# Patient Record
Sex: MALE | Race: WHITE | Employment: OTHER | URBAN - METROPOLITAN AREA
[De-identification: names, ages, dates, MRNs, and addresses within clinical notes are randomized per-mention and may not be internally consistent; named-entity substitution may affect disease eponyms.]

---

## 2018-02-20 ENCOUNTER — HOSPITAL ENCOUNTER (OUTPATIENT)
Age: 58
Setting detail: SPECIMEN
Discharge: HOME | End: 2018-02-20
Payer: COMMERCIAL

## 2018-02-20 LAB
ABSOLUTE EOS #: 0.08 K/UL (ref 0–0.44)
ABSOLUTE IMMATURE GRANULOCYTE: 0.03 K/UL (ref 0–0.3)
ABSOLUTE LYMPH #: 1.23 K/UL (ref 1.1–3.7)
ABSOLUTE MONO #: 0.53 K/UL (ref 0.1–1.2)
ALBUMIN SERPL-MCNC: 4.3 G/DL (ref 3.5–5.2)
ALBUMIN/GLOBULIN RATIO: 1.8 (ref 1–2.5)
ALP BLD-CCNC: 233 U/L (ref 40–129)
ALT SERPL-CCNC: 19 U/L (ref 5–41)
ANION GAP SERPL CALCULATED.3IONS-SCNC: 15 MMOL/L (ref 9–17)
AST SERPL-CCNC: 18 U/L
BASOPHILS # BLD: 1 % (ref 0–2)
BASOPHILS ABSOLUTE: 0.07 K/UL (ref 0–0.2)
BILIRUB SERPL-MCNC: 0.94 MG/DL (ref 0.3–1.2)
BILIRUBIN URINE: NEGATIVE
BUN BLDV-MCNC: 10 MG/DL (ref 6–20)
BUN/CREAT BLD: ABNORMAL (ref 9–20)
CALCIUM SERPL-MCNC: 9.2 MG/DL (ref 8.6–10.4)
CHLORIDE BLD-SCNC: 105 MMOL/L (ref 98–107)
CHOLESTEROL/HDL RATIO: 5.7
CHOLESTEROL: 170 MG/DL
CO2: 25 MMOL/L (ref 20–31)
COLOR: YELLOW
COMMENT UA: NORMAL
CREAT SERPL-MCNC: 1.27 MG/DL (ref 0.7–1.2)
DIFFERENTIAL TYPE: ABNORMAL
EOSINOPHILS RELATIVE PERCENT: 1 % (ref 1–4)
ESTIMATED AVERAGE GLUCOSE: 111 MG/DL
GFR AFRICAN AMERICAN: >60 ML/MIN
GFR NON-AFRICAN AMERICAN: 58 ML/MIN
GFR SERPL CREATININE-BSD FRML MDRD: ABNORMAL ML/MIN/{1.73_M2}
GFR SERPL CREATININE-BSD FRML MDRD: ABNORMAL ML/MIN/{1.73_M2}
GLUCOSE BLD-MCNC: 87 MG/DL (ref 70–99)
GLUCOSE URINE: NEGATIVE
HBA1C MFR BLD: 5.5 % (ref 4–6)
HCT VFR BLD CALC: 50.1 % (ref 40.7–50.3)
HDLC SERPL-MCNC: 30 MG/DL
HEMOGLOBIN: 15.8 G/DL (ref 13–17)
IMMATURE GRANULOCYTES: 1 %
KETONES, URINE: NEGATIVE
LDL CHOLESTEROL: 108 MG/DL (ref 0–130)
LEUKOCYTE ESTERASE, URINE: NEGATIVE
LYMPHOCYTES # BLD: 19 % (ref 24–43)
MCH RBC QN AUTO: 28.7 PG (ref 25.2–33.5)
MCHC RBC AUTO-ENTMCNC: 31.5 G/DL (ref 28.4–34.8)
MCV RBC AUTO: 91.1 FL (ref 82.6–102.9)
MONOCYTES # BLD: 8 % (ref 3–12)
NITRITE, URINE: NEGATIVE
NRBC AUTOMATED: 0 PER 100 WBC
PDW BLD-RTO: 15.6 % (ref 11.8–14.4)
PH UA: 5 (ref 5–8)
PLATELET # BLD: 167 K/UL (ref 138–453)
PLATELET ESTIMATE: ABNORMAL
PMV BLD AUTO: 10.5 FL (ref 8.1–13.5)
POTASSIUM SERPL-SCNC: 5.5 MMOL/L (ref 3.7–5.3)
PROTEIN UA: NEGATIVE
RBC # BLD: 5.5 M/UL (ref 4.21–5.77)
RBC # BLD: ABNORMAL 10*6/UL
SEG NEUTROPHILS: 70 % (ref 36–65)
SEGMENTED NEUTROPHILS ABSOLUTE COUNT: 4.59 K/UL (ref 1.5–8.1)
SODIUM BLD-SCNC: 145 MMOL/L (ref 135–144)
SPECIFIC GRAVITY UA: 1.01 (ref 1–1.03)
THYROXINE, FREE: 0.97 NG/DL (ref 0.93–1.7)
TOTAL PROTEIN: 6.7 G/DL (ref 6.4–8.3)
TRIGL SERPL-MCNC: 158 MG/DL
TSH SERPL DL<=0.05 MIU/L-ACNC: 1.93 MIU/L (ref 0.3–5)
TURBIDITY: CLEAR
URINE HGB: NEGATIVE
UROBILINOGEN, URINE: NORMAL
VITAMIN D 25-HYDROXY: 36.2 NG/ML (ref 30–100)
VLDLC SERPL CALC-MCNC: ABNORMAL MG/DL (ref 1–30)
WBC # BLD: 6.5 K/UL (ref 3.5–11.3)
WBC # BLD: ABNORMAL 10*3/UL

## 2018-02-22 ENCOUNTER — HOSPITAL ENCOUNTER (OUTPATIENT)
Age: 58
Setting detail: SPECIMEN
Discharge: HOME | End: 2018-02-22
Payer: COMMERCIAL

## 2018-02-22 LAB
ALBUMIN SERPL-MCNC: 4.5 G/DL (ref 3.5–5.2)
ALBUMIN/GLOBULIN RATIO: 2 (ref 1–2.5)
ALP BLD-CCNC: 230 U/L (ref 40–129)
ALT SERPL-CCNC: 19 U/L (ref 5–41)
ANION GAP SERPL CALCULATED.3IONS-SCNC: 17 MMOL/L (ref 9–17)
AST SERPL-CCNC: 17 U/L
BILIRUB SERPL-MCNC: 0.66 MG/DL (ref 0.3–1.2)
BUN BLDV-MCNC: 12 MG/DL (ref 6–20)
BUN/CREAT BLD: ABNORMAL (ref 9–20)
CALCIUM SERPL-MCNC: 9.1 MG/DL (ref 8.6–10.4)
CHLORIDE BLD-SCNC: 103 MMOL/L (ref 98–107)
CO2: 24 MMOL/L (ref 20–31)
CREAT SERPL-MCNC: 1.21 MG/DL (ref 0.7–1.2)
GFR AFRICAN AMERICAN: >60 ML/MIN
GFR NON-AFRICAN AMERICAN: >60 ML/MIN
GFR SERPL CREATININE-BSD FRML MDRD: ABNORMAL ML/MIN/{1.73_M2}
GFR SERPL CREATININE-BSD FRML MDRD: ABNORMAL ML/MIN/{1.73_M2}
GGT: 23 U/L (ref 8–61)
GLUCOSE BLD-MCNC: 54 MG/DL (ref 70–99)
HAV IGM SER IA-ACNC: NONREACTIVE
HEPATITIS B CORE IGM ANTIBODY: NONREACTIVE
HEPATITIS B SURFACE ANTIGEN: NONREACTIVE
HEPATITIS C ANTIBODY: NONREACTIVE
POTASSIUM SERPL-SCNC: 5.6 MMOL/L (ref 3.7–5.3)
SODIUM BLD-SCNC: 144 MMOL/L (ref 135–144)
TOTAL PROTEIN: 6.8 G/DL (ref 6.4–8.3)

## 2018-02-26 ENCOUNTER — HOSPITAL ENCOUNTER (OUTPATIENT)
Age: 58
Setting detail: SPECIMEN
Discharge: HOME | End: 2018-02-26
Payer: COMMERCIAL

## 2018-02-26 LAB
ALBUMIN SERPL-MCNC: 4.3 G/DL (ref 3.5–5.2)
ALBUMIN/GLOBULIN RATIO: 1.7 (ref 1–2.5)
ALP BLD-CCNC: 235 U/L (ref 40–129)
ALT SERPL-CCNC: 18 U/L (ref 5–41)
ANION GAP SERPL CALCULATED.3IONS-SCNC: 14 MMOL/L (ref 9–17)
AST SERPL-CCNC: 19 U/L
BILIRUB SERPL-MCNC: 0.62 MG/DL (ref 0.3–1.2)
BUN BLDV-MCNC: 10 MG/DL (ref 6–20)
BUN/CREAT BLD: ABNORMAL (ref 9–20)
CALCIUM SERPL-MCNC: 9 MG/DL (ref 8.6–10.4)
CHLORIDE BLD-SCNC: 102 MMOL/L (ref 98–107)
CO2: 26 MMOL/L (ref 20–31)
CREAT SERPL-MCNC: 1.23 MG/DL (ref 0.7–1.2)
GFR AFRICAN AMERICAN: >60 ML/MIN
GFR NON-AFRICAN AMERICAN: >60 ML/MIN
GFR SERPL CREATININE-BSD FRML MDRD: ABNORMAL ML/MIN/{1.73_M2}
GFR SERPL CREATININE-BSD FRML MDRD: ABNORMAL ML/MIN/{1.73_M2}
GLUCOSE BLD-MCNC: 66 MG/DL (ref 70–99)
POTASSIUM SERPL-SCNC: 5.9 MMOL/L (ref 3.7–5.3)
SODIUM BLD-SCNC: 142 MMOL/L (ref 135–144)
TOTAL PROTEIN: 6.8 G/DL (ref 6.4–8.3)

## 2018-03-06 ENCOUNTER — HOSPITAL ENCOUNTER (OUTPATIENT)
Age: 58
Setting detail: SPECIMEN
Discharge: HOME | End: 2018-03-06
Payer: COMMERCIAL

## 2018-03-06 LAB
ALBUMIN SERPL-MCNC: 4.5 G/DL (ref 3.5–5.2)
ALBUMIN/GLOBULIN RATIO: 2 (ref 1–2.5)
ALP BLD-CCNC: 257 U/L (ref 40–129)
ALT SERPL-CCNC: 30 U/L (ref 5–41)
ANION GAP SERPL CALCULATED.3IONS-SCNC: 14 MMOL/L (ref 9–17)
AST SERPL-CCNC: 21 U/L
BILIRUB SERPL-MCNC: 0.66 MG/DL (ref 0.3–1.2)
BUN BLDV-MCNC: 11 MG/DL (ref 6–20)
BUN/CREAT BLD: ABNORMAL (ref 9–20)
CALCIUM SERPL-MCNC: 9.6 MG/DL (ref 8.6–10.4)
CHLORIDE BLD-SCNC: 105 MMOL/L (ref 98–107)
CO2: 27 MMOL/L (ref 20–31)
CREAT SERPL-MCNC: 1.09 MG/DL (ref 0.7–1.2)
GFR AFRICAN AMERICAN: >60 ML/MIN
GFR NON-AFRICAN AMERICAN: >60 ML/MIN
GFR SERPL CREATININE-BSD FRML MDRD: ABNORMAL ML/MIN/{1.73_M2}
GFR SERPL CREATININE-BSD FRML MDRD: ABNORMAL ML/MIN/{1.73_M2}
GLUCOSE BLD-MCNC: 89 MG/DL (ref 70–99)
POTASSIUM SERPL-SCNC: 5 MMOL/L (ref 3.7–5.3)
SODIUM BLD-SCNC: 146 MMOL/L (ref 135–144)
TOTAL PROTEIN: 6.8 G/DL (ref 6.4–8.3)

## 2018-03-09 ENCOUNTER — HOSPITAL ENCOUNTER (OUTPATIENT)
Age: 58
Setting detail: SPECIMEN
Discharge: HOME | End: 2018-03-09
Payer: COMMERCIAL

## 2018-03-09 LAB
ALP BLD-CCNC: 269 U/L (ref 40–129)
PTH INTACT: 108.8 PG/ML (ref 15–65)

## 2018-03-12 LAB
ALBUMIN (CALCULATED): 4.5 G/DL (ref 3.2–5.2)
ALBUMIN PERCENT: 67 % (ref 45–65)
ALPHA 1 PERCENT: 3 % (ref 3–6)
ALPHA 2 PERCENT: 11 % (ref 6–13)
ALPHA-1-GLOBULIN: 0.2 G/DL (ref 0.1–0.4)
ALPHA-2-GLOBULIN: 0.7 G/DL (ref 0.5–0.9)
BETA GLOBULIN: 0.7 G/DL (ref 0.5–1.1)
BETA PERCENT: 11 % (ref 11–19)
FREE KAPPA/LAMBDA RATIO: 2.99 (ref 0.26–1.65)
GAMMA GLOBULIN %: 9 % (ref 9–20)
GAMMA GLOBULIN: 0.6 G/DL (ref 0.5–1.5)
KAPPA FREE LIGHT CHAINS QNT: 5.39 MG/DL (ref 0.37–1.94)
LAMBDA FREE LIGHT CHAINS QNT: 1.8 MG/DL (ref 0.57–2.63)
MITOCHONDRIAL ANTIBODY: 4.9 UNITS (ref 0–20)
PATHOLOGIST: ABNORMAL
PATHOLOGIST: ABNORMAL
PROTEIN ELECTROPHORESIS, SERUM: ABNORMAL
SERUM IFX INTERP: ABNORMAL
TOTAL PROT. SUM,%: 101 % (ref 98–102)
TOTAL PROT. SUM: 6.7 G/DL (ref 6.3–8.2)
TOTAL PROTEIN: 6.8 G/DL (ref 6.4–8.3)

## 2018-04-05 ENCOUNTER — INITIAL CONSULT (OUTPATIENT)
Dept: ONCOLOGY | Age: 58
End: 2018-04-05
Payer: COMMERCIAL

## 2018-04-05 ENCOUNTER — TELEPHONE (OUTPATIENT)
Dept: ONCOLOGY | Age: 58
End: 2018-04-05

## 2018-04-05 ENCOUNTER — HOSPITAL ENCOUNTER (OUTPATIENT)
Facility: MEDICAL CENTER | Age: 58
Discharge: HOME | End: 2018-04-05
Payer: COMMERCIAL

## 2018-04-05 VITALS
BODY MASS INDEX: 30.81 KG/M2 | RESPIRATION RATE: 20 BRPM | WEIGHT: 191.7 LBS | SYSTOLIC BLOOD PRESSURE: 98 MMHG | HEART RATE: 61 BPM | DIASTOLIC BLOOD PRESSURE: 67 MMHG | TEMPERATURE: 97.6 F | HEIGHT: 66 IN

## 2018-04-05 DIAGNOSIS — N18.30 CHRONIC RENAL IMPAIRMENT, STAGE 3 (MODERATE) (HCC): ICD-10-CM

## 2018-04-05 DIAGNOSIS — D89.2 PARAPROTEINEMIA: Primary | ICD-10-CM

## 2018-04-05 DIAGNOSIS — D47.2 MONOCLONAL GAMMOPATHY: ICD-10-CM

## 2018-04-05 DIAGNOSIS — D89.2 PARAPROTEINEMIA: ICD-10-CM

## 2018-04-05 LAB
ABSOLUTE EOS #: 0.1 K/UL (ref 0–0.4)
ABSOLUTE IMMATURE GRANULOCYTE: ABNORMAL K/UL (ref 0–0.3)
ABSOLUTE LYMPH #: 1.2 K/UL (ref 1–4.8)
ABSOLUTE MONO #: 0.6 K/UL (ref 0.2–0.8)
ALBUMIN SERPL-MCNC: 4.2 G/DL (ref 3.5–5.2)
ALBUMIN/GLOBULIN RATIO: ABNORMAL (ref 1–2.5)
ALP BLD-CCNC: 211 U/L (ref 40–129)
ALT SERPL-CCNC: 16 U/L (ref 5–41)
ANION GAP SERPL CALCULATED.3IONS-SCNC: 10 MMOL/L (ref 9–17)
AST SERPL-CCNC: 12 U/L
BASOPHILS # BLD: 1 % (ref 0–2)
BASOPHILS ABSOLUTE: 0 K/UL (ref 0–0.2)
BILIRUB SERPL-MCNC: 0.66 MG/DL (ref 0.3–1.2)
BUN BLDV-MCNC: 12 MG/DL (ref 6–20)
BUN/CREAT BLD: 9 (ref 9–20)
CALCIUM SERPL-MCNC: 9.1 MG/DL (ref 8.6–10.4)
CHLORIDE BLD-SCNC: 103 MMOL/L (ref 98–107)
CO2: 27 MMOL/L (ref 20–31)
CREAT SERPL-MCNC: 1.29 MG/DL (ref 0.7–1.2)
DIFFERENTIAL TYPE: ABNORMAL
EOSINOPHILS RELATIVE PERCENT: 1 % (ref 1–4)
FREE KAPPA/LAMBDA RATIO: 3.75 (ref 0.26–1.65)
GFR AFRICAN AMERICAN: >60 ML/MIN
GFR NON-AFRICAN AMERICAN: 57 ML/MIN
GFR SERPL CREATININE-BSD FRML MDRD: ABNORMAL ML/MIN/{1.73_M2}
GFR SERPL CREATININE-BSD FRML MDRD: ABNORMAL ML/MIN/{1.73_M2}
GLUCOSE BLD-MCNC: 91 MG/DL (ref 70–99)
HCT VFR BLD CALC: 48.5 % (ref 41–53)
HEMOGLOBIN: 15.9 G/DL (ref 13.5–17.5)
IGA: 161 MG/DL (ref 70–400)
IGG: 849 MG/DL (ref 700–1600)
IGM: 41 MG/DL (ref 40–230)
IMMATURE GRANULOCYTES: ABNORMAL %
KAPPA FREE LIGHT CHAINS QNT: 8.07 MG/DL (ref 0.37–1.94)
LACTATE DEHYDROGENASE: 156 U/L (ref 135–225)
LAMBDA FREE LIGHT CHAINS QNT: 2.15 MG/DL (ref 0.57–2.63)
LYMPHOCYTES # BLD: 14 % (ref 24–44)
MCH RBC QN AUTO: 29.5 PG (ref 26–34)
MCHC RBC AUTO-ENTMCNC: 32.9 G/DL (ref 31–37)
MCV RBC AUTO: 89.9 FL (ref 80–100)
MONOCYTES # BLD: 7 % (ref 1–7)
NRBC AUTOMATED: ABNORMAL PER 100 WBC
PDW BLD-RTO: 15.9 % (ref 11.5–14.5)
PLATELET # BLD: 185 K/UL (ref 130–400)
PLATELET ESTIMATE: ABNORMAL
PMV BLD AUTO: 8.7 FL (ref 6–12)
POTASSIUM SERPL-SCNC: 4.6 MMOL/L (ref 3.7–5.3)
RBC # BLD: 5.39 M/UL (ref 4.5–5.9)
RBC # BLD: ABNORMAL 10*6/UL
SEG NEUTROPHILS: 77 % (ref 36–66)
SEGMENTED NEUTROPHILS ABSOLUTE COUNT: 6.5 K/UL (ref 1.8–7.7)
SODIUM BLD-SCNC: 140 MMOL/L (ref 135–144)
TOTAL PROTEIN: 6.8 G/DL (ref 6.4–8.3)
WBC # BLD: 8.4 K/UL (ref 3.5–11)
WBC # BLD: ABNORMAL 10*3/UL

## 2018-04-05 PROCEDURE — 83883 ASSAY NEPHELOMETRY NOT SPEC: CPT

## 2018-04-05 PROCEDURE — 84165 PROTEIN E-PHORESIS SERUM: CPT

## 2018-04-05 PROCEDURE — 83615 LACTATE (LD) (LDH) ENZYME: CPT

## 2018-04-05 PROCEDURE — G8417 CALC BMI ABV UP PARAM F/U: HCPCS | Performed by: INTERNAL MEDICINE

## 2018-04-05 PROCEDURE — 82784 ASSAY IGA/IGD/IGG/IGM EACH: CPT

## 2018-04-05 PROCEDURE — G8427 DOCREV CUR MEDS BY ELIG CLIN: HCPCS | Performed by: INTERNAL MEDICINE

## 2018-04-05 PROCEDURE — 82232 ASSAY OF BETA-2 PROTEIN: CPT

## 2018-04-05 PROCEDURE — 3017F COLORECTAL CA SCREEN DOC REV: CPT | Performed by: INTERNAL MEDICINE

## 2018-04-05 PROCEDURE — 84155 ASSAY OF PROTEIN SERUM: CPT

## 2018-04-05 PROCEDURE — 80053 COMPREHEN METABOLIC PANEL: CPT

## 2018-04-05 PROCEDURE — 36415 COLL VENOUS BLD VENIPUNCTURE: CPT

## 2018-04-05 PROCEDURE — 86334 IMMUNOFIX E-PHORESIS SERUM: CPT

## 2018-04-05 PROCEDURE — 85025 COMPLETE CBC W/AUTO DIFF WBC: CPT

## 2018-04-05 PROCEDURE — 99201 HC NEW PT, E/M LEVEL 1: CPT

## 2018-04-05 PROCEDURE — 99244 OFF/OP CNSLTJ NEW/EST MOD 40: CPT | Performed by: INTERNAL MEDICINE

## 2018-04-05 RX ORDER — RANITIDINE 150 MG/1
150 TABLET ORAL 2 TIMES DAILY
Status: ON HOLD | COMMUNITY
End: 2018-05-29 | Stop reason: HOSPADM

## 2018-04-05 RX ORDER — FOLIC ACID 1 MG/1
1 TABLET ORAL DAILY
Status: ON HOLD | COMMUNITY
End: 2019-09-10

## 2018-04-05 RX ORDER — FEXOFENADINE HYDROCHLORIDE 60 MG/1
60 TABLET, FILM COATED ORAL DAILY
Status: ON HOLD | COMMUNITY
End: 2018-05-22 | Stop reason: ALTCHOICE

## 2018-04-05 RX ORDER — POLYETHYLENE GLYCOL 3350 17 G/17G
17 POWDER, FOR SOLUTION ORAL DAILY PRN
Status: ON HOLD | COMMUNITY
End: 2018-05-29 | Stop reason: HOSPADM

## 2018-04-05 RX ORDER — HYDROXYZINE 50 MG/1
50 TABLET, FILM COATED ORAL 3 TIMES DAILY PRN
Status: ON HOLD | COMMUNITY
End: 2018-05-29 | Stop reason: HOSPADM

## 2018-04-05 RX ORDER — BENZONATATE 100 MG/1
100 CAPSULE ORAL 3 TIMES DAILY PRN
Status: ON HOLD | COMMUNITY
End: 2018-05-22 | Stop reason: ALTCHOICE

## 2018-04-05 RX ORDER — DIAPER,BRIEF,INFANT-TODD,DISP
EACH MISCELLANEOUS 2 TIMES DAILY
Status: ON HOLD | COMMUNITY
End: 2019-09-10

## 2018-04-05 RX ORDER — ALUMINUM ZIRCONIUM OCTACHLOROHYDREX GLY 16 G/100G
1 GEL TOPICAL DAILY
Status: ON HOLD | COMMUNITY
End: 2018-05-22 | Stop reason: ALTCHOICE

## 2018-04-05 RX ORDER — SENNA PLUS 8.6 MG/1
1 TABLET ORAL 2 TIMES DAILY
Status: ON HOLD | COMMUNITY
End: 2018-05-22 | Stop reason: ALTCHOICE

## 2018-04-06 LAB — BETA-2 MICROGLOBULIN: 3 MG/L (ref 0.6–2.4)

## 2018-04-08 LAB
ALBUMIN (CALCULATED): 4.6 G/DL (ref 3.2–5.2)
ALBUMIN PERCENT: 68 % (ref 45–65)
ALPHA 1 PERCENT: 2 % (ref 3–6)
ALPHA 2 PERCENT: 9 % (ref 6–13)
ALPHA-1-GLOBULIN: 0.2 G/DL (ref 0.1–0.4)
ALPHA-2-GLOBULIN: 0.6 G/DL (ref 0.5–0.9)
BETA GLOBULIN: 0.7 G/DL (ref 0.5–1.1)
BETA PERCENT: 11 % (ref 11–19)
GAMMA GLOBULIN %: 10 % (ref 9–20)
GAMMA GLOBULIN: 0.7 G/DL (ref 0.5–1.5)
PATHOLOGIST: ABNORMAL
PATHOLOGIST: NORMAL
PROTEIN ELECTROPHORESIS, SERUM: ABNORMAL
SERUM IFX INTERP: NORMAL
TOTAL PROT. SUM,%: 100 % (ref 98–102)
TOTAL PROT. SUM: 6.8 G/DL (ref 6.3–8.2)
TOTAL PROTEIN: 6.8 G/DL (ref 6.4–8.3)

## 2018-04-11 ENCOUNTER — HOSPITAL ENCOUNTER (OUTPATIENT)
Facility: MEDICAL CENTER | Age: 58
End: 2018-04-11
Payer: COMMERCIAL

## 2018-04-12 ENCOUNTER — OFFICE VISIT (OUTPATIENT)
Dept: ONCOLOGY | Age: 58
End: 2018-04-12
Payer: COMMERCIAL

## 2018-04-12 ENCOUNTER — TELEPHONE (OUTPATIENT)
Dept: ONCOLOGY | Age: 58
End: 2018-04-12

## 2018-04-12 VITALS
SYSTOLIC BLOOD PRESSURE: 95 MMHG | RESPIRATION RATE: 20 BRPM | HEART RATE: 80 BPM | WEIGHT: 191.8 LBS | DIASTOLIC BLOOD PRESSURE: 65 MMHG | TEMPERATURE: 96 F | BODY MASS INDEX: 30.96 KG/M2

## 2018-04-12 DIAGNOSIS — D47.2 MGUS (MONOCLONAL GAMMOPATHY OF UNKNOWN SIGNIFICANCE): Primary | ICD-10-CM

## 2018-04-12 PROCEDURE — 3017F COLORECTAL CA SCREEN DOC REV: CPT | Performed by: INTERNAL MEDICINE

## 2018-04-12 PROCEDURE — 1036F TOBACCO NON-USER: CPT | Performed by: INTERNAL MEDICINE

## 2018-04-12 PROCEDURE — G8417 CALC BMI ABV UP PARAM F/U: HCPCS | Performed by: INTERNAL MEDICINE

## 2018-04-12 PROCEDURE — G8427 DOCREV CUR MEDS BY ELIG CLIN: HCPCS | Performed by: INTERNAL MEDICINE

## 2018-04-12 PROCEDURE — 99214 OFFICE O/P EST MOD 30 MIN: CPT | Performed by: INTERNAL MEDICINE

## 2018-04-12 PROCEDURE — 99211 OFF/OP EST MAY X REQ PHY/QHP: CPT

## 2018-05-21 ENCOUNTER — HOSPITAL ENCOUNTER (INPATIENT)
Age: 58
LOS: 8 days | Discharge: HOME | DRG: 751 | End: 2018-05-29
Attending: EMERGENCY MEDICINE | Admitting: PSYCHIATRY & NEUROLOGY
Payer: COMMERCIAL

## 2018-05-21 DIAGNOSIS — R45.851 SUICIDAL IDEATION: ICD-10-CM

## 2018-05-21 DIAGNOSIS — F32.A DEPRESSION, UNSPECIFIED DEPRESSION TYPE: Primary | ICD-10-CM

## 2018-05-21 LAB
ABSOLUTE EOS #: 0.1 K/UL (ref 0–0.4)
ABSOLUTE IMMATURE GRANULOCYTE: ABNORMAL K/UL (ref 0–0.3)
ABSOLUTE LYMPH #: 1.2 K/UL (ref 1–4.8)
ABSOLUTE MONO #: 0.6 K/UL (ref 0.1–1.3)
ANION GAP SERPL CALCULATED.3IONS-SCNC: 12 MMOL/L (ref 9–17)
BASOPHILS # BLD: 1 % (ref 0–2)
BASOPHILS ABSOLUTE: 0.1 K/UL (ref 0–0.2)
BUN BLDV-MCNC: 13 MG/DL (ref 6–20)
BUN/CREAT BLD: ABNORMAL (ref 9–20)
CALCIUM SERPL-MCNC: 9.4 MG/DL (ref 8.6–10.4)
CHLORIDE BLD-SCNC: 103 MMOL/L (ref 98–107)
CO2: 26 MMOL/L (ref 20–31)
CREAT SERPL-MCNC: 1.13 MG/DL (ref 0.7–1.2)
DIFFERENTIAL TYPE: ABNORMAL
EOSINOPHILS RELATIVE PERCENT: 1 % (ref 0–4)
GFR AFRICAN AMERICAN: >60 ML/MIN
GFR NON-AFRICAN AMERICAN: >60 ML/MIN
GFR SERPL CREATININE-BSD FRML MDRD: ABNORMAL ML/MIN/{1.73_M2}
GFR SERPL CREATININE-BSD FRML MDRD: ABNORMAL ML/MIN/{1.73_M2}
GLUCOSE BLD-MCNC: 150 MG/DL (ref 70–99)
HCT VFR BLD CALC: 48.5 % (ref 41–53)
HEMOGLOBIN: 16 G/DL (ref 13.5–17.5)
IMMATURE GRANULOCYTES: ABNORMAL %
LYMPHOCYTES # BLD: 16 % (ref 24–44)
MCH RBC QN AUTO: 29.1 PG (ref 26–34)
MCHC RBC AUTO-ENTMCNC: 33 G/DL (ref 31–37)
MCV RBC AUTO: 88.1 FL (ref 80–100)
MONOCYTES # BLD: 8 % (ref 1–7)
NRBC AUTOMATED: ABNORMAL PER 100 WBC
PDW BLD-RTO: 15.4 % (ref 11.5–14.9)
PLATELET # BLD: 182 K/UL (ref 150–450)
PLATELET ESTIMATE: ABNORMAL
PMV BLD AUTO: 8.3 FL (ref 6–12)
POTASSIUM SERPL-SCNC: 4.2 MMOL/L (ref 3.7–5.3)
RBC # BLD: 5.5 M/UL (ref 4.5–5.9)
RBC # BLD: ABNORMAL 10*6/UL
SEG NEUTROPHILS: 74 % (ref 36–66)
SEGMENTED NEUTROPHILS ABSOLUTE COUNT: 5.5 K/UL (ref 1.3–9.1)
SODIUM BLD-SCNC: 141 MMOL/L (ref 135–144)
WBC # BLD: 7.5 K/UL (ref 3.5–11)
WBC # BLD: ABNORMAL 10*3/UL

## 2018-05-21 PROCEDURE — 6370000000 HC RX 637 (ALT 250 FOR IP): Performed by: PSYCHIATRY & NEUROLOGY

## 2018-05-21 PROCEDURE — 1240000000 HC EMOTIONAL WELLNESS R&B

## 2018-05-21 PROCEDURE — 85025 COMPLETE CBC W/AUTO DIFF WBC: CPT

## 2018-05-21 PROCEDURE — 99285 EMERGENCY DEPT VISIT HI MDM: CPT

## 2018-05-21 PROCEDURE — 36415 COLL VENOUS BLD VENIPUNCTURE: CPT

## 2018-05-21 PROCEDURE — 80048 BASIC METABOLIC PNL TOTAL CA: CPT

## 2018-05-21 RX ORDER — FAMOTIDINE 20 MG/1
40 TABLET, FILM COATED ORAL 2 TIMES DAILY
Status: DISCONTINUED | OUTPATIENT
Start: 2018-05-21 | End: 2018-05-29 | Stop reason: HOSPADM

## 2018-05-21 RX ORDER — MAGNESIUM HYDROXIDE/ALUMINUM HYDROXICE/SIMETHICONE 120; 1200; 1200 MG/30ML; MG/30ML; MG/30ML
30 SUSPENSION ORAL EVERY 6 HOURS PRN
Status: DISCONTINUED | OUTPATIENT
Start: 2018-05-21 | End: 2018-05-29 | Stop reason: HOSPADM

## 2018-05-21 RX ORDER — CETIRIZINE HYDROCHLORIDE 10 MG/1
10 TABLET ORAL DAILY
Status: DISCONTINUED | OUTPATIENT
Start: 2018-05-22 | End: 2018-05-29 | Stop reason: HOSPADM

## 2018-05-21 RX ORDER — PANTOPRAZOLE SODIUM 40 MG/1
40 TABLET, DELAYED RELEASE ORAL DAILY
Status: DISCONTINUED | OUTPATIENT
Start: 2018-05-22 | End: 2018-05-29 | Stop reason: HOSPADM

## 2018-05-21 RX ORDER — FOLIC ACID 1 MG/1
1 TABLET ORAL DAILY
Status: DISCONTINUED | OUTPATIENT
Start: 2018-05-22 | End: 2018-05-29 | Stop reason: HOSPADM

## 2018-05-21 RX ORDER — WARFARIN SODIUM 2 MG/1
2 TABLET ORAL
Status: DISCONTINUED | OUTPATIENT
Start: 2018-05-23 | End: 2018-05-22

## 2018-05-21 RX ORDER — SENNA PLUS 8.6 MG/1
1 TABLET ORAL 2 TIMES DAILY
Status: DISCONTINUED | OUTPATIENT
Start: 2018-05-21 | End: 2018-05-22

## 2018-05-21 RX ORDER — TAMSULOSIN HYDROCHLORIDE 0.4 MG/1
0.4 CAPSULE ORAL DAILY
Status: DISCONTINUED | OUTPATIENT
Start: 2018-05-22 | End: 2018-05-29 | Stop reason: HOSPADM

## 2018-05-21 RX ORDER — LUBIPROSTONE 24 UG/1
24 CAPSULE, GELATIN COATED ORAL 2 TIMES DAILY WITH MEALS
Status: DISCONTINUED | OUTPATIENT
Start: 2018-05-22 | End: 2018-05-29 | Stop reason: HOSPADM

## 2018-05-21 RX ORDER — BENZTROPINE MESYLATE 1 MG/ML
2 INJECTION INTRAMUSCULAR; INTRAVENOUS 2 TIMES DAILY PRN
Status: DISCONTINUED | OUTPATIENT
Start: 2018-05-21 | End: 2018-05-28

## 2018-05-21 RX ORDER — POLYETHYLENE GLYCOL 3350 17 G/17G
17 POWDER, FOR SOLUTION ORAL DAILY PRN
Status: DISCONTINUED | OUTPATIENT
Start: 2018-05-21 | End: 2018-05-29 | Stop reason: HOSPADM

## 2018-05-21 RX ORDER — DOCUSATE SODIUM 100 MG/1
100 CAPSULE, LIQUID FILLED ORAL 2 TIMES DAILY
Status: DISCONTINUED | OUTPATIENT
Start: 2018-05-21 | End: 2018-05-24

## 2018-05-21 RX ORDER — TRAZODONE HYDROCHLORIDE 50 MG/1
50 TABLET ORAL NIGHTLY PRN
Status: DISCONTINUED | OUTPATIENT
Start: 2018-05-22 | End: 2018-05-23

## 2018-05-21 RX ORDER — BENZONATATE 100 MG/1
100 CAPSULE ORAL 3 TIMES DAILY PRN
Status: DISCONTINUED | OUTPATIENT
Start: 2018-05-21 | End: 2018-05-29 | Stop reason: HOSPADM

## 2018-05-21 RX ORDER — NICOTINE 21 MG/24HR
1 PATCH, TRANSDERMAL 24 HOURS TRANSDERMAL DAILY
Status: DISCONTINUED | OUTPATIENT
Start: 2018-05-22 | End: 2018-05-29 | Stop reason: HOSPADM

## 2018-05-21 RX ORDER — ASPIRIN 81 MG/1
81 TABLET, CHEWABLE ORAL DAILY
Status: DISCONTINUED | OUTPATIENT
Start: 2018-05-22 | End: 2018-05-29 | Stop reason: HOSPADM

## 2018-05-21 RX ORDER — LACTOBACILLUS RHAMNOSUS GG 10B CELL
1 CAPSULE ORAL DAILY
Status: DISCONTINUED | OUTPATIENT
Start: 2018-05-22 | End: 2018-05-22

## 2018-05-21 RX ORDER — LANOLIN ALCOHOL/MO/W.PET/CERES
1000 CREAM (GRAM) TOPICAL DAILY
Status: DISCONTINUED | OUTPATIENT
Start: 2018-05-22 | End: 2018-05-29 | Stop reason: HOSPADM

## 2018-05-21 RX ORDER — DIAPER,BRIEF,INFANT-TODD,DISP
EACH MISCELLANEOUS 2 TIMES DAILY
Status: DISCONTINUED | OUTPATIENT
Start: 2018-05-21 | End: 2018-05-29 | Stop reason: HOSPADM

## 2018-05-21 RX ORDER — ACETAMINOPHEN 325 MG/1
650 TABLET ORAL EVERY 4 HOURS PRN
Status: DISCONTINUED | OUTPATIENT
Start: 2018-05-21 | End: 2018-05-22

## 2018-05-21 RX ORDER — HYDROXYZINE HYDROCHLORIDE 25 MG/1
50 TABLET, FILM COATED ORAL 3 TIMES DAILY PRN
Status: DISCONTINUED | OUTPATIENT
Start: 2018-05-21 | End: 2018-05-29 | Stop reason: HOSPADM

## 2018-05-21 RX ORDER — ATORVASTATIN CALCIUM 20 MG/1
20 TABLET, FILM COATED ORAL DAILY
Status: DISCONTINUED | OUTPATIENT
Start: 2018-05-22 | End: 2018-05-22

## 2018-05-21 RX ORDER — WARFARIN SODIUM 1 MG/1
1 TABLET ORAL
Status: DISCONTINUED | OUTPATIENT
Start: 2018-05-22 | End: 2018-05-22

## 2018-05-21 RX ADMIN — HYDROXYZINE HYDROCHLORIDE 50 MG: 25 TABLET, FILM COATED ORAL at 23:42

## 2018-05-21 RX ADMIN — SENNOSIDES 8.6 MG: 8.6 TABLET, FILM COATED ORAL at 23:42

## 2018-05-21 RX ADMIN — FAMOTIDINE 40 MG: 20 TABLET ORAL at 23:42

## 2018-05-21 ASSESSMENT — ENCOUNTER SYMPTOMS
RHINORRHEA: 0
COUGH: 0
DIARRHEA: 0
SHORTNESS OF BREATH: 0
ABDOMINAL PAIN: 0
VOMITING: 0
EYE DISCHARGE: 0
EYE PAIN: 0
BACK PAIN: 0
EYE REDNESS: 0

## 2018-05-21 ASSESSMENT — SLEEP AND FATIGUE QUESTIONNAIRES
DIFFICULTY STAYING ASLEEP: YES
DIFFICULTY FALLING ASLEEP: YES
RESTFUL SLEEP: NO
DIFFICULTY ARISING: NO
SLEEP PATTERN: DIFFICULTY FALLING ASLEEP;DISTURBED/INTERRUPTED SLEEP;EARLY AWAKENING;RESTLESSNESS;INSOMNIA
AVERAGE NUMBER OF SLEEP HOURS: 2
DO YOU HAVE DIFFICULTY SLEEPING: YES
DO YOU USE A SLEEP AID: YES

## 2018-05-21 ASSESSMENT — PATIENT HEALTH QUESTIONNAIRE - PHQ9: SUM OF ALL RESPONSES TO PHQ QUESTIONS 1-9: 19

## 2018-05-21 ASSESSMENT — PAIN - FUNCTIONAL ASSESSMENT: PAIN_FUNCTIONAL_ASSESSMENT: 0-10

## 2018-05-21 ASSESSMENT — LIFESTYLE VARIABLES: HISTORY_ALCOHOL_USE: YES

## 2018-05-22 LAB
CHOLESTEROL/HDL RATIO: 7.4
CHOLESTEROL: 215 MG/DL
ESTIMATED AVERAGE GLUCOSE: 103 MG/DL
HBA1C MFR BLD: 5.2 % (ref 4–6)
HDLC SERPL-MCNC: 29 MG/DL
INR BLD: 2.2
LDL CHOLESTEROL: 154 MG/DL (ref 0–130)
PROTHROMBIN TIME: 22.5 SEC (ref 9.7–12)
THYROXINE, FREE: 1.1 NG/DL (ref 0.93–1.7)
TRIGL SERPL-MCNC: 162 MG/DL
TSH SERPL DL<=0.05 MIU/L-ACNC: 2.21 MIU/L (ref 0.3–5)
VLDLC SERPL CALC-MCNC: ABNORMAL MG/DL (ref 1–30)

## 2018-05-22 PROCEDURE — 83036 HEMOGLOBIN GLYCOSYLATED A1C: CPT

## 2018-05-22 PROCEDURE — 6370000000 HC RX 637 (ALT 250 FOR IP): Performed by: PSYCHIATRY & NEUROLOGY

## 2018-05-22 PROCEDURE — 84439 ASSAY OF FREE THYROXINE: CPT

## 2018-05-22 PROCEDURE — 84443 ASSAY THYROID STIM HORMONE: CPT

## 2018-05-22 PROCEDURE — 6370000000 HC RX 637 (ALT 250 FOR IP): Performed by: REGISTERED NURSE

## 2018-05-22 PROCEDURE — 85610 PROTHROMBIN TIME: CPT

## 2018-05-22 PROCEDURE — 90792 PSYCH DIAG EVAL W/MED SRVCS: CPT | Performed by: REGISTERED NURSE

## 2018-05-22 PROCEDURE — 1240000000 HC EMOTIONAL WELLNESS R&B

## 2018-05-22 PROCEDURE — 80061 LIPID PANEL: CPT

## 2018-05-22 PROCEDURE — 36415 COLL VENOUS BLD VENIPUNCTURE: CPT

## 2018-05-22 RX ORDER — WARFARIN SODIUM 2 MG/1
2 TABLET ORAL
Status: DISCONTINUED | OUTPATIENT
Start: 2018-05-23 | End: 2018-05-29 | Stop reason: HOSPADM

## 2018-05-22 RX ORDER — ACETAMINOPHEN 325 MG/1
650 TABLET ORAL EVERY 6 HOURS PRN
Status: DISCONTINUED | OUTPATIENT
Start: 2018-05-22 | End: 2018-05-29 | Stop reason: HOSPADM

## 2018-05-22 RX ORDER — GUAIFENESIN 100 MG/5ML
100 SYRUP ORAL 3 TIMES DAILY PRN
Status: ON HOLD | COMMUNITY
End: 2018-05-29 | Stop reason: HOSPADM

## 2018-05-22 RX ORDER — ARIPIPRAZOLE 30 MG/1
30 TABLET ORAL DAILY
Status: DISCONTINUED | OUTPATIENT
Start: 2018-05-22 | End: 2018-05-29 | Stop reason: HOSPADM

## 2018-05-22 RX ORDER — FEXOFENADINE HYDROCHLORIDE 60 MG/1
60 TABLET, FILM COATED ORAL DAILY
COMMUNITY
End: 2019-07-11

## 2018-05-22 RX ORDER — MIRTAZAPINE 30 MG/1
30 TABLET, FILM COATED ORAL NIGHTLY
Status: DISCONTINUED | OUTPATIENT
Start: 2018-05-22 | End: 2018-05-26

## 2018-05-22 RX ORDER — MIDODRINE HYDROCHLORIDE 5 MG/1
5 TABLET ORAL 3 TIMES DAILY
Status: DISCONTINUED | OUTPATIENT
Start: 2018-05-22 | End: 2018-05-29 | Stop reason: HOSPADM

## 2018-05-22 RX ORDER — PSEUDOEPHEDRINE HYDROCHLORIDE 30 MG/1
30 TABLET ORAL 2 TIMES DAILY PRN
Status: ON HOLD | COMMUNITY
End: 2018-05-29 | Stop reason: HOSPADM

## 2018-05-22 RX ORDER — BENZTROPINE MESYLATE 0.5 MG/1
0.5 TABLET ORAL 2 TIMES DAILY
Status: DISCONTINUED | OUTPATIENT
Start: 2018-05-22 | End: 2018-05-29 | Stop reason: HOSPADM

## 2018-05-22 RX ORDER — GUAIFENESIN 100 MG/5ML
100 SOLUTION ORAL 3 TIMES DAILY PRN
Status: DISCONTINUED | OUTPATIENT
Start: 2018-05-22 | End: 2018-05-29 | Stop reason: HOSPADM

## 2018-05-22 RX ORDER — MIDODRINE HYDROCHLORIDE 5 MG/1
10 TABLET ORAL 3 TIMES DAILY
Status: ON HOLD | COMMUNITY
End: 2019-09-10

## 2018-05-22 RX ORDER — DIPHENHYDRAMINE HCL 25 MG
25 CAPSULE ORAL 2 TIMES DAILY PRN
Status: ON HOLD | COMMUNITY
End: 2018-05-29 | Stop reason: HOSPADM

## 2018-05-22 RX ORDER — GABAPENTIN 600 MG/1
600 TABLET ORAL 3 TIMES DAILY
Status: DISCONTINUED | OUTPATIENT
Start: 2018-05-22 | End: 2018-05-29 | Stop reason: HOSPADM

## 2018-05-22 RX ORDER — WARFARIN SODIUM 1 MG/1
1 TABLET ORAL
Status: DISCONTINUED | OUTPATIENT
Start: 2018-05-22 | End: 2018-05-29 | Stop reason: HOSPADM

## 2018-05-22 RX ORDER — DIPHENHYDRAMINE HCL 25 MG
25 TABLET ORAL 2 TIMES DAILY PRN
Status: DISCONTINUED | OUTPATIENT
Start: 2018-05-22 | End: 2018-05-29 | Stop reason: HOSPADM

## 2018-05-22 RX ORDER — ACETAMINOPHEN 325 MG/1
650 TABLET ORAL EVERY 6 HOURS PRN
Status: ON HOLD | COMMUNITY
End: 2018-05-29 | Stop reason: HOSPADM

## 2018-05-22 RX ORDER — PSEUDOEPHEDRINE HYDROCHLORIDE 30 MG/1
30 TABLET ORAL 2 TIMES DAILY PRN
Status: DISCONTINUED | OUTPATIENT
Start: 2018-05-22 | End: 2018-05-29 | Stop reason: HOSPADM

## 2018-05-22 RX ADMIN — GABAPENTIN 600 MG: 600 TABLET, FILM COATED ORAL at 20:48

## 2018-05-22 RX ADMIN — NYSTATIN 500000 UNITS: 100000 SUSPENSION ORAL at 08:44

## 2018-05-22 RX ADMIN — Medication 1 CAPSULE: at 08:39

## 2018-05-22 RX ADMIN — BENZTROPINE MESYLATE 0.5 MG: 0.5 TABLET ORAL at 20:47

## 2018-05-22 RX ADMIN — LUBIPROSTONE 24 MCG: 24 CAPSULE, GELATIN COATED ORAL at 08:40

## 2018-05-22 RX ADMIN — GABAPENTIN 600 MG: 600 TABLET, FILM COATED ORAL at 15:21

## 2018-05-22 RX ADMIN — PANTOPRAZOLE SODIUM 40 MG: 40 TABLET, DELAYED RELEASE ORAL at 08:39

## 2018-05-22 RX ADMIN — TAMSULOSIN HYDROCHLORIDE 0.4 MG: 0.4 CAPSULE ORAL at 08:39

## 2018-05-22 RX ADMIN — WARFARIN SODIUM 1 MG: 1 TABLET ORAL at 18:12

## 2018-05-22 RX ADMIN — FAMOTIDINE 40 MG: 20 TABLET ORAL at 20:47

## 2018-05-22 RX ADMIN — ARIPIPRAZOLE 30 MG: 30 TABLET ORAL at 15:21

## 2018-05-22 RX ADMIN — SENNOSIDES 8.6 MG: 8.6 TABLET, FILM COATED ORAL at 08:39

## 2018-05-22 RX ADMIN — FAMOTIDINE 40 MG: 20 TABLET ORAL at 08:39

## 2018-05-22 RX ADMIN — ASPIRIN 81 MG 81 MG: 81 TABLET ORAL at 08:39

## 2018-05-22 RX ADMIN — METOPROLOL TARTRATE 25 MG: 25 TABLET ORAL at 08:39

## 2018-05-22 RX ADMIN — BENZTROPINE MESYLATE 0.5 MG: 0.5 TABLET ORAL at 15:21

## 2018-05-22 RX ADMIN — HYDROXYZINE HYDROCHLORIDE 50 MG: 25 TABLET, FILM COATED ORAL at 20:48

## 2018-05-22 RX ADMIN — VITAMIN D, TAB 1000IU (100/BT) 4000 UNITS: 25 TAB at 09:01

## 2018-05-22 RX ADMIN — MIDODRINE HYDROCHLORIDE 5 MG: 5 TABLET ORAL at 15:22

## 2018-05-22 RX ADMIN — MIDODRINE HYDROCHLORIDE 5 MG: 5 TABLET ORAL at 20:48

## 2018-05-22 RX ADMIN — FOLIC ACID 1 MG: 1 TABLET ORAL at 08:39

## 2018-05-22 RX ADMIN — CYANOCOBALAMIN TAB 1000 MCG 1000 MCG: 1000 TAB at 08:40

## 2018-05-22 RX ADMIN — ATORVASTATIN CALCIUM 20 MG: 20 TABLET, FILM COATED ORAL at 08:39

## 2018-05-22 RX ADMIN — DOCUSATE SODIUM 100 MG: 100 CAPSULE, LIQUID FILLED ORAL at 08:39

## 2018-05-22 RX ADMIN — LUBIPROSTONE 24 MCG: 24 CAPSULE, GELATIN COATED ORAL at 15:21

## 2018-05-22 RX ADMIN — MIRTAZAPINE 30 MG: 30 TABLET, FILM COATED ORAL at 20:47

## 2018-05-22 RX ADMIN — PSEUDOEPHEDRINE HCL 30 MG: 30 TABLET, FILM COATED ORAL at 20:47

## 2018-05-22 RX ADMIN — CETIRIZINE HYDROCHLORIDE 10 MG: 10 TABLET, FILM COATED ORAL at 08:39

## 2018-05-22 ASSESSMENT — LIFESTYLE VARIABLES: HISTORY_ALCOHOL_USE: YES

## 2018-05-23 LAB
INR BLD: 1.8
PROTHROMBIN TIME: 18 SEC (ref 9.7–12)

## 2018-05-23 PROCEDURE — 6370000000 HC RX 637 (ALT 250 FOR IP): Performed by: PSYCHIATRY & NEUROLOGY

## 2018-05-23 PROCEDURE — 85610 PROTHROMBIN TIME: CPT

## 2018-05-23 PROCEDURE — 1240000000 HC EMOTIONAL WELLNESS R&B

## 2018-05-23 PROCEDURE — 99232 SBSQ HOSP IP/OBS MODERATE 35: CPT | Performed by: REGISTERED NURSE

## 2018-05-23 PROCEDURE — 36415 COLL VENOUS BLD VENIPUNCTURE: CPT

## 2018-05-23 PROCEDURE — 6370000000 HC RX 637 (ALT 250 FOR IP): Performed by: REGISTERED NURSE

## 2018-05-23 RX ORDER — TRAZODONE HYDROCHLORIDE 100 MG/1
100 TABLET ORAL NIGHTLY PRN
Status: DISCONTINUED | OUTPATIENT
Start: 2018-05-23 | End: 2018-05-24

## 2018-05-23 RX ADMIN — VITAMIN D, TAB 1000IU (100/BT) 4000 UNITS: 25 TAB at 08:36

## 2018-05-23 RX ADMIN — WARFARIN SODIUM 2 MG: 2 TABLET ORAL at 18:27

## 2018-05-23 RX ADMIN — ARIPIPRAZOLE 30 MG: 30 TABLET ORAL at 08:35

## 2018-05-23 RX ADMIN — FOLIC ACID 1 MG: 1 TABLET ORAL at 08:35

## 2018-05-23 RX ADMIN — FAMOTIDINE 40 MG: 20 TABLET ORAL at 08:35

## 2018-05-23 RX ADMIN — HYDROXYZINE HYDROCHLORIDE 50 MG: 25 TABLET, FILM COATED ORAL at 21:15

## 2018-05-23 RX ADMIN — CYANOCOBALAMIN TAB 1000 MCG 1000 MCG: 1000 TAB at 08:36

## 2018-05-23 RX ADMIN — DIPHENHYDRAMINE HCL 25 MG: 25 TABLET ORAL at 02:24

## 2018-05-23 RX ADMIN — TRAZODONE HYDROCHLORIDE 100 MG: 100 TABLET ORAL at 21:15

## 2018-05-23 RX ADMIN — MIRTAZAPINE 30 MG: 30 TABLET, FILM COATED ORAL at 21:15

## 2018-05-23 RX ADMIN — HYDROCORTISONE: 1 CREAM TOPICAL at 08:36

## 2018-05-23 RX ADMIN — HYDROXYZINE HYDROCHLORIDE 50 MG: 25 TABLET, FILM COATED ORAL at 14:29

## 2018-05-23 RX ADMIN — TRAZODONE HYDROCHLORIDE 50 MG: 50 TABLET ORAL at 02:24

## 2018-05-23 RX ADMIN — GABAPENTIN 600 MG: 600 TABLET, FILM COATED ORAL at 08:35

## 2018-05-23 RX ADMIN — GABAPENTIN 600 MG: 600 TABLET, FILM COATED ORAL at 13:27

## 2018-05-23 RX ADMIN — MIDODRINE HYDROCHLORIDE 5 MG: 5 TABLET ORAL at 13:28

## 2018-05-23 RX ADMIN — ASPIRIN 81 MG 81 MG: 81 TABLET ORAL at 08:35

## 2018-05-23 RX ADMIN — CETIRIZINE HYDROCHLORIDE 10 MG: 10 TABLET, FILM COATED ORAL at 08:36

## 2018-05-23 RX ADMIN — FAMOTIDINE 40 MG: 20 TABLET ORAL at 21:15

## 2018-05-23 RX ADMIN — MIDODRINE HYDROCHLORIDE 5 MG: 5 TABLET ORAL at 08:36

## 2018-05-23 RX ADMIN — BENZTROPINE MESYLATE 0.5 MG: 0.5 TABLET ORAL at 08:36

## 2018-05-23 RX ADMIN — PANTOPRAZOLE SODIUM 40 MG: 40 TABLET, DELAYED RELEASE ORAL at 08:36

## 2018-05-23 RX ADMIN — GABAPENTIN 600 MG: 600 TABLET, FILM COATED ORAL at 21:16

## 2018-05-23 RX ADMIN — BENZTROPINE MESYLATE 0.5 MG: 0.5 TABLET ORAL at 21:16

## 2018-05-23 RX ADMIN — TAMSULOSIN HYDROCHLORIDE 0.4 MG: 0.4 CAPSULE ORAL at 08:36

## 2018-05-24 LAB
INR BLD: 1.8
PROTHROMBIN TIME: 18.7 SEC (ref 9.7–12)

## 2018-05-24 PROCEDURE — 6370000000 HC RX 637 (ALT 250 FOR IP): Performed by: PSYCHIATRY & NEUROLOGY

## 2018-05-24 PROCEDURE — 99232 SBSQ HOSP IP/OBS MODERATE 35: CPT | Performed by: REGISTERED NURSE

## 2018-05-24 PROCEDURE — 85610 PROTHROMBIN TIME: CPT

## 2018-05-24 PROCEDURE — 1240000000 HC EMOTIONAL WELLNESS R&B

## 2018-05-24 PROCEDURE — 6370000000 HC RX 637 (ALT 250 FOR IP): Performed by: REGISTERED NURSE

## 2018-05-24 PROCEDURE — 36415 COLL VENOUS BLD VENIPUNCTURE: CPT

## 2018-05-24 RX ORDER — TRAZODONE HYDROCHLORIDE 150 MG/1
150 TABLET ORAL NIGHTLY PRN
Status: DISCONTINUED | OUTPATIENT
Start: 2018-05-24 | End: 2018-05-29 | Stop reason: HOSPADM

## 2018-05-24 RX ADMIN — FOLIC ACID 1 MG: 1 TABLET ORAL at 08:04

## 2018-05-24 RX ADMIN — MIRTAZAPINE 30 MG: 30 TABLET, FILM COATED ORAL at 20:48

## 2018-05-24 RX ADMIN — WARFARIN SODIUM 2 MG: 2 TABLET ORAL at 17:48

## 2018-05-24 RX ADMIN — HYDROCORTISONE: 1 CREAM TOPICAL at 08:04

## 2018-05-24 RX ADMIN — MIDODRINE HYDROCHLORIDE 5 MG: 5 TABLET ORAL at 08:04

## 2018-05-24 RX ADMIN — TAMSULOSIN HYDROCHLORIDE 0.4 MG: 0.4 CAPSULE ORAL at 08:03

## 2018-05-24 RX ADMIN — FAMOTIDINE 40 MG: 20 TABLET ORAL at 20:49

## 2018-05-24 RX ADMIN — PANTOPRAZOLE SODIUM 40 MG: 40 TABLET, DELAYED RELEASE ORAL at 08:04

## 2018-05-24 RX ADMIN — GABAPENTIN 600 MG: 600 TABLET, FILM COATED ORAL at 08:03

## 2018-05-24 RX ADMIN — BENZTROPINE MESYLATE 0.5 MG: 0.5 TABLET ORAL at 08:03

## 2018-05-24 RX ADMIN — GABAPENTIN 600 MG: 600 TABLET, FILM COATED ORAL at 20:48

## 2018-05-24 RX ADMIN — BENZTROPINE MESYLATE 0.5 MG: 0.5 TABLET ORAL at 20:49

## 2018-05-24 RX ADMIN — GABAPENTIN 600 MG: 600 TABLET, FILM COATED ORAL at 14:13

## 2018-05-24 RX ADMIN — CYANOCOBALAMIN TAB 1000 MCG 1000 MCG: 1000 TAB at 08:04

## 2018-05-24 RX ADMIN — HYDROXYZINE HYDROCHLORIDE 50 MG: 25 TABLET, FILM COATED ORAL at 09:28

## 2018-05-24 RX ADMIN — ARIPIPRAZOLE 30 MG: 30 TABLET ORAL at 08:03

## 2018-05-24 RX ADMIN — CETIRIZINE HYDROCHLORIDE 10 MG: 10 TABLET, FILM COATED ORAL at 08:03

## 2018-05-24 RX ADMIN — ASPIRIN 81 MG 81 MG: 81 TABLET ORAL at 08:03

## 2018-05-24 RX ADMIN — TRAZODONE HYDROCHLORIDE 150 MG: 150 TABLET ORAL at 20:49

## 2018-05-24 RX ADMIN — FAMOTIDINE 40 MG: 20 TABLET ORAL at 08:04

## 2018-05-24 RX ADMIN — HYDROXYZINE HYDROCHLORIDE 50 MG: 25 TABLET, FILM COATED ORAL at 20:49

## 2018-05-24 RX ADMIN — MIDODRINE HYDROCHLORIDE 5 MG: 5 TABLET ORAL at 14:13

## 2018-05-24 RX ADMIN — VITAMIN D, TAB 1000IU (100/BT) 4000 UNITS: 25 TAB at 08:03

## 2018-05-25 LAB
INR BLD: 1.9
PROTHROMBIN TIME: 19.4 SEC (ref 9.7–12)

## 2018-05-25 PROCEDURE — 6370000000 HC RX 637 (ALT 250 FOR IP): Performed by: REGISTERED NURSE

## 2018-05-25 PROCEDURE — 85610 PROTHROMBIN TIME: CPT

## 2018-05-25 PROCEDURE — 1240000000 HC EMOTIONAL WELLNESS R&B

## 2018-05-25 PROCEDURE — 36415 COLL VENOUS BLD VENIPUNCTURE: CPT

## 2018-05-25 PROCEDURE — 6370000000 HC RX 637 (ALT 250 FOR IP): Performed by: PSYCHIATRY & NEUROLOGY

## 2018-05-25 PROCEDURE — 99232 SBSQ HOSP IP/OBS MODERATE 35: CPT | Performed by: REGISTERED NURSE

## 2018-05-25 RX ADMIN — FAMOTIDINE 40 MG: 20 TABLET ORAL at 08:24

## 2018-05-25 RX ADMIN — PSEUDOEPHEDRINE HCL 30 MG: 30 TABLET, FILM COATED ORAL at 08:26

## 2018-05-25 RX ADMIN — TRAZODONE HYDROCHLORIDE 150 MG: 150 TABLET ORAL at 20:34

## 2018-05-25 RX ADMIN — LUBIPROSTONE 24 MCG: 24 CAPSULE, GELATIN COATED ORAL at 08:27

## 2018-05-25 RX ADMIN — ASPIRIN 81 MG 81 MG: 81 TABLET ORAL at 08:24

## 2018-05-25 RX ADMIN — TAMSULOSIN HYDROCHLORIDE 0.4 MG: 0.4 CAPSULE ORAL at 08:24

## 2018-05-25 RX ADMIN — FOLIC ACID 1 MG: 1 TABLET ORAL at 08:24

## 2018-05-25 RX ADMIN — MIDODRINE HYDROCHLORIDE 5 MG: 5 TABLET ORAL at 14:28

## 2018-05-25 RX ADMIN — HYDROXYZINE HYDROCHLORIDE 50 MG: 25 TABLET, FILM COATED ORAL at 20:34

## 2018-05-25 RX ADMIN — PANTOPRAZOLE SODIUM 40 MG: 40 TABLET, DELAYED RELEASE ORAL at 08:24

## 2018-05-25 RX ADMIN — BENZTROPINE MESYLATE 0.5 MG: 0.5 TABLET ORAL at 08:24

## 2018-05-25 RX ADMIN — GABAPENTIN 600 MG: 600 TABLET, FILM COATED ORAL at 20:34

## 2018-05-25 RX ADMIN — WARFARIN SODIUM 2 MG: 2 TABLET ORAL at 17:30

## 2018-05-25 RX ADMIN — BENZTROPINE MESYLATE 0.5 MG: 0.5 TABLET ORAL at 20:34

## 2018-05-25 RX ADMIN — MIDODRINE HYDROCHLORIDE 5 MG: 5 TABLET ORAL at 08:28

## 2018-05-25 RX ADMIN — MIDODRINE HYDROCHLORIDE 5 MG: 5 TABLET ORAL at 21:02

## 2018-05-25 RX ADMIN — MIRTAZAPINE 30 MG: 30 TABLET, FILM COATED ORAL at 20:34

## 2018-05-25 RX ADMIN — GABAPENTIN 600 MG: 600 TABLET, FILM COATED ORAL at 08:24

## 2018-05-25 RX ADMIN — FAMOTIDINE 40 MG: 20 TABLET ORAL at 20:34

## 2018-05-25 RX ADMIN — LUBIPROSTONE 24 MCG: 24 CAPSULE, GELATIN COATED ORAL at 17:29

## 2018-05-25 RX ADMIN — ARIPIPRAZOLE 30 MG: 30 TABLET ORAL at 08:24

## 2018-05-25 RX ADMIN — CETIRIZINE HYDROCHLORIDE 10 MG: 10 TABLET, FILM COATED ORAL at 08:24

## 2018-05-25 RX ADMIN — GABAPENTIN 600 MG: 600 TABLET, FILM COATED ORAL at 14:28

## 2018-05-26 LAB
INR BLD: 2
PROTHROMBIN TIME: 20.2 SEC (ref 9.7–12)

## 2018-05-26 PROCEDURE — 6370000000 HC RX 637 (ALT 250 FOR IP): Performed by: PSYCHIATRY & NEUROLOGY

## 2018-05-26 PROCEDURE — 99232 SBSQ HOSP IP/OBS MODERATE 35: CPT | Performed by: PSYCHIATRY & NEUROLOGY

## 2018-05-26 PROCEDURE — 85610 PROTHROMBIN TIME: CPT

## 2018-05-26 PROCEDURE — 36415 COLL VENOUS BLD VENIPUNCTURE: CPT

## 2018-05-26 PROCEDURE — 6370000000 HC RX 637 (ALT 250 FOR IP): Performed by: REGISTERED NURSE

## 2018-05-26 PROCEDURE — 1240000000 HC EMOTIONAL WELLNESS R&B

## 2018-05-26 RX ADMIN — FOLIC ACID 1 MG: 1 TABLET ORAL at 08:40

## 2018-05-26 RX ADMIN — ASPIRIN 81 MG 81 MG: 81 TABLET ORAL at 08:38

## 2018-05-26 RX ADMIN — GABAPENTIN 600 MG: 600 TABLET, FILM COATED ORAL at 08:38

## 2018-05-26 RX ADMIN — CYANOCOBALAMIN TAB 1000 MCG 1000 MCG: 1000 TAB at 08:38

## 2018-05-26 RX ADMIN — FAMOTIDINE 40 MG: 20 TABLET ORAL at 08:38

## 2018-05-26 RX ADMIN — HYDROXYZINE HYDROCHLORIDE 50 MG: 25 TABLET, FILM COATED ORAL at 06:29

## 2018-05-26 RX ADMIN — MIRTAZAPINE 45 MG: 30 TABLET, FILM COATED ORAL at 21:03

## 2018-05-26 RX ADMIN — WARFARIN SODIUM 1 MG: 1 TABLET ORAL at 17:12

## 2018-05-26 RX ADMIN — BENZTROPINE MESYLATE 0.5 MG: 0.5 TABLET ORAL at 21:04

## 2018-05-26 RX ADMIN — CETIRIZINE HYDROCHLORIDE 10 MG: 10 TABLET, FILM COATED ORAL at 08:38

## 2018-05-26 RX ADMIN — GABAPENTIN 600 MG: 600 TABLET, FILM COATED ORAL at 14:20

## 2018-05-26 RX ADMIN — VITAMIN D, TAB 1000IU (100/BT) 4000 UNITS: 25 TAB at 14:20

## 2018-05-26 RX ADMIN — ARIPIPRAZOLE 30 MG: 30 TABLET ORAL at 08:39

## 2018-05-26 RX ADMIN — BENZTROPINE MESYLATE 0.5 MG: 0.5 TABLET ORAL at 08:38

## 2018-05-26 RX ADMIN — MIDODRINE HYDROCHLORIDE 5 MG: 5 TABLET ORAL at 21:04

## 2018-05-26 RX ADMIN — PANTOPRAZOLE SODIUM 40 MG: 40 TABLET, DELAYED RELEASE ORAL at 08:39

## 2018-05-26 RX ADMIN — MIDODRINE HYDROCHLORIDE 5 MG: 5 TABLET ORAL at 08:38

## 2018-05-26 RX ADMIN — TAMSULOSIN HYDROCHLORIDE 0.4 MG: 0.4 CAPSULE ORAL at 08:38

## 2018-05-26 RX ADMIN — FAMOTIDINE 40 MG: 20 TABLET ORAL at 21:03

## 2018-05-26 RX ADMIN — MIDODRINE HYDROCHLORIDE 5 MG: 5 TABLET ORAL at 14:21

## 2018-05-26 RX ADMIN — HYDROXYZINE HYDROCHLORIDE 50 MG: 25 TABLET, FILM COATED ORAL at 21:04

## 2018-05-26 RX ADMIN — TRAZODONE HYDROCHLORIDE 150 MG: 150 TABLET ORAL at 21:03

## 2018-05-26 RX ADMIN — GABAPENTIN 600 MG: 600 TABLET, FILM COATED ORAL at 21:03

## 2018-05-26 RX ADMIN — LUBIPROSTONE 24 MCG: 24 CAPSULE, GELATIN COATED ORAL at 17:12

## 2018-05-27 LAB
INR BLD: 1.9
PROTHROMBIN TIME: 19.3 SEC (ref 9.7–12)

## 2018-05-27 PROCEDURE — 85610 PROTHROMBIN TIME: CPT

## 2018-05-27 PROCEDURE — 6370000000 HC RX 637 (ALT 250 FOR IP): Performed by: REGISTERED NURSE

## 2018-05-27 PROCEDURE — 6370000000 HC RX 637 (ALT 250 FOR IP): Performed by: NURSE PRACTITIONER

## 2018-05-27 PROCEDURE — 6370000000 HC RX 637 (ALT 250 FOR IP): Performed by: PSYCHIATRY & NEUROLOGY

## 2018-05-27 PROCEDURE — 36415 COLL VENOUS BLD VENIPUNCTURE: CPT

## 2018-05-27 PROCEDURE — 1240000000 HC EMOTIONAL WELLNESS R&B

## 2018-05-27 RX ORDER — DIPHENOXYLATE HYDROCHLORIDE AND ATROPINE SULFATE 2.5; .025 MG/1; MG/1
1 TABLET ORAL 4 TIMES DAILY PRN
Status: DISCONTINUED | OUTPATIENT
Start: 2018-05-27 | End: 2018-05-29 | Stop reason: HOSPADM

## 2018-05-27 RX ADMIN — ARIPIPRAZOLE 30 MG: 30 TABLET ORAL at 08:24

## 2018-05-27 RX ADMIN — FAMOTIDINE 40 MG: 20 TABLET ORAL at 21:27

## 2018-05-27 RX ADMIN — FOLIC ACID 1 MG: 1 TABLET ORAL at 08:25

## 2018-05-27 RX ADMIN — GABAPENTIN 600 MG: 600 TABLET, FILM COATED ORAL at 21:27

## 2018-05-27 RX ADMIN — BENZTROPINE MESYLATE 0.5 MG: 0.5 TABLET ORAL at 21:27

## 2018-05-27 RX ADMIN — TAMSULOSIN HYDROCHLORIDE 0.4 MG: 0.4 CAPSULE ORAL at 08:25

## 2018-05-27 RX ADMIN — CYANOCOBALAMIN TAB 1000 MCG 1000 MCG: 1000 TAB at 08:25

## 2018-05-27 RX ADMIN — GABAPENTIN 600 MG: 600 TABLET, FILM COATED ORAL at 08:24

## 2018-05-27 RX ADMIN — MIDODRINE HYDROCHLORIDE 5 MG: 5 TABLET ORAL at 21:27

## 2018-05-27 RX ADMIN — GABAPENTIN 600 MG: 600 TABLET, FILM COATED ORAL at 14:16

## 2018-05-27 RX ADMIN — FAMOTIDINE 40 MG: 20 TABLET ORAL at 08:24

## 2018-05-27 RX ADMIN — MIRTAZAPINE 45 MG: 30 TABLET, FILM COATED ORAL at 21:27

## 2018-05-27 RX ADMIN — DIPHENOXYLATE HYDROCHLORIDE AND ATROPINE SULFATE 1 TABLET: 2.5; .025 TABLET ORAL at 21:26

## 2018-05-27 RX ADMIN — ASPIRIN 81 MG 81 MG: 81 TABLET ORAL at 08:25

## 2018-05-27 RX ADMIN — HYDROXYZINE HYDROCHLORIDE 50 MG: 25 TABLET, FILM COATED ORAL at 15:34

## 2018-05-27 RX ADMIN — PANTOPRAZOLE SODIUM 40 MG: 40 TABLET, DELAYED RELEASE ORAL at 08:24

## 2018-05-27 RX ADMIN — HYDROXYZINE HYDROCHLORIDE 50 MG: 25 TABLET, FILM COATED ORAL at 08:24

## 2018-05-27 RX ADMIN — BENZTROPINE MESYLATE 0.5 MG: 0.5 TABLET ORAL at 08:25

## 2018-05-27 RX ADMIN — VITAMIN D, TAB 1000IU (100/BT) 4000 UNITS: 25 TAB at 08:24

## 2018-05-27 RX ADMIN — MIDODRINE HYDROCHLORIDE 5 MG: 5 TABLET ORAL at 14:16

## 2018-05-27 RX ADMIN — MIDODRINE HYDROCHLORIDE 5 MG: 5 TABLET ORAL at 08:25

## 2018-05-27 RX ADMIN — CETIRIZINE HYDROCHLORIDE 10 MG: 10 TABLET, FILM COATED ORAL at 08:25

## 2018-05-27 RX ADMIN — TRAZODONE HYDROCHLORIDE 150 MG: 150 TABLET ORAL at 21:27

## 2018-05-27 RX ADMIN — WARFARIN SODIUM 2 MG: 2 TABLET ORAL at 17:21

## 2018-05-27 RX ADMIN — HYDROXYZINE HYDROCHLORIDE 50 MG: 25 TABLET, FILM COATED ORAL at 21:26

## 2018-05-28 PROBLEM — F32.A DEPRESSION: Status: RESOLVED | Noted: 2018-05-21 | Resolved: 2018-05-28

## 2018-05-28 PROBLEM — F14.90 CRACK COCAINE USE: Status: ACTIVE | Noted: 2018-05-28

## 2018-05-28 LAB
INR BLD: 1.8
PROTHROMBIN TIME: 18.4 SEC (ref 9.7–12)

## 2018-05-28 PROCEDURE — 6370000000 HC RX 637 (ALT 250 FOR IP): Performed by: REGISTERED NURSE

## 2018-05-28 PROCEDURE — 6370000000 HC RX 637 (ALT 250 FOR IP): Performed by: PSYCHIATRY & NEUROLOGY

## 2018-05-28 PROCEDURE — 85610 PROTHROMBIN TIME: CPT

## 2018-05-28 PROCEDURE — 99232 SBSQ HOSP IP/OBS MODERATE 35: CPT | Performed by: PSYCHIATRY & NEUROLOGY

## 2018-05-28 PROCEDURE — 36415 COLL VENOUS BLD VENIPUNCTURE: CPT

## 2018-05-28 PROCEDURE — 1240000000 HC EMOTIONAL WELLNESS R&B

## 2018-05-28 RX ADMIN — FAMOTIDINE 40 MG: 20 TABLET ORAL at 20:49

## 2018-05-28 RX ADMIN — ARIPIPRAZOLE 30 MG: 30 TABLET ORAL at 08:48

## 2018-05-28 RX ADMIN — FOLIC ACID 1 MG: 1 TABLET ORAL at 08:54

## 2018-05-28 RX ADMIN — CETIRIZINE HYDROCHLORIDE 10 MG: 10 TABLET, FILM COATED ORAL at 08:48

## 2018-05-28 RX ADMIN — HYDROXYZINE HYDROCHLORIDE 50 MG: 25 TABLET, FILM COATED ORAL at 20:50

## 2018-05-28 RX ADMIN — VITAMIN D, TAB 1000IU (100/BT) 4000 UNITS: 25 TAB at 10:43

## 2018-05-28 RX ADMIN — MIRTAZAPINE 45 MG: 30 TABLET, FILM COATED ORAL at 20:49

## 2018-05-28 RX ADMIN — FAMOTIDINE 40 MG: 20 TABLET ORAL at 08:49

## 2018-05-28 RX ADMIN — MIDODRINE HYDROCHLORIDE 5 MG: 5 TABLET ORAL at 08:48

## 2018-05-28 RX ADMIN — TAMSULOSIN HYDROCHLORIDE 0.4 MG: 0.4 CAPSULE ORAL at 08:48

## 2018-05-28 RX ADMIN — WARFARIN SODIUM 2 MG: 2 TABLET ORAL at 18:30

## 2018-05-28 RX ADMIN — GABAPENTIN 600 MG: 600 TABLET, FILM COATED ORAL at 08:48

## 2018-05-28 RX ADMIN — MIDODRINE HYDROCHLORIDE 5 MG: 5 TABLET ORAL at 15:44

## 2018-05-28 RX ADMIN — BENZTROPINE MESYLATE 0.5 MG: 0.5 TABLET ORAL at 08:48

## 2018-05-28 RX ADMIN — TRAZODONE HYDROCHLORIDE 150 MG: 150 TABLET ORAL at 20:49

## 2018-05-28 RX ADMIN — ASPIRIN 81 MG 81 MG: 81 TABLET ORAL at 08:48

## 2018-05-28 RX ADMIN — GABAPENTIN 600 MG: 600 TABLET, FILM COATED ORAL at 20:49

## 2018-05-28 RX ADMIN — BENZTROPINE MESYLATE 0.5 MG: 0.5 TABLET ORAL at 20:49

## 2018-05-28 RX ADMIN — HYDROXYZINE HYDROCHLORIDE 50 MG: 25 TABLET, FILM COATED ORAL at 14:26

## 2018-05-28 RX ADMIN — PANTOPRAZOLE SODIUM 40 MG: 40 TABLET, DELAYED RELEASE ORAL at 08:48

## 2018-05-28 RX ADMIN — GABAPENTIN 600 MG: 600 TABLET, FILM COATED ORAL at 14:26

## 2018-05-28 RX ADMIN — CYANOCOBALAMIN TAB 1000 MCG 1000 MCG: 1000 TAB at 08:49

## 2018-05-28 RX ADMIN — MIDODRINE HYDROCHLORIDE 5 MG: 5 TABLET ORAL at 20:49

## 2018-05-29 VITALS
DIASTOLIC BLOOD PRESSURE: 72 MMHG | OXYGEN SATURATION: 96 % | SYSTOLIC BLOOD PRESSURE: 105 MMHG | RESPIRATION RATE: 14 BRPM | HEART RATE: 76 BPM | BODY MASS INDEX: 30.22 KG/M2 | HEIGHT: 66 IN | TEMPERATURE: 98.2 F | WEIGHT: 188 LBS

## 2018-05-29 LAB
INR BLD: 1.8
PROTHROMBIN TIME: 18.4 SEC (ref 9.7–12)

## 2018-05-29 PROCEDURE — 6370000000 HC RX 637 (ALT 250 FOR IP): Performed by: PSYCHIATRY & NEUROLOGY

## 2018-05-29 PROCEDURE — 5130000000 HC BRIDGE APPOINTMENT

## 2018-05-29 PROCEDURE — 36415 COLL VENOUS BLD VENIPUNCTURE: CPT

## 2018-05-29 PROCEDURE — 6370000000 HC RX 637 (ALT 250 FOR IP): Performed by: REGISTERED NURSE

## 2018-05-29 PROCEDURE — 85610 PROTHROMBIN TIME: CPT

## 2018-05-29 PROCEDURE — 6370000000 HC RX 637 (ALT 250 FOR IP): Performed by: NURSE PRACTITIONER

## 2018-05-29 PROCEDURE — 99239 HOSP IP/OBS DSCHRG MGMT >30: CPT | Performed by: PSYCHIATRY & NEUROLOGY

## 2018-05-29 RX ORDER — GABAPENTIN 600 MG/1
600 TABLET ORAL 3 TIMES DAILY
Qty: 30 TABLET | Refills: 0 | Status: ON HOLD | OUTPATIENT
Start: 2018-05-29 | End: 2019-09-10

## 2018-05-29 RX ORDER — BENZTROPINE MESYLATE 0.5 MG/1
0.5 TABLET ORAL 2 TIMES DAILY
Qty: 60 TABLET | Refills: 0 | Status: SHIPPED | OUTPATIENT
Start: 2018-05-29 | End: 2019-07-11

## 2018-05-29 RX ORDER — MIRTAZAPINE 45 MG/1
45 TABLET, FILM COATED ORAL NIGHTLY
Qty: 30 TABLET | Refills: 0 | Status: ON HOLD | OUTPATIENT
Start: 2018-05-29 | End: 2019-09-10

## 2018-05-29 RX ORDER — ARIPIPRAZOLE 30 MG/1
30 TABLET ORAL DAILY
Qty: 30 TABLET | Refills: 0 | Status: SHIPPED | OUTPATIENT
Start: 2018-05-30 | End: 2019-07-11

## 2018-05-29 RX ADMIN — CYANOCOBALAMIN TAB 1000 MCG 1000 MCG: 1000 TAB at 08:34

## 2018-05-29 RX ADMIN — GABAPENTIN 600 MG: 600 TABLET, FILM COATED ORAL at 08:33

## 2018-05-29 RX ADMIN — CETIRIZINE HYDROCHLORIDE 10 MG: 10 TABLET, FILM COATED ORAL at 08:34

## 2018-05-29 RX ADMIN — ASPIRIN 81 MG 81 MG: 81 TABLET ORAL at 08:34

## 2018-05-29 RX ADMIN — MIDODRINE HYDROCHLORIDE 5 MG: 5 TABLET ORAL at 08:34

## 2018-05-29 RX ADMIN — FOLIC ACID 1 MG: 1 TABLET ORAL at 08:33

## 2018-05-29 RX ADMIN — ARIPIPRAZOLE 30 MG: 30 TABLET ORAL at 08:33

## 2018-05-29 RX ADMIN — FAMOTIDINE 40 MG: 20 TABLET ORAL at 08:33

## 2018-05-29 RX ADMIN — VITAMIN D, TAB 1000IU (100/BT) 4000 UNITS: 25 TAB at 08:34

## 2018-05-29 RX ADMIN — BENZTROPINE MESYLATE 0.5 MG: 0.5 TABLET ORAL at 08:34

## 2018-05-29 RX ADMIN — DIPHENOXYLATE HYDROCHLORIDE AND ATROPINE SULFATE 1 TABLET: 2.5; .025 TABLET ORAL at 02:55

## 2018-05-29 RX ADMIN — HYDROXYZINE HYDROCHLORIDE 50 MG: 25 TABLET, FILM COATED ORAL at 02:52

## 2018-05-29 RX ADMIN — PANTOPRAZOLE SODIUM 40 MG: 40 TABLET, DELAYED RELEASE ORAL at 08:34

## 2018-05-29 RX ADMIN — TAMSULOSIN HYDROCHLORIDE 0.4 MG: 0.4 CAPSULE ORAL at 08:34

## 2018-07-03 ENCOUNTER — HOSPITAL ENCOUNTER (EMERGENCY)
Age: 58
Discharge: HOME | End: 2018-07-03
Attending: EMERGENCY MEDICINE
Payer: COMMERCIAL

## 2018-07-03 VITALS
SYSTOLIC BLOOD PRESSURE: 154 MMHG | DIASTOLIC BLOOD PRESSURE: 89 MMHG | RESPIRATION RATE: 18 BRPM | OXYGEN SATURATION: 94 % | TEMPERATURE: 98.8 F | HEART RATE: 85 BPM

## 2018-07-03 DIAGNOSIS — F41.9 ANXIETY: Primary | ICD-10-CM

## 2018-07-03 DIAGNOSIS — G47.9 DIFFICULTY SLEEPING: ICD-10-CM

## 2018-07-03 LAB
ABSOLUTE EOS #: 0 K/UL (ref 0–0.4)
ABSOLUTE IMMATURE GRANULOCYTE: 0 K/UL (ref 0–0.3)
ABSOLUTE LYMPH #: 0.83 K/UL (ref 1–4.8)
ABSOLUTE MONO #: 0.18 K/UL (ref 0.1–0.8)
ANION GAP SERPL CALCULATED.3IONS-SCNC: 10 MMOL/L (ref 9–17)
BASOPHILS # BLD: 1 % (ref 0–2)
BASOPHILS ABSOLUTE: 0.09 K/UL (ref 0–0.2)
BUN BLDV-MCNC: 9 MG/DL (ref 6–20)
BUN/CREAT BLD: NORMAL (ref 9–20)
CALCIUM SERPL-MCNC: 9.2 MG/DL (ref 8.6–10.4)
CHLORIDE BLD-SCNC: 107 MMOL/L (ref 98–107)
CO2: 24 MMOL/L (ref 20–31)
CREAT SERPL-MCNC: 0.96 MG/DL (ref 0.7–1.2)
DIFFERENTIAL TYPE: ABNORMAL
EOSINOPHILS RELATIVE PERCENT: 0 % (ref 1–4)
GFR AFRICAN AMERICAN: >60 ML/MIN
GFR NON-AFRICAN AMERICAN: >60 ML/MIN
GFR SERPL CREATININE-BSD FRML MDRD: NORMAL ML/MIN/{1.73_M2}
GFR SERPL CREATININE-BSD FRML MDRD: NORMAL ML/MIN/{1.73_M2}
GLUCOSE BLD-MCNC: 89 MG/DL (ref 70–99)
HCT VFR BLD CALC: 50 % (ref 40.7–50.3)
HEMOGLOBIN: 16.1 G/DL (ref 13–17)
IMMATURE GRANULOCYTES: 0 %
LYMPHOCYTES # BLD: 9 % (ref 24–44)
MCH RBC QN AUTO: 28.3 PG (ref 25.2–33.5)
MCHC RBC AUTO-ENTMCNC: 32.2 G/DL (ref 28.4–34.8)
MCV RBC AUTO: 88 FL (ref 82.6–102.9)
MONOCYTES # BLD: 2 % (ref 1–7)
MORPHOLOGY: ABNORMAL
NRBC AUTOMATED: 0 PER 100 WBC
PDW BLD-RTO: 14.9 % (ref 11.8–14.4)
PLATELET # BLD: 156 K/UL (ref 138–453)
PLATELET ESTIMATE: ABNORMAL
PMV BLD AUTO: 10.2 FL (ref 8.1–13.5)
POTASSIUM SERPL-SCNC: 4.4 MMOL/L (ref 3.7–5.3)
RBC # BLD: 5.68 M/UL (ref 4.21–5.77)
RBC # BLD: ABNORMAL 10*6/UL
SEG NEUTROPHILS: 88 % (ref 36–66)
SEGMENTED NEUTROPHILS ABSOLUTE COUNT: 8.1 K/UL (ref 1.8–7.7)
SODIUM BLD-SCNC: 141 MMOL/L (ref 135–144)
THYROXINE, FREE: 1.22 NG/DL (ref 0.93–1.7)
TSH SERPL DL<=0.05 MIU/L-ACNC: 1.75 MIU/L (ref 0.3–5)
WBC # BLD: 9.2 K/UL (ref 3.5–11.3)
WBC # BLD: ABNORMAL 10*3/UL

## 2018-07-03 PROCEDURE — 80048 BASIC METABOLIC PNL TOTAL CA: CPT

## 2018-07-03 PROCEDURE — 6360000002 HC RX W HCPCS: Performed by: EMERGENCY MEDICINE

## 2018-07-03 PROCEDURE — 84439 ASSAY OF FREE THYROXINE: CPT

## 2018-07-03 PROCEDURE — 99283 EMERGENCY DEPT VISIT LOW MDM: CPT

## 2018-07-03 PROCEDURE — 85025 COMPLETE CBC W/AUTO DIFF WBC: CPT

## 2018-07-03 PROCEDURE — 96374 THER/PROPH/DIAG INJ IV PUSH: CPT

## 2018-07-03 PROCEDURE — 84443 ASSAY THYROID STIM HORMONE: CPT

## 2018-07-03 RX ORDER — LORAZEPAM 2 MG/ML
1 INJECTION INTRAMUSCULAR ONCE
Status: COMPLETED | OUTPATIENT
Start: 2018-07-03 | End: 2018-07-03

## 2018-07-03 RX ADMIN — LORAZEPAM 1 MG: 2 INJECTION INTRAMUSCULAR; INTRAVENOUS at 10:17

## 2018-07-03 ASSESSMENT — ENCOUNTER SYMPTOMS
DIARRHEA: 0
WHEEZING: 0
EYE REDNESS: 0
CHEST TIGHTNESS: 0
ABDOMINAL PAIN: 0
PHOTOPHOBIA: 0
NAUSEA: 0
BACK PAIN: 0
SINUS PAIN: 0
CONSTIPATION: 0
VOMITING: 0
COLOR CHANGE: 0
SHORTNESS OF BREATH: 0
TROUBLE SWALLOWING: 0
RHINORRHEA: 0

## 2018-07-03 NOTE — ED NOTES
Pt to Ed with onset of insomnia, anxiety and psych. no SI or HI. Pt states PCP changed meds, but unsure what medications were changed. Pt show sno distress pt resting in bed with no distress no CP or SOB. Pt resting in bed.  Pt staying at [facility]  07/03/18 7974

## 2018-07-03 NOTE — ED PROVIDER NOTES
[address]  ED  Emergency Department Encounter  Emergency Medicine Resident     Pt Name: Hayley Kamara  MRN: [id number]  [place] 1960  Date of evaluation: 7/3/18  PCP:  Crystal Hager MD    [address] [place] [address]       Chief Complaint   Patient presents with    Insomnia     pt state sno sleep for 3-4 days    Anxiety     Hx of bad anxiety and psych. pt feels slightly panic. pt states he cant sit still    Withdrawal     pt states switching meds taken on and off meds causing withdrawal symptoms. pt is poor historian       HISTORY OF PRESENT ILLNESS  (Location/Symptom, Timing/Onset, Context/Setting, Quality, Duration, Modifying Factors, Severity.)      Hayley Kamara is a 61 y.o. male who presents with 2 days of anxiety and difficulty sleeping. Patient denies increased stressors. He states recent medication changes, unable to provide specifics. Denies illicit drug or alcohol use recently. Reports history of thyroid disorder, does not know if he's been on medication the past.  Patient denies chest pain, palpitations, shortness of breath, diaphoresis. PAST MEDICAL / SURGICAL / SOCIAL / FAMILY HISTORY      has a past medical history of Bipolar 1 disorder ([facility] [address] Depression; Hypertension; Neck pain; and Patient in clinical research study. has a past surgical history that includes hernia repair; Neck surgery (2013); Abdomen surgery; Tonsillectomy; Appendectomy; Dilatation, esophagus; and Cardiac surgery (7/14/15). Social History     Social History    Marital status: Single     Spouse name: N/A    Number of children: N/A    Years of education: N/A     Occupational History    Not on file.      Social History Main Topics    Smoking status: Never Smoker    Smokeless tobacco: Never Used    Alcohol use Yes      Comment: heavy    Drug use: Yes     Types: Cocaine    Sexual activity: Not on file     Other Topics Concern    Not on file     Social History Narrative    No narrative on file no gallop and no friction rub. No murmur heard. Pulmonary/Chest: Effort normal and breath sounds normal. No stridor. No respiratory distress. He has no wheezes. He exhibits no tenderness. Abdominal: Soft. He exhibits no distension. There is no tenderness. Musculoskeletal: Normal range of motion. He exhibits no edema, tenderness or deformity. Neurological: He is alert and oriented to person, place, and time. He has normal reflexes. Awake, alert, answering questions appropriately. Sensation of face, upper, lower extremities intact equal bilaterally. Strength 5/5 upper and lower extremities symmetrical bilaterally. No dysdiadochokinesis. Skin: Skin is warm and dry. He is not diaphoretic.        DIFFERENTIAL  DIAGNOSIS     PLAN (LABS / IMAGING / EKG):  Orders Placed This Encounter   Procedures    TSH without Reflex    T4, Free    CBC Auto Differential    Basic Metabolic Panel       MEDICATIONS ORDERED:  Orders Placed This Encounter   Medications    LORazepam (ATIVAN) injection 1 mg       DDX: Anxiety, panic attack, medication side effect, cocaine, methamphetamine, unknown stimulant use, hyperthyroid, thyroid storm, dehydration    DIAGNOSTIC RESULTS / EMERGENCY DEPARTMENT COURSE / MDM     LABS:  Results for orders placed or performed during the hospital encounter of 07/03/18   T4, Free   Result Value Ref Range    Thyroxine, Free 1.22 0.93 - 1.70 ng/dL   CBC Auto Differential   Result Value Ref Range    WBC 9.2 3.5 - 11.3 k/uL    RBC 5.68 4.21 - 5.77 m/uL    Hemoglobin 16.1 13.0 - 17.0 g/dL    Hematocrit 50.0 40.7 - 50.3 %    MCV 88.0 82.6 - 102.9 fL    MCH 28.3 25.2 - 33.5 pg    MCHC 32.2 28.4 - 34.8 g/dL    RDW 14.9 (H) 11.8 - 14.4 %    Platelets 274 019 - 854 k/uL    MPV 10.2 8.1 - 13.5 fL    NRBC Automated 0.0 0.0 per 100 WBC    Differential Type NOT REPORTED     WBC Morphology NOT REPORTED     RBC Morphology NOT REPORTED     Platelet Estimate NOT REPORTED     Immature Granulocytes 0 0 %    Seg Neutrophils 88 (H) 36 - 66 %    Lymphocytes 9 (L) 24 - 44 %    Monocytes 2 1 - 7 %    Eosinophils % 0 (L) 1 - 4 %    Basophils 1 0 - 2 %    Absolute Immature Granulocyte 0.00 0.00 - 0.30 k/uL    Segs Absolute 8.10 (H) 1.8 - 7.7 k/uL    Absolute Lymph # 0.83 (L) 1.0 - 4.8 k/uL    Absolute Mono # 0.18 0.1 - 0.8 k/uL    Absolute Eos # 0.00 0.0 - 0.4 k/uL    Basophils # 0.09 0.0 - 0.2 k/uL    Morphology ANISOCYTOSIS PRESENT    Basic Metabolic Panel   Result Value Ref Range    Glucose 89 70 - 99 mg/dL    BUN 9 6 - 20 mg/dL    CREATININE 0.96 0.70 - 1.20 mg/dL    Bun/Cre Ratio NOT REPORTED 9 - 20    Calcium 9.2 8.6 - 10.4 mg/dL    Sodium 141 135 - 144 mmol/L    Potassium 4.4 3.7 - 5.3 mmol/L    Chloride 107 98 - 107 mmol/L    CO2 24 20 - 31 mmol/L    Anion Gap 10 9 - 17 mmol/L    GFR Non-African American >60 >60 mL/min    GFR African American >60 >60 mL/min    GFR Comment          GFR Staging NOT REPORTED    TSH without Reflex   Result Value Ref Range    TSH 1.75 0.30 - 5.00 mIU/L       IMPRESSION: 79-year-old male with complex medical history including bipolar, depression, polysubstance abuse reporting difficulty with sleep and increased anxiety. Compliant with outpatient medications prescribed by [facility]. Review of medication prescriptions shows current prescription of temazepam.  Single dose of Ativan given in ED with improvement. No evidence of thyroid or electrolyte abnormality. Patient discharged with instructions to follow up with [facility] and return to the ED as necessary. RADIOLOGY:  None    EKG  None    All EKG's are interpreted by the Emergency Department Physician who either signs or Co-signs this chart in the absence of a cardiologist.    EMERGENCY DEPARTMENT COURSE:    79-year-old male with past medical history bipolar, depression, polysubstance abuse presents with 2-3 days of increased anxiety and difficulty with sleep.   Patient notes recent changes in outpatient medication plan, unable to provide

## 2018-07-03 NOTE — ED NOTES
Bed: 08  Expected date:   Expected time:   Means of arrival:   Comments:  Rogelio Hammond, RN  07/03/18 [id number]

## 2018-08-02 ENCOUNTER — HOSPITAL ENCOUNTER (OUTPATIENT)
Age: 58
Setting detail: SPECIMEN
Discharge: HOME | End: 2018-08-02
Payer: COMMERCIAL

## 2018-08-02 LAB
ABSOLUTE EOS #: 0.04 K/UL (ref 0–0.44)
ABSOLUTE IMMATURE GRANULOCYTE: 0.04 K/UL (ref 0–0.3)
ABSOLUTE LYMPH #: 0.86 K/UL (ref 1.1–3.7)
ABSOLUTE MONO #: 0.4 K/UL (ref 0.1–1.2)
ALBUMIN SERPL-MCNC: 4.3 G/DL (ref 3.5–5.2)
ALBUMIN/GLOBULIN RATIO: 1.7 (ref 1–2.5)
ALP BLD-CCNC: 178 U/L (ref 40–129)
ALT SERPL-CCNC: 19 U/L (ref 5–41)
ANION GAP SERPL CALCULATED.3IONS-SCNC: 15 MMOL/L (ref 9–17)
AST SERPL-CCNC: 13 U/L
BASOPHILS # BLD: 1 % (ref 0–2)
BASOPHILS ABSOLUTE: 0.04 K/UL (ref 0–0.2)
BILIRUB SERPL-MCNC: 0.68 MG/DL (ref 0.3–1.2)
BUN BLDV-MCNC: 7 MG/DL (ref 6–20)
BUN/CREAT BLD: ABNORMAL (ref 9–20)
CALCIUM SERPL-MCNC: 9.2 MG/DL (ref 8.6–10.4)
CHLORIDE BLD-SCNC: 104 MMOL/L (ref 98–107)
CHOLESTEROL/HDL RATIO: 6.9
CHOLESTEROL: 193 MG/DL
CO2: 23 MMOL/L (ref 20–31)
CREAT SERPL-MCNC: 1.05 MG/DL (ref 0.7–1.2)
DIFFERENTIAL TYPE: ABNORMAL
EOSINOPHILS RELATIVE PERCENT: 1 % (ref 1–4)
GFR AFRICAN AMERICAN: >60 ML/MIN
GFR NON-AFRICAN AMERICAN: >60 ML/MIN
GFR SERPL CREATININE-BSD FRML MDRD: ABNORMAL ML/MIN/{1.73_M2}
GFR SERPL CREATININE-BSD FRML MDRD: ABNORMAL ML/MIN/{1.73_M2}
GLUCOSE BLD-MCNC: 86 MG/DL (ref 70–99)
HCT VFR BLD CALC: 51.2 % (ref 40.7–50.3)
HDLC SERPL-MCNC: 28 MG/DL
HEMOGLOBIN: 15.8 G/DL (ref 13–17)
IMMATURE GRANULOCYTES: 1 %
LDL CHOLESTEROL: 139 MG/DL (ref 0–130)
LYMPHOCYTES # BLD: 13 % (ref 24–43)
MCH RBC QN AUTO: 27.7 PG (ref 25.2–33.5)
MCHC RBC AUTO-ENTMCNC: 30.9 G/DL (ref 28.4–34.8)
MCV RBC AUTO: 89.8 FL (ref 82.6–102.9)
MONOCYTES # BLD: 6 % (ref 3–12)
NRBC AUTOMATED: 0 PER 100 WBC
PDW BLD-RTO: 15.1 % (ref 11.8–14.4)
PLATELET # BLD: 177 K/UL (ref 138–453)
PLATELET ESTIMATE: ABNORMAL
PMV BLD AUTO: 11 FL (ref 8.1–13.5)
POTASSIUM SERPL-SCNC: 5.4 MMOL/L (ref 3.7–5.3)
RBC # BLD: 5.7 M/UL (ref 4.21–5.77)
RBC # BLD: ABNORMAL 10*6/UL
SEG NEUTROPHILS: 78 % (ref 36–65)
SEGMENTED NEUTROPHILS ABSOLUTE COUNT: 5.15 K/UL (ref 1.5–8.1)
SODIUM BLD-SCNC: 142 MMOL/L (ref 135–144)
THYROXINE, FREE: 1.39 NG/DL (ref 0.93–1.7)
TOTAL PROTEIN: 6.8 G/DL (ref 6.4–8.3)
TRIGL SERPL-MCNC: 132 MG/DL
TSH SERPL DL<=0.05 MIU/L-ACNC: 1.79 MIU/L (ref 0.3–5)
VITAMIN D 25-HYDROXY: 28.4 NG/ML (ref 30–100)
VLDLC SERPL CALC-MCNC: ABNORMAL MG/DL (ref 1–30)
WBC # BLD: 6.5 K/UL (ref 3.5–11.3)
WBC # BLD: ABNORMAL 10*3/UL

## 2018-08-03 LAB
ESTIMATED AVERAGE GLUCOSE: 105 MG/DL
HBA1C MFR BLD: 5.3 % (ref 4–6)

## 2018-10-03 ENCOUNTER — HOSPITAL ENCOUNTER (OUTPATIENT)
Facility: MEDICAL CENTER | Age: 58
End: 2018-10-03
Payer: COMMERCIAL

## 2018-10-04 ENCOUNTER — HOSPITAL ENCOUNTER (OUTPATIENT)
Facility: MEDICAL CENTER | Age: 58
Discharge: HOME | End: 2018-10-04
Payer: COMMERCIAL

## 2018-10-04 DIAGNOSIS — D47.2 MGUS (MONOCLONAL GAMMOPATHY OF UNKNOWN SIGNIFICANCE): ICD-10-CM

## 2018-10-04 LAB
ABSOLUTE EOS #: 0.1 K/UL (ref 0–0.4)
ABSOLUTE IMMATURE GRANULOCYTE: ABNORMAL K/UL (ref 0–0.3)
ABSOLUTE LYMPH #: 0.8 K/UL (ref 1–4.8)
ABSOLUTE MONO #: 0.6 K/UL (ref 0.2–0.8)
ANION GAP SERPL CALCULATED.3IONS-SCNC: 10 MMOL/L (ref 9–17)
BASOPHILS # BLD: 2 % (ref 0–2)
BASOPHILS ABSOLUTE: 0.1 K/UL (ref 0–0.2)
BUN BLDV-MCNC: 6 MG/DL (ref 6–20)
BUN/CREAT BLD: 6 (ref 9–20)
CALCIUM SERPL-MCNC: 9.3 MG/DL (ref 8.6–10.4)
CHLORIDE BLD-SCNC: 102 MMOL/L (ref 98–107)
CO2: 26 MMOL/L (ref 20–31)
CREAT SERPL-MCNC: 0.95 MG/DL (ref 0.7–1.2)
DIFFERENTIAL TYPE: ABNORMAL
EOSINOPHILS RELATIVE PERCENT: 1 % (ref 1–4)
FREE KAPPA/LAMBDA RATIO: 3.2 (ref 0.26–1.65)
GFR AFRICAN AMERICAN: >60 ML/MIN
GFR NON-AFRICAN AMERICAN: >60 ML/MIN
GFR SERPL CREATININE-BSD FRML MDRD: ABNORMAL ML/MIN/{1.73_M2}
GFR SERPL CREATININE-BSD FRML MDRD: ABNORMAL ML/MIN/{1.73_M2}
GLUCOSE BLD-MCNC: 71 MG/DL (ref 70–99)
HCT VFR BLD CALC: 45.5 % (ref 41–53)
HEMOGLOBIN: 15 G/DL (ref 13.5–17.5)
IGA: 140 MG/DL (ref 70–400)
IGG: 792 MG/DL (ref 700–1600)
IGM: 57 MG/DL (ref 40–230)
IMMATURE GRANULOCYTES: ABNORMAL %
KAPPA FREE LIGHT CHAINS QNT: 4.42 MG/DL (ref 0.37–1.94)
LAMBDA FREE LIGHT CHAINS QNT: 1.38 MG/DL (ref 0.57–2.63)
LYMPHOCYTES # BLD: 10 % (ref 24–44)
MCH RBC QN AUTO: 29.5 PG (ref 26–34)
MCHC RBC AUTO-ENTMCNC: 33.1 G/DL (ref 31–37)
MCV RBC AUTO: 89.3 FL (ref 80–100)
MONOCYTES # BLD: 7 % (ref 1–7)
NRBC AUTOMATED: ABNORMAL PER 100 WBC
PDW BLD-RTO: 16.7 % (ref 11.5–14.5)
PLATELET # BLD: 230 K/UL (ref 130–400)
PLATELET ESTIMATE: ABNORMAL
PMV BLD AUTO: 8.4 FL (ref 6–12)
POTASSIUM SERPL-SCNC: 3.9 MMOL/L (ref 3.7–5.3)
RBC # BLD: 5.09 M/UL (ref 4.5–5.9)
RBC # BLD: ABNORMAL 10*6/UL
SEG NEUTROPHILS: 80 % (ref 36–66)
SEGMENTED NEUTROPHILS ABSOLUTE COUNT: 6.4 K/UL (ref 1.8–7.7)
SODIUM BLD-SCNC: 138 MMOL/L (ref 135–144)
WBC # BLD: 8 K/UL (ref 3.5–11)
WBC # BLD: ABNORMAL 10*3/UL

## 2018-10-04 PROCEDURE — 82784 ASSAY IGA/IGD/IGG/IGM EACH: CPT

## 2018-10-04 PROCEDURE — 36415 COLL VENOUS BLD VENIPUNCTURE: CPT

## 2018-10-04 PROCEDURE — 84165 PROTEIN E-PHORESIS SERUM: CPT

## 2018-10-04 PROCEDURE — 83883 ASSAY NEPHELOMETRY NOT SPEC: CPT

## 2018-10-04 PROCEDURE — 80048 BASIC METABOLIC PNL TOTAL CA: CPT

## 2018-10-04 PROCEDURE — 85025 COMPLETE CBC W/AUTO DIFF WBC: CPT

## 2018-10-04 PROCEDURE — 86334 IMMUNOFIX E-PHORESIS SERUM: CPT

## 2018-10-04 PROCEDURE — 84155 ASSAY OF PROTEIN SERUM: CPT

## 2018-10-05 LAB
ALBUMIN (CALCULATED): 4.3 G/DL (ref 3.2–5.2)
ALBUMIN PERCENT: 67 % (ref 45–65)
ALPHA 1 PERCENT: 2 % (ref 3–6)
ALPHA 2 PERCENT: 11 % (ref 6–13)
ALPHA-1-GLOBULIN: 0.2 G/DL (ref 0.1–0.4)
ALPHA-2-GLOBULIN: 0.7 G/DL (ref 0.5–0.9)
BETA GLOBULIN: 0.6 G/DL (ref 0.5–1.1)
BETA PERCENT: 10 % (ref 11–19)
GAMMA GLOBULIN %: 11 % (ref 9–20)
GAMMA GLOBULIN: 0.7 G/DL (ref 0.5–1.5)
PATHOLOGIST: ABNORMAL
PATHOLOGIST: NORMAL
PROTEIN ELECTROPHORESIS, SERUM: ABNORMAL
SERUM IFX INTERP: NORMAL
TOTAL PROT. SUM,%: 101 % (ref 98–102)
TOTAL PROT. SUM: 6.5 G/DL (ref 6.3–8.2)
TOTAL PROTEIN: 6.5 G/DL (ref 6.4–8.3)

## 2018-10-11 ENCOUNTER — OFFICE VISIT (OUTPATIENT)
Dept: ONCOLOGY | Age: 58
End: 2018-10-11
Payer: COMMERCIAL

## 2018-10-11 ENCOUNTER — TELEPHONE (OUTPATIENT)
Dept: ONCOLOGY | Age: 58
End: 2018-10-11

## 2018-10-11 VITALS
DIASTOLIC BLOOD PRESSURE: 74 MMHG | BODY MASS INDEX: 26.15 KG/M2 | WEIGHT: 162 LBS | SYSTOLIC BLOOD PRESSURE: 104 MMHG | RESPIRATION RATE: 18 BRPM | HEART RATE: 89 BPM | TEMPERATURE: 97.3 F

## 2018-10-11 DIAGNOSIS — D47.2 MGUS (MONOCLONAL GAMMOPATHY OF UNKNOWN SIGNIFICANCE): Primary | ICD-10-CM

## 2018-10-11 PROCEDURE — G8417 CALC BMI ABV UP PARAM F/U: HCPCS | Performed by: INTERNAL MEDICINE

## 2018-10-11 PROCEDURE — 3017F COLORECTAL CA SCREEN DOC REV: CPT | Performed by: INTERNAL MEDICINE

## 2018-10-11 PROCEDURE — G8427 DOCREV CUR MEDS BY ELIG CLIN: HCPCS | Performed by: INTERNAL MEDICINE

## 2018-10-11 PROCEDURE — 99212 OFFICE O/P EST SF 10 MIN: CPT | Performed by: INTERNAL MEDICINE

## 2018-10-11 PROCEDURE — 1036F TOBACCO NON-USER: CPT | Performed by: INTERNAL MEDICINE

## 2018-10-11 PROCEDURE — 99214 OFFICE O/P EST MOD 30 MIN: CPT | Performed by: INTERNAL MEDICINE

## 2018-10-11 PROCEDURE — G8484 FLU IMMUNIZE NO ADMIN: HCPCS | Performed by: INTERNAL MEDICINE

## 2018-10-11 RX ORDER — POLYETHYLENE GLYCOL 3350 17 G/17G
17 POWDER, FOR SOLUTION ORAL DAILY PRN
Status: ON HOLD | COMMUNITY
End: 2019-09-10

## 2018-10-11 RX ORDER — ALENDRONATE SODIUM 10 MG/1
10 TABLET ORAL
Status: ON HOLD | COMMUNITY
End: 2019-09-10

## 2018-10-11 RX ORDER — DIPHENHYDRAMINE HCL 25 MG
25 CAPSULE ORAL EVERY 6 HOURS PRN
COMMUNITY
End: 2019-07-11

## 2018-10-15 NOTE — PROGRESS NOTES
signs: Blood pressure 104/74, pulse 89, temperature 97.3 °F (36.3 °C), temperature source Oral, resp. rate 18, weight 162 lb (73.5 kg). HEENT:  Eyes are normal. Ears, nose and throat are normal.  Neck: Supple. No lymph node enlargement. No thyroid enlargement. Trachea is centrally located. Chest:  Clear to auscultation. No wheezes or crepitations. Heart: Regular sinus rhythm. Abdomen: Soft, nontender. No hepatosplenomegaly. No masses. Extremities:  With no edema. Lymph Nodes:  No cervical, axillary or inguinal lymph node enlargement. Neurologic:  Conscious and oriented. No focal neurological deficits. Psychosocial: No depression, anxiety or stress. Skin: No rashes, bruises or ecchymoses.       Review of Diagnostic data:   Lab Results   Component Value Date    WBC 8.0 10/04/2018    HGB 15.0 10/04/2018    HCT 45.5 10/04/2018    MCV 89.3 10/04/2018     10/04/2018       Chemistry        Component Value Date/Time     10/04/2018 0914    K 3.9 10/04/2018 0914     10/04/2018 0914    CO2 26 10/04/2018 0914    BUN 6 10/04/2018 0914    CREATININE 0.95 10/04/2018 0914        Component Value Date/Time    CALCIUM 9.3 10/04/2018 0914    ALKPHOS 178 (H) 08/02/2018 1756    AST 13 08/02/2018 1756    ALT 19 08/02/2018 1756    BILITOT 0.68 08/02/2018 1756        4/5/2018 12:45 PM - Jeremy, [facility] Incoming Lab Results From [facility]     Component Results     Component Value Ref Range & Units Status Collected Lab   Kappa Free Light Chains QNT 8.07   0.37 - 1.94 mg/dL Final 04/05/2018  9:10  [address]   Lambda Free Light Chains QNT 2.15  0.57 - 2.63 mg/dL Final 04/05/2018  9:10  [address]   Free Comobabi/Lambda Ratio 3.75   0.26 - 1.65 Final 04/05/2018  9:10 AM [address], [address] [place] [address] [phone]     4/5/2018 12:06 PM - Jeremy, [facility] Incoming Lab Results From [facility]     Component Results     Component Value Ref Range &

## 2018-10-26 ENCOUNTER — TELEPHONE (OUTPATIENT)
Dept: ONCOLOGY | Age: 58
End: 2018-10-26

## 2018-10-26 NOTE — TELEPHONE ENCOUNTER
Received call from [facility] patient care giver in triage reporting that after Engel last appointment, they reviewed records and found that Leonidas Rubalcava has lost 30 pounds over the last year. When they saw Dr Alejandrina Birmingham, they were unsure of this. Would like to know if Dr Cherelle Dawson would see patient sooner? Writer speaks with Dr Cherelle Dawson and he will see patient in January with labs drawn.   Writer attempts to call [facility] and set up appointment, however she is not in until Monday  Had to leave voice mail message for moving appt up to January  Pink slip generated with above information and given to  with information

## 2018-12-28 ENCOUNTER — HOSPITAL ENCOUNTER (OUTPATIENT)
Facility: MEDICAL CENTER | Age: 58
End: 2018-12-28
Payer: COMMERCIAL

## 2019-01-03 ENCOUNTER — HOSPITAL ENCOUNTER (OUTPATIENT)
Facility: MEDICAL CENTER | Age: 59
Discharge: HOME | End: 2019-01-03
Payer: COMMERCIAL

## 2019-01-03 ENCOUNTER — HOSPITAL ENCOUNTER (OUTPATIENT)
Age: 59
Discharge: HOME | End: 2019-01-03
Payer: COMMERCIAL

## 2019-01-03 DIAGNOSIS — D47.2 MGUS (MONOCLONAL GAMMOPATHY OF UNKNOWN SIGNIFICANCE): ICD-10-CM

## 2019-01-03 LAB
ABSOLUTE EOS #: 0.2 K/UL (ref 0–0.4)
ABSOLUTE IMMATURE GRANULOCYTE: ABNORMAL K/UL (ref 0–0.3)
ABSOLUTE LYMPH #: 1.5 K/UL (ref 1–4.8)
ABSOLUTE MONO #: 0.7 K/UL (ref 0.2–0.8)
ALBUMIN SERPL-MCNC: 3.8 G/DL (ref 3.5–5.2)
ALBUMIN/GLOBULIN RATIO: 1.5 (ref 1–2.5)
ALP BLD-CCNC: 132 U/L (ref 40–129)
ALT SERPL-CCNC: 11 U/L (ref 5–41)
ANION GAP SERPL CALCULATED.3IONS-SCNC: 11 MMOL/L (ref 9–17)
ANION GAP SERPL CALCULATED.3IONS-SCNC: 14 MMOL/L (ref 9–17)
AST SERPL-CCNC: 13 U/L
BASOPHILS # BLD: 0 % (ref 0–2)
BASOPHILS ABSOLUTE: 0 K/UL (ref 0–0.2)
BILIRUB SERPL-MCNC: 0.59 MG/DL (ref 0.3–1.2)
BUN BLDV-MCNC: 10 MG/DL (ref 6–20)
BUN BLDV-MCNC: 10 MG/DL (ref 6–20)
BUN/CREAT BLD: 13 (ref 9–20)
BUN/CREAT BLD: ABNORMAL (ref 9–20)
CALCIUM SERPL-MCNC: 8.5 MG/DL (ref 8.6–10.4)
CALCIUM SERPL-MCNC: 8.7 MG/DL (ref 8.6–10.4)
CALCIUM SERPL-MCNC: 8.7 MG/DL (ref 8.6–10.4)
CHLORIDE BLD-SCNC: 100 MMOL/L (ref 98–107)
CHLORIDE BLD-SCNC: 100 MMOL/L (ref 98–107)
CO2: 24 MMOL/L (ref 20–31)
CO2: 26 MMOL/L (ref 20–31)
CREAT SERPL-MCNC: 0.79 MG/DL (ref 0.7–1.2)
CREAT SERPL-MCNC: 0.84 MG/DL (ref 0.7–1.2)
DIFFERENTIAL TYPE: ABNORMAL
EOSINOPHILS RELATIVE PERCENT: 2 % (ref 1–4)
FREE KAPPA/LAMBDA RATIO: 2.64 (ref 0.26–1.65)
GFR AFRICAN AMERICAN: >60 ML/MIN
GFR AFRICAN AMERICAN: >60 ML/MIN
GFR NON-AFRICAN AMERICAN: >60 ML/MIN
GFR NON-AFRICAN AMERICAN: >60 ML/MIN
GFR SERPL CREATININE-BSD FRML MDRD: ABNORMAL ML/MIN/{1.73_M2}
GLUCOSE BLD-MCNC: 79 MG/DL (ref 70–99)
GLUCOSE BLD-MCNC: 82 MG/DL (ref 70–99)
HCT VFR BLD CALC: 39.4 % (ref 41–53)
HCT VFR BLD CALC: 39.4 % (ref 41–53)
HEMOGLOBIN: 13.3 G/DL (ref 13.5–17.5)
HEMOGLOBIN: 13.3 G/DL (ref 13.5–17.5)
IGA: 113 MG/DL (ref 70–400)
IGG: 788 MG/DL (ref 700–1600)
IGM: 35 MG/DL (ref 40–230)
IMMATURE GRANULOCYTES: ABNORMAL %
KAPPA FREE LIGHT CHAINS QNT: 4.33 MG/DL (ref 0.37–1.94)
LAMBDA FREE LIGHT CHAINS QNT: 1.64 MG/DL (ref 0.57–2.63)
LYMPHOCYTES # BLD: 16 % (ref 24–44)
MCH RBC QN AUTO: 30.8 PG (ref 26–34)
MCH RBC QN AUTO: 30.8 PG (ref 26–34)
MCHC RBC AUTO-ENTMCNC: 33.8 G/DL (ref 31–37)
MCHC RBC AUTO-ENTMCNC: 33.8 G/DL (ref 31–37)
MCV RBC AUTO: 90.9 FL (ref 80–100)
MCV RBC AUTO: 90.9 FL (ref 80–100)
MONOCYTES # BLD: 8 % (ref 1–7)
NRBC AUTOMATED: ABNORMAL PER 100 WBC
NRBC AUTOMATED: ABNORMAL PER 100 WBC
PDW BLD-RTO: 15.1 % (ref 11.5–14.5)
PDW BLD-RTO: 15.1 % (ref 11.5–14.5)
PLATELET # BLD: 187 K/UL (ref 130–400)
PLATELET # BLD: 187 K/UL (ref 130–400)
PLATELET ESTIMATE: ABNORMAL
PMV BLD AUTO: ABNORMAL FL (ref 6–12)
PMV BLD AUTO: ABNORMAL FL (ref 6–12)
POTASSIUM SERPL-SCNC: 3.6 MMOL/L (ref 3.7–5.3)
POTASSIUM SERPL-SCNC: 3.7 MMOL/L (ref 3.7–5.3)
PTH INTACT: 101.3 PG/ML (ref 15–65)
RBC # BLD: 4.34 M/UL (ref 4.5–5.9)
RBC # BLD: 4.34 M/UL (ref 4.5–5.9)
RBC # BLD: ABNORMAL 10*6/UL
SEG NEUTROPHILS: 74 % (ref 36–66)
SEGMENTED NEUTROPHILS ABSOLUTE COUNT: 6.8 K/UL (ref 1.8–7.7)
SODIUM BLD-SCNC: 137 MMOL/L (ref 135–144)
SODIUM BLD-SCNC: 138 MMOL/L (ref 135–144)
TOTAL PROTEIN: 6.4 G/DL (ref 6.4–8.3)
WBC # BLD: 9.2 K/UL (ref 3.5–11)
WBC # BLD: 9.2 K/UL (ref 3.5–11)
WBC # BLD: ABNORMAL 10*3/UL

## 2019-01-03 PROCEDURE — 85025 COMPLETE CBC W/AUTO DIFF WBC: CPT

## 2019-01-03 PROCEDURE — 83970 ASSAY OF PARATHORMONE: CPT

## 2019-01-03 PROCEDURE — 82523 COLLAGEN CROSSLINKS: CPT

## 2019-01-03 PROCEDURE — 85027 COMPLETE CBC AUTOMATED: CPT

## 2019-01-03 PROCEDURE — 83883 ASSAY NEPHELOMETRY NOT SPEC: CPT

## 2019-01-03 PROCEDURE — 86334 IMMUNOFIX E-PHORESIS SERUM: CPT

## 2019-01-03 PROCEDURE — 80053 COMPREHEN METABOLIC PANEL: CPT

## 2019-01-03 PROCEDURE — 82310 ASSAY OF CALCIUM: CPT

## 2019-01-03 PROCEDURE — 80048 BASIC METABOLIC PNL TOTAL CA: CPT

## 2019-01-03 PROCEDURE — 36415 COLL VENOUS BLD VENIPUNCTURE: CPT

## 2019-01-03 PROCEDURE — 82784 ASSAY IGA/IGD/IGG/IGM EACH: CPT

## 2019-01-04 LAB
PATHOLOGIST: NORMAL
SERUM IFX INTERP: NORMAL

## 2019-01-05 LAB
CREATININE URINE /VOLUME: 117 MG/DL
N-TELO/CREAT. RATIO: 22 (ref 21–83)

## 2019-01-10 ENCOUNTER — TELEPHONE (OUTPATIENT)
Dept: ONCOLOGY | Age: 59
End: 2019-01-10

## 2019-01-10 ENCOUNTER — OFFICE VISIT (OUTPATIENT)
Dept: ONCOLOGY | Age: 59
End: 2019-01-10
Payer: COMMERCIAL

## 2019-01-10 VITALS
RESPIRATION RATE: 18 BRPM | HEART RATE: 93 BPM | SYSTOLIC BLOOD PRESSURE: 106 MMHG | DIASTOLIC BLOOD PRESSURE: 75 MMHG | TEMPERATURE: 97.3 F | WEIGHT: 163.5 LBS | BODY MASS INDEX: 26.39 KG/M2

## 2019-01-10 DIAGNOSIS — N18.30 CHRONIC RENAL IMPAIRMENT, STAGE 3 (MODERATE) (HCC): ICD-10-CM

## 2019-01-10 DIAGNOSIS — D47.2 MGUS (MONOCLONAL GAMMOPATHY OF UNKNOWN SIGNIFICANCE): Primary | ICD-10-CM

## 2019-01-10 PROCEDURE — G8417 CALC BMI ABV UP PARAM F/U: HCPCS | Performed by: INTERNAL MEDICINE

## 2019-01-10 PROCEDURE — 99211 OFF/OP EST MAY X REQ PHY/QHP: CPT

## 2019-01-10 PROCEDURE — G8428 CUR MEDS NOT DOCUMENT: HCPCS | Performed by: INTERNAL MEDICINE

## 2019-01-10 PROCEDURE — 1036F TOBACCO NON-USER: CPT | Performed by: INTERNAL MEDICINE

## 2019-01-10 PROCEDURE — 99214 OFFICE O/P EST MOD 30 MIN: CPT | Performed by: INTERNAL MEDICINE

## 2019-01-10 PROCEDURE — 3017F COLORECTAL CA SCREEN DOC REV: CPT | Performed by: INTERNAL MEDICINE

## 2019-01-10 PROCEDURE — G8484 FLU IMMUNIZE NO ADMIN: HCPCS | Performed by: INTERNAL MEDICINE

## 2019-02-18 ENCOUNTER — TELEPHONE (OUTPATIENT)
Dept: GASTROENTEROLOGY | Age: 59
End: 2019-02-18

## 2019-03-04 ENCOUNTER — HOSPITAL ENCOUNTER (OUTPATIENT)
Dept: SLEEP CENTER | Age: 59
Discharge: HOME | End: 2019-03-06
Payer: COMMERCIAL

## 2019-03-04 DIAGNOSIS — G47.33 OSA (OBSTRUCTIVE SLEEP APNEA): Primary | ICD-10-CM

## 2019-03-04 PROCEDURE — 95811 POLYSOM 6/>YRS CPAP 4/> PARM: CPT

## 2019-03-05 VITALS
OXYGEN SATURATION: 96 % | HEART RATE: 78 BPM | HEIGHT: 69 IN | RESPIRATION RATE: 16 BRPM | DIASTOLIC BLOOD PRESSURE: 82 MMHG | BODY MASS INDEX: 23.55 KG/M2 | SYSTOLIC BLOOD PRESSURE: 120 MMHG | WEIGHT: 159 LBS

## 2019-03-05 ASSESSMENT — SLEEP AND FATIGUE QUESTIONNAIRES
HOW LIKELY ARE YOU TO NOD OFF OR FALL ASLEEP WHILE LYING DOWN TO REST IN THE AFTERNOON WHEN CIRCUMSTANCES PERMIT: 0
ESS TOTAL SCORE: 0
HOW LIKELY ARE YOU TO NOD OFF OR FALL ASLEEP IN A CAR, WHILE STOPPED FOR A FEW MINUTES IN TRAFFIC: 0
HOW LIKELY ARE YOU TO NOD OFF OR FALL ASLEEP WHILE SITTING QUIETLY AFTER LUNCH WITHOUT ALCOHOL: 0
HOW LIKELY ARE YOU TO NOD OFF OR FALL ASLEEP WHEN YOU ARE A PASSENGER IN A CAR FOR AN HOUR WITHOUT A BREAK: 0
HOW LIKELY ARE YOU TO NOD OFF OR FALL ASLEEP WHILE SITTING INACTIVE IN A PUBLIC PLACE: 0
HOW LIKELY ARE YOU TO NOD OFF OR FALL ASLEEP WHILE SITTING AND TALKING TO SOMEONE: 0
HOW LIKELY ARE YOU TO NOD OFF OR FALL ASLEEP WHILE SITTING AND READING: 0
HOW LIKELY ARE YOU TO NOD OFF OR FALL ASLEEP WHILE WATCHING TV: 0

## 2019-03-19 LAB — STATUS: NORMAL

## 2019-07-03 ENCOUNTER — HOSPITAL ENCOUNTER (OUTPATIENT)
Facility: MEDICAL CENTER | Age: 59
Discharge: HOME | End: 2019-07-03
Payer: COMMERCIAL

## 2019-07-03 DIAGNOSIS — D47.2 MGUS (MONOCLONAL GAMMOPATHY OF UNKNOWN SIGNIFICANCE): ICD-10-CM

## 2019-07-03 LAB
ABSOLUTE EOS #: 0.1 K/UL (ref 0–0.44)
ABSOLUTE IMMATURE GRANULOCYTE: 0.04 K/UL (ref 0–0.3)
ABSOLUTE LYMPH #: 0.79 K/UL (ref 1.1–3.7)
ABSOLUTE MONO #: 0.48 K/UL (ref 0.1–1.2)
ANION GAP SERPL CALCULATED.3IONS-SCNC: 12 MMOL/L (ref 9–17)
BASOPHILS # BLD: 1 % (ref 0–2)
BASOPHILS ABSOLUTE: 0.06 K/UL (ref 0–0.2)
BUN BLDV-MCNC: 10 MG/DL (ref 6–20)
BUN/CREAT BLD: 10 (ref 9–20)
CALCIUM SERPL-MCNC: 8.9 MG/DL (ref 8.6–10.4)
CHLORIDE BLD-SCNC: 101 MMOL/L (ref 98–107)
CO2: 25 MMOL/L (ref 20–31)
CREAT SERPL-MCNC: 0.96 MG/DL (ref 0.7–1.2)
DIFFERENTIAL TYPE: ABNORMAL
EOSINOPHILS RELATIVE PERCENT: 2 % (ref 1–4)
FREE KAPPA/LAMBDA RATIO: 5.99 (ref 0.26–1.65)
GFR AFRICAN AMERICAN: >60 ML/MIN
GFR NON-AFRICAN AMERICAN: >60 ML/MIN
GFR SERPL CREATININE-BSD FRML MDRD: ABNORMAL ML/MIN/{1.73_M2}
GFR SERPL CREATININE-BSD FRML MDRD: ABNORMAL ML/MIN/{1.73_M2}
GLUCOSE BLD-MCNC: 115 MG/DL (ref 70–99)
HCT VFR BLD CALC: 44.1 % (ref 40.7–50.3)
HEMOGLOBIN: 14.3 G/DL (ref 13–17)
IGA: 115 MG/DL (ref 70–400)
IGG: 974 MG/DL (ref 700–1600)
IGM: 31 MG/DL (ref 40–230)
IMMATURE GRANULOCYTES: 1 %
KAPPA FREE LIGHT CHAINS QNT: 12.81 MG/DL (ref 0.37–1.94)
LAMBDA FREE LIGHT CHAINS QNT: 2.14 MG/DL (ref 0.57–2.63)
LYMPHOCYTES # BLD: 15 % (ref 24–43)
MCH RBC QN AUTO: 29.4 PG (ref 25.2–33.5)
MCHC RBC AUTO-ENTMCNC: 32.4 G/DL (ref 28.4–34.8)
MCV RBC AUTO: 90.6 FL (ref 82.6–102.9)
MONOCYTES # BLD: 9 % (ref 3–12)
NRBC AUTOMATED: 0 PER 100 WBC
PDW BLD-RTO: 15.1 % (ref 11.8–14.4)
PLATELET # BLD: 151 K/UL (ref 138–453)
PLATELET ESTIMATE: ABNORMAL
PMV BLD AUTO: 10.2 FL (ref 8.1–13.5)
POTASSIUM SERPL-SCNC: 4 MMOL/L (ref 3.7–5.3)
RBC # BLD: 4.87 M/UL (ref 4.21–5.77)
RBC # BLD: ABNORMAL 10*6/UL
SEG NEUTROPHILS: 72 % (ref 36–65)
SEGMENTED NEUTROPHILS ABSOLUTE COUNT: 3.82 K/UL (ref 1.5–8.1)
SODIUM BLD-SCNC: 138 MMOL/L (ref 135–144)
WBC # BLD: 5.3 K/UL (ref 3.5–11.3)
WBC # BLD: ABNORMAL 10*3/UL

## 2019-07-03 PROCEDURE — 82784 ASSAY IGA/IGD/IGG/IGM EACH: CPT

## 2019-07-03 PROCEDURE — 36415 COLL VENOUS BLD VENIPUNCTURE: CPT

## 2019-07-03 PROCEDURE — 80048 BASIC METABOLIC PNL TOTAL CA: CPT

## 2019-07-03 PROCEDURE — 85025 COMPLETE CBC W/AUTO DIFF WBC: CPT

## 2019-07-03 PROCEDURE — 84155 ASSAY OF PROTEIN SERUM: CPT

## 2019-07-03 PROCEDURE — 84165 PROTEIN E-PHORESIS SERUM: CPT

## 2019-07-03 PROCEDURE — 86334 IMMUNOFIX E-PHORESIS SERUM: CPT

## 2019-07-03 PROCEDURE — 83883 ASSAY NEPHELOMETRY NOT SPEC: CPT

## 2019-07-08 ENCOUNTER — HOSPITAL ENCOUNTER (OUTPATIENT)
Facility: MEDICAL CENTER | Age: 59
End: 2019-07-08
Payer: COMMERCIAL

## 2019-07-08 LAB
ALBUMIN (CALCULATED): 4.3 G/DL (ref 3.2–5.2)
ALBUMIN PERCENT: 66 % (ref 45–65)
ALPHA 1 PERCENT: 3 % (ref 3–6)
ALPHA 2 PERCENT: 10 % (ref 6–13)
ALPHA-1-GLOBULIN: 0.2 G/DL (ref 0.1–0.4)
ALPHA-2-GLOBULIN: 0.7 G/DL (ref 0.5–0.9)
BETA GLOBULIN: 0.6 G/DL (ref 0.5–1.1)
BETA PERCENT: 10 % (ref 11–19)
GAMMA GLOBULIN %: 12 % (ref 9–20)
GAMMA GLOBULIN: 0.8 G/DL (ref 0.5–1.5)
PATHOLOGIST: ABNORMAL
PATHOLOGIST: NORMAL
PROTEIN ELECTROPHORESIS, SERUM: ABNORMAL
SERUM IFX INTERP: NORMAL
TOTAL PROT. SUM,%: 101 % (ref 98–102)
TOTAL PROT. SUM: 6.6 G/DL (ref 6.3–8.2)
TOTAL PROTEIN: 6.5 G/DL (ref 6.4–8.3)

## 2019-07-11 ENCOUNTER — OFFICE VISIT (OUTPATIENT)
Dept: ONCOLOGY | Age: 59
End: 2019-07-11
Payer: COMMERCIAL

## 2019-07-11 ENCOUNTER — TELEPHONE (OUTPATIENT)
Dept: ONCOLOGY | Age: 59
End: 2019-07-11

## 2019-07-11 VITALS
SYSTOLIC BLOOD PRESSURE: 125 MMHG | DIASTOLIC BLOOD PRESSURE: 76 MMHG | RESPIRATION RATE: 17 BRPM | HEART RATE: 72 BPM | TEMPERATURE: 97.5 F | BODY MASS INDEX: 25.99 KG/M2 | WEIGHT: 176 LBS

## 2019-07-11 DIAGNOSIS — D47.2 MGUS (MONOCLONAL GAMMOPATHY OF UNKNOWN SIGNIFICANCE): Primary | ICD-10-CM

## 2019-07-11 PROCEDURE — G8427 DOCREV CUR MEDS BY ELIG CLIN: HCPCS | Performed by: INTERNAL MEDICINE

## 2019-07-11 PROCEDURE — G8417 CALC BMI ABV UP PARAM F/U: HCPCS | Performed by: INTERNAL MEDICINE

## 2019-07-11 PROCEDURE — 99211 OFF/OP EST MAY X REQ PHY/QHP: CPT | Performed by: INTERNAL MEDICINE

## 2019-07-11 PROCEDURE — 1036F TOBACCO NON-USER: CPT | Performed by: INTERNAL MEDICINE

## 2019-07-11 PROCEDURE — 99214 OFFICE O/P EST MOD 30 MIN: CPT | Performed by: INTERNAL MEDICINE

## 2019-07-11 PROCEDURE — 3017F COLORECTAL CA SCREEN DOC REV: CPT | Performed by: INTERNAL MEDICINE

## 2019-07-11 NOTE — PROGRESS NOTES
[quetiapine fumarate]. FAMILY HISTORY: Negative for any hematological or oncological conditions. SOCIAL HISTORY:  reports that he has never smoked. He has never used smokeless tobacco. He reports that he drinks alcohol. He reports that he has current or past drug history. Drug: Cocaine. REVIEW OF SYSTEMS:     · General: No weakness or fatigue. No unanticipated weight loss or decreased appetite. No fever or chills. · Eyes: No blurred vision, eye pain or double vision. · Ears: No hearing problems or drainage. No tinnitus. · Throat: No sore throat, problems with swallowing or dysphagia. · Respiratory: No cough, sputum or hemoptysis. No shortness of breath. No pleuritic chest pain. · Cardiovascular: No chest pain, orthopnea or PND. No lower extremity edema. No palpitation. · Gastrointestinal: No problems with swallowing. No abdominal pain or bloating. No nausea or vomiting. No diarrhea or constipation. No GI bleeding. · Genitourinary: No dysuria, hematuria, frequency or urgency. · Musculoskeletal: No muscle aches or pains. No limitation of movement. No back pain. No gait disturbance, No joint complaints. · Dermatologic: No skin rashes or pruritus. No skin lesions or discolorations. · Psychiatric: No depression, anxiety, or stress or signs of schizophrenia. No change in mood or affect. · Hematologic: No history of bleeding tendency. No bruises or ecchymosis. No history of clotting problems. · Infectious disease: No fever, chills or frequent infections. · Endocrine: No problems with opacity. No polydipsia or polyuria. No temperature intolerance. · Neurologic: No headaches or dizziness. No weakness or numbness of the extremities. No changes in balance, coordination,  memory, mentation, behavior. · Allergic/Immunologic: No nasal congestion or hives. No repeated infections. PHYSICAL EXAM:  The patient is not in acute distress.   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 (MGUS). Chronic renal insufficiency  Bipolar disorder  Hypertension  ECOG performance score (0). PLAN: labs were reviewed and discussed with the patient. I explained the significance of these abnormalities related to immunoglobulins. There were some fluctuation in the level of the immunoglobulins. Likely dealing with MGUS. Recommend monitoring. No need for BM test yet. We will repeat labs in 6 months. Patient's questions were answered to the best of his satisfaction and he verbalized full understanding and agreement.

## 2019-09-10 ENCOUNTER — HOSPITAL ENCOUNTER (INPATIENT)
Age: 59
LOS: 2 days | Discharge: HOME | DRG: 753 | End: 2019-09-12
Attending: PSYCHIATRY & NEUROLOGY | Admitting: PSYCHIATRY & NEUROLOGY
Payer: COMMERCIAL

## 2019-09-10 DIAGNOSIS — F31.9 BIPOLAR 1 DISORDER (HCC): Primary | ICD-10-CM

## 2019-09-10 PROCEDURE — 6370000000 HC RX 637 (ALT 250 FOR IP): Performed by: NURSE PRACTITIONER

## 2019-09-10 PROCEDURE — 1240000000 HC EMOTIONAL WELLNESS R&B

## 2019-09-10 PROCEDURE — 90792 PSYCH DIAG EVAL W/MED SRVCS: CPT | Performed by: NURSE PRACTITIONER

## 2019-09-10 RX ORDER — TAMSULOSIN HYDROCHLORIDE 0.4 MG/1
0.8 CAPSULE ORAL DAILY
COMMUNITY

## 2019-09-10 RX ORDER — ATORVASTATIN CALCIUM 20 MG/1
20 TABLET, FILM COATED ORAL EVERY EVENING
Status: DISCONTINUED | OUTPATIENT
Start: 2019-09-10 | End: 2019-09-12 | Stop reason: HOSPADM

## 2019-09-10 RX ORDER — RANITIDINE 150 MG/1
150 TABLET ORAL 2 TIMES DAILY
Status: ON HOLD | COMMUNITY
End: 2020-07-01 | Stop reason: ALTCHOICE

## 2019-09-10 RX ORDER — CETIRIZINE HYDROCHLORIDE 10 MG/1
10 TABLET ORAL DAILY
COMMUNITY

## 2019-09-10 RX ORDER — CYANOCOBALAMIN (VITAMIN B-12) 500 MCG
1000 TABLET ORAL DAILY
COMMUNITY

## 2019-09-10 RX ORDER — TAMSULOSIN HYDROCHLORIDE 0.4 MG/1
0.4 CAPSULE ORAL NIGHTLY
Status: DISCONTINUED | OUTPATIENT
Start: 2019-09-10 | End: 2019-09-12 | Stop reason: HOSPADM

## 2019-09-10 RX ORDER — TEMAZEPAM 15 MG/1
30 CAPSULE ORAL NIGHTLY PRN
Status: ON HOLD | COMMUNITY
End: 2019-09-10

## 2019-09-10 RX ORDER — ALENDRONATE SODIUM 70 MG/1
70 TABLET ORAL
Status: DISCONTINUED | OUTPATIENT
Start: 2019-09-13 | End: 2019-09-10 | Stop reason: CLARIF

## 2019-09-10 RX ORDER — ATORVASTATIN CALCIUM 20 MG/1
20 TABLET, FILM COATED ORAL DAILY
Status: ON HOLD | COMMUNITY
End: 2020-07-07 | Stop reason: HOSPADM

## 2019-09-10 RX ORDER — FAMOTIDINE 20 MG/1
20 TABLET, FILM COATED ORAL 2 TIMES DAILY
Status: DISCONTINUED | OUTPATIENT
Start: 2019-09-10 | End: 2019-09-12 | Stop reason: HOSPADM

## 2019-09-10 RX ORDER — LANOLIN ALCOHOL/MO/W.PET/CERES
400 CREAM (GRAM) TOPICAL NIGHTLY
Status: DISCONTINUED | OUTPATIENT
Start: 2019-09-10 | End: 2019-09-12 | Stop reason: HOSPADM

## 2019-09-10 RX ORDER — ATORVASTATIN CALCIUM 20 MG/1
20 TABLET, FILM COATED ORAL NIGHTLY
Status: ON HOLD | COMMUNITY
End: 2019-09-10

## 2019-09-10 RX ORDER — CETIRIZINE HYDROCHLORIDE 10 MG/1
10 TABLET ORAL DAILY
Status: ON HOLD | COMMUNITY
End: 2019-09-10

## 2019-09-10 RX ORDER — LANOLIN ALCOHOL/MO/W.PET/CERES
1000 CREAM (GRAM) TOPICAL DAILY
Status: DISCONTINUED | OUTPATIENT
Start: 2019-09-10 | End: 2019-09-12 | Stop reason: HOSPADM

## 2019-09-10 RX ORDER — DIVALPROEX SODIUM 500 MG/1
500 TABLET, EXTENDED RELEASE ORAL NIGHTLY
Status: DISCONTINUED | OUTPATIENT
Start: 2019-09-10 | End: 2019-09-12 | Stop reason: HOSPADM

## 2019-09-10 RX ORDER — MAGNESIUM OXIDE 400 MG/1
400 TABLET ORAL DAILY
Status: ON HOLD | COMMUNITY
End: 2019-09-10

## 2019-09-10 RX ORDER — TRAZODONE HYDROCHLORIDE 50 MG/1
50 TABLET ORAL NIGHTLY PRN
Status: DISCONTINUED | OUTPATIENT
Start: 2019-09-10 | End: 2019-09-12 | Stop reason: HOSPADM

## 2019-09-10 RX ORDER — ASPIRIN 81 MG/1
81 TABLET ORAL DAILY
Status: DISCONTINUED | OUTPATIENT
Start: 2019-09-10 | End: 2019-09-12 | Stop reason: HOSPADM

## 2019-09-10 RX ORDER — CETIRIZINE HYDROCHLORIDE 10 MG/1
10 TABLET ORAL DAILY
Status: DISCONTINUED | OUTPATIENT
Start: 2019-09-10 | End: 2019-09-12 | Stop reason: HOSPADM

## 2019-09-10 RX ORDER — TRAZODONE HYDROCHLORIDE 50 MG/1
50 TABLET ORAL NIGHTLY
Status: DISCONTINUED | OUTPATIENT
Start: 2019-09-10 | End: 2019-09-10

## 2019-09-10 RX ORDER — ALENDRONATE SODIUM 70 MG/1
70 TABLET ORAL
COMMUNITY

## 2019-09-10 RX ORDER — GABAPENTIN 300 MG/1
300 CAPSULE ORAL 3 TIMES DAILY
Status: DISCONTINUED | OUTPATIENT
Start: 2019-09-10 | End: 2019-09-12 | Stop reason: HOSPADM

## 2019-09-10 RX ADMIN — FAMOTIDINE 20 MG: 20 TABLET ORAL at 20:46

## 2019-09-10 RX ADMIN — DIVALPROEX SODIUM 500 MG: 500 TABLET, FILM COATED, EXTENDED RELEASE ORAL at 20:46

## 2019-09-10 RX ADMIN — Medication 400 MG: at 20:46

## 2019-09-10 RX ADMIN — ASPIRIN 81 MG: 81 TABLET ORAL at 16:12

## 2019-09-10 RX ADMIN — ATORVASTATIN CALCIUM 20 MG: 20 TABLET, FILM COATED ORAL at 16:12

## 2019-09-10 RX ADMIN — CETIRIZINE HYDROCHLORIDE 10 MG: 10 TABLET, FILM COATED ORAL at 16:12

## 2019-09-10 RX ADMIN — GABAPENTIN 300 MG: 300 CAPSULE ORAL at 20:46

## 2019-09-10 RX ADMIN — GABAPENTIN 300 MG: 300 CAPSULE ORAL at 16:12

## 2019-09-10 RX ADMIN — TAMSULOSIN HYDROCHLORIDE 0.4 MG: 0.4 CAPSULE ORAL at 20:46

## 2019-09-10 ASSESSMENT — PATIENT HEALTH QUESTIONNAIRE - PHQ9: SUM OF ALL RESPONSES TO PHQ QUESTIONS 1-9: 14

## 2019-09-10 ASSESSMENT — SLEEP AND FATIGUE QUESTIONNAIRES
AVERAGE NUMBER OF SLEEP HOURS: 4
RESTFUL SLEEP: NO
DO YOU HAVE DIFFICULTY SLEEPING: YES
DIFFICULTY ARISING: NO
SLEEP PATTERN: DISTURBED/INTERRUPTED SLEEP;RESTLESSNESS;INSOMNIA
DO YOU USE A SLEEP AID: YES
DIFFICULTY FALLING ASLEEP: YES
DIFFICULTY STAYING ASLEEP: YES

## 2019-09-10 ASSESSMENT — LIFESTYLE VARIABLES
HISTORY_ALCOHOL_USE: NO
HISTORY_ALCOHOL_USE: NO

## 2019-09-10 ASSESSMENT — PAIN SCALES - GENERAL
PAINLEVEL_OUTOF10: 0
PAINLEVEL_OUTOF10: 0

## 2019-09-10 NOTE — GROUP NOTE
Group Therapy Note    Date: September 10    Group Start Time: 1330  Group End Time: 2790  Group Topic: Recovery    [facility]    PIETER Hernandez, LUIS    patient refused to attend Recovery group at 1:30pm after encouragement from staff.   1:1 talk time provided as alternative to group session      Signature:  PIETER Hernandez LSW

## 2019-09-10 NOTE — GROUP NOTE
Group Therapy Note    Date: September 10    Group Start Time: 1000  Group End Time: 1030  Group Topic: Psychotherapy    [facility]    Cynthia Roth        Group Therapy Note    Attendees: 6/16         Patient's Goal:  PT will demonstrate increased interpersonal interaction and a clear understanding on multiple types of coping skills relating to the here-and-now therapeutic practice. Notes: PT was an active listener during group discussion on this date. Status After Intervention:  Improved    Participation Level: Active Listener    Participation Quality: Appropriate and Attentive      Speech:  normal      Thought Process/Content: Logical      Affective Functioning: Congruent      Mood: stable      Level of consciousness:  Alert, Oriented x4 and Attentive      Response to Learning: Able to verbalize current knowledge/experience and Progressing to goal      Endings: None Reported    Modes of Intervention: Support, Socialization, Exploration, Clarifying and Problem-solving      Discipline Responsible: /Counselor      Signature:   Cynthia Roth

## 2019-09-10 NOTE — PLAN OF CARE
Problem: Altered Mood, Depressive Behavior:  Goal: Able to verbalize and/or display a decrease in depressive symptoms  Description  Able to verbalize and/or display a decrease in depressive symptoms  9/10/2019 1303 by Virginie Sainz LPN  Outcome: Ongoing  1:1 with pt x ten minutes. Pt encouraged to attend unit programming and interact with peers and staff. Pt also encouraged to tend to hygiene and ADLs. Pt encouraged to discuss feelings with staff and feedback and reassurance provided. Problem: Altered Mood, Depressive Behavior:  Goal: Ability to disclose and discuss suicidal ideas will improve  Description  Ability to disclose and discuss suicidal ideas will improve  9/10/2019 1303 by Virginie Saizn LPN  Outcome: Ongoing  Pt admits to having thoughts of self harm. Agreeable to seeking out staff should feelings increase. Able to identify positive coping skills and plan for safety. Will cont to monitor q15 minutes for safety and provide support and reassurance as needed. Problem: Altered Mood, Depressive Behavior:  Goal: Absence of self-harm  Description  Absence of self-harm  9/10/2019 1303 by Virginie Sainz LPN  Outcome: Ongoing   No self harm noted this shift. Patient agrees to seek staff out if negative thoughts arise. Will continue to monitor Q15 minute and intermittently.

## 2019-09-10 NOTE — BH NOTE
Psychiatric Admission Note         Medina Jaimes is a 60 y.o. male who was admitted from [facility]. The patient was admitted on a pink slip though he has signed in on a voluntary basis. The patient had suicidal ideation to shoot himself. He was still reporting suicidal thoughts upon admission, but was able to contract for safety. Also reported heightened anxiety to nursing. Documentation and charting has been reviewed. The patient has not received any as needed medications in the past 24 hours. Patient is seen up in his room today. The patient reports that he has been hospitalized because \"I am having racing thoughts. I am having suicidal thoughts. \"  The patient does report that his suicidal thoughts have been \"coming and going\" throughout the day. When asked if he could explain the thoughts he stated \"it would just be easier if I were gone. \"  The patient adamantly denies any specific plan to hurt himself. He does endorse feeling hopeless helpless and worthless, he indicates that he feels that there is no other way but to die at this point in effort to deal with his symptoms. The patient does report that he is experiencing brielle. He states he feels like he is \"crawling out of my skin. \"  He states he is been sleeping poorly even with his CPAP. He is unable to stay and fall asleep. He has had heightened energy, even with less and sleep. He remains hyperactive. Distractible. He reports he has been impulsive and made risky decisions in the past, though none recently. The patient states that his brielle started when his gabapentin was discontinued last week. The patient had been taking 600 mg 3 times daily. He denies any auditory or visual hallucinations. There are no notes of any delusions or hallucinations. The patient reports that he has attempted suicide in the past.  Reports intermittent drug use.     In addition to presenting with symptoms of brielle, the patient also reports that he has been feeling sad and depressed. He reports a loss of interest in usual activities and loss of self confidence. Given the patient's suicidal thoughts, hopelessness helplessness and worthlessness, coupled with his presentation of manic to hypomanic symptoms, he has an elevated risk of harm to self. He will remain hospitalized at this time his level of care required for stabilization is greater than that which may be provided on the outpatient basis. Past Psychiatric History   Patient reports current outpatient psychiatric linkage. .  Patient is currently linked with [facility]. The  Reported history of psychiatric inpatient hospitalization he reports that he was just hospitalized at [address] [place] [address] in 2019 for similar presentation. He states that he stabilized with the gabapentin, but his outpatient provider discontinued this approximately 1 week ago. Reported history of suicide attempts. History of Substance Abuse     Patient reports a history of alcohol abuse and dependence. He states he has not drank alcohol in over 5 years. He does report that he has been using more drugs in the past 1-2 months. Most recently he reported that he had been using powder cocaine. Denies any tobacco use. Family History of psychiatric disorders    Family history: denied . The patient reports that his parents are . Past psychiatric medications  The patient is  unable to provide a thorough list of medications he has taken in the past to manage his bipolar disorder. He is able to report that he has taken BuSpar, clonazepam, and gabapentin. No additional medications are able to be recalled by the patient. Medical History   Allergies:  Oxycodone; Fish-derived products; Ibuprofen; Latuda [lurasidone hcl];  Lithium; Lurasidone; Quetiapine; and Seroquel [quetiapine fumarate]   Past Medical History:   Diagnosis Date    Bipolar 1

## 2019-09-10 NOTE — BH NOTE
`[facility]  Admission Note     Admission Type:   Admission Type:  Involuntary    Reason for admission:  Reason for Admission: suicidal ideation    PATIENT STRENGTHS:  Strengths: Connection to output provider    Patient Strengths and Limitations:  Limitations: Difficult relationships / poor social skills, Hopeless about future    Addictive Behavior:   Addictive Behavior  In the past 3 months, have you felt or has someone told you that you have a problem with:  : None  Do you have a history of Chemical Use?: No  Do you have a history of Alcohol Use?: No  Do you have a history of Street Drug Abuse?: No  Histroy of Prescripton Drug Abuse?: No    Medical Problems:   Past Medical History:   Diagnosis Date    Bipolar 1 disorder ([facility] [address]     Depression     Hypertension     Neck pain     Patient in clinical research study 04/12/2018    [id number]       Status EXAM:  Status and Exam  Normal: No  Facial Expression: Worried  Affect: Appropriate  Level of Consciousness: Alert  Mood:Normal: No  Mood: Depressed  Motor Activity:Normal: Yes  Interview Behavior: Cooperative  Preception: [place] to Person, [address] to Time, [place] to Place, [place] to Situation  Attention:Normal: No  Attention: Unable to Concentrate  Thought Processes: Blocking  Thought Content:Normal: No  Thought Content: Preoccupations  Hallucinations: None  Delusions: No  Memory:Normal: Yes  Insight and Judgment: No  Insight and Judgment: Poor Judgment, Unmotivated  Present Suicidal Ideation: Yes(fleeting no plan contracts for safety)  Present Homicidal Ideation: No    Tobacco Screening:  Practical Counseling, on admission, georgette X, if applicable and completed (first 3 are required if patient doesn't refuse):            ( )  Recognizing danger situations (included triggers and roadblocks)                    ( )  Coping skills (new ways to manage stress, exercise, relaxation techniques, changing routine, distraction)

## 2019-09-10 NOTE — H&P
HISTORY and Audrey Kwong [address]       NAME:  Ita Pastro  MRN: [id number]   YOB: 1960   Date: 9/10/2019   Age: 60 y.o. Gender: male     COMPLAINT AND PRESENT HISTORY:      Ita Pastor is 60 y.o.,  male, admitted because of Bipolar Disorder. Patient states he came in due to suicidal ideations. Patient had plans to hang himself. Pt denies any homicidal ideation. Pt has a history of previous suicide attempts. Pt feels hopeless, helpless, worthless, lack of interest, loss of energy. Poor insight. Patient admits to his depression, he states that he feels like his he is crawling out of his skin. Patient states that he was taken off of his Klonopin a week ago and has been unable to contain himself. Patient admits to poor sleep and concentration. Patient denies any visual or auditory hallucinations. Patient states that he has been on his other prescribed psychiatric medications. Patient denies any current alcohol or substance abuse. Patient states his last use of cocaine was done couple months ago. Patient states he has his own place. Any somatic complaints. Chest pain or shortness of breath.       DIAGNOSTIC RESULTS       PAST MEDICAL HISTORY     Past Medical History:   Diagnosis Date    Bipolar 1 disorder ([facility] [address]     Depression     Hypertension     Neck pain     Patient in clinical research study 04/12/2018    [id number]       Pt denies any history of Diabetes mellitus type 2,  stroke, heart disease, COPD, Asthma, GERD, HLD, Cancer, Seizures,Thyroid disease, Kidney Disease, Hepatitis, TB.    SURGICAL HISTORY       Past Surgical History:   Procedure Laterality Date    ABDOMEN SURGERY      APPENDECTOMY      CARDIAC SURGERY  7/14/15    Median Sternotomy, Repair of ascending aorti aneurysm, stentless valve placement    DILATATION, ESOPHAGUS      HERNIA REPAIR      NECK SURGERY  2013    TONSILLECTOMY         FAMILY HISTORY       Family History   Problem Relation Age of Onset    Coronary Art Dis Mother     Hypertension Mother     Hypertension Father     Cancer Father         blood       SOCIAL HISTORY       Social History     Socioeconomic History    Marital status: Single     Spouse name: Not on file    Number of children: Not on file    Years of education: Not on file    Highest education level: Not on file   Occupational History    Not on file   Social Needs    Financial resource strain: Not on file    Food insecurity:     Worry: Not on file     Inability: Not on file    Transportation needs:     Medical: Not on file     Non-medical: Not on file   Tobacco Use    Smoking status: Never Smoker    Smokeless tobacco: Never Used   Substance and Sexual Activity    Alcohol use: Yes     Comment: heavy    Drug use: Yes     Types: Cocaine    Sexual activity: Not on file   Lifestyle    Physical activity:     Days per week: Not on file     Minutes per session: Not on file    Stress: Not on file   Relationships    Social connections:     Talks on phone: Not on file     Gets together: Not on file     Attends Yarsanism service: Not on file     Active member of club or organization: Not on file     Attends meetings of clubs or organizations: Not on file     Relationship status: Not on file    Intimate partner violence:     Fear of current or ex partner: Not on file     Emotionally abused: Not on file     Physically abused: Not on file     Forced sexual activity: Not on file   Other Topics Concern    Not on file   Social History Narrative    Not on file           REVIEW OF SYSTEMS      Allergies   Allergen Reactions    Oxycodone Itching    Fish-Derived Products     Ibuprofen     Latuda [Lurasidone Hcl] Other (See Comments)     \"Feels like pt is going to crawl out of own skin\"    Lithium     Lurasidone     Quetiapine     Seroquel [Quetiapine Fumarate]        No current facility-administered medications on file prior to encounter.       Current problems.  *      JACKLYN MARI, LOGAN - CNP on 9/10/2019 at 1:10 PM

## 2019-09-10 NOTE — PLAN OF CARE
[facility]  Initial Interdisciplinary Treatment Plan NO      Original treatment plan Date & Time: 9/10/2019  0726    Admission Type:  Admission Type:  Involuntary    Reason for admission:   Reason for Admission: suicidal ideation    Estimated Length of Stay:  5-7days  Estimated Discharge Date: to be determined by physician    PATIENT STRENGTHS:  Patient Strengths:Strengths: Connection to output provider  Patient Strengths and Limitations:Limitations: Difficult relationships / poor social skills, Hopeless about future  Addictive Behavior: Addictive Behavior  In the past 3 months, have you felt or has someone told you that you have a problem with:  : None  Do you have a history of Chemical Use?: No  Do you have a history of Alcohol Use?: No  Do you have a history of Street Drug Abuse?: No  Histroy of Prescripton Drug Abuse?: No  Medical Problems:  Past Medical History:   Diagnosis Date    Bipolar 1 disorder ([facility] [address]     Depression     Hypertension     Neck pain     Patient in clinical research study 04/12/2018    [id number]     Status EXAM:Status and Exam  Normal: No  Facial Expression: Worried  Affect: Appropriate  Level of Consciousness: Alert  Mood:Normal: No  Mood: Depressed  Motor Activity:Normal: Yes  Interview Behavior: Cooperative  Preception: Dell to Person, Jay Han to Time, Dell to Place, Dell to Situation  Attention:Normal: No  Attention: Unable to Concentrate  Thought Processes: Blocking  Thought Content:Normal: No  Thought Content: Preoccupations  Hallucinations: None  Delusions: No  Memory:Normal: Yes  Insight and Judgment: No  Insight and Judgment: Poor Judgment, Unmotivated  Present Suicidal Ideation: Yes(fleeting no plan contracts for safety)  Present Homicidal Ideation: No    EDUCATION:   Learner Progress Toward Treatment Goals: reviewed group plans and strategies for care    Method:group therapy, medication compliance, individualized assessments and care planning    Outcome: needs

## 2019-09-11 LAB
INR BLD: 1.8
PROTHROMBIN TIME: 20.5 SEC (ref 11.8–14.6)

## 2019-09-11 PROCEDURE — 85610 PROTHROMBIN TIME: CPT

## 2019-09-11 PROCEDURE — 36415 COLL VENOUS BLD VENIPUNCTURE: CPT

## 2019-09-11 PROCEDURE — 99221 1ST HOSP IP/OBS SF/LOW 40: CPT | Performed by: INTERNAL MEDICINE

## 2019-09-11 PROCEDURE — 99232 SBSQ HOSP IP/OBS MODERATE 35: CPT | Performed by: NURSE PRACTITIONER

## 2019-09-11 PROCEDURE — 6370000000 HC RX 637 (ALT 250 FOR IP): Performed by: NURSE PRACTITIONER

## 2019-09-11 PROCEDURE — 1240000000 HC EMOTIONAL WELLNESS R&B

## 2019-09-11 RX ORDER — TEMAZEPAM 15 MG/1
15 CAPSULE ORAL ONCE
Status: COMPLETED | OUTPATIENT
Start: 2019-09-11 | End: 2019-09-11

## 2019-09-11 RX ORDER — WARFARIN SODIUM 2 MG/1
2 TABLET ORAL
Status: ON HOLD | COMMUNITY
End: 2019-09-12 | Stop reason: HOSPADM

## 2019-09-11 RX ORDER — WARFARIN SODIUM 3 MG/1
3 TABLET ORAL
Status: COMPLETED | OUTPATIENT
Start: 2019-09-11 | End: 2019-09-12

## 2019-09-11 RX ORDER — BENZTROPINE MESYLATE 0.5 MG/1
0.5 TABLET ORAL 2 TIMES DAILY
Status: DISCONTINUED | OUTPATIENT
Start: 2019-09-11 | End: 2019-09-12 | Stop reason: HOSPADM

## 2019-09-11 RX ORDER — WARFARIN SODIUM 1 MG/1
1 TABLET ORAL EVERY EVENING
Status: ON HOLD | COMMUNITY
Start: 2019-09-11 | End: 2019-09-11

## 2019-09-11 RX ADMIN — GABAPENTIN 300 MG: 300 CAPSULE ORAL at 08:23

## 2019-09-11 RX ADMIN — GABAPENTIN 300 MG: 300 CAPSULE ORAL at 15:35

## 2019-09-11 RX ADMIN — TAMSULOSIN HYDROCHLORIDE 0.4 MG: 0.4 CAPSULE ORAL at 20:45

## 2019-09-11 RX ADMIN — BENZTROPINE MESYLATE 0.5 MG: 0.5 TABLET ORAL at 20:45

## 2019-09-11 RX ADMIN — ASPIRIN 81 MG: 81 TABLET ORAL at 08:24

## 2019-09-11 RX ADMIN — VITAMIN D, TAB 1000IU (100/BT) 1000 UNITS: 25 TAB at 08:24

## 2019-09-11 RX ADMIN — Medication 400 MG: at 20:45

## 2019-09-11 RX ADMIN — TEMAZEPAM 15 MG: 15 CAPSULE ORAL at 20:45

## 2019-09-11 RX ADMIN — ATORVASTATIN CALCIUM 20 MG: 20 TABLET, FILM COATED ORAL at 18:55

## 2019-09-11 RX ADMIN — GABAPENTIN 300 MG: 300 CAPSULE ORAL at 20:45

## 2019-09-11 RX ADMIN — FAMOTIDINE 20 MG: 20 TABLET ORAL at 20:45

## 2019-09-11 RX ADMIN — CETIRIZINE HYDROCHLORIDE 10 MG: 10 TABLET, FILM COATED ORAL at 08:24

## 2019-09-11 RX ADMIN — CYANOCOBALAMIN TAB 1000 MCG 1000 MCG: 1000 TAB at 08:24

## 2019-09-11 RX ADMIN — FAMOTIDINE 20 MG: 20 TABLET ORAL at 08:24

## 2019-09-11 RX ADMIN — DIVALPROEX SODIUM 500 MG: 500 TABLET, FILM COATED, EXTENDED RELEASE ORAL at 20:45

## 2019-09-11 NOTE — GROUP NOTE
Group Therapy Note    Date: September 11    Group Start Time: 1000  Group End Time: 8200  Group Topic: Psychotherapy    [facility]    Eneida Rubalcava        Group Therapy Note    Attendees: *6/17         Patient's Goal:Patient is making progress, AEB participating in group discussion, actively listening, and supporting other group members. PT participates in group and encourages others to participate         Notes: Patient is making progress, AEB participating in group discussion, actively listening, and supporting other group members. PT participates in group and encourages others to participate      Status After Intervention:  Improved    Participation Level: Active Listener and Interactive    Participation Quality: Appropriate, Attentive, Sharing and Supportive      Speech:  normal      Thought Process/Content: Logical      Affective Functioning: Congruent      Mood: stable      Level of consciousness:  Alert, Oriented x4 and Attentive      Response to Learning: Able to verbalize/acknowledge new learning and Progressing to goal      Endings: None Reported    Modes of Intervention: Support, Socialization, Exploration, Clarifying and Problem-solving      Discipline Responsible: /Counselor      Signature:   Eneida Rubalcava

## 2019-09-11 NOTE — PROGRESS NOTES
Temp 97.7 °F (36.5 °C) (Oral)   Resp 16   Ht 5' 6\" (1.676 m)   Wt 175 lb (79.4 kg)   SpO2 100%   BMI 28.25 kg/m²      Mental Status Evaluation:  Orientation: alertness: alert   Mood:. depressed      Affect:  normal and mood-congruent      Appearance:  age appropriate and casually dressed   Activity:  Within Normal Limits   Gait/Posture: Normal   Speech:   Normal in rate rhythm tone and volume. Thought Process:  goal directed   Thought Content:   Patient denies active suicidal thoughts. At times he reports that he has thoughts that he is better off if he were not here because he feels like a burden. No auditory visual hallucinations. No notes of delusions or paranoia. Sensorium:  person, place, time/date and situation   Cognition:  slowed   Memory: impaired recent memory   Insight:  fair   Judgment: fair   Suicidal Ideations: passive   Homicidal Ideations: Negative for homicidal ideation      Medication Side Effects: absent       Attention Span attention span appeared shorter than expected for age       Labs  No results found for this or any previous visit (from the past 67 hour(s)).     Medications  Current Facility-Administered Medications   Medication Dose Route Frequency Provider Last Rate Last Dose    traZODone (DESYREL) tablet 50 mg  50 mg Oral Nightly PRN Justine Odell MD        aspirin EC tablet 81 mg  81 mg Oral Daily Rudene Roland, APRN - CNP   81 mg at 09/11/19 0824    atorvastatin (LIPITOR) tablet 20 mg  20 mg Oral QPM Rudene Roland, APRN - CNP   20 mg at 09/10/19 1612    cetirizine (ZYRTEC) tablet 10 mg  10 mg Oral Daily Rudene Roland, APRN - CNP   10 mg at 09/11/19 0824    vitamin B-12 (CYANOCOBALAMIN) tablet 1,000 mcg  1,000 mcg Oral Daily Rudene Roland, APRN - CNP   1,000 mcg at 09/11/19 0824    magnesium oxide (MAG-OX) tablet 400 mg  400 mg Oral Nightly Rudene Roland, APRN - CNP   400 mg at 09/10/19 2046    famotidine (PEPCID) tablet 20 mg  20 mg Oral BID Rudene Roland,

## 2019-09-12 VITALS
OXYGEN SATURATION: 97 % | RESPIRATION RATE: 14 BRPM | HEART RATE: 60 BPM | HEIGHT: 66 IN | SYSTOLIC BLOOD PRESSURE: 117 MMHG | BODY MASS INDEX: 28.12 KG/M2 | DIASTOLIC BLOOD PRESSURE: 64 MMHG | WEIGHT: 175 LBS | TEMPERATURE: 97.3 F

## 2019-09-12 LAB
INR BLD: 1.6
PROTHROMBIN TIME: 19 SEC (ref 11.8–14.6)

## 2019-09-12 PROCEDURE — 36415 COLL VENOUS BLD VENIPUNCTURE: CPT

## 2019-09-12 PROCEDURE — 5130000000 HC BRIDGE APPOINTMENT

## 2019-09-12 PROCEDURE — 6370000000 HC RX 637 (ALT 250 FOR IP): Performed by: NURSE PRACTITIONER

## 2019-09-12 PROCEDURE — 85610 PROTHROMBIN TIME: CPT

## 2019-09-12 PROCEDURE — 6370000000 HC RX 637 (ALT 250 FOR IP): Performed by: PSYCHIATRY & NEUROLOGY

## 2019-09-12 PROCEDURE — 99239 HOSP IP/OBS DSCHRG MGMT >30: CPT | Performed by: NURSE PRACTITIONER

## 2019-09-12 RX ORDER — WARFARIN SODIUM 2 MG/1
1 TABLET ORAL
Qty: 30 TABLET | Refills: 3 | Status: ON HOLD | OUTPATIENT
Start: 2019-09-12 | End: 2022-06-13 | Stop reason: DRUGHIGH

## 2019-09-12 RX ORDER — WARFARIN SODIUM 1 MG/1
1 TABLET ORAL EVERY EVENING
Qty: 30 TABLET | Refills: 3 | Status: ON HOLD | OUTPATIENT
Start: 2019-09-12 | End: 2020-02-19

## 2019-09-12 RX ORDER — BENZTROPINE MESYLATE 0.5 MG/1
0.5 TABLET ORAL 2 TIMES DAILY
Qty: 60 TABLET | Refills: 0 | Status: ON HOLD | OUTPATIENT
Start: 2019-09-12 | End: 2020-07-01

## 2019-09-12 RX ORDER — DIVALPROEX SODIUM 500 MG/1
500 TABLET, EXTENDED RELEASE ORAL NIGHTLY
Qty: 30 TABLET | Refills: 0 | Status: ON HOLD | OUTPATIENT
Start: 2019-09-12 | End: 2020-02-18 | Stop reason: HOSPADM

## 2019-09-12 RX ORDER — WARFARIN SODIUM 3 MG/1
3 TABLET ORAL
Status: DISCONTINUED | OUTPATIENT
Start: 2019-09-12 | End: 2019-09-12 | Stop reason: HOSPADM

## 2019-09-12 RX ORDER — GABAPENTIN 300 MG/1
300 CAPSULE ORAL 3 TIMES DAILY
Qty: 90 CAPSULE | Refills: 0 | Status: ON HOLD | OUTPATIENT
Start: 2019-09-12 | End: 2020-07-01

## 2019-09-12 RX ORDER — TEMAZEPAM 15 MG/1
30 CAPSULE ORAL NIGHTLY PRN
Qty: 30 CAPSULE | Refills: 0 | Status: SHIPPED | OUTPATIENT
Start: 2019-09-12 | End: 2019-10-12

## 2019-09-12 RX ADMIN — FAMOTIDINE 20 MG: 20 TABLET ORAL at 08:25

## 2019-09-12 RX ADMIN — BENZTROPINE MESYLATE 0.5 MG: 0.5 TABLET ORAL at 08:25

## 2019-09-12 RX ADMIN — ASPIRIN 81 MG: 81 TABLET ORAL at 08:25

## 2019-09-12 RX ADMIN — CETIRIZINE HYDROCHLORIDE 10 MG: 10 TABLET, FILM COATED ORAL at 08:25

## 2019-09-12 RX ADMIN — VITAMIN D, TAB 1000IU (100/BT) 1000 UNITS: 25 TAB at 08:25

## 2019-09-12 RX ADMIN — CYANOCOBALAMIN TAB 1000 MCG 1000 MCG: 1000 TAB at 08:25

## 2019-09-12 RX ADMIN — GABAPENTIN 300 MG: 300 CAPSULE ORAL at 08:25

## 2019-09-12 RX ADMIN — WARFARIN SODIUM 3 MG: 3 TABLET ORAL at 04:34

## 2019-09-12 NOTE — GROUP NOTE
Group Therapy Note    Date: September 12    Group Start Time: 1330  Group End Time: 1910  Group Topic: Recovery    [facility]    PIETER Desai, LUIS    patient refused to attend Recovery group at 1:30pm after encouragement from staff.   1:1 talk time not provided as alternative to group session          Signature:  PIETER Desai LSW

## 2019-09-12 NOTE — BH NOTE
Pharmacy contacted about consult to dose coumadin Patient stated he has not had it in two days, Pharmacy to put in order.

## 2019-09-12 NOTE — DISCHARGE SUMMARY
the time of admission, therefore the level of care required for stabilization was greater than that which provided on the outpatient basis. Hospital Course:   Upon admission, Oumar Prado was provided a safe secure environment, introduced to unit milieu. Patient participated in groups and individual therapies. Meds were adjusted. After few days of hospital care, patient began to feel improvement. Depression lifted, thoughts to harm self ceased. Sleep improved, appetite was good. On morning rounds 9/12/2019, patient endorsed feeling ready for discharge. Patient denies suicidal or homicidal ideations, denies hallucinations or delusions. Denies SE's from meds. It was decided that pt had achieved maximum benefit from hospital care and can be discharged     Consults: Internal medicine for history and physical as well as management of Coumadin. Significant Diagnostic Studies: Routine labs and diagnostics    Treatments: Psychotropic medications, therapy with group, milieu, and 1:1 with nurses, social workers, PAAlleyC/CNP, and Attending physician. Discharge Medications:  Discharge Medication List as of 9/12/2019  1:37 PM      START taking these medications    Details   gabapentin (NEURONTIN) 300 MG capsule Take 1 capsule by mouth 3 times daily for 30 days. , Disp-90 capsule, R-0Normal      divalproex (DEPAKOTE ER) 500 MG extended release tablet Take 1 tablet by mouth nightly, Disp-30 tablet, R-0Normal      benztropine (COGENTIN) 0.5 MG tablet Take 1 tablet by mouth 2 times daily, Disp-60 tablet, R-0Normal         CONTINUE these medications which have CHANGED    Details   !! warfarin (COUMADIN) 2 MG tablet Take 0.5 tablets by mouth four times a week Indications: takes 1 mg tabs tuesday and saturday, takes 2mg tabs mon, wed thurs fri sun, Disp-30 tablet, R-3Normal      !! warfarin (COUMADIN) 1 MG tablet Take 1 tablet by mouth every evening Take 1mg by mouth on Mondays.  Take 2mg on all other days Tuesday, Wednesday,Thursday,Friday,Saturday, Sunday. , Disp-30 tablet, R-3Normal      temazepam (RESTORIL) 15 MG capsule Take 2 capsules by mouth nightly as needed for Sleep for up to 30 days. , Disp-30 capsule, R-0Normal       !! - Potential duplicate medications found. Please discuss with provider.       CONTINUE these medications which have NOT CHANGED    Details   tamsulosin (FLOMAX) 0.4 MG capsule Take 0.4 mg by mouth dailyHistorical Med      Cyanocobalamin (VITAMIN B 12 PO) Take 1,000 mcg by mouth dailyHistorical Med      MAGNESIUM OXIDE 400 PO Take 1 tablet by mouth nightlyHistorical Med      alendronate (FOSAMAX) 70 MG tablet Take 70 mg by mouth every 7 daysHistorical Med      cetirizine (ZYRTEC) 10 MG tablet Take 10 mg by mouth dailyHistorical Med      atorvastatin (LIPITOR) 20 MG tablet Take 20 mg by mouth dailyHistorical Med      aspirin 81 MG tablet Take 81 mg by mouth dailyHistorical Med      ranitidine (ZANTAC) 150 MG tablet Take 150 mg by mouth 2 times dailyHistorical Med      vitamin D (CHOLECALCIFEROL) 1000 UNIT TABS tablet Take 1,000 Units by mouth dailyHistorical Med         STOP taking these medications       Alendronate Sodium 70 MG TBEF Comments:   Reason for Stopping:         magnesium oxide (MAG-OX) 400 MG tablet Comments:   Reason for Stopping:         polyethylene glycol (GLYCOLAX) packet Comments:   Reason for Stopping:         gabapentin (NEURONTIN) 600 MG tablet Comments:   Reason for Stopping:         mirtazapine (REMERON) 45 MG tablet Comments:   Reason for Stopping:         midodrine (PROAMATINE) 5 MG tablet Comments:   Reason for Stopping:         hydrocortisone 1 % cream Comments:   Reason for Stopping:         folic acid (FOLVITE) 1 MG tablet Comments:   Reason for Stopping:         cyanocobalamin 1000 MCG tablet Comments:   Reason for Stopping:         vitamin D (ERGOCALCIFEROL) 20638 UNITS CAPS capsule Comments:   Reason for Stopping:                Core Measures statement:   Not

## 2019-09-12 NOTE — GROUP NOTE
Group Therapy Note     Date: September 12  Group Start Time: 1000am  Group End Time: 1045am  Group Topic: Cognitive Skills     NORAH Foss, CTRS           Group Therapy Note     Attendees:    4/11        Patient's Goal:  To increase social interaction and to practice problem solving, and improve thought process     Notes: Pt participated fully in group and was able to solve some problems independently, and others with support of peers and brief clues. Pt was pleasant and supportive of peers. Status After Intervention:  Improved     Participation Level:  Active Listener and Interactive     Participation Quality: Appropriate, Attentive, Sharing         Speech:   Normal     Thought Process/Content: Logical,linear     Affective Functioning: Congruent, brightened     Mood: euthymic        Level of consciousness:  Alert, and Attentive        Response to Learning: Able to verbalize current knowledge/experience, Able to verbalize/acknowledge new learning, and Progressing to goal        Endings: None Reported     Modes of Intervention: Education, Support, Socialization, Exploration, Clarifying and Problem-solving        Discipline Responsible: Psychoeducational Specialist        Signature:  DIMITRI Mcbride

## 2019-09-16 NOTE — CARE COORDINATION
Name: Sonya Heller    : 1960    Discharge Date: 2019    Primary Auth/Cert #: [id number]    Discharge Medications:      Medication List      START taking these medications    benztropine 0.5 MG tablet  Commonly known as:  COGENTIN  Take 1 tablet by mouth 2 times daily  Notes to patient:  For prevention of psychiatric medication side effects     divalproex 500 MG extended release tablet  Commonly known as:  DEPAKOTE ER  Take 1 tablet by mouth nightly  Notes to patient: For mood stabilization     gabapentin 300 MG capsule  Commonly known as:  NEURONTIN  Take 1 capsule by mouth 3 times daily for 30 days. Replaces:  gabapentin 600 MG tablet  Notes to patient:  For nerve pain and mood stabilization        CHANGE how you take these medications    aspirin 81 MG tablet  What changed:  Another medication with the same name was removed. Continue taking this medication, and follow the directions you see here. Notes to patient:  For health maintence     temazepam 15 MG capsule  Commonly known as:  RESTORIL  Take 2 capsules by mouth nightly as needed for Sleep for up to 30 days. What changed:  Another medication with the same name was removed. Continue taking this medication, and follow the directions you see here. Notes to patient:  For sleep     * warfarin 2 MG tablet  Commonly known as:  COUMADIN  Take 0.5 tablets by mouth four times a week Indications: takes 1 mg tabs tuesday and saturday, takes 2mg tabs mon, wed thurs fri sun  What changed:  Another medication with the same name was removed. Continue taking this medication, and follow the directions you see here. Notes to patient:  For clot prevention     * warfarin 1 MG tablet  Commonly known as:  COUMADIN  Take 1 tablet by mouth every evening Take 1mg by mouth on . Take 2mg on all other days Tuesday, Wednesday,Thursday,Friday,Saturday, . What changed:  Another medication with the same name was removed.  Continue taking this medication, and 8:30am with Yandy    Please send PT to;  [address] [facility]  [address]  [facility]  Go on 9/12/2019  Please send by [facility] cab

## 2020-01-07 ENCOUNTER — HOSPITAL ENCOUNTER (OUTPATIENT)
Facility: MEDICAL CENTER | Age: 60
Discharge: HOME | End: 2020-01-07
Payer: COMMERCIAL

## 2020-01-07 DIAGNOSIS — D47.2 MGUS (MONOCLONAL GAMMOPATHY OF UNKNOWN SIGNIFICANCE): ICD-10-CM

## 2020-01-07 LAB
ABSOLUTE EOS #: 0.13 K/UL (ref 0–0.44)
ABSOLUTE IMMATURE GRANULOCYTE: 0.04 K/UL (ref 0–0.3)
ABSOLUTE LYMPH #: 1.09 K/UL (ref 1.1–3.7)
ABSOLUTE MONO #: 0.64 K/UL (ref 0.1–1.2)
ANION GAP SERPL CALCULATED.3IONS-SCNC: 11 MMOL/L (ref 9–17)
BASOPHILS # BLD: 1 % (ref 0–2)
BASOPHILS ABSOLUTE: 0.05 K/UL (ref 0–0.2)
BUN BLDV-MCNC: 7 MG/DL (ref 8–23)
BUN/CREAT BLD: 6 (ref 9–20)
CALCIUM SERPL-MCNC: 9 MG/DL (ref 8.6–10.4)
CHLORIDE BLD-SCNC: 107 MMOL/L (ref 98–107)
CO2: 24 MMOL/L (ref 20–31)
CREAT SERPL-MCNC: 1.16 MG/DL (ref 0.7–1.2)
DIFFERENTIAL TYPE: ABNORMAL
EOSINOPHILS RELATIVE PERCENT: 2 % (ref 1–4)
FREE KAPPA/LAMBDA RATIO: 6.17 (ref 0.26–1.65)
GFR AFRICAN AMERICAN: >60 ML/MIN
GFR NON-AFRICAN AMERICAN: >60 ML/MIN
GFR SERPL CREATININE-BSD FRML MDRD: ABNORMAL ML/MIN/{1.73_M2}
GFR SERPL CREATININE-BSD FRML MDRD: ABNORMAL ML/MIN/{1.73_M2}
GLUCOSE BLD-MCNC: 129 MG/DL (ref 70–99)
HCT VFR BLD CALC: 42.1 % (ref 40.7–50.3)
HEMOGLOBIN: 13.7 G/DL (ref 13–17)
IGA: 120 MG/DL (ref 70–400)
IGG: 986 MG/DL (ref 700–1600)
IGM: 43 MG/DL (ref 40–230)
IMMATURE GRANULOCYTES: 1 %
KAPPA FREE LIGHT CHAINS QNT: 11.84 MG/DL (ref 0.37–1.94)
LAMBDA FREE LIGHT CHAINS QNT: 1.92 MG/DL (ref 0.57–2.63)
LYMPHOCYTES # BLD: 18 % (ref 24–43)
MCH RBC QN AUTO: 29.3 PG (ref 25.2–33.5)
MCHC RBC AUTO-ENTMCNC: 32.5 G/DL (ref 28.4–34.8)
MCV RBC AUTO: 90 FL (ref 82.6–102.9)
MONOCYTES # BLD: 11 % (ref 3–12)
NRBC AUTOMATED: 0 PER 100 WBC
PDW BLD-RTO: 15.6 % (ref 11.8–14.4)
PLATELET # BLD: 164 K/UL (ref 138–453)
PLATELET ESTIMATE: ABNORMAL
PMV BLD AUTO: 10.6 FL (ref 8.1–13.5)
POTASSIUM SERPL-SCNC: 3.8 MMOL/L (ref 3.7–5.3)
RBC # BLD: 4.68 M/UL (ref 4.21–5.77)
RBC # BLD: ABNORMAL 10*6/UL
SEG NEUTROPHILS: 67 % (ref 36–65)
SEGMENTED NEUTROPHILS ABSOLUTE COUNT: 4.09 K/UL (ref 1.5–8.1)
SODIUM BLD-SCNC: 142 MMOL/L (ref 135–144)
WBC # BLD: 6 K/UL (ref 3.5–11.3)
WBC # BLD: ABNORMAL 10*3/UL

## 2020-01-07 PROCEDURE — 80048 BASIC METABOLIC PNL TOTAL CA: CPT

## 2020-01-07 PROCEDURE — 36415 COLL VENOUS BLD VENIPUNCTURE: CPT

## 2020-01-07 PROCEDURE — 84155 ASSAY OF PROTEIN SERUM: CPT

## 2020-01-07 PROCEDURE — 82784 ASSAY IGA/IGD/IGG/IGM EACH: CPT

## 2020-01-07 PROCEDURE — 83883 ASSAY NEPHELOMETRY NOT SPEC: CPT

## 2020-01-07 PROCEDURE — 86334 IMMUNOFIX E-PHORESIS SERUM: CPT

## 2020-01-07 PROCEDURE — 84165 PROTEIN E-PHORESIS SERUM: CPT

## 2020-01-07 PROCEDURE — 85025 COMPLETE CBC W/AUTO DIFF WBC: CPT

## 2020-01-09 LAB
ALBUMIN (CALCULATED): 4.2 G/DL (ref 3.2–5.2)
ALBUMIN PERCENT: 64 % (ref 45–65)
ALPHA 1 PERCENT: 3 % (ref 3–6)
ALPHA 2 PERCENT: 9 % (ref 6–13)
ALPHA-1-GLOBULIN: 0.2 G/DL (ref 0.1–0.4)
ALPHA-2-GLOBULIN: 0.6 G/DL (ref 0.5–0.9)
BETA GLOBULIN: 0.8 G/DL (ref 0.5–1.1)
BETA PERCENT: 12 % (ref 11–19)
GAMMA GLOBULIN %: 12 % (ref 9–20)
GAMMA GLOBULIN: 0.8 G/DL (ref 0.5–1.5)
PATHOLOGIST: NORMAL
PATHOLOGIST: NORMAL
PROTEIN ELECTROPHORESIS, SERUM: NORMAL
SERUM IFX INTERP: NORMAL
TOTAL PROT. SUM,%: 100 % (ref 98–102)
TOTAL PROT. SUM: 6.6 G/DL (ref 6.3–8.2)
TOTAL PROTEIN: 6.5 G/DL (ref 6.4–8.3)

## 2020-01-20 ENCOUNTER — HOSPITAL ENCOUNTER (OUTPATIENT)
Facility: MEDICAL CENTER | Age: 60
End: 2020-01-20
Payer: COMMERCIAL

## 2020-01-21 ENCOUNTER — TELEPHONE (OUTPATIENT)
Dept: ONCOLOGY | Age: 60
End: 2020-01-21

## 2020-01-21 ENCOUNTER — OFFICE VISIT (OUTPATIENT)
Dept: ONCOLOGY | Age: 60
End: 2020-01-21
Payer: COMMERCIAL

## 2020-01-21 VITALS
HEART RATE: 61 BPM | WEIGHT: 197.9 LBS | SYSTOLIC BLOOD PRESSURE: 118 MMHG | TEMPERATURE: 97.5 F | DIASTOLIC BLOOD PRESSURE: 80 MMHG | BODY MASS INDEX: 31.94 KG/M2

## 2020-01-21 PROCEDURE — G8417 CALC BMI ABV UP PARAM F/U: HCPCS | Performed by: INTERNAL MEDICINE

## 2020-01-21 PROCEDURE — 99211 OFF/OP EST MAY X REQ PHY/QHP: CPT

## 2020-01-21 PROCEDURE — G8484 FLU IMMUNIZE NO ADMIN: HCPCS | Performed by: INTERNAL MEDICINE

## 2020-01-21 PROCEDURE — 3017F COLORECTAL CA SCREEN DOC REV: CPT | Performed by: INTERNAL MEDICINE

## 2020-01-21 PROCEDURE — G8427 DOCREV CUR MEDS BY ELIG CLIN: HCPCS | Performed by: INTERNAL MEDICINE

## 2020-01-21 PROCEDURE — 1036F TOBACCO NON-USER: CPT | Performed by: INTERNAL MEDICINE

## 2020-01-21 PROCEDURE — 99214 OFFICE O/P EST MOD 30 MIN: CPT | Performed by: INTERNAL MEDICINE

## 2020-01-21 NOTE — PROGRESS NOTES
_        Chief Complaint   Patient presents with    Follow-up     Review status of disease     Discuss Labs     DIAGNOSIS:       Paraproteinemia/ monoclonal gammopathy of undetermined significance. (MGUS). Chronic renal insufficiency  Bipolar disorder  Hypertension      CURRENT THERAPY:         Seen to discuss labs results and further management plans. BRIEF CASE HISTORY:      Mr. Cornelio Garcia is a very pleasant 60 y.o. male with history of hypertension, bipolar disorder, and chronic renal insufficiency. The patient had labs done which revealed monoclonal gammopathy. He is referred for further management. At the present time he denies any fever or night sweats. No weight loss or decreased appetite. No chest pain or shortness of breath. No palpable lymph nodes. Patient has generalized body aches. No bone fractures. No other complaints. .     INTERIM HISTORY:   Seen for follow up abnormal immunoglobulins levels. Patient is doing well clinically. No significant complaints. No aches or pains. No fever or night sweats. No weight loss or decreased appetite. No other complaints. PAST MEDICAL HISTORY: has a past medical history of Bipolar 1 disorder ([facility] [address] Depression, Hypertension, Neck pain, and Patient in clinical research study. PAST SURGICAL HISTORY: has a past surgical history that includes hernia repair; Neck surgery (2013); Abdomen surgery; Tonsillectomy; Appendectomy; Dilatation, esophagus; and Cardiac surgery (7/14/15). CURRENT MEDICATIONS:  has a current medication list which includes the following prescription(s): warfarin, warfarin, gabapentin, divalproex, benztropine, tamsulosin, cyanocobalamin, magnesium oxide, alendronate, cetirizine, atorvastatin, aspirin, ranitidine, and vitamin d.     ALLERGIES:  is allergic to oxycodone; fish-derived products; ibuprofen; latuda [lurasidone hcl]; lithium; lurasidone; quetiapine; and seroquel [quetiapine fumarate]. FAMILY HISTORY: Negative for any hematological or oncological conditions. SOCIAL HISTORY:  reports that he has never smoked. He has never used smokeless tobacco. He reports current alcohol use. He reports current drug use. Drug: Cocaine. REVIEW OF SYSTEMS:     · General: No weakness or fatigue. No unanticipated weight loss or decreased appetite. No fever or chills. · Eyes: No blurred vision, eye pain or double vision. · Ears: No hearing problems or drainage. No tinnitus. · Throat: No sore throat, problems with swallowing or dysphagia. · Respiratory: No cough, sputum or hemoptysis. No shortness of breath. No pleuritic chest pain. · Cardiovascular: No chest pain, orthopnea or PND. No lower extremity edema. No palpitation. · Gastrointestinal: No problems with swallowing. No abdominal pain or bloating. No nausea or vomiting. No diarrhea or constipation. No GI bleeding. · Genitourinary: No dysuria, hematuria, frequency or urgency. · Musculoskeletal: No muscle aches or pains. No limitation of movement. No back pain. No gait disturbance, No joint complaints. · Dermatologic: No skin rashes or pruritus. No skin lesions or discolorations. · Psychiatric: No depression, anxiety, or stress or signs of schizophrenia. No change in mood or affect. · Hematologic: No history of bleeding tendency. No bruises or ecchymosis. No history of clotting problems. · Infectious disease: No fever, chills or frequent infections. · Endocrine: No problems with opacity. No polydipsia or polyuria. No temperature intolerance. · Neurologic: No headaches or dizziness. No weakness or numbness of the extremities. No changes in balance, coordination,  memory, mentation, behavior. · Allergic/Immunologic: No nasal congestion or hives. No repeated infections. PHYSICAL EXAM:  The patient is not in acute distress.   Vital signs: Blood pressure 118/80, pulse 61, temperature 97.5 °F (36.4 °C), temperature source Oral, weight 197 lb 14.4 oz (89.8 kg). HEENT:  Eyes are normal. Ears, nose and throat are normal.  Neck: Supple. No lymph node enlargement. No thyroid enlargement. Trachea is centrally located. Chest:  Clear to auscultation. No wheezes or crepitations. Heart: Regular sinus rhythm. Abdomen: Soft, nontender. No hepatosplenomegaly. No masses. Extremities:  With no edema. Lymph Nodes:  No cervical, axillary or inguinal lymph node enlargement. Neurologic:  Conscious and oriented. No focal neurological deficits. Psychosocial: No depression, anxiety or stress. Skin: No rashes, bruises or ecchymoses.       Review of Diagnostic data:   Lab Results   Component Value Date    WBC 6.0 01/07/2020    HGB 13.7 01/07/2020    HCT 42.1 01/07/2020    MCV 90.0 01/07/2020     01/07/2020       Chemistry        Component Value Date/Time     01/07/2020 1132    K 3.8 01/07/2020 1132     01/07/2020 1132    CO2 24 01/07/2020 1132    BUN 7 (L) 01/07/2020 1132    CREATININE 1.16 01/07/2020 1132        Component Value Date/Time    CALCIUM 9.0 01/07/2020 1132    ALKPHOS 132 (H) 01/03/2019 1005    AST 13 01/03/2019 1005    ALT 11 01/03/2019 1005    BILITOT 0.59 01/03/2019 1005        4/5/2018 12:45 PM - Atilio Luna Incoming Lab Results From [facility]     Component Results     Component Value Ref Range & Units Status Collected Lab   Kappa Free Light Chains QNT 8.07   0.37 - 1.94 mg/dL Final 04/05/2018  9:10 AM [facility] [place]   Lambda Free Light Chains QNT 2.15  0.57 - 2.63 mg/dL Final 04/05/2018  9:10  [address]   Free [place]/Lambda Ratio 3.75   0.26 - 1.65 Final 04/05/2018  9:10 AM [address] [phone]     4/5/2018 12:06 PM - Atilio Luna Incoming Lab Results From [facility]     Component Results     Component Value Ref Range & Units Status Collected Lab   Igg 849  700 - 1600 mg/dL Final 04/05/2018  9:10  [address]     70 - 400 mg/dL Final 04/05/2018  9:10  [address]   Igm 41  40 - 230 mg/dL Final 04/05/2018  9:10 AM [address] [place] [address], [address] [place] [address] [phone]             IMPRESSION:   Paraproteinemia/ monoclonal gammopathy of undetermined significance. (MGUS). Chronic renal insufficiency  Bipolar disorder  Hypertension  ECOG performance score (0). PLAN: labs were reviewed and discussed with the patient. I explained the significance of these abnormalities related to immunoglobulins. There were some fluctuation in the level of the immunoglobulins. Likely dealing with MGUS. Recommend monitoring. No need for BM test yet. We will repeat labs in 6 months. Patient's questions were answered to the best of his satisfaction and he verbalized full understanding and agreement.

## 2020-02-14 ENCOUNTER — APPOINTMENT (OUTPATIENT)
Dept: GENERAL RADIOLOGY | Age: 60
DRG: 753 | End: 2020-02-14
Payer: COMMERCIAL

## 2020-02-14 ENCOUNTER — HOSPITAL ENCOUNTER (INPATIENT)
Age: 60
LOS: 2 days | Discharge: PSYCHIATRIC OR SUBSTANCE USE FACILITY | DRG: 753 | End: 2020-02-16
Attending: EMERGENCY MEDICINE | Admitting: FAMILY MEDICINE
Payer: COMMERCIAL

## 2020-02-14 ENCOUNTER — APPOINTMENT (OUTPATIENT)
Dept: CT IMAGING | Age: 60
DRG: 753 | End: 2020-02-14
Payer: COMMERCIAL

## 2020-02-14 PROBLEM — D64.9 ANEMIA: Status: ACTIVE | Noted: 2020-02-14

## 2020-02-14 PROBLEM — N17.9 AKI (ACUTE KIDNEY INJURY) (HCC): Status: ACTIVE | Noted: 2020-02-14

## 2020-02-14 PROBLEM — E87.0 HYPERNATREMIA: Status: ACTIVE | Noted: 2020-02-14

## 2020-02-14 PROBLEM — R41.82 ALTERED MENTAL STATUS: Status: ACTIVE | Noted: 2020-02-14

## 2020-02-14 PROBLEM — R41.82 ALTERED MENTAL STATUS, UNSPECIFIED: Status: ACTIVE | Noted: 2020-02-14

## 2020-02-14 PROBLEM — R79.1 SUPRATHERAPEUTIC INR: Status: ACTIVE | Noted: 2020-02-14

## 2020-02-14 PROBLEM — E86.0 DEHYDRATION: Status: ACTIVE | Noted: 2020-02-14

## 2020-02-14 PROBLEM — I48.91 ATRIAL FIBRILLATION (HCC): Status: ACTIVE | Noted: 2020-02-14

## 2020-02-14 LAB
ABSOLUTE EOS #: 0.07 K/UL (ref 0–0.4)
ABSOLUTE EOS #: <0.03 K/UL (ref 0–0.44)
ABSOLUTE IMMATURE GRANULOCYTE: 0.07 K/UL (ref 0–0.3)
ABSOLUTE IMMATURE GRANULOCYTE: 0.07 K/UL (ref 0–0.3)
ABSOLUTE LYMPH #: 0.74 K/UL (ref 1–4.8)
ABSOLUTE LYMPH #: 0.79 K/UL (ref 1.1–3.7)
ABSOLUTE MONO #: 0.15 K/UL (ref 0.1–0.8)
ABSOLUTE MONO #: 0.71 K/UL (ref 0.1–1.2)
ACETAMINOPHEN LEVEL: <5 UG/ML (ref 10–30)
ALBUMIN SERPL-MCNC: 3.7 G/DL (ref 3.5–5.2)
ALBUMIN/GLOBULIN RATIO: 1.4 (ref 1–2.5)
ALP BLD-CCNC: 137 U/L (ref 40–129)
ALT SERPL-CCNC: 25 U/L (ref 5–41)
AMMONIA: 25 UMOL/L (ref 16–60)
AMMONIA: 33 UMOL/L (ref 16–60)
AMPHETAMINE SCREEN URINE: NEGATIVE
ANION GAP SERPL CALCULATED.3IONS-SCNC: 12 MMOL/L (ref 9–17)
ANION GAP SERPL CALCULATED.3IONS-SCNC: 13 MMOL/L (ref 9–17)
AST SERPL-CCNC: 27 U/L
BARBITURATE SCREEN URINE: NEGATIVE
BASOPHILS # BLD: 1 % (ref 0–2)
BASOPHILS # BLD: 1 % (ref 0–2)
BASOPHILS ABSOLUTE: 0.04 K/UL (ref 0–0.2)
BASOPHILS ABSOLUTE: 0.07 K/UL (ref 0–0.2)
BENZODIAZEPINE SCREEN, URINE: NEGATIVE
BILIRUB SERPL-MCNC: 0.81 MG/DL (ref 0.3–1.2)
BNP INTERPRETATION: ABNORMAL
BUN BLDV-MCNC: 13 MG/DL (ref 8–23)
BUN BLDV-MCNC: 13 MG/DL (ref 8–23)
BUN/CREAT BLD: ABNORMAL (ref 9–20)
BUN/CREAT BLD: ABNORMAL (ref 9–20)
BUPRENORPHINE URINE: ABNORMAL
CALCIUM SERPL-MCNC: 7.8 MG/DL (ref 8.6–10.4)
CALCIUM SERPL-MCNC: 8.2 MG/DL (ref 8.6–10.4)
CANNABINOID SCREEN URINE: NEGATIVE
CHLORIDE BLD-SCNC: 101 MMOL/L (ref 98–107)
CHLORIDE BLD-SCNC: 107 MMOL/L (ref 98–107)
CO2: 33 MMOL/L (ref 20–31)
CO2: 34 MMOL/L (ref 20–31)
COCAINE METABOLITE, URINE: POSITIVE
CREAT SERPL-MCNC: 1.15 MG/DL (ref 0.7–1.2)
CREAT SERPL-MCNC: 1.35 MG/DL (ref 0.7–1.2)
DIFFERENTIAL TYPE: ABNORMAL
DIFFERENTIAL TYPE: ABNORMAL
EOSINOPHILS RELATIVE PERCENT: 0 % (ref 1–4)
EOSINOPHILS RELATIVE PERCENT: 1 % (ref 1–4)
ETHANOL PERCENT: <0.01 %
ETHANOL: <10 MG/DL
GFR AFRICAN AMERICAN: >60 ML/MIN
GFR AFRICAN AMERICAN: >60 ML/MIN
GFR NON-AFRICAN AMERICAN: 54 ML/MIN
GFR NON-AFRICAN AMERICAN: >60 ML/MIN
GFR SERPL CREATININE-BSD FRML MDRD: ABNORMAL ML/MIN/{1.73_M2}
GLUCOSE BLD-MCNC: 114 MG/DL (ref 70–99)
GLUCOSE BLD-MCNC: 144 MG/DL (ref 70–99)
HCT VFR BLD CALC: 33.1 % (ref 40.7–50.3)
HCT VFR BLD CALC: 33.3 % (ref 40.7–50.3)
HEMOGLOBIN: 10.8 G/DL (ref 13–17)
HEMOGLOBIN: 11 G/DL (ref 13–17)
IMMATURE GRANULOCYTES: 1 %
IMMATURE GRANULOCYTES: 1 %
INR BLD: 3.9
LYMPHOCYTES # BLD: 10 % (ref 24–43)
LYMPHOCYTES # BLD: 10 % (ref 24–44)
MAGNESIUM: 2.3 MG/DL (ref 1.6–2.6)
MCH RBC QN AUTO: 30.6 PG (ref 25.2–33.5)
MCH RBC QN AUTO: 30.7 PG (ref 25.2–33.5)
MCHC RBC AUTO-ENTMCNC: 32.4 G/DL (ref 28.4–34.8)
MCHC RBC AUTO-ENTMCNC: 33.2 G/DL (ref 28.4–34.8)
MCV RBC AUTO: 92.5 FL (ref 82.6–102.9)
MCV RBC AUTO: 94.3 FL (ref 82.6–102.9)
MDMA URINE: ABNORMAL
METHADONE SCREEN, URINE: NEGATIVE
METHAMPHETAMINE, URINE: ABNORMAL
MONOCYTES # BLD: 2 % (ref 1–7)
MONOCYTES # BLD: 9 % (ref 3–12)
MORPHOLOGY: ABNORMAL
NRBC AUTOMATED: 0 PER 100 WBC
NRBC AUTOMATED: 0 PER 100 WBC
OPIATES, URINE: NEGATIVE
OXYCODONE SCREEN URINE: NEGATIVE
PDW BLD-RTO: 15.5 % (ref 11.8–14.4)
PDW BLD-RTO: 15.7 % (ref 11.8–14.4)
PHENCYCLIDINE, URINE: NEGATIVE
PLATELET # BLD: 167 K/UL (ref 138–453)
PLATELET # BLD: 174 K/UL (ref 138–453)
PLATELET ESTIMATE: ABNORMAL
PLATELET ESTIMATE: ABNORMAL
PMV BLD AUTO: 10.4 FL (ref 8.1–13.5)
PMV BLD AUTO: 10.6 FL (ref 8.1–13.5)
POTASSIUM SERPL-SCNC: 3.3 MMOL/L (ref 3.7–5.3)
POTASSIUM SERPL-SCNC: 3.9 MMOL/L (ref 3.7–5.3)
PRO-BNP: 306 PG/ML
PROPOXYPHENE, URINE: ABNORMAL
PROTHROMBIN TIME: 37.3 SEC (ref 9–12)
RBC # BLD: 3.53 M/UL (ref 4.21–5.77)
RBC # BLD: 3.58 M/UL (ref 4.21–5.77)
RBC # BLD: ABNORMAL 10*6/UL
RBC # BLD: ABNORMAL 10*6/UL
SALICYLATE LEVEL: <1 MG/DL (ref 3–10)
SEG NEUTROPHILS: 79 % (ref 36–65)
SEG NEUTROPHILS: 85 % (ref 36–66)
SEGMENTED NEUTROPHILS ABSOLUTE COUNT: 5.98 K/UL (ref 1.5–8.1)
SEGMENTED NEUTROPHILS ABSOLUTE COUNT: 6.3 K/UL (ref 1.8–7.7)
SODIUM BLD-SCNC: 147 MMOL/L (ref 135–144)
SODIUM BLD-SCNC: 153 MMOL/L (ref 135–144)
TEST INFORMATION: ABNORMAL
TOTAL PROTEIN: 6.3 G/DL (ref 6.4–8.3)
TOXIC TRICYCLIC SC,BLOOD: NEGATIVE
TRICYCLIC ANTIDEPRESSANTS, UR: ABNORMAL
TROPONIN INTERP: NORMAL
TROPONIN T: NORMAL NG/ML
TROPONIN, HIGH SENSITIVITY: 15 NG/L (ref 0–22)
WBC # BLD: 7.4 K/UL (ref 3.5–11.3)
WBC # BLD: 7.6 K/UL (ref 3.5–11.3)
WBC # BLD: ABNORMAL 10*3/UL
WBC # BLD: ABNORMAL 10*3/UL

## 2020-02-14 PROCEDURE — 93005 ELECTROCARDIOGRAM TRACING: CPT | Performed by: STUDENT IN AN ORGANIZED HEALTH CARE EDUCATION/TRAINING PROGRAM

## 2020-02-14 PROCEDURE — G0378 HOSPITAL OBSERVATION PER HR: HCPCS

## 2020-02-14 PROCEDURE — 99285 EMERGENCY DEPT VISIT HI MDM: CPT

## 2020-02-14 PROCEDURE — 85610 PROTHROMBIN TIME: CPT

## 2020-02-14 PROCEDURE — 73562 X-RAY EXAM OF KNEE 3: CPT

## 2020-02-14 PROCEDURE — 99221 1ST HOSP IP/OBS SF/LOW 40: CPT | Performed by: PHYSICIAN ASSISTANT

## 2020-02-14 PROCEDURE — 84484 ASSAY OF TROPONIN QUANT: CPT

## 2020-02-14 PROCEDURE — 83735 ASSAY OF MAGNESIUM: CPT

## 2020-02-14 PROCEDURE — 2580000003 HC RX 258: Performed by: PHYSICIAN ASSISTANT

## 2020-02-14 PROCEDURE — 70450 CT HEAD/BRAIN W/O DYE: CPT

## 2020-02-14 PROCEDURE — 1200000000 HC SEMI PRIVATE

## 2020-02-14 PROCEDURE — G0480 DRUG TEST DEF 1-7 CLASSES: HCPCS

## 2020-02-14 PROCEDURE — 73060 X-RAY EXAM OF HUMERUS: CPT

## 2020-02-14 PROCEDURE — 90715 TDAP VACCINE 7 YRS/> IM: CPT | Performed by: STUDENT IN AN ORGANIZED HEALTH CARE EDUCATION/TRAINING PROGRAM

## 2020-02-14 PROCEDURE — 6360000002 HC RX W HCPCS: Performed by: STUDENT IN AN ORGANIZED HEALTH CARE EDUCATION/TRAINING PROGRAM

## 2020-02-14 PROCEDURE — 96361 HYDRATE IV INFUSION ADD-ON: CPT

## 2020-02-14 PROCEDURE — 90471 IMMUNIZATION ADMIN: CPT | Performed by: STUDENT IN AN ORGANIZED HEALTH CARE EDUCATION/TRAINING PROGRAM

## 2020-02-14 PROCEDURE — 6370000000 HC RX 637 (ALT 250 FOR IP): Performed by: PHYSICIAN ASSISTANT

## 2020-02-14 PROCEDURE — 80307 DRUG TEST PRSMV CHEM ANLYZR: CPT

## 2020-02-14 PROCEDURE — 85025 COMPLETE CBC W/AUTO DIFF WBC: CPT

## 2020-02-14 PROCEDURE — 83880 ASSAY OF NATRIURETIC PEPTIDE: CPT

## 2020-02-14 PROCEDURE — 2580000003 HC RX 258: Performed by: STUDENT IN AN ORGANIZED HEALTH CARE EDUCATION/TRAINING PROGRAM

## 2020-02-14 PROCEDURE — 80048 BASIC METABOLIC PNL TOTAL CA: CPT

## 2020-02-14 PROCEDURE — 73590 X-RAY EXAM OF LOWER LEG: CPT

## 2020-02-14 PROCEDURE — 82140 ASSAY OF AMMONIA: CPT

## 2020-02-14 PROCEDURE — 71046 X-RAY EXAM CHEST 2 VIEWS: CPT

## 2020-02-14 PROCEDURE — 80053 COMPREHEN METABOLIC PANEL: CPT

## 2020-02-14 RX ORDER — TAMSULOSIN HYDROCHLORIDE 0.4 MG/1
0.4 CAPSULE ORAL DAILY
Status: DISCONTINUED | OUTPATIENT
Start: 2020-02-14 | End: 2020-02-16 | Stop reason: HOSPADM

## 2020-02-14 RX ORDER — FAMOTIDINE 20 MG/1
20 TABLET, FILM COATED ORAL 2 TIMES DAILY
Status: CANCELLED | OUTPATIENT
Start: 2020-02-14

## 2020-02-14 RX ORDER — CETIRIZINE HYDROCHLORIDE 10 MG/1
10 TABLET ORAL DAILY
Status: DISCONTINUED | OUTPATIENT
Start: 2020-02-14 | End: 2020-02-16 | Stop reason: HOSPADM

## 2020-02-14 RX ORDER — SODIUM CHLORIDE 9 MG/ML
INJECTION, SOLUTION INTRAVENOUS CONTINUOUS
Status: DISCONTINUED | OUTPATIENT
Start: 2020-02-14 | End: 2020-02-15

## 2020-02-14 RX ORDER — 0.9 % SODIUM CHLORIDE 0.9 %
250 INTRAVENOUS SOLUTION INTRAVENOUS ONCE
Status: COMPLETED | OUTPATIENT
Start: 2020-02-14 | End: 2020-02-14

## 2020-02-14 RX ORDER — BENZTROPINE MESYLATE 0.5 MG/1
0.5 TABLET ORAL 2 TIMES DAILY
Status: DISCONTINUED | OUTPATIENT
Start: 2020-02-14 | End: 2020-02-16 | Stop reason: HOSPADM

## 2020-02-14 RX ORDER — GABAPENTIN 300 MG/1
300 CAPSULE ORAL 3 TIMES DAILY
Status: DISCONTINUED | OUTPATIENT
Start: 2020-02-14 | End: 2020-02-16 | Stop reason: HOSPADM

## 2020-02-14 RX ORDER — ATORVASTATIN CALCIUM 20 MG/1
20 TABLET, FILM COATED ORAL DAILY
Status: DISCONTINUED | OUTPATIENT
Start: 2020-02-14 | End: 2020-02-16 | Stop reason: HOSPADM

## 2020-02-14 RX ORDER — ACETAMINOPHEN 500 MG
1000 TABLET ORAL EVERY 8 HOURS PRN
Status: DISCONTINUED | OUTPATIENT
Start: 2020-02-14 | End: 2020-02-16 | Stop reason: HOSPADM

## 2020-02-14 RX ADMIN — SODIUM CHLORIDE 250 ML: 9 INJECTION, SOLUTION INTRAVENOUS at 14:52

## 2020-02-14 RX ADMIN — SODIUM CHLORIDE: 9 INJECTION, SOLUTION INTRAVENOUS at 21:53

## 2020-02-14 RX ADMIN — GABAPENTIN 300 MG: 300 CAPSULE ORAL at 21:19

## 2020-02-14 RX ADMIN — TAMSULOSIN HYDROCHLORIDE 0.4 MG: 0.4 CAPSULE ORAL at 18:39

## 2020-02-14 RX ADMIN — TETANUS TOXOID, REDUCED DIPHTHERIA TOXOID AND ACELLULAR PERTUSSIS VACCINE, ADSORBED 0.5 ML: 5; 2.5; 8; 8; 2.5 SUSPENSION INTRAMUSCULAR at 13:26

## 2020-02-14 RX ADMIN — ATORVASTATIN CALCIUM 20 MG: 20 TABLET, FILM COATED ORAL at 18:39

## 2020-02-14 RX ADMIN — BENZTROPINE MESYLATE 0.5 MG: 0.5 TABLET ORAL at 21:19

## 2020-02-14 RX ADMIN — CETIRIZINE HYDROCHLORIDE 10 MG: 10 TABLET ORAL at 18:39

## 2020-02-14 RX ADMIN — ACETAMINOPHEN 1000 MG: 500 TABLET ORAL at 18:38

## 2020-02-14 ASSESSMENT — ENCOUNTER SYMPTOMS
ABDOMINAL PAIN: 0
SHORTNESS OF BREATH: 0

## 2020-02-14 ASSESSMENT — PAIN SCALES - GENERAL
PAINLEVEL_OUTOF10: 0
PAINLEVEL_OUTOF10: 4
PAINLEVEL_OUTOF10: 0

## 2020-02-14 NOTE — ED TRIAGE NOTES
Patient states that he took double doses of his sleeping pill. Patient stays at [facility]. Patient states no other complaints.  Patient states that he isnt suicidal he just wanted to sleep

## 2020-02-14 NOTE — ED PROVIDER NOTES
[facility]     Emergency Department     Faculty Attestation    I performed a history and physical examination of the patient and discussed management with the resident. I reviewed the residents note and agree with the documented findings and plan of care. Any areas of disagreement are noted on the chart. I was personally present for the key portions of any procedures. I have documented in the chart those procedures where I was not present during the key portions. I have reviewed the emergency nurses triage note. I agree with the chief complaint, past medical history, past surgical history, allergies, medications, social and family history as documented unless otherwise noted below. Documentation of the HPI, Physical Exam and Medical Decision Making performed by medical students or scribes is based on my personal performance of the HPI, PE and MDM. For Physician Assistant/ Nurse Practitioner cases/documentation I have personally evaluated this patient and have completed at least one if not all key elements of the E/M (history, physical exam, and MDM). Additional findings are as noted. Vital signs:   Vitals:    02/14/20 1130   BP: 112/72   Pulse: 69   Resp: 16   Temp: 96.1 °F (35.6 °C)   SpO2: 80      70-year-old male here with altered mental status and fatigue. Patient reportedly took a double dose of his sleep medication yesterday. He denies pain apart from pain in his left upper extremity. He has a bruise there. He is on Coumadin because he has a mechanical valve. Initially denied falls, but then states that he is not sure if he has been falling, and then states that he thinks he did fall. Breath sounds are clear and equal.  Cardiac exam with normal rate, regular rhythm. Abdomen is soft and nontender. Extremities with multiple areas of bruising. We will obtain x-rays. CT head.   CBC, BMP, EKG.,  Urinalysis            Jennifer Serrano M.D,  Attending Emergency  Physician Elvin Gonzalez MD  02/14/20 2970

## 2020-02-14 NOTE — H&P
[facility]    HISTORY AND PHYSICAL EXAMINATION            Date:   2/14/2020  Patient name:  Erica Harding  Date of admission:  2/14/2020 11:26 AM  MRN:   [id number]  Account:  [de-identified]  YOB: 1960  PCP:    Lis Talamantes MD  Room:   19/19  Code Status:    Prior    Chief Complaint:     Chief Complaint   Patient presents with    Fatigue       History Obtained From:     patient, electronic medical record    History of Present Illness:     Erica Harding is a 60 y.o. male with past medical history significant for bipolar 1 disorder, major depressive disorder, hypertension, atrial fibrillation, crack cocaine abuse, homelessness. Patient is a poor historian. Reportedly presented today due to suicidal ideation. Patient reports that he is having suicidal thoughts. States that he has suicidal thoughts \"all the time\". States that he took 2-3 Ambien last night, unclear of which dose. He has no recollection of the events preceding his ED evaluation today. He comes to us with diffuse bruising on his right lower extremity and right knee. States that he may have fell, however is unsure. Notably does take Coumadin for his atrial fibrillation. ED evaluation was significant for INR of 3.9. CT head was unrevealing. Right lower extremity x-ray was negative for any acute fracture; however, right knee x-ray revealed subcutaneous edema with pre-patella hematoma. Creatinine elevated to 1.37, unclear what the patient's baseline is. Sodium elevated to 147. Hemoglobin 11. Patient admitted to medicine for further evaluation. Will likely be discharged to psychiatric hospital upon medical clearance.     Past Medical History:     Past Medical History:   Diagnosis Date    Bipolar 1 disorder ([facility] [address]     Depression     Hypertension     Neck pain     Patient in clinical research study 04/12/2018    [id number]        Past Surgical History: <0.03 ng/mL    Troponin Interp NOT REPORTED    Protime-INR    Collection Time: 02/14/20 12:03 PM   Result Value Ref Range    Protime 37.3 (H) 9.0 - 12.0 sec    INR 3.9    AMMONIA    Collection Time: 02/14/20  1:36 PM   Result Value Ref Range    Ammonia 25 16 - 60 umol/L       Imaging/Diagnostics:  Xr Chest Standard (2 Vw)    Result Date: 2/14/2020  Chronic findings in the chest.  Chronic appearing basilar opacities favoring compressive atelectasis or scarring. Xr Humerus Left (min 2 Views)    Result Date: 2/14/2020  No acute fracture. Xr Knee Right (3 Views)    Result Date: 2/14/2020  1. No acute osseous abnormality in the right knee or right tibia/fibula. 2.  Diffuse subcutaneous edema with prominent prepatellar soft tissue swelling. There is also an oval soft tissue mass in the prepatellar soft tissues, likely hematoma, given history. Xr Tibia Fibula Right (2 Views)    Result Date: 2/14/2020  1. No acute osseous abnormality in the right knee or right tibia/fibula. 2.  Diffuse subcutaneous edema with prominent prepatellar soft tissue swelling. There is also an oval soft tissue mass in the prepatellar soft tissues, likely hematoma, given history. Ct Head Wo Contrast    Result Date: 2/14/2020  No acute intracranial abnormality. Assessment :      Hospital Problems           Last Modified POA    * (Principal) Altered mental status, unspecified 2/14/2020 Yes    HTN (hypertension) 2/14/2020 Yes    Crack cocaine use 2/14/2020 Yes    Overview Signed 5/28/2018 11:44 AM by Pushpa De Souza MD     $300 a week         Bipolar 1 disorder ([address] 2/14/2020 Yes    Dehydration 2/14/2020 Yes    YONATHAN (acute kidney injury) ([address] 2/14/2020 Yes    Altered mental status 2/14/2020 Yes    Supratherapeutic INR 2/14/2020 Yes    Atrial fibrillation ([address] 2/14/2020 Yes    Hypernatremia 2/14/2020 Yes    Anemia 2/14/2020 Yes          Plan:     Patient status inpatient in the Med/Surge    1. Suicidal ideation: psych consult.

## 2020-02-15 LAB
ANION GAP SERPL CALCULATED.3IONS-SCNC: 12 MMOL/L (ref 9–17)
BUN BLDV-MCNC: 12 MG/DL (ref 8–23)
BUN/CREAT BLD: ABNORMAL (ref 9–20)
CALCIUM SERPL-MCNC: 7.9 MG/DL (ref 8.6–10.4)
CHLORIDE BLD-SCNC: 108 MMOL/L (ref 98–107)
CO2: 24 MMOL/L (ref 20–31)
CREAT SERPL-MCNC: 1.17 MG/DL (ref 0.7–1.2)
EKG ATRIAL RATE: 72 BPM
EKG P AXIS: 50 DEGREES
EKG P-R INTERVAL: 142 MS
EKG Q-T INTERVAL: 396 MS
EKG QRS DURATION: 92 MS
EKG QTC CALCULATION (BAZETT): 433 MS
EKG R AXIS: 12 DEGREES
EKG T AXIS: 44 DEGREES
EKG VENTRICULAR RATE: 72 BPM
GFR AFRICAN AMERICAN: >60 ML/MIN
GFR NON-AFRICAN AMERICAN: >60 ML/MIN
GFR SERPL CREATININE-BSD FRML MDRD: ABNORMAL ML/MIN/{1.73_M2}
GFR SERPL CREATININE-BSD FRML MDRD: ABNORMAL ML/MIN/{1.73_M2}
GLUCOSE BLD-MCNC: 102 MG/DL (ref 70–99)
INR BLD: 4.9
POTASSIUM SERPL-SCNC: 3.6 MMOL/L (ref 3.7–5.3)
PROTHROMBIN TIME: 45.7 SEC (ref 9–12)
SODIUM BLD-SCNC: 144 MMOL/L (ref 135–144)

## 2020-02-15 PROCEDURE — 96361 HYDRATE IV INFUSION ADD-ON: CPT

## 2020-02-15 PROCEDURE — 2580000003 HC RX 258: Performed by: FAMILY MEDICINE

## 2020-02-15 PROCEDURE — 36415 COLL VENOUS BLD VENIPUNCTURE: CPT

## 2020-02-15 PROCEDURE — G0378 HOSPITAL OBSERVATION PER HR: HCPCS

## 2020-02-15 PROCEDURE — 6370000000 HC RX 637 (ALT 250 FOR IP): Performed by: NURSE PRACTITIONER

## 2020-02-15 PROCEDURE — 1200000000 HC SEMI PRIVATE

## 2020-02-15 PROCEDURE — 80048 BASIC METABOLIC PNL TOTAL CA: CPT

## 2020-02-15 PROCEDURE — 85610 PROTHROMBIN TIME: CPT

## 2020-02-15 PROCEDURE — 6370000000 HC RX 637 (ALT 250 FOR IP): Performed by: FAMILY MEDICINE

## 2020-02-15 PROCEDURE — 90792 PSYCH DIAG EVAL W/MED SRVCS: CPT | Performed by: NURSE PRACTITIONER

## 2020-02-15 PROCEDURE — 99232 SBSQ HOSP IP/OBS MODERATE 35: CPT | Performed by: FAMILY MEDICINE

## 2020-02-15 PROCEDURE — 6370000000 HC RX 637 (ALT 250 FOR IP): Performed by: PHYSICIAN ASSISTANT

## 2020-02-15 RX ORDER — DEXTROSE AND SODIUM CHLORIDE 5; .45 G/100ML; G/100ML
INJECTION, SOLUTION INTRAVENOUS CONTINUOUS
Status: DISCONTINUED | OUTPATIENT
Start: 2020-02-15 | End: 2020-02-16 | Stop reason: HOSPADM

## 2020-02-15 RX ORDER — HYDROXYZINE HYDROCHLORIDE 25 MG/1
25 TABLET, FILM COATED ORAL 3 TIMES DAILY PRN
Status: DISCONTINUED | OUTPATIENT
Start: 2020-02-15 | End: 2020-02-16 | Stop reason: HOSPADM

## 2020-02-15 RX ORDER — MIRTAZAPINE 15 MG/1
15 TABLET, FILM COATED ORAL NIGHTLY
Status: DISCONTINUED | OUTPATIENT
Start: 2020-02-15 | End: 2020-02-16 | Stop reason: HOSPADM

## 2020-02-15 RX ORDER — UREA 10 %
3 LOTION (ML) TOPICAL NIGHTLY PRN
Status: DISCONTINUED | OUTPATIENT
Start: 2020-02-15 | End: 2020-02-16 | Stop reason: HOSPADM

## 2020-02-15 RX ADMIN — Medication 3 MG: at 22:09

## 2020-02-15 RX ADMIN — ATORVASTATIN CALCIUM 20 MG: 20 TABLET, FILM COATED ORAL at 08:46

## 2020-02-15 RX ADMIN — GABAPENTIN 300 MG: 300 CAPSULE ORAL at 15:12

## 2020-02-15 RX ADMIN — DEXTROSE AND SODIUM CHLORIDE: 5; 450 INJECTION, SOLUTION INTRAVENOUS at 16:47

## 2020-02-15 RX ADMIN — GABAPENTIN 300 MG: 300 CAPSULE ORAL at 20:09

## 2020-02-15 RX ADMIN — BENZTROPINE MESYLATE 0.5 MG: 0.5 TABLET ORAL at 08:47

## 2020-02-15 RX ADMIN — HYDROXYZINE HYDROCHLORIDE 25 MG: 25 TABLET ORAL at 15:12

## 2020-02-15 RX ADMIN — ACETAMINOPHEN 1000 MG: 500 TABLET ORAL at 15:36

## 2020-02-15 RX ADMIN — GABAPENTIN 300 MG: 300 CAPSULE ORAL at 08:47

## 2020-02-15 RX ADMIN — HYDROXYZINE HYDROCHLORIDE 25 MG: 25 TABLET ORAL at 22:09

## 2020-02-15 RX ADMIN — BENZTROPINE MESYLATE 0.5 MG: 0.5 TABLET ORAL at 20:09

## 2020-02-15 RX ADMIN — CETIRIZINE HYDROCHLORIDE 10 MG: 10 TABLET ORAL at 08:47

## 2020-02-15 RX ADMIN — MIRTAZAPINE 15 MG: 15 TABLET, FILM COATED ORAL at 20:09

## 2020-02-15 RX ADMIN — TAMSULOSIN HYDROCHLORIDE 0.4 MG: 0.4 CAPSULE ORAL at 08:47

## 2020-02-15 ASSESSMENT — ENCOUNTER SYMPTOMS
VOMITING: 0
CONSTIPATION: 0
NAUSEA: 0
DIARRHEA: 0
COUGH: 0
VOICE CHANGE: 0
SHORTNESS OF BREATH: 0
WHEEZING: 0
SINUS PRESSURE: 0
ABDOMINAL PAIN: 0
RHINORRHEA: 0
BACK PAIN: 0
CHOKING: 0
CHEST TIGHTNESS: 0

## 2020-02-15 ASSESSMENT — PAIN SCALES - GENERAL
PAINLEVEL_OUTOF10: 4
PAINLEVEL_OUTOF10: 8

## 2020-02-15 NOTE — PROGRESS NOTES
[facility]      Daily Progress Note     Admit Date: 2/14/2020  Bed/Room No.  1652/4567-57  Admitting Physician : Maicol Dolan MD  Code Status :Prior  Hospital Day:  LOS: 1 day   Chief Complaint:     Chief Complaint   Patient presents with    Fatigue     Principal Problem:    Altered mental status, unspecified  Active Problems:    HTN (hypertension)    Chronic renal impairment, stage 3 (moderate) (HCC)    Crack cocaine use    Bipolar 1 disorder ([facility] [address]    Dehydration    YONATHAN (acute kidney injury) ([facility] [address]    Altered mental status    Supratherapeutic INR    Atrial fibrillation (HCC)    Hypernatremia    Anemia  Resolved Problems:    * No resolved hospital problems. *    Subjective : Interval History/Significant events :  02/15/20    Patient is having anxiety. Patient is shaking his body. He is breathing okay on room air. Wants to eat and is hungry. Denies any breathing difficulty. Patient is complaining of right knee pain. He has bruise all over right leg. He does not recall any fall or trauma. Vitals - Stable afebrile  Labs -hypernatremia    Nursing notes , Consults notes reviewed. Overnight events and updates discussed with Nursing staff . Background History:         Molly Bearden is 60 y.o. male  Who was admitted to the hospital on 2/14/2020 for treatment of Altered mental status, unspecified. Patient presented to emergency room with fatigue, depression with suicidal ideation. Patient took extra Ambien with unclear intent. He has underlying history of bipolar disorder with depression, hypertension, paroxysmal atrial fibrillation on long-term anticoagulation with Coumadin. Initial evaluation in emergency room showed hyponatremia 147, BUN 13, creatinine 1.35, troponin negative, glucose 144. Alcohol level was <0.010%, U tox was positive for cocaine. Drug screen negative for salicylate, acetaminophen. Patient had supratherapeutic INR 3.9 and increased to 4.9.   PMH:  Past SpO2 Height Weight   02/14/20 2209 -- -- -- -- -- -- 5' 6\" (1.676 m) 206 lb (93.4 kg)   02/14/20 2145 117/80 98 °F (36.7 °C) Oral 75 16 94 % -- --   02/14/20 1743 128/89 -- -- 71 16 95 % -- --   02/14/20 1456 -- -- -- -- -- 97 % -- --   02/14/20 1408 -- -- -- -- -- 98 % -- --   02/14/20 1346 138/88 -- -- -- -- -- -- --   02/14/20 1331 124/80 -- -- -- -- 95 % -- --   02/14/20 1316 117/72 -- -- -- -- 99 % -- --   02/14/20 1247 114/66 -- -- -- -- 96 % -- --   02/14/20 1153 118/69 -- -- -- -- 95 % -- --   02/14/20 1130 112/72 96.1 °F (35.6 °C) -- 69 16 92 % -- 200 lb (90.7 kg)     Intake / output   02/14 0701 - 02/15 0700  In: 250   Out: -   Physical Exam:  Physical Exam  Vitals signs and nursing note reviewed. Constitutional:       General: He is not in acute distress. Appearance: He is not diaphoretic. HENT:      Head: Normocephalic and atraumatic. Nose:      Right Sinus: No maxillary sinus tenderness or frontal sinus tenderness. Left Sinus: No maxillary sinus tenderness or frontal sinus tenderness. Mouth/Throat:      Pharynx: No oropharyngeal exudate. Eyes:      General: No scleral icterus. Conjunctiva/sclera: Conjunctivae normal.      Pupils: Pupils are equal, round, and reactive to light. Neck:      Musculoskeletal: Full passive range of motion without pain and neck supple. Thyroid: No thyromegaly. Vascular: No JVD. Cardiovascular:      Rate and Rhythm: Normal rate and regular rhythm. Pulses:           Dorsalis pedis pulses are 2+ on the right side and 2+ on the left side. Heart sounds: Normal heart sounds. No murmur. Pulmonary:      Effort: Pulmonary effort is normal.      Breath sounds: Normal breath sounds. No wheezing or rales. Abdominal:      Palpations: Abdomen is soft. There is no mass. Tenderness: There is no abdominal tenderness. Musculoskeletal:      Comments: Right knee effusion, limited ROM, tender to touch.   Ecchymosis right leg

## 2020-02-15 NOTE — CONSULTS
1,000 mcg by mouth daily   Yes Historical Provider, MD   MAGNESIUM OXIDE 400 PO Take 1 tablet by mouth nightly   Yes Historical Provider, MD   alendronate (FOSAMAX) 70 MG tablet Take 70 mg by mouth every 7 days   Yes Historical Provider, MD   cetirizine (ZYRTEC) 10 MG tablet Take 10 mg by mouth daily   Yes Historical Provider, MD   atorvastatin (LIPITOR) 20 MG tablet Take 20 mg by mouth daily   Yes Historical Provider, MD   aspirin 81 MG tablet Take 81 mg by mouth daily   Yes Historical Provider, MD   ranitidine (ZANTAC) 150 MG tablet Take 150 mg by mouth 2 times daily   Yes Historical Provider, MD   vitamin D (CHOLECALCIFEROL) 1000 UNIT TABS tablet Take 1,000 Units by mouth daily   Yes Historical Provider, MD   gabapentin (NEURONTIN) 300 MG capsule Take 1 capsule by mouth 3 times daily for 30 days. 9/12/19 1/21/20  [facility] Coffee, APRN - RADHA   divalproex (DEPAKOTE ER) 500 MG extended release tablet Take 1 tablet by mouth nightly 9/12/19 1/21/20  [facility] Delvin APRN - CNP   benztropine (COGENTIN) 0.5 MG tablet Take 1 tablet by mouth 2 times daily 9/12/19 1/21/20  [facility] Coffee, APRN - CNP       Allergies:    Oxycodone; Fish-derived products; Ibuprofen; Latuda [lurasidone hcl];  Lithium; Lurasidone; Quetiapine; and Seroquel [quetiapine fumarate]    Social History:   Social History     Socioeconomic History    Marital status: Single     Spouse name: None    Number of children: None    Years of education: None    Highest education level: None   Occupational History    None   Social Needs    Financial resource strain: None    Food insecurity:     Worry: None     Inability: None    Transportation needs:     Medical: None     Non-medical: None   Tobacco Use    Smoking status: Never Smoker    Smokeless tobacco: Never Used   Substance and Sexual Activity    Alcohol use: Yes     Comment: heavy    Drug use: Yes     Types: Cocaine    Sexual activity: None   Lifestyle    Physical activity:     Days per week: None     Minutes per session: None    Stress: None   Relationships    Social connections:     Talks on phone: None     Gets together: None     Attends [employer] service: None     Active member of club or organization: None     Attends meetings of clubs or organizations: None     Relationship status: None    Intimate partner violence:     Fear of current or ex partner: None     Emotionally abused: None     Physically abused: None     Forced sexual activity: None   Other Topics Concern    None   Social History Narrative    None       Family History:  Family History   Problem Relation Age of Onset    Coronary Art Dis Mother     Hypertension Mother     Hypertension Father     Cancer Father         blood       REVIEW OF SYSTEMS:    Constitutional: Negative for fever and chills. HENT: Negative for congestion. Eyes: Negative for blurred vision and double vision. Respiratory: Negative for cough, shortness of breath and wheezing. Cardiovascular: Negative for chest pain and palpitations. Gastrointestinal: Negative for nausea. Negative for vomiting. Musculoskeletal: Positive for right knee pain. See HPI   Skin: Negative for itching and rash. Neurological: Negative for dizziness, sensory change and headaches. Psychiatric/Behavioral: Negative for depression and suicidal ideas. PHYSICAL EXAM:  /66   Pulse 76   Temp 98.4 °F (36.9 °C) (Oral)   Resp 22   Ht 5' 6\" (1.676 m)   Wt 206 lb (93.4 kg)   SpO2 93%   BMI 33.25 kg/m²     Gen: AAOx3, NAD, cooperative     Head: Normocephalic, atraumatic     Neck: Supple     Chest: Non labored breathing    Cardiovascular: Regular rate, no dependent edema, distal pulses 2+     Respiratory: Chest symmetric, no accessory muscle use, normal respirations     RLE: Pre-patella bursa firm and swollen. Mild increased warmth, erythema to medial aspect. Multiple scabs / abrasions over anterior knee. Ecchymosis throughout anterior leg and anterior thigh.  No

## 2020-02-15 NOTE — ED NOTES
Patient's pleasant & cooperative with assessment, has flat affect. Patient states he took double his sleeping pill dose in an attempt to sleep & kill himself. Patient states he's depressed because no one wants to be around him. Patient confirms hx self-harm via OD, most recently several months ago. Per Epic, patient was pink slipped from [facility] to [facility] 9/10/19, discharged 9/12/19 dx bipolar 1. Patient denies HI, a/v hallucinations. Patient states he lives at [facility] sober living facility. Patient confirms medication compliance, last dose yesterday. Patient states the RN has medication list & it's in the computer. Patient confirms crack cocaine use yesterday, states he uses crack cocaine every once in awhile. Patient states he's on the ACT team, requests writer inform therapist [facility] he's in ED & why. Ninoska Sifuentes, patient's ACT therapist, stated patient was at their facility today & they called the EMS due to his behavior. [facility] stated patient denied SI, stated he took extra unknown medication to sleep. [facility] stated patient is in recovery program but does not live in sober living facility, lives in one of their [facility] apartments & has a had time bonding with others. [facility] requested floor social work contact her at [phone] to coordinate care. Writer left voicemail for floor social work.       PIETER Cole, LSW  02/14/20 6646
Pt back to room from xray.      Denise Dallas RN  02/14/20 5946
Pt is now stating he fell 2 days ago, pt is on Coumadin and has bruising in many different areas of his body. Pt has a large bruise covering entire right knee/three abrasions to right knee, one abrasion is actively bleeding. Pt had gauze and tape covering the wounds.       Maxene Fabry, RN  02/14/20 7005
Pt now states SI with a plan to hurt himself by overdosing on sleeping pills. Josiah Zuluaga speaking with pt.      Rika Villatoro RN  02/14/20 3003
Pt resting in bed with safety guard present. Pt denies complaints at this time. Will continue to monitor.       Bryan Villa RN  02/14/20 6239
Report taken from [facility].       Andrew Reed RN  02/14/20 6906
Report to [facility] on 4C w/ solid understanding, no further questions at this time     Christ Woodall RN  02/14/20 2112
Safety guard called to watch pt d/t suicide risk.      Radha Jackman RN  02/14/20 2035
Persons present during search:   Results of search and disposition:       Searchers Name: security    These items or items similar should be removed from the room:   [x] Chairs   [] Telephone   [] Trash cans and liners   [x] Plastic utensils (order Patient Safety tray)   [] Empty or remove Sharps containers   [x] All personal clothing/belongings removed   [x] All unnecessary lead wires, electrical cords, draw cords, etc.   [x] Flowers and plants   [x] Double check for lighters, matches, razors, any glass items etc that can be used as weapons. Person completing Checklist: [facility]       GENERAL INFORMATION     Y  N     [x] [] Has the patient been informed that they are on a watch and what that means? [x] [] Can the patient get out of Bed without nursing assistance? [x] [] Can the patient use the restroom without nursing assistance? [x] [] Can the patient walk the halls to [place] their legs? \"   [] [x] Does the patient have metal utensils? [x] [] Have the patient's belongings been placed out of control of the patient? [x] [] Have the patient and his/her belongings been checked for contraband? [x] [] Is the patient under any visitor restrictions? If Yes, explain: DIRK pt   [] [x] Is the patient under an alias? [facility] Name:   Authorized visitors (no more than two are to be on the list)   Name/Relationship:   Name/Relationship:    Name of Staff member that you  Received this information from?: security and self    General Description:    Dustin Rodríguez 19/19 male 60 y.o.  Admission weight: 200 lb (90.7 kg)    Race: []  [] Black  []   []   [x] [place] [] Other  Facial Hair:  [x] Yes  [] No  If yes, please describe: grey beard  Identifying Marks (i.e. Visible tattoos, scars, etc...): tattoos on arms visible      NURSING CARE PLAN    Nursing Diagnosis: Risk of Self Directed Harm  [x] Actual  [] Potential  Date Started: 2/14/20      Etiological Factors: (related to)  [x]

## 2020-02-15 NOTE — VIRTUAL HEALTH
Continuous  warfarin (COUMADIN) daily dosing (placeholder), , Other, RX Placeholder       PAST PSYCHIATRIC HISTORY:  Patient reports he has a history of bipolar although patient denies history of manic episode. Patient reports two months ago he tried to OD on pills because he was suicidal and depressed. Past psychiatric medications include:   Depakote  Restoril  Remeron  Cogentin    Adverse reactions from psychotropic medications:  denies    Lifetime Psychiatric Review of Systems:     Mirian: denies  Panic: reports having panic attacks last one a couple days ago  Phobia: denies  Hallucinations: denies  Delusions: denies     Past Medical History:        Diagnosis Date    Bipolar 1 disorder ([facility]     Depression     Hypertension     Neck pain     Patient in clinical research study 04/12/2018    [id number]       Past Surgical History:        Procedure Laterality Date    ABDOMEN SURGERY      APPENDECTOMY      CARDIAC SURGERY  7/14/15    [facility] Sternotomy, Repair of ascending aorti aneurysm, stentless valve placement    DILATATION, ESOPHAGUS      HERNIA REPAIR      NECK SURGERY  2013    TONSILLECTOMY         Allergies: Oxycodone; Fish-derived products; Ibuprofen; Latuda [lurasidone hcl];  Lithium; Lurasidone; Quetiapine; and Seroquel [quetiapine fumarate]      Social History:    Patient denies drinking alcohol in a long time; patient reports he used crack cocaine 3 days ago  Social History     Socioeconomic History    Marital status: Single     Spouse name: None    Number of children: None    Years of education: None    Highest education level: None   Occupational History    None   Social Needs    Financial resource strain: None    Food insecurity:     Worry: None     Inability: None    Transportation needs:     Medical: None     Non-medical: None   Tobacco Use    Smoking status: Never Smoker    Smokeless tobacco: Never Used   Substance and Sexual Activity    Alcohol use: Yes     Comment: heavy    Drug use: Yes     Types: Cocaine    Sexual activity: None   Lifestyle    Physical activity:     Days per week: None     Minutes per session: None    Stress: None   Relationships    Social connections:     Talks on phone: None     Gets together: None     Attends Jewish service: None     Active member of club or organization: None     Attends meetings of clubs or organizations: None     Relationship status: None    Intimate partner violence:     Fear of current or ex partner: None     Emotionally abused: None     Physically abused: None     Forced sexual activity: None   Other Topics Concern    None   Social History Narrative    None       Family Psychiatric History:  Patient reports \"dont know\" for family psych history; patient reports his father was an alcoholic; patient reports his paternal and maternal uncle both committed suicide when he was 20's-30's. Family History:       Problem Relation Age of Onset    Coronary Art Dis Mother     Hypertension Mother     Hypertension Father     Cancer Father         blood         Physical  /66   Pulse 76   Temp 98.4 °F (36.9 °C) (Oral)   Resp 22   Ht 5' 6\" (1.676 m)   Wt 206 lb (93.4 kg)   SpO2 93%   BMI 33.25 kg/m²     MENTAL STATUS EXAM:  Level of consciousness: alert  Appearance:  Hospital attire  Behavior/Motor: patient moves head continuously, nurse confirms this has been occurring all morning, cooperative, pleasant  Attitude toward examiner:  cooperative  Speech: slow to respond, volume WNL  Mood:  Depressed and suicidal  Affect:  flat  Thought processes:  Coherent, guarded, limited due to confusion  Thought content:  Homocidal ideation denies  Suicidal Ideation: patient reports he is suicidal and has a plan to shoot himself  Delusions:  None noted at this time  Perceptual Disturbance:  Patient denies, none noted  Cognition:  Alert and oriented to time, person, place.   Memory: limited, patient reports being confused during interview  Insight & Judgement: poor  Medication side effects: denies    DSM-V DIAGNOSIS:  MDD, recurrent, unspecified    Impression    Patient Active Problem List   Diagnosis Code    Light headed R42    Recurrent major depression in partial remission ([facility] F33.41    HTN (hypertension) I10    Neck pain M54.2    GERD (gastroesophageal reflux disease) K21.9    Degenerative disc disease, cervical M50.30    Hyponatremia E87.1    Hypercalcemia E83.52    Aortic aneurysm ([facility]) I71.9    MGUS (monoclonal gammopathy of unknown significance) D47.2    Chronic renal impairment, stage 3 (moderate) ([facility]) N18.3    Crack cocaine use F14.90    Bipolar 1 disorder ([facility]) F31.9    Altered mental status, unspecified R41.82    Dehydration E86.0    YONATHAN (acute kidney injury) ([facility] N17.9    Altered mental status R41.82    Supratherapeutic INR R79.1    Atrial fibrillation ([facility]) I48.91    Hypernatremia E87.0    Anemia D64.9               PLAN:  Recommendation is to start Remeron for depression and suicidal ideation. This could possibly help the patient with sleep as well. Recommend to start Atarax 25mg BID PRN to help with anxiety. Once medically clear please contact the on call provider for consideration for admission to the [facility] related to suicidal ideations and plan. Patient has plan to shoot self but denies having access to a firearm at this time. Thank you very much for allowing us to participate in the care of this patient. Time spent > 60 min. Physicians Signature:  Electronically signed by LOGAN Kaur CNP on 2/15/2020 at 11:08 AM.    Dragon voice recognition software used in portions of this document. Patient Location:  [address] Onc/Med Surg    Provider Location (City/State):   [place]    This virtual visit was conducted via interactive/real-time audio/video.

## 2020-02-15 NOTE — PROGRESS NOTES
Patient expresses concerns of  Having active thoughts of harming self with gun, does not have access to one, his uncle was successful that way and that's what he wishes as well. He lives alone, has thoughts like thins chronically, would like to get help and treatment.

## 2020-02-16 ENCOUNTER — HOSPITAL ENCOUNTER (INPATIENT)
Age: 60
LOS: 3 days | Discharge: HOME | DRG: 751 | End: 2020-02-19
Attending: PSYCHIATRY & NEUROLOGY | Admitting: PSYCHIATRY & NEUROLOGY
Payer: COMMERCIAL

## 2020-02-16 ENCOUNTER — APPOINTMENT (OUTPATIENT)
Dept: GENERAL RADIOLOGY | Age: 60
DRG: 753 | End: 2020-02-16
Payer: COMMERCIAL

## 2020-02-16 VITALS
WEIGHT: 206 LBS | SYSTOLIC BLOOD PRESSURE: 118 MMHG | HEART RATE: 75 BPM | DIASTOLIC BLOOD PRESSURE: 74 MMHG | RESPIRATION RATE: 18 BRPM | TEMPERATURE: 97.8 F | BODY MASS INDEX: 33.11 KG/M2 | OXYGEN SATURATION: 94 % | HEIGHT: 66 IN

## 2020-02-16 PROBLEM — F33.9 MAJOR DEPRESSIVE DISORDER, RECURRENT (HCC): Status: ACTIVE | Noted: 2020-02-16

## 2020-02-16 PROBLEM — F33.9 MAJOR DEPRESSION, RECURRENT (HCC): Status: ACTIVE | Noted: 2020-02-16

## 2020-02-16 LAB
INR BLD: 2.8
PROTHROMBIN TIME: 27.5 SEC (ref 9–12)

## 2020-02-16 PROCEDURE — 96375 TX/PRO/DX INJ NEW DRUG ADDON: CPT

## 2020-02-16 PROCEDURE — 6360000002 HC RX W HCPCS: Performed by: FAMILY MEDICINE

## 2020-02-16 PROCEDURE — 85610 PROTHROMBIN TIME: CPT

## 2020-02-16 PROCEDURE — 2580000003 HC RX 258: Performed by: FAMILY MEDICINE

## 2020-02-16 PROCEDURE — G0378 HOSPITAL OBSERVATION PER HR: HCPCS

## 2020-02-16 PROCEDURE — 96366 THER/PROPH/DIAG IV INF ADDON: CPT

## 2020-02-16 PROCEDURE — 6370000000 HC RX 637 (ALT 250 FOR IP): Performed by: EMERGENCY MEDICINE

## 2020-02-16 PROCEDURE — 96365 THER/PROPH/DIAG IV INF INIT: CPT

## 2020-02-16 PROCEDURE — 1240000000 HC EMOTIONAL WELLNESS R&B

## 2020-02-16 PROCEDURE — 99232 SBSQ HOSP IP/OBS MODERATE 35: CPT | Performed by: FAMILY MEDICINE

## 2020-02-16 PROCEDURE — 6370000000 HC RX 637 (ALT 250 FOR IP): Performed by: NURSE PRACTITIONER

## 2020-02-16 PROCEDURE — 6370000000 HC RX 637 (ALT 250 FOR IP): Performed by: PHYSICIAN ASSISTANT

## 2020-02-16 PROCEDURE — 36415 COLL VENOUS BLD VENIPUNCTURE: CPT

## 2020-02-16 PROCEDURE — 71045 X-RAY EXAM CHEST 1 VIEW: CPT

## 2020-02-16 RX ORDER — BENZTROPINE MESYLATE 0.5 MG/1
0.5 TABLET ORAL 2 TIMES DAILY
Status: DISCONTINUED | OUTPATIENT
Start: 2020-02-16 | End: 2020-02-19 | Stop reason: HOSPADM

## 2020-02-16 RX ORDER — VITAMIN B COMPLEX
1000 TABLET ORAL DAILY
Status: DISCONTINUED | OUTPATIENT
Start: 2020-02-16 | End: 2020-02-19 | Stop reason: HOSPADM

## 2020-02-16 RX ORDER — DIVALPROEX SODIUM 500 MG/1
500 TABLET, EXTENDED RELEASE ORAL NIGHTLY
Status: DISCONTINUED | OUTPATIENT
Start: 2020-02-16 | End: 2020-02-19 | Stop reason: HOSPADM

## 2020-02-16 RX ORDER — TAMSULOSIN HYDROCHLORIDE 0.4 MG/1
0.4 CAPSULE ORAL DAILY
Status: DISCONTINUED | OUTPATIENT
Start: 2020-02-16 | End: 2020-02-19 | Stop reason: HOSPADM

## 2020-02-16 RX ORDER — NICOTINE 21 MG/24HR
1 PATCH, TRANSDERMAL 24 HOURS TRANSDERMAL DAILY
Status: DISCONTINUED | OUTPATIENT
Start: 2020-02-16 | End: 2020-02-19 | Stop reason: HOSPADM

## 2020-02-16 RX ORDER — TRAZODONE HYDROCHLORIDE 50 MG/1
50 TABLET ORAL NIGHTLY PRN
Status: DISCONTINUED | OUTPATIENT
Start: 2020-02-16 | End: 2020-02-19 | Stop reason: HOSPADM

## 2020-02-16 RX ORDER — WARFARIN SODIUM 1 MG/1
1 TABLET ORAL EVERY EVENING
Status: DISCONTINUED | OUTPATIENT
Start: 2020-02-16 | End: 2020-02-16 | Stop reason: DRUGHIGH

## 2020-02-16 RX ORDER — ATORVASTATIN CALCIUM 20 MG/1
20 TABLET, FILM COATED ORAL DAILY
Status: DISCONTINUED | OUTPATIENT
Start: 2020-02-16 | End: 2020-02-19 | Stop reason: HOSPADM

## 2020-02-16 RX ORDER — UREA 10 %
3 LOTION (ML) TOPICAL NIGHTLY PRN
Qty: 30 TABLET | Refills: 0 | Status: ON HOLD | OUTPATIENT
Start: 2020-02-16 | End: 2020-07-07 | Stop reason: HOSPADM

## 2020-02-16 RX ORDER — ASPIRIN 81 MG/1
81 TABLET ORAL DAILY
Status: DISCONTINUED | OUTPATIENT
Start: 2020-02-16 | End: 2020-02-19 | Stop reason: HOSPADM

## 2020-02-16 RX ORDER — BENZTROPINE MESYLATE 1 MG/ML
2 INJECTION INTRAMUSCULAR; INTRAVENOUS 2 TIMES DAILY PRN
Status: DISCONTINUED | OUTPATIENT
Start: 2020-02-16 | End: 2020-02-19 | Stop reason: HOSPADM

## 2020-02-16 RX ORDER — HYDROXYZINE HYDROCHLORIDE 25 MG/1
25 TABLET, FILM COATED ORAL 3 TIMES DAILY PRN
Status: DISCONTINUED | OUTPATIENT
Start: 2020-02-16 | End: 2020-02-19 | Stop reason: HOSPADM

## 2020-02-16 RX ORDER — UREA 10 %
3 LOTION (ML) TOPICAL NIGHTLY PRN
Status: DISCONTINUED | OUTPATIENT
Start: 2020-02-16 | End: 2020-02-16 | Stop reason: CLARIF

## 2020-02-16 RX ORDER — HYDROXYZINE HYDROCHLORIDE 25 MG/1
25 TABLET, FILM COATED ORAL 3 TIMES DAILY PRN
Qty: 30 TABLET | Refills: 0 | Status: SHIPPED | OUTPATIENT
Start: 2020-02-16 | End: 2020-02-26

## 2020-02-16 RX ORDER — MAGNESIUM HYDROXIDE/ALUMINUM HYDROXICE/SIMETHICONE 120; 1200; 1200 MG/30ML; MG/30ML; MG/30ML
30 SUSPENSION ORAL EVERY 6 HOURS PRN
Status: DISCONTINUED | OUTPATIENT
Start: 2020-02-16 | End: 2020-02-19 | Stop reason: HOSPADM

## 2020-02-16 RX ORDER — ACETAMINOPHEN 325 MG/1
650 TABLET ORAL EVERY 4 HOURS PRN
Status: DISCONTINUED | OUTPATIENT
Start: 2020-02-16 | End: 2020-02-19 | Stop reason: HOSPADM

## 2020-02-16 RX ORDER — WARFARIN SODIUM 1 MG/1
1 TABLET ORAL
Status: COMPLETED | OUTPATIENT
Start: 2020-02-16 | End: 2020-02-16

## 2020-02-16 RX ORDER — ONDANSETRON 2 MG/ML
4 INJECTION INTRAMUSCULAR; INTRAVENOUS EVERY 6 HOURS PRN
Status: DISCONTINUED | OUTPATIENT
Start: 2020-02-16 | End: 2020-02-16 | Stop reason: HOSPADM

## 2020-02-16 RX ORDER — CETIRIZINE HYDROCHLORIDE 10 MG/1
10 TABLET ORAL DAILY
Status: DISCONTINUED | OUTPATIENT
Start: 2020-02-16 | End: 2020-02-19 | Stop reason: HOSPADM

## 2020-02-16 RX ORDER — GABAPENTIN 300 MG/1
300 CAPSULE ORAL 3 TIMES DAILY
Status: DISCONTINUED | OUTPATIENT
Start: 2020-02-16 | End: 2020-02-19 | Stop reason: HOSPADM

## 2020-02-16 RX ORDER — FAMOTIDINE 20 MG/1
40 TABLET, FILM COATED ORAL 2 TIMES DAILY
Status: DISCONTINUED | OUTPATIENT
Start: 2020-02-16 | End: 2020-02-19 | Stop reason: HOSPADM

## 2020-02-16 RX ADMIN — DEXTROSE AND SODIUM CHLORIDE: 5; 450 INJECTION, SOLUTION INTRAVENOUS at 04:20

## 2020-02-16 RX ADMIN — VITAMIN D, TAB 1000IU (100/BT) 1000 UNITS: 25 TAB at 21:42

## 2020-02-16 RX ADMIN — GABAPENTIN 300 MG: 300 CAPSULE ORAL at 13:37

## 2020-02-16 RX ADMIN — ATORVASTATIN CALCIUM 20 MG: 20 TABLET, FILM COATED ORAL at 07:41

## 2020-02-16 RX ADMIN — GABAPENTIN 300 MG: 300 CAPSULE ORAL at 07:41

## 2020-02-16 RX ADMIN — Medication 400 MG: at 21:42

## 2020-02-16 RX ADMIN — BENZTROPINE MESYLATE 0.5 MG: 0.5 TABLET ORAL at 07:41

## 2020-02-16 RX ADMIN — DIVALPROEX SODIUM 500 MG: 500 TABLET, EXTENDED RELEASE ORAL at 21:42

## 2020-02-16 RX ADMIN — GABAPENTIN 300 MG: 300 CAPSULE ORAL at 21:42

## 2020-02-16 RX ADMIN — AMPICILLIN AND SULBACTAM 1.5 G: 1; .5 INJECTION, POWDER, FOR SOLUTION INTRAMUSCULAR; INTRAVENOUS at 17:00

## 2020-02-16 RX ADMIN — ACETAMINOPHEN 1000 MG: 500 TABLET ORAL at 07:41

## 2020-02-16 RX ADMIN — ONDANSETRON 4 MG: 2 INJECTION INTRAMUSCULAR; INTRAVENOUS at 09:25

## 2020-02-16 RX ADMIN — BENZTROPINE MESYLATE 0.5 MG: 0.5 TABLET ORAL at 21:42

## 2020-02-16 RX ADMIN — CETIRIZINE HYDROCHLORIDE 10 MG: 10 TABLET ORAL at 07:41

## 2020-02-16 RX ADMIN — TAMSULOSIN HYDROCHLORIDE 0.4 MG: 0.4 CAPSULE ORAL at 07:41

## 2020-02-16 RX ADMIN — ASPIRIN 81 MG: 81 TABLET, COATED ORAL at 21:42

## 2020-02-16 RX ADMIN — Medication 1 MG: at 21:42

## 2020-02-16 RX ADMIN — WARFARIN SODIUM 1 MG: 1 TABLET ORAL at 17:54

## 2020-02-16 ASSESSMENT — SLEEP AND FATIGUE QUESTIONNAIRES
DO YOU HAVE DIFFICULTY SLEEPING: YES
AVERAGE NUMBER OF SLEEP HOURS: 5
DIFFICULTY ARISING: NO
DO YOU USE A SLEEP AID: NO
SLEEP PATTERN: DIFFICULTY FALLING ASLEEP;INSOMNIA;DISTURBED/INTERRUPTED SLEEP
DIFFICULTY STAYING ASLEEP: NO
DIFFICULTY FALLING ASLEEP: YES
DO YOU HAVE DIFFICULTY SLEEPING: YES
DIFFICULTY FALLING ASLEEP: YES
RESTFUL SLEEP: NO
DIFFICULTY ARISING: NO
RESTFUL SLEEP: NO
DIFFICULTY STAYING ASLEEP: NO
SLEEP PATTERN: DIFFICULTY FALLING ASLEEP;INSOMNIA;DISTURBED/INTERRUPTED SLEEP
DO YOU USE A SLEEP AID: NO
AVERAGE NUMBER OF SLEEP HOURS: 5

## 2020-02-16 ASSESSMENT — ENCOUNTER SYMPTOMS
DIARRHEA: 0
CHEST TIGHTNESS: 0
BACK PAIN: 0
WHEEZING: 0
NAUSEA: 0
CONSTIPATION: 0
SHORTNESS OF BREATH: 0
CHOKING: 0
VOMITING: 0
RHINORRHEA: 0
VOICE CHANGE: 0
COUGH: 0
SINUS PRESSURE: 0
ABDOMINAL PAIN: 0

## 2020-02-16 ASSESSMENT — PAIN DESCRIPTION - DESCRIPTORS: DESCRIPTORS: ACHING;CONSTANT;SHARP

## 2020-02-16 ASSESSMENT — PAIN DESCRIPTION - ORIENTATION: ORIENTATION: RIGHT

## 2020-02-16 ASSESSMENT — PATIENT HEALTH QUESTIONNAIRE - PHQ9: SUM OF ALL RESPONSES TO PHQ QUESTIONS 1-9: 9

## 2020-02-16 ASSESSMENT — PAIN SCALES - GENERAL
PAINLEVEL_OUTOF10: 7
PAINLEVEL_OUTOF10: 8

## 2020-02-16 ASSESSMENT — LIFESTYLE VARIABLES: HISTORY_ALCOHOL_USE: NO

## 2020-02-16 ASSESSMENT — PAIN DESCRIPTION - ONSET: ONSET: ON-GOING

## 2020-02-16 ASSESSMENT — PAIN DESCRIPTION - LOCATION: LOCATION: KNEE

## 2020-02-16 ASSESSMENT — PAIN - FUNCTIONAL ASSESSMENT: PAIN_FUNCTIONAL_ASSESSMENT: PREVENTS OR INTERFERES SOME ACTIVE ACTIVITIES AND ADLS

## 2020-02-16 ASSESSMENT — PAIN DESCRIPTION - FREQUENCY: FREQUENCY: CONTINUOUS

## 2020-02-16 ASSESSMENT — PAIN DESCRIPTION - PAIN TYPE: TYPE: CHRONIC PAIN

## 2020-02-16 NOTE — PLAN OF CARE
Problem: Falls - Risk of:  Goal: Will remain free from falls  Description  Will remain free from falls  Outcome: Ongoing  Goal: Absence of physical injury  Description  Absence of physical injury  Outcome: Ongoing     Problem: Suicide risk  Goal: Provide patient with safe environment  Description  Provide patient with safe environment  Outcome: Ongoing     Problem: Health Behavior:  Goal: Ability to identify and utilize available support systems will improve  Description  Ability to identify and utilize available support systems will improve  Outcome: Ongoing     Problem: Role Relationship:  Goal: Ability to verbalize awareness of feelings that lead to decreased social interaction will improve  Description  Ability to verbalize awareness of feelings that lead to decreased social interaction will improve  Outcome: Ongoing  Goal: Ability to demonstrate positive changes in social behaviors and relationships will improve  Description  Ability to demonstrate positive changes in social behaviors and relationships will improve  Outcome: Ongoing

## 2020-02-16 NOTE — PROGRESS NOTES
[facility] [place]      Daily Progress Note     Admit Date: 2/14/2020  Bed/Room No.  2796/6302-68  Admitting Physician : Mary Jane Hartmann MD  Code Status :Prior  Hospital Day:  LOS: 2 days   Chief Complaint:     Chief Complaint   Patient presents with    Fatigue     Principal Problem:    Altered mental status, unspecified  Active Problems:    HTN (hypertension)    Chronic renal impairment, stage 3 (moderate) (HCC)    Depression    Crack cocaine use    Bipolar 1 disorder ([facility]    Dehydration    YONATHAN (acute kidney injury) ([facility]    Altered mental status    Supratherapeutic INR    Atrial fibrillation (HCC)    Hypernatremia    Anemia  Resolved Problems:    * No resolved hospital problems. *    Subjective : Interval History/Significant events :  02/16/20    Patient continues to complain of right knee pain and swelling. No change in leg rash, effusion. Patient was worked up in orthopedic surgery and no intervention needed. He remains afebrile. T-max 99.1. He is breathing okay on room air without any dyspnea, cough, expectoration. Vitals - Stable afebrile  Labs  -hypernatremia resolved. Nursing notes , Consults notes reviewed. Overnight events and updates discussed with Nursing staff . Background History:         Timm Severs is 60 y.o. male  Who was admitted to the hospital on 2/14/2020 for treatment of Altered mental status, unspecified. Patient presented to emergency room with fatigue, depression with suicidal ideation. Patient took extra Ambien with unclear intent. He has underlying history of bipolar disorder with depression, hypertension, paroxysmal atrial fibrillation on long-term anticoagulation with Coumadin. Initial evaluation in emergency room showed hyponatremia 147, BUN 13, creatinine 1.35, troponin negative, glucose 144. Alcohol level was <0.010%, U tox was positive for cocaine. Drug screen negative for salicylate, acetaminophen.   Patient had supratherapeutic INR 3.9 and increased to 4.9. Coumadin was held and orthopedic surgery was consulted for knee effusion and recommended conservative treatment. Patient was evaluated psychiatry recommended inpatient treatment for depression. PMH:  Past Medical History:   Diagnosis Date    Bipolar 1 disorder ([facility]     Depression     Hypertension     Neck pain     Patient in clinical research study 04/12/2018    [id number]      Allergies: Allergies   Allergen Reactions    Oxycodone Itching    Fish-Derived Products     Ibuprofen     Latuda [Lurasidone Hcl] Other (See Comments)     \"Feels like pt is going to crawl out of own skin\"    Lithium     Lurasidone     Quetiapine     Seroquel [Quetiapine Fumarate]       Medications :  mirtazapine, 15 mg, Oral, Nightly  atorvastatin, 20 mg, Oral, Daily  benztropine, 0.5 mg, Oral, BID  cetirizine, 10 mg, Oral, Daily  gabapentin, 300 mg, Oral, TID  tamsulosin, 0.4 mg, Oral, Daily  warfarin (COUMADIN) daily dosing (placeholder), , Other, RX Placeholder        Review of Systems   Review of Systems   Constitutional: Negative for activity change, appetite change, fatigue, fever and unexpected weight change. HENT: Negative for congestion, nosebleeds, rhinorrhea, sinus pressure, sneezing and voice change. Respiratory: Negative for cough, choking, chest tightness, shortness of breath and wheezing. Cardiovascular: Negative for chest pain, palpitations and leg swelling. Gastrointestinal: Negative for abdominal pain, constipation, diarrhea, nausea and vomiting. Genitourinary: Negative for difficulty urinating, discharge, dysuria, frequency and testicular pain. Musculoskeletal: Positive for joint swelling (R Knee ). Negative for back pain. Skin: Negative for rash. Neurological: Negative for dizziness, weakness, light-headedness and headaches. Hematological: Does not bruise/bleed easily.    Psychiatric/Behavioral: Negative for agitation, behavioral problems, confusion, self-injury, sleep disturbance and suicidal ideas. The patient is nervous/anxious. Objective :      Current Vitals : Temp: 99.1 °F (37.3 °C),  Pulse: 76, Resp: 18, BP: 98/65, SpO2: 93 %  Last 24 Hrs Vitals   Patient Vitals for the past 24 hrs:   BP Temp Temp src Resp SpO2   02/15/20 1945 98/65 99.1 °F (37.3 °C) Oral 18 93 %     Intake / output   02/15 0701 - 02/16 0700  In: 2050 [I.V.:2050]  Out: -   Physical Exam:  Physical Exam  Vitals signs and nursing note reviewed. Constitutional:       General: He is not in acute distress. Appearance: He is not diaphoretic. HENT:      Head: Normocephalic and atraumatic. Nose:      Right Sinus: No maxillary sinus tenderness or frontal sinus tenderness. Left Sinus: No maxillary sinus tenderness or frontal sinus tenderness. Mouth/Throat:      Pharynx: No oropharyngeal exudate. Eyes:      General: No scleral icterus. Conjunctiva/sclera: Conjunctivae normal.      Pupils: Pupils are equal, round, and reactive to light. Neck:      Musculoskeletal: Full passive range of motion without pain and neck supple. Thyroid: No thyromegaly. Vascular: No JVD. Cardiovascular:      Rate and Rhythm: Normal rate and regular rhythm. Pulses:           Dorsalis pedis pulses are 2+ on the right side and 2+ on the left side. Heart sounds: Normal heart sounds. No murmur. Pulmonary:      Effort: Pulmonary effort is normal.      Breath sounds: Normal breath sounds. No wheezing or rales. Abdominal:      Palpations: Abdomen is soft. There is no mass. Tenderness: There is no abdominal tenderness. Musculoskeletal:      Comments: Right knee effusion, limited ROM, tender to touch. Ecchymosis right leg   Lymphadenopathy:      Head:      Right side of head: No submandibular adenopathy. Left side of head: No submandibular adenopathy. Cervical: No cervical adenopathy. Skin:     General: Skin is warm.    Neurological:      Mental Status: He is alert and oriented to person, place, and time. Motor: No tremor. Psychiatric:         Behavior: Behavior is cooperative. Laboratory findings:    Recent Labs     02/14/20  1203 02/14/20  1804 02/15/20  0602 02/16/20  0602   WBC 7.4 7.6  --   --    HGB 11.0* 10.8*  --   --    HCT 33.1* 33.3*  --   --     167  --   --    INR 3.9  --  4.9* 2.8     Recent Labs     02/14/20  1203 02/14/20  1804 02/15/20  1656   * 153* 144   K 3.9 3.3* 3.6*    107 108*   CO2 33* 34* 24   GLUCOSE 144* 114* 102*   BUN 13 13 12   CREATININE 1.35* 1.15 1.17   MG  --  2.3  --    CALCIUM 8.2* 7.8* 7.9*     Recent Labs     02/14/20  1336 02/14/20  1804   PROT  --  6.3*   LABALBU  --  3.7   AST  --  27   ALT  --  25   ALKPHOS  --  137*   BILITOT  --  0.81   AMMONIA 25 33          Specific Gravity, UA   Date Value Ref Range Status   02/20/2018 1.014 1.005 - 1.030 Final     Protein, UA   Date Value Ref Range Status   02/20/2018 NEGATIVE NEG Final     RBC, UA   Date Value Ref Range Status   02/21/2013 5 TO 10 /HPF Final     Bacteria, UA   Date Value Ref Range Status   02/21/2013 FEW (A) NONE Final     Nitrite, Urine   Date Value Ref Range Status   02/20/2018 NEGATIVE NEG Final     WBC, UA   Date Value Ref Range Status   02/21/2013 5 TO 10 /HPF Final     Leukocyte Esterase, Urine   Date Value Ref Range Status   02/20/2018 NEGATIVE NEG Final       Imaging / Clinical Data :-   Xr Chest Standard (2 Vw)    Result Date: 2/14/2020  Chronic findings in the chest.  Chronic appearing basilar opacities favoring compressive atelectasis or scarring. Xr Humerus Left (min 2 Views)    Result Date: 2/14/2020  No acute fracture. Xr Knee Right (3 Views)    Result Date: 2/14/2020  1. No acute osseous abnormality in the right knee or right tibia/fibula. 2.  Diffuse subcutaneous edema with prominent prepatellar soft tissue swelling.   There is also an oval soft tissue mass in the prepatellar soft tissues, likely hematoma, given history. Xr Tibia Fibula Right (2 Views)    Result Date: 2/14/2020  1. No acute osseous abnormality in the right knee or right tibia/fibula. 2.  Diffuse subcutaneous edema with prominent prepatellar soft tissue swelling. There is also an oval soft tissue mass in the prepatellar soft tissues, likely hematoma, given history. Ct Head Wo Contrast    Result Date: 2/14/2020  No acute intracranial abnormality. Clinical Course : gradually improving  Assessment and Plan  :        1. Depression with suicidal ideation -psychiatric consultation, recommend inpatient treatment. 2. Cocaine abuse -recommend abstinence. 3. Supratherapeutic INR -Coumadin held in the hospital.  Resume tonight. 4. Paroxysmal atrial fibrillation -in normal sinus rhythm. Continue beta-blocker. 5. Bipolar disorder with depression -continue Remeron  6. Anxiety disorder-Atarax 25 three  times daily. 7. Right knee hematoma-consult orthopedic surgery. Conservative treatment recommended. Discharge to [facility] inpatient psychiatry  Plan and updates discussed with patient ,  answers  explained to satisfaction.    Plan discussed with Staff Pancho KIM     (Please note that portions of this note were completed with a voice recognition program. Efforts were made to edit the dictations but occasionally words are mis-transcribed.)      Harsha Schmid MD  2/16/2020

## 2020-02-16 NOTE — PLAN OF CARE
Problem: Falls - Risk of:  Goal: Will remain free from falls  Description  Will remain free from falls  2/15/2020 2321 by J Carlos Pandya RN  Outcome: Ongoing     Problem: Falls - Risk of:  Goal: Absence of physical injury  Description  Absence of physical injury  2/15/2020 2321 by J Carlos Pandya RN  Outcome: Ongoing     Problem: Suicide risk  Goal: Provide patient with safe environment  Description  Provide patient with safe environment  Outcome: Ongoing     Problem: Health Behavior:  Goal: Ability to identify and utilize available support systems will improve  Description  Ability to identify and utilize available support systems will improve  Outcome: Ongoing     Problem: Role Relationship:  Goal: Ability to verbalize awareness of feelings that lead to decreased social interaction will improve  Description  Ability to verbalize awareness of feelings that lead to decreased social interaction will improve  Outcome: Ongoing     Problem: Role Relationship:  Goal: Ability to demonstrate positive changes in social behaviors and relationships will improve  Description  Ability to demonstrate positive changes in social behaviors and relationships will improve  Outcome: Ongoing

## 2020-02-17 LAB
ABSOLUTE EOS #: 0.1 K/UL (ref 0–0.4)
ABSOLUTE IMMATURE GRANULOCYTE: ABNORMAL K/UL (ref 0–0.3)
ABSOLUTE LYMPH #: 0.9 K/UL (ref 1–4.8)
ABSOLUTE MONO #: 0.7 K/UL (ref 0.1–1.3)
ALBUMIN SERPL-MCNC: 3.2 G/DL (ref 3.5–5.2)
ALBUMIN/GLOBULIN RATIO: ABNORMAL (ref 1–2.5)
ALP BLD-CCNC: 112 U/L (ref 40–129)
ALT SERPL-CCNC: 14 U/L (ref 5–41)
ANION GAP SERPL CALCULATED.3IONS-SCNC: 11 MMOL/L (ref 9–17)
AST SERPL-CCNC: 14 U/L
BASOPHILS # BLD: 1 % (ref 0–2)
BASOPHILS ABSOLUTE: 0.1 K/UL (ref 0–0.2)
BILIRUB SERPL-MCNC: 1.18 MG/DL (ref 0.3–1.2)
BUN BLDV-MCNC: 15 MG/DL (ref 8–23)
BUN/CREAT BLD: ABNORMAL (ref 9–20)
CALCIUM SERPL-MCNC: 8.5 MG/DL (ref 8.6–10.4)
CHLORIDE BLD-SCNC: 109 MMOL/L (ref 98–107)
CHOLESTEROL/HDL RATIO: 5.3
CHOLESTEROL: 126 MG/DL
CO2: 21 MMOL/L (ref 20–31)
CREAT SERPL-MCNC: 1 MG/DL (ref 0.7–1.2)
DIFFERENTIAL TYPE: ABNORMAL
EOSINOPHILS RELATIVE PERCENT: 1 % (ref 0–4)
ESTIMATED AVERAGE GLUCOSE: 108 MG/DL
GFR AFRICAN AMERICAN: >60 ML/MIN
GFR NON-AFRICAN AMERICAN: >60 ML/MIN
GFR SERPL CREATININE-BSD FRML MDRD: ABNORMAL ML/MIN/{1.73_M2}
GFR SERPL CREATININE-BSD FRML MDRD: ABNORMAL ML/MIN/{1.73_M2}
GLUCOSE BLD-MCNC: 97 MG/DL (ref 70–99)
HBA1C MFR BLD: 5.4 % (ref 4–6)
HCT VFR BLD CALC: 30.5 % (ref 41–53)
HDLC SERPL-MCNC: 24 MG/DL
HEMOGLOBIN: 10.3 G/DL (ref 13.5–17.5)
IMMATURE GRANULOCYTES: ABNORMAL %
INR BLD: 1.7
LDL CHOLESTEROL: 79 MG/DL (ref 0–130)
LYMPHOCYTES # BLD: 13 % (ref 24–44)
MCH RBC QN AUTO: 30.6 PG (ref 26–34)
MCHC RBC AUTO-ENTMCNC: 33.6 G/DL (ref 31–37)
MCV RBC AUTO: 91.1 FL (ref 80–100)
MONOCYTES # BLD: 10 % (ref 1–7)
NRBC AUTOMATED: ABNORMAL PER 100 WBC
PDW BLD-RTO: 16.3 % (ref 11.5–14.9)
PLATELET # BLD: 191 K/UL (ref 150–450)
PLATELET ESTIMATE: ABNORMAL
PMV BLD AUTO: 7.4 FL (ref 6–12)
POTASSIUM SERPL-SCNC: 4.3 MMOL/L (ref 3.7–5.3)
PROTHROMBIN TIME: 19.7 SEC (ref 11.8–14.6)
RBC # BLD: 3.35 M/UL (ref 4.5–5.9)
RBC # BLD: ABNORMAL 10*6/UL
SEG NEUTROPHILS: 75 % (ref 36–66)
SEGMENTED NEUTROPHILS ABSOLUTE COUNT: 5.3 K/UL (ref 1.3–9.1)
SODIUM BLD-SCNC: 141 MMOL/L (ref 135–144)
THYROXINE, FREE: 0.81 NG/DL (ref 0.93–1.7)
TOTAL PROTEIN: 6 G/DL (ref 6.4–8.3)
TRIGL SERPL-MCNC: 116 MG/DL
TSH SERPL DL<=0.05 MIU/L-ACNC: 2.41 MIU/L (ref 0.3–5)
VLDLC SERPL CALC-MCNC: ABNORMAL MG/DL (ref 1–30)
WBC # BLD: 7.1 K/UL (ref 3.5–11)
WBC # BLD: ABNORMAL 10*3/UL

## 2020-02-17 PROCEDURE — 84443 ASSAY THYROID STIM HORMONE: CPT

## 2020-02-17 PROCEDURE — 36415 COLL VENOUS BLD VENIPUNCTURE: CPT

## 2020-02-17 PROCEDURE — 1240000000 HC EMOTIONAL WELLNESS R&B

## 2020-02-17 PROCEDURE — 80061 LIPID PANEL: CPT

## 2020-02-17 PROCEDURE — 80053 COMPREHEN METABOLIC PANEL: CPT

## 2020-02-17 PROCEDURE — 6370000000 HC RX 637 (ALT 250 FOR IP): Performed by: REGISTERED NURSE

## 2020-02-17 PROCEDURE — 90792 PSYCH DIAG EVAL W/MED SRVCS: CPT | Performed by: REGISTERED NURSE

## 2020-02-17 PROCEDURE — 97161 PT EVAL LOW COMPLEX 20 MIN: CPT

## 2020-02-17 PROCEDURE — 85025 COMPLETE CBC W/AUTO DIFF WBC: CPT

## 2020-02-17 PROCEDURE — 6370000000 HC RX 637 (ALT 250 FOR IP): Performed by: INTERNAL MEDICINE

## 2020-02-17 PROCEDURE — 85610 PROTHROMBIN TIME: CPT

## 2020-02-17 PROCEDURE — 84439 ASSAY OF FREE THYROXINE: CPT

## 2020-02-17 PROCEDURE — 83036 HEMOGLOBIN GLYCOSYLATED A1C: CPT

## 2020-02-17 PROCEDURE — 6370000000 HC RX 637 (ALT 250 FOR IP): Performed by: NURSE PRACTITIONER

## 2020-02-17 RX ORDER — MIDODRINE HYDROCHLORIDE 5 MG/1
10 TABLET ORAL 3 TIMES DAILY
Status: ON HOLD | COMMUNITY
End: 2020-07-01 | Stop reason: DRUGHIGH

## 2020-02-17 RX ORDER — POLYETHYLENE GLYCOL 3350 17 G/17G
17 POWDER, FOR SOLUTION ORAL DAILY PRN
Status: DISCONTINUED | OUTPATIENT
Start: 2020-02-17 | End: 2020-02-19 | Stop reason: HOSPADM

## 2020-02-17 RX ORDER — WARFARIN SODIUM 3 MG/1
3 TABLET ORAL
Status: COMPLETED | OUTPATIENT
Start: 2020-02-17 | End: 2020-02-17

## 2020-02-17 RX ORDER — POLYETHYLENE GLYCOL 3350 17 G/17G
17 POWDER, FOR SOLUTION ORAL DAILY PRN
Status: ON HOLD | COMMUNITY
End: 2020-07-01

## 2020-02-17 RX ORDER — FUROSEMIDE 20 MG/1
20 TABLET ORAL DAILY
COMMUNITY

## 2020-02-17 RX ORDER — CALCIUM POLYCARBOPHIL 625 MG 625 MG/1
625 TABLET ORAL 3 TIMES DAILY
Status: DISCONTINUED | OUTPATIENT
Start: 2020-02-17 | End: 2020-02-19 | Stop reason: HOSPADM

## 2020-02-17 RX ORDER — LIDOCAINE 50 MG/G
1 PATCH TOPICAL DAILY
Status: ON HOLD | COMMUNITY
End: 2020-07-01

## 2020-02-17 RX ORDER — IBUPROFEN 200 MG
400 TABLET ORAL EVERY 6 HOURS PRN
Status: ON HOLD | COMMUNITY
End: 2020-02-18 | Stop reason: HOSPADM

## 2020-02-17 RX ORDER — FOLIC ACID 1 MG/1
1 TABLET ORAL DAILY
COMMUNITY

## 2020-02-17 RX ORDER — CALCIUM POLYCARBOPHIL 625 MG 625 MG/1
625 TABLET ORAL 3 TIMES DAILY
Status: ON HOLD | COMMUNITY
End: 2022-05-01

## 2020-02-17 RX ORDER — MULTIVITAMIN
1 TABLET ORAL DAILY
Status: ON HOLD | COMMUNITY
End: 2020-07-07 | Stop reason: HOSPADM

## 2020-02-17 RX ORDER — DOXEPIN HYDROCHLORIDE 10 MG/1
6 CAPSULE ORAL NIGHTLY
Status: ON HOLD | COMMUNITY
End: 2020-07-01 | Stop reason: DRUGHIGH

## 2020-02-17 RX ORDER — ERGOCALCIFEROL 1.25 MG/1
50000 CAPSULE ORAL WEEKLY
Status: ON HOLD | COMMUNITY
End: 2020-07-01 | Stop reason: ALTCHOICE

## 2020-02-17 RX ORDER — OLANZAPINE 10 MG/1
10 TABLET ORAL NIGHTLY
Status: ON HOLD | COMMUNITY
End: 2020-02-18 | Stop reason: HOSPADM

## 2020-02-17 RX ORDER — FOLIC ACID 1 MG/1
1 TABLET ORAL DAILY
Status: DISCONTINUED | OUTPATIENT
Start: 2020-02-17 | End: 2020-02-19 | Stop reason: HOSPADM

## 2020-02-17 RX ORDER — FUROSEMIDE 20 MG/1
20 TABLET ORAL DAILY
Status: DISCONTINUED | OUTPATIENT
Start: 2020-02-17 | End: 2020-02-19 | Stop reason: HOSPADM

## 2020-02-17 RX ORDER — M-VIT,TX,IRON,MINS/CALC/FOLIC 27MG-0.4MG
1 TABLET ORAL DAILY
Status: DISCONTINUED | OUTPATIENT
Start: 2020-02-17 | End: 2020-02-19 | Stop reason: HOSPADM

## 2020-02-17 RX ADMIN — FAMOTIDINE 40 MG: 20 TABLET ORAL at 10:54

## 2020-02-17 RX ADMIN — DIVALPROEX SODIUM 500 MG: 500 TABLET, EXTENDED RELEASE ORAL at 20:57

## 2020-02-17 RX ADMIN — FUROSEMIDE 20 MG: 20 TABLET ORAL at 17:13

## 2020-02-17 RX ADMIN — TAMSULOSIN HYDROCHLORIDE 0.4 MG: 0.4 CAPSULE ORAL at 09:30

## 2020-02-17 RX ADMIN — GABAPENTIN 300 MG: 300 CAPSULE ORAL at 09:30

## 2020-02-17 RX ADMIN — CALCIUM POLYCARBOPHIL 625 MG: 625 TABLET, FILM COATED ORAL at 20:57

## 2020-02-17 RX ADMIN — GABAPENTIN 300 MG: 300 CAPSULE ORAL at 20:57

## 2020-02-17 RX ADMIN — MULTIPLE VITAMINS W/ MINERALS TAB 1 TABLET: TAB at 17:13

## 2020-02-17 RX ADMIN — VITAMIN D, TAB 1000IU (100/BT) 1000 UNITS: 25 TAB at 09:30

## 2020-02-17 RX ADMIN — CETIRIZINE HYDROCHLORIDE 10 MG: 10 TABLET, FILM COATED ORAL at 09:30

## 2020-02-17 RX ADMIN — BENZTROPINE MESYLATE 0.5 MG: 0.5 TABLET ORAL at 20:57

## 2020-02-17 RX ADMIN — ASPIRIN 81 MG: 81 TABLET, COATED ORAL at 09:30

## 2020-02-17 RX ADMIN — ATORVASTATIN CALCIUM 20 MG: 20 TABLET, FILM COATED ORAL at 09:30

## 2020-02-17 RX ADMIN — FOLIC ACID 1 MG: 1 TABLET ORAL at 17:13

## 2020-02-17 RX ADMIN — BENZTROPINE MESYLATE 0.5 MG: 0.5 TABLET ORAL at 09:30

## 2020-02-17 RX ADMIN — FAMOTIDINE 40 MG: 20 TABLET ORAL at 20:57

## 2020-02-17 RX ADMIN — CALCIUM POLYCARBOPHIL 625 MG: 625 TABLET, FILM COATED ORAL at 17:56

## 2020-02-17 RX ADMIN — GABAPENTIN 300 MG: 300 CAPSULE ORAL at 17:07

## 2020-02-17 RX ADMIN — Medication 400 MG: at 20:57

## 2020-02-17 RX ADMIN — WARFARIN SODIUM 3 MG: 3 TABLET ORAL at 17:07

## 2020-02-17 ASSESSMENT — PAIN DESCRIPTION - FREQUENCY: FREQUENCY: INTERMITTENT

## 2020-02-17 ASSESSMENT — PAIN SCALES - GENERAL: PAINLEVEL_OUTOF10: 8

## 2020-02-17 ASSESSMENT — PAIN DESCRIPTION - DESCRIPTORS: DESCRIPTORS: ACHING

## 2020-02-17 ASSESSMENT — PAIN DESCRIPTION - PAIN TYPE: TYPE: ACUTE PAIN

## 2020-02-17 ASSESSMENT — PAIN - FUNCTIONAL ASSESSMENT: PAIN_FUNCTIONAL_ASSESSMENT: ACTIVITIES ARE NOT PREVENTED

## 2020-02-17 ASSESSMENT — PAIN DESCRIPTION - LOCATION: LOCATION: KNEE

## 2020-02-17 ASSESSMENT — PAIN DESCRIPTION - ONSET: ONSET: ON-GOING

## 2020-02-17 ASSESSMENT — LIFESTYLE VARIABLES: HISTORY_ALCOHOL_USE: NO

## 2020-02-17 ASSESSMENT — PAIN DESCRIPTION - ORIENTATION: ORIENTATION: RIGHT

## 2020-02-17 ASSESSMENT — PAIN DESCRIPTION - PROGRESSION: CLINICAL_PROGRESSION: NOT CHANGED

## 2020-02-17 NOTE — DISCHARGE SUMMARY
[facility]      Discharge Summary     Patient ID: Robin Lane  :  1960   MRN: [id number]     ACCOUNT:  [de-identified]   Patient Location : [address] [place] [address]  Patient's PCP: Lacey Oliver MD  Admit Date: 2020   Discharge Date: 2020     Length of Stay: 2  Code Status:  Full Code  Admitting Physician: Cleopatra Higgins MD  Discharge Physician: Cleopatra Higgins MD     Active Discharge Diagnosis :     Primary Problem  Altered mental status, unspecified      [place] Problems    Diagnosis Date Noted    Altered mental status, unspecified [R41.82] 2020    Dehydration [E86.0] 2020    YONATHAN (acute kidney injury) ([facility] [N17.9] 2020    Altered mental status [R41.82] 2020    Supratherapeutic INR [R79.1] 2020    Atrial fibrillation ([facility] [I48.91] 2020    Hypernatremia [E87.0] 2020    Anemia [D64.9] 2020    Bipolar 1 disorder ([facility] [facility] [F31.9] 09/10/2019    Crack cocaine use [F14.90] 2018    Depression [F32.9] 2018    Chronic renal impairment, stage 3 (moderate) ([facility] [N18.3] 2018    HTN (hypertension) [I10] 2014       Admission Condition:  fair     Discharged Condition: stable    Hospital Stay:     Hospital Course:  Robin Lane is a 60 y.o. male who was admitted for the management of   Altered mental status, unspecified , presented to ER with Fatigue  Patient presented to emergency room with fatigue, depression with suicidal ideation. Patient took extra Ambien with unclear intent. He has underlying history of bipolar disorder with depression, hypertension, paroxysmal atrial fibrillation on long-term anticoagulation with Coumadin. Initial evaluation in emergency room showed hyponatremia 147, BUN 13, creatinine 1.35, troponin negative, glucose 144. Alcohol level was <0.010%, U tox was positive for cocaine. Drug screen negative for salicylate, acetaminophen.   Patient had supratherapeutic INR 3.9 and increased to 4.9. Coumadin was held and orthopedic surgery was consulted for knee effusion and recommended conservative treatment. Patient was evaluated psychiatry recommended inpatient treatment for depression. Significant therapeutic interventions:   1. Depression with suicidal ideation - psychiatric consultation, recommend inpatient treatment. 2. Cocaine abuse -recommend abstinence. 3. Supratherapeutic INR -Coumadin held in the hospital.  Resume at discharge   4. Paroxysmal atrial fibrillation - in normal sinus rhythm. Continue beta-blocker. 5. Bipolar disorder with depression -continue Remeron  6. Anxiety disorder-Atarax 25 three  times daily. 7. Right knee hematoma-consult orthopedic surgery. Conservative treatment recommended. Significant Diagnostic Studies:   Labs / Micro:/Radiology  Recent Labs     02/17/20  0647 02/14/20  1804 02/14/20  1203   WBC 7.1 7.6 7.4   HGB 10.3* 10.8* 11.0*   HCT 30.5* 33.3* 33.1*   MCV 91.1 94.3 92.5    167 174     Labs Renal Latest Ref Rng & Units 2/17/2020 2/15/2020 2/14/2020 2/14/2020 1/7/2020   BUN 8 - 23 mg/dL 15 12 13 13 7(L)   Cr 0.70 - 1.20 mg/dL 1.00 1.17 1.15 1.35(H) 1.16   K 3.7 - 5.3 mmol/L 4.3 3.6(L) 3. 3(L) 3.9 3.8   Na 135 - 144 mmol/L 141 144 153(H) 147(H) 142     Lab Results   Component Value Date    ALT 14 02/17/2020    AST 14 02/17/2020    ALKPHOS 112 02/17/2020    BILITOT 1.18 02/17/2020     Lab Results   Component Value Date    TSH 2.41 02/17/2020     Lab Results   Component Value Date    HEPAIGM NONREACTIVE 02/22/2018    HEPBIGM NONREACTIVE 02/22/2018    HEPCAB NONREACTIVE 02/22/2018     Lab Results   Component Value Date    COLORU YELLOW 02/20/2018    NITRU NEGATIVE 02/20/2018    GLUCOSEU NEGATIVE 02/20/2018    KETUA NEGATIVE 02/20/2018    UROBILINOGEN Normal 02/20/2018    BILIRUBINUR NEGATIVE 02/20/2018     Lab Results   Component Value Date    LABA1C 5.4 02/17/2020     Lab Results   Component Value Date Up POST HOSPITALIZATION/Incidental Findings: follow up with Psych. Diet: cardiac diet    Activity: As tolerated. Instructions to Patient: follow up with Psychiatry. Discharge Medications:      Medication List      START taking these medications    hydrOXYzine 25 MG tablet  Commonly known as:  ATARAX  Take 1 tablet by mouth 3 times daily as needed for Itching     melatonin 1 MG tablet  Take 3 tablets by mouth nightly as needed for Sleep        CONTINUE taking these medications    alendronate 70 MG tablet  Commonly known as:  FOSAMAX     aspirin 81 MG tablet     atorvastatin 20 MG tablet  Commonly known as:  LIPITOR     benztropine 0.5 MG tablet  Commonly known as:  COGENTIN  Take 1 tablet by mouth 2 times daily     cetirizine 10 MG tablet  Commonly known as:  ZYRTEC     divalproex 500 MG extended release tablet  Commonly known as:  DEPAKOTE ER  Take 1 tablet by mouth nightly     gabapentin 300 MG capsule  Commonly known as:  NEURONTIN  Take 1 capsule by mouth 3 times daily for 30 days. MAGNESIUM OXIDE 400 PO     ranitidine 150 MG tablet  Commonly known as:  ZANTAC     tamsulosin 0.4 MG capsule  Commonly known as:  FLOMAX     VITAMIN B 12 PO     vitamin D 1000 UNIT Tabs tablet  Commonly known as:  CHOLECALCIFEROL     * warfarin 2 MG tablet  Commonly known as:  COUMADIN  Take 0.5 tablets by mouth four times a week Indications: takes 1 mg tabs tuesday and saturday, takes 2mg tabs mon, wed thurs fri sun     * warfarin 1 MG tablet  Commonly known as:  COUMADIN  Take 1 tablet by mouth every evening Take 1mg by mouth on Mondays. Take 2mg on all other days Tuesday, Wednesday,Thursday,Friday,Saturday, Sunday. * This list has 2 medication(s) that are the same as other medications prescribed for you. Read the directions carefully, and ask your doctor or other care provider to review them with you.                Where to Get Your Medications      These medications were sent to [address] [place] [address]

## 2020-02-17 NOTE — BH NOTE
products; Ibuprofen; Latuda [lurasidone hcl]; Lithium; Lurasidone; Quetiapine; and Seroquel [quetiapine fumarate]   Past Medical History:   Diagnosis Date    AMS (altered mental status) 02/15/2020    Atrial fibrillation (HCC)     on coumadin    Bipolar 1 disorder ([facility] [address]     Depression     Hypertension     Neck pain     Patient in clinical research study 04/12/2018    [id number]      Past Surgical History:   Procedure Laterality Date    ABDOMEN SURGERY      APPENDECTOMY      CARDIAC SURGERY  7/14/15    Median Sternotomy, Repair of ascending aorti aneurysm, stentless valve placement    CARDIAC SURGERY  07/14/2015    Median stenotomy, repair of ascending aorti aneurysm, stentless valve placement     DILATATION, ESOPHAGUS      HERNIA REPAIR      NECK SURGERY  2013    TONSILLECTOMY       Neurologic Exam    See H&P for further physical examination. SOCIAL HISTORY     The patient reports he was born and raised in [place]. He has 2 sisters and he is the middle child. He denies any history of physical sexual or emotional abuse as a child or an adult. He reports he did complete high school. The patient currently receives disability secondary to his bipolar disorder. He is single and has no children. Drug and alcohol history as noted previously. Denies any smith of preference. Denies any  experience. Denies any current legal issues.       Social History     Socioeconomic History    Marital status: Single     Spouse name: Not on file    Number of children: Not on file    Years of education: Not on file    Highest education level: Not on file   Occupational History    Not on file   Social Needs    Financial resource strain: Not on file    Food insecurity:     Worry: Not on file     Inability: Not on file    Transportation needs:     Medical: Not on file     Non-medical: Not on file   Tobacco Use    Smoking status: Never Smoker    Smokeless tobacco: Never Used   Substance and Sexual Activity    Alcohol use: Yes     Comment: heavy    Drug use: Yes     Types: Cocaine    Sexual activity: Not on file   Lifestyle    Physical activity:     Days per week: Not on file     Minutes per session: Not on file    Stress: Not on file   Relationships    Social connections:     Talks on phone: Not on file     Gets together: Not on file     Attends Tenriism service: Not on file     Active member of club or organization: Not on file     Attends meetings of clubs or organizations: Not on file     Relationship status: Not on file    Intimate partner violence:     Fear of current or ex partner: Not on file     Emotionally abused: Not on file     Physically abused: Not on file     Forced sexual activity: Not on file   Other Topics Concern    Not on file   Social History Narrative    Not on file         Mental Status    The patient is seen in the day area. He is alert. He is oriented to person place situation and time. The patient is dressed in hospital garb. He makes good eye contact. Significant involuntary movements are noted of both the hands legs mouth and torso. The patient makes good eye contact. Grooming and hygiene are appropriate. He is calm cooperative with this writer. He has been isolative and withdrawn, but has attended select groups. He has come out of his room for meals. The patient's speech is soft in volume. At times garbled and difficult to understand, but  when asked to repeat statements which have been difficult to understand the patient is able to do so clearly. His mood is depressed and anxious. Affect is congruent to mood appropriate to content. Thought processes are organized, though there is some thought blocking. Associations are impaired. Thought content is positive for hopeless helpless and worthless feelings. Positive for passive suicidal ideation. Negative for any homicidal ideation. No auditory or visual hallucinations are reported.   He any delusions or paranoia during assessment. Patient does report that thoughts are racing. His gait is steady. He is noted to be restless and fidgety throughout assessment. Language skills are  within normal limits based on this assessment, but suspect that there may be some reduction in skill. We will further assess throughout the week. Patient's cognitive functions were slowed. Memory for recent events was forgetful and remote appears grossly intact. Insight and judgment are poor.       Labs  Recent Results (from the past 72 hour(s))   Comprehensive Metabolic Panel w/ Reflex to MG    Collection Time: 02/14/20  6:04 PM   Result Value Ref Range    Glucose 114 (H) 70 - 99 mg/dL    BUN 13 8 - 23 mg/dL    CREATININE 1.15 0.70 - 1.20 mg/dL    Bun/Cre Ratio NOT REPORTED 9 - 20    Calcium 7.8 (L) 8.6 - 10.4 mg/dL    Sodium 153 (H) 135 - 144 mmol/L    Potassium 3.3 (L) 3.7 - 5.3 mmol/L    Chloride 107 98 - 107 mmol/L    CO2 34 (H) 20 - 31 mmol/L    Anion Gap 12 9 - 17 mmol/L    Alkaline Phosphatase 137 (H) 40 - 129 U/L    ALT 25 5 - 41 U/L    AST 27 <40 U/L    Total Bilirubin 0.81 0.3 - 1.2 mg/dL    Total Protein 6.3 (L) 6.4 - 8.3 g/dL    Alb 3.7 3.5 - 5.2 g/dL    Albumin/Globulin Ratio 1.4 1.0 - 2.5    GFR Non-African American >60 >60 mL/min    GFR African American >60 >60 mL/min    GFR Comment          GFR Staging NOT REPORTED    Ammonia    Collection Time: 02/14/20  6:04 PM   Result Value Ref Range    Ammonia 33 16 - 60 umol/L   CBC Auto Differential    Collection Time: 02/14/20  6:04 PM   Result Value Ref Range    WBC 7.6 3.5 - 11.3 k/uL    RBC 3.53 (L) 4.21 - 5.77 m/uL    Hemoglobin 10.8 (L) 13.0 - 17.0 g/dL    Hematocrit 33.3 (L) 40.7 - 50.3 %    MCV 94.3 82.6 - 102.9 fL    MCH 30.6 25.2 - 33.5 pg    MCHC 32.4 28.4 - 34.8 g/dL    RDW 15.7 (H) 11.8 - 14.4 %    Platelets 223 320 - 087 k/uL    MPV 10.6 8.1 - 13.5 fL    NRBC Automated 0.0 0.0 per 100 WBC    Differential Type NOT REPORTED     WBC Morphology NOT REPORTED     RBC 135 - 144 mmol/L    Potassium 3.6 (L) 3.7 - 5.3 mmol/L    Chloride 108 (H) 98 - 107 mmol/L    CO2 24 20 - 31 mmol/L    Anion Gap 12 9 - 17 mmol/L    GFR Non-African American >60 >60 mL/min    GFR African American >60 >60 mL/min    GFR Comment          GFR Staging NOT REPORTED    Protime-INR    Collection Time: 02/16/20  6:02 AM   Result Value Ref Range    Protime 27.5 (H) 9.0 - 12.0 sec    INR 2.8    Comprehensive Metabolic Panel w/ Reflex to MG    Collection Time: 02/17/20  6:47 AM   Result Value Ref Range    Glucose 97 70 - 99 mg/dL    BUN 15 8 - 23 mg/dL    CREATININE 1.00 0.70 - 1.20 mg/dL    Bun/Cre Ratio NOT REPORTED 9 - 20    Calcium 8.5 (L) 8.6 - 10.4 mg/dL    Sodium 141 135 - 144 mmol/L    Potassium 4.3 3.7 - 5.3 mmol/L    Chloride 109 (H) 98 - 107 mmol/L    CO2 21 20 - 31 mmol/L    Anion Gap 11 9 - 17 mmol/L    Alkaline Phosphatase 112 40 - 129 U/L    ALT 14 5 - 41 U/L    AST 14 <40 U/L    Total Bilirubin 1.18 0.3 - 1.2 mg/dL    Total Protein 6.0 (L) 6.4 - 8.3 g/dL    Alb 3.2 (L) 3.5 - 5.2 g/dL    Albumin/Globulin Ratio NOT REPORTED 1.0 - 2.5    GFR Non-African American >60 >60 mL/min    GFR African American >60 >60 mL/min    GFR Comment          GFR Staging NOT REPORTED    Hemoglobin A1c    Collection Time: 02/17/20  6:47 AM   Result Value Ref Range    Hemoglobin A1C 5.4 4.0 - 6.0 %    Estimated Avg Glucose 108 mg/dL   TSH without Reflex    Collection Time: 02/17/20  6:47 AM   Result Value Ref Range    TSH 2.41 0.30 - 5.00 mIU/L   T4, free    Collection Time: 02/17/20  6:47 AM   Result Value Ref Range    Thyroxine, Free 0.81 (L) 0.93 - 1.70 ng/dL   Lipid panel - fasting    Collection Time: 02/17/20  6:47 AM   Result Value Ref Range    Cholesterol 126 <200 mg/dL    HDL 24 (L) >40 mg/dL    LDL Cholesterol 79 0 - 130 mg/dL    Chol/HDL Ratio 5.3 (H) <5    Triglycerides 116 <150 mg/dL    VLDL NOT REPORTED 1 - 30 mg/dL   CBC auto differential    Collection Time: 02/17/20  6:47 AM   Result Value Ref Range    WBC treatment   Supportive therapy with medication management. Reviewed risks and benefits as well as potential side effects with patient.  Therapeutic activities and groups   Follow up at [facility] after symptoms stabilized   Restart the patient on gabapentin 300 mg 3 times daily and Depakote delayed release 500 mg at bedtime. Estimated length of stay: 5-7 days    Cherylann Gottron, APRN - CNS  Psychiatric Advanced Practice Nurse  Marc voice recognition software used in portions of this document.  Occasionally words are mis-transcribed

## 2020-02-17 NOTE — GROUP NOTE
Group Therapy Note    Date: 2/17/2020    Group Start Time: 1100  Group End Time: 1130  Group Topic: Psychoeducation    NORAH BHPATRICIA link, [facility]    Patient's Goal:  To increase interpersonal interaction. Notes:  Pt attended group, however, needed assistance to participate. Status After Intervention:  Improved    Participation Level:  Active Listener and Interactive    Participation Quality: Appropriate      Speech:  normal      Thought Process/Content: Logical      Affective Functioning: Constricted/Restricted      Mood: dysphoric      Level of consciousness:  Alert and Attentive      Response to Learning: Progressing to goal      Endings: None Reported    Modes of Intervention: Socialization, Problem-solving, Activity, Media and Reality-testing      Discipline Responsible: Psychoeducational Specialist      Signature:  Damaris Guzman

## 2020-02-17 NOTE — CARE COORDINATION
ENTRY FOR 2/17/20    Called Araseli Pierce pt's [facility]  -  left message that pt was discharged to [facility] on 2/1620

## 2020-02-17 NOTE — PROGRESS NOTES
(degrees)  RLE AROM: WFL  AROM LLE (degrees)  LLE AROM : WFL  AROM RUE (degrees)  RUE AROM : WFL  AROM LUE (degrees)  LUE AROM : WFL  Strength RLE  Strength RLE: WFL  Comment: Grossly 4-/5  Strength LLE  Strength LLE: WFL  Comment: Grossly 4-/5  Strength RUE  Strength RUE: WFL  Strength LUE  Strength LUE: WFL     Sensation  Overall Sensation Status: WFL(Pt denies)  Bed mobility  Rolling to Left: Modified independent  Supine to Sit: Modified independent  Sit to Supine: Modified independent  Scooting: Modified independent  Comment: HOB slightly elevated, pt requires no cues, safe bed mobility. Back to bed at end of session. Declines R LE elevation. Transfers  Sit to Stand: Independent  Stand to sit: Independent  Comment: no device, no cues needed, no LOB or requiring UE support  Ambulation  Ambulation?: Yes  Ambulation 1  Surface: level tile  Device: No Device  Assistance: Independent  Quality of Gait: slight increased lateral sway, no LOB, pt steady  Distance: 48'  Comments: Pt denies feeling off balance or unsteady, \"just have some knee pain\" Declines needs for further PT services. Stairs/Curb  Stairs?: No(not a goal per home set up)     Balance  Posture: Good  Sitting - Static: Good  Sitting - Dynamic: Good  Standing - Static: Good;-  Standing - Dynamic: Good;-  Comments: no device, low fall risk per Tinetti 26/28        Plan   Plan  Times per week: D/C PT - no PT goals identified  Safety Devices  Type of devices:  All fall risk precautions in place, Gait belt, Left in bed, Nurse notified(Gait belt removed from room/unit)    G-Code       OutComes Score  Balance Score: 15 (02/17/20 0840)  Gait Score: 11 (02/17/20 0840)        Tinetti Total Score: 26 (02/17/20 0840)   Low fall risk                                   AM-PAC Score     AM-PAC Inpatient Mobility without Stair Climbing Raw Score : 17 (02/17/20 0840)  AM-PAC Inpatient without Stair Climbing T-Scale Score : 48.47 (02/17/20 0840)  Mobility Inpatient CMS 0-100% Score: 32.72 (02/17/20 0840)  Mobility Inpatient without Stair CMS G-Code Modifier : CJ (02/17/20 0840)       Goals  Short term goals  Time Frame for Short term goals: D/C PT - no PT skilled identified. Pt is IND with functional mobility.        Therapy Time   Individual Concurrent Group Co-treatment   Time In 0840         Time Out 6556         Minutes [address] [place] [address]

## 2020-02-17 NOTE — CARE COORDINATION
[facility] [address] called in order to fax a form to hospital. They reported their contact information was as follows: Phone [phone], Fax [phone].

## 2020-02-17 NOTE — PROGRESS NOTES
Pharmacy Note  Warfarin Consult    Rehana Henry is a 60 y.o. male for whom pharmacy has been consulted to manage warfarin therapy. Consulting Physician: Freddy Ames  Reason for Admission: MDD    Warfarin dose prior to admission: 1 mg on Mondays; 2 mg all other days  Warfarin indication: Atrial Fibrillation  Target INR range: 2 to 3     Past Medical History:   Diagnosis Date    Bipolar 1 disorder ([facility] [address]     Depression     Hypertension     Neck pain     Patient in clinical research study 04/12/2018    [id number]                Recent Labs     02/17/20  0815   INR 1.7     Recent Labs     02/14/20  1203 02/14/20  1804 02/17/20  0647   HGB 11.0* 10.8* 10.3*   HCT 33.1* 33.3* 30.5*    167 191       Current warfarin drug-drug interactions: Aspirin, Acetaminophen      Date             INR        Dose   2/17/2020        1.7       3 mg    INR dropped one point since yesterday (2.8 on 2/16/20). Will give 3 mg today and plan to resume home regimen moving forward. Daily PT/INR while inpatient. Thank you for the consult. Will continue to follow.   Emanuel Riojas, PharmD, BCPS  2/17/2020 9:04 AM

## 2020-02-17 NOTE — PROGRESS NOTES
BHI Biopsychosocial Assessment    Current Level of Psychosocial Functioning     Independent X  Dependent    Minimal Assist     Comments:    Psychosocial High Risk Factors (check all that apply)    Unable to obtain meds   Chronic illness/pain    Substance abuse X  Lack of Family Support X    Financial stress   Isolation X   Inadequate Community Resources  Suicide attempt(s) X   Not taking medications   Victim of crime   Developmental Delay  Unable to manage personal needs    Age 72 or older   Homeless  No transportation   Readmission within 30 days  Unemployment X   Traumatic Event    Comments:   Psychiatric Advanced Directives:  N/A   Family to Involve in Treatment:   No TUNDE for family signed  Sexual Orientation:    Heterosexual  Patient Strengths:  Pt is independent in self-care activities. Patient Barriers:   Pt lacks support system  Opiate Education Provided:  N/A   CMHC/mental health history:  [facility] [phone] (TUNDE signed)  Plan of Care   medication management, group/individual therapies, family meetings, psycho -education, treatment team meetings to assist with stabilization    Initial Discharge Plan:    Pt is planning to return home and continue outpatient care with [facility] [phone] (TUNDE signed). Clinical Summary:    Pt is a 60 y.o.  male who presented to the ED with suicidal ideations and a plan to shoot himself in the head. Prior to this pt had been using crack cocaine. Pt reports a previous suicide attempt approximately 2 months ago, where he tried to OD on his sleeping medication. Pt denied SI, HI, and hallucinations. Pt was oriented to time, place, person, and situation. Pt was friendly and cooperative during assessment. Pt reports he is linked to [facility] [phone] (TUNDE signed) and is on the ACT team. Pt reports he receives SSI. Pt reports that he currently lives in [address] [place] [address] owned by [facility]. Pt reported his parents are  and he speaks to his 2 sisters \"once in awhile\".  Pt

## 2020-02-17 NOTE — PROGRESS NOTES
Nutrition Assessment (Low Risk)    Type and Reason for Visit: Positive Nutrition Screen(Difficulty chewing or swallowing. Dietitian consult needed: oor dentition/ missing teeth)    Nutrition Recommendations: Continue General diet. Add Dental Soft restriction if requested by patient. Nutrition Assessment:  Patient assessed for nutritional risk. Deemed to be at low risk at this time. Will continue to monitor for changes in status. Patient appears to be adequately nourished and has a good appetite. Patient is edentulous but is eating well at meals and is able to find food that are toleated. Patient declined need for altered diet texture. Continue General diet. Monitor p.o intakes and chewing ability.      Malnutrition Assessment:  · Malnutrition Status: No malnutrition    Nutrition Risk Level   Risk Level: Low    Nutrition Diagnosis:   · Problem: Biting/chewing difficulty  · Etiology: Partial or complete edentulism    Signs and symptoms: Ill-fitting, broken, or lack of dentures    Nutrition Intervention:  Food and/or Delivery: Continue current diet  Nutrition Education/Counseling/Coordination of Care:  Continued Inpatient Monitoring      [facility]olic HealthZOHRA, LPAT,  Clinical Dietitian  Cell # [phone]  Office # [phone]

## 2020-02-17 NOTE — PROGRESS NOTES
Pharmacy Med Education Group Note    Date: 2/17/20  Start Time: 1330  End Time: 2708    Number Participants in Group:  4    Goal:  Patient will demonstrate an understanding of the medications intended purpose and possible adverse effects  Topic: [place] for Pharmacy Med Ed Group    Discipline Responsible:     OT  AT  [facility].  RT     X Other       Participation Level:     None  Minimal      X Active Listener    X Interactive    Monopolizing         Participation Quality:    X Appropriate  Inappropriate     X       Attentive        Intrusive          Sharing        Resistant          Supportive        Lethargic       Affective:     X Congruent  Incongruent  Blunted  Flat    Constricted  Anxious  Elated  Angry    Labile  Depressed  Other         Cognitive:    X Alert  Oriented PPTP     Concentration   X G  F  P   Attention Span   X G  F  P   Short-Term Memory   X G  F  P   Long-Term Memory  G  F  P   ProblemSolving/  Decision Making  G  F  P   Ability to Process  Information   X G  F  P      Contributing Factors             Delusional             Hallucinating             Flight of Ideas             Other:       Modes of Intervention:    X Education   X Support  Exploration    Clarifying  Problem Solving  Confrontation    Socialization  Limit Setting  Reality Testing    Activity  Movement  Media    Other:            Response to Learning:    X Able to verbalize current knowledge/experience    Able to verbalize/acknowledge new learning    Able to retain information    Capable of insight    Able to change behavior    Progressing to goal    Other:        Comments:     Trena Koroma PharmD, BCPS  2/17/2020 2:15 PM

## 2020-02-17 NOTE — GROUP NOTE
Group Therapy Note    Date: 2/17/2020    Group Start Time: 1000  Group End Time: 4936  Group Topic: Group Therapy    [facility]    PIETER Huitron, LUIS        Group Therapy Note    Attendees: 9/17         Patient's Goal:  Increase interpersonal relationship skills    Notes:  Patient was an active participant in group discussion     Status After Intervention:  Unchanged    Participation Level:  Active Listener and Interactive    Participation Quality: Appropriate, Attentive, Sharing and Supportive      Speech:  normal      Thought Process/Content: Logical      Affective Functioning: Congruent      Mood: anxious and depressed      Level of consciousness:  Alert, Oriented x4 and Attentive      Response to Learning: Progressing to goal      Endings: None Reported    Modes of Intervention: Support, Socialization, Exploration and Clarifying      Discipline Responsible: /Counselor      Signature:  PIETER Huitron, LUIS

## 2020-02-17 NOTE — H&P
HISTORY and [facility] [address]       NAME:  Jayshree Arango  MRN: [id number]   YOB: 1960   Date: 2/17/2020   Age: 60 y.o. Gender: male     COMPLAINT AND PRESENT HISTORY:    Jayshree Arango is a 60 y.o.  male, admitted because of increasing depression with suicidal ideation. Patient had plan to his self. Patient states he does not have a gun. . Patient has a history of previous suicide attempts. Patient was admitted couple days prior due to fatigue, mental status secondary to taking extra Ambien. Patient was watched for dehydration and hyper coagulation. Patient has a history of atrial fibrillation is a post status open heart surgery. Patient is currently on Coumadin. Patient states to writer he is unsure why he is here. Patient appears to have poor insight. Patient exhibits some  erythemic areas to his right knee with granulated areas also swelling that right side. Patient states he is unsure how he may have injured it. Patient denies any homicidal ideation. Patient admits to feeling hopeless, helpless, worthless, with a general lack of interest in everyday activities. patient states that sleep  and  concentration has been affected. No history of auditory, visual or tactile hallucination. Patient patient did exhibit some motor agitation with his legs and some mild rocking. Patient denies any current alcohol abuse, but he admits that he does use cocaine occasionally. Patient states that he has his own place. Patient has somatic complaints of right knee pain. Knee is swollen with erythema around some granulated sites. .  Patient's labs are showing anemia at 10.3 hemoglobin. Patient denies any chest pain or shortness of breath. No fever or chills.     DIAGNOSTIC RESULTS   Labs:  CBC:   Recent Labs     02/14/20  1203 02/14/20  1804 02/17/20  0647   WBC 7.4 7.6 7.1   HGB 11.0* 10.8* 10.3*    167 191     BMP:    Recent Labs     02/14/20  1804 02/15/20  8482 02/17/20  0647   * 144 141   K 3.3* 3.6* 4.3    108* 109*   CO2 34* 24 21   BUN 13 12 15   CREATININE 1.15 1.17 1.00   GLUCOSE 114* 102* 97     Hepatic:   Recent Labs     02/14/20  1804 02/17/20  0647   AST 27 14   ALT 25 14   BILITOT 0.81 1.18   ALKPHOS 137* 112     Lipids:   Recent Labs     02/17/20  0647   CHOL 126   HDL 24*     U/A:  Lab Results   Component Value Date    COLORU YELLOW 02/20/2018    WBCUA 5 TO 10 02/21/2013    RBCUA 5 TO 10 02/21/2013    MUCUS 3+ 02/21/2013    BACTERIA FEW 02/21/2013    SPECGRAV 1.014 02/20/2018    LEUKOCYTESUR NEGATIVE 02/20/2018    GLUCOSEU NEGATIVE 02/20/2018    AMORPHOUS NOT REPORTED 02/21/2013       PAST MEDICAL HISTORY     Past Medical History:   Diagnosis Date    AMS (altered mental status) 02/15/2020    Atrial fibrillation (HCC)     on coumadin    [facility]     Depression     Hypertension     Neck pain     Patient in clinical research study 04/12/2018    [id number]     Pt denies any history of Diabetes mellitus type 2,stroke, heart disease, COPD, Asthma, GERD, HLD, Cancer, Seizures,Thyroid disease, Kidney Disease, Hepatitis, TB.    SURGICAL HISTORY       Past Surgical History:   Procedure Laterality Date    ABDOMEN SURGERY      APPENDECTOMY      CARDIAC SURGERY  7/14/15    Median Sternotomy, Repair of ascending aorti aneurysm, stentless valve placement    CARDIAC SURGERY  07/14/2015    Median stenotomy, repair of ascending aorti aneurysm, stentless valve placement     DILATATION, ESOPHAGUS      HERNIA REPAIR      NECK SURGERY  2013    TONSILLECTOMY       FAMILY HISTORY       Family History   Problem Relation Age of Onset    Coronary Art Dis Mother     Hypertension Mother     Hypertension Father     Cancer Father         blood       SOCIAL HISTORY       Social History     Socioeconomic History    Marital status: Single     Spouse name: None    Number of children: None    Years of education: None    Highest education level: None   Occupational History    None   Social Needs    Financial resource strain: None    Food insecurity:     Worry: None     Inability: None    Transportation needs:     Medical: None     Non-medical: None   Tobacco Use    Smoking status: Never Smoker    Smokeless tobacco: Never Used   Substance and Sexual Activity    Alcohol use: Yes     Comment: heavy    Drug use: Yes     Types: Cocaine    Sexual activity: None   Lifestyle    Physical activity:     Days per week: None     Minutes per session: None    Stress: None   Relationships    Social connections:     Talks on phone: None     Gets together: None     Attends Tenriism service: None     Active member of club or organization: None     Attends meetings of clubs or organizations: None     Relationship status: None    Intimate partner violence:     Fear of current or ex partner: None     Emotionally abused: None     Physically abused: None     Forced sexual activity: None   Other Topics Concern    None   Social History Narrative    None       REVIEW OF SYSTEMS      Allergies   Allergen Reactions    Oxycodone Itching    Fish-Derived Products     Ibuprofen     Latuda [Lurasidone Hcl] Other (See Comments)     \"Feels like pt is going to crawl out of own skin\"    Lithium     Lurasidone     Quetiapine     Seroquel [Quetiapine Fumarate]        No current facility-administered medications on file prior to encounter. Current Outpatient Medications on File Prior to Encounter   Medication Sig Dispense Refill    hydrOXYzine (ATARAX) 25 MG tablet Take 1 tablet by mouth 3 times daily as needed for Itching 30 tablet 0    melatonin 1 MG tablet Take 3 tablets by mouth nightly as needed for Sleep 30 tablet 0    gabapentin (NEURONTIN) 300 MG capsule Take 1 capsule by mouth 3 times daily for 30 days.  90 capsule 0    benztropine (COGENTIN) 0.5 MG tablet Take 1 tablet by mouth 2 times daily 60 tablet 0    tamsulosin (FLOMAX) 0.4 MG capsule Take 0.4 mg by is 33.25 kg/m². Pt was examined with a nurse present in the room. GENERAL APPEARANCE:  Petra Will is 60 y.o.,  male, moderately obese, nourished, conscious, alert. Does not appear to be distress or pain at this time. SKIN:  Warm, dry, no cyanosis or jaundice. Patient has multiple granulated areas with erythema surrounding. Right lower quadrant is swollen. HEAD:  Normocephalic, atraumatic, no swelling or tenderness. EYES:  Pupils equal, reactive to light, Conjunctiva is clear, EOMs intact uma. eyelids WNL. EARS:  No discharge, no marked hearing loss. NOSE:  No rhinorrhea, epistaxis or septal deformity. THROAT:  Not congested. No ulceration bleeding or discharge. NECK:  No stiffness, trachea central.  No palpable masses or L.N.      CHEST:  Symmetrical and equal on expansion. HEART: irregular rate and rhythm. S1 > S2, No audible murmurs or gallops. LUNGS:  Equal on expansion, normal breath sounds. No adventitious sounds. ABDOMEN:  Obese. Soft on palpation. No localized tenderness. No guarding or rigidity. No palpable organomegaly. LYMPHATICS:  No palpable cervical Lymphadenopathy. LOCOMOTOR, BACK AND SPINE:  No tenderness or deformities. EXTREMITIES:  Symmetrical, no pretibial edema. Kaylas sign negative. No discoloration or ulcerations. NEUROLOGIC:  The patient is conscious, alert, oriented,Cranial nerve II-XII intact, taste and smell were not examined. No apparent focal sensory or motor deficits. Muscle strength equal Uma. No facial droop, tongue protrudes centrally, no slurring of the speech. PROVISIONAL DIAGNOSES:      Active Problems:    Major depression, recurrent (HCC)    Major depressive disorder, recurrent ([facility]  Resolved Problems:    * No resolved hospital problems.  *      JACKLYN MARI, LOGAN - CNP on 2/17/2020 at 11:39 AM

## 2020-02-17 NOTE — PLAN OF CARE
[id number]  Initial Interdisciplinary Treatment Plan NO      Original treatment plan Date & Time: 2/17/20 0930    Admission Type:       Reason for admission:   Reason for Admission: admitted for feelings of self harm to overdose or shoot self,  patient does not have access to guns at home. possible overdose on his coumadin. patient has swelling and bruising to right knee, patient unsure of falls.   current daily cocaine user, states about 20 dollars daily    Estimated Length of Stay:  5-7days  Estimated Discharge Date: to be determined by physician    PATIENT STRENGTHS:  Patient Strengths:Strengths: Connection to output provider  Patient Strengths and Limitations:Limitations: Tendency to isolate self, Inappropriate/potentially harmful leisure interests, Difficulty problem solving/relies on others to help solve problems  Addictive Behavior: Addictive Behavior  In the past 3 months, have you felt or has someone told you that you have a problem with:  : None  Do you have a history of Chemical Use?: Yes  Do you have a history of Alcohol Use?: No  Do you have a history of Street Drug Abuse?: Yes  Histroy of Prescripton Drug Abuse?: No  Medical Problems:  Past Medical History:   Diagnosis Date    Bipolar 1 disorder ([facility]     Depression     Hypertension     Neck pain     Patient in clinical research study 04/12/2018    [id number]     Status EXAM:Status and Exam  Normal: No  Facial Expression: Flat  Affect: Appropriate  Level of Consciousness: Alert  Mood:Normal: No  Mood: Depressed, Anxious, Helpless  Motor Activity:Normal: No  Motor Activity: Decreased, Unusual Posture/Gait  Interview Behavior: Cooperative  Preception: [place] to Person, [place] to Time, [place] to Place, [place] to Situation  Attention:Normal: Yes  Thought Content:Normal: No  Thought Content: Other(See Comment)(denies)  Hallucinations: None  Delusions: No  Memory:Normal: No  Memory: Poor Recent, Poor Remote  Insight and Judgment: No  Insight and Judgment: Poor Judgment, Poor Insight, Unmotivated  Present Suicidal Ideation: Yes  Present Homicidal Ideation: No    EDUCATION:   Learner Progress Toward Treatment Goals: reviewed group plans and strategies for care    Method:group therapy, medication compliance, individualized assessments and care planning    Outcome: needs reinforcement    PATIENT GOALS: to be discussed with patient within 72 hours    PLAN/TREATMENT RECOMMENDATIONS:     continue group therapy , medications compliance, goal setting, individualized assessments and care, continue to monitor pt on unit      SHORT-TERM GOALS:   Time frame for Short-Term Goals: 5-7 days    LONG-TERM GOALS:  Time frame for Long-Term Goals: 6 months  Members Present in Team Meeting: See Signature Sheet    Sandoval, [address]

## 2020-02-17 NOTE — BH NOTE
`[facility]  Admission Note     Admission Type:        Reason for admission:  Reason for Admission: admitted for feelings of self harm to overdose or shoot self,  patient does not have access to guns at home. possible overdose on his coumadin. patient has swelling and bruising to right knee, patient unsure of falls.   current daily cocaine user, states about 20 dollars daily    PATIENT STRENGTHS:  Strengths: Connection to output provider    Patient Strengths and Limitations:  Limitations: Tendency to isolate self, Inappropriate/potentially harmful leisure interests, Difficulty problem solving/relies on others to help solve problems    Addictive Behavior:   Addictive Behavior  In the past 3 months, have you felt or has someone told you that you have a problem with:  : None  Do you have a history of Chemical Use?: Yes  Do you have a history of Alcohol Use?: No  Do you have a history of Street Drug Abuse?: Yes  Histroy of Prescripton Drug Abuse?: No    Medical Problems:   Past Medical History:   Diagnosis Date    Bipolar 1 disorder ([facility]     Depression     Hypertension     Neck pain     Patient in clinical research study 04/12/2018    [id number]       Status EXAM:  Status and Exam  Normal: No  Facial Expression: Flat  Affect: Appropriate  Level of Consciousness: Alert  Mood:Normal: No  Mood: Depressed, Anxious, Helpless  Motor Activity:Normal: No  Motor Activity: Decreased, Unusual Posture/Gait  Interview Behavior: Cooperative  Preception: [place] to Person, [place] to Time, [place] to Place, [place] to Situation  Attention:Normal: Yes  Thought Content:Normal: No  Thought Content: Other(See Comment)(denies)  Hallucinations: None  Delusions: No  Memory:Normal: No  Memory: Poor Recent, Poor Remote  Insight and Judgment: No  Insight and Judgment: Poor Judgment, Poor Insight, Unmotivated  Present Suicidal Ideation: Yes  Present Homicidal Ideation: No    Tobacco Screening:  Practical Counseling, on program expectations and copy of patient rights given. Received admission packet:  yes. Consents reviewed, signed yes. Patient verbalize understanding:  yes. Patient education on precautions: yes    Patient admitted to [facility]  unit room 208 bed 1 at 1800. Patient changed out into hospital attire and scanned with metal detector. food and beverage was provided to patient,  Physician aware of suicidal ideation and patient juana for safety. Patient suicide precautions discontinued due to patient willing to seek out staff if feelings of self harm were to continue. Every 15 minute checks and random spontaneous checks/roundings to continue for patient safety. Patient currently in dayroom,  watching television.     Carlis Sandifer, RN

## 2020-02-18 LAB
INR BLD: 1.6
PROTHROMBIN TIME: 18.8 SEC (ref 11.8–14.6)

## 2020-02-18 PROCEDURE — 85610 PROTHROMBIN TIME: CPT

## 2020-02-18 PROCEDURE — 6370000000 HC RX 637 (ALT 250 FOR IP): Performed by: NURSE PRACTITIONER

## 2020-02-18 PROCEDURE — 99232 SBSQ HOSP IP/OBS MODERATE 35: CPT | Performed by: REGISTERED NURSE

## 2020-02-18 PROCEDURE — 5130000000 HC BRIDGE APPOINTMENT

## 2020-02-18 PROCEDURE — 99222 1ST HOSP IP/OBS MODERATE 55: CPT | Performed by: INTERNAL MEDICINE

## 2020-02-18 PROCEDURE — 1240000000 HC EMOTIONAL WELLNESS R&B

## 2020-02-18 PROCEDURE — 6370000000 HC RX 637 (ALT 250 FOR IP): Performed by: REGISTERED NURSE

## 2020-02-18 PROCEDURE — 6370000000 HC RX 637 (ALT 250 FOR IP): Performed by: INTERNAL MEDICINE

## 2020-02-18 PROCEDURE — 36415 COLL VENOUS BLD VENIPUNCTURE: CPT

## 2020-02-18 RX ORDER — CEPHALEXIN 500 MG/1
500 CAPSULE ORAL EVERY 8 HOURS SCHEDULED
Status: DISCONTINUED | OUTPATIENT
Start: 2020-02-18 | End: 2020-02-19 | Stop reason: HOSPADM

## 2020-02-18 RX ORDER — DIVALPROEX SODIUM 500 MG/1
500 TABLET, EXTENDED RELEASE ORAL NIGHTLY
Qty: 14 TABLET | Refills: 0 | Status: ON HOLD | OUTPATIENT
Start: 2020-02-18 | End: 2020-07-01

## 2020-02-18 RX ORDER — WARFARIN SODIUM 3 MG/1
3 TABLET ORAL
Status: COMPLETED | OUTPATIENT
Start: 2020-02-18 | End: 2020-02-18

## 2020-02-18 RX ORDER — CEPHALEXIN 500 MG/1
500 CAPSULE ORAL 3 TIMES DAILY
Qty: 30 CAPSULE | Refills: 0 | Status: ON HOLD | OUTPATIENT
Start: 2020-02-18 | End: 2020-07-01 | Stop reason: ALTCHOICE

## 2020-02-18 RX ADMIN — CALCIUM POLYCARBOPHIL 625 MG: 625 TABLET, FILM COATED ORAL at 09:35

## 2020-02-18 RX ADMIN — GABAPENTIN 300 MG: 300 CAPSULE ORAL at 14:23

## 2020-02-18 RX ADMIN — DIVALPROEX SODIUM 500 MG: 500 TABLET, EXTENDED RELEASE ORAL at 20:53

## 2020-02-18 RX ADMIN — CETIRIZINE HYDROCHLORIDE 10 MG: 10 TABLET, FILM COATED ORAL at 09:36

## 2020-02-18 RX ADMIN — ATORVASTATIN CALCIUM 20 MG: 20 TABLET, FILM COATED ORAL at 09:36

## 2020-02-18 RX ADMIN — FUROSEMIDE 20 MG: 20 TABLET ORAL at 09:36

## 2020-02-18 RX ADMIN — CEPHALEXIN 500 MG: 500 CAPSULE ORAL at 17:14

## 2020-02-18 RX ADMIN — FAMOTIDINE 40 MG: 20 TABLET ORAL at 09:36

## 2020-02-18 RX ADMIN — BENZTROPINE MESYLATE 0.5 MG: 0.5 TABLET ORAL at 09:36

## 2020-02-18 RX ADMIN — ASPIRIN 81 MG: 81 TABLET, COATED ORAL at 09:35

## 2020-02-18 RX ADMIN — WARFARIN SODIUM 3 MG: 3 TABLET ORAL at 17:14

## 2020-02-18 RX ADMIN — CALCIUM POLYCARBOPHIL 625 MG: 625 TABLET, FILM COATED ORAL at 20:53

## 2020-02-18 RX ADMIN — FOLIC ACID 1 MG: 1 TABLET ORAL at 09:36

## 2020-02-18 RX ADMIN — GABAPENTIN 300 MG: 300 CAPSULE ORAL at 20:53

## 2020-02-18 RX ADMIN — FAMOTIDINE 40 MG: 20 TABLET ORAL at 20:53

## 2020-02-18 RX ADMIN — CALCIUM POLYCARBOPHIL 625 MG: 625 TABLET, FILM COATED ORAL at 14:23

## 2020-02-18 RX ADMIN — GABAPENTIN 300 MG: 300 CAPSULE ORAL at 09:35

## 2020-02-18 RX ADMIN — Medication 400 MG: at 20:53

## 2020-02-18 RX ADMIN — MULTIPLE VITAMINS W/ MINERALS TAB 1 TABLET: TAB at 09:36

## 2020-02-18 RX ADMIN — BENZTROPINE MESYLATE 0.5 MG: 0.5 TABLET ORAL at 20:53

## 2020-02-18 RX ADMIN — VITAMIN D, TAB 1000IU (100/BT) 1000 UNITS: 25 TAB at 09:35

## 2020-02-18 RX ADMIN — TAMSULOSIN HYDROCHLORIDE 0.4 MG: 0.4 CAPSULE ORAL at 09:36

## 2020-02-18 RX ADMIN — CEPHALEXIN 500 MG: 500 CAPSULE ORAL at 20:53

## 2020-02-18 NOTE — GROUP NOTE
Group Therapy Note    Date: 2/18/2020    Group Start Time: 1000  Group End Time: 1746  Group Topic: Psychotherapy    [facility] Geiger        Group Therapy Note    Attendees: 10/17    Patient's Goal:  Increase interpersonal relationships and express emotions adequately     Notes:  Patient shared experiences and offered support and insight     Status After Intervention: Unchanged     Participation Level:  Active Listener and Interactive     Participation Quality: Appropriate, Attentive and Sharing     Speech:  Normal     Thought Process/Content: Logical     Affective Functioning: Congruent     Mood: Euthymic     Level of consciousness:  Alert, Oriented x4 and Attentive     Response to Learning: Able to verbalize current knowledge/experience, Able to verbalize/acknowledge new learning, Able to retain information and Capable of insight     Endings: None Reported     Modes of Intervention: Education, Socialization and Exploration     Discipline Responsible: /Counselor     Signature:  LUIS Ramos

## 2020-02-18 NOTE — PROGRESS NOTES
Pharmacy Note  Warfarin Consult follow-up      Recent Labs     02/16/20  0602 02/17/20  0815 02/18/20  0626   INR 2.8 1.7 1.6     Recent Labs     02/17/20  0647   HGB 10.3*   HCT 30.5*          Significant Drug-Drug Interactions:  New warfarin drug-drug interactions: None  Discontinued drug-drug interactions: None  Current warfarin drug-drug interactions: Aspirin, Acetaminophen, Atorvastatin      Date             INR        Dose given previous day  Dose scheduled for today  2/18/2020        1.6       3 mg        3 mg      Notes: Will give 3 mg again today. Daily PT/INR while inpatient. Thank you for the consult. Will continue to follow.   Juan R Aldana PharmD, BCPS  2/18/2020 8:03 AM

## 2020-02-18 NOTE — CARE COORDINATION
Bridge Appointment completed: Reviewed Discharge Instructions with patient. Patient verbalizes understanding and agreement with the discharge plan using the teachback method. Discharge Arrangements: Pt is scheduled to follow up with Dr. Christelle Ware at [address] [place] [address] on 2/25/2020 for medication management.     Guardian notified: N/A  Discharge destination/address: Home; [address] [place] [address] , [facility], [address]  Transported byTonya December cab

## 2020-02-18 NOTE — DISCHARGE SUMMARY
patient has not received any as needed medications in the past 24 hours.     Patient is seen up in the day room today. He states he continues to feel suicidal and depressed. He reports that he started having SI after he used crack cocaine. He has a history of bipolar disorder. He has had hospital admissions with brielle and with depression. He reports feeling hopeless, helpless and worthless. He reports anxiety and feeling very anxious fidgety and racing heart. He reports his mind races. Hospital Course:   Upon admission, Rehana Henry was provided a safe secure environment, introduced to unit milieu. Patient participated in groups and individual therapies. Meds were adjusted. After few days of hospital care, patient began to feel improvement. Depression lifted, thoughts to harm self ceased. Sleep improved, appetite was good. On morning rounds 2/18/2020, patient endorsed feeling ready for discharge. Patient denies suicidal or homicidal ideations, denies hallucinations or delusions. Denies SE's from meds. It was decided that pt had achieved maximum benefit from hospital care and can be discharged     Consults:   Internal medicine    Significant Diagnostic Studies: Routine labs and diagnostics    Treatments: Psychotropic medications, therapy with group, milieu, and 1:1 with nurses, social workers, PA-C/CNP, and Attending physician.       Discharge Medications:  Current Discharge Medication List      CONTINUE these medications which have CHANGED    Details   divalproex (DEPAKOTE ER) 500 MG extended release tablet Take 1 tablet by mouth nightly  Qty: 14 tablet, Refills: 0         CONTINUE these medications which have NOT CHANGED    Details   polyethylene glycol (GLYCOLAX) packet Take 17 g by mouth daily as needed for Constipation      folic acid (FOLVITE) 1 MG tablet Take 1 mg by mouth daily      Multiple Vitamin TABS Take 1 tablet by mouth daily      midodrine (PROAMATINE) 5 MG tablet Take 10 mg by mouth 3 times daily      polycarbophil (FIBERCON) 625 MG tablet Take 625 mg by mouth 3 times daily      furosemide (LASIX) 20 MG tablet Take 20 mg by mouth daily      vitamin D (ERGOCALCIFEROL) 1.25 MG (60865 UT) CAPS capsule Take 50,000 Units by mouth once a week Monday      doxepin (SINEQUAN) 10 MG capsule Take 6 mg by mouth nightly      lidocaine (LIDODERM) 5 % Place 1 patch onto the skin daily 12 hours on, 12 hours off.      hydrOXYzine (ATARAX) 25 MG tablet Take 1 tablet by mouth 3 times daily as needed for Itching  Qty: 30 tablet, Refills: 0      melatonin 1 MG tablet Take 3 tablets by mouth nightly as needed for Sleep  Qty: 30 tablet, Refills: 0      gabapentin (NEURONTIN) 300 MG capsule Take 1 capsule by mouth 3 times daily for 30 days. Qty: 90 capsule, Refills: 0      benztropine (COGENTIN) 0.5 MG tablet Take 1 tablet by mouth 2 times daily  Qty: 60 tablet, Refills: 0      tamsulosin (FLOMAX) 0.4 MG capsule Take 0.4 mg by mouth daily      cetirizine (ZYRTEC) 10 MG tablet Take 10 mg by mouth daily      atorvastatin (LIPITOR) 20 MG tablet Take 20 mg by mouth daily      !! warfarin (COUMADIN) 2 MG tablet Take 0.5 tablets by mouth four times a week Indications: takes 1 mg tabs tuesday and saturday, takes 2mg tabs mon, wed thurs fri sun  Qty: 30 tablet, Refills: 3      !! warfarin (COUMADIN) 1 MG tablet Take 1 tablet by mouth every evening Take 1mg by mouth on Mondays. Take 2mg on all other days Tuesday, Wednesday,Thursday,Friday,Saturday, Sunday. Qty: 30 tablet, Refills: 3      Cyanocobalamin (VITAMIN B 12 PO) Take 1,000 mcg by mouth daily      MAGNESIUM OXIDE 400 PO Take 1 tablet by mouth nightly      alendronate (FOSAMAX) 70 MG tablet Take 70 mg by mouth every 7 days      aspirin 81 MG tablet Take 81 mg by mouth daily      ranitidine (ZANTAC) 150 MG tablet Take 150 mg by mouth 2 times daily      vitamin D (CHOLECALCIFEROL) 1000 UNIT TABS tablet Take 1,000 Units by mouth daily       ! ! - Potential duplicate medications

## 2020-02-18 NOTE — BH NOTE
Consult:   Dr Christianne Lopez in to see patient. Instructed to give patient an ice pack for knee swelling. Patient refused ice pack. Knee is red and swollen with three scabbed abrasion; area is hot to touch. Also most of right bicept is bruised, possibly from a blood pressure cuff. Will ask Doctor to reevaluate tomorrow.  Tomas Kimbrough RN

## 2020-02-18 NOTE — GROUP NOTE
Group Therapy Note    Date: 2/18/2020    Group Start Time: 1100  Group End Time: 1130  Group Topic: Psychoeducation    [facility]IA [facility]    Patient's Goal:  To increase interpersonal interaction. Notes:  Pt attended and participated in group. Status After Intervention:  Improved    Participation Level:  Active Listener and Interactive    Participation Quality: Appropriate and Attentive      Speech:  normal      Thought Process/Content: Logical      Affective Functioning: Congruent      Mood: euthymic      Level of consciousness:  Alert and Attentive      Response to Learning: Able to verbalize current knowledge/experience and Progressing to goal      Endings: None Reported    Modes of Intervention: Socialization, Problem-solving, Media and Reality-testing      Discipline Responsible: Psychoeducational Specialist      Signature:  David Rinaldi

## 2020-02-18 NOTE — PLAN OF CARE
Problem: Falls - Risk of:  Goal: Will remain free from falls  Description  Will remain free from falls  2/17/2020 2215 by Ward Lesches, RN  Outcome: Ongoing  Note:   Pt remains free of falls and verbalizes understanding of individual fall risks. Pt wearing non skid footwear and encouraged to seek out staff for any assistance needed.         Problem: Altered Mood, Depressive Behavior:  Goal: Able to verbalize and/or display a decrease in depressive symptoms  Description  Able to verbalize and/or display a decrease in depressive symptoms  2/17/2020 2215 by Ward Lesches, RN  Outcome: Ongoing  Note:   Patient is very thought blocking, he denies suicidal/homicidal ideations, denies hallucinations, patient out watching television during early part of the shift, he is evasive of peers but will interact with them when they interact with him, patient has little insight into his care, when discussing his night time medications patient had limited awareness of what he was taking and he said the time is usually \"when someone brings them to me\", cooperative and pleasant when interacted with and compliant with all evening care, currently medication compliant and behaviorally controlled

## 2020-02-18 NOTE — PLAN OF CARE
No  Memory:Normal: No  Memory: Poor Recent, Poor Remote  Insight and Judgment: No  Insight and Judgment: Poor Judgment, Poor Insight  Present Suicidal Ideation: No  Present Homicidal Ideation: No    Daily Assessment Last Entry:   Daily Sleep (WDL): Within Defined Limits         Patient Currently in Pain: Yes  Daily Nutrition (WDL): Within Defined Limits    Patient Monitoring:  Frequency of Checks: 4 times per hour, close    Psychiatric Symptoms:   Depression Symptoms  Depression Symptoms: Impaired concentration  Anxiety Symptoms  Anxiety Symptoms: Generalized  Mirian Symptoms  Mirian Symptoms: No problems reported or observed. Psychosis Symptoms  Delusion Type: No problems reported or observed. Suicide Risk CSSR-S:  1) Within the past month, have you wished you were dead or wished you could go to sleep and not wake up? : Yes  2) Have you actually had any thoughts of killing yourself? : Yes  3) Have you been thinking about how you might kill yourself? : Yes  5) Have you started to work out or worked out the details of how to kill yourself? Do you intend to carry out this plan? : No  6) Have you ever done anything, started to do anything, or prepared to do anything to end your life?: Yes  Change in Result no Change in Plan of care no      EDUCATION:   EDUCATION:   Learner Progress Toward Treatment Goals: Reviewed results and recommendations of this team, Reviewed group plan and strategies, Reviewed signs, symptoms and risk of self harm and violent behavior, Reviewed goals and plan of care    Method:small group, individual verbal education    Outcome:verbalized by patient, but needs reinforcement to obtain goals    PATIENT GOALS:  Short term: To get better and clear his thoughts   Long term: To work on sobriety and to follow up with [facility]    PLAN/TREATMENT RECOMMENDATIONS UPDATE: continue with group therapies, increased socialization, continue planning for after discharge goals, continue with medication

## 2020-02-19 VITALS
BODY MASS INDEX: 33.11 KG/M2 | SYSTOLIC BLOOD PRESSURE: 125 MMHG | WEIGHT: 206 LBS | TEMPERATURE: 98.4 F | RESPIRATION RATE: 14 BRPM | OXYGEN SATURATION: 97 % | HEIGHT: 66 IN | HEART RATE: 80 BPM | DIASTOLIC BLOOD PRESSURE: 77 MMHG

## 2020-02-19 LAB
INR BLD: 1.5
PROTHROMBIN TIME: 18.3 SEC (ref 11.8–14.6)

## 2020-02-19 PROCEDURE — 6370000000 HC RX 637 (ALT 250 FOR IP): Performed by: INTERNAL MEDICINE

## 2020-02-19 PROCEDURE — 36415 COLL VENOUS BLD VENIPUNCTURE: CPT

## 2020-02-19 PROCEDURE — 99239 HOSP IP/OBS DSCHRG MGMT >30: CPT | Performed by: REGISTERED NURSE

## 2020-02-19 PROCEDURE — 85610 PROTHROMBIN TIME: CPT

## 2020-02-19 PROCEDURE — 6370000000 HC RX 637 (ALT 250 FOR IP): Performed by: NURSE PRACTITIONER

## 2020-02-19 PROCEDURE — 6370000000 HC RX 637 (ALT 250 FOR IP): Performed by: REGISTERED NURSE

## 2020-02-19 RX ORDER — WARFARIN SODIUM 2 MG/1
4 TABLET ORAL
Status: DISCONTINUED | OUTPATIENT
Start: 2020-02-19 | End: 2020-02-19 | Stop reason: HOSPADM

## 2020-02-19 RX ORDER — WARFARIN SODIUM 2 MG/1
TABLET ORAL
Qty: 2 TABLET | Refills: 0 | Status: ON HOLD | OUTPATIENT
Start: 2020-02-19 | End: 2020-07-01 | Stop reason: SDUPTHER

## 2020-02-19 RX ADMIN — ATORVASTATIN CALCIUM 20 MG: 20 TABLET, FILM COATED ORAL at 08:47

## 2020-02-19 RX ADMIN — ASPIRIN 81 MG: 81 TABLET, COATED ORAL at 08:47

## 2020-02-19 RX ADMIN — VITAMIN D, TAB 1000IU (100/BT) 1000 UNITS: 25 TAB at 08:47

## 2020-02-19 RX ADMIN — CALCIUM POLYCARBOPHIL 625 MG: 625 TABLET, FILM COATED ORAL at 08:48

## 2020-02-19 RX ADMIN — MULTIPLE VITAMINS W/ MINERALS TAB 1 TABLET: TAB at 08:47

## 2020-02-19 RX ADMIN — FOLIC ACID 1 MG: 1 TABLET ORAL at 08:47

## 2020-02-19 RX ADMIN — FAMOTIDINE 40 MG: 20 TABLET ORAL at 08:48

## 2020-02-19 RX ADMIN — BENZTROPINE MESYLATE 0.5 MG: 0.5 TABLET ORAL at 08:55

## 2020-02-19 RX ADMIN — CETIRIZINE HYDROCHLORIDE 10 MG: 10 TABLET, FILM COATED ORAL at 08:47

## 2020-02-19 RX ADMIN — TAMSULOSIN HYDROCHLORIDE 0.4 MG: 0.4 CAPSULE ORAL at 08:48

## 2020-02-19 RX ADMIN — CEPHALEXIN 500 MG: 500 CAPSULE ORAL at 06:21

## 2020-02-19 RX ADMIN — FUROSEMIDE 20 MG: 20 TABLET ORAL at 08:47

## 2020-02-19 RX ADMIN — GABAPENTIN 300 MG: 300 CAPSULE ORAL at 08:47

## 2020-02-19 ASSESSMENT — PAIN SCALES - GENERAL: PAINLEVEL_OUTOF10: 6

## 2020-02-19 ASSESSMENT — PAIN DESCRIPTION - DESCRIPTORS: DESCRIPTORS: ACHING

## 2020-02-19 ASSESSMENT — PAIN DESCRIPTION - ORIENTATION: ORIENTATION: RIGHT

## 2020-02-19 ASSESSMENT — PAIN DESCRIPTION - LOCATION: LOCATION: KNEE

## 2020-02-19 NOTE — BH NOTE
08/02/2018    HDL 29 (L) 05/22/2018    HDL 30 (L) 02/20/2018    HDL 27 (L) 06/27/2016    HDL 36 (L) 02/21/2013     No components found for: LDLCAL  No results found for: LABVLDL    Patient ambulated to discharge doors and left via [employer] cab for private apartment with all belongings.      Jennifer Choi RN

## 2020-02-19 NOTE — GROUP NOTE
Group Therapy Note    Date: 2/19/2020    Group Start Time: 0845  Group End Time: 0900  Group Topic: [address]        Group Therapy Note    Attendees: 8/16         Patient's Goal:  To increase interpersonal interaction     Notes:      Status After Intervention:  Improved    Participation Level:  Active Listener and Interactive    Participation Quality: Appropriate, Attentive and Sharing      Speech:  normal      Thought Process/Content: Logical  Linear      Affective Functioning: Congruent      Mood: euthymic      Level of consciousness:  Alert, Oriented x4 and Attentive      Response to Learning: Able to verbalize current knowledge/experience, Able to verbalize/acknowledge new learning, Able to retain information and Progressing to goal      Endings: None Reported    Modes of Intervention: Education, Support, Socialization, Exploration, Clarifying and Problem-solving      Discipline Responsible: Psychoeducational Specialist      Signature:  [place]

## 2020-02-19 NOTE — PLAN OF CARE
Problem: Altered Mood, Depressive Behavior:  Goal: Able to verbalize and/or display a decrease in depressive symptoms  Description  Able to verbalize and/or display a decrease in depressive symptoms  2/18/2020 2026 by Ana Maria Huynh RN  Outcome: Ongoing  Note:   Patient denies any thoughts to harm self or others, denies any hallucinations. States he is eating well and sleeping well. States he was suppose to be discharged today but had a hiccup with it. States he is ready to be discharged tomorrow. Social in the dayroom.

## 2020-02-19 NOTE — DISCHARGE INSTR - OTHER ORDERS
Please keep all follow up appointments. Take all medications as prescribed. Avoid alcohol and street drugs.

## 2020-02-19 NOTE — PROGRESS NOTES
Department of Psychiatry  Attending Progress Note  Chief Complaint: Bipolar 1 disorder [facility])     SUBJECTIVE: This is a 50-year-old male being seen for follow-up. He is seen in treatment meeting. He initially was to be discharged today but Coumadin orders were not clear and the pharmacy advised that the patient had not filled coumadin since end of December. [facility] to learn appropriate dosing and the clinic is closed now. Will plan for discharge in the am.   OBJECTIVE    Physical  /77   Pulse 80   Temp 98.4 °F (36.9 °C) (Oral)   Resp 14   Ht 5' 6\" (1.676 m)   Wt 206 lb (93.4 kg)   SpO2 97%   BMI 33.25 kg/m²      Mental Status Evaluation:  Orientation: alertness: alert   Mood:. dysthymic      Affect:  normal and mood-congruent      Appearance:  age appropriate and casually dressed   Activity:  Within Normal Limits   Gait/Posture: Normal   Speech:   Normal in rate rhythm tone and volume. Thought Process:  goal directed   Thought Content:   No auditory visual hallucinations. No notes of delusions or paranoia.    Sensorium:  person, place, time/date and situation   Cognition:  slowed   Memory: impaired recent memory   Insight:  fair   Judgment: fair   Suicidal Ideations: denies   Homicidal Ideations: Negative for homicidal ideation      Medication Side Effects: absent       Attention Span attention span appeared shorter than expected for age       Labs  Recent Results (from the past 67 hour(s))   Comprehensive Metabolic Panel w/ Reflex to MG    Collection Time: 02/17/20  6:47 AM   Result Value Ref Range    Glucose 97 70 - 99 mg/dL    BUN 15 8 - 23 mg/dL    CREATININE 1.00 0.70 - 1.20 mg/dL    Bun/Cre Ratio NOT REPORTED 9 - 20    Calcium 8.5 (L) 8.6 - 10.4 mg/dL    Sodium 141 135 - 144 mmol/L    Potassium 4.3 3.7 - 5.3 mmol/L    Chloride 109 (H) 98 - 107 mmol/L    CO2 21 20 - 31 mmol/L    Anion Gap 11 9 - 17 mmol/L    Alkaline Phosphatase 112 40 - 129 U/L    ALT 14 5 (NICORETTE) gum 2 mg  2 mg Oral Q2H PRN Colene Jan, APRN - CNP        aspirin EC tablet 81 mg  81 mg Oral Daily Colene Jan, APRN - CNP   81 mg at 02/19/20 0847    atorvastatin (LIPITOR) tablet 20 mg  20 mg Oral Daily Colene Jan, APRN - CNP   20 mg at 02/19/20 0847    benztropine (COGENTIN) tablet 0.5 mg  0.5 mg Oral BID Colene Jan, APRN - CNP   0.5 mg at 02/19/20 0855    cetirizine (ZYRTEC) tablet 10 mg  10 mg Oral Daily Colene Jan, APRN - CNP   10 mg at 02/19/20 0847    divalproex (DEPAKOTE ER) extended release tablet 500 mg  500 mg Oral Nightly Frances Jan, APRN - CNP   500 mg at 02/18/20 2053    gabapentin (NEURONTIN) capsule 300 mg  300 mg Oral TID Frances Jan, APRN - CNP   300 mg at 02/19/20 6002    hydrOXYzine (ATARAX) tablet 25 mg  25 mg Oral TID PRN Colene Jan, APRN - CNP        magnesium oxide (MAG-OX) tablet 400 mg  400 mg Oral Nightly Colene Jan, APRN - CNP   400 mg at 02/18/20 2053    famotidine (PEPCID) tablet 40 mg  40 mg Oral BID Colene Jan, APRN - CNP   40 mg at 02/19/20 0848    vitamin D (CHOLECALCIFEROL) tablet 1,000 Units  1,000 Units Oral Daily Colene Jan, APRN - CNP   1,000 Units at 02/19/20 0847    tamsulosin (FLOMAX) capsule 0.4 mg  0.4 mg Oral Daily Colene Jan, APRN - CNP   0.4 mg at 02/19/20 0848    melatonin ER tablet 1 mg  1 mg Oral Nightly PRN Frances Jan, APRN - CNP   1 mg at 02/16/20 2142    warfarin (COUMADIN) daily dosing (placeholder)   Other RX Placeholder Frances Jan, APRN - CNP             warfarin  4 mg Oral Once    cephALEXin  500 mg Oral 3 times per day    folic acid  1 mg Oral Daily    furosemide  20 mg Oral Daily    therapeutic multivitamin-minerals  1 tablet Oral Daily    polycarbophil  625 mg Oral TID    nicotine  1 patch Transdermal Daily    aspirin  81 mg Oral Daily    atorvastatin  20 mg Oral Daily    benztropine  0.5 mg Oral BID    cetirizine  10 mg Oral Daily    divalproex  500 mg Oral Nightly  gabapentin  300 mg Oral TID    magnesium oxide  400 mg Oral Nightly    famotidine  40 mg Oral BID    Vitamin D  1,000 Units Oral Daily    tamsulosin  0.4 mg Oral Daily    warfarin (COUMADIN) daily dosing (placeholder)   Other RX Placeholder       ASSESSMENT  Bipolar 1 disorder (HCC)     Patient's Response to Treatment: positive    PLAN  · Continue inpatient psychiatric treatment  · Supportive therapy with medication management. Reviewed risks and benefits as well as potential side effects with patient. · Therapeutic activities and groups  · Follow up at [facility] after symptoms stabilized  · Continue current medications. Pharmacy to dose coumadin. · Discharge 2/19/2020 after pharmacy discusses proper coumadin dose with [facility] coumadin clinic. Electronically signed by LOGAN Garcia on 2/19/2020 at 1545 [employer] voice recognition software used in portions of this document.

## 2020-02-19 NOTE — PLAN OF CARE
Problem: Falls - Risk of:  Goal: Will remain free from falls  Description  Will remain free from falls  Outcome: Met This Shift  Note:   Pt remains free of falls and verbalizes understanding of individual fall risks. Pt wearing non skid footwear and encouraged to seek out staff for any assistance needed. Problem: Falls - Risk of:  Goal: Absence of physical injury  Description  Absence of physical injury  Outcome: Met This Shift  Note:   No falls or injuries noted. Problem: Altered Mood, Depressive Behavior:  Goal: Able to verbalize and/or display a decrease in depressive symptoms  Description  Able to verbalize and/or display a decrease in depressive symptoms  Outcome: Met This Shift  Note:   Patient denies suicidal or homicidal ideations. Denies hallucinations. Patient voices readiness for discharge. Mood is stable. Patient is compliant with medications and group therapy. Voices he feels better. Continue to provide support and reassurance in a therapeutic milieu. Problem: Altered Mood, Depressive Behavior:  Goal: Ability to disclose and discuss suicidal ideas will improve  Description  Ability to disclose and discuss suicidal ideas will improve  Outcome: Met This Shift  Note:   Patient denies suicidal ideations at this time. Problem: Altered Mood, Depressive Behavior:  Goal: Able to verbalize support systems  Description  Able to verbalize support systems  Outcome: Met This Shift  Note:   Patient voices [facility] as his main support. Problem: Bleeding:  Goal: Will show no signs and symptoms of excessive bleeding  Description  Will show no signs and symptoms of excessive bleeding  Outcome: Met This Shift  Note:   No bleeding noted. Bleeding precautions maintained. Problem: Pain:  Goal: Pain level will decrease  Description  Pain level will decrease  Outcome: Met This Shift  Note:   No voiced complaints of pain.

## 2020-02-19 NOTE — DISCHARGE SUMMARY
DISCHARGE SUMMARY  Patient ID:  Casa Bowman  [id number]  10 y.o.  1960    Admit date: 2/16/2020    Discharge date and time: 2/19/2020  11:57 AM     Admitting Physician: Jose Armando Mcguire MD     Discharge Physician:  Karley Echeverria APRN - CNS    Admission Diagnoses: Major depression [F32.9]  Major depression, recurrent ([address] [F33.9]  Major depressive disorder, recurrent ([address] [F33.9]    Discharge Diagnoses:   Bipolar 1 disorder ([address]     Patient Active Problem List   Diagnosis Code    Light headed R42    Recurrent major depression in partial remission ([facility] F33.41    HTN (hypertension) I10    Neck pain M54.2    GERD (gastroesophageal reflux disease) K21.9    Degenerative disc disease, cervical M50.30    Hyponatremia E87.1    Hypercalcemia E83.52    Aortic aneurysm (HCC) I71.9    MGUS (monoclonal gammopathy of unknown significance) D47.2    Chronic renal impairment, stage 3 (moderate) (HCC) N18.3    Depression F32.9    Crack cocaine use F14.90    Bipolar 1 disorder ([address] F31.9    Altered mental status, unspecified R41.82    Dehydration E86.0    YONATHAN (acute kidney injury) ([address] N17.9    Altered mental status R41.82    Supratherapeutic INR R79.1    Atrial fibrillation (HCC) I48.91    Hypernatremia E87.0    Anemia D64.9    Major depression, recurrent (HCC) F33.9    Major depressive disorder, recurrent ([address] F33.9        Admission Condition: poor    Discharged Condition: stable    Indication for Admission: threat to self    History of Present Illnes (present tense wording indicates findings from admission exam on 2/16/2020 and are not necessarily indicative of current findings):   Casa Bowman is a 60 y.o. male who was admitted from [address]. He was admitted medically for treatment of altered mental status. While inpatient he reported that he felt depression and had been lonely and confused. He reported SI with a plan to shoot himself. Documentation and charting has been reviewed.   The patient has not received any as needed medications in the past 24 hours.     Patient is seen up in the day room today. He states he continues to feel suicidal and depressed. He reports that he started having SI after he used crack cocaine. He has a history of bipolar disorder. He has had hospital admissions with brielle and with depression. He reports feeling hopeless, helpless and worthless. He reports anxiety and feeling very anxious fidgety and racing heart. He reports his mind races  Hospital Course:   Upon admission, Marko Borden was provided a safe secure environment, introduced to unit milieu. Patient participated in groups and individual therapies. Meds were adjusted. After few days of hospital care, patient began to feel improvement. Depression lifted, thoughts to harm self ceased. Sleep improved, appetite was good. On morning rounds 2/19/2020, patient endorsed feeling ready for discharge. Patient denies suicidal or homicidal ideations, denies hallucinations or delusions. Denies SE's from meds. It was decided that pt had achieved maximum benefit from hospital care and can be discharged     Consults:   Internal Medicine    Significant Diagnostic Studies: Routine labs and diagnostics    Treatments: Psychotropic medications, therapy with group, milieu, and 1:1 with nurses, social workers, PA-C/CNP, and Attending physician. Discharge Medications:  Current Discharge Medication List      START taking these medications    Details   cephALEXin (KEFLEX) 500 MG capsule Take 1 capsule by mouth 3 times daily  Qty: 30 capsule, Refills: 0         CONTINUE these medications which have CHANGED    Details   !! warfarin (COUMADIN) 2 MG tablet Take 4 mg tonight only. Qty: 2 tablet, Refills: 0      divalproex (DEPAKOTE ER) 500 MG extended release tablet Take 1 tablet by mouth nightly  Qty: 14 tablet, Refills: 0       !! - Potential duplicate medications found. Please discuss with provider.       CONTINUE these

## 2020-02-19 NOTE — BH NOTE
Patient given tobacco quitline number 42898649172 at this time, refusing to call at this time, states \" I just dont want to quit now\"- patient given information as to the dangers of long term tobacco use. Continue to reinforce the importance of tobacco cessation.

## 2020-02-19 NOTE — CARE COORDINATION
Bridge Appointment completed: Reviewed Discharge Instructions with patient.  Patient verbalizes understanding and agreement with the discharge plan using the teachback method.         Discharge Arrangements: Pt is scheduled to follow up with Dr. Penelope Allen at [facility] on 2/25/2020 for medication management.     Guardian notified: N/A  Discharge destination/address: Home; [address] [place] [address] Dr, [place], [address]  [facility]

## 2020-02-20 NOTE — CARE COORDINATION
Name: Jax Lopez    : 1960    Discharge Date: 20    Primary Auth/Cert #: [id number]    Discharged to home     Discharge Medications:      Medication List      START taking these medications    cephALEXin 500 MG capsule  Commonly known as:  KEFLEX  Take 1 capsule by mouth 3 times daily  Notes to patient:  Antibiotic         CHANGE how you take these medications    * warfarin 2 MG tablet  Commonly known as:  COUMADIN  Take 0.5 tablets by mouth four times a week Indications: takes 1 mg tabs tuesday and saturday, takes 2mg tabs mon,  fri sun  What changed:    · how much to take  · when to take this  · additional instructions  Notes to patient:  Prevent blood clots     * warfarin 2 MG tablet  Commonly known as:  COUMADIN  Take 4 mg tonight only. What changed: You were already taking a medication with the same name, and this prescription was added. Make sure you understand how and when to take each. Notes to patient:  Prevent blood clots         * This list has 2 medication(s) that are the same as other medications prescribed for you. Read the directions carefully, and ask your doctor or other care provider to review them with you. CONTINUE taking these medications    alendronate 70 MG tablet  Commonly known as:  FOSAMAX  Notes to patient: For bone density / osteoporosis     aspirin 81 MG tablet  Notes to patient:  For heart health     atorvastatin 20 MG tablet  Commonly known as:  LIPITOR  Notes to patient:  Lowers bad cholesterol     benztropine 0.5 MG tablet  Commonly known as:  COGENTIN  Take 1 tablet by mouth 2 times daily  Notes to patient:  For side effects caused by other medications. cetirizine 10 MG tablet  Commonly known as:  ZYRTEC  Notes to patient:  For allergy symptoms.       divalproex 500 MG extended release tablet  Commonly known as:  DEPAKOTE ER  Take 1 tablet by mouth nightly  Notes to patient:  Treat seizures / bipolar / migraines     doxepin 10 MG These medications were sent to [facility], [address] [place] [address]  [address] [address]    Phone:  [phone]   · divalproex 500 MG extended release tablet  · warfarin 2 MG tablet     You can get these medications from any pharmacy    Bring a paper prescription for each of these medications  · cephALEXin 500 MG capsule         Follow Up Appointment: Traci Tsang MD  [address]  [phone]      As needed    [facility] [address] [place] [address] E    Go on 2/25/2020  Dr. Kimmy Alexander at 2:40 pm for medication management.

## 2020-03-15 PROBLEM — E86.0 DEHYDRATION: Status: RESOLVED | Noted: 2020-02-14 | Resolved: 2020-03-15

## 2020-06-30 ENCOUNTER — HOSPITAL ENCOUNTER (INPATIENT)
Age: 60
LOS: 7 days | Discharge: HOME | DRG: 750 | End: 2020-07-07
Attending: PSYCHIATRY & NEUROLOGY | Admitting: PSYCHIATRY & NEUROLOGY
Payer: COMMERCIAL

## 2020-06-30 PROCEDURE — 1240000000 HC EMOTIONAL WELLNESS R&B

## 2020-07-01 LAB
-: NORMAL
ABSOLUTE BANDS #: 0.05 K/UL (ref 0–1)
ABSOLUTE EOS #: 0.05 K/UL (ref 0–0.4)
ABSOLUTE IMMATURE GRANULOCYTE: ABNORMAL K/UL (ref 0–0.3)
ABSOLUTE LYMPH #: 0.88 K/UL (ref 1–4.8)
ABSOLUTE MONO #: 0.68 K/UL (ref 0.1–1.3)
ALBUMIN SERPL-MCNC: 3.9 G/DL (ref 3.5–5.2)
ALBUMIN/GLOBULIN RATIO: ABNORMAL (ref 1–2.5)
ALP BLD-CCNC: 75 U/L (ref 40–129)
ALT SERPL-CCNC: 10 U/L (ref 5–41)
AMORPHOUS: NORMAL
AMPHETAMINE SCREEN URINE: NEGATIVE
ANION GAP SERPL CALCULATED.3IONS-SCNC: 11 MMOL/L (ref 9–17)
AST SERPL-CCNC: 13 U/L
BACTERIA: NORMAL
BANDS: 1 % (ref 0–10)
BARBITURATE SCREEN URINE: NEGATIVE
BASOPHILS # BLD: 0 % (ref 0–2)
BASOPHILS ABSOLUTE: 0 K/UL (ref 0–0.2)
BENZODIAZEPINE SCREEN, URINE: NEGATIVE
BILIRUB SERPL-MCNC: 0.45 MG/DL (ref 0.3–1.2)
BILIRUBIN URINE: ABNORMAL
BUN BLDV-MCNC: 20 MG/DL (ref 8–23)
BUN/CREAT BLD: ABNORMAL (ref 9–20)
BUPRENORPHINE URINE: ABNORMAL
CALCIUM SERPL-MCNC: 8.9 MG/DL (ref 8.6–10.4)
CANNABINOID SCREEN URINE: NEGATIVE
CASTS UA: NORMAL /LPF
CHLORIDE BLD-SCNC: 107 MMOL/L (ref 98–107)
CO2: 23 MMOL/L (ref 20–31)
COCAINE METABOLITE, URINE: POSITIVE
COLOR: YELLOW
COMMENT UA: ABNORMAL
CREAT SERPL-MCNC: 1.45 MG/DL (ref 0.7–1.2)
CRYSTALS, UA: NORMAL /HPF
DIFFERENTIAL TYPE: ABNORMAL
EOSINOPHILS RELATIVE PERCENT: 1 % (ref 0–4)
EPITHELIAL CELLS UA: NORMAL /HPF
GFR AFRICAN AMERICAN: >60 ML/MIN
GFR NON-AFRICAN AMERICAN: 50 ML/MIN
GFR SERPL CREATININE-BSD FRML MDRD: ABNORMAL ML/MIN/{1.73_M2}
GFR SERPL CREATININE-BSD FRML MDRD: ABNORMAL ML/MIN/{1.73_M2}
GLUCOSE BLD-MCNC: 83 MG/DL (ref 70–99)
GLUCOSE URINE: NEGATIVE
HCT VFR BLD CALC: 40.5 % (ref 41–53)
HEMOGLOBIN: 13.8 G/DL (ref 13.5–17.5)
IMMATURE GRANULOCYTES: ABNORMAL %
INR BLD: 2.6
KETONES, URINE: ABNORMAL
LEUKOCYTE ESTERASE, URINE: NEGATIVE
LYMPHOCYTES # BLD: 17 % (ref 24–44)
MCH RBC QN AUTO: 29.7 PG (ref 26–34)
MCHC RBC AUTO-ENTMCNC: 34 G/DL (ref 31–37)
MCV RBC AUTO: 87.5 FL (ref 80–100)
MDMA URINE: ABNORMAL
METHADONE SCREEN, URINE: NEGATIVE
METHAMPHETAMINE, URINE: ABNORMAL
MONOCYTES # BLD: 13 % (ref 1–7)
MORPHOLOGY: ABNORMAL
MUCUS: NORMAL
NITRITE, URINE: NEGATIVE
NRBC AUTOMATED: ABNORMAL PER 100 WBC
OPIATES, URINE: NEGATIVE
OTHER OBSERVATIONS UA: NORMAL
OXYCODONE SCREEN URINE: NEGATIVE
PDW BLD-RTO: 16.8 % (ref 11.5–14.9)
PH UA: 5.5 (ref 5–8)
PHENCYCLIDINE, URINE: NEGATIVE
PLATELET # BLD: 138 K/UL (ref 150–450)
PLATELET ESTIMATE: ABNORMAL
PMV BLD AUTO: 7.4 FL (ref 6–12)
POTASSIUM SERPL-SCNC: 4.6 MMOL/L (ref 3.7–5.3)
PROPOXYPHENE, URINE: ABNORMAL
PROTEIN UA: NEGATIVE
PROTHROMBIN TIME: 27.9 SEC (ref 11.8–14.6)
RBC # BLD: 4.63 M/UL (ref 4.5–5.9)
RBC # BLD: ABNORMAL 10*6/UL
RBC UA: NORMAL /HPF
RENAL EPITHELIAL, UA: NORMAL /HPF
SEG NEUTROPHILS: 68 % (ref 36–66)
SEGMENTED NEUTROPHILS ABSOLUTE COUNT: 3.54 K/UL (ref 1.3–9.1)
SODIUM BLD-SCNC: 141 MMOL/L (ref 135–144)
SPECIFIC GRAVITY UA: 1.02 (ref 1–1.03)
TEST INFORMATION: ABNORMAL
TOTAL PROTEIN: 6.3 G/DL (ref 6.4–8.3)
TRICHOMONAS: NORMAL
TRICYCLIC ANTIDEPRESSANTS, UR: ABNORMAL
TSH SERPL DL<=0.05 MIU/L-ACNC: 1.33 MIU/L (ref 0.3–5)
TURBIDITY: CLEAR
URINE HGB: ABNORMAL
UROBILINOGEN, URINE: NORMAL
WBC # BLD: 5.2 K/UL (ref 3.5–11)
WBC # BLD: ABNORMAL 10*3/UL
WBC UA: NORMAL /HPF
YEAST: NORMAL

## 2020-07-01 PROCEDURE — 93005 ELECTROCARDIOGRAM TRACING: CPT | Performed by: PSYCHIATRY & NEUROLOGY

## 2020-07-01 PROCEDURE — 80307 DRUG TEST PRSMV CHEM ANLYZR: CPT

## 2020-07-01 PROCEDURE — 85025 COMPLETE CBC W/AUTO DIFF WBC: CPT

## 2020-07-01 PROCEDURE — 99223 1ST HOSP IP/OBS HIGH 75: CPT | Performed by: PSYCHIATRY & NEUROLOGY

## 2020-07-01 PROCEDURE — 6370000000 HC RX 637 (ALT 250 FOR IP): Performed by: PSYCHIATRY & NEUROLOGY

## 2020-07-01 PROCEDURE — 36415 COLL VENOUS BLD VENIPUNCTURE: CPT

## 2020-07-01 PROCEDURE — 80053 COMPREHEN METABOLIC PANEL: CPT

## 2020-07-01 PROCEDURE — 1240000000 HC EMOTIONAL WELLNESS R&B

## 2020-07-01 PROCEDURE — 85610 PROTHROMBIN TIME: CPT

## 2020-07-01 PROCEDURE — 81001 URINALYSIS AUTO W/SCOPE: CPT

## 2020-07-01 PROCEDURE — 84443 ASSAY THYROID STIM HORMONE: CPT

## 2020-07-01 RX ORDER — ATORVASTATIN CALCIUM 20 MG/1
20 TABLET, FILM COATED ORAL NIGHTLY
Status: DISCONTINUED | OUTPATIENT
Start: 2020-07-01 | End: 2020-07-07 | Stop reason: HOSPADM

## 2020-07-01 RX ORDER — WARFARIN SODIUM 2 MG/1
2 TABLET ORAL
Status: COMPLETED | OUTPATIENT
Start: 2020-07-01 | End: 2020-07-01

## 2020-07-01 RX ORDER — SENNA PLUS 8.6 MG/1
1 TABLET ORAL 2 TIMES DAILY
Status: DISCONTINUED | OUTPATIENT
Start: 2020-07-01 | End: 2020-07-07 | Stop reason: HOSPADM

## 2020-07-01 RX ORDER — SENNA PLUS 8.6 MG/1
2 TABLET ORAL DAILY
Status: ON HOLD | COMMUNITY
End: 2022-06-13 | Stop reason: ALTCHOICE

## 2020-07-01 RX ORDER — CETIRIZINE HYDROCHLORIDE 10 MG/1
10 TABLET ORAL DAILY
Status: DISCONTINUED | OUTPATIENT
Start: 2020-07-01 | End: 2020-07-07 | Stop reason: HOSPADM

## 2020-07-01 RX ORDER — M-VIT,TX,IRON,MINS/CALC/FOLIC 27MG-0.4MG
1 TABLET ORAL DAILY
Status: DISCONTINUED | OUTPATIENT
Start: 2020-07-01 | End: 2020-07-07 | Stop reason: HOSPADM

## 2020-07-01 RX ORDER — PANTOPRAZOLE SODIUM 40 MG/1
40 TABLET, DELAYED RELEASE ORAL DAILY
COMMUNITY
End: 2021-09-30

## 2020-07-01 RX ORDER — DOXEPIN HYDROCHLORIDE 6 MG/1
6 TABLET ORAL NIGHTLY
Status: ON HOLD | COMMUNITY
End: 2020-07-07 | Stop reason: HOSPADM

## 2020-07-01 RX ORDER — HALOPERIDOL 10 MG/1
5 TABLET ORAL EVERY 4 HOURS PRN
Status: DISCONTINUED | OUTPATIENT
Start: 2020-07-01 | End: 2020-07-07 | Stop reason: HOSPADM

## 2020-07-01 RX ORDER — ALENDRONATE SODIUM 70 MG/1
70 TABLET ORAL
Status: DISCONTINUED | OUTPATIENT
Start: 2020-07-01 | End: 2020-07-01 | Stop reason: CLARIF

## 2020-07-01 RX ORDER — MAGNESIUM HYDROXIDE/ALUMINUM HYDROXICE/SIMETHICONE 120; 1200; 1200 MG/30ML; MG/30ML; MG/30ML
30 SUSPENSION ORAL 3 TIMES DAILY PRN
Status: DISCONTINUED | OUTPATIENT
Start: 2020-07-01 | End: 2020-07-07 | Stop reason: HOSPADM

## 2020-07-01 RX ORDER — CALCIUM POLYCARBOPHIL 625 MG 625 MG/1
625 TABLET ORAL 3 TIMES DAILY
Status: DISCONTINUED | OUTPATIENT
Start: 2020-07-01 | End: 2020-07-07 | Stop reason: HOSPADM

## 2020-07-01 RX ORDER — FAMOTIDINE 20 MG/1
20 TABLET, FILM COATED ORAL 2 TIMES DAILY
Status: DISCONTINUED | OUTPATIENT
Start: 2020-07-01 | End: 2020-07-07 | Stop reason: HOSPADM

## 2020-07-01 RX ORDER — HYDROXYZINE HYDROCHLORIDE 25 MG/1
25 TABLET, FILM COATED ORAL 3 TIMES DAILY PRN
Status: DISCONTINUED | OUTPATIENT
Start: 2020-07-01 | End: 2020-07-07 | Stop reason: HOSPADM

## 2020-07-01 RX ORDER — TAMSULOSIN HYDROCHLORIDE 0.4 MG/1
0.4 CAPSULE ORAL DAILY
Status: DISCONTINUED | OUTPATIENT
Start: 2020-07-01 | End: 2020-07-07 | Stop reason: HOSPADM

## 2020-07-01 RX ORDER — ACETAMINOPHEN 325 MG/1
650 TABLET ORAL EVERY 4 HOURS PRN
Status: DISCONTINUED | OUTPATIENT
Start: 2020-07-01 | End: 2020-07-07 | Stop reason: HOSPADM

## 2020-07-01 RX ORDER — MIDODRINE HYDROCHLORIDE 10 MG/1
10 TABLET ORAL 3 TIMES DAILY
Status: DISCONTINUED | OUTPATIENT
Start: 2020-07-01 | End: 2020-07-03

## 2020-07-01 RX ORDER — WARFARIN SODIUM 2 MG/1
2 TABLET ORAL SEE ADMIN INSTRUCTIONS
Status: DISCONTINUED | OUTPATIENT
Start: 2020-07-01 | End: 2020-07-01

## 2020-07-01 RX ORDER — ZOLPIDEM TARTRATE 10 MG/1
TABLET ORAL NIGHTLY PRN
COMMUNITY
End: 2021-09-30

## 2020-07-01 RX ORDER — FAMOTIDINE 20 MG/1
20 TABLET, FILM COATED ORAL 2 TIMES DAILY
COMMUNITY
End: 2021-09-30 | Stop reason: ALTCHOICE

## 2020-07-01 RX ORDER — HYDROXYZINE 50 MG/1
50 TABLET, FILM COATED ORAL 3 TIMES DAILY PRN
Status: DISCONTINUED | OUTPATIENT
Start: 2020-07-01 | End: 2020-07-01

## 2020-07-01 RX ORDER — LANOLIN ALCOHOL/MO/W.PET/CERES
1000 CREAM (GRAM) TOPICAL DAILY
Status: DISCONTINUED | OUTPATIENT
Start: 2020-07-01 | End: 2020-07-07 | Stop reason: HOSPADM

## 2020-07-01 RX ORDER — HALOPERIDOL 5 MG/ML
5 INJECTION INTRAMUSCULAR EVERY 4 HOURS PRN
Status: DISCONTINUED | OUTPATIENT
Start: 2020-07-01 | End: 2020-07-07 | Stop reason: HOSPADM

## 2020-07-01 RX ORDER — DOXEPIN HYDROCHLORIDE 10 MG/1
10 CAPSULE ORAL NIGHTLY
Status: DISCONTINUED | OUTPATIENT
Start: 2020-07-01 | End: 2020-07-07 | Stop reason: HOSPADM

## 2020-07-01 RX ORDER — ASPIRIN 81 MG/1
81 TABLET, CHEWABLE ORAL DAILY
Status: DISCONTINUED | OUTPATIENT
Start: 2020-07-01 | End: 2020-07-07 | Stop reason: HOSPADM

## 2020-07-01 RX ORDER — TRAZODONE HYDROCHLORIDE 50 MG/1
50 TABLET ORAL NIGHTLY PRN
Status: DISCONTINUED | OUTPATIENT
Start: 2020-07-01 | End: 2020-07-07 | Stop reason: HOSPADM

## 2020-07-01 RX ORDER — VITAMIN B COMPLEX
2000 TABLET ORAL DAILY
Status: DISCONTINUED | OUTPATIENT
Start: 2020-07-01 | End: 2020-07-07 | Stop reason: HOSPADM

## 2020-07-01 RX ORDER — MIDODRINE HYDROCHLORIDE 10 MG/1
10 TABLET ORAL 3 TIMES DAILY
COMMUNITY

## 2020-07-01 RX ORDER — BENZTROPINE MESYLATE 1 MG/ML
2 INJECTION INTRAMUSCULAR; INTRAVENOUS DAILY PRN
Status: DISCONTINUED | OUTPATIENT
Start: 2020-07-01 | End: 2020-07-07 | Stop reason: HOSPADM

## 2020-07-01 RX ORDER — FUROSEMIDE 20 MG/1
20 TABLET ORAL DAILY
Status: DISCONTINUED | OUTPATIENT
Start: 2020-07-01 | End: 2020-07-02

## 2020-07-01 RX ORDER — NICOTINE 21 MG/24HR
1 PATCH, TRANSDERMAL 24 HOURS TRANSDERMAL DAILY
Status: DISCONTINUED | OUTPATIENT
Start: 2020-07-01 | End: 2020-07-05

## 2020-07-01 RX ORDER — FOLIC ACID 1 MG/1
1 TABLET ORAL DAILY
Status: DISCONTINUED | OUTPATIENT
Start: 2020-07-01 | End: 2020-07-07 | Stop reason: HOSPADM

## 2020-07-01 RX ORDER — DIPHENHYDRAMINE HCL 25 MG
25 TABLET ORAL NIGHTLY PRN
Status: DISCONTINUED | OUTPATIENT
Start: 2020-07-01 | End: 2020-07-07 | Stop reason: HOSPADM

## 2020-07-01 RX ADMIN — FAMOTIDINE 20 MG: 20 TABLET, FILM COATED ORAL at 22:01

## 2020-07-01 RX ADMIN — MULTIPLE VITAMINS W/ MINERALS TAB 1 TABLET: TAB at 12:49

## 2020-07-01 RX ADMIN — MIDODRINE HYDROCHLORIDE 10 MG: 10 TABLET ORAL at 22:01

## 2020-07-01 RX ADMIN — SENNOSIDES 8.6 MG: 8.6 TABLET, FILM COATED ORAL at 22:01

## 2020-07-01 RX ADMIN — FAMOTIDINE 20 MG: 20 TABLET, FILM COATED ORAL at 12:49

## 2020-07-01 RX ADMIN — WARFARIN SODIUM 2 MG: 2 TABLET ORAL at 17:36

## 2020-07-01 RX ADMIN — TAMSULOSIN HYDROCHLORIDE 0.4 MG: 0.4 CAPSULE ORAL at 12:49

## 2020-07-01 RX ADMIN — ATORVASTATIN CALCIUM 20 MG: 20 TABLET, FILM COATED ORAL at 22:01

## 2020-07-01 RX ADMIN — CETIRIZINE HYDROCHLORIDE 10 MG: 10 TABLET, FILM COATED ORAL at 12:49

## 2020-07-01 RX ADMIN — CALCIUM POLYCARBOPHIL 625 MG TABLET 625 MG: at 22:01

## 2020-07-01 RX ADMIN — FUROSEMIDE 20 MG: 20 TABLET ORAL at 17:35

## 2020-07-01 RX ADMIN — DOXEPIN HYDROCHLORIDE 10 MG: 10 CAPSULE ORAL at 22:06

## 2020-07-01 RX ADMIN — CYANOCOBALAMIN TAB 1000 MCG 1000 MCG: 1000 TAB at 17:35

## 2020-07-01 RX ADMIN — SENNOSIDES 8.6 MG: 8.6 TABLET, FILM COATED ORAL at 12:49

## 2020-07-01 RX ADMIN — ASPIRIN 81 MG 81 MG: 81 TABLET ORAL at 12:49

## 2020-07-01 RX ADMIN — MELATONIN 2000 UNITS: at 12:49

## 2020-07-01 RX ADMIN — Medication 1 MG: at 22:01

## 2020-07-01 RX ADMIN — FOLIC ACID 1 MG: 1 TABLET ORAL at 12:49

## 2020-07-01 RX ADMIN — DIPHENHYDRAMINE HCL 25 MG: 25 TABLET ORAL at 22:01

## 2020-07-01 ASSESSMENT — SLEEP AND FATIGUE QUESTIONNAIRES
RESTFUL SLEEP: NO
DO YOU HAVE DIFFICULTY SLEEPING: YES
DIFFICULTY STAYING ASLEEP: YES
DO YOU USE A SLEEP AID: NO
DIFFICULTY ARISING: NO
AVERAGE NUMBER OF SLEEP HOURS: 6
DO YOU USE A SLEEP AID: NO
DIFFICULTY STAYING ASLEEP: YES
DIFFICULTY FALLING ASLEEP: YES
DIFFICULTY ARISING: NO
AVERAGE NUMBER OF SLEEP HOURS: 6
SLEEP PATTERN: DIFFICULTY FALLING ASLEEP
SLEEP PATTERN: DIFFICULTY FALLING ASLEEP
DIFFICULTY FALLING ASLEEP: YES
DO YOU HAVE DIFFICULTY SLEEPING: YES
RESTFUL SLEEP: NO

## 2020-07-01 ASSESSMENT — ENCOUNTER SYMPTOMS
SINUS PRESSURE: 0
SORE THROAT: 0
VOMITING: 0
NAUSEA: 0
DIARRHEA: 0
SHORTNESS OF BREATH: 1
COUGH: 0
ABDOMINAL DISTENTION: 0
SINUS PAIN: 0
CONSTIPATION: 0
WHEEZING: 0

## 2020-07-01 ASSESSMENT — LIFESTYLE VARIABLES
HISTORY_ALCOHOL_USE: NO
HISTORY_ALCOHOL_USE: NO

## 2020-07-01 ASSESSMENT — PAIN - FUNCTIONAL ASSESSMENT
PAIN_FUNCTIONAL_ASSESSMENT: 0-10
PAIN_FUNCTIONAL_ASSESSMENT: 0-10

## 2020-07-01 NOTE — BH NOTE
distraction)                                                           ( )  Basic information about quitting (benefits of quitting, techniques in how to quit, available resources  ( ) Referral for counseling faxed to [facility]                                           ( ) Patient refused counseling  ( ) Patient has not smoked in the last 30 days    Metabolic Screening:    Lab Results   Component Value Date    LABA1C 5.4 02/17/2020       Lab Results   Component Value Date    CHOL 126 02/17/2020    CHOL 193 08/02/2018    CHOL 215 (H) 05/22/2018    CHOL 170 02/20/2018    CHOL 142 06/27/2016    CHOL 159 02/21/2013     Lab Results   Component Value Date    TRIG 116 02/17/2020    TRIG 132 08/02/2018    TRIG 162 (H) 05/22/2018    TRIG 158 (H) 02/20/2018    TRIG 204 (H) 06/27/2016    TRIG 103 02/21/2013     Lab Results   Component Value Date    HDL 24 (L) 02/17/2020    HDL 28 (L) 08/02/2018    HDL 29 (L) 05/22/2018    HDL 30 (L) 02/20/2018    HDL 27 (L) 06/27/2016    HDL 36 (L) 02/21/2013     No components found for: LDLCAL  No results found for: LABVLDL      Body mass index is 30.02 kg/m². BP Readings from Last 2 Encounters:   07/01/20 121/64   02/19/20 125/77           Pt admitted with followings belongings:  Dentures: (not with pt)  Vision - Corrective Lenses: None  Hearing Aid: None  Jewelry: None  Body Piercings Removed: N/A  Clothing: Footwear, Socks, Undergarments (Comment), Other (Comment)(tan shorts, gray shorts, bandan, belt)  Were All Patient Medications Collected?: Not Applicable  Other Valuables: Cell phone, [place]( ss card, DL, insurancecard )     Valuables placed in safe in security envelope, number: [id number]. Patient's home medications were Need verified. Patient oriented to surroundings and program expectations and copy of patient rights given. Received admission packet. Consents reviewed, signed . Patient verbalize understanding.    Patient education on precautions    Pt is a 60year old  male  Admitted from [facility]  with suicidal ideation to hang self. Denies this now stating \" I only felt that way because I was high on crack. Stated [facility] and that's where he gets his psych meds from. Stated did $400 crack over several days last day was 6/29/30 .  Has a history of aortic aneurysm and Afib    Gema Fall RN

## 2020-07-01 NOTE — PLAN OF CARE
[facility]  Initial Interdisciplinary Treatment Plan NO      Original treatment plan Date & Time: 7/1/2020  Admission Type:  Admission Type: Involuntary    Reason for admission:   Reason for Admission: SI no plan    Estimated Length of Stay:  5-7days  Estimated Discharge Date: to be determined by physician    PATIENT STRENGTHS:  Patient Strengths:Strengths: Positive Support, No significant Physical Illness  Patient Strengths and Limitations:Limitations: Tendency to isolate self, Lacks leisure interests, Difficulty problem solving/relies on others to help solve problems, Hopeless about future, Multiple barriers to leisure interests, Inappropriate/potentially harmful leisure interests (depression substance abuse anxiety poor coping skills)  Addictive Behavior: Addictive Behavior  In the past 3 months, have you felt or has someone told you that you have a problem with:  : None  Do you have a history of Chemical Use?: No  Do you have a history of Alcohol Use?: No  Do you have a history of Street Drug Abuse?: Yes  Histroy of Prescripton Drug Abuse?: No  Medical Problems:No past medical history on file.   Status EXAM:Status and Exam  Normal: No  Facial Expression: Elevated  Affect: Inappropriate  Level of Consciousness: Alert  Mood:Normal: No  Mood: Depressed, Anxious  Motor Activity:Normal: No  Motor Activity: Decreased  Interview Behavior: Cooperative  Preception: [place] to Person, [place]iv Laughter to Time, [place] to Place, [place] to Situation  Attention:Normal: Yes  Attention: Distractible  Thought Processes: Circumstantial  Thought Content:Normal: Yes  Thought Content: Preoccupations  Hallucinations: None  Delusions: No  Memory:Normal: Yes  Memory: Poor Recent, Confabulation  Insight and Judgment: No  Insight and Judgment: Unmotivated  Present Suicidal Ideation: No  Present Homicidal Ideation: No    EDUCATION:   Learner Progress Toward Treatment Goals: reviewed group plans and strategies for care    Method:group therapy, medication compliance, individualized assessments and care planning    Outcome: needs reinforcement    PATIENT GOALS: to be discussed with patient within 72 hours    PLAN/TREATMENT RECOMMENDATIONS:     continue group therapy , medications compliance, goal setting, individualized assessments and care, continue to monitor pt on unit      SHORT-TERM GOALS:   Time frame for Short-Term Goals: 5-7 days    LONG-TERM GOALS:  Time frame for Long-Term Goals: 6 months  Members Present in Team Meeting: See Signature Sheet    Javid Saleh

## 2020-07-01 NOTE — BH NOTE
BHI Biopsychosocial Assessment    Current Level of Psychosocial Functioning      Independent   Dependent    Minimal Assist X     Comments:  Client has a provisional diagnosis of at time of admission of Bipolar 1 Disorder, recurrent; client has a prior diagnosis of Stimulant-Use Disorder, cocaine type; multiple health issues    Psychosocial High-Risk Factors (check all that apply)     Unable to obtain meds   Chronic illness/pain    Not taking medications  Substance abuse X  Lack of Family Support X  Addictive Behaviors X  Financial stress   Isolation  Inadequate Community Resources  Suicide attempt(s)   Homicidal ideations  Self-mutilation  Victim of crime   Legal issues  Developmental Delay  Unable to manage personal needs    Age 71 or older   Homeless  No transportation   Readmission within 30 days   Unemployment  Traumatic Event    Psychiatric Advanced Directives:   Nothing reported     Family to [employer] in Treatment:  Client lists sister as emergency contact     Sexual Orientation:   Client reports as single     Patient Strengths:  SSI; [facility] Medicaid; stable housing; linked with [facility]     Patient Barriers:   Multiple inpatient psychiatric hospitalizations; crack cocaine use; poor coping skills; poor insight and judgment     Opiate/AOD Referral and/or Education Provided:   Crack cocaine use; client not interested with in/out patient treatment     CMHC/mental health history:   [facility] - voice mail left for Violet Calvillo to confirm on team     Plan of Care:  Medication management, group/individual therapies, family meetings, psycho-education, treatment team meetings to assist with stabilization      Safety Plan:  Writer provides and explains safety plan to client and advises client to complete it and return to staff. Initial Discharge Plan:    Stabilize mood and medications; follow-up with [facility]; return to address on face-sheet; [employer] Medicaid transportation     Clinical Summary:   Mario Alberto Knowles. Monique Morris is a 63-year old male who was first seen at [facility] ED and then sent to [facility] on a [address] for a psychiatric evaluation. Client reports was recently at a party and \"spent $400 on crack cocaine\" and the next day client reports started to have suicidal thoughts to hang himself. Client was most recently at [facility] 2020 - 2020 reporting the same situation regarding the amount of money spent on crack cocaine and having suicidal ideations to hang himself. Client is linked with [employer] and lives at the address on the face-sheet which a [facility] apartment owned by [employer]. Client denies any current SI/HI, hallucinations and/or delusions. During assessment client was oriented to time, place, person, and situation. Client reports feelings of hopelessness and worthlessness but is able to contract for safety. Client denies any history of childhood trauma or adult abuse; denies any current legal issues; receives SSI; both parents are ; 2 sisters and speaks with them monthly. Client reports girlfriend on/off but she is supportive. Client denies any stable, sober friends or peer relationships. Client reports compliant with [facility] appointments and medications.

## 2020-07-01 NOTE — GROUP NOTE
Group Therapy Note    Date: 7/1/2020    Group Start Time: 1000  Group End Time: 1030  Group Topic: Psychotherapy    NORAH LII MARGO Mcdaniels        Group Therapy Note         Patient refused to attend psychotherapy group after encouragement from staff. 1:1 talk time offered but refused. Signature:   Ladonna Mcdaniels

## 2020-07-01 NOTE — GROUP NOTE
Group Therapy Note    Date: 7/1/2020    Group Start Time: 7244  Group End Time: 0915  Group Topic: Community Meeting    [address], [place]        Group Therapy Note    Attendees: 8/22    patient refused to attend community meeting and goal setting group at [phone] after encouragement from staff. 1:1 talk time provided as alternative to group session.        Signature:  Sumi Hart [place]

## 2020-07-01 NOTE — BH NOTE
Admit date: 6/30/2020  8:52 PM  Admitted from: [facility]  Pinked/voluntary: Pink-signed in  Reason for admit: SI to hang self  Allergies: Oxycodone, fish,ibuprofen, latuda, lithium, seroquel, lurasidone,  Medical issues: see chart  Consults:   Labs/EKG:   Guardian:   Nelly violence/assault:   Sleeping/eating:   Suicide Risk (H, M, L):L  Summary of daily progress:   Pt is a 60year old  male  Admitted from [address] [place] [address]  with suicidal ideation to hang self. Denies this now stating \" I only felt that way because I was high on crack. [facility] and that's where he gets his psych meds from. Stated did $400 crack over several days last day was 6/29/30 .  Has a history of aortic aneurysm and Afib

## 2020-07-01 NOTE — PROGRESS NOTES
Pharmacy Note  Warfarin Consult    Venice Rowan is a 60 y.o. male for whom pharmacy has been consulted to manage warfarin therapy. Consulting Physician: Melissa Balderrama  Reason for Admission: mental health issues    Warfarin dose prior to admission: 1 mg on Mon,Thurs. 2 mg Tues,Wed, Friday, Sat, Sun  Warfarin indication: A fib  Target INR range: 2-3     Past Medical History:   Diagnosis Date    AMS (altered mental status) 02/15/2020    Atrial fibrillation ([facility]     on coumadin    Bipolar 1 disorder ([facility]     Depression     Hypertension     Neck pain     Patient in clinical research study 04/12/2018    [id number]                Recent Labs     07/01/20  1540   INR 2.6     Recent Labs     07/01/20  0619   HGB 13.8   HCT 40.5*   *       Current warfarin drug-drug interactions: aspirin, trazodone        Date             INR        Dose   7/1/2020          2.6      2 mg    Daily PT/INR while inpatient. Plan:  Continue 2 mg home dose today. Thank you for the consult. Will continue to follow. Mary Leach. Ph.  7/1/2020  4:52 PM

## 2020-07-01 NOTE — PLAN OF CARE
Problem: Depressive Behavior With or Without Suicide Precautions:  Goal: Ability to disclose and discuss suicidal ideas will improve  Description: Ability to disclose and discuss suicidal ideas will improve  7/1/2020 0943 by Cliff Rodríguez LPN  Outcome: Ongoing  Note: Patient is currently denying suicidal ideations. Patient states that his mindset was altered due to the use of drugs. Denies having the thoughts on a regular basis. Patient is able to care for himself, eating and sleeping are normal, and willing to talk with staff if the thoughts of suicide return. Problem: Depressive Behavior With or Without Suicide Precautions:  Goal: Absence of self-harm  Description: Absence of self-harm  7/1/2020 0943 by Cliff Rodríguez LPN  Outcome: Ongoing  Note: Patient denies wanting to harm himself or anyone else. Patient is encouraged to talk with staff. Problem: Substance Abuse:  Goal: Absence of drug withdrawal signs and symptoms  Description: Absence of drug withdrawal signs and symptoms  7/1/2020 0943 by Cliff Rodríguez LPN  Outcome: Ongoing  Note: Patient is experiencing slight tremors in his hands, more noticeable when holding arms out in front of him. Patient is denying any other withdraw symptoms at this time.  Patient is encouraged to talk with staff if new symptoms arise

## 2020-07-01 NOTE — BH NOTE
Depression     Hypertension     Neck pain     Patient in clinical research study 04/12/2018    [id number]      Past Surgical History:   Procedure Laterality Date    ABDOMEN SURGERY      APPENDECTOMY      CARDIAC SURGERY  7/14/15    Median Sternotomy, Repair of ascending aorti aneurysm, stentless valve placement    CARDIAC SURGERY  07/14/2015    Median stenotomy, repair of ascending aorti aneurysm, stentless valve placement     DILATATION, ESOPHAGUS      HERNIA REPAIR      NECK SURGERY  2013    TONSILLECTOMY       Neurologic Exam    See H&P for further physical examination. SOCIAL HISTORY   Noted the following information from previous admission    The patient reports he was born and raised in [place]. He has 2 sisters and he is the middle child. No abuse or mistreatment as a childHe reports he did complete high school. The patient currently receives disability secondary to his bipolar disorder. He is single and has no children. Drug and alcohol history as noted previously. Denies any smith of preference. Denies any  experience. Denies any current legal issues.       Social History     Socioeconomic History    Marital status: Single     Spouse name: Not on file    Number of children: Not on file    Years of education: Not on file    Highest education level: Not on file   Occupational History    Not on file   Social Needs    Financial resource strain: Not on file    Food insecurity     Worry: Not on file     Inability: Not on file    Transportation needs     Medical: Not on file     Non-medical: Not on file   Tobacco Use    Smoking status: Never Smoker    Smokeless tobacco: Never Used   Substance and Sexual Activity    Alcohol use: Yes     Comment: heavy    Drug use: Yes     Types: Cocaine    Sexual activity: Not on file   Lifestyle    Physical activity     Days per week: Not on file     Minutes per session: Not on file    Stress: Not on file   Relationships    Social connections     Talks on phone: Not on file     Gets together: Not on file     Attends [employer] service: Not on file     Active member of club or organization: Not on file     Attends meetings of clubs or organizations: Not on file     Relationship status: Not on file    Intimate partner violence     Fear of current or ex partner: Not on file     Emotionally abused: Not on file     Physically abused: Not on file     Forced sexual activity: Not on file   Other Topics Concern    Not on file   Social History Narrative    Not on file         Mental Status    Level of consciousness:  lethargic   Appearance:  hospital attire  Behavior/Motor:  psychomotor retardation  Attitude toward examiner:  withdrawn  Speech:  slow and poverty of speech   Mood: constricted  Affect:  blunted  Thought processes:  poverty of thought   Thought content:  Suicidal Ideation:  denies suicidal ideation  Delusions:  paranoid  Perceptual Disturbance:  auditory  Cognition:  oriented to person, place, and time   Concentration distractible  Memory impaired recent memory and remote memory  Insight poor   Judgement poor   Fund of Knowledge limited    Labs  Recent Results (from the past 72 hour(s))   CBC with DIFF    Collection Time: 07/01/20  6:19 AM   Result Value Ref Range    WBC 5.2 3.5 - 11.0 k/uL    RBC 4.63 4.5 - 5.9 m/uL    Hemoglobin 13.8 13.5 - 17.5 g/dL    Hematocrit 40.5 (L) 41 - 53 %    MCV 87.5 80 - 100 fL    MCH 29.7 26 - 34 pg    MCHC 34.0 31 - 37 g/dL    RDW 16.8 (H) 11.5 - 14.9 %    Platelets 243 (L) 127 - 450 k/uL    MPV 7.4 6.0 - 12.0 fL    NRBC Automated NOT REPORTED per 100 WBC    Differential Type NOT REPORTED     Immature Granulocytes NOT REPORTED 0 %    Absolute Immature Granulocyte NOT REPORTED 0.00 - 0.30 k/uL    WBC Morphology NOT REPORTED     RBC Morphology NOT REPORTED     Platelet Estimate NOT REPORTED     Seg Neutrophils 68 (H) 36 - 66 %    Lymphocytes 17 (L) 24 - 44 %    Monocytes 13 (H) 1 - 7 %    Eosinophils % 1 0 - 4 %    Basophils 0 0 - 2 %    Bands 1 0 - 10 %    Segs Absolute 3.54 1.3 - 9.1 k/uL    Absolute Lymph # 0.88 (L) 1.0 - 4.8 k/uL    Absolute Mono # 0.68 0.1 - 1.3 k/uL    Absolute Eos # 0.05 0.0 - 0.4 k/uL    Basophils Absolute 0.00 0.0 - 0.2 k/uL    Absolute Bands # 0.05 0.0 - 1.0 k/uL    Morphology ANISOCYTOSIS PRESENT     Morphology 1+ TEARDROPS     Morphology 1+ POLYCHROMASIA     Morphology 1+ ELLIPTOCYTES    Comp Metabolic Prof    Collection Time: 07/01/20  6:19 AM   Result Value Ref Range    Glucose 83 70 - 99 mg/dL    BUN 20 8 - 23 mg/dL    CREATININE 1.45 (H) 0.70 - 1.20 mg/dL    Bun/Cre Ratio NOT REPORTED 9 - 20    Calcium 8.9 8.6 - 10.4 mg/dL    Sodium 141 135 - 144 mmol/L    Potassium 4.6 3.7 - 5.3 mmol/L    Chloride 107 98 - 107 mmol/L    CO2 23 20 - 31 mmol/L    Anion Gap 11 9 - 17 mmol/L    Alkaline Phosphatase 75 40 - 129 U/L    ALT 10 5 - 41 U/L    AST 13 <40 U/L    Total Bilirubin 0.45 0.3 - 1.2 mg/dL    Total Protein 6.3 (L) 6.4 - 8.3 g/dL    Alb 3.9 3.5 - 5.2 g/dL    Albumin/Globulin Ratio NOT REPORTED 1.0 - 2.5    GFR Non- 50 (L) >60 mL/min    GFR African American >60 >60 mL/min    GFR Comment          GFR Staging NOT REPORTED          Diagnostic Impression  Schizoaffective d/o bipolar type        Medications   nicotine  1 patch Transdermal Daily     acetaminophen, traZODone, benztropine mesylate, hydrOXYzine, aluminum & magnesium hydroxide-simethicone, magnesium hydroxide, diphenhydrAMINE    Treatment Plan:     Admit to inpatient psychiatric treatment   Supportive therapy with medication management. Reviewed risks and benefits as well as potential side effects with patient.  Therapeutic activities and groups   [facility] up at [facility] after symptoms stabilized   Restart the patient on Doxepin. Add Invega 3mg daily.  In 24-48 hours we will request for nursing to complete a slums to assess cognitive functioning.   At initial presentation the patient is a poor historian of much of his past history, rule out neurocognitive disorder. Estimated length of stay: 5-7 days    Kurt Call MD                                      Odell Avila is a 60 y.o. male being evaluated by a Virtual Visit (video visit) encounter to address concerns as mentioned above. A caregiver was present in the room along with the patient. Pursuant to the emergency declaration under the [address] [place] [address], [address] [place] [address] and the [employer] Act, this Virtual Visit was conducted with patient's (and/or legal guardian's) consent, to reduce the patient's risk of exposure to COVID-19 and provide necessary medical care. Services were provided through a video synchronous discussion virtually to substitute for in-person visit by provider. Patient is present at [facility]  and I am physically present at my home in [place] [address], [place] on 7/1/2020 at 1:39 PM    An electronic signature was used to authenticate this note. **This report has been created using voice recognition software. It may contain minor errors which are inherent in voice recognition technology. **    Dragon voice recognition software used in portions of this document.  Occasionally words are mis-transcribed

## 2020-07-01 NOTE — FLOWSHEET NOTE
*Patient participated in the [facility] [employer] Service       07/01/20 1410   Encounter Summary   Services provided to: Patient   Referral/Consult From: Rounding   Continue Visiting   (7/1/20)   Complexity of Encounter Moderate   Length of Encounter 30 minutes   Spiritual Assessment Completed Yes   Spiritual/Catholic   Type Spiritual support   Assessment Calm; Approachable   Intervention Active listening;Prayer   Outcome Receptive

## 2020-07-01 NOTE — H&P
HISTORY and Treint VI[address]TOR[address]       NAME:  Sherri Washington  MRN: [id number]   YOB: 1960   Date: 7/1/2020   Age: 60 y.o. Gender: male     COMPLAINT AND PRESENT HISTORY:      Sherri Washington is 60 y.o.,  male, admitted because of Bipolar disorder. Pt admitted on pink slip from [facility] (per chart). Pt is poor historian. He is slowed, delayed in speech. Pt reports he has been smoking 300-400 dollars of crack per day, he states he was \"selling\" himself for money. He denies hallucinations at present. Pt reports he feels fidgety, tremulous, and restless. He is requesting medication to help him sleep, advised to ask psychiatrist. Pt reports poor sleep and appetite. Pt has poor insight. Poor concentration. Memory is fair. Denies alcohol abuse. Compliance to psychiatric meds unknown at present. Pt has history of atrial fibrillation, reports he is on Coumadin. He has hx of CAD s/p repair of aortic aneurysm. No chest pain or palpitations. He has exertional SOB. He is restless and fidgety. See psychiatric admission note for further psychiatric history.      DIAGNOSTIC RESULTS   Labs:  CBC:   Recent Labs     07/01/20  0619   WBC 5.2   HGB 13.8   *     BMP:    Recent Labs     07/01/20  0619      K 4.6      CO2 23   BUN 20   CREATININE 1.45*   GLUCOSE 83     Hepatic:   Recent Labs     07/01/20  0619   AST 13   ALT 10   BILITOT 0.45   ALKPHOS 75       PAST MEDICAL HISTORY     Past Medical History:   Diagnosis Date    AMS (altered mental status) 02/15/2020    Atrial fibrillation (HCC)     on coumadin    [facility] ([facility]     Depression     Hypertension     Neck pain     Patient in clinical research study 04/12/2018    [id number]       SURGICAL HISTORY       Past Surgical History:   Procedure Laterality Date    ABDOMEN SURGERY      APPENDECTOMY      CARDIAC SURGERY  7/14/15    Median Sternotomy, Repair of ascending aorti aneurysm, stentless valve placement    CARDIAC SURGERY  07/14/2015    Median stenotomy, repair of ascending aorti aneurysm, stentless valve placement     DILATATION, ESOPHAGUS      HERNIA REPAIR      NECK SURGERY  2013    TONSILLECTOMY         FAMILY HISTORY       Family History   Problem Relation Age of Onset    Coronary Art Dis Mother     Hypertension Mother     Hypertension Father     Cancer Father         blood       SOCIAL HISTORY       Social History     Socioeconomic History    Marital status: Single     Spouse name: None    Number of children: None    Years of education: None    Highest education level: None   Occupational History    None   Social Needs    Financial resource strain: None    Food insecurity     Worry: None     Inability: None    Transportation needs     Medical: None     Non-medical: None   Tobacco Use    Smoking status: Never Smoker    Smokeless tobacco: Never Used   Substance and Sexual Activity    Alcohol use: Yes     Comment: heavy    Drug use: Yes     Types: Cocaine    Sexual activity: None   Lifestyle    Physical activity     Days per week: None     Minutes per session: None    Stress: None   Relationships    Social connections     Talks on phone: None     Gets together: None     Attends Yazidi service: None     Active member of club or organization: None     Attends meetings of clubs or organizations: None     Relationship status: None    Intimate partner violence     Fear of current or ex partner: None     Emotionally abused: None     Physically abused: None     Forced sexual activity: None   Other Topics Concern    None   Social History Narrative    None           REVIEW OF SYSTEMS      Allergies   Allergen Reactions    Oxycodone Itching    Fish-Derived Products     Ibuprofen     Latuda [Lurasidone Hcl] Other (See Comments)     \"Feels like pt is going to crawl out of own skin\"    Lithium     Lurasidone     Quetiapine     Seroquel [Quetiapine Fumarate]        No current facility-administered medications on file prior to encounter. Current Outpatient Medications on File Prior to Encounter   Medication Sig Dispense Refill    warfarin (COUMADIN) 2 MG tablet Take 4 mg tonight only. 2 tablet 0    divalproex (DEPAKOTE ER) 500 MG extended release tablet Take 1 tablet by mouth nightly 14 tablet 0    cephALEXin (KEFLEX) 500 MG capsule Take 1 capsule by mouth 3 times daily 30 capsule 0    polyethylene glycol (GLYCOLAX) packet Take 17 g by mouth daily as needed for Constipation      folic acid (FOLVITE) 1 MG tablet Take 1 mg by mouth daily      Multiple Vitamin TABS Take 1 tablet by mouth daily      midodrine (PROAMATINE) 5 MG tablet Take 10 mg by mouth 3 times daily      polycarbophil (FIBERCON) 625 MG tablet Take 625 mg by mouth 3 times daily      furosemide (LASIX) 20 MG tablet Take 20 mg by mouth daily      vitamin D (ERGOCALCIFEROL) 1.25 MG (23263 UT) CAPS capsule Take 50,000 Units by mouth once a week Monday      doxepin (SINEQUAN) 10 MG capsule Take 6 mg by mouth nightly      lidocaine (LIDODERM) 5 % Place 1 patch onto the skin daily 12 hours on, 12 hours off.  melatonin 1 MG tablet Take 3 tablets by mouth nightly as needed for Sleep 30 tablet 0    warfarin (COUMADIN) 2 MG tablet Take 0.5 tablets by mouth four times a week Indications: takes 1 mg tabs tuesday and saturday, takes 2mg tabs mon, wed thurs fri sun (Patient taking differently: Take 2 mg by mouth daily Managed by [facility]) 30 tablet 3    gabapentin (NEURONTIN) 300 MG capsule Take 1 capsule by mouth 3 times daily for 30 days.  90 capsule 0    benztropine (COGENTIN) 0.5 MG tablet Take 1 tablet by mouth 2 times daily 60 tablet 0    tamsulosin (FLOMAX) 0.4 MG capsule Take 0.4 mg by mouth daily      Cyanocobalamin (VITAMIN B 12 PO) Take 1,000 mcg by mouth daily      MAGNESIUM OXIDE 400 PO Take 1 tablet by mouth nightly      alendronate (FOSAMAX) 70 MG tablet Take 70 mg by mouth every intact dexter. eyelids WNL. EARS:  No discharge, no marked hearing loss. NOSE:  No rhinorrhea, epistaxis or septal deformity. THROAT:  Poor dentition, multiple missing teeth. Uvula midline. No ulceration bleeding or discharge. Visible uncontrolled movements of the mouth, upper lip and tongue. NECK:  No stiffness, trachea central.    CHEST:  Symmetrical and equal on expansion. HEART:  Regular rate and rhythm. S1 > S2, No audible murmurs or gallops. LUNGS:  Equal on expansion, normal breath sounds. ABDOMEN:  Soft on palpation. No localized tenderness. No guarding or rigidity    LOCOMOTOR, BACK AND SPINE:  No tenderness or deformities. EXTREMITIES:  Constant uncontrolled movement of upper and lower extremities. Mild fine tremor bilat hands. Symmetrical, trace pedal edema. No calf tenderness. No discoloration or ulcerations. NEUROLOGIC:  The patient is conscious, alert, oriented, antalgic gait. No apparent focal sensory deficits. Uncontrolled movement of the mouth and tongue, pill rolling tremor bilat hands, more pronounced in right hand. Muscle strength equal Dexter. No facial droop, tongue protrudes centrally, no slurring of the speech. Cranial nerves 3-12 reveal no deficits, taste and smell not assessed. PROVISIONAL DIAGNOSES:      Active Problems:    Bipolar 1 disorder (HCC)  Resolved Problems:    * No resolved hospital problems.  Sarah Medicine, APRN - CNP on 7/1/2020 at 8:10 AM

## 2020-07-01 NOTE — GROUP NOTE
Group Therapy Note    Date: 7/1/2020    Group Start Time: 1100  Group End Time: 1150  Group Topic: Cognitive Skills    NORAH Mercedes, CTRS        Group Therapy Note    Attendees: 9/20         Pt did not participate in Cognitive Skills Group at 1100AM when encouraged by RT due to resting in room. Pt was offered talk time as an alternative to group but declined.          Discipline Responsible: Psychoeducational Specialist        Signature:  Diana Gusman

## 2020-07-02 PROBLEM — N18.9 ACUTE KIDNEY INJURY SUPERIMPOSED ON CHRONIC KIDNEY DISEASE (HCC): Status: ACTIVE | Noted: 2020-07-02

## 2020-07-02 PROBLEM — N17.9 ACUTE KIDNEY INJURY SUPERIMPOSED ON CHRONIC KIDNEY DISEASE (HCC): Status: ACTIVE | Noted: 2020-07-02

## 2020-07-02 LAB
EKG ATRIAL RATE: 73 BPM
EKG P AXIS: 57 DEGREES
EKG P-R INTERVAL: 124 MS
EKG Q-T INTERVAL: 372 MS
EKG QRS DURATION: 84 MS
EKG QTC CALCULATION (BAZETT): 409 MS
EKG R AXIS: 24 DEGREES
EKG T AXIS: 11 DEGREES
EKG VENTRICULAR RATE: 73 BPM
INR BLD: 2.1
PROTHROMBIN TIME: 23.8 SEC (ref 11.8–14.6)

## 2020-07-02 PROCEDURE — 99254 IP/OBS CNSLTJ NEW/EST MOD 60: CPT | Performed by: INTERNAL MEDICINE

## 2020-07-02 PROCEDURE — 99232 SBSQ HOSP IP/OBS MODERATE 35: CPT | Performed by: PSYCHIATRY & NEUROLOGY

## 2020-07-02 PROCEDURE — 85610 PROTHROMBIN TIME: CPT

## 2020-07-02 PROCEDURE — 6370000000 HC RX 637 (ALT 250 FOR IP): Performed by: PSYCHIATRY & NEUROLOGY

## 2020-07-02 PROCEDURE — 36415 COLL VENOUS BLD VENIPUNCTURE: CPT

## 2020-07-02 PROCEDURE — 1240000000 HC EMOTIONAL WELLNESS R&B

## 2020-07-02 PROCEDURE — 93010 ELECTROCARDIOGRAM REPORT: CPT | Performed by: INTERNAL MEDICINE

## 2020-07-02 RX ORDER — WARFARIN SODIUM 1 MG/1
1 TABLET ORAL
Status: COMPLETED | OUTPATIENT
Start: 2020-07-02 | End: 2020-07-02

## 2020-07-02 RX ORDER — FUROSEMIDE 20 MG/1
20 TABLET ORAL
Status: DISCONTINUED | OUTPATIENT
Start: 2020-07-04 | End: 2020-07-03

## 2020-07-02 RX ORDER — PALIPERIDONE 3 MG/1
3 TABLET, EXTENDED RELEASE ORAL DAILY
Status: DISCONTINUED | OUTPATIENT
Start: 2020-07-02 | End: 2020-07-04

## 2020-07-02 RX ADMIN — FAMOTIDINE 20 MG: 20 TABLET, FILM COATED ORAL at 08:45

## 2020-07-02 RX ADMIN — ASPIRIN 81 MG 81 MG: 81 TABLET ORAL at 08:46

## 2020-07-02 RX ADMIN — SENNOSIDES 8.6 MG: 8.6 TABLET, FILM COATED ORAL at 21:38

## 2020-07-02 RX ADMIN — Medication 1 MG: at 21:38

## 2020-07-02 RX ADMIN — CALCIUM POLYCARBOPHIL 625 MG TABLET 625 MG: at 13:24

## 2020-07-02 RX ADMIN — CYANOCOBALAMIN TAB 1000 MCG 1000 MCG: 1000 TAB at 08:46

## 2020-07-02 RX ADMIN — FOLIC ACID 1 MG: 1 TABLET ORAL at 08:46

## 2020-07-02 RX ADMIN — MELATONIN 2000 UNITS: at 08:45

## 2020-07-02 RX ADMIN — TAMSULOSIN HYDROCHLORIDE 0.4 MG: 0.4 CAPSULE ORAL at 08:46

## 2020-07-02 RX ADMIN — SENNOSIDES 8.6 MG: 8.6 TABLET, FILM COATED ORAL at 08:45

## 2020-07-02 RX ADMIN — MIDODRINE HYDROCHLORIDE 10 MG: 10 TABLET ORAL at 13:24

## 2020-07-02 RX ADMIN — MULTIPLE VITAMINS W/ MINERALS TAB 1 TABLET: TAB at 08:46

## 2020-07-02 RX ADMIN — MIDODRINE HYDROCHLORIDE 10 MG: 10 TABLET ORAL at 08:45

## 2020-07-02 RX ADMIN — DIPHENHYDRAMINE HCL 25 MG: 25 TABLET ORAL at 21:38

## 2020-07-02 RX ADMIN — WARFARIN SODIUM 1 MG: 1 TABLET ORAL at 17:21

## 2020-07-02 RX ADMIN — FAMOTIDINE 20 MG: 20 TABLET, FILM COATED ORAL at 21:38

## 2020-07-02 RX ADMIN — CALCIUM POLYCARBOPHIL 625 MG TABLET 625 MG: at 21:38

## 2020-07-02 RX ADMIN — MIDODRINE HYDROCHLORIDE 10 MG: 10 TABLET ORAL at 21:38

## 2020-07-02 RX ADMIN — DOXEPIN HYDROCHLORIDE 10 MG: 10 CAPSULE ORAL at 21:38

## 2020-07-02 RX ADMIN — ATORVASTATIN CALCIUM 20 MG: 20 TABLET, FILM COATED ORAL at 21:38

## 2020-07-02 RX ADMIN — PALIPERIDONE 3 MG: 3 TABLET, EXTENDED RELEASE ORAL at 14:14

## 2020-07-02 RX ADMIN — CETIRIZINE HYDROCHLORIDE 10 MG: 10 TABLET, FILM COATED ORAL at 08:46

## 2020-07-02 RX ADMIN — CALCIUM POLYCARBOPHIL 625 MG TABLET 625 MG: at 08:45

## 2020-07-02 RX ADMIN — HYDROXYZINE HYDROCHLORIDE 25 MG: 25 TABLET, FILM COATED ORAL at 08:45

## 2020-07-02 RX ADMIN — FUROSEMIDE 20 MG: 20 TABLET ORAL at 08:45

## 2020-07-02 ASSESSMENT — PAIN - FUNCTIONAL ASSESSMENT: PAIN_FUNCTIONAL_ASSESSMENT: 0-10

## 2020-07-02 ASSESSMENT — PAIN SCALES - GENERAL: PAINLEVEL_OUTOF10: 0

## 2020-07-02 NOTE — PROGRESS NOTES
BEHAVIORAL HEALTH FOLLOW-UP NOTE     7/2/2020     Patient was seen and examined in person, Chart reviewed   Patient's case discussed with staff/team    Chief Complaint: Suicidal ideation    Interim History:     -The patient presents with considerable poverty of thought and poverty of speech. He appears to be distracted. He is unable to give responses to any questions. He states that he is doing okay.   He is able to have an admission for his admission                  Energy:    [] Normal/Unchanged  [] Increased  [x] Decreased        Aggression:  [] yes  [x] no    Patient is [x] able  [] unable to CONTRACT FOR SAFETY ON THE UNIT    PAST MEDICAL/PSYCHIATRIC HISTORY:   Past Medical History:   Diagnosis Date    AMS (altered mental status) 02/15/2020    Atrial fibrillation ([facility] [address]     on coumadin    Bipolar 1 disorder ([facility] [address] [facility]     Depression     Hypertension     Neck pain     Patient in clinical research study 04/12/2018    [id number]       FAMILY/SOCIAL HISTORY:  Family History   Problem Relation Age of Onset    Coronary Art Dis Mother     Hypertension Mother     Hypertension Father     Cancer Father         blood     Social History     Socioeconomic History    Marital status: Single     Spouse name: Not on file    Number of children: Not on file    Years of education: Not on file    Highest education level: Not on file   Occupational History    Not on file   Social Needs    Financial resource strain: Not on file    Food insecurity     Worry: Not on file     Inability: Not on file    Transportation needs     Medical: Not on file     Non-medical: Not on file   Tobacco Use    Smoking status: Never Smoker    Smokeless tobacco: Never Used   Substance and Sexual Activity    Alcohol use: Yes     Comment: heavy    Drug use: Yes     Types: Cocaine    Sexual activity: Not on file   Lifestyle    Physical activity     Days per week: Not on file     Minutes per session: Not on file    Stress: Not on file Relationships    Social connections     Talks on phone: Not on file     Gets together: Not on file     Attends Jehovah's witness service: Not on file     Active member of club or organization: Not on file     Attends meetings of clubs or organizations: Not on file     Relationship status: Not on file    Intimate partner violence     Fear of current or ex partner: Not on file     Emotionally abused: Not on file     Physically abused: Not on file     Forced sexual activity: Not on file   Other Topics Concern    Not on file   Social History Narrative    Not on file           ROS:  [x] All negative/unchanged except if checked.  Explain positive(checked items) below:  [] Constitutional  [] Eyes  [] Ear/Nose/Mouth/Throat  [] Respiratory  [] CV  [] GI  []   [] Musculoskeletal  [] Skin/Breast  [] Neurological  [] Endocrine  [] Heme/Lymph  [] Allergic/Immunologic    Explanation:     MEDICATIONS:    Current Facility-Administered Medications:     warfarin (COUMADIN) tablet 1 mg, 1 mg, Oral, Once, MD Mesha Mesa  [START ON 7/4/2020] furosemide (LASIX) tablet 20 mg, 20 mg, Oral, Q48H, Mraianna Andrews MD    paliperidone (INVEGA) extended release tablet 3 mg, 3 mg, Oral, Daily, Steph Fountain MD    acetaminophen (TYLENOL) tablet 650 mg, 650 mg, Oral, Q4H PRN, Chica Green MD    traZODone (DESYREL) tablet 50 mg, 50 mg, Oral, Nightly PRN, Chica Green MD    benztropine mesylate (COGENTIN) injection 2 mg, 2 mg, Intramuscular, Daily PRN, Chica Green MD    hydrOXYzine (ATARAX) tablet 25 mg, 25 mg, Oral, TID PRN, Chica Green MD, 25 mg at 07/02/20 0845    aluminum & magnesium hydroxide-simethicone (MAALOX) 200-200-20 MG/5ML suspension 30 mL, 30 mL, Oral, TID PRN, Chica Green MD    magnesium hydroxide (MILK OF MAGNESIA) 400 MG/5ML suspension 30 mL, 30 mL, Oral, Nightly PRN, Chica Green MD    diphenhydrAMINE (BENADRYL) tablet 25 mg, 25 mg, Oral, Nightly PRN, Chica Green MD, 25 mg at 07/01/20 2201    nicotine (Lorry Kirti kg/m²   Gait - steady  Medication side effects(SE): none    Mental Status Examination:    Level of consciousness:  within normal limits   Appearance:  poor grooming and poor hygiene  Behavior/Motor:  psychomotor retardation  Attitude toward examiner:  withdrawn  Speech:  poverty of speech   Mood: constricted  Affect:  blunted  Thought processes:  poverty of thought   Thought content:  Homicidal ideation - none  Suicidal Ideation:  active  Delusions:  paranoid  Perceptual Disturbance:  denies any perceptual disturbance  Cognition:  oriented to person, place, and time   Concentration poor  Insight poor   Judgement poor     ASSESSMENT:   Patient symptoms are:  [] Well controlled  [] Improving  [] Worsening  [x] No change      Diagnosis: Schizoaffective disorder bipolar type    LABS:    Recent Labs     07/01/20 0619   WBC 5.2   HGB 13.8   *     Recent Labs     07/01/20 0619      K 4.6      CO2 23   BUN 20   CREATININE 1.45*   GLUCOSE 83     Recent Labs     07/01/20 0619   BILITOT 0.45   ALKPHOS 75   AST 13   ALT 10     Lab Results   Component Value Date    BARBSCNU NEGATIVE 07/01/2020    LABBENZ NEGATIVE 07/01/2020    LABBENZ NEGATIVE 02/21/2013    LABMETH NEGATIVE 07/01/2020    PPXUR NOT REPORTED 07/01/2020     Lab Results   Component Value Date    TSH 1.33 07/01/2020     Lab Results   Component Value Date    LITHIUM 0.5 (L) 03/07/2013     No results found for: VALPROATE, CBMZ    RISK ASSESSMENT: Moderate risk of suicide. Low risk of aggression    Treatment Plan:  Reviewed current Medications with the patient. No changes to medications today    Risks, benefits, side effects, drug-to-drug interactions and alternatives to treatment were discussed. The patient  consented to treatment. Encourage patient to attend group and other milieu activities.   Discharge planning discussed with the patient and treatment team.    PSYCHOTHERAPY/COUNSELING:  [] Therapeutic interview  [x] Supportive  [] CBT  [] Ongoing  [] Other    [x] Patient continues to need, on a daily basis, active treatment furnished directly by or requiring the supervision of inpatient psychiatric personnel      Anticipated Length of stay:3-5 days                                          Odell Avila is a 60 y.o. male being evaluated by a Virtual Visit (video visit) encounter to address concerns as mentioned above. A caregiver was present in the room along with the patient. Pursuant to the emergency declaration under the [address] [place] [address], [address] [place] [address] and the [employer] Act, this Virtual Visit was conducted with patient's (and/or legal guardian's) consent, to reduce the patient's risk of exposure to COVID-19 and provide necessary medical care. Services were provided through a video synchronous discussion virtually to substitute for in-person visit by provider. Patient is present at [facility]  and I am physically present at my home in [place], [facility] [address], [place] on 7/2/2020 at 12:11 PM    An electronic signature was used to authenticate this note. **This report has been created using voice recognition software. It may contain minor errors which are inherent in voice recognition technology. **

## 2020-07-02 NOTE — CONSULTS
[facility] Internal Medicine    CONSULTATION  / FOLLOW UP VISIT       Date:   7/2/2020  Patient name:  Gwen Ely  Date of admission:  6/30/2020  8:52 PM  MRN:   [id number]  Account:  [de-identified]  YOB: 1960  PCP:    Stephen Hebert MD  Room:   [facility]0229-01  Code Status:    Full Code    Physician Requesting Consult: Meenakshi Pulido MD    History of Present Illness:      C/C ;       REASON FOR CONSULT;  Medical comorbidity and medication management ;  Specifically for                                                   htn , a fib         HPI;     7/2/2020    · Has hx a fib , htn , gerd , ckd  1. Rate control good   2. bp ok          History on admission; Active Problems:    HTN (hypertension)    GERD (gastroesophageal reflux disease)    Chronic renal impairment, stage 3 (moderate) (HCC)    Bipolar 1 disorder (HCC)    Atrial fibrillation (HCC)  Resolved Problems:    * No resolved hospital problems. *         Significant last 24 hr data reviewed  [x] yes     Vitals:    07/01/20 1211 07/01/20 1919 07/02/20 0812 07/02/20 0851   BP: 104/64 109/78  112/64   Pulse: 77 73 78 78   Resp: 14 14  14   Temp: 97.9 °F (36.6 °C) 98 °F (36.7 °C)  97.3 °F (36.3 °C)   TempSrc: Oral   Oral   SpO2: 97%      Weight:       Height:          Recent Results (from the past 24 hour(s))   EKG 12 lead    Collection Time: 07/01/20  2:45 PM   Result Value Ref Range    Ventricular Rate 73 BPM    Atrial Rate 73 BPM    P-R Interval 124 ms    QRS Duration 84 ms    Q-T Interval 372 ms    QTc Calculation (Bazett) 409 ms    P Axis 57 degrees    R Axis 24 degrees    T Axis 11 degrees   Urinalysis, Routine    Collection Time: 07/01/20  3:12 PM   Result Value Ref Range    Color, UA YELLOW YELLOW    Turbidity UA CLEAR CLEAR    Glucose, Ur NEGATIVE NEGATIVE    Bilirubin Urine (A) NEGATIVE     Presumptive positive. Unable to confirm due to unavailability of reagent.     Ketones, Urine TRACE (A) NEGATIVE    Specific [place], UA 1.025 1.000 - 1.030    Urine Hgb TRACE (A) NEGATIVE    pH, UA 5.5 5.0 - 8.0    Protein, UA NEGATIVE NEGATIVE    Urobilinogen, Urine Normal Normal    Nitrite, Urine NEGATIVE NEGATIVE    Leukocyte Esterase, Urine NEGATIVE NEGATIVE    Urinalysis Comments NOT REPORTED    Drug Scr, Abuse, Ur    Collection Time: 07/01/20  3:12 PM   Result Value Ref Range    Amphetamine Screen, Ur NEGATIVE NEGATIVE    Barbiturate Screen, Ur NEGATIVE NEGATIVE    Benzodiazepine Screen, Urine NEGATIVE NEGATIVE    Cocaine Metabolite, Urine POSITIVE (A) NEGATIVE    Methadone Screen, Urine NEGATIVE NEGATIVE    Opiates, Urine NEGATIVE NEGATIVE    Phencyclidine, Urine NEGATIVE NEGATIVE    Propoxyphene, Urine NOT REPORTED NEGATIVE    Cannabinoid Scrn, Ur NEGATIVE NEGATIVE    Oxycodone Screen, Ur NEGATIVE NEGATIVE    Methamphetamine, Urine NOT REPORTED NEGATIVE    Tricyclic Antidepressants, Urine NOT REPORTED NEGATIVE    MDMA, Urine NOT REPORTED NEGATIVE    Buprenorphine Urine NOT REPORTED NEGATIVE    Test Information       Assay provides medical screening only. The absence of expected drug(s) and/or metabolite(s) may indicate diluted or adulterated urine, limitations of testing or timing of collection. Microscopic Urinalysis    Collection Time: 07/01/20  3:12 PM   Result Value Ref Range    -          WBC, UA 0 TO 2 /HPF    RBC, UA None /HPF    Casts UA NOT REPORTED /LPF    Crystals, UA NOT REPORTED None /HPF    Epithelial Cells UA NOT REPORTED /HPF    Renal Epithelial, UA NOT REPORTED 0 /HPF    Bacteria, UA NOT REPORTED None    Mucus, UA NOT REPORTED None    Trichomonas, UA NOT REPORTED None    Amorphous, UA NOT REPORTED None    Other Observations UA NOT REPORTED NOT REQ.     Yeast, UA NOT REPORTED None   PROTIME-INR    Collection Time: 07/01/20  3:40 PM   Result Value Ref Range    Protime 27.9 (H) 11.8 - 14.6 sec    INR 2.6    PROTIME-INR    Collection Time: 07/02/20  6:05 AM   Result Value Ref Range    Protime 23. 8 (H) 11.8 - 14.6 sec    INR 2.1      No results for input(s): POCGLU in the last 72 hours. No results found. ---     Past and surgical history as recorded in H&P  Social History:     Tobacco:    reports that he has never smoked. He has never used smokeless tobacco.  Alcohol:      reports current alcohol use. Drug Use:  reports current drug use. Drug: Cocaine. Review of Systems:     POSITIVE AND NEGATIVES AS DESCRIBED IN HISTORY OF PRESENT ILLNESS ;  IN ADDITION ;  Review of Systems          All other systems negative                Physical Exam:     Physical Exam   Vitals:    07/01/20 1211 07/01/20 1919 07/02/20 0812 07/02/20 0851   BP: 104/64 109/78  112/64   Pulse: 77 73 78 78   Resp: 14 14  14   Temp: 97.9 °F (36.6 °C) 98 °F (36.7 °C)  97.3 °F (36.3 °C)   TempSrc: Oral   Oral   SpO2: 97%      Weight:       Height:                       Body mass index is 30.02 kg/m². General Appearance:   -, CO-OPERATIVE ,                                                        Pulmonary/Chest:        Clear to auscultation bilaterally . No wheezes, rales or rhonchi . Cardiovascular:            Normal rate, regular rhythm,                                          No murmur or  Gallop . Abdomen:                       Soft, non-tender                                                                                    Extremities:                    No Edema .                                               Mental status as per psych notes         Data:     Labs:      URINE ANALYSIS: No results found for: LABURIN     CBC:  Lab Results   Component Value Date    WBC 5.2 07/01/2020    HGB 13.8 07/01/2020     07/01/2020     01/14/2012        BMP:    Lab Results   Component Value Date     07/01/2020    K 4.6 07/01/2020     07/01/2020    CO2 23 07/01/2020    BUN 20 07/01/2020    CREATININE 1.45 07/01/2020    GLUCOSE 83 07/01/2020    GLUCOSE 88 01/14/2012      LIVER PROFILE:  Lab Results   Component Value Date    ALT 10 07/01/2020    AST 13 07/01/2020    PROT 6.3 07/01/2020    BILITOT 0.45 07/01/2020    BILIDIR <0.08 06/27/2016    LABALBU 3.9 07/01/2020    LABALBU 4.2 01/14/2012             Radiology:           Assessment :      Primary Problem  Active Problems:    HTN (hypertension)    GERD (gastroesophageal reflux disease)    Chronic renal impairment, stage 3 (moderate) (HCC)    Bipolar 1 disorder (HCC)    Atrial fibrillation (HCC)  Resolved Problems:    * No resolved hospital problems. *              Plan:            7/2/20    Will monitor   On coumadin 1.   continuw treatment    2. Creat 1.45                 Medications: Allergies: Allergies   Allergen Reactions    Oxycodone Itching    Fish-Derived Products     Ibuprofen     Latuda [Lurasidone Hcl] Other (See Comments)     \"Feels like pt is going to crawl out of own skin\"    Lithium     Lurasidone     Quetiapine     Seroquel [Quetiapine Fumarate]        Current Meds:   Scheduled Meds:    warfarin  1 mg Oral Once    nicotine  1 patch Transdermal Daily    aspirin  81 mg Oral Daily    atorvastatin  20 mg Oral Nightly    cetirizine  10 mg Oral Daily    Vitamin D  2,000 Units Oral Daily    vitamin B-12  1,000 mcg Oral Daily    doxepin  10 mg Oral Nightly    folic acid  1 mg Oral Daily    famotidine  20 mg Oral BID    furosemide  20 mg Oral Daily    midodrine  10 mg Oral TID    therapeutic multivitamin-minerals  1 tablet Oral Daily    polycarbophil  625 mg Oral TID    senna  1 tablet Oral BID    tamsulosin  0.4 mg Oral Daily    warfarin (COUMADIN) daily dosing (placeholder)   Other RX Placeholder     Continuous Infusions:   PRN Meds: acetaminophen, traZODone, benztropine mesylate, hydrOXYzine, aluminum & magnesium hydroxide-simethicone, magnesium hydroxide, diphenhydrAMINE, melatonin ER, haloperidol lactate **OR** haloperidol      Thanks for consulting us .   Will monitorvitals and clinical course , and  Optimize therapy  as needed . Tammy Flower MD    Copy sent to Dr. Lane Bazan MD    Pleasenote that this chart was generated using voice recognition Dragon dictation software. Although every effort was made to ensure the accuracy of this automated transcription, some errors in transcription may have occurred.

## 2020-07-02 NOTE — PLAN OF CARE
[address] [place] [address]  Day 3 Interdisciplinary Treatment Plan NOTE    Review Date & Time: 7/2/2020                            1300     Admission Type:   Admission Type: Involuntary    Reason for admission:  Reason for Admission: SI no plan  Estimated Length of Stay: 5-7 days  Estimated Discharge Date Update: to be determined by physician    PATIENT STRENGTHS:  Patient Strengths Strengths: Positive Support, No significant Physical Illness  Patient Strengths and Limitations:Limitations: Tendency to isolate self, Lacks leisure interests, Difficulty problem solving/relies on others to help solve problems, Hopeless about future, Multiple barriers to leisure interests, Inappropriate/potentially harmful leisure interests (depression substance abuse anxiety poor coping skills)  Addictive Behavior:Addictive Behavior  In the past 3 months, have you felt or has someone told you that you have a problem with:  : None  Do you have a history of Chemical Use?: No  Do you have a history of Alcohol Use?: No  Do you have a history of Street Drug Abuse?: Yes  Histroy of Prescripton Drug Abuse?: No  Medical Problems:No past medical history on file.     Risk:  Fall RiskTotal: 53  Jaycob Scale Jaycob Scale Score: 22  BVC Total: 0  Change in scores no Changes to plan of Care no    Status EXAM:   Status and Exam  Normal: No  Facial Expression: Elevated  Affect: Inappropriate  Level of Consciousness: Alert  Mood:Normal: No  Mood: Depressed, Anxious  Motor Activity:Normal: No  Motor Activity: Decreased  Interview Behavior: Cooperative  Preception: [place] to Person, [place] to Time, [place] to Place, [place] to Situation  Attention:Normal: Yes  Attention: Distractible  Thought Processes: Circumstantial  Thought Content:Normal: Yes  Thought Content: Preoccupations  Hallucinations: None  Delusions: No  Memory:Normal: Yes  Memory: Poor Recent, Confabulation  Insight and Judgment: No  Insight and Judgment: Unmotivated  Present Suicidal Ideation: No  Present Homicidal Ideation: No    Daily Assessment Last Entry:   Daily Sleep (WDL): Within Defined Limits         Patient Currently in Pain: No  Daily Nutrition (WDL): Within Defined Limits    Patient Monitoring:  Frequency of Checks: 4 times per hour, close    Psychiatric Symptoms:   Depression Symptoms  Depression Symptoms: Isolative, Loss of interest  Anxiety Symptoms  Anxiety Symptoms: Generalized  Mirian Symptoms  Mirian Symptoms: No problems reported or observed. Psychosis Symptoms  Delusion Type: No problems reported or observed.     Suicide Risk CSSR-S:  Have you wished you were dead or wished you could go to sleep and not wake up? : NO  Have you actually had any thoughts of killing yourself? : NO  Have you ever done anything, started to do anything, or prepared to do anything to end your life?: NO  Change in Result                 no              Change in Plan of care                no      EDUCATION:   EDUCATION:   Learner Progress Toward Treatment Goals: Reviewed results and recommendations of this team, Reviewed group plan and strategies, Reviewed signs, symptoms and risk of self harm and violent behavior, Reviewed goals and plan of care    Method:small group, individual verbal education    Outcome:verbalized by patient, but needs reinforcement to obtain goals    PATIENT GOALS:  Short term: \"decrease depression\"  Long term: \"socialize with positive people near home to stop using, follow up with [facility]\"    PLAN/TREATMENT RECOMMENDATIONS UPDATE: continue with group therapies, increased socialization, continue planning for after discharge goals, continue with medication compliance    SHORT-TERM GOALS UPDATE:   Time frame for Short-Term Goals: 5-7 days    LONG-TERM GOALS UPDATE:   Time frame for Long-Term Goals: 6 months  Members Present in Team Meeting: See Signature Sheet    Dave Cousins

## 2020-07-02 NOTE — GROUP NOTE
Group Therapy Note    Date: 7/2/2020    Group Start Time: 1430  Group End Time: 1500  Group Topic: Psychoeducation    [facility]    Abimael Weinstein, CRISTINOS        Group Therapy Note    Attendees: 13         Patient's Goal:  To increase interpersonal skills    Notes:  Patient attended group and participated     Status After Intervention:  Improved    Participation Level:  Active Listener and Interactive    Participation Quality: Appropriate, Attentive and Sharing      Speech:  normal      Thought Process/Content: Logical      Affective Functioning: Congruent      Mood: euthymic      Level of consciousness:  Alert and Oriented x4      Response to Learning: Progressing to goal      Endings: None Reported    Modes of Intervention: Education and Socialization      Discipline Responsible: Psychoeducational Specialist      Signature:  Magdy Fonseca

## 2020-07-02 NOTE — GROUP NOTE
Group Therapy Note    Date: 7/2/2020    Group Start Time: 1000  Group End Time: 1030  Group Topic: Psychotherapy    NORAH Howard        Group Therapy Note           Patient refused to attend psychotherapy group after encouragement from staff. 1:1 talk time offered but refused. Signature:   Rossy Howard

## 2020-07-02 NOTE — PROGRESS NOTES
Pharmacy Note  Warfarin Consult follow-up      Recent Labs     07/01/20  1540 07/02/20  0605   INR 2.6 2.1     Recent Labs     07/01/20  0619   HGB 13.8   HCT 40.5*   *       Significant Drug-Drug Interactions:  New warfarin drug-drug interactions: none  Discontinued drug-drug interactions: none  Current warfarin drug-drug interactions: Aspirin      Date             INR        Dose given previous day  Dose scheduled for today  7/2/2020            2.1       2 mg        1 mg        Notes: Will continue patient's home regimen, 1 mg due today (Thursday) as patient was stable prior to admission. Daily PT/INR while inpatient.      Christ ChaconD, BCPS  7/2/2020 7:59 AM

## 2020-07-02 NOTE — GROUP NOTE
Group Therapy Note    Date: 7/2/2020    Group Start Time: 1330  Group End Time: 0038  Group Topic: Healthy Living/Wellness    [facility]    Marleen Mayer, CTRS        Group Therapy Note    Attendees: 11/20         Patient's Goal:  To increase social interaction and to practice gentle stretching and exercises -benefits of exercising were discussed r/t physical and mental mental health, as well as stress relief and relapse prevention     Notes: Pt participated partially in group r/t limited repetitions and range of motion. Pt was able to practice practice gentle stretching and exercises -benefits of exercising were discussed r/t physical and mental mental health, as well as stress relief and relapse prevention       Status After Intervention:  Improved     Participation Level:  Active Listener and Interactive     Participation Quality: Appropriate, Attentive, Sharing and Supportive        Speech:  minimal        Thought Process/Content: Logical  Linear        Affective Functioning: Blunted        Mood: euthymic        Level of consciousness:  Alert, Oriented x4 and Attentive        Response to Learning: Able to verbalize current knowledge/experience, Able to verbalize/acknowledge new learning, Able to retain information and Progressing to goal        Endings: None Reported     Modes of Intervention: Education, Support, Socialization, Exploration, Clarifying and Problem-solving        Discipline Responsible: Psychoeducational Specialist        Signature:  Charlette Styles

## 2020-07-02 NOTE — GROUP NOTE
Group Therapy Note    Date: 7/2/2020    Group Start Time: 0900  Group End Time: 2978  Group Topic: Community Meeting    [facility]    [place], [place]        Group Therapy Note    Attendees: 12         Patient's Goal:  To increase interpersonal skills     Notes:  Patient attended group and participated     Status After Intervention:  Improved    Participation Level:  Active Listener and Interactive    Participation Quality: Appropriate, Attentive and Sharing      Speech:  normal      Thought Process/Content: Logical      Affective Functioning: Congruent      Mood: euthymic      Level of consciousness:  Alert and Oriented x4      Response to Learning: Progressing to goal      Endings: None Reported    Modes of Intervention: Education, Support and Socialization      Discipline Responsible: Psychoeducational Specialist      Signature:  Cristal Lomax

## 2020-07-02 NOTE — GROUP NOTE
Group Therapy Note    Date: 7/2/2020    Group Start Time: 1100  Group End Time: 1361  Group Topic: Cognitive Skills    NORAH Zuñiga, CRISTINOS        Group Therapy Note    Attendees: 8/20         Patient's Goal:  To increase social interaction and to practice decision making and communication skills      Notes: Pt was attentive but passive when asked to share in group. Status After Intervention:  Unchanged     Participation Level: Active Listener      Participation Quality: Attentive      Speech:  guarded, offers little      Thought Process/Content: unwilling to put forth effort to share on familiar topics.  Pt states can't read-no reading involved in this task      Affective Functioning: Blunted, lip tremors        Mood: passive,in control      Level of consciousness:  Alert, Oriented x4 and Attentive        Response to Learning: Attentive but passive, unmotivated to explore topics      Endings: None Reported     Modes of Intervention: Education, Support, Socialization, Exploration, Clarifying and Problem-solving        Discipline Responsible: Psychoeducational Specialist        Signature:  Marie Jordan

## 2020-07-03 LAB
ANION GAP SERPL CALCULATED.3IONS-SCNC: 9 MMOL/L (ref 9–17)
BUN BLDV-MCNC: 26 MG/DL (ref 8–23)
BUN/CREAT BLD: ABNORMAL (ref 9–20)
CALCIUM SERPL-MCNC: 9.4 MG/DL (ref 8.6–10.4)
CHLORIDE BLD-SCNC: 105 MMOL/L (ref 98–107)
CO2: 25 MMOL/L (ref 20–31)
CREAT SERPL-MCNC: 1.42 MG/DL (ref 0.7–1.2)
GFR AFRICAN AMERICAN: >60 ML/MIN
GFR NON-AFRICAN AMERICAN: 51 ML/MIN
GFR SERPL CREATININE-BSD FRML MDRD: ABNORMAL ML/MIN/{1.73_M2}
GFR SERPL CREATININE-BSD FRML MDRD: ABNORMAL ML/MIN/{1.73_M2}
GLUCOSE BLD-MCNC: 95 MG/DL (ref 70–99)
INR BLD: 1.7
POTASSIUM SERPL-SCNC: 4.5 MMOL/L (ref 3.7–5.3)
PROTHROMBIN TIME: 19.9 SEC (ref 11.8–14.6)
SODIUM BLD-SCNC: 139 MMOL/L (ref 135–144)

## 2020-07-03 PROCEDURE — 99232 SBSQ HOSP IP/OBS MODERATE 35: CPT | Performed by: INTERNAL MEDICINE

## 2020-07-03 PROCEDURE — 99232 SBSQ HOSP IP/OBS MODERATE 35: CPT | Performed by: PSYCHIATRY & NEUROLOGY

## 2020-07-03 PROCEDURE — 80048 BASIC METABOLIC PNL TOTAL CA: CPT

## 2020-07-03 PROCEDURE — 1240000000 HC EMOTIONAL WELLNESS R&B

## 2020-07-03 PROCEDURE — 85610 PROTHROMBIN TIME: CPT

## 2020-07-03 PROCEDURE — 6370000000 HC RX 637 (ALT 250 FOR IP): Performed by: PSYCHIATRY & NEUROLOGY

## 2020-07-03 PROCEDURE — 36415 COLL VENOUS BLD VENIPUNCTURE: CPT

## 2020-07-03 RX ORDER — WARFARIN SODIUM 2.5 MG/1
2.5 TABLET ORAL
Status: COMPLETED | OUTPATIENT
Start: 2020-07-03 | End: 2020-07-03

## 2020-07-03 RX ADMIN — TRAZODONE HYDROCHLORIDE 50 MG: 50 TABLET ORAL at 21:23

## 2020-07-03 RX ADMIN — FOLIC ACID 1 MG: 1 TABLET ORAL at 09:31

## 2020-07-03 RX ADMIN — WARFARIN SODIUM 2.5 MG: 2.5 TABLET ORAL at 17:55

## 2020-07-03 RX ADMIN — ASPIRIN 81 MG 81 MG: 81 TABLET ORAL at 09:31

## 2020-07-03 RX ADMIN — SENNOSIDES 8.6 MG: 8.6 TABLET, FILM COATED ORAL at 09:31

## 2020-07-03 RX ADMIN — CYANOCOBALAMIN TAB 1000 MCG 1000 MCG: 1000 TAB at 09:31

## 2020-07-03 RX ADMIN — CETIRIZINE HYDROCHLORIDE 10 MG: 10 TABLET, FILM COATED ORAL at 09:31

## 2020-07-03 RX ADMIN — TAMSULOSIN HYDROCHLORIDE 0.4 MG: 0.4 CAPSULE ORAL at 09:31

## 2020-07-03 RX ADMIN — ATORVASTATIN CALCIUM 20 MG: 20 TABLET, FILM COATED ORAL at 21:22

## 2020-07-03 RX ADMIN — CALCIUM POLYCARBOPHIL 625 MG TABLET 625 MG: at 15:07

## 2020-07-03 RX ADMIN — PALIPERIDONE 3 MG: 3 TABLET, EXTENDED RELEASE ORAL at 09:31

## 2020-07-03 RX ADMIN — FAMOTIDINE 20 MG: 20 TABLET, FILM COATED ORAL at 09:32

## 2020-07-03 RX ADMIN — SENNOSIDES 8.6 MG: 8.6 TABLET, FILM COATED ORAL at 21:22

## 2020-07-03 RX ADMIN — DOXEPIN HYDROCHLORIDE 10 MG: 10 CAPSULE ORAL at 21:24

## 2020-07-03 RX ADMIN — MIDODRINE HYDROCHLORIDE 10 MG: 10 TABLET ORAL at 09:32

## 2020-07-03 RX ADMIN — CALCIUM POLYCARBOPHIL 625 MG TABLET 625 MG: at 21:23

## 2020-07-03 RX ADMIN — CALCIUM POLYCARBOPHIL 625 MG TABLET 625 MG: at 09:31

## 2020-07-03 RX ADMIN — MULTIPLE VITAMINS W/ MINERALS TAB 1 TABLET: TAB at 09:31

## 2020-07-03 RX ADMIN — FAMOTIDINE 20 MG: 20 TABLET, FILM COATED ORAL at 21:22

## 2020-07-03 RX ADMIN — Medication 1 MG: at 21:22

## 2020-07-03 RX ADMIN — MELATONIN 2000 UNITS: at 09:36

## 2020-07-03 NOTE — PLAN OF CARE
Problem: Depressive Behavior With or Without Suicide Precautions:  Goal: Ability to disclose and discuss suicidal ideas will improve  Description: Ability to disclose and discuss suicidal ideas will improve  Outcome: Ongoing   Patient denies suicidal ideations. Agreeable to talking with staff if thoughts to harm self arise. Q15 minute checks maintained for safety. Problem: Depressive Behavior With or Without Suicide Precautions:  Goal: Absence of self-harm  Description: Absence of self-harm  Outcome: Ongoing   Safe environment maintained and patient remains free from self-harm. Agreeable to seeking staff should thoughts to harm self or others arise. Q15 minute checks for safety.

## 2020-07-03 NOTE — GROUP NOTE
Group Therapy Note    Date: 7/3/2020    Group Start Time: 1000  Group End Time: 1100  Group Topic: Psychotherapy    [facility]    PIETER Olvera, [facility]        Group Therapy Note    Attendees: 13/23         Patient's Goal: Pt will participate in psychotherapy in order to help eliminate or control troubling symptoms so a person can function better and can increase well-being and healing. Notes:  Pt appeared attentive for group discussion, however did not partake in any contribution to group discussion. Status After Intervention:  Unchanged    Participation Level:  Active Listener    Participation Quality: Appropriate and Attentive      Speech:  mute       Thought Process/Content: Logical      Affective Functioning: Congruent      Mood: dysphoric      Level of consciousness:  Alert, Oriented x4 and Attentive      Response to Learning: Able to verbalize current knowledge/experience and Progressing to goal      Endings: None Reported    Modes of Intervention: Education, Socialization, Exploration, Clarifying and Problem-solving      Discipline Responsible: /Counselor      Signature:  PIETER Olvera, [place]

## 2020-07-03 NOTE — GROUP NOTE
Group Therapy Note    Date: 7/3/2020    Group Start Time: 1100  Group End Time: 7438  Group Topic: Recreational    [facility]    DIMITRI Manning        Group Therapy Note    Attendees: 14       Patient's Goal:  To increase interpersonal skills     Notes:  Patient attended group and participated     Status After Intervention:  Improved    Participation Level:  Active Listener and Interactive    Participation Quality: Appropriate, Attentive and Sharing      Speech:  normal      Thought Process/Content: Logical      Affective Functioning: Congruent      Mood: euthymic      Level of consciousness:  Alert and Oriented x4      Response to Learning: Progressing to goal      Endings: None Reported    Modes of Intervention: Socialization and Activity      Discipline Responsible: Psychoeducational Specialist      Signature:  Ysabel Madera

## 2020-07-03 NOTE — GROUP NOTE
Group Therapy Note    Date: 7/3/2020    Group Start Time: 0900  Group End Time: 0930  Group Topic: Community Meeting    NORAH ARAGON    Leana Tompkins        Group Therapy Note    Attendees: 14/23         Patient's Goal:  Patient stated that his goal for today is \"talk to the doctor about going home\". Notes:  Patient was pleasant and appropriate throughout the session, patient stated that he is aware of where he is going following discharge and where his follow up care is. Status After Intervention:  Improved    Participation Level:  Active Listener and Interactive    Participation Quality: Appropriate, Attentive, Sharing and Supportive      Speech:  normal      Thought Process/Content: Logical      Affective Functioning: Congruent      Mood: euthymic      Level of consciousness:  Alert, Oriented x4 and Attentive      Response to Learning: Able to verbalize current knowledge/experience, Able to verbalize/acknowledge new learning, Capable of insight and Progressing to goal      Endings: None Reported    Modes of Intervention: Education, Support, Socialization, Exploration, Clarifying and Problem-solving      Discipline Responsible: Psychoeducational Specialist      Signature:  Leana Tompkins

## 2020-07-03 NOTE — CONSULTS
[facility] Internal Medicine    CONSULTATION  / FOLLOW UP VISIT       Date:   7/3/2020  Patient name:  Dione Dailey  Date of admission:  6/30/2020  8:52 PM  MRN:   [id number]  Account:  [de-identified]  YOB: 1960  PCP:    Tanisha Carson MD  Room:   [address] [place], [place] [address]  Code Status:    Full Code    Physician Requesting Consult: Travon Bahena MD    History of Present Illness:      C/C ;       REASON FOR CONSULT;  Medical comorbidity and medication management ;  Specifically for                                                   htn , a fib         HPI;     7/3/2020    · Has hx a fib , htn , gerd , ckd  1. Rate control good   2. bp ok           BMP:   Recent Labs     07/01/20  0619 07/03/20  0619    139   K 4.6 4.5   CO2 23 25   BUN 20 26*   CREATININE 1.45* 1.42*   LABGLOM 50* 51*   GLUCOSE 83 95                  History on admission; Active Problems:    HTN (hypertension)    GERD (gastroesophageal reflux disease)    Chronic renal impairment, stage 3 (moderate) (HCC)    Bipolar 1 disorder (HCC)    Atrial fibrillation (HCC)    Acute kidney injury superimposed on chronic kidney disease (HCC)    Schizoaffective disorder, bipolar type ([facility] [address]  Resolved Problems:    * No resolved hospital problems.  *         Significant last 24 hr data reviewed  [x] yes     Vitals:    07/02/20 0812 07/02/20 0851 07/02/20 2026 07/03/20 0900   BP:  112/64 (!) 127/53 104/67   Pulse: 78 78 72 82   Resp:  14 14 14   Temp:  97.3 °F (36.3 °C) 97.6 °F (36.4 °C) 97.9 °F (36.6 °C)   TempSrc:  Oral Oral Oral   SpO2:       Weight:       Height:          Recent Results (from the past 24 hour(s))   PROTIME-INR    Collection Time: 07/03/20  6:19 AM   Result Value Ref Range    Protime 19.9 (H) 11.8 - 14.6 sec    INR 1.7    Basic Metabolic Panel    Collection Time: 07/03/20  6:19 AM   Result Value Ref Range    Glucose 95 70 - 99 mg/dL    BUN 26 (H) 8 - 23 mg/dL    CREATININE 1.42 (H) 0.70 - 1.20 mg/dL    Bun/Cre Ratio NOT REPORTED 9 - 20    Calcium 9.4 8.6 - 10.4 mg/dL    Sodium 139 135 - 144 mmol/L    Potassium 4.5 3.7 - 5.3 mmol/L    Chloride 105 98 - 107 mmol/L    CO2 25 20 - 31 mmol/L    Anion Gap 9 9 - 17 mmol/L    GFR Non-African American 51 (L) >60 mL/min    GFR African American >60 >60 mL/min    GFR Comment          GFR Staging NOT REPORTED      No results for input(s): POCGLU in the last 72 hours. No results found. ---     Past and surgical history as recorded in H&P  Social History:     Tobacco:    reports that he has never smoked. He has never used smokeless tobacco.  Alcohol:      reports current alcohol use. Drug Use:  reports current drug use. Drug: Cocaine. Review of Systems:     POSITIVE AND NEGATIVES AS DESCRIBED IN HISTORY OF PRESENT ILLNESS ;  IN ADDITION ;  Review of Systems          All other systems negative                Physical Exam:     Physical Exam   Vitals:    07/02/20 0812 07/02/20 0851 07/02/20 2026 07/03/20 0900   BP:  112/64 (!) 127/53 104/67   Pulse: 78 78 72 82   Resp:  14 14 14   Temp:  97.3 °F (36.3 °C) 97.6 °F (36.4 °C) 97.9 °F (36.6 °C)   TempSrc:  Oral Oral Oral   SpO2:       Weight:       Height:                       Body mass index is 30.02 kg/m². General Appearance:   -, CO-OPERATIVE ,                                                        Pulmonary/Chest:        Clear to auscultation bilaterally . No wheezes, rales or rhonchi . Cardiovascular:            Normal rate, regular rhythm,                                          No murmur or  Gallop . Abdomen:                       Soft, non-tender                                                                                    Extremities:                    No Edema .                                               Mental status as per psych notes         Data:     Labs:      URINE ANALYSIS: No results found for: LABURIN     CBC:  Lab Results Component Value Date    WBC 5.2 07/01/2020    HGB 13.8 07/01/2020     07/01/2020     01/14/2012        BMP:    Lab Results   Component Value Date     07/03/2020    K 4.5 07/03/2020     07/03/2020    CO2 25 07/03/2020    BUN 26 07/03/2020    CREATININE 1.42 07/03/2020    GLUCOSE 95 07/03/2020    GLUCOSE 88 01/14/2012      LIVER PROFILE:  Lab Results   Component Value Date    ALT 10 07/01/2020    AST 13 07/01/2020    PROT 6.3 07/01/2020    BILITOT 0.45 07/01/2020    BILIDIR <0.08 06/27/2016    LABALBU 3.9 07/01/2020    LABALBU 4.2 01/14/2012             Radiology:           Assessment :      Primary Problem  Active Problems:    HTN (hypertension)    GERD (gastroesophageal reflux disease)    Chronic renal impairment, stage 3 (moderate) (HCC)    Bipolar 1 disorder (HCC)    Atrial fibrillation (HCC)    Acute kidney injury superimposed on chronic kidney disease (HCC)    Schizoaffective disorder, bipolar type ([facility] [address]  Resolved Problems:    * No resolved hospital problems. *              Plan:            7/2/20    Will monitor   On coumadin 1.   continuw treatment    2. Creat 1.45            7/3/20    · Creatinine is stable around 1.42  · We will discontinue Lasix altogether and monitor 1. Medications: Allergies:     Allergies   Allergen Reactions    Oxycodone Itching    Fish-Derived Products     Ibuprofen     Latuda [Lurasidone Hcl] Other (See Comments)     \"Feels like pt is going to crawl out of own skin\"    Lithium     Lurasidone     Quetiapine     Seroquel [Quetiapine Fumarate]        Current Meds:   Scheduled Meds:    warfarin  2.5 mg Oral Once    paliperidone palmitate  234 mg Intramuscular Once    [START ON 7/4/2020] furosemide  20 mg Oral Q48H    paliperidone  3 mg Oral Daily    nicotine  1 patch Transdermal Daily    aspirin  81 mg Oral Daily    atorvastatin  20 mg Oral Nightly    cetirizine  10 mg Oral Daily    Vitamin D  2,000 Units Oral Daily    vitamin B-12  1,000 mcg Oral Daily    doxepin  10 mg Oral Nightly    folic acid  1 mg Oral Daily    famotidine  20 mg Oral BID    midodrine  10 mg Oral TID    therapeutic multivitamin-minerals  1 tablet Oral Daily    polycarbophil  625 mg Oral TID    senna  1 tablet Oral BID    tamsulosin  0.4 mg Oral Daily    warfarin (COUMADIN) daily dosing (placeholder)   Other RX Placeholder     Continuous Infusions:   PRN Meds: acetaminophen, traZODone, benztropine mesylate, hydrOXYzine, aluminum & magnesium hydroxide-simethicone, magnesium hydroxide, diphenhydrAMINE, melatonin ER, haloperidol lactate **OR** haloperidol      Thanks for consulting us . Will monitorvitals and clinical course , and  Optimize therapy  as needed . Alessandra Orta MD    Copy sent to Dr. Moisés Lamb MD    Pleasenote that this chart was generated using voice recognition Dragon dictation software. Although every effort was made to ensure the accuracy of this automated transcription, some errors in transcription may have occurred.

## 2020-07-03 NOTE — PROGRESS NOTES
BEHAVIORAL HEALTH FOLLOW-UP NOTE     7/3/2020     Patient was seen and examined in person, Chart reviewed   Patient's case discussed with staff/team    Chief Complaint: Suicidal ideation    Interim History:     - Speaking a little more today. He is discharge focussed. Denies feeling suicidal when not using drugs. Says he was hanging around with the wrong people -we discussed that the patient wanted to hang himself prior to admission. The patient states this was entirely due to drug use.   -                  Energy:    [] Normal/Unchanged  [] Increased  [x] Decreased        Aggression:  [] yes  [x] no    Patient is [x] able  [] unable to CONTRACT FOR SAFETY ON THE UNIT    PAST MEDICAL/PSYCHIATRIC HISTORY:   Past Medical History:   Diagnosis Date    AMS (altered mental status) 02/15/2020    Atrial fibrillation ([facility] [address]     on coumadin    Bipolar 1 disorder ([facility] [address]     Depression     Hypertension     Neck pain     Patient in clinical research study 04/12/2018    [id number]       FAMILY/SOCIAL HISTORY:  Family History   Problem Relation Age of Onset    Coronary Art Dis Mother     Hypertension Mother     Hypertension Father     Cancer Father         blood     Social History     Socioeconomic History    Marital status: Single     Spouse name: Not on file    Number of children: Not on file    Years of education: Not on file    Highest education level: Not on file   Occupational History    Not on file   Social Needs    Financial resource strain: Not on file    Food insecurity     Worry: Not on file     Inability: Not on file    Transportation needs     Medical: Not on file     Non-medical: Not on file   Tobacco Use    Smoking status: Never Smoker    Smokeless tobacco: Never Used   Substance and Sexual Activity    Alcohol use: Yes     Comment: heavy    Drug use: Yes     Types: Cocaine    Sexual activity: Not on file   Lifestyle    Physical activity     Days per week: Not on file     Minutes per session: Not on file    Stress: Not on file   Relationships    Social connections     Talks on phone: Not on file     Gets together: Not on file     Attends [employer] service: Not on file     Active member of club or organization: Not on file     Attends meetings of clubs or organizations: Not on file     Relationship status: Not on file    Intimate partner violence     Fear of current or ex partner: Not on file     Emotionally abused: Not on file     Physically abused: Not on file     Forced sexual activity: Not on file   Other Topics Concern    Not on file   Social History Narrative    Not on file           ROS:  [x] All negative/unchanged except if checked.  Explain positive(checked items) below:  [] Constitutional  [] Eyes  [] Ear/Nose/Mouth/Throat  [] Respiratory  [] CV  [] GI  []   [] Musculoskeletal  [] Skin/Breast  [] Neurological  [] Endocrine  [] Heme/Lymph  [] Allergic/Immunologic    Explanation:     MEDICATIONS:    Current Facility-Administered Medications:     warfarin (COUMADIN) tablet 2.5 mg, 2.5 mg, Oral, Once, MD Ernesto Dhillon  [START ON 7/4/2020] furosemide (LASIX) tablet 20 mg, 20 mg, Oral, Q48H, Zane Eubanks MD    paliperidone (INVEGA) extended release tablet 3 mg, 3 mg, Oral, Daily, Delano Noyola MD, 3 mg at 07/03/20 0931    acetaminophen (TYLENOL) tablet 650 mg, 650 mg, Oral, Q4H PRN, Saintclair Eisenmenger, MD    traZODone (DESYREL) tablet 50 mg, 50 mg, Oral, Nightly PRN, Saintclair Eisenmenger, MD    benztropine mesylate (COGENTIN) injection 2 mg, 2 mg, Intramuscular, Daily PRN, Saintclair Eisenmenger, MD    hydrOXYzine (ATARAX) tablet 25 mg, 25 mg, Oral, TID PRN, Saintclair Eisenmenger, MD, 25 mg at 07/02/20 0845    aluminum & magnesium hydroxide-simethicone (MAALOX) 200-200-20 MG/5ML suspension 30 mL, 30 mL, Oral, TID PRN, Saintclair Eisenmenger, MD    magnesium hydroxide (MILK OF MAGNESIA) 400 MG/5ML suspension 30 mL, 30 mL, Oral, Nightly PRN, Saintclair Eisenmenger, MD    diphenhydrAMINE (BENADRYL) tablet 25 mg, 25 mg, Oral, Nightly PRN, Sloane Augustine MD, 25 mg at 07/02/20 2138    nicotine (NICODERM CQ) 14 MG/24HR 1 patch, 1 patch, Transdermal, Daily, Sloane Augustine MD    aspirin chewable tablet 81 mg, 81 mg, Oral, Daily, Dipika Harding MD, 81 mg at 07/03/20 0931    atorvastatin (LIPITOR) tablet 20 mg, 20 mg, Oral, Nightly, Dipika Harding MD, 20 mg at 07/02/20 2138    cetirizine (ZYRTEC) tablet 10 mg, 10 mg, Oral, Daily, Dipika Harding MD, 10 mg at 07/03/20 0931    Vitamin D (CHOLECALCIFEROL) tablet 2,000 Units, 2,000 Units, Oral, Daily, Dipika Harding MD, 2,000 Units at 07/03/20 0936    vitamin B-12 (CYANOCOBALAMIN) tablet 1,000 mcg, 1,000 mcg, Oral, Daily, Dipika Harding MD, 1,000 mcg at 07/03/20 0931    doxepin (SINEQUAN) capsule 10 mg, 10 mg, Oral, Nightly, Dipika Harding MD, 10 mg at 58/68/35 0068    folic acid (FOLVITE) tablet 1 mg, 1 mg, Oral, Daily, Dipika Harding MD, 1 mg at 07/03/20 0931    famotidine (PEPCID) tablet 20 mg, 20 mg, Oral, BID, Dipika Harding MD, 20 mg at 07/03/20 0932    melatonin ER tablet 1 mg, 1 mg, Oral, Nightly PRN, Dipika Harding MD, 1 mg at 07/02/20 2138    midodrine (PROAMATINE) tablet 10 mg, 10 mg, Oral, TID, Dipika Harding MD, 10 mg at 07/03/20 0932    therapeutic multivitamin-minerals 1 tablet, 1 tablet, Oral, Daily, Dipika Harding MD, 1 tablet at 07/03/20 0931    polycarbophil (FIBERCON) tablet 625 mg, 625 mg, Oral, TID, Dipika Harding MD, 625 mg at 07/03/20 0931    senna (SENOKOT) tablet 8.6 mg, 1 tablet, Oral, BID, Dipika Harding MD, 8.6 mg at 07/03/20 0931    tamsulosin (FLOMAX) capsule 0.4 mg, 0.4 mg, Oral, Daily, Dipika Harding MD, 0.4 mg at 07/03/20 0931    haloperidol lactate (HALDOL) injection 5 mg, 5 mg, Intramuscular, Q4H PRN **OR** haloperidol (HALDOL) tablet 5 mg, 5 mg, Oral, Q4H PRN, Dipika Harding MD    warfarin (COUMADIN) daily dosing (placeholder), , Other, RX Placeholder, Dipika Harding MD      Examination:  /67   Pulse 82   Temp 97.9 °F (36.6 °C) (Oral)   Resp 14   Ht 5' 6\" (1.676 m)   Wt 186 lb (84.4 kg)   SpO2 97%   BMI 30.02 kg/m²   Gait - steady  Medication side effects(SE): none    Mental Status Examination:    Level of consciousness:  within normal limits   Appearance:  poor grooming and poor hygiene  Behavior/Motor:  psychomotor retardation  Attitude toward examiner:  withdrawn  Speech:  poverty of speech   Mood: constricted  Affect:  restricted  Thought processes:  poverty of thought (improving)  Thought content:  Homicidal ideation - none  Suicidal Ideation: denies  Delusions:  Denies  Perceptual Disturbance:  denies any perceptual disturbance  Cognition:  oriented to person, place, and time   Concentration poor  Insight poor   Judgement poor     ASSESSMENT:   Patient symptoms are:  [] Well controlled  [] Improving  [] Worsening  [x] No change      Diagnosis: Schizoaffective disorder bipolar type    LABS:    Recent Labs     07/01/20 0619   WBC 5.2   HGB 13.8   *     Recent Labs     07/01/20 0619 07/03/20 0619    139   K 4.6 4.5    105   CO2 23 25   BUN 20 26*   CREATININE 1.45* 1.42*   GLUCOSE 83 95     Recent Labs     07/01/20 0619   BILITOT 0.45   ALKPHOS 75   AST 13   ALT 10     Lab Results   Component Value Date    BARBSCNU NEGATIVE 07/01/2020    LABBENZ NEGATIVE 07/01/2020    LABBENZ NEGATIVE 02/21/2013    LABMETH NEGATIVE 07/01/2020    PPXUR NOT REPORTED 07/01/2020     Lab Results   Component Value Date    TSH 1.33 07/01/2020     Lab Results   Component Value Date    LITHIUM 0.5 (L) 03/07/2013     No results found for: VALPROATE, CBMZ    RISK ASSESSMENT: Moderate risk of suicide. Low risk of aggression    Treatment Plan:  Reviewed current Medications with the patient. First loading dose of [place] ordered. Risks, benefits, side effects, drug-to-drug interactions and alternatives to treatment were discussed. The patient  consented to treatment. Encourage patient to attend group and other milieu activities.   Discharge planning discussed with the patient and treatment team.    PSYCHOTHERAPY/COUNSELING:  [] Therapeutic interview  [x] Supportive  [] CBT  [] Ongoing  [] Other    [x] Patient continues to need, on a daily basis, active treatment furnished directly by or requiring the supervision of inpatient psychiatric personnel      Anticipated Length of stay:3-5 days                                          John Kennedy is a 60 y.o. male being evaluated by a Virtual Visit (video visit) encounter to address concerns as mentioned above. A caregiver was present in the room along with the patient. Pursuant to the emergency declaration under the [facility], [address] [place] [address] authority and the [employer] Act, this Virtual Visit was conducted with patient's (and/or legal guardian's) consent, to reduce the patient's risk of exposure to COVID-19 and provide necessary medical care. Services were provided through a video synchronous discussion virtually to substitute for in-person visit by provider. Patient is present at [address] [facility]  and I am physically present at my home in [place] [address], [place] on 7/3/2020 at 12:29 PM    An electronic signature was used to authenticate this note. **This report has been created using voice recognition software. It may contain minor errors which are inherent in voice recognition technology. **

## 2020-07-03 NOTE — PROGRESS NOTES
Pharmacy Note  Warfarin Consult follow-up      Recent Labs     07/01/20  1540 07/02/20  0605 07/03/20  0619   INR 2.6 2.1 1.7     Recent Labs     07/01/20  0619   HGB 13.8   HCT 40.5*   *       Significant Drug-Drug Interactions:  New warfarin drug-drug interactions: none  Discontinued drug-drug interactions: none  Current warfarin drug-drug interactions: aspirin, doxepin, atorvastatin      Date             INR        Dose given previous day  Dose scheduled for today  7/3/2020            1.7       2 mg      2.5 mg      Notes:    INR dropped to 1.7. Will increase dose to 2.5 mg today. Daily PT/INR while inpatient.    Kwaku Alfredo RPh  7/3/2020  7:59 AM

## 2020-07-03 NOTE — GROUP NOTE
Group Therapy Note    Date: 7/3/2020    Group Start Time: 1330  Group End Time: 3673  Group Topic: Psychoeducation    [facility]    Kaleb Dee        Group Therapy Note    Attendees: 14/21         Patient's Goal:  To demonstrate improved social skills      Notes:  Patient was pleasant and appropriate     Status After Intervention:  Improved    Participation Level:  Active Listener and Interactive    Participation Quality: Appropriate, Attentive, Sharing and Supportive      Speech:  normal      Thought Process/Content: Logical      Affective Functioning: Congruent      Mood: euthymic      Level of consciousness:  Alert, Oriented x4 and Attentive      Response to Learning: Able to verbalize current knowledge/experience, Able to verbalize/acknowledge new learning and Progressing to goal      Endings: None Reported    Modes of Intervention: Education, Support, Socialization, Exploration, Clarifying and Problem-solving      Discipline Responsible: Psychoeducational Specialist      Signature:  Kaleb Dee

## 2020-07-03 NOTE — CARE COORDINATION
PT receives [facility] [address]- when PT is discharged Please call them at [phone] to restart his home care services. SW calls on call phone today to let them know that PT is still hospitalized and does not have a discharge plan for today. DEIDRE will continue to monitor the situation.

## 2020-07-03 NOTE — GROUP NOTE
Group Therapy Note    Date: 7/3/2020    Group Start Time: 1330  Group End Time: 1400  Group Topic: Cognitive Skills    [facility]    CRISTINO CastS        Group Therapy Note    Attendees: 12         Patient's Goal:  To increase interpersonal skills     Notes:  Patient attended group and participated     Status After Intervention:  Improved    Participation Level:  Active Listener and Interactive    Participation Quality: Appropriate, Attentive and Sharing      Speech:  normal      Thought Process/Content: Logical      Affective Functioning: Congruent      Mood: euthymic      Level of consciousness:  Alert and Oriented x4      Response to Learning: Progressing to goal      Endings: None Reported    Modes of Intervention: Socialization and Activity      Discipline Responsible: Psychoeducational Specialist      Signature:  Nicholas Jimenez

## 2020-07-04 LAB
ANION GAP SERPL CALCULATED.3IONS-SCNC: 8 MMOL/L (ref 9–17)
BUN BLDV-MCNC: 24 MG/DL (ref 8–23)
BUN/CREAT BLD: ABNORMAL (ref 9–20)
CALCIUM SERPL-MCNC: 9.1 MG/DL (ref 8.6–10.4)
CHLORIDE BLD-SCNC: 107 MMOL/L (ref 98–107)
CO2: 25 MMOL/L (ref 20–31)
CREAT SERPL-MCNC: 1.43 MG/DL (ref 0.7–1.2)
GFR AFRICAN AMERICAN: >60 ML/MIN
GFR NON-AFRICAN AMERICAN: 50 ML/MIN
GFR SERPL CREATININE-BSD FRML MDRD: ABNORMAL ML/MIN/{1.73_M2}
GFR SERPL CREATININE-BSD FRML MDRD: ABNORMAL ML/MIN/{1.73_M2}
GLUCOSE BLD-MCNC: 104 MG/DL (ref 70–99)
INR BLD: 1.4
POTASSIUM SERPL-SCNC: 4.3 MMOL/L (ref 3.7–5.3)
PROTHROMBIN TIME: 17.1 SEC (ref 11.8–14.6)
SODIUM BLD-SCNC: 140 MMOL/L (ref 135–144)

## 2020-07-04 PROCEDURE — 6370000000 HC RX 637 (ALT 250 FOR IP): Performed by: PSYCHIATRY & NEUROLOGY

## 2020-07-04 PROCEDURE — 80048 BASIC METABOLIC PNL TOTAL CA: CPT

## 2020-07-04 PROCEDURE — 99232 SBSQ HOSP IP/OBS MODERATE 35: CPT | Performed by: PSYCHIATRY & NEUROLOGY

## 2020-07-04 PROCEDURE — 1240000000 HC EMOTIONAL WELLNESS R&B

## 2020-07-04 PROCEDURE — 85610 PROTHROMBIN TIME: CPT

## 2020-07-04 PROCEDURE — 36415 COLL VENOUS BLD VENIPUNCTURE: CPT

## 2020-07-04 RX ORDER — WARFARIN SODIUM 3 MG/1
3 TABLET ORAL
Status: COMPLETED | OUTPATIENT
Start: 2020-07-04 | End: 2020-07-04

## 2020-07-04 RX ADMIN — Medication 1 MG: at 21:10

## 2020-07-04 RX ADMIN — SENNOSIDES 8.6 MG: 8.6 TABLET, FILM COATED ORAL at 10:16

## 2020-07-04 RX ADMIN — FAMOTIDINE 20 MG: 20 TABLET, FILM COATED ORAL at 10:16

## 2020-07-04 RX ADMIN — FOLIC ACID 1 MG: 1 TABLET ORAL at 10:16

## 2020-07-04 RX ADMIN — CALCIUM POLYCARBOPHIL 625 MG TABLET 625 MG: at 10:16

## 2020-07-04 RX ADMIN — ATORVASTATIN CALCIUM 20 MG: 20 TABLET, FILM COATED ORAL at 21:10

## 2020-07-04 RX ADMIN — MELATONIN 2000 UNITS: at 10:15

## 2020-07-04 RX ADMIN — CALCIUM POLYCARBOPHIL 625 MG TABLET 625 MG: at 21:10

## 2020-07-04 RX ADMIN — CETIRIZINE HYDROCHLORIDE 10 MG: 10 TABLET, FILM COATED ORAL at 10:16

## 2020-07-04 RX ADMIN — ASPIRIN 81 MG 81 MG: 81 TABLET ORAL at 10:16

## 2020-07-04 RX ADMIN — CALCIUM POLYCARBOPHIL 625 MG TABLET 625 MG: at 14:38

## 2020-07-04 RX ADMIN — CYANOCOBALAMIN TAB 1000 MCG 1000 MCG: 1000 TAB at 10:15

## 2020-07-04 RX ADMIN — DOXEPIN HYDROCHLORIDE 10 MG: 10 CAPSULE ORAL at 21:10

## 2020-07-04 RX ADMIN — SENNOSIDES 8.6 MG: 8.6 TABLET, FILM COATED ORAL at 21:10

## 2020-07-04 RX ADMIN — WARFARIN SODIUM 3 MG: 3 TABLET ORAL at 17:44

## 2020-07-04 RX ADMIN — PALIPERIDONE 3 MG: 3 TABLET, EXTENDED RELEASE ORAL at 10:16

## 2020-07-04 RX ADMIN — FAMOTIDINE 20 MG: 20 TABLET, FILM COATED ORAL at 21:10

## 2020-07-04 RX ADMIN — TAMSULOSIN HYDROCHLORIDE 0.4 MG: 0.4 CAPSULE ORAL at 10:16

## 2020-07-04 RX ADMIN — MULTIPLE VITAMINS W/ MINERALS TAB 1 TABLET: TAB at 10:16

## 2020-07-04 NOTE — PROGRESS NOTES
BEHAVIORAL HEALTH FOLLOW-UP NOTE     7/4/2020     Patient was seen and examined in person, Chart reviewed   Patient's case discussed with staff/team    Chief Complaint: Suicidal ideation    Interim History:     - Patient is discharge focussed. No side effects from [place]. Denying all symptoms.   -We discussed that he has been improvement. He would like him to receive the second dose of Invega. It may be possible for him to receive that as an outpatient.   We will check with his community mental health agency on Monday                  Energy:    [] Normal/Unchanged  [] Increased  [x] Decreased        Aggression:  [] yes  [x] no    Patient is [x] able  [] unable to CONTRACT FOR SAFETY ON THE UNIT    PAST MEDICAL/PSYCHIATRIC HISTORY:   Past Medical History:   Diagnosis Date    AMS (altered mental status) 02/15/2020    Atrial [facility] ([facility] [address]     on coumadin    Bipolar 1 disorder ([facility] [address]     Depression     Hypertension     Neck pain     Patient in clinical research study [facility]12/2018    [id number]       FAMILY/SOCIAL HISTORY:  Family History   Problem Relation Age of Onset    Coronary Art Dis Mother     Hypertension Mother     Hypertension Father     Cancer Father         blood     Social History     Socioeconomic History    Marital status: Single     Spouse name: Not on file    Number of children: Not on file    Years of education: Not on file    Highest education level: Not on file   Occupational History    Not on file   Social Needs    Financial resource strain: Not on file    Food insecurity     Worry: Not on file     Inability: Not on file    Transportation needs     Medical: Not on file     Non-medical: Not on file   Tobacco Use    Smoking status: Never Smoker    Smokeless tobacco: Never Used   Substance and Sexual Activity    Alcohol use: Yes     Comment: heavy    Drug use: Yes     Types: Cocaine    Sexual activity: Not on file   Lifestyle    Physical activity     Days per week: Not on file Minutes per session: Not on file    Stress: Not on file   Relationships    Social connections     Talks on phone: Not on file     Gets together: Not on file     Attends [employer]ology service: Not on file     Active member of club or organization: Not on file     Attends meetings of clubs or organizations: Not on file     Relationship status: Not on file    Intimate partner violence     Fear of current or ex partner: Not on file     Emotionally abused: Not on file     Physically abused: Not on file     Forced sexual activity: Not on file   Other Topics Concern    Not on file   Social History Narrative    Not on file           ROS:  [x] All negative/unchanged except if checked.  Explain positive(checked items) below:  [] Constitutional  [] Eyes  [] Ear/Nose/Mouth/Throat  [] Respiratory  [] CV  [] GI  []   [] Musculoskeletal  [] Skin/Breast  [] Neurological  [] Endocrine  [] Heme/Lymph  [] Allergic/Immunologic    Explanation:     MEDICATIONS:    Current Facility-Administered Medications:     warfarin (COUMADIN) tablet 3 mg, 3 mg, Oral, Once, Michelle Frederick MD    acetaminophen (TYLENOL) tablet 650 mg, 650 mg, Oral, Q4H PRN, Patricio Barragan MD    traZODone (DESYREL) tablet 50 mg, 50 mg, Oral, Nightly PRN, Patricio Barragan MD, 50 mg at 07/03/20 2123    benztropine mesylate (COGENTIN) injection 2 mg, 2 mg, Intramuscular, Daily PRN, Patricio Barragan MD    hydrOXYzine (ATARAX) tablet 25 mg, 25 mg, Oral, TID PRN, Patricio Barragan MD, 25 mg at 07/02/20 0845    aluminum & magnesium hydroxide-simethicone (MAALOX) 200-200-20 MG/5ML suspension 30 mL, 30 mL, Oral, TID PRN, Patricio Barragan MD    magnesium hydroxide (MILK OF MAGNESIA) 400 MG/5ML suspension 30 mL, 30 mL, Oral, Nightly PRN, Patricio Barragan MD    diphenhydrAMINE (BENADRYL) tablet 25 mg, 25 mg, Oral, Nightly PRN, Patricio Barragan MD, 25 mg at 07/02/20 2138    nicotine (NICODERM CQ) 14 MG/24HR 1 patch, 1 patch, Transdermal, Daily, Patricio Barragan MD    aspirin chewable tablet 81 mg, psychomotor retardation  Attitude toward examiner: Cooperative  Speech:  poverty of speech   Mood: Euthymic  Affect:  restricted  Thought processes: No abnormalities noted   thought content:  Homicidal ideation - none  Suicidal Ideation: denies  Delusions:  Denies  Perceptual Disturbance:  denies any perceptual disturbance  Cognition:  oriented to person, place, and time   Concentration poor  Insight fair  Judgement fair    ASSESSMENT:   Patient symptoms are:  [] Well controlled  [x] Improving  [] Worsening  [] No change      Diagnosis: Schizoaffective disorder bipolar type    LABS:    No results for input(s): WBC, HGB, PLT in the last 72 hours. Recent Labs     07/03/20  0619 07/04/20  0632    140   K 4.5 4.3    107   CO2 25 25   BUN 26* 24*   CREATININE 1.42* 1.43*   GLUCOSE 95 104*     No results for input(s): BILITOT, ALKPHOS, AST, ALT in the last 72 hours. Lab Results   Component Value Date    BARBSCNU NEGATIVE 07/01/2020    LABBENZ NEGATIVE 07/01/2020    LABBENZ NEGATIVE 02/21/2013    LABMETH NEGATIVE 07/01/2020    PPXUR NOT REPORTED 07/01/2020     Lab Results   Component Value Date    TSH 1.33 07/01/2020     Lab Results   Component Value Date    LITHIUM 0.5 (L) 03/07/2013     No results found for: VALPROATE, CBMZ    RISK ASSESSMENT: Moderate risk of suicide. Low risk of aggression    Treatment Plan:  Reviewed current Medications with the patient. Oral Invega discontinued. The patient has received first loading dose of [place]  Risks, benefits, side effects, drug-to-drug interactions and alternatives to treatment were discussed. The patient  consented to treatment. Encourage patient to attend group and other milieu activities.   Discharge planning discussed with the patient and treatment team.    PSYCHOTHERAPY/COUNSELING:  [] Therapeutic interview  [x] Supportive  [] CBT  [] Ongoing  [] Other    [x] Patient continues to need, on a daily basis, active treatment furnished directly by or requiring the supervision of inpatient psychiatric personnel      Anticipated Length of stay:3-5 days                                          Iris Curtis is a 60 y.o. male being evaluated by a Virtual Visit (video visit) encounter to address concerns as mentioned above. A caregiver was present in the room along with the patient. Pursuant to the emergency declaration under the [address] [place] [address], [address] [place] [address] and the [employer] Act, this Virtual Visit was conducted with patient's (and/or legal guardian's) consent, to reduce the patient's risk of exposure to COVID-19 and provide necessary medical care. Services were provided through a video synchronous discussion virtually to substitute for in-person visit by provider. Patient is present at [facility]  and I am physically present at my home in [place] [address], [place] on 7/4/2020 at 3:28 PM    An electronic signature was used to authenticate this note. **This report has been created using voice recognition software. It may contain minor errors which are inherent in voice recognition technology. **

## 2020-07-04 NOTE — PROGRESS NOTES
Pharmacy Note  Warfarin Consult follow-up      Recent Labs     07/02/20  0605 07/03/20  0619 07/04/20  0632   INR 2.1 1.7 1.4     No results for input(s): HGB, HCT, PLT in the last 72 hours. Significant Drug-Drug Interactions:  New warfarin drug-drug interactions: trazodone  Discontinued drug-drug interactions: none  Current warfarin drug-drug interactions: atorvastatin, doxepine       Date             INR        Dose given previous day  Dose scheduled for today  7/4/2020            1.4       2.5 mg            3 mg    Notes:                   INR subtherapeutic  Will give increased dose of 3 mg today  Daily PT/INR while inpatient. Niles Garibay, [facility].  [address] [facility] 7/4/2020 12:30 PM

## 2020-07-04 NOTE — PLAN OF CARE
Problem: Depressive Behavior With or Without Suicide Precautions:  Goal: Absence of self-harm  Description: Absence of self-harm  7/3/2020 2258 by Dm Lynn LPN  Outcome: Ongoing  Note: Pt denies thoughts of self harm and is agreeable to seeking out should thoughts of self harm arise. Safe environment maintained. Q15 minute checks for safety cont per unit policy. Will cont to monitor for safety and provides support and reassurance as needed.

## 2020-07-04 NOTE — GROUP NOTE
Group Therapy Note    Date: 7/4/2020    Group Start Time: 0900  Group End Time: 0930  Group Topic: Community Meeting    NORAH Ellis [place], [place]    Attendees: 12         Patient's Goal:  To be informed of group programming schedule for today and reiteration of unit guidelines. To verbalize obtainable short term goal for today pertaining to mental health and stability. Notes:  Patient verbalized goal for today to work on discharge planning. Status After Intervention:  Improved    Participation Level:  Active Listener and Interactive    Participation Quality: Appropriate, Attentive and Sharing      Speech:  normal      Thought Process/Content: Logical      Affective Functioning: Congruent      Mood: euthymic      Level of consciousness:  Alert, Oriented x4 and Attentive      Response to Learning: Able to verbalize current knowledge/experience, Able to verbalize/acknowledge new learning, Able to retain information, Capable of insight and Progressing to goal      Endings: None Reported       Modes of Intervention: Support, Socialization, Exploration, Clarifying, Problem-solving, Confrontation, Limit-setting and Reality-testing      Discipline Responsible: Psychoeducational Specialist      Signature:  Uzma Ladd

## 2020-07-04 NOTE — GROUP NOTE
Group Therapy Note    Date: 7/3/2020    Group Start Time: 2015  Group End Time: 2100  Group Topic: Wrap-Up    [facility]    Ignacia Babcock        Group Therapy Note    Attendees: 13/21         Patient's Goal:  Wrap Up Group  Notes:  Short/long term goals    Status After Intervention:  Improved    Participation Level:  Active Listener and Interactive    Participation Quality: Appropriate, Attentive, Sharing and Supportive      Speech:  normal      Thought Process/Content: Logical      Affective Functioning: Congruent      Mood: within normal limits      Level of consciousness:  Alert, Oriented x4 and Attentive      Response to Learning: Able to verbalize current knowledge/experience, Able to verbalize/acknowledge new learning and Able to retain information      Endings: None Reported    Modes of Intervention: Education, Support and Socialization      Discipline Responsible: [address], [employer]      Signature:  Ignacia Babcock

## 2020-07-05 LAB
ANION GAP SERPL CALCULATED.3IONS-SCNC: 8 MMOL/L (ref 9–17)
BUN BLDV-MCNC: 22 MG/DL (ref 8–23)
BUN/CREAT BLD: ABNORMAL (ref 9–20)
CALCIUM SERPL-MCNC: 9.1 MG/DL (ref 8.6–10.4)
CHLORIDE BLD-SCNC: 108 MMOL/L (ref 98–107)
CO2: 22 MMOL/L (ref 20–31)
CREAT SERPL-MCNC: 1.31 MG/DL (ref 0.7–1.2)
GFR AFRICAN AMERICAN: >60 ML/MIN
GFR NON-AFRICAN AMERICAN: 56 ML/MIN
GFR SERPL CREATININE-BSD FRML MDRD: ABNORMAL ML/MIN/{1.73_M2}
GFR SERPL CREATININE-BSD FRML MDRD: ABNORMAL ML/MIN/{1.73_M2}
GLUCOSE BLD-MCNC: 99 MG/DL (ref 70–99)
INR BLD: 1.3
POTASSIUM SERPL-SCNC: 4.4 MMOL/L (ref 3.7–5.3)
PROTHROMBIN TIME: 16.1 SEC (ref 11.8–14.6)
SODIUM BLD-SCNC: 138 MMOL/L (ref 135–144)

## 2020-07-05 PROCEDURE — 6370000000 HC RX 637 (ALT 250 FOR IP): Performed by: PSYCHIATRY & NEUROLOGY

## 2020-07-05 PROCEDURE — 99232 SBSQ HOSP IP/OBS MODERATE 35: CPT | Performed by: PSYCHIATRY & NEUROLOGY

## 2020-07-05 PROCEDURE — 85610 PROTHROMBIN TIME: CPT

## 2020-07-05 PROCEDURE — 1240000000 HC EMOTIONAL WELLNESS R&B

## 2020-07-05 PROCEDURE — 80048 BASIC METABOLIC PNL TOTAL CA: CPT

## 2020-07-05 PROCEDURE — 36415 COLL VENOUS BLD VENIPUNCTURE: CPT

## 2020-07-05 RX ORDER — WARFARIN SODIUM 3 MG/1
3 TABLET ORAL
Status: COMPLETED | OUTPATIENT
Start: 2020-07-05 | End: 2020-07-05

## 2020-07-05 RX ADMIN — CYANOCOBALAMIN TAB 1000 MCG 1000 MCG: 1000 TAB at 08:56

## 2020-07-05 RX ADMIN — ATORVASTATIN CALCIUM 20 MG: 20 TABLET, FILM COATED ORAL at 21:01

## 2020-07-05 RX ADMIN — CETIRIZINE HYDROCHLORIDE 10 MG: 10 TABLET, FILM COATED ORAL at 08:53

## 2020-07-05 RX ADMIN — HYDROXYZINE HYDROCHLORIDE 25 MG: 25 TABLET, FILM COATED ORAL at 21:01

## 2020-07-05 RX ADMIN — DOXEPIN HYDROCHLORIDE 10 MG: 10 CAPSULE ORAL at 21:01

## 2020-07-05 RX ADMIN — WARFARIN SODIUM 3 MG: 3 TABLET ORAL at 17:16

## 2020-07-05 RX ADMIN — CALCIUM POLYCARBOPHIL 625 MG TABLET 625 MG: at 21:02

## 2020-07-05 RX ADMIN — FAMOTIDINE 20 MG: 20 TABLET, FILM COATED ORAL at 08:53

## 2020-07-05 RX ADMIN — FAMOTIDINE 20 MG: 20 TABLET, FILM COATED ORAL at 21:01

## 2020-07-05 RX ADMIN — TAMSULOSIN HYDROCHLORIDE 0.4 MG: 0.4 CAPSULE ORAL at 08:53

## 2020-07-05 RX ADMIN — ASPIRIN 81 MG 81 MG: 81 TABLET ORAL at 08:53

## 2020-07-05 RX ADMIN — MELATONIN 2000 UNITS: at 08:53

## 2020-07-05 RX ADMIN — SENNOSIDES 8.6 MG: 8.6 TABLET, FILM COATED ORAL at 08:53

## 2020-07-05 RX ADMIN — SENNOSIDES 8.6 MG: 8.6 TABLET, FILM COATED ORAL at 21:01

## 2020-07-05 RX ADMIN — CALCIUM POLYCARBOPHIL 625 MG TABLET 625 MG: at 13:17

## 2020-07-05 RX ADMIN — Medication 1 MG: at 21:01

## 2020-07-05 RX ADMIN — CALCIUM POLYCARBOPHIL 625 MG TABLET 625 MG: at 08:56

## 2020-07-05 RX ADMIN — MULTIPLE VITAMINS W/ MINERALS TAB 1 TABLET: TAB at 08:53

## 2020-07-05 RX ADMIN — FOLIC ACID 1 MG: 1 TABLET ORAL at 08:53

## 2020-07-05 RX ADMIN — ACETAMINOPHEN 650 MG: 325 TABLET, FILM COATED ORAL at 21:56

## 2020-07-05 ASSESSMENT — PAIN SCALES - GENERAL
PAINLEVEL_OUTOF10: 0
PAINLEVEL_OUTOF10: 3

## 2020-07-05 NOTE — PLAN OF CARE
Problem: Depressive Behavior With or Without Suicide Precautions:  Goal: Ability to disclose and discuss suicidal ideas will improve  Description: Ability to disclose and discuss suicidal ideas will improve  Outcome: Ongoing   Patient denies suicidal ideation at this time. Patient is encouraged to reach out to staff if this changes  Problem: Depressive Behavior With or Without Suicide Precautions:  Goal: Absence of self-harm  Description: Absence of self-harm  Outcome: Ongoing   Patient is absent of self-harm at this time. Patient is safety check every fifteen minutes.

## 2020-07-05 NOTE — GROUP NOTE
Group Therapy Note    Date: 7/5/2020    Group Start Time: 1100  Group End Time: 1150  Group Topic: Cognitive Skills    NOR[facility] BHPATRICIA Harman, [facility]        Group Therapy Note    Attendees: 8/20         Patient's Goal:  To increase social interaction and to practice concentration and decision making skills. Notes: Pt participated fully in group  Pt was able to practice concentration and decision making skills independently . Pt was pleasant and supportive of peers. Status After Intervention:  Improved     Participation Level: Active Listener and Interactive     Participation Quality: Appropriate, Attentive, Sharing and Supportive        Speech:  normal        Thought Process/Content: some thought blocking but able to give logical answers x5.  Pt demonstrates increased participation and improved thought process           Affective Functioning: Congruent-pt was smiling and enthusiastic r/t task, volunteered for extra turns        Mood: euthymic        Level of consciousness:  Alert, Oriented x4 and Attentive        Response to Learning: Able to verbalize current knowledge/experience, Able to verbalize/acknowledge new learning, Able to retain information and Progressing to goal        Endings: None Reported     Modes of Intervention: Education, Support, Socialization, Exploration, Clarifying and Problem-solving        Discipline Responsible: Psychoeducational Specialist        Signature:  Modesta Epley

## 2020-07-05 NOTE — PROGRESS NOTES
BEHAVIORAL HEALTH FOLLOW-UP NOTE     7/5/2020     Patient was seen and examined in person, Chart reviewed   Patient's case discussed with staff/team    Chief Complaint: Suicidal ideation    Interim History:     -The patient was seen face-to-face. He remains discharged focused. He says he was feeling suicidal at the time of admission but he is no longer feeling like that. He continues to be somewhat restricted in his affect.                 Energy:    [] Normal/Unchanged  [] Increased  [x] Decreased        Aggression:  [] yes  [x] no    Patient is [x] able  [] unable to CONTRACT FOR SAFETY ON THE UNIT    PAST MEDICAL/PSYCHIATRIC HISTORY:   Past Medical History:   Diagnosis Date    AMS (altered mental status) 02/15/2020    Atrial fibrillation ([facility] [address]     on coumadin    Bipolar 1 disorder ([facility] [address]     Depression     Hypertension     Neck pain     Patient in clinical research study 04/12/2018    [id number]       FAMILY/SOCIAL HISTORY:  Family History   Problem Relation Age of Onset    Coronary Art Dis Mother     Hypertension Mother     Hypertension Father     Cancer Father         blood     Social History     Socioeconomic History    Marital status: Single     Spouse name: Not on file    Number of children: Not on file    Years of education: Not on file    Highest education level: Not on file   Occupational History    Not on file   Social Needs    Financial resource strain: Not on file    Food insecurity     Worry: Not on file     Inability: Not on file    Transportation needs     Medical: Not on file     Non-medical: Not on file   Tobacco Use    Smoking status: Never Smoker    Smokeless tobacco: Never Used   Substance and Sexual Activity    Alcohol use: Yes     Comment: heavy    Drug use: Yes     Types: Cocaine    Sexual activity: Not on file   Lifestyle    Physical activity     Days per week: Not on file     Minutes per session: Not on file    Stress: Not on file   Relationships    Social connections     Talks on phone: Not on file     Gets together: Not on file     Attends Pentecostal service: Not on file     Active member of club or organization: Not on file     Attends meetings of clubs or organizations: Not on file     Relationship status: Not on file    Intimate partner violence     Fear of current or ex partner: Not on file     Emotionally abused: Not on file     Physically abused: Not on file     Forced sexual activity: Not on file   Other Topics Concern    Not on file   Social History Narrative    Not on file           ROS:  [x] All negative/unchanged except if checked.  Explain positive(checked items) below:  [] Constitutional  [] Eyes  [] Ear/Nose/Mouth/Throat  [] Respiratory  [] CV  [] GI  []   [] Musculoskeletal  [] Skin/Breast  [] Neurological  [] Endocrine  [] Heme/Lymph  [] Allergic/Immunologic    Explanation:     MEDICATIONS:    Current Facility-Administered Medications:     warfarin (COUMADIN) tablet 3 mg, 3 mg, Oral, Once, Bahman Acharya MD    acetaminophen (TYLENOL) tablet 650 mg, 650 mg, Oral, Q4H PRN, Rhys Reynolds MD    traZODone (DESYREL) tablet 50 mg, 50 mg, Oral, Nightly PRN, Rhys Reynolds MD, 50 mg at 07/03/20 2123    benztropine mesylate (COGENTIN) injection 2 mg, 2 mg, Intramuscular, Daily PRN, Rhys Reynolds MD    hydrOXYzine (ATARAX) tablet 25 mg, 25 mg, Oral, TID PRN, Rhys Reynolds MD, 25 mg at 07/02/20 0845    aluminum & magnesium hydroxide-simethicone (MAALOX) 200-200-20 MG/5ML suspension 30 mL, 30 mL, Oral, TID PRN, Rhys Reynolds MD    magnesium hydroxide (MILK OF MAGNESIA) 400 MG/5ML suspension 30 mL, 30 mL, Oral, Nightly PRN, Rhys Reynolds MD    diphenhydrAMINE (BENADRYL) tablet 25 mg, 25 mg, Oral, Nightly PRN, Rhys Reynolds MD, 25 mg at 07/02/20 2138    aspirin chewable tablet 81 mg, 81 mg, Oral, Daily, Bahman Acharya MD, 81 mg at 07/05/20 0853    atorvastatin (LIPITOR) tablet 20 mg, 20 mg, Oral, Nightly, Bahman Acharya MD, 20 mg at 07/04/20 8100   cetirizine (ZYRTEC) tablet 10 mg, 10 mg, Oral, Daily, Tameka Ortiz MD, 10 mg at 07/05/20 0853    Vitamin D (CHOLECALCIFEROL) tablet 2,000 Units, 2,000 Units, Oral, Daily, Tameka Ortiz MD, 2,000 Units at 07/05/20 0853    vitamin B-12 (CYANOCOBALAMIN) tablet 1,000 mcg, 1,000 mcg, Oral, Daily, Tameka Ortiz MD, 1,000 mcg at 07/05/20 0856    doxepin (SINEQUAN) capsule 10 mg, 10 mg, Oral, Nightly, Tameka Ortiz MD, 10 mg at 08/83/95 6428    folic acid (FOLVITE) tablet 1 mg, 1 mg, Oral, Daily, Tameka Ortiz MD, 1 mg at 07/05/20 0853    famotidine (PEPCID) tablet 20 mg, 20 mg, Oral, BID, Tameka Ortiz MD, 20 mg at 07/05/20 0853    melatonin ER tablet 1 mg, 1 mg, Oral, Nightly PRN, Tameka Ortiz MD, 1 mg at 07/04/20 2110    therapeutic multivitamin-minerals 1 tablet, 1 tablet, Oral, Daily, Tameka Ortiz MD, 1 tablet at 07/05/20 0853    polycarbophil (FIBERCON) tablet 625 mg, 625 mg, Oral, TID, Tameka Ortiz MD, 625 mg at 07/05/20 0856    senna (SENOKOT) tablet 8.6 mg, 1 tablet, Oral, BID, Tameka Ortiz MD, 8.6 mg at 07/05/20 0853    tamsulosin (FLOMAX) capsule 0.4 mg, 0.4 mg, Oral, Daily, Tameka Ortiz MD, 0.4 mg at 07/05/20 0853    haloperidol lactate (HALDOL) injection 5 mg, 5 mg, Intramuscular, Q4H PRN **OR** haloperidol (HALDOL) tablet 5 mg, 5 mg, Oral, Q4H PRN, Tameka Ortiz MD    warfarin (COUMADIN) daily dosing (placeholder), , Other, RX Placeholder, Tameka Ortiz MD      Examination:  /73   Pulse 72   Temp 97.1 °F (36.2 °C) (Oral)   Resp 14   Ht 5' 6\" (1.676 m)   Wt 186 lb (84.4 kg)   SpO2 97%   BMI 30.02 kg/m²   Gait - steady  Medication side effects(SE): none    Mental Status Examination:    Level of consciousness:  within normal limits   Appearance:  fair grooming and poor hygiene  Behavior/Motor:  psychomotor retardation  Attitude toward examiner: Cooperative  Speech:  poverty of speech   Mood: Euthymic  Affect:  restricted  Thought processes: No abnormalities noted thought content:  Homicidal ideation - none  Suicidal Ideation: denies  Delusions:  Denies  Perceptual Disturbance:  denies any perceptual disturbance  Cognition:  oriented to person, place, and time   Concentration fair  Insight fair  Judgement fair    ASSESSMENT:   Patient symptoms are:  [] Well controlled  [x] Improving  [] Worsening  [] No change      Diagnosis: Schizoaffective disorder bipolar type    LABS:    No results for input(s): WBC, HGB, PLT in the last 72 hours. Recent Labs     07/03/20  0619 07/04/20  0632 07/05/20  0639    140 138   K 4.5 4.3 4.4    107 108*   CO2 25 25 22   BUN 26* 24* 22   CREATININE 1.42* 1.43* 1.31*   GLUCOSE 95 104* 99     No results for input(s): BILITOT, ALKPHOS, AST, ALT in the last 72 hours. Lab Results   Component Value Date    BARBSCNU NEGATIVE 07/01/2020    LABBENZ NEGATIVE 07/01/2020    LABBENZ NEGATIVE 02/21/2013    LABMETH NEGATIVE 07/01/2020    PPXUR NOT REPORTED 07/01/2020     Lab Results   Component Value Date    TSH 1.33 07/01/2020     Lab Results   Component Value Date    LITHIUM 0.5 (L) 03/07/2013     No results found for: VALPROATE, CBMZ    RISK ASSESSMENT: Moderate risk of suicide. Low risk of aggression    Treatment Plan:  Reviewed current Medications with the patient. Oral Invega discontinued. The patient has received first loading dose of [place]  Risks, benefits, side effects, drug-to-drug interactions and alternatives to treatment were discussed. The patient  consented to treatment. Encourage patient to attend group and other milieu activities.   Discharge planning discussed with the patient and treatment team.    PSYCHOTHERAPY/COUNSELING:  [] Therapeutic interview  [x] Supportive  [] CBT  [] Ongoing  [] Other    [x] Patient continues to need, on a daily basis, active treatment furnished directly by or requiring the supervision of inpatient psychiatric personnel      Anticipated Length of AFEP:4-2SNYT --Konrad Gtz MD on 7/5/2020 at 11:20 AM    An electronic signature was used to authenticate this note. **This report has been created using voice recognition software. It may contain minor errors which are inherent in voice recognition technology. **

## 2020-07-05 NOTE — PLAN OF CARE
Problem: Depressive Behavior With or Without Suicide Precautions:  Goal: Ability to disclose and discuss suicidal ideas will improve  Description: Ability to disclose and discuss suicidal ideas will improve  7/4/2020 2249 by Jabier Long LPN  Outcome: Ongoing  Note: Patient denies suicidal ideations at this time. Patient safety check every 15 minute     Problem: Depressive Behavior With or Without Suicide Precautions:  Goal: Absence of self-harm  Description: Absence of self-harm  7/4/2020 2249 by Jabier Long LPN  Outcome: Ongoing  Note: Patient is free of self harm at this time. Patient safety check every 15 minute     Problem: Substance Abuse:  Goal: Absence of drug withdrawal signs and symptoms  Description: Absence of drug withdrawal signs and symptoms  Outcome: Ongoing  Note: Patient is free of drug withdrawal at this time. Patient safety check every 15 minute.

## 2020-07-05 NOTE — GROUP NOTE
Group Therapy Note    Date: 7/5/2020    Group Start Time: 0900  Group End Time: 5258  Group Topic: Community Meeting    [facility]    Nilsa Waters, [address]        Group Therapy Note    Attendees: 12/22              Patient's Goal:  To increase social interaction and to explore and identify short term goals r/t wellness and recovery. RT discussed group schedule and unit structure/resources and encouraged pts to be engaged in their treatment. Pts were given opportunities to ask questions. Notes: Pt participated in group task and was able to identify short term goals r/t wellness and recovery. Pt is pleasant and supportive of peers        Status After Intervention:  Improved     Participation Level:  Active Listener and Interactive     Participation Quality: Appropriate, Attentive, Sharing         Speech:   Normal     Thought Process/Content: slow to process ,thought blocking  Affective Functioning: Blunted     Mood: euthymic        Level of consciousness:  Alert, and Attentive        Response to Learning: Able to verbalize current knowledge/experience, Able to verbalize/acknowledge new learning, and Progressing to goal        Endings: None Reported     Modes of Intervention: Education, Support, Socialization, Exploration, Clarifying and Problem-solving        Discipline Responsible: Psychoeducational Specialist        Signature:  Yesenia Dumas, CTRS

## 2020-07-06 LAB
ANION GAP SERPL CALCULATED.3IONS-SCNC: 9 MMOL/L (ref 9–17)
BUN BLDV-MCNC: 18 MG/DL (ref 8–23)
BUN/CREAT BLD: ABNORMAL (ref 9–20)
CALCIUM SERPL-MCNC: 8.9 MG/DL (ref 8.6–10.4)
CHLORIDE BLD-SCNC: 109 MMOL/L (ref 98–107)
CO2: 22 MMOL/L (ref 20–31)
CREAT SERPL-MCNC: 1.34 MG/DL (ref 0.7–1.2)
GFR AFRICAN AMERICAN: >60 ML/MIN
GFR NON-AFRICAN AMERICAN: 54 ML/MIN
GFR SERPL CREATININE-BSD FRML MDRD: ABNORMAL ML/MIN/{1.73_M2}
GFR SERPL CREATININE-BSD FRML MDRD: ABNORMAL ML/MIN/{1.73_M2}
GLUCOSE BLD-MCNC: 92 MG/DL (ref 70–99)
INR BLD: 1.4
POTASSIUM SERPL-SCNC: 4.4 MMOL/L (ref 3.7–5.3)
PROTHROMBIN TIME: 17 SEC (ref 11.8–14.6)
SODIUM BLD-SCNC: 140 MMOL/L (ref 135–144)

## 2020-07-06 PROCEDURE — 1240000000 HC EMOTIONAL WELLNESS R&B

## 2020-07-06 PROCEDURE — 6370000000 HC RX 637 (ALT 250 FOR IP): Performed by: PSYCHIATRY & NEUROLOGY

## 2020-07-06 PROCEDURE — 36415 COLL VENOUS BLD VENIPUNCTURE: CPT

## 2020-07-06 PROCEDURE — 80048 BASIC METABOLIC PNL TOTAL CA: CPT

## 2020-07-06 PROCEDURE — 85610 PROTHROMBIN TIME: CPT

## 2020-07-06 RX ORDER — WARFARIN SODIUM 2 MG/1
4 TABLET ORAL
Status: COMPLETED | OUTPATIENT
Start: 2020-07-06 | End: 2020-07-06

## 2020-07-06 RX ADMIN — TRAZODONE HYDROCHLORIDE 50 MG: 50 TABLET ORAL at 21:09

## 2020-07-06 RX ADMIN — FOLIC ACID 1 MG: 1 TABLET ORAL at 08:13

## 2020-07-06 RX ADMIN — SENNOSIDES 8.6 MG: 8.6 TABLET, FILM COATED ORAL at 08:13

## 2020-07-06 RX ADMIN — ASPIRIN 81 MG 81 MG: 81 TABLET ORAL at 08:12

## 2020-07-06 RX ADMIN — CALCIUM POLYCARBOPHIL 625 MG TABLET 625 MG: at 21:10

## 2020-07-06 RX ADMIN — TAMSULOSIN HYDROCHLORIDE 0.4 MG: 0.4 CAPSULE ORAL at 08:12

## 2020-07-06 RX ADMIN — MELATONIN 2000 UNITS: at 08:13

## 2020-07-06 RX ADMIN — CYANOCOBALAMIN TAB 1000 MCG 1000 MCG: 1000 TAB at 09:47

## 2020-07-06 RX ADMIN — HYDROXYZINE HYDROCHLORIDE 25 MG: 25 TABLET, FILM COATED ORAL at 21:10

## 2020-07-06 RX ADMIN — CETIRIZINE HYDROCHLORIDE 10 MG: 10 TABLET, FILM COATED ORAL at 08:12

## 2020-07-06 RX ADMIN — DOXEPIN HYDROCHLORIDE 10 MG: 10 CAPSULE ORAL at 21:10

## 2020-07-06 RX ADMIN — CALCIUM POLYCARBOPHIL 625 MG TABLET 625 MG: at 15:22

## 2020-07-06 RX ADMIN — ACETAMINOPHEN 650 MG: 325 TABLET, FILM COATED ORAL at 09:48

## 2020-07-06 RX ADMIN — ACETAMINOPHEN 650 MG: 325 TABLET, FILM COATED ORAL at 21:10

## 2020-07-06 RX ADMIN — FAMOTIDINE 20 MG: 20 TABLET, FILM COATED ORAL at 21:10

## 2020-07-06 RX ADMIN — MULTIPLE VITAMINS W/ MINERALS TAB 1 TABLET: TAB at 08:13

## 2020-07-06 RX ADMIN — SENNOSIDES 8.6 MG: 8.6 TABLET, FILM COATED ORAL at 21:09

## 2020-07-06 RX ADMIN — CALCIUM POLYCARBOPHIL 625 MG TABLET 625 MG: at 09:47

## 2020-07-06 RX ADMIN — FAMOTIDINE 20 MG: 20 TABLET, FILM COATED ORAL at 08:12

## 2020-07-06 RX ADMIN — ATORVASTATIN CALCIUM 20 MG: 20 TABLET, FILM COATED ORAL at 21:09

## 2020-07-06 RX ADMIN — WARFARIN SODIUM 4 MG: 2 TABLET ORAL at 18:02

## 2020-07-06 ASSESSMENT — PAIN DESCRIPTION - LOCATION
LOCATION: LEG
LOCATION: LEG

## 2020-07-06 ASSESSMENT — PAIN SCALES - GENERAL
PAINLEVEL_OUTOF10: 3
PAINLEVEL_OUTOF10: 6
PAINLEVEL_OUTOF10: 1
PAINLEVEL_OUTOF10: 0

## 2020-07-06 NOTE — PLAN OF CARE
Problem: Depressive Behavior With or Without Suicide Precautions:  Goal: Ability to disclose and discuss suicidal ideas will improve  Description: Ability to disclose and discuss suicidal ideas will improve  Outcome: Ongoing  Note: Patient is accepting of 1:1 talk time with staff. Patient admits to anxiety. Patient is eating and sleeping well. Patient attends unit programming. Patient is social with peers in milieu. Reassurance and support provided. Q15 minute checks maintained. Patient remains safe at this time. Problem: Depressive Behavior With or Without Suicide Precautions:  Goal: Absence of self-harm  Description: Absence of self-harm  Note: No self harm behaviors noted. Patient denies suicidal and homicidal ideation and verbally agrees to approach staff if thoughts of self harm arises.

## 2020-07-06 NOTE — PROGRESS NOTES
Pharmacy Note  Warfarin Consult follow-up      Recent Labs     07/04/20  0632 07/05/20  0639 07/06/20  0615   INR 1.4 1.3 1.4     No results for input(s): HGB, HCT, PLT in the last 72 hours. Significant Drug-Drug Interactions:  New warfarin drug-drug interactions: none  Discontinued drug-drug interactions: none  Current warfarin drug-drug interactions: aspirin, atorvastatin      Date             INR        Dose given previous day  Dose scheduled for today  7/6/2020          1.4     3 mg                 4 mg        Notes:  INR remains low, will give 4 mg today. Daily PT/INR while inpatient.      JosefChrist HeardD, BCPS  7/6/2020 8:05 AM

## 2020-07-06 NOTE — GROUP NOTE
Group Therapy Note    Date: 7/6/2020    Group Start Time: 1430  Group End Time: 5700  Group Topic: Psychoeducation    [facility]    Replaced by [facility]        Group Therapy Note    Pt did not attend positive affirmations activity group d/t resting in room despite staff invitation to attend. 1:1 talk time offered as alternative to group session, pt declined.

## 2020-07-06 NOTE — GROUP NOTE
Group Therapy Note    Date: 7/6/2020    Group Start Time: 1100  Group End Time: 8711  Group Topic: Cognitive Skills    [facility]    CRISTINO IvanS        Group Therapy Note    Attendees: 8         Patient's Goal:  To improve interpersonal relationships     Notes:   Pt was pleasant and participated well     Status After Intervention:  Improved    Participation Level:  Active Listener and Interactive    Participation Quality: Appropriate and Attentive      Speech:  normal      Thought Process/Content: Logical      Affective Functioning: Congruent      Mood: euthymic      Level of consciousness:  Alert       Response to Learning: Able to verbalize current knowledge/experience and Progressing to goal      Endings: None Reported    Modes of Intervention: Education and Support      Discipline Responsible: Psychoeducational Specialist      Signature:  Fatoumata De Jesus

## 2020-07-06 NOTE — GROUP NOTE
Group Therapy Note    Date: 7/6/2020    Group Start Time: 0900  Group End Time: 2604  Group Topic: Community Meeting    DIMITRI Coelho        Group Therapy Note    Attendees 10         Patient's Goal:  To improve decision making skills     Notes:   Pt was pleasant and cooperative     Status After Intervention:  Improved    Participation Level:  Active Listener and Interactive    Participation Quality: Appropriate, Attentive and Sharing      Speech:  normal      Thought Process/Content: Logical      Affective Functioning: flat      Mood: depressed       Level of consciousness:  Alert      Response to Learning: Able to verbalize current knowledge/experience and Progressing to goal      Endings: None Reported    Modes of Intervention: Education and Support      Discipline Responsible: Psychoeducational Specialist      Signature:  Preston Mayer

## 2020-07-06 NOTE — GROUP NOTE
Group Therapy Note    Date: 7/6/2020    Group Start Time: 1000  Group End Time: 1100  Group Topic: Psychotherapy    [facility]    PIETER Pollock, LUIS        Group Therapy Note    Attendees: 12/22         Patient's Goal:  Pt will participate in psychotherapy in order to help eliminate or control troubling symptoms so a person can function better and can increase well-being and healing. Notes:  Pt appeared interested in group discussion, however did not participate in the discussions. Status After Intervention:  Unchanged    Participation Level:  Active Listener and Interactive    Participation Quality: Appropriate and Attentive      Speech:  normal      Thought Process/Content: Linear      Affective Functioning: Congruent      Mood: anxious      Level of consciousness:  Alert, Oriented x4 and Attentive      Response to Learning: Able to verbalize current knowledge/experience, Able to verbalize/acknowledge new learning and Progressing to goal      Endings: None Reported    Modes of Intervention: Support, Socialization, Exploration, Clarifying and Problem-solving      Discipline Responsible: /Counselor      Signature:  PIETER Pollock [place] [facility]

## 2020-07-06 NOTE — FLOWSHEET NOTE
*Patient participated in the Spirituality Group       07/06/20 7536   Encounter Summary   Services provided to: Patient   Referral/Consult From: Rounding   Continue Visiting   (7/6/20)   Complexity of Encounter Moderate   Length of Encounter 30 minutes   Spiritual/Amish   Type Spiritual support   Assessment Calm; Approachable   Intervention Active listening   Outcome Receptive

## 2020-07-07 VITALS
TEMPERATURE: 97.3 F | RESPIRATION RATE: 16 BRPM | OXYGEN SATURATION: 98 % | HEART RATE: 75 BPM | HEIGHT: 66 IN | SYSTOLIC BLOOD PRESSURE: 98 MMHG | DIASTOLIC BLOOD PRESSURE: 68 MMHG | BODY MASS INDEX: 29.89 KG/M2 | WEIGHT: 186 LBS

## 2020-07-07 PROBLEM — F25.9 SCHIZOAFFECTIVE DISORDER (HCC): Status: ACTIVE | Noted: 2020-07-07

## 2020-07-07 LAB
ANION GAP SERPL CALCULATED.3IONS-SCNC: 9 MMOL/L (ref 9–17)
BUN BLDV-MCNC: 18 MG/DL (ref 8–23)
BUN/CREAT BLD: ABNORMAL (ref 9–20)
CALCIUM SERPL-MCNC: 9.4 MG/DL (ref 8.6–10.4)
CHLORIDE BLD-SCNC: 107 MMOL/L (ref 98–107)
CO2: 23 MMOL/L (ref 20–31)
CREAT SERPL-MCNC: 1.33 MG/DL (ref 0.7–1.2)
GFR AFRICAN AMERICAN: >60 ML/MIN
GFR NON-AFRICAN AMERICAN: 55 ML/MIN
GFR SERPL CREATININE-BSD FRML MDRD: ABNORMAL ML/MIN/{1.73_M2}
GFR SERPL CREATININE-BSD FRML MDRD: ABNORMAL ML/MIN/{1.73_M2}
GLUCOSE BLD-MCNC: 97 MG/DL (ref 70–99)
INR BLD: 1.5
POTASSIUM SERPL-SCNC: 4.4 MMOL/L (ref 3.7–5.3)
PROTHROMBIN TIME: 18.1 SEC (ref 11.8–14.6)
SODIUM BLD-SCNC: 139 MMOL/L (ref 135–144)

## 2020-07-07 PROCEDURE — 80048 BASIC METABOLIC PNL TOTAL CA: CPT

## 2020-07-07 PROCEDURE — 85610 PROTHROMBIN TIME: CPT

## 2020-07-07 PROCEDURE — 36415 COLL VENOUS BLD VENIPUNCTURE: CPT

## 2020-07-07 PROCEDURE — 6370000000 HC RX 637 (ALT 250 FOR IP): Performed by: PSYCHIATRY & NEUROLOGY

## 2020-07-07 RX ORDER — CHOLECALCIFEROL (VITAMIN D3) 50 MCG
2000 TABLET ORAL DAILY
Qty: 30 TABLET | Refills: 0 | Status: SHIPPED | OUTPATIENT
Start: 2020-07-08 | End: 2021-09-30

## 2020-07-07 RX ORDER — ATORVASTATIN CALCIUM 20 MG/1
20 TABLET, FILM COATED ORAL NIGHTLY
Qty: 30 TABLET | Refills: 3 | Status: SHIPPED | OUTPATIENT
Start: 2020-07-07

## 2020-07-07 RX ORDER — WARFARIN SODIUM 3 MG/1
3 TABLET ORAL
Status: DISCONTINUED | OUTPATIENT
Start: 2020-07-07 | End: 2020-07-07

## 2020-07-07 RX ORDER — M-VIT,TX,IRON,MINS/CALC/FOLIC 27MG-0.4MG
1 TABLET ORAL DAILY
Qty: 14 TABLET | Refills: 0 | Status: SHIPPED | OUTPATIENT
Start: 2020-07-08 | End: 2021-09-30

## 2020-07-07 RX ORDER — WARFARIN SODIUM 2 MG/1
2 TABLET ORAL
Status: DISCONTINUED | OUTPATIENT
Start: 2020-07-07 | End: 2020-07-07 | Stop reason: HOSPADM

## 2020-07-07 RX ORDER — DOXEPIN HYDROCHLORIDE 10 MG/1
10 CAPSULE ORAL NIGHTLY
Qty: 30 CAPSULE | Refills: 3 | Status: SHIPPED | OUTPATIENT
Start: 2020-07-07 | End: 2021-02-04 | Stop reason: ALTCHOICE

## 2020-07-07 RX ORDER — ASPIRIN 81 MG/1
81 TABLET, CHEWABLE ORAL DAILY
Qty: 30 TABLET | Refills: 3 | Status: SHIPPED | OUTPATIENT
Start: 2020-07-08

## 2020-07-07 RX ADMIN — CYANOCOBALAMIN TAB 1000 MCG 1000 MCG: 1000 TAB at 09:28

## 2020-07-07 RX ADMIN — CETIRIZINE HYDROCHLORIDE 10 MG: 10 TABLET, FILM COATED ORAL at 09:27

## 2020-07-07 RX ADMIN — MELATONIN 2000 UNITS: at 09:27

## 2020-07-07 RX ADMIN — MULTIPLE VITAMINS W/ MINERALS TAB 1 TABLET: TAB at 09:27

## 2020-07-07 RX ADMIN — FAMOTIDINE 20 MG: 20 TABLET, FILM COATED ORAL at 09:27

## 2020-07-07 RX ADMIN — SENNOSIDES 8.6 MG: 8.6 TABLET, FILM COATED ORAL at 09:27

## 2020-07-07 RX ADMIN — ASPIRIN 81 MG 81 MG: 81 TABLET ORAL at 09:27

## 2020-07-07 RX ADMIN — FOLIC ACID 1 MG: 1 TABLET ORAL at 09:27

## 2020-07-07 RX ADMIN — TAMSULOSIN HYDROCHLORIDE 0.4 MG: 0.4 CAPSULE ORAL at 09:27

## 2020-07-07 ASSESSMENT — PAIN SCALES - GENERAL: PAINLEVEL_OUTOF10: 0

## 2020-07-07 NOTE — PLAN OF CARE
Problem: Depressive Behavior With or Without Suicide Precautions:  Goal: Ability to disclose and discuss suicidal ideas will improve  Description: Ability to disclose and discuss suicidal ideas will improve  7/7/2020 1213 by Sara Bishop RN  Outcome: Completed     Problem: Depressive Behavior With or Without Suicide Precautions:  Goal: Absence of self-harm  Description: Absence of self-harm  7/7/2020 1213 by Sara Bishop RN  Outcome: Completed     Problem: Substance Abuse:  Goal: Absence of drug withdrawal signs and symptoms  Description: Absence of drug withdrawal signs and symptoms  7/7/2020 1213 by Sara Bishop RN  Outcome: Completed     Problem: Substance Abuse:  Goal: Participates in care planning  Description: Participates in care planning  7/7/2020 1213 by Sara Bishop RN  Outcome: Completed     Problem: Substance Abuse:  Goal: Patient specific goal  Description: Patient specific goal  7/7/2020 1213 by Sara Bishop RN  Outcome: Completed    Patient is active in his treatment planning. He is out and social on the unit, attends unit programming, and is compliant with scheduled medications. Safety maintained per unit policy, including q 76W patient safety checks.

## 2020-07-07 NOTE — PROGRESS NOTES
CLINICAL PHARMACY NOTE: MEDS TO [address] [facility] Patient?: No  Total # of Prescriptions Filled: 5   The following medications were delivered to the patient:  · Melatonin  · Asprin  · Doxepin  · One a day vitamin  · Vitamin D   Total # of Interventions Completed: 0  Time Spent (min): 30    Additional Documentation:

## 2020-07-07 NOTE — GROUP NOTE
Group Therapy Note    Date: 2020    Group Start Time: 1000  Group End Time: 7  Group Topic: Group Therapy    [facility]    Aneudy Lomax                Patient's Goal:  ***    Notes:  ***    Status After Intervention:  {Status After Intervention:361384453}    Participation Level: {Participation Level:440231663}    Participation Quality: {[facility] PARTICIPATION QUALITY:629065356}      Speech:  {ED  CD_SPEECH:25414}      Thought Process/Content: {Thought Process/Content:064094600}      Affective Functioning: {Affective Functionin}      Mood: {Mood:970483633}      Level of consciousness:  {Level of consciousness:421986782}      Response to Learnin [facility] Responses to Learnin}      Endings: {[facility] Endings:62404}    Modes of Intervention: {[facility] Modes of Intervention:624594671}      Discipline Responsible: {[facility] Multidisciplinary:677033297}      Signature:  Michael Hatfield

## 2020-07-07 NOTE — GROUP NOTE
Group Therapy Note    Date: 7/6/2020    Group Start Time: 2100  Group End Time: 2130  Group Topic: Wrap UP    [facility]    Lars Guzman LPN        Group Therapy Note    Attendees: 10/20         Patient's Goal:        Status After Intervention:  Improved    Participation Level:  Active Listener and Interactive    Participation Quality: Appropriate, Sharing and Supportive      Speech:  normal      Thought Process/Content: Logical      Affective Functioning: Flat      Mood: WNL      Level of consciousness:  Alert and Oriented x4      Response to Learning: Able to verbalize/acknowledge new learning and Progressing to goal      Endings: None Reported    Modes of Intervention: Education and Socialization      Discipline Responsible: Licensed Practical Nurse      Signature:  Lars Guzman LPN

## 2020-07-07 NOTE — GROUP NOTE
Group Therapy Note    Date: 7/7/2020    Group Start Time: 1100  Group End Time: 9329  Group Topic: Recreational    [facility]    Briseida Chandni        Group Therapy Note    Attendees: 6/12       Patient's Goal:  To increase interpersonal Interaction    Notes:  Pt attended and participated in group. Pt expressed difficulty participating in group and asked for assistance reading and writing    Status After Intervention:  Improved    Participation Level:  Active Listener and Interactive    Participation Quality: Appropriate and Attentive      Speech:  normal      Thought Process/Content: Logical  Linear      Affective Functioning: Congruent      Mood: euthymic      Level of consciousness:  Alert and Attentive    Response to Learning: Progressing to goal      Endings: None Reported    Modes of Intervention: Socialization, Exploration, Clarifying, Problem-solving, Activity and Reality-testing      Discipline Responsible: Psychoeducational Specialist

## 2020-07-07 NOTE — PLAN OF CARE
Problem: Depressive Behavior With or Without Suicide Precautions:  Goal: Ability to disclose and discuss suicidal ideas will improve  Description: Ability to disclose and discuss suicidal ideas will improve  7/6/2020 2306 by Victoria Harrison RN  Outcome: Ongoing  Note: Pt out in dayroom social with select peers and watching television. Medication compliant. Calm and cooperative denies suicidal/homicidal ideation and visual/auditory hallucinations.  15 minute safety rounds maintained per protocol     Problem: Depressive Behavior With or Without Suicide Precautions:  Goal: Absence of self-harm  Description: Absence of self-harm  7/6/2020 2306 by Victoria Harrison RN  Outcome: Ongoing  Note: Pt free from harm     Problem: Substance Abuse:  Goal: Absence of drug withdrawal signs and symptoms  Description: Absence of drug withdrawal signs and symptoms  Outcome: Ongoing  Note: Pt free from withdrawal symptoms     Problem: Substance Abuse:  Goal: Participates in care planning  Description: Participates in care planning  Outcome: Ongoing     Problem: Substance Abuse:  Goal: Patient specific goal  Description: Patient specific goal  Outcome: Ongoing

## 2020-07-07 NOTE — GROUP NOTE
Group Therapy Note    Date: 7/7/2020    Group Start Time: 1330  Group End Time: 4617  Group Topic: Psychoeducation    [facility]    Evaristo Rahman        Group Therapy Note    Attendees: 10/23           Patient's Goal:  To increase interpersonal interaction, and engage in discussion about social skills    Notes:  Pt attended group on periphery, and would respond when addressed, then return to staring out window.      Status After Intervention:  Improved    Participation Level: Minimal    Participation Quality: Resistant      Speech:  normal      Thought Process/Content: Logical  Linear      Affective Functioning: Congruent      Mood: euthymic      Level of consciousness:  Preoccupied      Response to Learning: Able to verbalize current knowledge/experience and Progressing to goal      Endings: None Reported    Modes of Intervention: Support, Socialization, Exploration, Activity and Reality-testing      Discipline Responsible: Psychoeducational Specialist      Signature:  Evaristo Rahman

## 2020-07-07 NOTE — DISCHARGE INSTR - COC
Continuity of Care Form    Patient Name: Sherri Washington   :  1960  MRN:  [id number]    Admit date:  2020  Discharge date:  ***    Code Status Order: Full Code   Advance Directives:   Advance Care Flowsheet Documentation     Date/Time Healthcare Directive Type of Healthcare Directive Copy in [address] Agent's Name Healthcare Agent's Phone Number    20 [phone]  No, patient does not have an advance directive for healthcare treatment -- -- -- -- --          Admitting Physician:  Michaela Gregory MD  PCP: Yara Munoz MD    Discharging Nurse: [facility] Unit/Room#: 0229/0229-01  Discharging Unit Phone Number: ***    Emergency Contact:   Extended Emergency Contact Information  Primary Emergency Contact: Yaa Elizondo  Address: [place] [address] [place] [address] Phone: [phone]  Mobile Phone: [phone]  Relation: Brother/Sister    Past Surgical History:  Past Surgical History:   Procedure Laterality Date    ABDOMEN SURGERY      APPENDECTOMY      CARDIAC SURGERY  7/14/15    Median Sternotomy, Repair of ascending aorti aneurysm, stentless valve placement    CARDIAC SURGERY  2015    Median stenotomy, repair of ascending aorti aneurysm, stentless valve placement     DILATATION, ESOPHAGUS      HERNIA REPAIR      NECK SURGERY  2013    TONSILLECTOMY         Immunization History:   Immunization History   Administered Date(s) Administered    Tdap (Boostrix, Adacel) 2020       Active Problems:  Patient Active Problem List   Diagnosis Code    Light headed R42    [facility] F33.41    HTN (hypertension) I10    Neck pain M54.2    GERD (gastroesophageal reflux disease) K21.9    Degenerative disc disease, cervical M50.30    Hyponatremia E87.1    Hypercalcemia E83.52    Aortic aneurysm (HCC) I71.9    MGUS (monoclonal gammopathy of unknown significance) D47.2    Chronic renal impairment, stage 3 (moderate) (HCC) N18.3    Depression F32.9    Crack cocaine use F14.90    Bipolar 1 disorder (HCC) F31.9    Altered mental status, unspecified R41.82    YONATHAN (acute kidney injury) ([facility] N17.9    Altered mental status R41.82    Supratherapeutic INR R79.1    Atrial fibrillation (HCC) I48.91    Hypernatremia E87.0    Anemia D64.9    Major depression, recurrent (HCC) F33.9    Major depressive disorder, recurrent (HCC) F33.9    Acute kidney injury superimposed on chronic kidney disease (HCC) N17.9, N18.9    Schizoaffective disorder, bipolar type (HCC) F25.0    Schizoaffective disorder (HCC) F25.9       Isolation/Infection:   Isolation          No Isolation        Patient Infection Status     None to display          Nurse Assessment:  Last Vital Signs: BP 98/68   Pulse 75   Temp 97.3 °F (36.3 °C) (Oral)   Resp 16   Ht 5' 6\" (1.676 m)   Wt 186 lb (84.4 kg)   SpO2 98%   BMI 30.02 kg/m²     Last documented pain score (0-10 scale): Pain Level: 0  Last Weight:   Wt Readings from Last 1 Encounters:   07/01/20 186 lb (84.4 kg)     Mental Status:  {IP PT MENTAL STATUS:20030}    IV Access:  { MARLO IV ACCESS:473268265}    Nursing Mobility/ADLs:  Walking   {[facility] DME GYWU:253641634}  Transfer  {[facility] DME DRDC:246707910}  Bathing  {[facility] DME STQD:600473114}  Dressing  {[facility] DME TNYE:949687651}  Toileting  {[facility] DME URBH:296479632}  Feeding  {[facility] DME LZIT:815644957}  Med Admin  {[facility] DME UCUX:127625671}  Med Delivery   {[facility] MED Delivery:545253912}    Wound Care Documentation and Therapy:        Elimination:  Continence:   · Bowel: {YES / UR:53565}  · Bladder: {YES / MB:34123}  Urinary Catheter: {Urinary Catheter:196325299}   Colostomy/Ileostomy/Ileal Conduit: {YES / BZ:73054}       Date of Last BM: ***  No intake or output data in the 24 hours ending 07/07/20 1303  No intake/output data recorded.     Safety Concerns:     Chas Giraldo MARLO Safety Concerns:809360184}    Impairments/Disabilities: [address] [facility] Impairments/Disabilities:103236474}    Nutrition Therapy:  Current Nutrition Therapy:   [address] [facility] Diet List:328797513}    Routes of Feeding: {[facility]P DME Other Feedings:244185809}  Liquids: {Slp liquid thickness:87346}  Daily Fluid Restriction: {CHP DME Yes amt example:390580815}  Last Modified Barium Swallow with Video (Video Swallowing Test): {Done Not Done KZFE:227933420}    Treatments at the Time of Hospital Discharge:   Respiratory Treatments: ***  Oxygen Therapy:  {Therapy; copd oxygen:94379}  Ventilator:    { CC Vent KDVQ:822536661}    Rehab Therapies: {THERAPEUTIC INTERVENTION:8929500373}  Weight Bearing Status/Restrictions: [address]  Weight Bearin}  Other Medical Equipment (for information only, NOT a DME order):  {EQUIPMENT:019897120}  Other Treatments: ***    Patient's personal belongings (please select all that are sent with patient):  {P DME Belongings:168153183}    RN SIGNATURE:  {Esignature:652116769}    CASE MANAGEMENT/SOCIAL WORK SECTION    Inpatient Status Date: ***    Readmission Risk Assessment Score:  Readmission Risk              Risk of Unplanned Readmission:        31           Discharging to Facility/ Agency   · Name:   · Address:  · Phone:  · Fax:    Dialysis Facility (if applicable)   · Name:  · Address:  · Dialysis Schedule:  · Phone:  · Fax:    / signature: {Esignature:097152512}    PHYSICIAN SECTION    Prognosis: {Prognosis:4834084854}    Condition at Discharge: [address] Patient Condition:949947987}    Rehab Potential (if transferring to Rehab): {Prognosis:2088046533}    Recommended Labs or Other Treatments After Discharge: ***    Physician Certification: I certify the above information and transfer of Colt Kirkland  is necessary for the continuing treatment of the diagnosis listed and that he requires {Admit to Appropriate Level of Care:13740} for {GREATER/LESS:050001522} 30 days.      Update Admission H&P: {CHP DME Changes in [facility]:566755139}    PHYSICIAN SIGNATURE:  {Esignature:588569184}

## 2020-07-07 NOTE — BH NOTE
[facility]  Discharge Note    Pt discharged with followings belongings:   Dentures: (not with pt)  Vision - Corrective Lenses: None  Hearing Aid: None  Jewelry: None  Body Piercings Removed: N/A  Clothing: Footwear, Socks, Undergarments (Comment), Other (Comment)(tan shorts, gray shorts, bandan, belt)  Were All Patient Medications Collected?: Not Applicable  Other Valuables: Cell phone, Kamryn Speaks( ss card, DL, insurancecard )   Valuables sent home with patient. Valuables retrieved from safe, Security envelope number:  [phone] and returned to patient. Patient education on aftercare instructions: yes  Information faxed to [place] by nurse Patient verbalize understanding of AVS:  yes.     Status EXAM upon discharge:  Status and Exam  Normal: Yes  Facial Expression: Brightened  Affect: Appropriate  Level of Consciousness: Alert  Mood:Normal: No  Mood: Anxious  Motor Activity:Normal: Yes  Motor Activity: Tremors  Interview Behavior: Cooperative  Preception: [place] to Person, Peggyann Dynes to Time, [place] to Place, [place] to Situation  Attention:Normal: No  Attention: Distractible  Thought Processes: Circumstantial  Thought Content:Normal: Yes  Thought Content: Poverty of Content  Hallucinations: None  Delusions: No  Memory:Normal: No  Memory: Poor Recent, Poor Remote  Insight and Judgment: No  Insight and Judgment: Poor Judgment, Poor Insight  Present Suicidal Ideation: No  Present Homicidal Ideation: No      Metabolic Screening:    Lab Results   Component Value Date    LABA1C 5.4 02/17/2020       Lab Results   Component Value Date    CHOL 126 02/17/2020    CHOL 193 08/02/2018    CHOL 215 (H) 05/22/2018    CHOL 170 02/20/2018    CHOL 142 06/27/2016    CHOL 159 02/21/2013     Lab Results   Component Value Date    TRIG 116 02/17/2020    TRIG 132 08/02/2018    TRIG 162 (H) 05/22/2018    TRIG 158 (H) 02/20/2018    TRIG 204 (H) 06/27/2016    TRIG 103 02/21/2013     Lab Results   Component Value Date    HDL 24

## 2020-07-08 NOTE — BH NOTE
7/8/2020 at [facility] 121 returned to [address] picked up  his Meds to [facility] prescription medications with him.

## 2020-07-08 NOTE — CARE COORDINATION
Name: Adriana Garcia    : 1960    Discharge Date: 20    Primary Auth/Cert #: [id number]    Discharge Medications:      Medication List      START taking these medications    aspirin 81 MG chewable tablet  Take 1 tablet by mouth daily  Replaces:  aspirin 81 MG tablet  Notes to patient:  Heart health     doxepin 10 MG capsule  Commonly known as:  SINEQUAN  Take 1 capsule by mouth nightly  Replaces:  Doxepin HCl 6 MG Tabs  Notes to patient:  Anti-depressant     melatonin ER 1 MG Tbcr tablet  Take 1 tablet by mouth nightly as needed (insomnia)  Replaces:  melatonin 1 MG tablet  Notes to patient:  sleep     therapeutic multivitamin-minerals tablet  Take 1 tablet by mouth daily for 14 days  Replaces:  Multiple Vitamin Tabs  Notes to patient:  supplement     vitamin D 50 MCG (2000) Tabs tablet  Commonly known as:  CHOLECALCIFEROL  Take 1 tablet by mouth daily  Replaces:  Cholecalciferol 50 MCG (2000) Caps        CHANGE how you take these medications    atorvastatin 20 MG tablet  Commonly known as:  LIPITOR  Take 1 tablet by mouth nightly  What changed:  when to take this  Notes to patient:  High cholesterol     warfarin 2 MG tablet  Commonly known as:  COUMADIN  Take 0.5 tablets by mouth four times a week Indications: takes 1 mg tabs tuesday and saturday, takes 2mg tabs mon,  sun  What changed:    · how much to take  · when to take this  · additional instructions  Notes to patient:  Blood thinner        CONTINUE taking these medications    alendronate 70 MG tablet  Commonly known as:  FOSAMAX  Notes to patient:  Bone health     cetirizine 10 MG tablet  Commonly known as:  ZYRTEC  Notes to patient:  allergies     famotidine 20 MG tablet  Commonly known as:  PEPCID  Notes to patient:  GERD     folic acid 1 MG tablet  Commonly known as:  Giulia Miryam  Notes to patient:  supplement     furosemide 20 MG tablet  Commonly known as:  LASIX  Notes to patient:  diuretic     midodrine 10 MG tablet  Commonly [facility]  [address]  [place], [address]  Phone: [phone]  Attn:  ACT nurse to [phone]  Please fax AVS     Go on 7/10/2020  Concord Gonzalez, you have the following 2 appointments: (1) Friday, July 10 at 9:15am with Dr. Maegan Myers, (2) Friday, July 17 at 11:15am with nurse    Discharge to home:  [address]  Go on 7/7/2020  Please send client home via [address] Medicaid transportation    [address]:  [address] through [facility] [address]- when PT is discharged Please call them at [phone]   Call

## 2020-07-09 ENCOUNTER — HOSPITAL ENCOUNTER (OUTPATIENT)
Facility: MEDICAL CENTER | Age: 60
End: 2020-07-09
Payer: COMMERCIAL

## 2020-07-13 ENCOUNTER — HOSPITAL ENCOUNTER (EMERGENCY)
Age: 60
Discharge: HOME | End: 2020-07-13
Attending: EMERGENCY MEDICINE
Payer: COMMERCIAL

## 2020-07-13 VITALS
RESPIRATION RATE: 18 BRPM | TEMPERATURE: 97.6 F | BODY MASS INDEX: 29.89 KG/M2 | HEIGHT: 66 IN | HEART RATE: 78 BPM | DIASTOLIC BLOOD PRESSURE: 49 MMHG | SYSTOLIC BLOOD PRESSURE: 131 MMHG | WEIGHT: 186 LBS

## 2020-07-13 PROCEDURE — 99283 EMERGENCY DEPT VISIT LOW MDM: CPT

## 2020-07-13 NOTE — ED PROVIDER NOTES
(07/14/2015). Social History:  reports that he has never smoked. He has never used smokeless tobacco. He reports current alcohol use. He reports current drug use. Drug: Cocaine. Family History: Noncontributory at this time  Psychiatric History: See PMH  Allergies:is allergic to oxycodone; fish-derived products; ibuprofen; latuda [lurasidone hcl]; lithium; lurasidone; quetiapine; and seroquel [quetiapine fumarate]. PHYSICAL EXAM     INITIAL VITALS: BP: (!) 131/49  Pulse: 78  Resp: 18  Temp: 97.6 °F (36.4 °C)       Constitutional:  Well developed, no acute distress   Eyes:  Pupils equal and readily reactive to light  HENT:  Atraumatic, external ears normal, nose normal, oropharynx moist. Neck- supple    Respiratory:  Clear to auscultation bilaterally with good air exchange  Cardiovascular:  RRR with normal S1 and S2  GI:  Soft, nondistended and nontender   Musculoskeletal:  No edema, no tenderness, no deformities  Integument:  No rash  Neurologic:  Alert & oriented x 4, no focal deficits noted   Psychiatric: Depressed mood, flat affect      EMERGENCY DEPARTMENT COURSE     25-year-old male presents with depression with no suicidal or homicidal thoughts. He is afebrile, nontoxic, normal vital signs. No acute distress. Patient has no other medical complaints. He is medically cleared from my point of view. We are going to consult with psychiatry who are familiar with the patient. 2:10 PM EDT   spoke with on-call psychiatrist.  They are not recommending hospital admission at this time. I agree with this. He is not actively suicidal or homicidal.  He seems to have good follow-up and is compliant with his medications. He was advised to return if any symptoms worsen. Patient agreeable with plan will be discharged home today. FINAL IMPRESSION     1.  Depression, unspecified depression type          DISPOSITION:  DISPOSITION Decision To Discharge 07/13/2020 02:08:03 PM        PATIENT REFERRED TO:  Arsenio Hatch MD  [address] [facility]  [phone]    Schedule an appointment as soon as possible for a visit       [address]  [place] [address]  [phone]    As needed, If symptoms worsen      DISCHARGE MEDICATIONS:  Current Discharge Medication List          (Please note that portions of this note were completed with a voice recognition program. Efforts were made to edit the dictations but occasionally words are mis-transcribed.  Whenever words are used in this note in any gender, they shall be construed as though they were used in the gender appropriate to the circumstances; and whenever words are used in this note in the singular or plural form, they shall be construed as though they were used in the form appropriate to the circumstances.)    Caleb Leavitt DO  Attending Emergency Medicine Physician        Caleb Leavitt DO  07/13/20 1418

## 2020-07-13 NOTE — ED NOTES
Provisional Diagnosis:   Major Depressive Disorder    Psychosocial and Contextual Factors:   Patient recently discharged from [facility] on 7/720. Patient reports he continues to feel depressed. C-SSRS Summary:      Patient: X  Family:   Agency:     Substance Abuse: Patient denies. Present Suicidal Behavior:  Patient denies. Verbal:     Attempt:    Past Suicidal Behavior: Patient reports one past suicide attempt. Verbal:    Attempt:X      Self-Injurious/Self-Mutilation: Patient denies. Trauma Identified:  Patient denies. Protective Factors:    Patient has housing. Patient linked with outpatient mental health services. Risk Factors:    Patient has poor insight. Clinical Summary:    Sharmaine Leos is a 60 y.o. male who presents to the ED by cab. Patient was recently discharged from [facility] on 7/7/20. Patient states he \"still feels depressed\" since he was discharged. Patient denies suicidal and homicidal ideation. Patient denies auditory and visual hallucinations. Patient denies paranoid thoughts. Patient reports poor sleep and poor appetite. Patient did eat while in the ED. Patient reports feeling hopeless. Patient states he is having an increase in anxiety and states he has racing thoughts. Level of Care Disposition:    Writer consulted with Dr. Ct Cano, who states patient does not meet criteria for inpatient admission. Dr. Ct Cano states patients should follow up with [facility].  provided patient with brief patient prevention education interventions including follow-up outpatient treatment resources & recommendations, and lethal means counseling. Writer and patient discussed safety planning consisting of resources patient can use during or before a mental health crisis. The following service(s) is/are recommended for behavioral health follow-up:  Contact [facility] [phone] any time for support. Medications: Take Medications as prescribed.  Let your physician know if you have any concerns. Recovery Help line for assistance with linkage to mental health and substance use services. Call 211. Return to Emergency Department if you have any further medical concerns. Patient given National suicide prevention line at 0-533.963.9310. Patient is to follow-up with providers at [facility]. Patient states he did not wish to go to [facility] at this time.

## 2020-07-13 NOTE — DISCHARGE SUMMARY
DISCHARGE    Blood pressure 98/68, pulse 75, temperature 97.3 °F (36.3 °C), temperature source Oral, resp. rate 16, height 5' 6\" (1.676 m), weight 186 lb (84.4 kg), SpO2 98 %. At theTime discharge:  Patient  had mild anxiety. Overall mood and affect has improved. Patient denied any Suicidal or homicidal thoughts  Patient denied Hallucinations or delusions. Patient was oriented to time place and person    No side effects from medication reported    EXAM:    BP 98/68   Pulse 75   Temp 97.3 °F (36.3 °C) (Oral)   Resp 16   Ht 5' 6\" (1.676 m)   Wt 186 lb (84.4 kg)   SpO2 98%   BMI 30.02 kg/m²   Patient did not have any physical complaint at the time of discharge    No results found for this or any previous visit (from the past 72 hour(s)).       DISCHARGE INSTRUCTIONS:  Disposition: Discharge to :  HOME/SELF CARE  Condition on discharge:  GOOD  Regular diet  Activities as tolerated      DISCHARGE MEDS:     Medication List      START taking these medications    aspirin 81 MG chewable tablet  Take 1 tablet by mouth daily  Replaces:  aspirin 81 MG tablet  Notes to patient:  Heart health     doxepin 10 MG capsule  Commonly known as:  SINEQUAN  Take 1 capsule by mouth nightly  Replaces:  Doxepin HCl 6 MG Tabs  Notes to patient:  Anti-depressant     melatonin ER 1 MG Tbcr tablet  Take 1 tablet by mouth nightly as needed (insomnia)  Replaces:  melatonin 1 MG tablet  Notes to patient:  sleep     therapeutic multivitamin-minerals tablet  Take 1 tablet by mouth daily for 14 days  Replaces:  Multiple Vitamin Tabs  Notes to patient:  supplement     vitamin D 50 MCG (2000 UT) Tabs tablet  Commonly known as:  CHOLECALCIFEROL  Take 1 tablet by mouth daily  Replaces:  Cholecalciferol 50 MCG (2000 UT) Caps        CHANGE how you take these medications    atorvastatin 20 MG tablet  Commonly known as:  LIPITOR  Take 1 tablet by mouth nightly  What changed:  when to take this  Notes to patient:  High cholesterol     warfarin 2 MG tablet  Commonly known as:  COUMADIN  Take 0.5 tablets by mouth four times a week Indications: takes 1 mg tabs tuesday and saturday, takes 2mg tabs mon, wed thurs fri sun  What changed:    · how much to take  · when to take this  · additional instructions  Notes to patient:  Blood thinner        CONTINUE taking these medications    alendronate 70 MG tablet  Commonly known as:  FOSAMAX  Notes to patient:  Bone health     cetirizine 10 MG tablet  Commonly known as:  ZYRTEC  Notes to patient:  allergies     famotidine 20 MG tablet  Commonly known as:  PEPCID  Notes to patient:  GERD     folic acid 1 MG tablet  Commonly known as:  Benjamen Cypher  Notes to patient:  supplement     furosemide 20 MG tablet  Commonly known as:  LASIX  Notes to patient:  diuretic     midodrine 10 MG tablet  Commonly known as:  PROAMATINE  Notes to patient:  Blood pressure     pantoprazole 40 MG tablet  Commonly known as:  PROTONIX  Notes to patient:  GERD     polycarbophil 625 MG tablet  Commonly known as:  Renelle Srinivasa  Notes to patient:  Fiber therapy     senna 8.6 MG tablet  Commonly known as:  SENOKOT  Notes to patient:  Stool softner     tamsulosin 0.4 MG capsule  Commonly known as:  FLOMAX  Notes to patient:  Urinary retention     VITAMIN B 12 PO  Notes to patient:  supplement     zolpidem 10 MG tablet  Commonly known as:  AMBIEN  Notes to patient:  sleep        STOP taking these medications    aspirin 81 MG tablet  Replaced by:  aspirin 81 MG chewable tablet  Notes to patient:  Heart health     cephALEXin 500 MG capsule  Commonly known as:  Gen[facility] Room  Notes to patient:  antibiotic     Cholecalciferol 50 MCG (2000 UT) Caps  Replaced by:  vitamin D 50 MCG (2000 UT) Tabs tablet  Notes to patient:  supplement     Doxepin HCl 6 MG Tabs  Replaced by:  doxepin 10 MG capsule  Notes to patient:  Anti-depressant     melatonin 1 MG tablet  Replaced by:  melatonin ER 1 MG Tbcr tablet  Notes to patient:  sleep     Multiple Vitamin Tabs  Replaced by:

## 2020-07-13 NOTE — ED NOTES
Pt arrived in ED with c/o depression and anxiety. Pt denies any suicidal ideation or homicidal ideation at this time. Pt cooperative with good eye contact. Pt changed into a blue safety gown pt personal gown and belongings checked and secured by security.        Ronny Newberry RN  07/13/20 0385

## 2020-07-20 ENCOUNTER — TELEPHONE (OUTPATIENT)
Dept: ONCOLOGY | Age: 60
End: 2020-07-20

## 2020-07-20 NOTE — TELEPHONE ENCOUNTER
Received a vm from [place] [address]  with [facility] [address]. She is questioning the appointment for 07/21/2020 since Junito Maldonado did not have his labs done? Upon review I found a cancelled appt for 07/21/20 with notes:  PATIENT'S /MINOR, CALLED TO CANCEL PATIENT'S APPOINTMENT DUE TO TRANSPORTATION ISSUES. PATIENT IS RESCHEDULED 8/4/20    Writer returned call with above reasoning given and dates and times of new appointments with labs 07/28/2020 and md @1045 08/04/2020.

## 2020-07-28 ENCOUNTER — HOSPITAL ENCOUNTER (OUTPATIENT)
Facility: MEDICAL CENTER | Age: 60
Discharge: HOME | End: 2020-07-28
Payer: COMMERCIAL

## 2020-07-28 DIAGNOSIS — D47.2 MGUS (MONOCLONAL GAMMOPATHY OF UNKNOWN SIGNIFICANCE): ICD-10-CM

## 2020-07-28 LAB
ABSOLUTE EOS #: 0.09 K/UL (ref 0–0.44)
ABSOLUTE IMMATURE GRANULOCYTE: 0.02 K/UL (ref 0–0.3)
ABSOLUTE LYMPH #: 1.27 K/UL (ref 1.1–3.7)
ABSOLUTE MONO #: 0.53 K/UL (ref 0.1–1.2)
ANION GAP SERPL CALCULATED.3IONS-SCNC: 12 MMOL/L (ref 9–17)
BASOPHILS # BLD: 1 % (ref 0–2)
BASOPHILS ABSOLUTE: 0.03 K/UL (ref 0–0.2)
BUN BLDV-MCNC: 11 MG/DL (ref 8–23)
BUN/CREAT BLD: 9 (ref 9–20)
CALCIUM SERPL-MCNC: 9.5 MG/DL (ref 8.6–10.4)
CHLORIDE BLD-SCNC: 104 MMOL/L (ref 98–107)
CO2: 25 MMOL/L (ref 20–31)
CREAT SERPL-MCNC: 1.22 MG/DL (ref 0.7–1.2)
DIFFERENTIAL TYPE: ABNORMAL
EOSINOPHILS RELATIVE PERCENT: 2 % (ref 1–4)
FREE KAPPA/LAMBDA RATIO: 4.13 (ref 0.26–1.65)
GFR AFRICAN AMERICAN: >60 ML/MIN
GFR NON-AFRICAN AMERICAN: >60 ML/MIN
GFR SERPL CREATININE-BSD FRML MDRD: ABNORMAL ML/MIN/{1.73_M2}
GFR SERPL CREATININE-BSD FRML MDRD: ABNORMAL ML/MIN/{1.73_M2}
GLUCOSE BLD-MCNC: 93 MG/DL (ref 70–99)
HCT VFR BLD CALC: 42.3 % (ref 40.7–50.3)
HEMOGLOBIN: 14 G/DL (ref 13–17)
IGA: 124 MG/DL (ref 70–400)
IGG: 905 MG/DL (ref 700–1600)
IGM: 38 MG/DL (ref 40–230)
IMMATURE GRANULOCYTES: 0 %
KAPPA FREE LIGHT CHAINS QNT: 8.76 MG/DL (ref 0.37–1.94)
LAMBDA FREE LIGHT CHAINS QNT: 2.12 MG/DL (ref 0.57–2.63)
LYMPHOCYTES # BLD: 21 % (ref 24–43)
MCH RBC QN AUTO: 29.5 PG (ref 25.2–33.5)
MCHC RBC AUTO-ENTMCNC: 33.1 G/DL (ref 28.4–34.8)
MCV RBC AUTO: 89.2 FL (ref 82.6–102.9)
MONOCYTES # BLD: 9 % (ref 3–12)
NRBC AUTOMATED: 0 PER 100 WBC
PDW BLD-RTO: 15.9 % (ref 11.8–14.4)
PLATELET # BLD: 163 K/UL (ref 138–453)
PLATELET ESTIMATE: ABNORMAL
PMV BLD AUTO: 10.2 FL (ref 8.1–13.5)
POTASSIUM SERPL-SCNC: 4.1 MMOL/L (ref 3.7–5.3)
RBC # BLD: 4.74 M/UL (ref 4.21–5.77)
RBC # BLD: ABNORMAL 10*6/UL
SEG NEUTROPHILS: 67 % (ref 36–65)
SEGMENTED NEUTROPHILS ABSOLUTE COUNT: 4.11 K/UL (ref 1.5–8.1)
SODIUM BLD-SCNC: 141 MMOL/L (ref 135–144)
WBC # BLD: 6.1 K/UL (ref 3.5–11.3)
WBC # BLD: ABNORMAL 10*3/UL

## 2020-07-28 PROCEDURE — 86334 IMMUNOFIX E-PHORESIS SERUM: CPT

## 2020-07-28 PROCEDURE — 84165 PROTEIN E-PHORESIS SERUM: CPT

## 2020-07-28 PROCEDURE — 84155 ASSAY OF PROTEIN SERUM: CPT

## 2020-07-28 PROCEDURE — 36415 COLL VENOUS BLD VENIPUNCTURE: CPT

## 2020-07-28 PROCEDURE — 83883 ASSAY NEPHELOMETRY NOT SPEC: CPT

## 2020-07-28 PROCEDURE — 80048 BASIC METABOLIC PNL TOTAL CA: CPT

## 2020-07-28 PROCEDURE — 85025 COMPLETE CBC W/AUTO DIFF WBC: CPT

## 2020-07-28 PROCEDURE — 82784 ASSAY IGA/IGD/IGG/IGM EACH: CPT

## 2020-07-29 LAB
ALBUMIN (CALCULATED): 4.2 G/DL (ref 3.2–5.2)
ALBUMIN PERCENT: 66 % (ref 45–65)
ALPHA 1 PERCENT: 3 % (ref 3–6)
ALPHA 2 PERCENT: 10 % (ref 6–13)
ALPHA-1-GLOBULIN: 0.2 G/DL (ref 0.1–0.4)
ALPHA-2-GLOBULIN: 0.7 G/DL (ref 0.5–0.9)
BETA GLOBULIN: 0.7 G/DL (ref 0.5–1.1)
BETA PERCENT: 10 % (ref 11–19)
GAMMA GLOBULIN %: 11 % (ref 9–20)
GAMMA GLOBULIN: 0.7 G/DL (ref 0.5–1.5)
PATHOLOGIST: ABNORMAL
PATHOLOGIST: NORMAL
PROTEIN ELECTROPHORESIS, SERUM: ABNORMAL
SERUM IFX INTERP: NORMAL
TOTAL PROT. SUM,%: 100 % (ref 98–102)
TOTAL PROT. SUM: 6.5 G/DL (ref 6.3–8.2)
TOTAL PROTEIN: 6.4 G/DL (ref 6.4–8.3)

## 2020-07-30 ENCOUNTER — HOSPITAL ENCOUNTER (OUTPATIENT)
Facility: MEDICAL CENTER | Age: 60
End: 2020-07-30
Payer: COMMERCIAL

## 2020-08-04 ENCOUNTER — OFFICE VISIT (OUTPATIENT)
Dept: ONCOLOGY | Age: 60
End: 2020-08-04
Payer: COMMERCIAL

## 2020-08-04 ENCOUNTER — TELEPHONE (OUTPATIENT)
Dept: ONCOLOGY | Age: 60
End: 2020-08-04

## 2020-08-04 VITALS
SYSTOLIC BLOOD PRESSURE: 104 MMHG | WEIGHT: 176.7 LBS | HEART RATE: 75 BPM | TEMPERATURE: 97.2 F | DIASTOLIC BLOOD PRESSURE: 73 MMHG | BODY MASS INDEX: 28.52 KG/M2

## 2020-08-04 PROCEDURE — G8417 CALC BMI ABV UP PARAM F/U: HCPCS | Performed by: INTERNAL MEDICINE

## 2020-08-04 PROCEDURE — 99214 OFFICE O/P EST MOD 30 MIN: CPT | Performed by: INTERNAL MEDICINE

## 2020-08-04 PROCEDURE — 1111F DSCHRG MED/CURRENT MED MERGE: CPT | Performed by: INTERNAL MEDICINE

## 2020-08-04 PROCEDURE — 99211 OFF/OP EST MAY X REQ PHY/QHP: CPT

## 2020-08-04 PROCEDURE — 1036F TOBACCO NON-USER: CPT | Performed by: INTERNAL MEDICINE

## 2020-08-04 PROCEDURE — 3017F COLORECTAL CA SCREEN DOC REV: CPT | Performed by: INTERNAL MEDICINE

## 2020-08-04 PROCEDURE — G8427 DOCREV CUR MEDS BY ELIG CLIN: HCPCS | Performed by: INTERNAL MEDICINE

## 2020-08-04 NOTE — PROGRESS NOTES
_        Chief Complaint   Patient presents with    Follow-up     Review status of disease     Discuss Labs     DIAGNOSIS:       Paraproteinemia/ monoclonal gammopathy of undetermined significance. (MGUS). Chronic renal insufficiency  Bipolar disorder  Hypertension      CURRENT THERAPY:         Seen to discuss labs results and further management plans. BRIEF CASE HISTORY:      Mr. Kerri Arce is a very pleasant 60 y.o. male with history of hypertension, bipolar disorder, and chronic renal insufficiency. The patient had labs done which revealed monoclonal gammopathy. He is referred for further management. At the present time he denies any fever or night sweats. No weight loss or decreased appetite. No chest pain or shortness of breath. No palpable lymph nodes. Patient has generalized body aches. No bone fractures. No other complaints. .     INTERIM HISTORY:   Seen for follow up abnormal immunoglobulins levels. Patient is doing well clinically. No significant complaints. No aches or pains. No fever or night sweats. No weight loss or decreased appetite. No other complaints. PAST MEDICAL HISTORY: has a past medical history of AMS (altered mental status), Atrial fibrillation ([facility] [address] Bipolar 1 disorder ([facility] [address] Depression, Hypertension, Neck pain, and Patient in clinical research study. PAST SURGICAL HISTORY: has a past surgical history that includes hernia repair; Neck surgery (2013); Abdomen surgery; Tonsillectomy; Appendectomy; Dilatation, esophagus; Cardiac surgery (7/14/15); and Cardiac surgery (07/14/2015).      CURRENT MEDICATIONS:  has a current medication list which includes the following prescription(s): aspirin, atorvastatin, doxepin, melatonin er, therapeutic multivitamin-minerals, vitamin d, zolpidem, famotidine, midodrine, pantoprazole, senna, folic acid, polycarbophil, furosemide, warfarin, tamsulosin, alendronate, cetirizine, and cyanocobalamin. ALLERGIES:  is allergic to oxycodone; fish-derived products; ibuprofen; latuda [lurasidone hcl]; lithium; lurasidone; quetiapine; and seroquel [quetiapine fumarate]. FAMILY HISTORY: Negative for any hematological or oncological conditions. SOCIAL HISTORY:  reports that he has never smoked. He has never used smokeless tobacco. He reports current alcohol use. He reports current drug use. Drug: Cocaine. REVIEW OF SYSTEMS:     · General: No weakness or fatigue. No unanticipated weight loss or decreased appetite. No fever or chills. · Eyes: No blurred vision, eye pain or double vision. · Ears: No hearing problems or drainage. No tinnitus. · Throat: No sore throat, problems with swallowing or dysphagia. · Respiratory: No cough, sputum or hemoptysis. No shortness of breath. No pleuritic chest pain. · Cardiovascular: No chest pain, orthopnea or PND. No lower extremity edema. No palpitation. · Gastrointestinal: No problems with swallowing. No abdominal pain or bloating. No nausea or vomiting. No diarrhea or constipation. No GI bleeding. · Genitourinary: No dysuria, hematuria, frequency or urgency. · Musculoskeletal: No muscle aches or pains. No limitation of movement. No back pain. No gait disturbance, No joint complaints. · Dermatologic: No skin rashes or pruritus. No skin lesions or discolorations. · Psychiatric: No depression, anxiety, or stress or signs of schizophrenia. No change in mood or affect. · Hematologic: No history of bleeding tendency. No bruises or ecchymosis. No history of clotting problems. · Infectious disease: No fever, chills or frequent infections. · Endocrine: No problems with opacity. No polydipsia or polyuria. No temperature intolerance. · Neurologic: No headaches or dizziness. No weakness or numbness of the extremities. No changes in balance, coordination,  memory, mentation, behavior. · Allergic/Immunologic: No nasal congestion or hives. No repeated infections. PHYSICAL EXAM:  The patient is not in acute distress. Vital signs: Blood pressure 104/73, pulse 75, temperature 97.2 °F (36.2 °C), temperature source Skin, weight 176 lb 11.2 oz (80.2 kg). HEENT:  Eyes are normal. Ears, nose and throat are normal.  Neck: Supple. No lymph node enlargement. No thyroid enlargement. Trachea is centrally located. Chest:  Clear to auscultation. No wheezes or crepitations. Heart: Regular sinus rhythm. Abdomen: Soft, nontender. No hepatosplenomegaly. No masses. Extremities:  With no edema. Lymph Nodes:  No cervical, axillary or inguinal lymph node enlargement. Neurologic:  Conscious and oriented. No focal neurological deficits. Psychosocial: No depression, anxiety or stress. Skin: No rashes, bruises or ecchymoses.       Review of Diagnostic data:   Lab Results   Component Value Date    WBC 6.1 07/28/2020    HGB 14.0 07/28/2020    HCT 42.3 07/28/2020    MCV 89.2 07/28/2020     07/28/2020       Chemistry        Component Value Date/Time     07/28/2020 1038    K 4.1 07/28/2020 1038     07/28/2020 1038    CO2 25 07/28/2020 1038    BUN 11 07/28/2020 1038    CREATININE 1.22 (H) 07/28/2020 1038        Component Value Date/Time    CALCIUM 9.5 07/28/2020 1038    ALKPHOS 75 07/01/2020 0619    AST 13 07/01/2020 0619    ALT 10 07/01/2020 0619    BILITOT 0.45 07/01/2020 0619        4/5/2018 12:45 PM - Jeremy, Mhpn Incoming Lab Results From [facility]     Component Results     Component Value Ref Range & Units Status Collected Lab   Kappa Free Light Chains QNT 8.07   0.37 - 1.94 mg/dL Final 04/05/2018  9:10 AM [facility]   Lambda Free Light Chains QNT 2.15  0.57 - 2.63 mg/dL Final 04/05/2018  9:10  [address]   Free Joice/Lambda Ratio 3.75   0.26 - 1.65 Final 04/05/2018  9:10 AM [address] [address] [phone]     4/5/2018 12:06 PM - Jeremy, Atilio Incoming Lab Results From [facility]     Component Results     Component Value Ref Range & Units Status Collected Lab   Igg 849  700 - 1600 mg/dL Final 04/05/2018  9:10  [address]     70 - 400 mg/dL Final 04/05/2018  9:10  [address]   Igm 41  40 - 230 mg/dL Final 04/05/2018  9:10 AM [address] [place] [address], [address] [place] [address] [phone]             IMPRESSION:   Paraproteinemia/ monoclonal gammopathy of undetermined significance. (MGUS). Chronic renal insufficiency  Bipolar disorder  Hypertension  ECOG performance score (0). PLAN: labs were reviewed and discussed with the patient. I explained the significance of these abnormalities related to immunoglobulins. There were some fluctuation in the level of the immunoglobulins. Likely dealing with MGUS. Recommend monitoring. No need for BM test yet. We will repeat labs in 6 months. Patient's questions were answered to the best of his satisfaction and he verbalized full understanding and agreement. [address] [place] [address] [facility]                          Cell: [phone]    This note is created with the assistance of a speech recognition program.  While intending to generate a document that actually reflects the content of the visit, the document can still have some errors including those of syntax and sound a like substitutions which may escape proof reading. It such instances, actual meaning can be extrapolated by contextual diversion.

## 2020-08-04 NOTE — TELEPHONE ENCOUNTER
Roney Carballo MD VISIT  DR Maya Flow IN TO SEE PATIENT  ORDERS RECEIVED  RV 6 MONTHS W/LABS PRIOR   LABS CDP BMP SIFX IGG IGA IGM KAPPA/LAMBDA FREE LIGHT CHAINS PRIOR TO   AVS PRINTED AND PLACED INTO CALL FOLDER TO CALL PT TO SCHEDULE F/U & LABS FOR FEB 2021  AVS PRINTED AND GIVEN TO PATIENT WITH INSTRUCTIONS  PATIENT DISCHARGED AMBULATORY

## 2020-09-08 ENCOUNTER — HOSPITAL ENCOUNTER (OUTPATIENT)
Age: 60
Setting detail: SPECIMEN
Discharge: HOME | End: 2020-09-08
Payer: COMMERCIAL

## 2020-09-10 LAB — FERRITIN: 75 UG/L (ref 30–400)

## 2021-01-22 ENCOUNTER — HOSPITAL ENCOUNTER (OUTPATIENT)
Facility: MEDICAL CENTER | Age: 61
End: 2021-01-22
Payer: COMMERCIAL

## 2021-01-29 ENCOUNTER — HOSPITAL ENCOUNTER (OUTPATIENT)
Facility: MEDICAL CENTER | Age: 61
End: 2021-01-29
Payer: COMMERCIAL

## 2021-02-01 ENCOUNTER — TELEPHONE (OUTPATIENT)
Dept: ONCOLOGY | Age: 61
End: 2021-02-01

## 2021-02-01 ENCOUNTER — HOSPITAL ENCOUNTER (OUTPATIENT)
Facility: MEDICAL CENTER | Age: 61
Discharge: HOME | End: 2021-02-01
Payer: COMMERCIAL

## 2021-02-01 ENCOUNTER — HOSPITAL ENCOUNTER (OUTPATIENT)
Age: 61
Setting detail: SPECIMEN
Discharge: HOME | End: 2021-02-01
Payer: COMMERCIAL

## 2021-02-01 DIAGNOSIS — D47.2 MGUS (MONOCLONAL GAMMOPATHY OF UNKNOWN SIGNIFICANCE): ICD-10-CM

## 2021-02-01 LAB
ABSOLUTE EOS #: 0.12 K/UL (ref 0–0.44)
ABSOLUTE EOS #: 0.15 K/UL (ref 0–0.44)
ABSOLUTE IMMATURE GRANULOCYTE: 0.04 K/UL (ref 0–0.3)
ABSOLUTE IMMATURE GRANULOCYTE: 0.08 K/UL (ref 0–0.3)
ABSOLUTE LYMPH #: 1 K/UL (ref 1.1–3.7)
ABSOLUTE LYMPH #: 1.02 K/UL (ref 1.1–3.7)
ABSOLUTE MONO #: 0.56 K/UL (ref 0.1–1.2)
ABSOLUTE MONO #: 0.61 K/UL (ref 0.1–1.2)
ALBUMIN SERPL-MCNC: 4 G/DL (ref 3.5–5.2)
ALBUMIN/GLOBULIN RATIO: 1.3 (ref 1–2.5)
ALP BLD-CCNC: 153 U/L (ref 40–129)
ALT SERPL-CCNC: 8 U/L (ref 5–41)
ANION GAP SERPL CALCULATED.3IONS-SCNC: 10 MMOL/L (ref 9–17)
ANION GAP SERPL CALCULATED.3IONS-SCNC: 13 MMOL/L (ref 9–17)
AST SERPL-CCNC: 10 U/L
BASOPHILS # BLD: 1 % (ref 0–2)
BASOPHILS # BLD: 1 % (ref 0–2)
BASOPHILS ABSOLUTE: 0.06 K/UL (ref 0–0.2)
BASOPHILS ABSOLUTE: 0.06 K/UL (ref 0–0.2)
BILIRUB SERPL-MCNC: 0.57 MG/DL (ref 0.3–1.2)
BUN BLDV-MCNC: 14 MG/DL (ref 8–23)
BUN BLDV-MCNC: 15 MG/DL (ref 8–23)
BUN/CREAT BLD: 13 (ref 9–20)
BUN/CREAT BLD: ABNORMAL (ref 9–20)
CALCIUM SERPL-MCNC: 9.1 MG/DL (ref 8.6–10.4)
CALCIUM SERPL-MCNC: 9.4 MG/DL (ref 8.6–10.4)
CHLORIDE BLD-SCNC: 100 MMOL/L (ref 98–107)
CHLORIDE BLD-SCNC: 103 MMOL/L (ref 98–107)
CHOLESTEROL, FASTING: 111 MG/DL
CHOLESTEROL/HDL RATIO: 4
CO2: 24 MMOL/L (ref 20–31)
CO2: 26 MMOL/L (ref 20–31)
CREAT SERPL-MCNC: 1.16 MG/DL (ref 0.7–1.2)
CREAT SERPL-MCNC: 1.17 MG/DL (ref 0.7–1.2)
DIFFERENTIAL TYPE: ABNORMAL
DIFFERENTIAL TYPE: ABNORMAL
EOSINOPHILS RELATIVE PERCENT: 2 % (ref 1–4)
EOSINOPHILS RELATIVE PERCENT: 2 % (ref 1–4)
FREE KAPPA/LAMBDA RATIO: 3.25 (ref 0.26–1.65)
GFR AFRICAN AMERICAN: >60 ML/MIN
GFR AFRICAN AMERICAN: >60 ML/MIN
GFR NON-AFRICAN AMERICAN: >60 ML/MIN
GFR NON-AFRICAN AMERICAN: >60 ML/MIN
GFR SERPL CREATININE-BSD FRML MDRD: ABNORMAL ML/MIN/{1.73_M2}
GFR SERPL CREATININE-BSD FRML MDRD: ABNORMAL ML/MIN/{1.73_M2}
GFR SERPL CREATININE-BSD FRML MDRD: NORMAL ML/MIN/{1.73_M2}
GFR SERPL CREATININE-BSD FRML MDRD: NORMAL ML/MIN/{1.73_M2}
GLUCOSE BLD-MCNC: 92 MG/DL (ref 70–99)
GLUCOSE BLD-MCNC: 95 MG/DL (ref 70–99)
HCT VFR BLD CALC: 43.7 % (ref 40.7–50.3)
HCT VFR BLD CALC: 43.8 % (ref 40.7–50.3)
HDLC SERPL-MCNC: 28 MG/DL
HEMOGLOBIN: 14 G/DL (ref 13–17)
HEMOGLOBIN: 14.1 G/DL (ref 13–17)
IGA: 113 MG/DL (ref 70–400)
IGG: 846 MG/DL (ref 700–1600)
IGM: 34 MG/DL (ref 40–230)
IMMATURE GRANULOCYTES: 1 %
IMMATURE GRANULOCYTES: 1 %
KAPPA FREE LIGHT CHAINS QNT: 8.38 MG/DL (ref 0.37–1.94)
LAMBDA FREE LIGHT CHAINS QNT: 2.58 MG/DL (ref 0.57–2.63)
LDL CHOLESTEROL: 52 MG/DL (ref 0–130)
LYMPHOCYTES # BLD: 13 % (ref 24–43)
LYMPHOCYTES # BLD: 14 % (ref 24–43)
MCH RBC QN AUTO: 29 PG (ref 25.2–33.5)
MCH RBC QN AUTO: 29 PG (ref 25.2–33.5)
MCHC RBC AUTO-ENTMCNC: 32 G/DL (ref 28.4–34.8)
MCHC RBC AUTO-ENTMCNC: 32.2 G/DL (ref 28.4–34.8)
MCV RBC AUTO: 90.1 FL (ref 82.6–102.9)
MCV RBC AUTO: 90.5 FL (ref 82.6–102.9)
MONOCYTES # BLD: 8 % (ref 3–12)
MONOCYTES # BLD: 8 % (ref 3–12)
NRBC AUTOMATED: 0 PER 100 WBC
NRBC AUTOMATED: 0 PER 100 WBC
PDW BLD-RTO: 15.1 % (ref 11.8–14.4)
PDW BLD-RTO: 15.2 % (ref 11.8–14.4)
PLATELET # BLD: 165 K/UL (ref 138–453)
PLATELET # BLD: 165 K/UL (ref 138–453)
PLATELET ESTIMATE: ABNORMAL
PLATELET ESTIMATE: ABNORMAL
PMV BLD AUTO: 10.6 FL (ref 8.1–13.5)
PMV BLD AUTO: 10.7 FL (ref 8.1–13.5)
POTASSIUM SERPL-SCNC: 4 MMOL/L (ref 3.7–5.3)
POTASSIUM SERPL-SCNC: 4.2 MMOL/L (ref 3.7–5.3)
RBC # BLD: 4.83 M/UL (ref 4.21–5.77)
RBC # BLD: 4.86 M/UL (ref 4.21–5.77)
RBC # BLD: ABNORMAL 10*6/UL
RBC # BLD: ABNORMAL 10*6/UL
SEG NEUTROPHILS: 74 % (ref 36–65)
SEG NEUTROPHILS: 75 % (ref 36–65)
SEGMENTED NEUTROPHILS ABSOLUTE COUNT: 5.64 K/UL (ref 1.5–8.1)
SEGMENTED NEUTROPHILS ABSOLUTE COUNT: 5.71 K/UL (ref 1.5–8.1)
SODIUM BLD-SCNC: 136 MMOL/L (ref 135–144)
SODIUM BLD-SCNC: 140 MMOL/L (ref 135–144)
THYROXINE, FREE: 1.09 NG/DL (ref 0.93–1.7)
TOTAL PROTEIN: 7.1 G/DL (ref 6.4–8.3)
TRIGLYCERIDE, FASTING: 156 MG/DL
TSH SERPL DL<=0.05 MIU/L-ACNC: 2.5 MIU/L (ref 0.3–5)
VITAMIN B-12: 846 PG/ML (ref 232–1245)
VLDLC SERPL CALC-MCNC: ABNORMAL MG/DL (ref 1–30)
WBC # BLD: 7.4 K/UL (ref 3.5–11.3)
WBC # BLD: 7.6 K/UL (ref 3.5–11.3)
WBC # BLD: ABNORMAL 10*3/UL
WBC # BLD: ABNORMAL 10*3/UL

## 2021-02-01 PROCEDURE — 84443 ASSAY THYROID STIM HORMONE: CPT

## 2021-02-01 PROCEDURE — 80061 LIPID PANEL: CPT

## 2021-02-01 PROCEDURE — 84439 ASSAY OF FREE THYROXINE: CPT

## 2021-02-01 PROCEDURE — 36415 COLL VENOUS BLD VENIPUNCTURE: CPT

## 2021-02-01 PROCEDURE — 82784 ASSAY IGA/IGD/IGG/IGM EACH: CPT

## 2021-02-01 PROCEDURE — 85025 COMPLETE CBC W/AUTO DIFF WBC: CPT

## 2021-02-01 PROCEDURE — 83883 ASSAY NEPHELOMETRY NOT SPEC: CPT

## 2021-02-01 PROCEDURE — 80053 COMPREHEN METABOLIC PANEL: CPT

## 2021-02-01 PROCEDURE — 82607 VITAMIN B-12: CPT

## 2021-02-01 PROCEDURE — 84155 ASSAY OF PROTEIN SERUM: CPT

## 2021-02-01 PROCEDURE — 80048 BASIC METABOLIC PNL TOTAL CA: CPT

## 2021-02-01 PROCEDURE — 84165 PROTEIN E-PHORESIS SERUM: CPT

## 2021-02-01 PROCEDURE — 86334 IMMUNOFIX E-PHORESIS SERUM: CPT

## 2021-02-02 LAB
ALBUMIN (CALCULATED): 3.9 G/DL (ref 3.2–5.2)
ALBUMIN PERCENT: 61 % (ref 45–65)
ALPHA 1 PERCENT: 3 % (ref 3–6)
ALPHA 2 PERCENT: 11 % (ref 6–13)
ALPHA-1-GLOBULIN: 0.2 G/DL (ref 0.1–0.4)
ALPHA-2-GLOBULIN: 0.7 G/DL (ref 0.5–0.9)
BETA GLOBULIN: 0.7 G/DL (ref 0.5–1.1)
BETA PERCENT: 11 % (ref 11–19)
GAMMA GLOBULIN %: 14 % (ref 9–20)
GAMMA GLOBULIN: 0.9 G/DL (ref 0.5–1.5)
PATHOLOGIST: ABNORMAL
PATHOLOGIST: NORMAL
PROTEIN ELECTROPHORESIS, SERUM: ABNORMAL
SERUM IFX INTERP: NORMAL
TOTAL PROT. SUM,%: 100 % (ref 98–102)
TOTAL PROT. SUM: 6.4 G/DL (ref 6.3–8.2)
TOTAL PROTEIN: 6.3 G/DL (ref 6.4–8.3)

## 2021-02-04 ENCOUNTER — OFFICE VISIT (OUTPATIENT)
Dept: ONCOLOGY | Age: 61
End: 2021-02-04
Payer: COMMERCIAL

## 2021-02-04 ENCOUNTER — TELEPHONE (OUTPATIENT)
Dept: ONCOLOGY | Age: 61
End: 2021-02-04

## 2021-02-04 VITALS
TEMPERATURE: 98.1 F | WEIGHT: 171.5 LBS | RESPIRATION RATE: 18 BRPM | BODY MASS INDEX: 27.68 KG/M2 | HEART RATE: 76 BPM | DIASTOLIC BLOOD PRESSURE: 77 MMHG | SYSTOLIC BLOOD PRESSURE: 111 MMHG

## 2021-02-04 DIAGNOSIS — D47.2 MGUS (MONOCLONAL GAMMOPATHY OF UNKNOWN SIGNIFICANCE): Primary | ICD-10-CM

## 2021-02-04 PROCEDURE — G8417 CALC BMI ABV UP PARAM F/U: HCPCS | Performed by: INTERNAL MEDICINE

## 2021-02-04 PROCEDURE — 99211 OFF/OP EST MAY X REQ PHY/QHP: CPT | Performed by: INTERNAL MEDICINE

## 2021-02-04 PROCEDURE — 3017F COLORECTAL CA SCREEN DOC REV: CPT | Performed by: INTERNAL MEDICINE

## 2021-02-04 PROCEDURE — 1036F TOBACCO NON-USER: CPT | Performed by: INTERNAL MEDICINE

## 2021-02-04 PROCEDURE — G8484 FLU IMMUNIZE NO ADMIN: HCPCS | Performed by: INTERNAL MEDICINE

## 2021-02-04 PROCEDURE — G8427 DOCREV CUR MEDS BY ELIG CLIN: HCPCS | Performed by: INTERNAL MEDICINE

## 2021-02-04 PROCEDURE — 99214 OFFICE O/P EST MOD 30 MIN: CPT | Performed by: INTERNAL MEDICINE

## 2021-02-04 RX ORDER — GABAPENTIN 300 MG/1
900 CAPSULE ORAL 3 TIMES DAILY
COMMUNITY

## 2021-02-04 NOTE — TELEPHONE ENCOUNTER
Himanshu Johnson FOR MD VISIT  DR Frankie Alberts IN TO SEE PATIENT  ORDERS RECEIVED  RV 6 MONTHS W/LABS BEFORE RV  LABS CDP BMP IGG IGA IGM SIFX KAPPA LAMBDA FREE LIGHT CHAINS 7/29/21  MD VISIT 8/5/21 @2PM  AVS PRINTED AND GIVEN TO PATIENT WITH INSTRUCTIONS  PATIENT DISCHARGED AMBULATORY

## 2021-02-06 NOTE — PROGRESS NOTES
acid, polycarbophil, furosemide, warfarin, tamsulosin, cyanocobalamin, alendronate, and cetirizine. ALLERGIES:  is allergic to oxycodone; fish-derived products; ibuprofen; latuda [lurasidone hcl]; lithium; lurasidone; quetiapine; and seroquel [quetiapine fumarate]. FAMILY HISTORY: Negative for any hematological or oncological conditions. SOCIAL HISTORY:  reports that he has never smoked. He has never used smokeless tobacco. He reports current alcohol use. He reports current drug use. Drug: Cocaine. REVIEW OF SYSTEMS:     · General: No weakness or fatigue. No unanticipated weight loss or decreased appetite. No fever or chills. · Eyes: No blurred vision, eye pain or double vision. · Ears: No hearing problems or drainage. No tinnitus. · Throat: No sore throat, problems with swallowing or dysphagia. · Respiratory: No cough, sputum or hemoptysis. No shortness of breath. No pleuritic chest pain. · Cardiovascular: No chest pain, orthopnea or PND. No lower extremity edema. No palpitation. · Gastrointestinal: No problems with swallowing. No abdominal pain or bloating. No nausea or vomiting. No diarrhea or constipation. No GI bleeding. · Genitourinary: No dysuria, hematuria, frequency or urgency. · Musculoskeletal: No muscle aches or pains. No limitation of movement. No back pain. No gait disturbance, No joint complaints. · Dermatologic: No skin rashes or pruritus. No skin lesions or discolorations. · Psychiatric: No depression, anxiety, or stress or signs of schizophrenia. No change in mood or affect. · Hematologic: No history of bleeding tendency. No bruises or ecchymosis. No history of clotting problems. · Infectious disease: No fever, chills or frequent infections. · Endocrine: No problems with opacity. No polydipsia or polyuria. No temperature intolerance. · Neurologic: No headaches or dizziness. No weakness or numbness of the extremities.  No changes in balance, coordination, memory, mentation, behavior. · Allergic/Immunologic: No nasal congestion or hives. No repeated infections. PHYSICAL EXAM:  The patient is not in acute distress. Vital signs: Blood pressure 111/77, pulse 76, temperature 98.1 °F (36.7 °C), temperature source Oral, resp. rate 18, weight 171 lb 8 oz (77.8 kg). HEENT:  Eyes are normal. Ears, nose and throat are normal.  Neck: Supple. No lymph node enlargement. No thyroid enlargement. Trachea is centrally located. Chest:  Clear to auscultation. No wheezes or crepitations. Heart: Regular sinus rhythm. Abdomen: Soft, nontender. No hepatosplenomegaly. No masses. Extremities:  With no edema. Lymph Nodes:  No cervical, axillary or inguinal lymph node enlargement. Neurologic:  Conscious and oriented. No focal neurological deficits. Psychosocial: No depression, anxiety or stress. Skin: No rashes, bruises or ecchymoses.       Review of Diagnostic data:   Lab Results   Component Value Date    WBC 7.4 02/01/2021    HGB 14.1 02/01/2021    HCT 43.8 02/01/2021    MCV 90.1 02/01/2021     02/01/2021       Chemistry        Component Value Date/Time     02/01/2021 1213    K 4.0 02/01/2021 1213     02/01/2021 1213    CO2 26 02/01/2021 1213    BUN 15 02/01/2021 1213    CREATININE 1.16 02/01/2021 1213        Component Value Date/Time    CALCIUM 9.1 02/01/2021 1213    ALKPHOS 153 (H) 02/01/2021 1210    AST 10 02/01/2021 1210    ALT 8 02/01/2021 1210    BILITOT 0.57 02/01/2021 1210        4/5/2018 12:45 PM - Jeremy, Mhpn Incoming Lab Results From [facility]     Component Results     Component Value Ref Range & Units Status Collected Lab   Kappa Free Light Chains QNT 8.07   0.37 - 1.94 mg/dL Final 04/05/2018  9:10  [address]   Lambda Free Light Chains QNT 2.15  0.57 - 2.63 mg/dL Final 04/05/2018  9:10  [address]   Free [place]/Lambda Ratio 3.75   0.26 - 1.65 Final 04/05/2018  9:10  [address] [facility], [address] [place] [address] [phone]     4/5/2018 12:06 PM - Jeremy, Atilio Incoming Lab Results From [facility]     Component Results     Component Value Ref Range & Units Status Collected Lab   Igg 849  700 - 1600 mg/dL Final 04/05/2018  9:10  [address]     70 - 400 mg/dL Final 04/05/2018  9:10  [address]   Igm 41  40 - 230 mg/dL Final 04/05/2018  9:10 AM [address] [place] [address], [address] [place] [address] [phone]             IMPRESSION:   Paraproteinemia/ monoclonal gammopathy of undetermined significance. (MGUS). Chronic renal insufficiency  Bipolar disorder  Hypertension  ECOG performance score (0). PLAN: labs were reviewed and discussed with the patient. I explained the significance of these abnormalities related to immunoglobulins. There were some fluctuation in the level of the immunoglobulins. Likely dealing with MGUS. Recommend monitoring. No need for BM test yet. We will repeat labs in 6 months. Patient's questions were answered to the best of his satisfaction and he verbalized full understanding and agreement. 6 [facility]                            This note is created with the assistance of a speech recognition program.  While intending to generate a document that actually reflects the content of the visit, the document can still have some errors including those of syntax and sound a like substitutions which may escape proof reading. It such instances, actual meaning can be extrapolated by contextual diversion.

## 2021-06-17 NOTE — GROUP NOTE
Detail Level: Generalized Group Therapy Note    Date: 7/6/2020    Group Start Time: 2030  Group End Time: 2100  Group Topic: Relaxation    [facility]    Brie Hines LPN        Group Therapy Note    Attendees: 10/20         Patient's Goal:        Status After Intervention:  Improved    Participation Level:  Active Listener and Interactive    Participation Quality: Appropriate, Sharing and Supportive      Speech:  normal      Thought Process/Content: Logical      Affective Functioning: Flat      Mood: WNL      Level of consciousness:  Alert and Oriented x4      Response to Learning: Able to verbalize/acknowledge new learning and Progressing to goal      Endings: None Reported    Modes of Intervention: Education and Socialization      Discipline Responsible: Licensed Practical Nurse      Signature:  Brie Hines LPN Hide Include Location In Plan Question?: No

## 2021-08-09 ENCOUNTER — HOSPITAL ENCOUNTER (OUTPATIENT)
Facility: MEDICAL CENTER | Age: 61
End: 2021-08-09
Payer: COMMERCIAL

## 2021-08-12 ENCOUNTER — HOSPITAL ENCOUNTER (OUTPATIENT)
Facility: MEDICAL CENTER | Age: 61
End: 2021-08-12
Payer: COMMERCIAL

## 2021-08-16 ENCOUNTER — HOSPITAL ENCOUNTER (OUTPATIENT)
Age: 61
Setting detail: SPECIMEN
Discharge: HOME | End: 2021-08-16
Payer: COMMERCIAL

## 2021-08-16 DIAGNOSIS — D47.2 MGUS (MONOCLONAL GAMMOPATHY OF UNKNOWN SIGNIFICANCE): ICD-10-CM

## 2021-08-16 LAB
ABSOLUTE EOS #: 0.17 K/UL (ref 0–0.44)
ABSOLUTE IMMATURE GRANULOCYTE: 0.06 K/UL (ref 0–0.3)
ABSOLUTE LYMPH #: 1.03 K/UL (ref 1.1–3.7)
ABSOLUTE MONO #: 0.62 K/UL (ref 0.1–1.2)
ANION GAP SERPL CALCULATED.3IONS-SCNC: 11 MMOL/L (ref 9–17)
BASOPHILS # BLD: 1 % (ref 0–2)
BASOPHILS ABSOLUTE: 0.06 K/UL (ref 0–0.2)
BUN BLDV-MCNC: 12 MG/DL (ref 8–23)
BUN/CREAT BLD: NORMAL (ref 9–20)
CALCIUM SERPL-MCNC: 9.1 MG/DL (ref 8.6–10.4)
CHLORIDE BLD-SCNC: 102 MMOL/L (ref 98–107)
CO2: 24 MMOL/L (ref 20–31)
CREAT SERPL-MCNC: 1.03 MG/DL (ref 0.7–1.2)
DIFFERENTIAL TYPE: ABNORMAL
EOSINOPHILS RELATIVE PERCENT: 2 % (ref 1–4)
FREE KAPPA/LAMBDA RATIO: 3.57 (ref 0.26–1.65)
GFR AFRICAN AMERICAN: >60 ML/MIN
GFR NON-AFRICAN AMERICAN: >60 ML/MIN
GFR SERPL CREATININE-BSD FRML MDRD: NORMAL ML/MIN/{1.73_M2}
GFR SERPL CREATININE-BSD FRML MDRD: NORMAL ML/MIN/{1.73_M2}
GLUCOSE BLD-MCNC: 89 MG/DL (ref 70–99)
HCT VFR BLD CALC: 46.5 % (ref 40.7–50.3)
HEMOGLOBIN: 14.7 G/DL (ref 13–17)
IGA: 115 MG/DL (ref 70–400)
IGG: 908 MG/DL (ref 700–1600)
IGM: 33 MG/DL (ref 40–230)
IMMATURE GRANULOCYTES: 1 %
KAPPA FREE LIGHT CHAINS QNT: 7.99 MG/DL (ref 0.37–1.94)
LAMBDA FREE LIGHT CHAINS QNT: 2.24 MG/DL (ref 0.57–2.63)
LYMPHOCYTES # BLD: 14 % (ref 24–43)
MCH RBC QN AUTO: 28.9 PG (ref 25.2–33.5)
MCHC RBC AUTO-ENTMCNC: 31.6 G/DL (ref 28.4–34.8)
MCV RBC AUTO: 91.5 FL (ref 82.6–102.9)
MONOCYTES # BLD: 9 % (ref 3–12)
NRBC AUTOMATED: 0 PER 100 WBC
PDW BLD-RTO: 14.8 % (ref 11.8–14.4)
PLATELET # BLD: 166 K/UL (ref 138–453)
PLATELET ESTIMATE: ABNORMAL
PMV BLD AUTO: 11 FL (ref 8.1–13.5)
POTASSIUM SERPL-SCNC: 4.7 MMOL/L (ref 3.7–5.3)
RBC # BLD: 5.08 M/UL (ref 4.21–5.77)
RBC # BLD: ABNORMAL 10*6/UL
SEG NEUTROPHILS: 73 % (ref 36–65)
SEGMENTED NEUTROPHILS ABSOLUTE COUNT: 5.39 K/UL (ref 1.5–8.1)
SODIUM BLD-SCNC: 137 MMOL/L (ref 135–144)
WBC # BLD: 7.3 K/UL (ref 3.5–11.3)
WBC # BLD: ABNORMAL 10*3/UL

## 2021-08-16 PROCEDURE — 84155 ASSAY OF PROTEIN SERUM: CPT

## 2021-08-16 PROCEDURE — 86334 IMMUNOFIX E-PHORESIS SERUM: CPT

## 2021-08-16 PROCEDURE — 36415 COLL VENOUS BLD VENIPUNCTURE: CPT

## 2021-08-16 PROCEDURE — 82784 ASSAY IGA/IGD/IGG/IGM EACH: CPT

## 2021-08-16 PROCEDURE — 83883 ASSAY NEPHELOMETRY NOT SPEC: CPT

## 2021-08-16 PROCEDURE — 84165 PROTEIN E-PHORESIS SERUM: CPT

## 2021-08-16 PROCEDURE — 85025 COMPLETE CBC W/AUTO DIFF WBC: CPT

## 2021-08-16 PROCEDURE — 80048 BASIC METABOLIC PNL TOTAL CA: CPT

## 2021-08-19 LAB
ALBUMIN (CALCULATED): 4.2 G/DL (ref 3.2–5.2)
ALBUMIN PERCENT: 65 % (ref 45–65)
ALPHA 1 PERCENT: 3 % (ref 3–6)
ALPHA 2 PERCENT: 9 % (ref 6–13)
ALPHA-1-GLOBULIN: 0.2 G/DL (ref 0.1–0.4)
ALPHA-2-GLOBULIN: 0.6 G/DL (ref 0.5–0.9)
BETA GLOBULIN: 0.7 G/DL (ref 0.5–1.1)
BETA PERCENT: 11 % (ref 11–19)
GAMMA GLOBULIN %: 13 % (ref 9–20)
GAMMA GLOBULIN: 0.8 G/DL (ref 0.5–1.5)
PATHOLOGIST: NORMAL
PATHOLOGIST: NORMAL
PROTEIN ELECTROPHORESIS, SERUM: NORMAL
SERUM IFX INTERP: NORMAL
TOTAL PROT. SUM,%: 101 % (ref 98–102)
TOTAL PROT. SUM: 6.5 G/DL (ref 6.3–8.2)
TOTAL PROTEIN: 6.5 G/DL (ref 6.4–8.3)

## 2021-09-23 ENCOUNTER — HOSPITAL ENCOUNTER (OUTPATIENT)
Facility: MEDICAL CENTER | Age: 61
End: 2021-09-23
Payer: COMMERCIAL

## 2021-09-30 ENCOUNTER — TELEPHONE (OUTPATIENT)
Dept: ONCOLOGY | Age: 61
End: 2021-09-30

## 2021-09-30 ENCOUNTER — OFFICE VISIT (OUTPATIENT)
Dept: ONCOLOGY | Age: 61
End: 2021-09-30
Payer: COMMERCIAL

## 2021-09-30 VITALS
WEIGHT: 184.4 LBS | DIASTOLIC BLOOD PRESSURE: 73 MMHG | SYSTOLIC BLOOD PRESSURE: 114 MMHG | RESPIRATION RATE: 18 BRPM | HEART RATE: 65 BPM | BODY MASS INDEX: 29.76 KG/M2 | TEMPERATURE: 97.9 F

## 2021-09-30 DIAGNOSIS — D47.2 MGUS (MONOCLONAL GAMMOPATHY OF UNKNOWN SIGNIFICANCE): Primary | ICD-10-CM

## 2021-09-30 PROCEDURE — G8427 DOCREV CUR MEDS BY ELIG CLIN: HCPCS | Performed by: INTERNAL MEDICINE

## 2021-09-30 PROCEDURE — 99211 OFF/OP EST MAY X REQ PHY/QHP: CPT | Performed by: INTERNAL MEDICINE

## 2021-09-30 PROCEDURE — 1036F TOBACCO NON-USER: CPT | Performed by: INTERNAL MEDICINE

## 2021-09-30 PROCEDURE — 99211 OFF/OP EST MAY X REQ PHY/QHP: CPT

## 2021-09-30 PROCEDURE — 3017F COLORECTAL CA SCREEN DOC REV: CPT | Performed by: INTERNAL MEDICINE

## 2021-09-30 PROCEDURE — G8417 CALC BMI ABV UP PARAM F/U: HCPCS | Performed by: INTERNAL MEDICINE

## 2021-09-30 PROCEDURE — 99214 OFFICE O/P EST MOD 30 MIN: CPT | Performed by: INTERNAL MEDICINE

## 2021-09-30 RX ORDER — OXYBUTYNIN CHLORIDE 5 MG/1
5 TABLET, EXTENDED RELEASE ORAL DAILY
Status: ON HOLD | COMMUNITY
Start: 2021-08-30 | End: 2022-05-01

## 2021-09-30 RX ORDER — OXYBUTYNIN CHLORIDE 5 MG/1
5 TABLET ORAL 2 TIMES DAILY
COMMUNITY
Start: 2021-09-06 | End: 2022-03-31 | Stop reason: SDUPTHER

## 2021-09-30 RX ORDER — AMOXICILLIN AND CLAVULANATE POTASSIUM 875; 125 MG/1; MG/1
TABLET, FILM COATED ORAL
COMMUNITY
Start: 2021-09-27 | End: 2022-03-31

## 2021-09-30 RX ORDER — FERROUS SULFATE 325(65) MG
325 TABLET ORAL
COMMUNITY
Start: 2021-07-07

## 2021-09-30 RX ORDER — ESCITALOPRAM OXALATE 5 MG/1
15 TABLET ORAL DAILY
Status: ON HOLD | COMMUNITY
End: 2022-06-13

## 2021-09-30 RX ORDER — POLYETHYLENE GLYCOL 3350 17 G/17G
17 POWDER, FOR SOLUTION ORAL DAILY
Status: ON HOLD | COMMUNITY
End: 2022-05-01

## 2021-09-30 NOTE — TELEPHONE ENCOUNTER
ERASTO ARRIVES AMBULATORY FOR MD VISIT  DR Florencia Gandhi IN TO SEE PATIENT  ORDERS RECEIVED  RV 6 MONTHS WITH LABS BEFORE  LABS SIFX IGG IGA IGM KAPPA LAMBDA FREE LIGHT CHAINS BMP CDP DUE 3/24/22, Konrad Ellis  MD VISIT 3/31/22 @2PM  AVS PRINTED AND GIVEN TO PATIENT WITH INSTRUCTIONS  PATIENT DISCHARGED AMBULATORY

## 2021-09-30 NOTE — PROGRESS NOTES
_        Chief Complaint   Patient presents with    Follow-up    Discuss Labs     DIAGNOSIS:       Paraproteinemia/ monoclonal gammopathy of undetermined significance. (MGUS). Chronic renal insufficiency  Bipolar disorder  Hypertension      CURRENT THERAPY:         Seen to discuss labs results and further management plans. BRIEF CASE HISTORY:      Mr. Dorina Romeo is a very pleasant 63 y.o. male with history of hypertension, bipolar disorder, and chronic renal insufficiency. The patient had labs done which revealed monoclonal gammopathy. He is referred for further management. At the present time he denies any fever or night sweats. No weight loss or decreased appetite. No chest pain or shortness of breath. No palpable lymph nodes. Patient has generalized body aches. No bone fractures. No other complaints. .     INTERIM HISTORY:   Seen for follow up abnormal immunoglobulins levels. Patient is doing well clinically. No significant complaints. No aches or pains. No fever or night sweats. No weight loss or decreased appetite. No other complaints. PAST MEDICAL HISTORY: has a past medical history of AMS (altered mental status), Atrial fibrillation ([facility] [address] Bipolar 1 disorder ([facility] [address] Depression, Hypertension, Neck pain, and Patient in clinical research study. PAST SURGICAL HISTORY: has a past surgical history that includes hernia repair; Neck surgery (2013); Abdomen surgery; Tonsillectomy; Appendectomy; Dilatation, esophagus; Cardiac surgery (7/14/15); Cardiac surgery (07/14/2015); Colonoscopy (01/28/2021); and Upper gastrointestinal endoscopy (01/28/2021).      CURRENT MEDICATIONS:  has a current medication list which includes the following prescription(s): escitalopram, amoxicillin-clavulanate, oxybutynin, oxybutynin, polyethylene glycol, ferosul, gabapentin, aspirin, atorvastatin, melatonin er, midodrine, folic acid, polycarbophil, furosemide, warfarin, tamsulosin, cyanocobalamin, alendronate, cetirizine, and senna. ALLERGIES:  is allergic to oxycodone, fish-derived products, ibuprofen, latuda [lurasidone hcl], lithium, lurasidone, quetiapine, and seroquel [quetiapine fumarate]. FAMILY HISTORY: Negative for any hematological or oncological conditions. SOCIAL HISTORY:  reports that he has never smoked. He has never used smokeless tobacco. He reports current alcohol use. He reports current drug use. Drug: Cocaine. REVIEW OF SYSTEMS:     · General: No weakness or fatigue. No unanticipated weight loss or decreased appetite. No fever or chills. · Eyes: No blurred vision, eye pain or double vision. · Ears: No hearing problems or drainage. No tinnitus. · Throat: No sore throat, problems with swallowing or dysphagia. · Respiratory: No cough, sputum or hemoptysis. No shortness of breath. No pleuritic chest pain. · Cardiovascular: No chest pain, orthopnea or PND. No lower extremity edema. No palpitation. · Gastrointestinal: No problems with swallowing. No abdominal pain or bloating. No nausea or vomiting. No diarrhea or constipation. No GI bleeding. · Genitourinary: No dysuria, hematuria, frequency or urgency. · Musculoskeletal: No muscle aches or pains. No limitation of movement. No back pain. No gait disturbance, No joint complaints. · Dermatologic: No skin rashes or pruritus. No skin lesions or discolorations. · Psychiatric: No depression, anxiety, or stress or signs of schizophrenia. No change in mood or affect. · Hematologic: No history of bleeding tendency. No bruises or ecchymosis. No history of clotting problems. · Infectious disease: No fever, chills or frequent infections. · Endocrine: No problems with opacity. No polydipsia or polyuria. No temperature intolerance. · Neurologic: No headaches or dizziness. No weakness or numbness of the extremities.  No changes in balance, coordination,  memory, mentation, behavior. · Allergic/Immunologic: No nasal congestion or hives. No repeated infections. PHYSICAL EXAM:  The patient is not in acute distress. Vital signs: Blood pressure 114/73, pulse 65, temperature 97.9 °F (36.6 °C), temperature source Temporal, resp. rate 18, weight 184 lb 6.4 oz (83.6 kg). HEENT:  Eyes are normal. Ears, nose and throat are normal.  Neck: Supple. No lymph node enlargement. No thyroid enlargement. Trachea is centrally located. Chest:  Clear to auscultation. No wheezes or crepitations. Heart: Regular sinus rhythm. Abdomen: Soft, nontender. No hepatosplenomegaly. No masses. Extremities:  With no edema. Lymph Nodes:  No cervical, axillary or inguinal lymph node enlargement. Neurologic:  Conscious and oriented. No focal neurological deficits. Psychosocial: No depression, anxiety or stress. Skin: No rashes, bruises or ecchymoses.       Review of Diagnostic data:   Lab Results   Component Value Date    WBC 7.3 08/16/2021    HGB 14.7 08/16/2021    HCT 46.5 08/16/2021    MCV 91.5 08/16/2021     08/16/2021       Chemistry        Component Value Date/Time     08/16/2021 1054    K 4.7 08/16/2021 1054     08/16/2021 1054    CO2 24 08/16/2021 1054    BUN 12 08/16/2021 1054    CREATININE 1.03 08/16/2021 1054        Component Value Date/Time    CALCIUM 9.1 08/16/2021 1054    ALKPHOS 153 (H) 02/01/2021 1210    AST 10 02/01/2021 1210    ALT 8 02/01/2021 1210    BILITOT 0.57 02/01/2021 1210        4/5/2018 12:45 PM - Jeremy, Mhpn Incoming Lab Results From [facility]     Component Results     Component Value Ref Range & Units Status Collected Lab   Kappa Free Light Chains QNT 8.07   0.37 - 1.94 mg/dL Final 04/05/2018  9:10 AM [facility] [place]   Lambda Free Light Chains QNT 2.15  0.57 - 2.63 mg/dL Final 04/05/2018  9:10  [address]   Free [place]/Lambda Ratio 3.75   0.26 - 1.65 Final 04/05/2018  9:10 AM [facility] [id number] [place] [address], [address] [place] [address] [phone]     4/5/2018 12:06 PM - Jeremy, Atilio Incoming Lab Results From [facility]     Component Results     Component Value Ref Range & Units Status Collected Lab   Igg 849  700 - 1600 mg/dL Final 04/05/2018  9:10  [address]     70 - 400 mg/dL Final 04/05/2018  9:10  [address]   Igm 41  40 - 230 mg/dL Final 04/05/2018  9:10 AM 3 [facility] [id number] [place] [address], [address] [place] [address] [phone]             IMPRESSION:   Paraproteinemia/ monoclonal gammopathy of undetermined significance. (MGUS). Chronic renal insufficiency  Bipolar disorder  Hypertension  ECOG performance score (0). PLAN: labs were reviewed and discussed with the patient. I explained the significance of these abnormalities related to immunoglobulins. There were some fluctuation in the level of the immunoglobulins. Likely dealing with MGUS. Recommend monitoring. No need for BM test yet. We will repeat labs in 6 months. Patient's questions were answered to the best of his satisfaction and he verbalized full understanding and agreement. 6 [facility]                            This note is created with the assistance of a speech recognition program.  While intending to generate a document that actually reflects the content of the visit, the document can still have some errors including those of syntax and sound a like substitutions which may escape proof reading. It such instances, actual meaning can be extrapolated by contextual diversion.

## 2021-10-23 NOTE — ED PROVIDER NOTES
[address] [facility]  ED  Emergency Department Encounter  EmergencyMedicine Resident     Pt Name:Larry Bullard  MRN: [id number]  [place] 1960  Date of evaluation: 2/14/20  PCP:  MD Blade France       Chief Complaint   Patient presents with    Fatigue       HISTORY OF PRESENT ILLNESS  (Location/Symptom, Timing/Onset, Context/Setting, Quality, Duration, Modifying Factors, Severity.)      Jax Lopez is a 60 y.o. male Patient presenting by EMS for increased fatigue due to taking extra dose of his Ambien. Patient stating that he is depressed but did not try to harm himself. Unclear as to why he took extra Ambien. Patient also on Coumadin for valve replacement and aneurysm repair in 2015. Patient slow to respond initially denied any recent falls but upon further questioning and examination, patient revealed that he had multiple bruises on right knee from a fall that he says he thinks was 2 days ago but is not sure. He denies any headache, bleeding from the scalp, unilateral weakness, slurred speech. No complaint of vision change. No complaint of fever, nausea, vomiting. Patient also complaining of shortness of breath without chest pain or recent cough/URI symptoms. PAST MEDICAL / SURGICAL / SOCIAL / FAMILY HISTORY      has a past medical history of Bipolar 1 disorder ([address] Depression, Hypertension, Neck pain, and Patient in clinical research study. has a past surgical history that includes hernia repair; Neck surgery (2013); Abdomen surgery; Tonsillectomy; Appendectomy; Dilatation, esophagus; and Cardiac surgery (7/14/15).     Social History     Socioeconomic History    Marital status: Single     Spouse name: Not on file    Number of children: Not on file    Years of education: Not on file    Highest education level: Not on file   Occupational History    Not on file   Social Needs    Financial resource strain: Not on file    Food insecurity:     Worry: Not capsule by mouth 3 times daily for 30 days. 9/12/19 1/21/20  [facility], APRN - CNP   divalproex (DEPAKOTE ER) 500 MG extended release tablet Take 1 tablet by mouth nightly 9/12/19 1/21/20  [facility], APRN - CNP   benztropine (COGENTIN) 0.5 MG tablet Take 1 tablet by mouth 2 times daily 9/12/19 1/21/20  [facility], APRN - CNP   tamsulosin (FLOMAX) 0.4 MG capsule Take 0.4 mg by mouth daily    Historical Provider, MD   Cyanocobalamin (VITAMIN B 12 PO) Take 1,000 mcg by mouth daily    Historical Provider, MD   MAGNESIUM OXIDE 400 PO Take 1 tablet by mouth nightly    Historical Provider, MD   alendronate (FOSAMAX) 70 MG tablet Take 70 mg by mouth every 7 days    Historical Provider, MD   cetirizine (ZYRTEC) 10 MG tablet Take 10 mg by mouth daily    Historical Provider, MD   atorvastatin (LIPITOR) 20 MG tablet Take 20 mg by mouth daily    Historical Provider, MD   aspirin 81 MG tablet Take 81 mg by mouth daily    Historical Provider, MD   ranitidine (ZANTAC) 150 MG tablet Take 150 mg by mouth 2 times daily    Historical Provider, MD   vitamin D (CHOLECALCIFEROL) 1000 UNIT TABS tablet Take 1,000 Units by mouth daily    Historical Provider, MD       REVIEW OF SYSTEMS    (2-9 systems for level 4, 10 or more for level 5)      Review of Systems   Constitutional: Positive for fatigue. Negative for fever. Respiratory: Negative for shortness of breath. Cardiovascular: Negative for chest pain. Gastrointestinal: Negative for abdominal pain. Musculoskeletal: Negative for arthralgias. Skin: Positive for wound. Allergic/Immunologic: Negative for environmental allergies. Neurological: Positive for weakness. Hematological: Does not bruise/bleed easily. Psychiatric/Behavioral: Positive for suicidal ideas.        PHYSICAL EXAM   (up to 7 for level 4, 8 or more for level 5)      INITIAL VITALS:   /72   Pulse 69   Temp 96.1 °F (35.6 °C)   Resp 16   Wt 200 lb (90.7 kg)   SpO2 92%   BMI 32.28 and intact sensory function. Pupils equal and reactive, no facial droop. Patient slurring speech but appears to have normal thought content without aphasia    [AD]   1155 Patient also depressed and there is question for possible intentional overdose. Patient has normal vital signs. Plan is to obtain CT head, basic labs, chest x-ray, troponin, BNP and to reevaluate. Will consult social work as well. Patient does not have any support at home and is homeless.   He was brought in by EMS from a homeless shelter.    [AD]      ED Course User Index  [AD] Jaja Barrios MD       DIAGNOSTIC RESULTS / [address] [place] [address] / [facility]     LABS:  Results for orders placed or performed during the hospital encounter of 02/14/20   TOX SCR, BLD, ED   Result Value Ref Range    Ethanol <10 <10 mg/dL    Ethanol percent <2.761 <4.165 %    Salicylate Lvl <1 (L) 3 - 10 mg/dL    Acetaminophen Level <5 (L) 10 - 30 ug/mL    Toxic Tricyclic Sc,Blood NEGATIVE NEGATIVE   CBC Auto Differential   Result Value Ref Range    WBC 7.4 3.5 - 11.3 k/uL    RBC 3.58 (L) 4.21 - 5.77 m/uL    Hemoglobin 11.0 (L) 13.0 - 17.0 g/dL    Hematocrit 33.1 (L) 40.7 - 50.3 %    MCV 92.5 82.6 - 102.9 fL    MCH 30.7 25.2 - 33.5 pg    MCHC 33.2 28.4 - 34.8 g/dL    RDW 15.5 (H) 11.8 - 14.4 %    Platelets 125 194 - 171 k/uL    MPV 10.4 8.1 - 13.5 fL    NRBC Automated 0.0 0.0 per 100 WBC    Differential Type NOT REPORTED     WBC Morphology NOT REPORTED     RBC Morphology NOT REPORTED     Platelet Estimate NOT REPORTED     Immature Granulocytes 1 (H) 0 %    Seg Neutrophils 85 (H) 36 - 66 %    Lymphocytes 10 (L) 24 - 44 %    Monocytes 2 1 - 7 %    Eosinophils % 1 1 - 4 %    Basophils 1 0 - 2 %    Absolute Immature Granulocyte 0.07 0.00 - 0.30 k/uL    Segs Absolute 6.30 1.8 - 7.7 k/uL    Absolute Lymph # 0.74 (L) 1.0 - 4.8 k/uL    Absolute Mono # 0.15 0.1 - 0.8 k/uL    Absolute Eos # 0.07 0.0 - 0.4 k/uL    Basophils Absolute 0.07 0.0 - 0.2 k/uL    Morphology Altered mental status, unspecified altered mental status type          DISPOSITION / PLAN     DISPOSITION Decision To Admit 02/14/2020 01:04:14 PM      PATIENT REFERRED TO:  No follow-up provider specified.     DISCHARGE MEDICATIONS:  Current Discharge Medication List          Julieta Portillo MD  Emergency Medicine Resident    (Please note that portions of thisnote were completed with a voice recognition program.  Efforts were made to edit the dictations but occasionally words are mis-transcribed.)       Julieta Portillo MD  02/14/20 1500 yes...

## 2022-01-16 ENCOUNTER — APPOINTMENT (OUTPATIENT)
Dept: GENERAL RADIOLOGY | Age: 62
End: 2022-01-16
Payer: COMMERCIAL

## 2022-01-16 ENCOUNTER — HOSPITAL ENCOUNTER (EMERGENCY)
Age: 62
Discharge: HOME | End: 2022-01-16
Attending: EMERGENCY MEDICINE
Payer: COMMERCIAL

## 2022-01-16 VITALS
BODY MASS INDEX: 29.25 KG/M2 | SYSTOLIC BLOOD PRESSURE: 118 MMHG | WEIGHT: 182 LBS | RESPIRATION RATE: 16 BRPM | HEIGHT: 66 IN | OXYGEN SATURATION: 97 % | HEART RATE: 82 BPM | DIASTOLIC BLOOD PRESSURE: 78 MMHG | TEMPERATURE: 98.2 F

## 2022-01-16 DIAGNOSIS — M79.605 LEFT LEG PAIN: ICD-10-CM

## 2022-01-16 DIAGNOSIS — M79.604 RIGHT LEG PAIN: Primary | ICD-10-CM

## 2022-01-16 LAB
ABSOLUTE EOS #: 0.14 K/UL (ref 0–0.44)
ABSOLUTE IMMATURE GRANULOCYTE: 0.06 K/UL (ref 0–0.3)
ABSOLUTE LYMPH #: 0.84 K/UL (ref 1.1–3.7)
ABSOLUTE MONO #: 0.66 K/UL (ref 0.1–1.2)
ANION GAP SERPL CALCULATED.3IONS-SCNC: 12 MMOL/L (ref 9–17)
BASOPHILS # BLD: 1 % (ref 0–2)
BASOPHILS ABSOLUTE: 0.08 K/UL (ref 0–0.2)
BNP INTERPRETATION: NORMAL
BUN BLDV-MCNC: 13 MG/DL (ref 8–23)
BUN/CREAT BLD: 14 (ref 9–20)
CALCIUM SERPL-MCNC: 9 MG/DL (ref 8.6–10.4)
CHLORIDE BLD-SCNC: 102 MMOL/L (ref 98–107)
CO2: 23 MMOL/L (ref 20–31)
CREAT SERPL-MCNC: 0.91 MG/DL (ref 0.7–1.2)
DIFFERENTIAL TYPE: ABNORMAL
EOSINOPHILS RELATIVE PERCENT: 2 % (ref 1–4)
GFR AFRICAN AMERICAN: >60 ML/MIN
GFR NON-AFRICAN AMERICAN: >60 ML/MIN
GFR SERPL CREATININE-BSD FRML MDRD: ABNORMAL ML/MIN/{1.73_M2}
GFR SERPL CREATININE-BSD FRML MDRD: ABNORMAL ML/MIN/{1.73_M2}
GLUCOSE BLD-MCNC: 103 MG/DL (ref 70–99)
HCT VFR BLD CALC: 43.8 % (ref 40.7–50.3)
HEMOGLOBIN: 14.2 G/DL (ref 13–17)
IMMATURE GRANULOCYTES: 1 %
INR BLD: 2.6
LYMPHOCYTES # BLD: 12 % (ref 24–43)
MCH RBC QN AUTO: 29.7 PG (ref 25.2–33.5)
MCHC RBC AUTO-ENTMCNC: 32.4 G/DL (ref 28.4–34.8)
MCV RBC AUTO: 91.6 FL (ref 82.6–102.9)
MONOCYTES # BLD: 9 % (ref 3–12)
NRBC AUTOMATED: 0 PER 100 WBC
PDW BLD-RTO: 14.6 % (ref 11.8–14.4)
PLATELET # BLD: 159 K/UL (ref 138–453)
PLATELET ESTIMATE: ABNORMAL
PMV BLD AUTO: 10.3 FL (ref 8.1–13.5)
POTASSIUM SERPL-SCNC: 4.3 MMOL/L (ref 3.7–5.3)
PRO-BNP: 222 PG/ML
PROTHROMBIN TIME: 27 SEC (ref 11.5–14.2)
RBC # BLD: 4.78 M/UL (ref 4.21–5.77)
RBC # BLD: ABNORMAL 10*6/UL
SEG NEUTROPHILS: 75 % (ref 36–65)
SEGMENTED NEUTROPHILS ABSOLUTE COUNT: 5.43 K/UL (ref 1.5–8.1)
SODIUM BLD-SCNC: 137 MMOL/L (ref 135–144)
TROPONIN INTERP: NORMAL
TROPONIN T: NORMAL NG/ML
TROPONIN, HIGH SENSITIVITY: 7 NG/L (ref 0–22)
WBC # BLD: 7.2 K/UL (ref 3.5–11.3)
WBC # BLD: ABNORMAL 10*3/UL

## 2022-01-16 PROCEDURE — 71045 X-RAY EXAM CHEST 1 VIEW: CPT

## 2022-01-16 PROCEDURE — 6370000000 HC RX 637 (ALT 250 FOR IP): Performed by: PHYSICIAN ASSISTANT

## 2022-01-16 PROCEDURE — 99283 EMERGENCY DEPT VISIT LOW MDM: CPT

## 2022-01-16 PROCEDURE — 93005 ELECTROCARDIOGRAM TRACING: CPT | Performed by: EMERGENCY MEDICINE

## 2022-01-16 PROCEDURE — 83880 ASSAY OF NATRIURETIC PEPTIDE: CPT

## 2022-01-16 PROCEDURE — 2580000003 HC RX 258: Performed by: PHYSICIAN ASSISTANT

## 2022-01-16 PROCEDURE — 85025 COMPLETE CBC W/AUTO DIFF WBC: CPT

## 2022-01-16 PROCEDURE — 80048 BASIC METABOLIC PNL TOTAL CA: CPT

## 2022-01-16 PROCEDURE — 85610 PROTHROMBIN TIME: CPT

## 2022-01-16 PROCEDURE — 84484 ASSAY OF TROPONIN QUANT: CPT

## 2022-01-16 RX ORDER — DIAZEPAM 2 MG/1
2 TABLET ORAL ONCE
Status: COMPLETED | OUTPATIENT
Start: 2022-01-16 | End: 2022-01-16

## 2022-01-16 RX ORDER — SODIUM CHLORIDE 9 MG/ML
1000 INJECTION, SOLUTION INTRAVENOUS CONTINUOUS
Status: DISCONTINUED | OUTPATIENT
Start: 2022-01-16 | End: 2022-01-16 | Stop reason: HOSPADM

## 2022-01-16 RX ORDER — DIAZEPAM 5 MG/1
5 TABLET ORAL NIGHTLY PRN
Qty: 7 TABLET | Refills: 0 | Status: SHIPPED | OUTPATIENT
Start: 2022-01-16 | End: 2022-01-26

## 2022-01-16 RX ADMIN — SODIUM CHLORIDE 1000 ML: 9 INJECTION, SOLUTION INTRAVENOUS at 11:34

## 2022-01-16 RX ADMIN — DIAZEPAM 2 MG: 2 TABLET ORAL at 11:26

## 2022-01-16 ASSESSMENT — ENCOUNTER SYMPTOMS: BACK PAIN: 0

## 2022-01-16 NOTE — ED PROVIDER NOTES
[facility] ED  eMERGENCY dEPARTMENT eNCOUnter      Pt Name: Pam Powell  MRN: [id number]  [place] 1960  Date of evaluation: 1/16/22      CHIEF COMPLAINT       Chief Complaint   Patient presents with    Leg Pain     bilateral below the knees, onset 2 days ago, \"aching\"    Shortness of Breath     onset last night         HISTORY OF PRESENT ILLNESS    Pam Powell is a 57 y.o. male who presents complaining of Bilateral leg pain states that pain started couple days ago and he has shortness of breath last night. His legs are not swollen but they feel restless and it is causing him to lose sleep. The history is provided by the patient. Leg Injury  Location:  Leg (Bilateral restless legs and leg pain ongoing for the past few days no injury no trauma. No swelling no redness he reports it is keeping him up at night)  Time since incident:  3 days  Injury: no    Leg location:  L lower leg and R lower leg  Pain details:     Quality:  Aching and throbbing    Radiates to:  Does not radiate    Severity:  Moderate    Onset quality:  Gradual    Duration:  3 days    Timing:  Intermittent    Progression:  Waxing and waning  Chronicity:  New  Dislocation: no    Foreign body present:  No foreign bodies  Prior injury to area:  Unable to specify  Relieved by:  Nothing  Worsened by:  Nothing  Ineffective treatments:  None tried  Associated symptoms: stiffness    Associated symptoms: no back pain, no decreased ROM, no fatigue, no fever, no itching, no muscle weakness, no neck pain, no numbness, no swelling and no tingling        REVIEW OF SYSTEMS       Review of Systems   Constitutional: Negative for fatigue and fever. Musculoskeletal: Positive for myalgias and stiffness. Negative for back pain and neck pain. Skin: Negative for itching. All other systems reviewed and are negative.       PAST MEDICAL HISTORY     Past Medical History:   Diagnosis Date    AMS (altered mental status) 02/15/2020    Atrial fibrillation ([facility] [address]     on coumadin    Bipolar 1 disorder ([facility] [address]     Depression     Hypertension     Neck pain     Patient in clinical research study 04/12/2018    [id number]       SURGICAL HISTORY       Past Surgical History:   Procedure Laterality Date    ABDOMEN SURGERY      APPENDECTOMY      CARDIAC SURGERY  7/14/15    Median Sternotomy, Repair of ascending aorti aneurysm, stentless valve placement    CARDIAC SURGERY  07/14/2015    Median stenotomy, repair of ascending aorti aneurysm, stentless valve placement     COLONOSCOPY  01/28/2021    DILATATION, ESOPHAGUS      HERNIA REPAIR      NECK SURGERY  2013    TONSILLECTOMY      UPPER GASTROINTESTINAL ENDOSCOPY  01/28/2021       CURRENT MEDICATIONS       Previous Medications    ALENDRONATE (FOSAMAX) 70 MG TABLET    Take 70 mg by mouth every 7 days    AMOXICILLIN-CLAVULANATE (AUGMENTIN) 875-125 MG PER TABLET        ASPIRIN 81 MG CHEWABLE TABLET    Take 1 tablet by mouth daily    ATORVASTATIN (LIPITOR) 20 MG TABLET    Take 1 tablet by mouth nightly    CETIRIZINE (ZYRTEC) 10 MG TABLET    Take 10 mg by mouth daily    CYANOCOBALAMIN (VITAMIN B 12 PO)    Take 1,000 mcg by mouth daily    ESCITALOPRAM (LEXAPRO) 5 MG TABLET    Take 5 mg by mouth 3 times daily    FEROSUL 325 (65 FE) MG TABLET        FOLIC ACID (FOLVITE) 1 MG TABLET    Take 1 mg by mouth daily    FUROSEMIDE (LASIX) 20 MG TABLET    Take 20 mg by mouth daily    GABAPENTIN (NEURONTIN) 300 MG CAPSULE    Take 300 mg by mouth 3 times daily.      MELATONIN ER 1 MG TBCR TABLET    Take 1 tablet by mouth nightly as needed (insomnia)    MIDODRINE (PROAMATINE) 10 MG TABLET    Take 10 mg by mouth 3 times daily    OXYBUTYNIN (DITROPAN) 5 MG TABLET    Take 5 mg by mouth 2 times daily     OXYBUTYNIN (DITROPAN-XL) 5 MG EXTENDED RELEASE TABLET    Take 5 mg by mouth daily     POLYCARBOPHIL (FIBERCON) 625 MG TABLET    Take 625 mg by mouth 3 times daily    POLYETHYLENE GLYCOL (GLYCOLAX) 17 GM/SCOOP POWDER    Take 17 g by mouth daily    SENNA (SENOKOT) 8.6 MG TABLET    Take 1 tablet by mouth 2 times daily    TAMSULOSIN (FLOMAX) 0.4 MG CAPSULE    Take 0.4 mg by mouth daily    WARFARIN (COUMADIN) 2 MG TABLET    Take 0.5 tablets by mouth four times a week Indications: takes 1 mg tabs tuesday and saturday, takes 2mg tabs mon, wed thurs fri sun       ALLERGIES     is allergic to oxycodone, fish-derived products, ibuprofen, latuda [lurasidone hcl], lithium, lurasidone, quetiapine, and seroquel [quetiapine fumarate]. FAMILY HISTORY     He indicated that the status of his mother is unknown. He indicated that the status of his father is unknown. SOCIAL HISTORY      reports that he has never smoked. He has never used smokeless tobacco. He reports current alcohol use. He reports current drug use. Drug: Cocaine. PHYSICAL EXAM     INITIAL VITALS: /78   Pulse 82   Temp 98.2 °F (36.8 °C) (Tympanic)   Resp 16   Ht 5' 6\" (1.676 m)   Wt 182 lb (82.6 kg)   SpO2 97%   BMI 29.38 kg/m²      Physical Exam  Vitals and nursing note reviewed. Constitutional:       Appearance: He is well-developed. HENT:      Head: Normocephalic and atraumatic. Eyes:      Pupils: Pupils are equal, round, and reactive to light. Cardiovascular:      Rate and Rhythm: Normal rate and regular rhythm. Heart sounds: Normal heart sounds. No murmur heard. Comments: Bilateral leg pain, no swelling or redness pedal pulses are palpable. He has brisk capillary refill. Full range of motion strength is 5 out of 5 against resistance. Pulmonary:      Effort: Pulmonary effort is normal.      Breath sounds: Normal breath sounds. Abdominal:      General: Bowel sounds are normal.      Palpations: Abdomen is soft. Tenderness: There is no abdominal tenderness. Musculoskeletal:         General: Normal range of motion. Cervical back: Normal range of motion. Right lower leg: No tenderness. No edema. Left lower leg: No tenderness.  No edema.   Skin:     General: Skin is warm and dry. Capillary Refill: Capillary refill takes less than 2 seconds. Neurological:      Mental Status: He is alert and oriented to person, place, and time. MEDICAL DECISION MAKING:     With some restless leg symptoms. While in the emergency department we checked labs and EKG and chest x-ray no remarkable findings are noted he is given some IV fluids and some Valium. He feels marginally better he still having some light cramping in his left leg. He has no swelling or redness he is anticoagulated he does take Coumadin. I do not suspect DVT as he has anticoagulation on board, he has not had any prolonged immobility he is not tachycardic his negative Darryle Glad' sign he has not had any recent surgeries. Well score is 0. DIAGNOSTIC RESULTS     EKG: All EKG's are interpreted by the Emergency Department Physician who either signs or Co-signs this chart in the absence of acardiologist.        RADIOLOGY:Allplain film, CT, MRI, and formal ultrasound images (except ED bedside ultrasound) are read by the radiologist and the images and interpretations are directly viewed by the emergency physician. LABS:All lab results were reviewed by myself, and all abnormals are listed below.   Labs Reviewed   CBC WITH AUTO DIFFERENTIAL - Abnormal; Notable for the following components:       Result Value    RDW 14.6 (*)     Seg Neutrophils 75 (*)     Lymphocytes 12 (*)     Immature Granulocytes 1 (*)     Absolute Lymph # 0.84 (*)     All other components within normal limits   BASIC METABOLIC PANEL W/ REFLEX TO MG FOR LOW K - Abnormal; Notable for the following components:    Glucose 103 (*)     All other components within normal limits   PROTIME-INR - Abnormal; Notable for the following components:    Protime 27.0 (*)     All other components within normal limits   TROPONIN   BRAIN NATRIURETIC PEPTIDE         EMERGENCY DEPARTMENT COURSE:   Vitals:    Vitals:    01/16/22 1040 01/16/22 1042   BP:  118/78   Pulse:  82   Resp:  16   Temp: 98.2 °F (36.8 °C)    TempSrc: Tympanic    SpO2:  97%   Weight:  182 lb (82.6 kg)   Height:  5' 6\" (1.676 m)       The patient was given the following medications while in the emergency department:  Orders Placed This Encounter   Medications    0.9 % sodium chloride infusion    diazePAM (VALIUM) tablet 2 mg    diazePAM (VALIUM) 5 MG tablet     Sig: Take 1 tablet by mouth nightly as needed for Anxiety or Sleep for up to 10 days. Dispense:  7 tablet     Refill:  0       -------------------------      CRITICAL CARE:       CONSULTS:  None    PROCEDURES:  Procedures    FINAL IMPRESSION      1. Right leg pain    2. Left leg pain          DISPOSITION/PLAN   DISPOSITION Decision To Discharge 01/16/2022 02:14:05 PM      PATIENT REFERREDTO:  No follow-up provider specified. DISCHARGEMEDICATIONS:  New Prescriptions    DIAZEPAM (VALIUM) 5 MG TABLET    Take 1 tablet by mouth nightly as needed for Anxiety or Sleep for up to 10 days.        (Please note that portions of this note were completed with a voice recognition program.  Efforts were made to edit thedictations but occasionally words are mis-transcribed.)    SIMÓN Gonzales PA-C  01/16/22 2000

## 2022-01-16 NOTE — ED PROVIDER NOTES
eMERGENCY dEPARTMENT eNCOUnter   Independent Attestation     Pt Name: Rosalind Nash  MRN: [id number]  [place] 1960  Date of evaluation: 1/16/22     Rosalind Nash is a 57 y.o. male with CC: Leg Pain (bilateral below the knees, onset 2 days ago, \"aching\") and Shortness of Breath (onset last night)      This visit was performed by both a physician and an APC. I performed all aspects of the MDM as documented. I was personally available for consultation. Per review of the medical chart care appears to be appropriate. The care is provided during an unprecedented national emergency due to the novel coronavirus, COVID 19.     Adam Caceres DO  Attending Emergency Physician                    Torsten Wisdom DO  01/16/22 9161

## 2022-01-18 LAB
EKG ATRIAL RATE: 78 BPM
EKG P AXIS: 53 DEGREES
EKG P-R INTERVAL: 138 MS
EKG Q-T INTERVAL: 364 MS
EKG QRS DURATION: 92 MS
EKG QTC CALCULATION (BAZETT): 414 MS
EKG R AXIS: 15 DEGREES
EKG T AXIS: 39 DEGREES
EKG VENTRICULAR RATE: 78 BPM

## 2022-01-18 PROCEDURE — 93010 ELECTROCARDIOGRAM REPORT: CPT | Performed by: INTERNAL MEDICINE

## 2022-03-22 ENCOUNTER — HOSPITAL ENCOUNTER (OUTPATIENT)
Age: 62
Discharge: HOME | End: 2022-03-22
Payer: COMMERCIAL

## 2022-03-22 DIAGNOSIS — D47.2 MGUS (MONOCLONAL GAMMOPATHY OF UNKNOWN SIGNIFICANCE): ICD-10-CM

## 2022-03-22 DIAGNOSIS — D47.2 MGUS (MONOCLONAL GAMMOPATHY OF UNKNOWN SIGNIFICANCE): Primary | ICD-10-CM

## 2022-03-22 LAB
ABSOLUTE EOS #: 0.15 K/UL (ref 0–0.44)
ABSOLUTE IMMATURE GRANULOCYTE: 0.04 K/UL (ref 0–0.3)
ABSOLUTE LYMPH #: 0.97 K/UL (ref 1.1–3.7)
ABSOLUTE MONO #: 0.59 K/UL (ref 0.1–1.2)
ALBUMIN SERPL-MCNC: 4.3 G/DL (ref 3.5–5.2)
ALP BLD-CCNC: 138 U/L (ref 40–129)
ALT SERPL-CCNC: 9 U/L (ref 5–41)
ANION GAP SERPL CALCULATED.3IONS-SCNC: 8 MMOL/L (ref 9–17)
AST SERPL-CCNC: 9 U/L
BASOPHILS # BLD: 1 % (ref 0–2)
BASOPHILS ABSOLUTE: 0.07 K/UL (ref 0–0.2)
BILIRUB SERPL-MCNC: 0.53 MG/DL (ref 0.3–1.2)
BUN BLDV-MCNC: 13 MG/DL (ref 8–23)
BUN/CREAT BLD: 13 (ref 9–20)
CALCIUM SERPL-MCNC: 9.2 MG/DL (ref 8.6–10.4)
CHLORIDE BLD-SCNC: 103 MMOL/L (ref 98–107)
CO2: 28 MMOL/L (ref 20–31)
CREAT SERPL-MCNC: 0.97 MG/DL (ref 0.7–1.2)
EOSINOPHILS RELATIVE PERCENT: 2 % (ref 1–4)
FREE KAPPA/LAMBDA RATIO: 3.49 (ref 0.26–1.65)
GFR AFRICAN AMERICAN: >60 ML/MIN
GFR NON-AFRICAN AMERICAN: >60 ML/MIN
GFR SERPL CREATININE-BSD FRML MDRD: ABNORMAL ML/MIN/{1.73_M2}
GLUCOSE BLD-MCNC: 98 MG/DL (ref 70–99)
HCT VFR BLD CALC: 44.4 % (ref 40.7–50.3)
HEMOGLOBIN: 14.2 G/DL (ref 13–17)
IGA: 113 MG/DL (ref 70–400)
IGG: 902 MG/DL (ref 700–1600)
IGM: 39 MG/DL (ref 40–230)
IMMATURE GRANULOCYTES: 1 %
KAPPA FREE LIGHT CHAINS QNT: 7.25 MG/DL (ref 0.37–1.94)
LAMBDA FREE LIGHT CHAINS QNT: 2.08 MG/DL (ref 0.57–2.63)
LYMPHOCYTES # BLD: 12 % (ref 24–43)
MCH RBC QN AUTO: 29.8 PG (ref 25.2–33.5)
MCHC RBC AUTO-ENTMCNC: 32 G/DL (ref 28.4–34.8)
MCV RBC AUTO: 93.3 FL (ref 82.6–102.9)
MONOCYTES # BLD: 7 % (ref 3–12)
NRBC AUTOMATED: 0 PER 100 WBC
PDW BLD-RTO: 14.5 % (ref 11.8–14.4)
PLATELET # BLD: 157 K/UL (ref 138–453)
PMV BLD AUTO: 10.6 FL (ref 8.1–13.5)
POTASSIUM SERPL-SCNC: 4 MMOL/L (ref 3.7–5.3)
RBC # BLD: 4.76 M/UL (ref 4.21–5.77)
RBC # BLD: ABNORMAL 10*6/UL
SEG NEUTROPHILS: 77 % (ref 36–65)
SEGMENTED NEUTROPHILS ABSOLUTE COUNT: 6.29 K/UL (ref 1.5–8.1)
SODIUM BLD-SCNC: 139 MMOL/L (ref 135–144)
TOTAL PROTEIN: 7 G/DL (ref 6.4–8.3)
WBC # BLD: 8.1 K/UL (ref 3.5–11.3)

## 2022-03-22 PROCEDURE — 80053 COMPREHEN METABOLIC PANEL: CPT

## 2022-03-22 PROCEDURE — 86334 IMMUNOFIX E-PHORESIS SERUM: CPT

## 2022-03-22 PROCEDURE — 85025 COMPLETE CBC W/AUTO DIFF WBC: CPT

## 2022-03-22 PROCEDURE — 36415 COLL VENOUS BLD VENIPUNCTURE: CPT

## 2022-03-22 PROCEDURE — 84165 PROTEIN E-PHORESIS SERUM: CPT

## 2022-03-22 PROCEDURE — 82784 ASSAY IGA/IGD/IGG/IGM EACH: CPT

## 2022-03-22 PROCEDURE — 83883 ASSAY NEPHELOMETRY NOT SPEC: CPT

## 2022-03-22 PROCEDURE — 84155 ASSAY OF PROTEIN SERUM: CPT

## 2022-03-23 ENCOUNTER — HOSPITAL ENCOUNTER (OUTPATIENT)
Facility: MEDICAL CENTER | Age: 62
End: 2022-03-23
Payer: COMMERCIAL

## 2022-03-23 LAB
ALBUMIN (CALCULATED): 4.8 G/DL (ref 3.2–5.2)
ALBUMIN PERCENT: 68 % (ref 45–65)
ALPHA 1 PERCENT: 3 % (ref 3–6)
ALPHA 2 PERCENT: 9 % (ref 6–13)
ALPHA-1-GLOBULIN: 0.2 G/DL (ref 0.1–0.4)
ALPHA-2-GLOBULIN: 0.7 G/DL (ref 0.5–0.9)
BETA GLOBULIN: 0.7 G/DL (ref 0.7–1.4)
BETA PERCENT: 10 % (ref 11–19)
GAMMA GLOBULIN %: 10 % (ref 9–20)
GAMMA GLOBULIN: 0.7 G/DL (ref 0.5–1.5)
PATHOLOGIST: ABNORMAL
PATHOLOGIST: NORMAL
PROTEIN ELECTROPHORESIS, SERUM: ABNORMAL
SERUM IFX INTERP: NORMAL
TOTAL PROT. SUM,%: 100 % (ref 98–102)
TOTAL PROT. SUM: 7.1 G/DL (ref 6.3–8.2)
TOTAL PROTEIN: 7.1 G/DL (ref 6.4–8.3)

## 2022-03-31 ENCOUNTER — OFFICE VISIT (OUTPATIENT)
Dept: ONCOLOGY | Age: 62
End: 2022-03-31
Payer: COMMERCIAL

## 2022-03-31 ENCOUNTER — TELEPHONE (OUTPATIENT)
Dept: ONCOLOGY | Age: 62
End: 2022-03-31

## 2022-03-31 VITALS
DIASTOLIC BLOOD PRESSURE: 68 MMHG | WEIGHT: 187.4 LBS | RESPIRATION RATE: 16 BRPM | SYSTOLIC BLOOD PRESSURE: 107 MMHG | TEMPERATURE: 97.5 F | BODY MASS INDEX: 30.25 KG/M2 | HEART RATE: 53 BPM

## 2022-03-31 DIAGNOSIS — D47.2 MGUS (MONOCLONAL GAMMOPATHY OF UNKNOWN SIGNIFICANCE): Primary | ICD-10-CM

## 2022-03-31 PROCEDURE — 1036F TOBACCO NON-USER: CPT | Performed by: INTERNAL MEDICINE

## 2022-03-31 PROCEDURE — 3017F COLORECTAL CA SCREEN DOC REV: CPT | Performed by: INTERNAL MEDICINE

## 2022-03-31 PROCEDURE — G8484 FLU IMMUNIZE NO ADMIN: HCPCS | Performed by: INTERNAL MEDICINE

## 2022-03-31 PROCEDURE — G8427 DOCREV CUR MEDS BY ELIG CLIN: HCPCS | Performed by: INTERNAL MEDICINE

## 2022-03-31 PROCEDURE — 99211 OFF/OP EST MAY X REQ PHY/QHP: CPT | Performed by: INTERNAL MEDICINE

## 2022-03-31 PROCEDURE — 99214 OFFICE O/P EST MOD 30 MIN: CPT | Performed by: INTERNAL MEDICINE

## 2022-03-31 PROCEDURE — G8417 CALC BMI ABV UP PARAM F/U: HCPCS | Performed by: INTERNAL MEDICINE

## 2022-03-31 RX ORDER — ROPINIROLE 2 MG/1
0.5 TABLET, FILM COATED ORAL 2 TIMES DAILY
Status: ON HOLD | COMMUNITY
End: 2022-06-13

## 2022-03-31 RX ORDER — PANTOPRAZOLE SODIUM 40 MG/1
40 GRANULE, DELAYED RELEASE ORAL
Status: ON HOLD | COMMUNITY
End: 2022-06-13 | Stop reason: ALTCHOICE

## 2022-03-31 RX ORDER — TEMAZEPAM 15 MG/1
CAPSULE ORAL
Status: ON HOLD | COMMUNITY
Start: 2022-01-10 | End: 2022-05-01

## 2022-03-31 RX ORDER — OXYBUTYNIN CHLORIDE 5 MG/1
5 TABLET ORAL 2 TIMES DAILY
Status: ON HOLD | COMMUNITY
End: 2022-05-01

## 2022-03-31 NOTE — PROGRESS NOTES
_        Chief Complaint   Patient presents with    Follow-up    Discuss Labs     DIAGNOSIS:       Paraproteinemia/ monoclonal gammopathy of undetermined significance. (MGUS). Chronic renal insufficiency  Bipolar disorder  Hypertension      CURRENT THERAPY:         Seen to discuss labs results and further management plans. BRIEF CASE HISTORY:      Mr. Trudy Urbano is a very pleasant 57 y.o. male with history of hypertension, bipolar disorder, and chronic renal insufficiency. The patient had labs done which revealed monoclonal gammopathy. He is referred for further management. At the present time he denies any fever or night sweats. No weight loss or decreased appetite. No chest pain or shortness of breath. No palpable lymph nodes. Patient has generalized body aches. No bone fractures. No other complaints. .     INTERIM HISTORY:   Seen for follow up abnormal immunoglobulins levels. Patient is doing well clinically. No significant complaints. No aches or pains. No fever or night sweats. No weight loss or decreased appetite. No other complaints. PAST MEDICAL HISTORY: has a past medical history of AMS (altered mental status), Atrial fibrillation ([facility] [address] Bipolar 1 disorder ([facility] [address] Depression, Hypertension, Neck pain, and Patient in clinical research study. PAST SURGICAL HISTORY: has a past surgical history that includes hernia repair; Neck surgery (2013); Abdomen surgery; Tonsillectomy; Appendectomy; Dilatation, esophagus; Cardiac surgery (7/14/15); Cardiac surgery (07/14/2015); Colonoscopy (01/28/2021); and Upper gastrointestinal endoscopy (01/28/2021).      CURRENT MEDICATIONS:  has a current medication list which includes the following prescription(s): pantoprazole sodium, ropinirole, temazepam, oxybutynin, escitalopram, oxybutynin, polyethylene glycol, ferosul, gabapentin, aspirin, atorvastatin, melatonin er, midodrine, senna, folic acid, polycarbophil, furosemide, warfarin, tamsulosin, cyanocobalamin, alendronate, and cetirizine. ALLERGIES:  is allergic to oxycodone, fish-derived products, ibuprofen, latuda [lurasidone hcl], lithium, lurasidone, quetiapine, and seroquel [quetiapine fumarate]. FAMILY HISTORY: Negative for any hematological or oncological conditions. SOCIAL HISTORY:  reports that he has never smoked. He has never used smokeless tobacco. He reports current alcohol use. He reports current drug use. Drug: Cocaine. REVIEW OF SYSTEMS:     · General: No weakness or fatigue. No unanticipated weight loss or decreased appetite. No fever or chills. · Eyes: No blurred vision, eye pain or double vision. · Ears: No hearing problems or drainage. No tinnitus. · Throat: No sore throat, problems with swallowing or dysphagia. · Respiratory: No cough, sputum or hemoptysis. No shortness of breath. No pleuritic chest pain. · Cardiovascular: No chest pain, orthopnea or PND. No lower extremity edema. No palpitation. · Gastrointestinal: No problems with swallowing. No abdominal pain or bloating. No nausea or vomiting. No diarrhea or constipation. No GI bleeding. · Genitourinary: No dysuria, hematuria, frequency or urgency. · Musculoskeletal: No muscle aches or pains. No limitation of movement. No back pain. No gait disturbance, No joint complaints. · Dermatologic: No skin rashes or pruritus. No skin lesions or discolorations. · Psychiatric: No depression, anxiety, or stress or signs of schizophrenia. No change in mood or affect. · Hematologic: No history of bleeding tendency. No bruises or ecchymosis. No history of clotting problems. · Infectious disease: No fever, chills or frequent infections. · Endocrine: No problems with opacity. No polydipsia or polyuria. No temperature intolerance. · Neurologic: No headaches or dizziness. No weakness or numbness of the extremities.  No changes in balance, coordination,  memory, mentation, behavior. · Allergic/Immunologic: No nasal congestion or hives. No repeated infections. PHYSICAL EXAM:  The patient is not in acute distress. Vital signs: Blood pressure 107/68, pulse 53, temperature 97.5 °F (36.4 °C), temperature source Temporal, resp. rate 16, weight 187 lb 6.4 oz (85 kg). HEENT:  Eyes are normal. Ears, nose and throat are normal.  Neck: Supple. No lymph node enlargement. No thyroid enlargement. Trachea is centrally located. Chest:  Clear to auscultation. No wheezes or crepitations. Heart: Regular sinus rhythm. Abdomen: Soft, nontender. No hepatosplenomegaly. No masses. Extremities:  With no edema. Lymph Nodes:  No cervical, axillary or inguinal lymph node enlargement. Neurologic:  Conscious and oriented. No focal neurological deficits. Psychosocial: No depression, anxiety or stress. Skin: No rashes, bruises or ecchymoses.       Review of Diagnostic data:   Lab Results   Component Value Date    WBC 8.1 03/22/2022    HGB 14.2 03/22/2022    HCT 44.4 03/22/2022    MCV 93.3 03/22/2022     03/22/2022       Chemistry        Component Value Date/Time     03/22/2022 1343    K 4.0 03/22/2022 1343     03/22/2022 1343    CO2 28 03/22/2022 1343    BUN 13 03/22/2022 1343    CREATININE 0.97 03/22/2022 1343        Component Value Date/Time    CALCIUM 9.2 03/22/2022 1343    ALKPHOS 138 (H) 03/22/2022 1343    AST 9 03/22/2022 1343    ALT 9 03/22/2022 1343    BILITOT 0.53 03/22/2022 1343        4/5/2018 12:45 PM - Jeremy, Mhpn Incoming Lab Results From [facility]     Component Results     Component Value Ref Range & Units Status Collected Lab   Kappa Free Light Chains QNT 8.07   0.37 - 1.94 mg/dL Final 04/05/2018  9:10  [address]   Lambda Free Light Chains QNT 2.15  0.57 - 2.63 mg/dL Final 04/05/2018  9:10  [address]   Free [place]/Lambda Ratio 3.75   0.26 - 1.65 Final 04/05/2018  9:10 AM [facility] [address] [address] [address] [phone]     4/5/2018 12:06 PM - Jeremy, Atilio Incoming Lab Results From [facility]     Component Results     Component Value Ref Range & Units Status Collected Lab   Igg 849  700 - 1600 mg/dL Final 04/05/2018  9:10  [address]     70 - 400 mg/dL Final 04/05/2018  9:10  [address]   Igm 41  40 - 230 mg/dL Final 04/05/2018  9:10 AM 3 [facility] [id number] [place] [address], [address] [place] [address] [phone]             IMPRESSION:   Paraproteinemia/ monoclonal gammopathy of undetermined significance. (MGUS). Chronic renal insufficiency  Bipolar disorder  Hypertension  ECOG performance score (0). PLAN: labs were reviewed and discussed with the patient. I explained the significance of these abnormalities related to immunoglobulins. There were some fluctuation in the level of the immunoglobulins. Likely dealing with MGUS. Jolivue light chain level is improving. Recommend monitoring. No need for BM test.  We will repeat labs in 6 months. Patient's questions were answered to the best of his satisfaction and he verbalized full understanding and agreement. [facility]                            This note is created with the assistance of a speech recognition program.  While intending to generate a document that actually reflects the content of the visit, the document can still have some errors including those of syntax and sound a like substitutions which may escape proof reading. It such instances, actual meaning can be extrapolated by contextual diversion.

## 2022-03-31 NOTE — TELEPHONE ENCOUNTER
Amy Rudd FOR MD VISIT  DR Roseanna Mcmillan IN TO SEE PATIENT  ORDERS RECEIVED  RV 6 MONTHS WITH LABS  LABS CDP  CMP SIFX SPEP  IGG IGA IGM KAPPA/LAMBDA FREE LIGHT CHAINS 09/22/22, ORDERS GIVEN TO PT  MD VISIT 09/29/22 @10AM  AVS PRINTED AND GIVEN TO PATIENT WITH INSTRUCTIONS  PATIENT DISCHARGED AMBULATORY

## 2022-05-01 ENCOUNTER — APPOINTMENT (OUTPATIENT)
Dept: GENERAL RADIOLOGY | Age: 62
End: 2022-05-01
Payer: COMMERCIAL

## 2022-05-01 ENCOUNTER — HOSPITAL ENCOUNTER (OUTPATIENT)
Age: 62
Setting detail: OBSERVATION
Discharge: HOME HEALTH | End: 2022-05-02
Attending: EMERGENCY MEDICINE | Admitting: INTERNAL MEDICINE
Payer: COMMERCIAL

## 2022-05-01 ENCOUNTER — APPOINTMENT (OUTPATIENT)
Dept: CT IMAGING | Age: 62
End: 2022-05-01
Payer: COMMERCIAL

## 2022-05-01 DIAGNOSIS — L02.414 ABSCESS OF LEFT ARM: Primary | ICD-10-CM

## 2022-05-01 PROBLEM — Q85.00 NEUROFIBROMATOSIS, UNSPECIFIED (HCC): Status: ACTIVE | Noted: 2021-07-01

## 2022-05-01 PROBLEM — Z95.2 HISTORY OF HEART VALVE REPLACEMENT: Status: ACTIVE | Noted: 2017-05-25

## 2022-05-01 PROBLEM — N40.0 BPH (BENIGN PROSTATIC HYPERPLASIA): Status: ACTIVE | Noted: 2020-03-30

## 2022-05-01 PROBLEM — I25.10 CORONARY ATHEROSCLEROSIS: Status: ACTIVE | Noted: 2020-03-30

## 2022-05-01 PROBLEM — K59.00 CONSTIPATION, UNSPECIFIED: Status: ACTIVE | Noted: 2021-09-26

## 2022-05-01 LAB
ABSOLUTE EOS #: 0.16 K/UL (ref 0–0.44)
ABSOLUTE IMMATURE GRANULOCYTE: 0.07 K/UL (ref 0–0.3)
ABSOLUTE LYMPH #: 0.8 K/UL (ref 1.1–3.7)
ABSOLUTE MONO #: 0.76 K/UL (ref 0.1–1.2)
ANION GAP SERPL CALCULATED.3IONS-SCNC: 8 MMOL/L (ref 9–17)
BASOPHILS # BLD: 1 % (ref 0–2)
BASOPHILS ABSOLUTE: 0.07 K/UL (ref 0–0.2)
BUN BLDV-MCNC: 17 MG/DL (ref 8–23)
BUN/CREAT BLD: 18 (ref 9–20)
CALCIUM SERPL-MCNC: 9.1 MG/DL (ref 8.6–10.4)
CHLORIDE BLD-SCNC: 97 MMOL/L (ref 98–107)
CO2: 29 MMOL/L (ref 20–31)
CREAT SERPL-MCNC: 0.92 MG/DL (ref 0.7–1.2)
D-DIMER QUANTITATIVE: 0.28 MG/L FEU (ref 0–0.59)
EOSINOPHILS RELATIVE PERCENT: 2 % (ref 1–4)
GFR AFRICAN AMERICAN: >60 ML/MIN
GFR NON-AFRICAN AMERICAN: >60 ML/MIN
GFR SERPL CREATININE-BSD FRML MDRD: ABNORMAL ML/MIN/{1.73_M2}
GLUCOSE BLD-MCNC: 98 MG/DL (ref 70–99)
HCT VFR BLD CALC: 40.8 % (ref 40.7–50.3)
HEMOGLOBIN: 13 G/DL (ref 13–17)
IMMATURE GRANULOCYTES: 1 %
INR BLD: 2.8
LACTIC ACID, SEPSIS: 1.3 MMOL/L (ref 0.5–1.9)
LACTIC ACID, SEPSIS: 1.4 MMOL/L (ref 0.5–1.9)
LYMPHOCYTES # BLD: 8 % (ref 24–43)
MCH RBC QN AUTO: 29.7 PG (ref 25.2–33.5)
MCHC RBC AUTO-ENTMCNC: 31.9 G/DL (ref 28.4–34.8)
MCV RBC AUTO: 93.4 FL (ref 82.6–102.9)
MONOCYTES # BLD: 8 % (ref 3–12)
NRBC AUTOMATED: 0 PER 100 WBC
PDW BLD-RTO: 14.6 % (ref 11.8–14.4)
PLATELET # BLD: 162 K/UL (ref 138–453)
PMV BLD AUTO: 10.1 FL (ref 8.1–13.5)
POTASSIUM SERPL-SCNC: 4.5 MMOL/L (ref 3.7–5.3)
PROTHROMBIN TIME: 29.5 SEC (ref 11.5–14.2)
RBC # BLD: 4.37 M/UL (ref 4.21–5.77)
RBC # BLD: ABNORMAL 10*6/UL
SEG NEUTROPHILS: 80 % (ref 36–65)
SEGMENTED NEUTROPHILS ABSOLUTE COUNT: 7.68 K/UL (ref 1.5–8.1)
SODIUM BLD-SCNC: 134 MMOL/L (ref 135–144)
WBC # BLD: 9.5 K/UL (ref 3.5–11.3)

## 2022-05-01 PROCEDURE — 6360000004 HC RX CONTRAST MEDICATION: Performed by: NURSE PRACTITIONER

## 2022-05-01 PROCEDURE — 73030 X-RAY EXAM OF SHOULDER: CPT

## 2022-05-01 PROCEDURE — 96361 HYDRATE IV INFUSION ADD-ON: CPT

## 2022-05-01 PROCEDURE — 87040 BLOOD CULTURE FOR BACTERIA: CPT

## 2022-05-01 PROCEDURE — G0378 HOSPITAL OBSERVATION PER HR: HCPCS

## 2022-05-01 PROCEDURE — 80048 BASIC METABOLIC PNL TOTAL CA: CPT

## 2022-05-01 PROCEDURE — 6360000002 HC RX W HCPCS: Performed by: NURSE PRACTITIONER

## 2022-05-01 PROCEDURE — 96375 TX/PRO/DX INJ NEW DRUG ADDON: CPT

## 2022-05-01 PROCEDURE — 85610 PROTHROMBIN TIME: CPT

## 2022-05-01 PROCEDURE — 2580000003 HC RX 258: Performed by: NURSE PRACTITIONER

## 2022-05-01 PROCEDURE — 96374 THER/PROPH/DIAG INJ IV PUSH: CPT

## 2022-05-01 PROCEDURE — 99219 PR INITIAL OBSERVATION CARE/DAY 50 MINUTES: CPT | Performed by: NURSE PRACTITIONER

## 2022-05-01 PROCEDURE — 96365 THER/PROPH/DIAG IV INF INIT: CPT

## 2022-05-01 PROCEDURE — 96368 THER/DIAG CONCURRENT INF: CPT

## 2022-05-01 PROCEDURE — 73201 CT UPPER EXTREMITY W/DYE: CPT

## 2022-05-01 PROCEDURE — 96372 THER/PROPH/DIAG INJ SC/IM: CPT

## 2022-05-01 PROCEDURE — 6370000000 HC RX 637 (ALT 250 FOR IP): Performed by: EMERGENCY MEDICINE

## 2022-05-01 PROCEDURE — 36415 COLL VENOUS BLD VENIPUNCTURE: CPT

## 2022-05-01 PROCEDURE — 85379 FIBRIN DEGRADATION QUANT: CPT

## 2022-05-01 PROCEDURE — 6370000000 HC RX 637 (ALT 250 FOR IP): Performed by: NURSE PRACTITIONER

## 2022-05-01 PROCEDURE — 85025 COMPLETE CBC W/AUTO DIFF WBC: CPT

## 2022-05-01 PROCEDURE — 83605 ASSAY OF LACTIC ACID: CPT

## 2022-05-01 PROCEDURE — 99285 EMERGENCY DEPT VISIT HI MDM: CPT

## 2022-05-01 RX ORDER — ACETAMINOPHEN 500 MG
1000 TABLET ORAL ONCE
Status: COMPLETED | OUTPATIENT
Start: 2022-05-01 | End: 2022-05-01

## 2022-05-01 RX ORDER — GABAPENTIN 300 MG/1
300 CAPSULE ORAL 3 TIMES DAILY
Status: DISCONTINUED | OUTPATIENT
Start: 2022-05-01 | End: 2022-05-01

## 2022-05-01 RX ORDER — FUROSEMIDE 20 MG/1
20 TABLET ORAL DAILY
Status: DISCONTINUED | OUTPATIENT
Start: 2022-05-01 | End: 2022-05-02 | Stop reason: HOSPADM

## 2022-05-01 RX ORDER — ACETAMINOPHEN 325 MG/1
650 TABLET ORAL EVERY 6 HOURS PRN
Status: DISCONTINUED | OUTPATIENT
Start: 2022-05-01 | End: 2022-05-02 | Stop reason: HOSPADM

## 2022-05-01 RX ORDER — WARFARIN SODIUM 1 MG/1
1 TABLET ORAL
Status: DISCONTINUED | OUTPATIENT
Start: 2022-05-03 | End: 2022-05-02 | Stop reason: HOSPADM

## 2022-05-01 RX ORDER — VITAMIN B COMPLEX
2000 TABLET ORAL
Status: DISCONTINUED | OUTPATIENT
Start: 2022-05-02 | End: 2022-05-02 | Stop reason: HOSPADM

## 2022-05-01 RX ORDER — ATORVASTATIN CALCIUM 20 MG/1
20 TABLET, FILM COATED ORAL NIGHTLY
Status: DISCONTINUED | OUTPATIENT
Start: 2022-05-01 | End: 2022-05-02 | Stop reason: HOSPADM

## 2022-05-01 RX ORDER — POLYETHYLENE GLYCOL 3350 17 G/17G
17 POWDER, FOR SOLUTION ORAL DAILY PRN
Status: DISCONTINUED | OUTPATIENT
Start: 2022-05-01 | End: 2022-05-02 | Stop reason: HOSPADM

## 2022-05-01 RX ORDER — LANOLIN ALCOHOL/MO/W.PET/CERES
325 CREAM (GRAM) TOPICAL NIGHTLY
Status: DISCONTINUED | OUTPATIENT
Start: 2022-05-01 | End: 2022-05-02 | Stop reason: HOSPADM

## 2022-05-01 RX ORDER — 0.9 % SODIUM CHLORIDE 0.9 %
1000 INTRAVENOUS SOLUTION INTRAVENOUS ONCE
Status: COMPLETED | OUTPATIENT
Start: 2022-05-01 | End: 2022-05-02

## 2022-05-01 RX ORDER — WARFARIN SODIUM 2 MG/1
2 TABLET ORAL
Status: DISCONTINUED | OUTPATIENT
Start: 2022-05-01 | End: 2022-05-02 | Stop reason: HOSPADM

## 2022-05-01 RX ORDER — MIDODRINE HYDROCHLORIDE 10 MG/1
10 TABLET ORAL 3 TIMES DAILY
Status: DISCONTINUED | OUTPATIENT
Start: 2022-05-01 | End: 2022-05-02 | Stop reason: HOSPADM

## 2022-05-01 RX ORDER — LANOLIN ALCOHOL/MO/W.PET/CERES
325 CREAM (GRAM) TOPICAL
Status: DISCONTINUED | OUTPATIENT
Start: 2022-05-02 | End: 2022-05-01

## 2022-05-01 RX ORDER — ESCITALOPRAM OXALATE 10 MG/1
15 TABLET ORAL DAILY
Status: DISCONTINUED | OUTPATIENT
Start: 2022-05-02 | End: 2022-05-02 | Stop reason: HOSPADM

## 2022-05-01 RX ORDER — ACETAMINOPHEN 160 MG
1 TABLET,DISINTEGRATING ORAL
COMMUNITY

## 2022-05-01 RX ORDER — 0.9 % SODIUM CHLORIDE 0.9 %
80 INTRAVENOUS SOLUTION INTRAVENOUS ONCE
Status: DISCONTINUED | OUTPATIENT
Start: 2022-05-01 | End: 2022-05-02 | Stop reason: HOSPADM

## 2022-05-01 RX ORDER — SODIUM CHLORIDE 0.9 % (FLUSH) 0.9 %
10 SYRINGE (ML) INJECTION PRN
Status: DISCONTINUED | OUTPATIENT
Start: 2022-05-01 | End: 2022-05-02 | Stop reason: HOSPADM

## 2022-05-01 RX ORDER — ONDANSETRON 2 MG/ML
4 INJECTION INTRAMUSCULAR; INTRAVENOUS EVERY 6 HOURS PRN
Status: DISCONTINUED | OUTPATIENT
Start: 2022-05-01 | End: 2022-05-02 | Stop reason: HOSPADM

## 2022-05-01 RX ORDER — GABAPENTIN 300 MG/1
900 CAPSULE ORAL 3 TIMES DAILY
Status: DISCONTINUED | OUTPATIENT
Start: 2022-05-01 | End: 2022-05-02 | Stop reason: HOSPADM

## 2022-05-01 RX ORDER — ROPINIROLE 0.25 MG/1
0.5 TABLET, FILM COATED ORAL 2 TIMES DAILY
Status: DISCONTINUED | OUTPATIENT
Start: 2022-05-01 | End: 2022-05-02 | Stop reason: HOSPADM

## 2022-05-01 RX ORDER — PANTOPRAZOLE SODIUM 40 MG/1
40 TABLET, DELAYED RELEASE ORAL
Status: DISCONTINUED | OUTPATIENT
Start: 2022-05-02 | End: 2022-05-02 | Stop reason: HOSPADM

## 2022-05-01 RX ORDER — SODIUM CHLORIDE 0.9 % (FLUSH) 0.9 %
5-40 SYRINGE (ML) INJECTION EVERY 12 HOURS SCHEDULED
Status: DISCONTINUED | OUTPATIENT
Start: 2022-05-01 | End: 2022-05-02 | Stop reason: HOSPADM

## 2022-05-01 RX ORDER — SENNA PLUS 8.6 MG/1
2 TABLET ORAL NIGHTLY
Status: DISCONTINUED | OUTPATIENT
Start: 2022-05-01 | End: 2022-05-02 | Stop reason: HOSPADM

## 2022-05-01 RX ORDER — HYDROCODONE BITARTRATE AND ACETAMINOPHEN 5; 325 MG/1; MG/1
2 TABLET ORAL EVERY 4 HOURS PRN
Status: DISCONTINUED | OUTPATIENT
Start: 2022-05-01 | End: 2022-05-02 | Stop reason: HOSPADM

## 2022-05-01 RX ORDER — OXCARBAZEPINE 300 MG/1
300 TABLET, FILM COATED ORAL 2 TIMES DAILY
COMMUNITY

## 2022-05-01 RX ORDER — LANOLIN ALCOHOL/MO/W.PET/CERES
6 CREAM (GRAM) TOPICAL NIGHTLY PRN
Status: DISCONTINUED | OUTPATIENT
Start: 2022-05-01 | End: 2022-05-02 | Stop reason: HOSPADM

## 2022-05-01 RX ORDER — ESCITALOPRAM OXALATE 10 MG/1
5 TABLET ORAL 3 TIMES DAILY
Status: DISCONTINUED | OUTPATIENT
Start: 2022-05-01 | End: 2022-05-01

## 2022-05-01 RX ORDER — ONDANSETRON 4 MG/1
4 TABLET, ORALLY DISINTEGRATING ORAL EVERY 8 HOURS PRN
Status: DISCONTINUED | OUTPATIENT
Start: 2022-05-01 | End: 2022-05-02 | Stop reason: HOSPADM

## 2022-05-01 RX ORDER — POTASSIUM CHLORIDE 20 MEQ/1
40 TABLET, EXTENDED RELEASE ORAL PRN
Status: DISCONTINUED | OUTPATIENT
Start: 2022-05-01 | End: 2022-05-02 | Stop reason: HOSPADM

## 2022-05-01 RX ORDER — CALCIUM POLYCARBOPHIL 625 MG 625 MG/1
625 TABLET ORAL 3 TIMES DAILY
Status: DISCONTINUED | OUTPATIENT
Start: 2022-05-01 | End: 2022-05-01

## 2022-05-01 RX ORDER — TAMSULOSIN HYDROCHLORIDE 0.4 MG/1
0.4 CAPSULE ORAL DAILY
Status: DISCONTINUED | OUTPATIENT
Start: 2022-05-01 | End: 2022-05-02 | Stop reason: HOSPADM

## 2022-05-01 RX ORDER — ASPIRIN 81 MG/1
81 TABLET, CHEWABLE ORAL DAILY
Status: DISCONTINUED | OUTPATIENT
Start: 2022-05-01 | End: 2022-05-02 | Stop reason: HOSPADM

## 2022-05-01 RX ORDER — MORPHINE SULFATE 4 MG/ML
4 INJECTION, SOLUTION INTRAMUSCULAR; INTRAVENOUS ONCE
Status: COMPLETED | OUTPATIENT
Start: 2022-05-01 | End: 2022-05-01

## 2022-05-01 RX ORDER — FENTANYL CITRATE 50 UG/ML
50 INJECTION, SOLUTION INTRAMUSCULAR; INTRAVENOUS
Status: DISCONTINUED | OUTPATIENT
Start: 2022-05-01 | End: 2022-05-02

## 2022-05-01 RX ORDER — SODIUM CHLORIDE 0.9 % (FLUSH) 0.9 %
10 SYRINGE (ML) INJECTION ONCE
Status: COMPLETED | OUTPATIENT
Start: 2022-05-01 | End: 2022-05-01

## 2022-05-01 RX ORDER — ACETAMINOPHEN 650 MG/1
650 SUPPOSITORY RECTAL EVERY 6 HOURS PRN
Status: DISCONTINUED | OUTPATIENT
Start: 2022-05-01 | End: 2022-05-02 | Stop reason: HOSPADM

## 2022-05-01 RX ORDER — CETIRIZINE HYDROCHLORIDE 10 MG/1
10 TABLET ORAL DAILY
Status: DISCONTINUED | OUTPATIENT
Start: 2022-05-01 | End: 2022-05-02 | Stop reason: HOSPADM

## 2022-05-01 RX ORDER — FENTANYL CITRATE 50 UG/ML
50 INJECTION, SOLUTION INTRAMUSCULAR; INTRAVENOUS ONCE
Status: COMPLETED | OUTPATIENT
Start: 2022-05-01 | End: 2022-05-01

## 2022-05-01 RX ORDER — FOLIC ACID 1 MG/1
1 TABLET ORAL DAILY
Status: DISCONTINUED | OUTPATIENT
Start: 2022-05-01 | End: 2022-05-02 | Stop reason: HOSPADM

## 2022-05-01 RX ORDER — SENNA PLUS 8.6 MG/1
1 TABLET ORAL 2 TIMES DAILY
Status: DISCONTINUED | OUTPATIENT
Start: 2022-05-01 | End: 2022-05-01

## 2022-05-01 RX ORDER — SODIUM CHLORIDE 9 MG/ML
INJECTION, SOLUTION INTRAVENOUS CONTINUOUS
Status: DISCONTINUED | OUTPATIENT
Start: 2022-05-01 | End: 2022-05-02

## 2022-05-01 RX ORDER — MAGNESIUM SULFATE 1 G/100ML
1000 INJECTION INTRAVENOUS PRN
Status: DISCONTINUED | OUTPATIENT
Start: 2022-05-01 | End: 2022-05-02 | Stop reason: HOSPADM

## 2022-05-01 RX ORDER — OXYBUTYNIN CHLORIDE 5 MG/1
5 TABLET, EXTENDED RELEASE ORAL DAILY
Status: DISCONTINUED | OUTPATIENT
Start: 2022-05-01 | End: 2022-05-01

## 2022-05-01 RX ORDER — SODIUM CHLORIDE 9 MG/ML
INJECTION, SOLUTION INTRAVENOUS PRN
Status: DISCONTINUED | OUTPATIENT
Start: 2022-05-01 | End: 2022-05-02 | Stop reason: HOSPADM

## 2022-05-01 RX ORDER — POLYETHYLENE GLYCOL 3350 17 G/17G
17 POWDER, FOR SOLUTION ORAL DAILY
Status: DISCONTINUED | OUTPATIENT
Start: 2022-05-01 | End: 2022-05-01

## 2022-05-01 RX ORDER — POTASSIUM CHLORIDE 7.45 MG/ML
10 INJECTION INTRAVENOUS PRN
Status: DISCONTINUED | OUTPATIENT
Start: 2022-05-01 | End: 2022-05-02 | Stop reason: HOSPADM

## 2022-05-01 RX ORDER — ROPINIROLE 1 MG/1
2 TABLET, FILM COATED ORAL 2 TIMES DAILY
Status: DISCONTINUED | OUTPATIENT
Start: 2022-05-01 | End: 2022-05-01

## 2022-05-01 RX ORDER — LORAZEPAM 0.5 MG/1
0.5 TABLET ORAL NIGHTLY PRN
Status: DISCONTINUED | OUTPATIENT
Start: 2022-05-01 | End: 2022-05-02 | Stop reason: HOSPADM

## 2022-05-01 RX ORDER — OXCARBAZEPINE 300 MG/1
300 TABLET, FILM COATED ORAL 2 TIMES DAILY
Status: DISCONTINUED | OUTPATIENT
Start: 2022-05-01 | End: 2022-05-02 | Stop reason: HOSPADM

## 2022-05-01 RX ORDER — HYDROCODONE BITARTRATE AND ACETAMINOPHEN 5; 325 MG/1; MG/1
1 TABLET ORAL EVERY 4 HOURS PRN
Status: DISCONTINUED | OUTPATIENT
Start: 2022-05-01 | End: 2022-05-02 | Stop reason: HOSPADM

## 2022-05-01 RX ADMIN — TAMSULOSIN HYDROCHLORIDE 0.4 MG: 0.4 CAPSULE ORAL at 21:32

## 2022-05-01 RX ADMIN — OXCARBAZEPINE 300 MG: 300 TABLET, FILM COATED ORAL at 21:33

## 2022-05-01 RX ADMIN — WARFARIN SODIUM 2 MG: 2 TABLET ORAL at 21:34

## 2022-05-01 RX ADMIN — SODIUM CHLORIDE, PRESERVATIVE FREE 10 ML: 5 INJECTION INTRAVENOUS at 15:48

## 2022-05-01 RX ADMIN — HYDROCODONE BITARTRATE AND ACETAMINOPHEN 2 TABLET: 5; 325 TABLET ORAL at 21:33

## 2022-05-01 RX ADMIN — VANCOMYCIN HYDROCHLORIDE 2000 MG: 1 INJECTION, POWDER, LYOPHILIZED, FOR SOLUTION INTRAVENOUS at 18:41

## 2022-05-01 RX ADMIN — IOPAMIDOL 75 ML: 755 INJECTION, SOLUTION INTRAVENOUS at 15:48

## 2022-05-01 RX ADMIN — SODIUM CHLORIDE 1000 ML: 9 INJECTION, SOLUTION INTRAVENOUS at 17:01

## 2022-05-01 RX ADMIN — ACETAMINOPHEN 1000 MG: 500 TABLET ORAL at 16:35

## 2022-05-01 RX ADMIN — Medication 100 ML: at 15:48

## 2022-05-01 RX ADMIN — PIPERACILLIN AND TAZOBACTAM 4500 MG: 4; .5 INJECTION, POWDER, LYOPHILIZED, FOR SOLUTION INTRAVENOUS; PARENTERAL at 17:35

## 2022-05-01 RX ADMIN — Medication 6 MG: at 21:32

## 2022-05-01 RX ADMIN — GABAPENTIN 900 MG: 300 CAPSULE ORAL at 21:31

## 2022-05-01 RX ADMIN — LORAZEPAM 0.5 MG: 0.5 TABLET ORAL at 23:08

## 2022-05-01 RX ADMIN — MORPHINE SULFATE 4 MG: 4 INJECTION, SOLUTION INTRAMUSCULAR; INTRAVENOUS at 14:49

## 2022-05-01 RX ADMIN — ROPINIROLE HYDROCHLORIDE 0.5 MG: 0.25 TABLET, FILM COATED ORAL at 21:32

## 2022-05-01 RX ADMIN — FERROUS SULFATE TAB EC 325 MG (65 MG FE EQUIVALENT) 325 MG: 325 (65 FE) TABLET DELAYED RESPONSE at 21:34

## 2022-05-01 RX ADMIN — ATORVASTATIN CALCIUM 20 MG: 20 TABLET, FILM COATED ORAL at 21:32

## 2022-05-01 RX ADMIN — FENTANYL CITRATE 50 MCG: 50 INJECTION, SOLUTION INTRAMUSCULAR; INTRAVENOUS at 17:00

## 2022-05-01 RX ADMIN — MIDODRINE HYDROCHLORIDE 10 MG: 10 TABLET ORAL at 21:32

## 2022-05-01 RX ADMIN — SODIUM CHLORIDE: 9 INJECTION, SOLUTION INTRAVENOUS at 18:39

## 2022-05-01 ASSESSMENT — PAIN DESCRIPTION - ORIENTATION: ORIENTATION: LEFT;UPPER

## 2022-05-01 ASSESSMENT — PAIN DESCRIPTION - LOCATION
LOCATION: ARM
LOCATION: ARM

## 2022-05-01 ASSESSMENT — PAIN SCALES - GENERAL
PAINLEVEL_OUTOF10: 9
PAINLEVEL_OUTOF10: 6
PAINLEVEL_OUTOF10: 8
PAINLEVEL_OUTOF10: 7
PAINLEVEL_OUTOF10: 7
PAINLEVEL_OUTOF10: 8

## 2022-05-01 ASSESSMENT — ENCOUNTER SYMPTOMS
COUGH: 0
CONSTIPATION: 0
CHEST TIGHTNESS: 0
SHORTNESS OF BREATH: 0
NAUSEA: 0
DIARRHEA: 0
VOMITING: 0
COLOR CHANGE: 1

## 2022-05-01 ASSESSMENT — PAIN DESCRIPTION - PAIN TYPE: TYPE: ACUTE PAIN

## 2022-05-01 ASSESSMENT — PAIN - FUNCTIONAL ASSESSMENT
PAIN_FUNCTIONAL_ASSESSMENT: 0-10
PAIN_FUNCTIONAL_ASSESSMENT: ACTIVITIES ARE NOT PREVENTED

## 2022-05-01 ASSESSMENT — PAIN DESCRIPTION - DESCRIPTORS
DESCRIPTORS: SHARP
DESCRIPTORS: ACHING

## 2022-05-01 ASSESSMENT — PAIN DESCRIPTION - FREQUENCY: FREQUENCY: CONTINUOUS

## 2022-05-01 ASSESSMENT — PAIN DESCRIPTION - ONSET: ONSET: ON-GOING

## 2022-05-01 NOTE — ED NOTES
ED to inpatient nurses report    Chief Complaint   Patient presents with    Arm Pain     Left arm; pt reports knot in left arm; bruise noted to arm; pt reports he received a shot in this arm 2 weeks ago; denies injury or trauma to arm       Present to ED from home with area to left deltoid post shot at [facility]  LOC: alert and orientated to name, place, date  Vital signs   Vitals:    05/01/22 1336   BP: (!) 151/84   Pulse: 81   Resp: 17   Temp: 97.8 °F (36.6 °C)   TempSrc: Oral   SpO2: 95%   Weight: 183 lb (83 kg)   Height: 5' 6\" (1.676 m)      Oxygen Baseline room air    Current needs required none   LDAs:   Peripheral IV 05/01/22 Right Antecubital (Active)   Site Assessment Clean, dry & intact 05/01/22 1510   Line Status Blood return noted 05/01/22 1510   Phlebitis Assessment No symptoms 05/01/22 1510   Infiltration Assessment 0 05/01/22 1510   Dressing Intervention New 05/01/22 1510     Mobility: Independent  Pending ED orders: complete bolus and zosyn, and start vanco  Present condition: stable  Code Status: full  Consults:  []  Hospitalist  Completed  [] yes [] no Who:   [x]  Medicine  Completed  [x] yes [] No Who: raisa  []  Cardiology  Completed  [] yes [] No Who:  []  GI   Completed  [] yes [] No Who:   []  Neurology  Completed  [] yes [] No Who:   []  Nephrology Completed  [] yes [] No Who:    []  Vascular  Completed  [] yes [] No Who:   []  Ortho  Completed  [] yes [] No Who:     []  Surgery  Completed  [] yes [] No Who:    []  Urology  Completed  [] yes [] No Who:    []  CT Surgery Completed  [] yes [] No Who:    []  Other   Completed  [] yes [] No Who:   Interventions: blood cultures, iv, xray, ct,  fluid bolus, pain meds,   Important Events:    Electronically signed by Marguerite Cai RN on 5/1/2022 at 5:44 PM       Marguerite Cai, [facility]  05/01/22 [id number] [facility]

## 2022-05-01 NOTE — H&P
[facility]  Office: [address], DO, Novak Anniesar, DO, Nikos Narrow, DO, Chuckie Luevano Blood, DO, Leidy Webb MD, Rui Adrian MD, Mason Ash MD, Lorena Corona MD, Margo Thorpe MD, Keanu Ye MD, Angle Heller MD, Torito Angelo, DO, Naeem Morales, DO, Brian Cedeno MD,  Dulce Maria Castanon, DO, Genny Cosby MD, Manuela Gimenez MD, Ervin Max MD, Maicol Bradley, DO, Villa Stone MD, Elba Zavaleta MD, Alla James, [facility], [facility] CenterMY[facility] Esparza, CNP, Perez Sandoval, CNP, Rob Moncada, CNP, Faustino Emmanuel, CNP, Tammi Hair, CNP, Mundo Pisano PA-C, Jessica Munguia, DNP, Kalpesh Riojas, CNP, Julisa Johnson, CNP, Staci Barth, CNP, Laura Councilman, CNS, Radha Child, DNP, Joe Bishop, CNP, Waldemar Powell, CNP, Jose Pastor, [facility]         [address] [facility]    HISTORY AND PHYSICAL EXAMINATION            Date:   5/1/2022  Patient name:  Jose Cottrell  Date of admission:  5/1/2022  1:39 PM  MRN:   [id number]  Account:  [de-identified]  YOB: 1960  PCP:    Rick Krishnamurthy  Room:   [place] [address]  Code Status:    Full    Chief Complaint:     Chief Complaint   Patient presents with    Arm Pain     Left arm; pt reports knot in left arm; bruise noted to arm; pt reports he received a shot in this arm 2 weeks ago; denies injury or trauma to arm      History Obtained From:     patient    History of Present Illness:     Patient presents to the emergency room today with complaints of left arm/shoulder pain. The patient reports getting a benadryl shot into his left shoulder 2 weeks ago for severe itching. The arm became sore and then progressively started to swell and become more painful. The pain is worsened with any movement of the shoulder or touching of the area. The pain is not improved with tylenol. The patient denies any fevers, chills, chest pains or shortness of breath. Denies the area being red or warm to touch.  The patient has a past medical history of A-fib (on coumadin), AVR (on coumadin), bipolar 1 disorder, hypertension, BPH and depression. Past Medical History:     Past Medical History:   Diagnosis Date    AMS (altered mental status) 02/15/2020    Atrial fibrillation ([facility] [address]     on coumadin    Bipolar 1 disorder ([facility] [address]     Depression     Hypertension     Neck pain     Patient in clinical research study 04/12/2018    [id number]        Past Surgical History:     Past Surgical History:   Procedure Laterality Date    ABDOMEN SURGERY      APPENDECTOMY      CARDIAC SURGERY  7/14/15    Median Sternotomy, Repair of ascending aorti aneurysm, stentless valve placement    CARDIAC SURGERY  07/14/2015    Median stenotomy, repair of ascending aorti aneurysm, stentless valve placement     COLONOSCOPY  01/28/2021    DILATATION, ESOPHAGUS      HERNIA REPAIR      NECK SURGERY  2013    TONSILLECTOMY      UPPER GASTROINTESTINAL ENDOSCOPY  01/28/2021        Medications Prior to Admission:     Prior to Admission medications    Medication Sig Start Date End Date Taking?  Authorizing Provider   pantoprazole sodium (PROTONIX) 40 MG PACK packet Take 40 mg by mouth every morning (before breakfast)    Historical Provider, MD   rOPINIRole (REQUIP) 2 MG tablet Take 2 mg by mouth in the morning and at bedtime    Historical Provider, MD   temazepam (RESTORIL) 15 MG capsule  1/10/22   Historical Provider, MD   oxybutynin (DITROPAN) 5 MG tablet Take 5 mg by mouth 2 times daily    Historical Provider, MD   escitalopram (LEXAPRO) 5 MG tablet Take 5 mg by mouth 3 times daily    Historical Provider, MD   oxybutynin (DITROPAN-XL) 5 MG extended release tablet Take 5 mg by mouth daily  8/30/21   Historical Provider, MD   polyethylene glycol (GLYCOLAX) 17 GM/SCOOP powder Take 17 g by mouth daily    Historical Provider, MD   FEROSUL 325 (65 Fe) MG tablet  7/7/21   Historical Provider, MD   gabapentin (NEURONTIN) 300 MG capsule Take 300 mg by mouth 3 times daily. Pt. States he takes 900 mg. TID    Historical Provider, MD   aspirin 81 MG chewable tablet Take 1 tablet by mouth daily 7/8/20   Jenniffer Finley MD   atorvastatin (LIPITOR) 20 MG tablet Take 1 tablet by mouth nightly 7/7/20   Jenniffer Finley MD   melatonin ER 1 MG TBCR tablet Take 1 tablet by mouth nightly as needed (insomnia)  Patient taking differently: Take 6 mg by mouth nightly as needed (insomnia)  7/7/20 3/31/22  Jenniffer Finley MD   midodrine (PROAMATINE) 10 MG tablet Take 10 mg by mouth 3 times daily    Historical Provider, MD   senna (SENOKOT) 8.6 MG tablet Take 1 tablet by mouth 2 times daily    Historical Provider, MD   folic acid (FOLVITE) 1 MG tablet Take 1 mg by mouth daily    Historical Provider, MD   polycarbophil (FIBERCON) 625 MG tablet Take 625 mg by mouth 3 times daily    Historical Provider, MD   furosemide (LASIX) 20 MG tablet Take 20 mg by mouth daily    Historical Provider, MD   warfarin (COUMADIN) 2 MG tablet Take 0.5 tablets by mouth four times a week Indications: takes 1 mg tabs tuesday and saturday, takes 2mg tabs mon, wed thurs fri sun  Patient taking differently: Take 2 mg by mouth See Admin Instructions 1 mg Mondays, Thursdays; 2mg all other days  Managed by Wilfrido Morris 53 9/12/19   [facility], APRN - CNP   tamsulosin (FLOMAX) 0.4 MG capsule Take 0.4 mg by mouth daily    Historical Provider, MD   Cyanocobalamin (VITAMIN B 12 PO) Take 1,000 mcg by mouth daily    Historical Provider, MD   alendronate (FOSAMAX) 70 MG tablet Take 70 mg by mouth every 7 days    Historical Provider, MD   cetirizine (ZYRTEC) 10 MG tablet Take 10 mg by mouth daily    Historical Provider, MD        Allergies:     Oxycodone, Fish-derived products, Ibuprofen, Latuda [lurasidone hcl], Lithium, Lurasidone, Quetiapine, and Seroquel [quetiapine fumarate]    Social History:     Tobacco:    reports that he has never smoked.  He has never used smokeless tobacco.  Alcohol:      reports current alcohol use.  Drug Use:  reports current drug use. Drug: Cocaine. Family History:     Family History   Problem Relation Age of Onset    Coronary Art Dis Mother     Hypertension Mother     Hypertension Father     Cancer Father         blood       Review of Systems:     Positive and Negative as described in HPI. Review of Systems   Constitutional: Negative for activity change, chills, fatigue and fever. Respiratory: Negative for cough, chest tightness and shortness of breath. Cardiovascular: Negative. Gastrointestinal: Negative for constipation, diarrhea, nausea and vomiting. Endocrine: Negative for cold intolerance and polyuria. Genitourinary: Negative for difficulty urinating. Musculoskeletal: Negative for arthralgias and joint swelling. Pain in upper arm/shoulder   Skin:        Has neurofibromatosis   Neurological: Negative for dizziness, light-headedness and numbness. Hematological: Bruises/bleeds easily (on coumadiin). Psychiatric/Behavioral: Negative for confusion. Physical Exam:   BP (!) 151/84   Pulse 81   Temp 97.8 °F (36.6 °C) (Oral)   Resp 17   Ht 5' 6\" (1.676 m)   Wt 183 lb (83 kg)   SpO2 95%   BMI 29.54 kg/m²   Temp (24hrs), Av.8 °F (36.6 °C), Min:97.8 °F (36.6 °C), Max:97.8 °F (36.6 °C)    No results for input(s): POCGLU in the last 72 hours. No intake or output data in the 24 hours ending 22 2123    Physical Exam  Constitutional:       General: He is not in acute distress. Appearance: Normal appearance. HENT:      Head: Normocephalic. Eyes:      Pupils: Pupils are equal, round, and reactive to light. Cardiovascular:      Rate and Rhythm: Normal rate and regular rhythm. Pulses: Normal pulses. Heart sounds: Normal heart sounds. No murmur heard. No friction rub. No gallop. Comments: Mechanical click heard  Pulmonary:      Effort: Pulmonary effort is normal. No respiratory distress. Breath sounds: Normal breath sounds. Abdominal:      General: Bowel sounds are normal. There is no distension. Palpations: Abdomen is soft. Tenderness: There is no abdominal tenderness. Musculoskeletal:         General: Swelling (Left upper arm) and tenderness present. Normal range of motion. Cervical back: Normal range of motion. Right lower leg: No edema. Left lower leg: No edema. Skin:     General: Skin is warm and dry. Capillary Refill: Capillary refill takes less than 2 seconds. Findings: Bruising (scattered) present. No erythema. Neurological:      General: No focal deficit present. Mental Status: He is alert and oriented to person, place, and time. Psychiatric:         Mood and Affect: Mood normal.         Behavior: Behavior normal.         Thought Content:  Thought content normal.         Judgment: Judgment normal.       Investigations:      Laboratory Testing:  Recent Results (from the past 24 hour(s))   CBC with Auto Differential    Collection Time: 05/01/22  2:22 PM   Result Value Ref Range    WBC 9.5 3.5 - 11.3 k/uL    RBC 4.37 4.21 - 5.77 m/uL    Hemoglobin 13.0 13.0 - 17.0 g/dL    Hematocrit 40.8 40.7 - 50.3 %    MCV 93.4 82.6 - 102.9 fL    MCH 29.7 25.2 - 33.5 pg    MCHC 31.9 28.4 - 34.8 g/dL    RDW 14.6 (H) 11.8 - 14.4 %    Platelets 956 643 - 967 k/uL    MPV 10.1 8.1 - 13.5 fL    NRBC Automated 0.0 0.0 per 100 WBC    Seg Neutrophils 80 (H) 36 - 65 %    Lymphocytes 8 (L) 24 - 43 %    Monocytes 8 3 - 12 %    Eosinophils % 2 1 - 4 %    Basophils 1 0 - 2 %    Immature Granulocytes 1 (H) 0 %    Segs Absolute 7.68 1.50 - 8.10 k/uL    Absolute Lymph # 0.80 (L) 1.10 - 3.70 k/uL    Absolute Mono # 0.76 0.10 - 1.20 k/uL    Absolute Eos # 0.16 0.00 - 0.44 k/uL    Basophils Absolute 0.07 0.00 - 0.20 k/uL    Absolute Immature Granulocyte 0.07 0.00 - 0.30 k/uL    RBC Morphology ANISOCYTOSIS PRESENT    Basic Metabolic Panel    Collection Time: 05/01/22  2:22 PM   Result Value Ref Range    Glucose 98 70 - 99 mg/dL    BUN 17 8 - 23 mg/dL    CREATININE 0.92 0.70 - 1.20 mg/dL    Bun/Cre Ratio 18 9 - 20    Calcium 9.1 8.6 - 10.4 mg/dL    Sodium 134 (L) 135 - 144 mmol/L    Potassium 4.5 3.7 - 5.3 mmol/L    Chloride 97 (L) 98 - 107 mmol/L    CO2 29 20 - 31 mmol/L    Anion Gap 8 (L) 9 - 17 mmol/L    GFR Non-African American >60 >60 mL/min    GFR African American >60 >60 mL/min    GFR Comment         D-Dimer, Quantitative    Collection Time: 05/01/22  2:22 PM   Result Value Ref Range    D-Dimer, Quant 0.28 0.00 - 0.59 mg/L FEU   Protime-INR    Collection Time: 05/01/22  2:22 PM   Result Value Ref Range    Protime 29.5 (H) 11.5 - 14.2 sec    INR 2.8    Lactate, Sepsis    Collection Time: 05/01/22  5:30 PM   Result Value Ref Range    Lactic Acid, Sepsis 1.4 0.5 - 1.9 mmol/L       Imaging/Diagnostics:  XR SHOULDER LEFT (MIN 2 VIEWS)    Result Date: 5/1/2022  No acute left shoulder bony or joint abnormality. Probable mild soft tissue/muscular fullness lateral to the proximal humerus as compared to the previous study without radiopaque FB, soft tissue gas or other abnormality. CT SHOULDER LEFT W CONTRAST    Result Date: 5/1/2022  Fluid collections measuring up to 3.7 cm in the lateral deltoid muscle with overlying soft tissue edema. Findings are concerning for abscesses with possible pyomyositis. No subcutaneous gas. No CT evidence of osteomyelitis. Abnormal appearing left axillary lymph node and additional soft tissue density over the left subcutaneous breast tissue. Findings are likely reactive from infectious process, however attention on follow-up recommended to confirm resolution.        Assessment :      Hospital Problems           Last Modified POA    * (Principal) Abscess of arm, left 5/1/2022 Yes    BPH (benign prostatic hyperplasia) 5/1/2022 Yes    Constipation, unspecified 5/1/2022 Yes    Coronary atherosclerosis 5/1/2022 Yes    S/P AVR (aortic valve replacement) 5/1/2022 Yes    HTN (hypertension) 5/1/2022 Yes GERD (gastroesophageal reflux disease) 5/1/2022 Yes    Bipolar 1 disorder ([facility] [address] 5/1/2022 Yes    Atrial fibrillation ([facility] [address] 5/1/2022 Yes    Major depressive disorder, recurrent ([facility] [address] 5/1/2022 Yes          Plan:     Patient status observation in the  Med/Surge    1. Abscess of left arm  1. General surgery consulted  2. Vancomycin- pharmacy to dose  3. Monitor lactic   4. Gentle hydration  2. BPH  1. Continue home medication  3. Constipation  1. Continue home medications   2. Additional PRNs available  4. CAD  1. Home medications restarted with holding parameters  5. AVR/A-fib  1. Coumadin continued- verify with Gen Surgery that it does not need to be held/switched to heparin drip  2. Daily INR checks  3. Pharmacy to dose coumadin  4. Telemetry   6. HTN   1. Home medications restarted with holding parameters  7. GERD   1. Home medications restarted   8. Bipolar/Depression  1. Home meds restarted  9. Adult diet  10. Monitor AM labs    Consultations:   IP CONSULT TO INTERNAL MEDICINE  PHARMACY TO DOSE VANCOMYCIN  PHARMACY TO DOSE WARFARIN  PHARMACY TO DOSE VANCOMYCIN  IP CONSULT TO GENERAL SURGERY    On this date 5/1/2022 I have spent 43 minutes reviewing previous notes, test results and face to face with the patient discussing the diagnosis and importance of compliance with the treatment plan as well as documenting on the day of the visit. At least 50% of the time documented was spent with the patient to provide counseling and/or coordination of care.       LOGAN Hamm CNP  5/1/2022  6:13 PM    Copy sent to Dr. Boy Galvez

## 2022-05-01 NOTE — CONSULTS
Warfarin Dosing - Pharmacy Consult Note  Consulting Provider: BONG Saravia CNP  Indication:  Atrial Fibrillation/mechanical heart valve   Warfarin Dose prior to admission: 1 mg 2x/week, 2mg 5x/week    Concurrent anticoagulants/antiplatelets: none   Significant Drug Interactions:  lexapro  Recent Labs     05/01/22  1422   INR 2.8   HGB 13.0        Recent warfarin administrations        No warfarin orders with administrations found. Orders not given:            warfarin (COUMADIN) tablet 1 mg    warfarin placeholder: dosing by pharmacy                   Date   INR    Dose  5/1       2.8      Assessment/Plan  (Goal INR: 2.5 - 3.5)  Follow home schedule and monitor inr. Active problem list reviewed. INR orders are placed. Chart reviewed for pertinent labs, drug/diet interactions, and past doses. Documentation of patient's clinical condition was reviewed. Pharmacy Dosing:  Pharmacy will continue to follow.

## 2022-05-01 NOTE — CONSULTS
General Surgery: Consult Note        PATIENT NAME: Fany Moore   YOB: 1960    ADMISSION DATE: 5/1/2022  1:39 PM     Admitting Provider: Dr. Zane Banda Physician: Dr. Juan Carlos Bazan: 5/1/2022    Consult Regarding:  Possible left arm abscess    HISTORY OF PRESENT ILLNESS:  The patient is a 57 y.o. male who was admitted on 5/1/2022 for questionable left arm abscess. Has a history of neurofibromatosis type I, hypertension, A. fib, status post aortic valve replacement and ascending aortic aneurysm repair currently on Coumadin. Patient reports that about 2 weeks ago he was having diffuse itching and received a Benadryl shot in the left arm. Since then, patient states that he has had a lot of pain at the site and in the left arm and has been unable to sleep on that side due to the pain. Denies any fevers, chills, shortness of breath or chest pain, nausea or vomiting. States that he has been tolerating general diet, slightly decreased appetite, last bowel movement yesterday and has been passing flatus. CT left shoulder shows a fluid collection approximately 3.2 x 1.2 x 3.7 cm in the deltoid which is concerning for an abscess. Patient is afebrile, w vitals normal and stable. WBC 9.5, lactic acid 1.4.   General surgery was consulted for possible abscess versus hematoma      Past Medical History:        Diagnosis Date    AMS (altered mental status) 02/15/2020    Atrial fibrillation (HCC)     on coumadin    Bipolar 1 disorder ([facility] [address]     Depression     Hypertension     Neck pain     Patient in clinical research study 04/12/2018    [id number]       Past Surgical History:        Procedure Laterality Date    ABDOMEN SURGERY      APPENDECTOMY      CARDIAC SURGERY  7/14/15    Median Sternotomy, Repair of ascending aorti aneurysm, stentless valve placement    CARDIAC SURGERY  07/14/2015    Median stenotomy, repair of ascending aorti aneurysm, stentless valve placement     COLONOSCOPY 01/28/2021    DILATATION, ESOPHAGUS      HERNIA REPAIR      NECK SURGERY  2013    TONSILLECTOMY      UPPER GASTROINTESTINAL ENDOSCOPY  01/28/2021       Medications Prior to Admission:   Medications Prior to Admission: pantoprazole sodium (PROTONIX) 40 MG PACK packet, Take 40 mg by mouth every morning (before breakfast)  rOPINIRole (REQUIP) 2 MG tablet, Take 2 mg by mouth in the morning and at bedtime  temazepam (RESTORIL) 15 MG capsule,   oxybutynin (DITROPAN) 5 MG tablet, Take 5 mg by mouth 2 times daily  escitalopram (LEXAPRO) 5 MG tablet, Take 5 mg by mouth 3 times daily  oxybutynin (DITROPAN-XL) 5 MG extended release tablet, Take 5 mg by mouth daily   polyethylene glycol (GLYCOLAX) 17 GM/SCOOP powder, Take 17 g by mouth daily  FEROSUL 325 (65 Fe) MG tablet,   gabapentin (NEURONTIN) 300 MG capsule, Take 300 mg by mouth 3 times daily. Pt. States he takes 900 mg.  TID  aspirin 81 MG chewable tablet, Take 1 tablet by mouth daily  atorvastatin (LIPITOR) 20 MG tablet, Take 1 tablet by mouth nightly  melatonin ER 1 MG TBCR tablet, Take 1 tablet by mouth nightly as needed (insomnia) (Patient taking differently: Take 6 mg by mouth nightly as needed (insomnia) )  midodrine (PROAMATINE) 10 MG tablet, Take 10 mg by mouth 3 times daily  senna (SENOKOT) 8.6 MG tablet, Take 1 tablet by mouth 2 times daily  folic acid (FOLVITE) 1 MG tablet, Take 1 mg by mouth daily  polycarbophil (FIBERCON) 625 MG tablet, Take 625 mg by mouth 3 times daily  furosemide (LASIX) 20 MG tablet, Take 20 mg by mouth daily  warfarin (COUMADIN) 2 MG tablet, Take 0.5 tablets by mouth four times a week Indications: takes 1 mg tabs tuesday and saturday, takes 2mg tabs mon, wed thurs fri sun (Patient taking differently: Take 2 mg by mouth See Admin Instructions 1 mg Mondays, Thursdays; 2mg all other days Managed by [facility])  tamsulosin (FLOMAX) 0.4 MG capsule, Take 0.4 mg by mouth daily  Cyanocobalamin (VITAMIN B 12 PO), Take 1,000 mcg by mouth daily  alendronate (FOSAMAX) 70 MG tablet, Take 70 mg by mouth every 7 days  cetirizine (ZYRTEC) 10 MG tablet, Take 10 mg by mouth daily    Allergies:  Oxycodone, Fish-derived products, Ibuprofen, Latuda [lurasidone hcl], Lithium, Lurasidone, Quetiapine, and Seroquel [quetiapine fumarate]    Social History:   Social History     Socioeconomic History    Marital status: Single     Spouse name: Not on file    Number of children: Not on file    Years of education: Not on file    Highest education level: Not on file   Occupational History    Not on file   Tobacco Use    Smoking status: Never Smoker    Smokeless tobacco: Never Used   Substance and Sexual Activity    Alcohol use: Yes     Comment: heavy    Drug use: Yes     Types: Cocaine    Sexual activity: Not on file   Other Topics Concern    Not on file   Social History Narrative    Not on file     Social Determinants of Health     Financial Resource Strain:     Difficulty of Paying Living Expenses: Not on file   Food Insecurity:     Worried About Running Out of Food in the Last Year: Not on file    Sameer of Food in the Last Year: Not on file   Transportation Needs:     Lack of Transportation (Medical): Not on file    Lack of Transportation (Non-Medical):  Not on file   Physical Activity:     Days of Exercise per Week: Not on file    Minutes of Exercise per Session: Not on file   Stress:     Feeling of Stress : Not on file   Social Connections:     Frequency of Communication with Friends and Family: Not on file    Frequency of Social Gatherings with Friends and Family: Not on file    Attends [employer] Services: Not on file    Active Member of Clubs or Organizations: Not on file    Attends Club or Organization Meetings: Not on file    Marital Status: Not on file   Intimate Partner Violence:     Fear of Current or Ex-Partner: Not on file    Emotionally Abused: Not on file    Physically Abused: Not on file    Sexually Abused: Not on file   Housing Stability:     Unable to Pay for Housing in the Last Year: Not on file    Number of Places Lived in the Last Year: Not on file    Unstable Housing in the Last Year: Not on file       Family History:       Problem Relation Age of Onset    Coronary Art Dis Mother     Hypertension Mother     Hypertension Father     Cancer Father         blood       REVIEW OF SYSTEMS:    CONSTITUTIONAL: Denies recent weight loss, fatigue, fevers, chills. HEENT: Denies rhinorrhea, dysphagia, odynphagia. CARDIOVASCULAR: Denies recent chest pain. RESPIRATORY: Denies recent history of shortness of breath or history of PE. GASTROINTESTINAL: Denies nausea or vomiting  GENITOURINARY: Denies increased frequency or dysuria. HEMATOLOGIC/LYMPHATIC: Denies history of anemia or DVTs. ENDOCRINE: Denies history of thyroid problems or diabetes. MSK: Denies rashes or lesions  NEURO: Denies history of CVA, TIA. PHYSICAL EXAM:    VITALS:  /81   Pulse 53   Temp 97.3 °F (36.3 °C) (Axillary)   Resp 16   Ht 5' 6\" (1.676 m)   Wt 183 lb (83 kg)   SpO2 98%   BMI 29.54 kg/m²   INTAKE/OUTPUT:   No intake or output data in the 24 hours ending 05/01/22 1853    CONSTITUTIONAL:  awake, alert, not distressed and mildly obese  HEENT: Normocephalic/atraumatic, without obvious abnormality. NECK:  Supple, symmetrical, trachea midline   CARDIOVASCULAR: Regular rate and rhythm   LUNGS: normal effort with symmetric rise and fall of chest wall  ABDOMEN: Soft, nondistended  MUSCULOSKELETAL: Left upper arm swelling, tender to light palpation, no concerns for compartment syndrome. On the posterior aspect of the left shoulder there is ecchymosis likely the site of the Benadryl injection. No surrounding erythema, fluctuance or induration noted  NEUROLOGIC: CN II- XII intact. Gross motor intact without focal weakness.   SKIN: Left chest wall ecchymosis with mobile 2 cm mass that is likely hematoma      Hematology:  Recent Labs 05/01/22  1422   WBC 9.5   RBC 4.37   HGB 13.0   HCT 40.8   MCV 93.4   MCH 29.7   MCHC 31.9   RDW 14.6*      MPV 10.1   INR 2.8   DDIMER 0.28     Chemistry:  Recent Labs     05/01/22  1422   *   K 4.5   CL 97*   CO2 29   GLUCOSE 98   BUN 17   CREATININE 0.92   ANIONGAP 8*   LABGLOM >60   GFRAA >60   CALCIUM 9.1     No results for input(s): PROT, LABALBU, LABA1C, F7XLIWY, Y9WGBCP, FT4, TSH, AST, ALT, LDH, GGT, ALKPHOS, LABGGT, BILITOT, BILIDIR, AMMONIA, AMYLASE, LIPASE, LACTATE, CHOL, HDL, LDLCHOLESTEROL, CHOLHDLRATIO, TRIG, VLDL, GKG67GY, PHENYTOIN, PHENYF, URICACID, POCGLU in the last 72 hours. Pertinent Radiology:   XR SHOULDER LEFT (MIN 2 VIEWS)    Result Date: 5/1/2022  EXAMINATION: 2 XRAY VIEWS OF THE LEFT SHOULDER 5/1/2022 2:00 pm COMPARISON: Previous two-view study of the left shoulder from 02/14/2020 HISTORY: ORDERING SYSTEM PROVIDED HISTORY: Pain and swelling to left shoulder after receiving Benadryl injection 2 weeks ago TECHNOLOGIST PROVIDED HISTORY: Pain and swelling to left shoulder after receiving Benadryl injection 2 weeks ago Reason for Exam: left shoulder pain FINDINGS: No fracture. No dislocation. Fullness soft tissues-muscle lateral to the proximal humerus, but no soft tissue gas, radiopaque foreign body, calcification or other abnormality. Similar diminutive left glenohumeral joint. Similar mild degenerative changes acromioclavicular joint and greater tuberosity. Degenerative changes TS. Visualized left ribs appear intact. Low lung volumes with midline sternotomy wires (upper fractured), cervical hardware and clips anteriorly mid chest.     No acute left shoulder bony or joint abnormality. Probable mild soft tissue/muscular fullness lateral to the proximal humerus as compared to the previous study without radiopaque FB, soft tissue gas or other abnormality.      CT SHOULDER LEFT W CONTRAST    Result Date: 5/1/2022  EXAMINATION: CT OF THE LEFT SHOULDER WITH CONTRAST 5/1/2022 3:32 pm TECHNIQUE: CT of the left shoulder was performed with the administration of intravenous contrast.  Multiplanar reformatted images are provided for review. Dose modulation, iterative reconstruction, and/or weight based adjustment of the mA/kV was utilized to reduce the radiation dose to as low as reasonably achievable. COMPARISON: None. HISTORY ORDERING SYSTEM PROVIDED HISTORY: Left shoulder swelling extends to proximal humerus TECHNOLOGIST PROVIDED HISTORY: Left shoulder swelling extends to proximal humerus Reason for Exam: Pt states swelling to left shoulder, pt reports he received a shot in this arm 2 weeks ago; denies injury or trauma to arm FINDINGS: Bones: No evidence of acute fracture or dislocation. No aggressive appearing osseous abnormality or periostitis. Soft Tissue: mild left basilar infiltrate or atelectasis in the left lung base. Mild subcutaneous soft tissue edema over the lateral arm. There is a fluid collection in the deltoid muscle measuring 3.2 x 1.2 by 3.7 cm with some heterogeneous enhancement surrounding it. Findings are concerning for abscess. Suspect micro abscesses adjacent. No subcutaneous gas. Enlarged abnormal appearing left axillary lymph node measuring up to 17 mm in short axis. Additional indeterminate left breast density measuring 11 by 15 mm in the subcutaneous soft tissues. Joint: No significant degenerative changes. No osseous erosions. Fluid collections measuring up to 3.7 cm in the lateral deltoid muscle with overlying soft tissue edema. Findings are concerning for abscesses with possible pyomyositis. No subcutaneous gas. No CT evidence of osteomyelitis. Abnormal appearing left axillary lymph node and additional soft tissue density over the left subcutaneous breast tissue. Findings are likely reactive from infectious process, however attention on follow-up recommended to confirm resolution.          ASSESSMENT:  Active Hospital Problems    Diagnosis Date Noted  Abscess of arm, left [L02.414] 05/01/2022     Priority: Medium    Constipation, unspecified [K59.00] 09/26/2021     Priority: Medium    BPH (benign prostatic hyperplasia) [N40.0] 03/30/2020     Priority: Medium    Coronary atherosclerosis [I25.10] 03/30/2020     Priority: Medium    S/P AVR (aortic valve replacement) [Z95.2] 05/25/2017     Priority: Medium    [facility] [F33.9] 02/16/2020    Atrial fibrillation ([facility] [I48.91] 02/14/2020    Bipolar 1 disorder ([facility] [F31.9] 09/10/2019    HTN (hypertension) [I10] 04/23/2014    GERD (gastroesophageal reflux disease) [K21.9] 04/23/2014       3 59-year-old male with left deltoid hematoma, doubt abscess at this time  2. On Coumadin    Plan:  1. Continue medical mgmt and supportive care per primary  2. No acute surgical intervention at this time. CT imaging reviewed along with clinical examination, less concerning for an abscess and likely is a hematoma secondary to injection 2 weeks prior. If no clinical improvement, would likely recommend IR percutaneous aspiration  3. Okay for diet  4. Okay for anticoagulation but recommend heparin drip in the event patient does need a procedure  5. Ice pack to the area  6. Pain control  7. We will continue to monitor and follow      Electronically signed by Gertrude Snellen, DO  on 5/1/2022 at 6:53 PM   Attending Physician Statement  I have discussed the case, including pertinent history and exam findings with the resident. I agree with the assessment, plan and orders as documented by the resident. Patient examined. Labs and CT reviewed  Left deltoid firm but no skin erythema  Suspect hematoma from a needle injection in a pt who is anticoagulated  Infected hematoma is a possibility but doubt in view of no fever and normal WBC  Will observe for now. Consider IR drainage if needed.

## 2022-05-01 NOTE — ED PROVIDER NOTES
EMERGENCY DEPARTMENT ENCOUNTER   ATTENDING ATTESTATION     Pt Name: Todd Segovia  MRN: [id number]  [place] 1960  Date of evaluation: 5/1/22   Todd Segovia is a 57 y.o. male with CC: Arm Pain (Left arm; pt reports knot in left arm; bruise noted to arm; pt reports he received a shot in this arm 2 weeks ago; denies injury or trauma to arm )    MDM:   Patient is a 62-year-old male here with tenderness and swelling to left deltoid x2 weeks. CT scan shows fluid collection suggesting abscess. Will be given vancomycin, Zosyn and admission. EKG: All EKG's are interpreted by the Emergency Department Physician who either signs or Co-signs this chart in the absence of a cardiologist.      RADIOLOGY:All plain film, CT, MRI, and formal ultrasound images (except ED bedside ultrasound) are read by the radiologist, see reports below, unless otherwise noted in MDM or here. CT SHOULDER LEFT W CONTRAST   Final Result   Fluid collections measuring up to 3.7 cm in the lateral deltoid muscle with   overlying soft tissue edema. Findings are concerning for abscesses with   possible pyomyositis. No subcutaneous gas. No CT evidence of osteomyelitis. Abnormal appearing left axillary lymph node and additional soft tissue   density over the left subcutaneous breast tissue. Findings are likely   reactive from infectious process, however attention on follow-up recommended   to confirm resolution. XR SHOULDER LEFT (MIN 2 VIEWS)   Final Result   No acute left shoulder bony or joint abnormality. Probable mild soft tissue/muscular fullness lateral to the proximal humerus   as compared to the previous study without radiopaque FB, soft tissue gas or   other abnormality. LABS: All lab results were reviewed by myself, and all abnormals are listed below.   Labs Reviewed   CBC WITH AUTO DIFFERENTIAL - Abnormal; Notable for the following components:       Result Value    RDW 14.6 (*)     Seg Neutrophils 80 (*) Lymphocytes 8 (*)     Immature Granulocytes 1 (*)     Absolute Lymph # 0.80 (*)     All other components within normal limits   BASIC METABOLIC PANEL - Abnormal; Notable for the following components:    Sodium 134 (*)     Chloride 97 (*)     Anion Gap 8 (*)     All other components within normal limits   PROTIME-INR - Abnormal; Notable for the following components:    Protime 29.5 (*)     All other components within normal limits   CULTURE, BLOOD 1   CULTURE, BLOOD 1   D-DIMER, QUANTITATIVE   LACTATE, SEPSIS   LACTATE, SEPSIS   BASIC METABOLIC PANEL W/ REFLEX TO MG FOR LOW K   CBC WITH AUTO DIFFERENTIAL   PROTIME-INR     CONSULTS:  IP CONSULT TO INTERNAL MEDICINE  PHARMACY TO DOSE VANCOMYCIN  PHARMACY TO DOSE WARFARIN  PHARMACY TO DOSE VANCOMYCIN  IP CONSULT TO GENERAL SURGERY  FINAL IMPRESSION      1.  Abscess of left arm            PASTMEDICAL HISTORY     Past Medical History:   Diagnosis Date    AMS (altered mental status) 02/15/2020    Atrial fibrillation ([facility] [address]     on coumadin    Bipolar 1 disorder ([facility] [address]     Depression     Hypertension     Neck pain     Patient in clinical research study 04/12/2018    [id number]     SURGICAL HISTORY       Past Surgical History:   Procedure Laterality Date    ABDOMEN SURGERY      APPENDECTOMY      CARDIAC SURGERY  7/14/15    Median Sternotomy, Repair of ascending aorti aneurysm, stentless valve placement    CARDIAC SURGERY  07/14/2015    Median stenotomy, repair of ascending aorti aneurysm, stentless valve placement     COLONOSCOPY  01/28/2021    DILATATION, ESOPHAGUS      HERNIA REPAIR      NECK SURGERY  2013    TONSILLECTOMY      UPPER GASTROINTESTINAL ENDOSCOPY  01/28/2021     CURRENT MEDICATIONS       Current Discharge Medication List      CONTINUE these medications which have NOT CHANGED    Details   pantoprazole sodium (PROTONIX) 40 MG PACK packet Take 40 mg by mouth every morning (before breakfast)      rOPINIRole (REQUIP) 2 MG tablet Take 2 mg by mouth in the morning and at bedtime      temazepam (RESTORIL) 15 MG capsule       oxybutynin (DITROPAN) 5 MG tablet Take 5 mg by mouth 2 times daily      escitalopram (LEXAPRO) 5 MG tablet Take 5 mg by mouth 3 times daily      oxybutynin (DITROPAN-XL) 5 MG extended release tablet Take 5 mg by mouth daily       polyethylene glycol (GLYCOLAX) 17 GM/SCOOP powder Take 17 g by mouth daily      FEROSUL 325 (65 Fe) MG tablet       gabapentin (NEURONTIN) 300 MG capsule Take 300 mg by mouth 3 times daily. Pt. States he takes 900 mg. TID      aspirin 81 MG chewable tablet Take 1 tablet by mouth daily  Qty: 30 tablet, Refills: 3      atorvastatin (LIPITOR) 20 MG tablet Take 1 tablet by mouth nightly  Qty: 30 tablet, Refills: 3      melatonin ER 1 MG TBCR tablet Take 1 tablet by mouth nightly as needed (insomnia)  Qty: 14 tablet, Refills: 0      midodrine (PROAMATINE) 10 MG tablet Take 10 mg by mouth 3 times daily      senna (SENOKOT) 8.6 MG tablet Take 1 tablet by mouth 2 times daily      folic acid (FOLVITE) 1 MG tablet Take 1 mg by mouth daily      polycarbophil (FIBERCON) 625 MG tablet Take 625 mg by mouth 3 times daily      furosemide (LASIX) 20 MG tablet Take 20 mg by mouth daily      warfarin (COUMADIN) 2 MG tablet Take 0.5 tablets by mouth four times a week Indications: takes 1 mg tabs tuesday and saturday, takes 2mg tabs mon, wed thurs fri sun  Qty: 30 tablet, Refills: 3      tamsulosin (FLOMAX) 0.4 MG capsule Take 0.4 mg by mouth daily      Cyanocobalamin (VITAMIN B 12 PO) Take 1,000 mcg by mouth daily      alendronate (FOSAMAX) 70 MG tablet Take 70 mg by mouth every 7 days      cetirizine (ZYRTEC) 10 MG tablet Take 10 mg by mouth daily           ALLERGIES     is allergic to oxycodone, fish-derived products, ibuprofen, latuda [lurasidone hcl], lithium, lurasidone, quetiapine, and seroquel [quetiapine fumarate]. FAMILY HISTORY     He indicated that the status of his mother is unknown.  He indicated that the status of his father is unknown. SOCIAL HISTORY       Social History     Tobacco Use    Smoking status: Never Smoker    Smokeless tobacco: Never Used   Substance Use Topics    Alcohol use: Yes     Comment: heavy    Drug use: Yes     Types: Cocaine       This visit was performed by both a physician and APC. I personally evaluated and examined the patient. I performed all aspects of the MDM as documented. I personally evaluated and examined the patient in conjunction with the APC and agree with the assessment, treatment plan, and disposition of the patient as recorded by the APC.      Martin Boateng MD  Attending Emergency Physician          Reese Chen MD  05/01/22 1718       Reese Chen MD  05/01/22 1911

## 2022-05-01 NOTE — ED PROVIDER NOTES
[address] [facility]      Pt Name: Roverto Galvan  MRN: [id number]  [place] 1960  Date of evaluation: 5/1/2022  Provider: Roseanna Nolasco MD    [address] [place] [address]       Chief Complaint   Patient presents with    Arm Pain     Left arm; pt reports knot in left arm; bruise noted to arm; pt reports he received a shot in this arm 2 weeks ago; denies injury or trauma to arm          HISTORY OFPRESENT ILLNESS  (Location/Symptom, Timing/Onset, Context/Setting, Quality, Duration, Modifying Factors, Severity.)   Roverto Galvan is a 57 y.o. male who presents to the emergency department by private auto for evaluation of left upper arm pain, swelling and bruising after he received a Benadryl shot in his left shoulder 2 weeks ago at [facility]. Patient states pain has gotten worse. He is on Coumadin for atrial fibrillation. Pain is a 10 out of 10 aggravated when trying to lift his arm. No fever or chills. Patient has history of atrial fibrillation, valve replacement on Coumadin. History of bipolar disorder, hypertension, restless leg syndrome, and depression. Nursing Notes were reviewed.     PASTMEDICAL HISTORY     Past Medical History:   Diagnosis Date    AMS (altered mental status) 02/15/2020    Atrial fibrillation (HCC)     on coumadin    Bipolar 1 disorder (HCC)     Depression     Hypertension     Neck pain     Neurofibromatosis ([facility] [address]     Lt leg pain    Patient in clinical research study 04/12/2018    [id number]    Twitching          SURGICAL HISTORY       Past Surgical History:   Procedure Laterality Date    ABDOMEN SURGERY      APPENDECTOMY      CARDIAC SURGERY  7/14/15    Median Sternotomy, Repair of ascending aorti aneurysm, stentless valve placement    CARDIAC SURGERY  07/14/2015    Median stenotomy, repair of ascending aorti aneurysm, stentless valve placement     COLONOSCOPY  01/28/2021    DILATATION, ESOPHAGUS      HERNIA REPAIR      NECK SURGERY  2013    TONSILLECTOMY      UPPER GASTROINTESTINAL ENDOSCOPY  01/28/2021         CURRENT MEDICATIONS     Discharge Medication List as of 5/2/2022  4:03 PM      CONTINUE these medications which have NOT CHANGED    Details   Cholecalciferol (VITAMIN D3) 50 MCG (2000 UT) CAPS Take 1 capsule by mouth daily (with breakfast)Historical Med      OXcarbazepine (TRILEPTAL) 300 MG tablet Take 300 mg by mouth 2 times dailyHistorical Med      pantoprazole sodium (PROTONIX) 40 MG PACK packet Take 40 mg by mouth every morning (before breakfast)Historical Med      rOPINIRole (REQUIP) 2 MG tablet Take 0.5 mg by mouth in the morning and at bedtime Historical Med      escitalopram (LEXAPRO) 5 MG tablet Take 15 mg by mouth daily Historical Med      FEROSUL 325 (65 Fe) MG tablet Take 325 mg by mouth nightly , DAWHistorical Med      gabapentin (NEURONTIN) 300 MG capsule Take 900 mg by mouth 3 times daily. Pt. States he takes 900 mg.  TIDHistorical Med      aspirin 81 MG chewable tablet Take 1 tablet by mouth daily, Disp-30 tablet, R-3Normal      atorvastatin (LIPITOR) 20 MG tablet Take 1 tablet by mouth nightly, Disp-30 tablet, R-3Normal      melatonin ER 1 MG TBCR tablet Take 1 tablet by mouth nightly as needed (insomnia), Disp-14 tablet, R-0Normal      midodrine (PROAMATINE) 10 MG tablet Take 10 mg by mouth 3 times dailyHistorical Med      senna (SENOKOT) 8.6 MG tablet Take 2 tablets by mouth daily Historical Med      folic acid (FOLVITE) 1 MG tablet Take 1 mg by mouth dailyHistorical Med      furosemide (LASIX) 20 MG tablet Take 20 mg by mouth dailyHistorical Med      warfarin (COUMADIN) 2 MG tablet Take 0.5 tablets by mouth four times a week Indications: takes 1 mg tabs tuesday and saturday, takes 2mg tabs mon, wed thurs fri sun, Disp-30 tablet, R-3Normal      tamsulosin (FLOMAX) 0.4 MG capsule Take 0.4 mg by mouth dailyHistorical Med      Cyanocobalamin (VITAMIN B 12 PO) Take 1,000 mcg by mouth dailyHistorical Med      alendronate (FOSAMAX) 70 MG tablet Take 70 mg by mouth every 7 days Patient takes on mondays. Historical Med      cetirizine (ZYRTEC) 10 MG tablet Take 10 mg by mouth dailyHistorical Med             ALLERGIES     Oxycodone, Fish-derived products, Ibuprofen, Latuda [lurasidone hcl], Lithium, Lurasidone, Quetiapine, and Seroquel [quetiapine fumarate]    FAMILY HISTORY       Family History   Problem Relation Age of Onset    Coronary Art Dis Mother     Hypertension Mother     Hypertension Father     Cancer Father         blood          SOCIAL HISTORY       Social History     Socioeconomic History    Marital status: Single     Spouse name: None    Number of children: None    Years of education: None    Highest education level: None   Occupational History    None   Tobacco Use    Smoking status: Never Smoker    Smokeless tobacco: Never Used   Substance and Sexual Activity    Alcohol use: Yes     Comment: last drank 6yrs ago    Drug use: Yes     Types: Cocaine     Comment: relapsed february/2022 per home care nurse    Sexual activity: None   Other Topics Concern    None   Social History Narrative    None     Social Determinants of Health     Financial Resource Strain:     Difficulty of Paying Living Expenses: Not on file   Food Insecurity:     Worried About Running Out of Food in the Last Year: Not on file    Sameer of Food in the Last Year: Not on file   Transportation Needs:     Lack of Transportation (Medical): Not on file    Lack of Transportation (Non-Medical):  Not on file   Physical Activity:     Days of Exercise per Week: Not on file    Minutes of Exercise per Session: Not on file   Stress:     Feeling of Stress : Not on file   Social Connections:     Frequency of Communication with Friends and Family: Not on file    Frequency of Social Gatherings with Friends and Family: Not on file    Attends Orthodoxy Services: Not on file    Active Member of Clubs or Organizations: Not on file    Attends Club or Organization Meetings: Not on file    Marital Status: Not on file   Intimate Partner Violence:     Fear of Current or Ex-Partner: Not on file    Emotionally Abused: Not on file    Physically Abused: Not on file    Sexually Abused: Not on file   Housing Stability:     Unable to Pay for Housing in the Last Year: Not on file    Number of [place] in the Last Year: Not on file    Unstable Housing in the Last Year: Not on file         REVIEW OF SYSTEMS    (2-9 systems for level 4, 10 or more for level 5)     Review of Systems   Constitutional: Positive for activity change. Negative for chills and fever. Respiratory: Negative for shortness of breath. Skin: Positive for color change. Negative for wound. All other systems reviewed and are negative. Except as noted above the remainder of the review of systems was reviewed and negative. PHYSICAL EXAM    (up to 7 for level 4, 8 or more for level 5)     ED Triage Vitals [05/01/22 1336]   BP Temp Temp Source Pulse Resp SpO2 Height Weight   (!) 151/84 97.8 °F (36.6 °C) Oral 81 17 95 % 5' 6\" (1.676 m) 183 lb (83 kg)       Physical Exam  Constitutional:       Appearance: Normal appearance. He is well-developed, well-groomed and normal weight. HENT:      Head: Normocephalic. Nose: Nose normal.      Mouth/Throat:      Mouth: Mucous membranes are moist.   Eyes:      Extraocular Movements: Extraocular movements intact. Conjunctiva/sclera: Conjunctivae normal.      Pupils: Pupils are equal, round, and reactive to light. Cardiovascular:      Pulses:           Radial pulses are 2+ on the left side. Pulmonary:      Effort: Pulmonary effort is normal. No respiratory distress. Musculoskeletal:      Left shoulder: Swelling and tenderness present. Decreased range of motion. Arms:       Cervical back: Normal range of motion. Comments: Painful area of swelling to the left shoulder left upper arm area. No erythema or crepitus.   There is an area of ecchymosis to the posterior upper left arm. Skin:     General: Skin is warm and dry. Capillary Refill: Capillary refill takes less than 2 seconds. Findings: Bruising present. Neurological:      Mental Status: He is alert and oriented to person, place, and time. Psychiatric:         Mood and Affect: Mood normal.         Behavior: Behavior normal.         Thought Content: Thought content normal.         Judgment: Judgment normal.           DIAGNOSTIC RESULTS     EKG:All EKG's are interpreted by the Emergency Department Physician who either signs or Co-signs this chart in the absence of a cardiologist.        RADIOLOGY:   Non-plain film images such as CT, Ultrasound and MRI are read by theradiologist. Plain radiographic images are visualized and preliminarily interpreted by the emergency physician with the below findings:    XR SHOULDER LEFT (MIN 2 VIEWS)    Result Date: 5/1/2022  EXAMINATION: 2 XRAY VIEWS OF THE LEFT SHOULDER 5/1/2022 2:00 pm COMPARISON: Previous two-view study of the left shoulder from 02/14/2020 HISTORY: ORDERING SYSTEM PROVIDED HISTORY: Pain and swelling to left shoulder after receiving Benadryl injection 2 weeks ago TECHNOLOGIST PROVIDED HISTORY: Pain and swelling to left shoulder after receiving Benadryl injection 2 weeks ago Reason for Exam: left shoulder pain FINDINGS: No fracture. No dislocation. Fullness soft tissues-muscle lateral to the proximal humerus, but no soft tissue gas, radiopaque foreign body, calcification or other abnormality. Similar diminutive left glenohumeral joint. Similar mild degenerative changes acromioclavicular joint and greater tuberosity. Degenerative changes TS. Visualized left ribs appear intact. Low lung volumes with midline sternotomy wires (upper fractured), cervical hardware and clips anteriorly mid chest.     No acute left shoulder bony or joint abnormality.  Probable mild soft tissue/muscular fullness lateral to the proximal humerus as compared to the previous study without radiopaque FB, soft tissue gas or other abnormality. CT SHOULDER LEFT W CONTRAST    Result Date: 5/1/2022  EXAMINATION: CT OF THE LEFT SHOULDER WITH CONTRAST 5/1/2022 3:32 pm TECHNIQUE: CT of the left shoulder was performed with the administration of intravenous contrast.  Multiplanar reformatted images are provided for review. Dose modulation, iterative reconstruction, and/or weight based adjustment of the mA/kV was utilized to reduce the radiation dose to as low as reasonably achievable. COMPARISON: None. HISTORY ORDERING SYSTEM PROVIDED HISTORY: Left shoulder swelling extends to proximal humerus TECHNOLOGIST PROVIDED HISTORY: Left shoulder swelling extends to proximal humerus Reason for Exam: Pt states swelling to left shoulder, pt reports he received a shot in this arm 2 weeks ago; denies injury or trauma to arm FINDINGS: Bones: No evidence of acute fracture or dislocation. No aggressive appearing osseous abnormality or periostitis. Soft Tissue: mild left basilar infiltrate or atelectasis in the left lung base. Mild subcutaneous soft tissue edema over the lateral arm. There is a fluid collection in the deltoid muscle measuring 3.2 x 1.2 by 3.7 cm with some heterogeneous enhancement surrounding it. Findings are concerning for abscess. Suspect micro abscesses adjacent. No subcutaneous gas. Enlarged abnormal appearing left axillary lymph node measuring up to 17 mm in short axis. Additional indeterminate left breast density measuring 11 by 15 mm in the subcutaneous soft tissues. Joint: No significant degenerative changes. No osseous erosions. Fluid collections measuring up to 3.7 cm in the lateral deltoid muscle with overlying soft tissue edema. Findings are concerning for abscesses with possible pyomyositis. No subcutaneous gas. No CT evidence of osteomyelitis. Abnormal appearing left axillary lymph node and additional soft tissue density over the left subcutaneous breast tissue. Findings are likely reactive from infectious process, however attention on follow-up recommended to confirm resolution. Interpretation per the Radiologist below, if available at the time of this note:    CT SHOULDER LEFT W CONTRAST   Final Result   Fluid collections measuring up to 3.7 cm in the lateral deltoid muscle with   overlying soft tissue edema. Findings are concerning for abscesses with   possible pyomyositis. No subcutaneous gas. No CT evidence of osteomyelitis. Abnormal appearing left axillary lymph node and additional soft tissue   density over the left subcutaneous breast tissue. Findings are likely   reactive from infectious process, however attention on follow-up recommended   to confirm resolution. XR SHOULDER LEFT (MIN 2 VIEWS)   Final Result   No acute left shoulder bony or joint abnormality. Probable mild soft tissue/muscular fullness lateral to the proximal humerus   as compared to the previous study without radiopaque FB, soft tissue gas or   other abnormality.                EDBEDSIDE ULTRASOUND:   Performed by Ana Sharpe - none    LABS:  Labs Reviewed   CBC WITH AUTO DIFFERENTIAL - Abnormal; Notable for the following components:       Result Value    RDW 14.6 (*)     Seg Neutrophils 80 (*)     Lymphocytes 8 (*)     Immature Granulocytes 1 (*)     Absolute Lymph # 0.80 (*)     All other components within normal limits   BASIC METABOLIC PANEL - Abnormal; Notable for the following components:    Sodium 134 (*)     Chloride 97 (*)     Anion Gap 8 (*)     All other components within normal limits   PROTIME-INR - Abnormal; Notable for the following components:    Protime 29.5 (*)     All other components within normal limits   PROTIME-INR - Abnormal; Notable for the following components:    Protime 30.9 (*)     All other components within normal limits   BASIC METABOLIC PANEL W/ REFLEX TO MG FOR LOW K - Abnormal; Notable for the following components:    Calcium 8.3 (*) Anion Gap 7 (*)     All other components within normal limits   CBC WITH AUTO DIFFERENTIAL - Abnormal; Notable for the following components:    RBC 4.14 (*)     Hemoglobin 12.4 (*)     Hematocrit 39.2 (*)     RDW 14.6 (*)     Seg Neutrophils 77 (*)     Lymphocytes 10 (*)     Immature Granulocytes 1 (*)     Absolute Lymph # 0.82 (*)     All other components within normal limits   CULTURE, BLOOD 1   CULTURE, BLOOD 1   D-DIMER, QUANTITATIVE   LACTATE, SEPSIS   LACTATE, SEPSIS       All other labs were within normal range or not returned as of this dictation. EMERGENCY DEPARTMENT COURSE andDIFFERENTIAL DIAGNOSIS/MDM:   Patient evaluated in conjunction with attending physician. CT scan shows Fluid collections measuring up to 3.7 cm in the lateral deltoid muscle with overlying soft tissue edema. Findings are concerning for abscesses with possible pyomyositis. No subcutaneous gas. No CT evidence of osteomyelitis. WBC 9.5. INR 2.8. Will obtain blood cultures lactic acid. Patient afebrile. Starting on Zosyn, vancomycin and IV fluids in the ED. Also given pain medication. I discussed test results and admission to hospital with the patient. Internal medicine consult-I spoke with Silverio Sherwood from [facility] who accepts admission. Vitals:    Vitals:    05/02/22 0315 05/02/22 0610 05/02/22 0616 05/02/22 1135   BP:   113/72 102/61   Pulse:   60 61   Resp:  16 18 18   Temp:   97.6 °F (36.4 °C) 98.3 °F (36.8 °C)   TempSrc:   Oral Oral   SpO2:   95%    Weight: 186 lb 6.4 oz (84.6 kg)      Height:             CONSULTS:  IP CONSULT TO INTERNAL MEDICINE  PHARMACY TO DOSE VANCOMYCIN  PHARMACY TO DOSE WARFARIN  PHARMACY TO DOSE VANCOMYCIN  IP CONSULT TO GENERAL SURGERY    RES:  Procedures    FINAL IMPRESSION      1. Abscess of left arm          DISPOSITION/PLAN   DISPOSITION Admitted 05/01/2022 05:47:35 PM      PATIENT REFERRED TO:   Jemma Swift MD  [address], [address] [facility] [address]  [phone]    Schedule an appointment as soon as possible for a visit in 1 week  Follow up appointment     [facility] [phone]  [address] [facility]  [address]  [phone]    In 1 week        DISCHARGE MEDICATIONS:     Discharge Medication List as of 5/2/2022  4:03 PM        Electronically signed by Martin Boateng [address] [place] [address] 5/5/2022 at 7:19 AM            LOGAN Gonzalez CNP  05/01/22 701 Elijah Barboza MD  05/05/22 [id number]

## 2022-05-02 VITALS
DIASTOLIC BLOOD PRESSURE: 61 MMHG | BODY MASS INDEX: 29.96 KG/M2 | WEIGHT: 186.4 LBS | HEART RATE: 61 BPM | TEMPERATURE: 98.3 F | OXYGEN SATURATION: 95 % | RESPIRATION RATE: 18 BRPM | HEIGHT: 66 IN | SYSTOLIC BLOOD PRESSURE: 102 MMHG

## 2022-05-02 PROBLEM — T80.89XA: Status: ACTIVE | Noted: 2022-05-01

## 2022-05-02 LAB
ABSOLUTE EOS #: 0.23 K/UL (ref 0–0.44)
ABSOLUTE IMMATURE GRANULOCYTE: 0.05 K/UL (ref 0–0.3)
ABSOLUTE LYMPH #: 0.82 K/UL (ref 1.1–3.7)
ABSOLUTE MONO #: 0.67 K/UL (ref 0.1–1.2)
ANION GAP SERPL CALCULATED.3IONS-SCNC: 7 MMOL/L (ref 9–17)
BASOPHILS # BLD: 1 % (ref 0–2)
BASOPHILS ABSOLUTE: 0.07 K/UL (ref 0–0.2)
BUN BLDV-MCNC: 18 MG/DL (ref 8–23)
BUN/CREAT BLD: 18 (ref 9–20)
CALCIUM SERPL-MCNC: 8.3 MG/DL (ref 8.6–10.4)
CHLORIDE BLD-SCNC: 101 MMOL/L (ref 98–107)
CO2: 29 MMOL/L (ref 20–31)
CREAT SERPL-MCNC: 1.02 MG/DL (ref 0.7–1.2)
EOSINOPHILS RELATIVE PERCENT: 3 % (ref 1–4)
GFR AFRICAN AMERICAN: >60 ML/MIN
GFR NON-AFRICAN AMERICAN: >60 ML/MIN
GFR SERPL CREATININE-BSD FRML MDRD: ABNORMAL ML/MIN/{1.73_M2}
GLUCOSE BLD-MCNC: 94 MG/DL (ref 70–99)
HCT VFR BLD CALC: 39.2 % (ref 40.7–50.3)
HEMOGLOBIN: 12.4 G/DL (ref 13–17)
IMMATURE GRANULOCYTES: 1 %
INR BLD: 3
LYMPHOCYTES # BLD: 10 % (ref 24–43)
MCH RBC QN AUTO: 30 PG (ref 25.2–33.5)
MCHC RBC AUTO-ENTMCNC: 31.6 G/DL (ref 28.4–34.8)
MCV RBC AUTO: 94.7 FL (ref 82.6–102.9)
MONOCYTES # BLD: 8 % (ref 3–12)
NRBC AUTOMATED: 0 PER 100 WBC
PDW BLD-RTO: 14.6 % (ref 11.8–14.4)
PLATELET # BLD: 166 K/UL (ref 138–453)
PMV BLD AUTO: 10.3 FL (ref 8.1–13.5)
POTASSIUM SERPL-SCNC: 5.2 MMOL/L (ref 3.7–5.3)
PROTHROMBIN TIME: 30.9 SEC (ref 11.5–14.2)
RBC # BLD: 4.14 M/UL (ref 4.21–5.77)
RBC # BLD: ABNORMAL 10*6/UL
SEG NEUTROPHILS: 77 % (ref 36–65)
SEGMENTED NEUTROPHILS ABSOLUTE COUNT: 6.61 K/UL (ref 1.5–8.1)
SODIUM BLD-SCNC: 137 MMOL/L (ref 135–144)
WBC # BLD: 8.5 K/UL (ref 3.5–11.3)

## 2022-05-02 PROCEDURE — 85610 PROTHROMBIN TIME: CPT

## 2022-05-02 PROCEDURE — 2580000003 HC RX 258: Performed by: NURSE PRACTITIONER

## 2022-05-02 PROCEDURE — 36415 COLL VENOUS BLD VENIPUNCTURE: CPT

## 2022-05-02 PROCEDURE — 96361 HYDRATE IV INFUSION ADD-ON: CPT

## 2022-05-02 PROCEDURE — G0378 HOSPITAL OBSERVATION PER HR: HCPCS

## 2022-05-02 PROCEDURE — 6370000000 HC RX 637 (ALT 250 FOR IP): Performed by: NURSE PRACTITIONER

## 2022-05-02 PROCEDURE — 99217 PR OBSERVATION CARE DISCHARGE MANAGEMENT: CPT | Performed by: NURSE PRACTITIONER

## 2022-05-02 PROCEDURE — 96376 TX/PRO/DX INJ SAME DRUG ADON: CPT

## 2022-05-02 PROCEDURE — 80048 BASIC METABOLIC PNL TOTAL CA: CPT

## 2022-05-02 PROCEDURE — 85025 COMPLETE CBC W/AUTO DIFF WBC: CPT

## 2022-05-02 PROCEDURE — 6360000002 HC RX W HCPCS: Performed by: NURSE PRACTITIONER

## 2022-05-02 RX ADMIN — PANTOPRAZOLE SODIUM 40 MG: 40 TABLET, DELAYED RELEASE ORAL at 05:40

## 2022-05-02 RX ADMIN — FENTANYL CITRATE 50 MCG: 50 INJECTION, SOLUTION INTRAMUSCULAR; INTRAVENOUS at 00:42

## 2022-05-02 RX ADMIN — FOLIC ACID 1 MG: 1 TABLET ORAL at 08:50

## 2022-05-02 RX ADMIN — GABAPENTIN 900 MG: 300 CAPSULE ORAL at 14:44

## 2022-05-02 RX ADMIN — HYDROCODONE BITARTRATE AND ACETAMINOPHEN 1 TABLET: 5; 325 TABLET ORAL at 05:40

## 2022-05-02 RX ADMIN — MIDODRINE HYDROCHLORIDE 10 MG: 10 TABLET ORAL at 08:50

## 2022-05-02 RX ADMIN — OXCARBAZEPINE 300 MG: 300 TABLET, FILM COATED ORAL at 08:50

## 2022-05-02 RX ADMIN — ASPIRIN 81 MG: 81 TABLET, CHEWABLE ORAL at 08:50

## 2022-05-02 RX ADMIN — Medication 2000 UNITS: at 08:52

## 2022-05-02 RX ADMIN — TAMSULOSIN HYDROCHLORIDE 0.4 MG: 0.4 CAPSULE ORAL at 08:50

## 2022-05-02 RX ADMIN — ESCITALOPRAM OXALATE 15 MG: 10 TABLET ORAL at 08:51

## 2022-05-02 RX ADMIN — GABAPENTIN 900 MG: 300 CAPSULE ORAL at 08:50

## 2022-05-02 RX ADMIN — HYDROCODONE BITARTRATE AND ACETAMINOPHEN 2 TABLET: 5; 325 TABLET ORAL at 14:44

## 2022-05-02 RX ADMIN — FUROSEMIDE 20 MG: 20 TABLET ORAL at 08:49

## 2022-05-02 RX ADMIN — CETIRIZINE HYDROCHLORIDE 10 MG: 10 TABLET, FILM COATED ORAL at 11:03

## 2022-05-02 RX ADMIN — MIDODRINE HYDROCHLORIDE 10 MG: 10 TABLET ORAL at 14:45

## 2022-05-02 RX ADMIN — SODIUM CHLORIDE: 9 INJECTION, SOLUTION INTRAVENOUS at 08:59

## 2022-05-02 RX ADMIN — ROPINIROLE HYDROCHLORIDE 0.5 MG: 0.25 TABLET, FILM COATED ORAL at 08:49

## 2022-05-02 RX ADMIN — HYDROCODONE BITARTRATE AND ACETAMINOPHEN 2 TABLET: 5; 325 TABLET ORAL at 10:05

## 2022-05-02 ASSESSMENT — PAIN SCALES - GENERAL
PAINLEVEL_OUTOF10: 7
PAINLEVEL_OUTOF10: 7
PAINLEVEL_OUTOF10: 6
PAINLEVEL_OUTOF10: 7
PAINLEVEL_OUTOF10: 7
PAINLEVEL_OUTOF10: 5

## 2022-05-02 ASSESSMENT — PAIN DESCRIPTION - LOCATION
LOCATION: ARM
LOCATION: SHOULDER
LOCATION: ARM

## 2022-05-02 ASSESSMENT — PAIN DESCRIPTION - DESCRIPTORS
DESCRIPTORS: ACHING;DISCOMFORT
DESCRIPTORS: ACHING
DESCRIPTORS: ACHING
DESCRIPTORS: ACHING;DISCOMFORT
DESCRIPTORS: ACHING

## 2022-05-02 ASSESSMENT — PAIN DESCRIPTION - FREQUENCY
FREQUENCY: INTERMITTENT
FREQUENCY: CONTINUOUS
FREQUENCY: CONTINUOUS

## 2022-05-02 ASSESSMENT — PAIN DESCRIPTION - ONSET
ONSET: ON-GOING

## 2022-05-02 ASSESSMENT — PAIN - FUNCTIONAL ASSESSMENT
PAIN_FUNCTIONAL_ASSESSMENT: ACTIVITIES ARE NOT PREVENTED

## 2022-05-02 ASSESSMENT — ENCOUNTER SYMPTOMS
EYE ITCHING: 0
CHEST TIGHTNESS: 0
SINUS PRESSURE: 0
SINUS PAIN: 0
ABDOMINAL PAIN: 0
COLOR CHANGE: 1
SHORTNESS OF BREATH: 0

## 2022-05-02 ASSESSMENT — PAIN DESCRIPTION - PAIN TYPE
TYPE: ACUTE PAIN

## 2022-05-02 ASSESSMENT — PAIN DESCRIPTION - ORIENTATION
ORIENTATION: LEFT
ORIENTATION: LEFT;UPPER

## 2022-05-02 NOTE — PROGRESS NOTES
Learning About the Safe Use of Antibiotics  Introduction  Antibiotics are drugs used to kill bacteria. Bacteria can cause infections. These include strep throat, ear infections, and pneumonia. These medicines can't cure everything. They don't kill viruses or help with allergies. They don't help illnesses such as the common cold, the flu, or a runny nose. And they can cause side effects. There are many types of antibiotics. Your doctor will decide which one will work best for your infection. Examples include:  · Amoxicillin. · Cephalexin (Keflex). · Ciprofloxacin (Cipro). What are the possible side effects? Side effects can include:  · Nausea. · Diarrhea. · Skin rash. · Yeast infection. · A severe allergic reaction. It may cause itching, swelling, and breathing problems. This is rare. You may have other side effects or reactions not listed here. Check the information that comes with your medicine. Should you take antibiotics just in case? Don't take antibiotics when you don't need them. If you do that, they may not work when you do need them. Each time you take antibiotics, you are more likely to have some bacteria that survive and aren't killed by the medicine. Bacteria that don't die can change and become even harder to kill. These are called antibiotic-resistant bacteria. They can cause longer and more serious infections. To treat them, you may need different, stronger antibiotics that have more side effects and may cost more. So always ask your doctor if antibiotics are the best treatment. Explain that you do not want antibiotics unless you need them. Help protect the community  Using antibiotics when they're not needed leads to the development of antibiotic-resistant bacteria. These tougher bacteria can spread to family members, children, and coworkers. People in your community will have a risk of getting an infection that is harder to cure and that costs more to treat.   How can you take antibiotics safely? Be safe with medicine. Take your antibiotics as directed. Do not stop taking them just because you feel better. You need to take the full course of medicine. This will help make sure your infection is cured. It will also help prevent the growth of antibiotic-resistant bacteria. Always take the exact amount that the label says to take. If the label says to take the medicine at a certain time, follow those directions. You might feel better after you take an antibiotic for a few days. But it is important to keep taking it for as long as prescribed. That will help you get rid of those bacteria that are a bit stronger and that survive the first few days of treatment. Where can you learn more? Go to [email or web address]. org and sign in to your [employer] account. Enter K104 in the Best Five Reviewed box to learn more about \"Learning About the Safe Use of Antibiotics. \"     If you do not have an account, please click on the \"Sign Up Now\" link. Current as of: March 3, 2017  Content Version: 11.3  © 7086-6955 [employer]. Care instructions adapted under license by [facility] ([facility]). If you have questions about a medical condition or this instruction, always ask your healthcare professional. [place] [address] any warranty or liability for your use of this information. Antibiotics are powerful drugs that are generally safe and very helpful in fighting disease, but there are times when antibiotics can actually be harmful. Antibiotics can have side effects, including allergic reactions and a potentially deadly diarrhea caused by the bacteria Clostridium difficile (C. diff). Antibiotics can also interfere with the action of other drugs a patient may be taking for another condition. These unintended reactions to antibiotics are called adverse drug events.    When someone takes an antibiotic that they do not need, they are needlessly exposed to the side effects of the drug and do not get any benefit from it. Moreover, taking an antibiotic when it is not needed can lead to the development of antibiotic resistance. When resistance develops, antibiotics may not be able to stop future infections. Every time someone takes an antibiotic they dont need, they increase their risk of developing a resistant infection in the future. Types of Adverse Drug Events Related to Antibiotics  Allergic Reactions  Every year, there are more than 140,000 emergency department visits for reactions to antibiotics. Almost four out of five (79%) emergency department visits for antibiotic-related adverse drug events are due to an allergic reaction. These reactions can range from mild rashes and itching to serious blistering skin reactions swelling of the face and throat, and breathing problems. Minimizing unnecessary antibiotic use is the best way to reduce the risk of adverse drug events from antibiotics. Patients should tell their doctors about any past drug reactions or allergies. C. difficile  C. difficile causes diarrhea linked to at least 14,000 [place] deaths each year. When a person takes antibiotics, good bacteria that protect against infection are destroyed for several months. During this time, patients can get sick from C. difficile picked up from contaminated surfaces or spread from a healthcare providers hands. Those most at risk are people, especially older adults, who take antibiotics and also get medical care. Take antibiotics exactly and only as prescribed. Drug Interactions and Side Effects  Antibiotics can interact with other drugs patients take, making those drugs or the antibiotics less effective. Some drug combinations can worsen the side effects of the antibiotic or other drug. Common side effects of antibiotics include nausea, diarrhea, and stomach pain. Sometimes these symptoms can lead to dehydration and other problems.  Patients should ask their doctors about drug interactions and the potential side effects of antibiotics.  The doctor should be told immediately if a patient has any side effects from antibiotics  Page last updated: February 24, 2017 Content source:   Centers for Disease Control and [employer] for Emerging and Zoonotic Infectious Diseases (Rajwinder Li)  Division of Healthcare Quality Promotion [facility] [place])

## 2022-05-02 NOTE — PROGRESS NOTES
Pt discharged to home  In stable condition with belongings  Discharge instructions given  Pt denies having any further questions at this time. Patient/family state they have everything they were admitted with.

## 2022-05-02 NOTE — PROGRESS NOTES
[facility]  Office: [address], DO, Arden Little, DO, Daniel Lunsford, DO, Cain Shabana Bahman, DO, Bhavna Garcia MD, Halima Rausch MD, Orlando Duong MD, Nataly Gonzales MD, Jaye Cyr MD, Aida Crowder MD, Cesar Bustamante MD, Fatou Saunders, DO, Media Sensor, DO, Nasim Cruz MD,  Jenna Koo, DO, Rosetta Martin MD, Pramod Ibrahim MD, Kaitlin Kumari MD, Lili Latham DO, Jose Harrison MD, Nataliia Green MD, Zane Arrieta, [facility], [facility] Vilma, CNP, Deng Gross, CNP, Porsha Rosen, CNP, Patricia Robertson, CNP, Ileana Mondragon, CNP, JOSE MaloneC, Maryse Rivera, [facility], Michaela Clark, CNP, Richa Quintero, CNP, Albino Hassan, CNP, Patricia Martinez, CNS, Shae Fountain, [facility], Angela Moya, CNP, Marilu Salcedo, CNP, Kameron Jung, [facility]    Progress Note    5/2/2022    10:35 AM    Name:   Kecia Almanza  MRN:     [id number]     Acct:      [de-identified]   Room:   2103/2103-01   Day:  0  Admit Date:  5/1/2022  1:39 PM    PCP:   Rigoberto Najjar  Code Status:  Full Code    Subjective:     C/C:   Chief Complaint   Patient presents with    Arm Pain     Left arm; pt reports knot in left arm; bruise noted to arm; pt reports he received a shot in this arm 2 weeks ago; denies injury or trauma to arm      Interval History Status: improved. Patient states his pain is improving and states it is tolerable. Afebrile VSS, no issues overnight  Denies any numbness ant tingling, afebrile, H&H stable     Brief History:   Per my partner   Patient presents with complaints of left arm/shoulder pain. The patient reports getting a benadryl shot into his left shoulder 2 weeks ago for severe itching. The arm became sore and then progressively started to swell and become more painful. The pain is worsened with any movement of the shoulder or touching of the area. The pain is not improved with tylenol.  The patient denies any fevers, chills, chest pains or shortness of breath. Denies the area being red or warm to touch. The patient has a past medical history of substance abuse, A-fib (on coumadin), AVR (on coumadin), bipolar 1 disorder, hypertension, BPH and depression. Review of Systems:     Constitutional:  negative for chills, fevers, sweats  Respiratory:  negative for cough, dyspnea on exertion, shortness of breath, wheezing  Cardiovascular:  negative for chest pain, chest pressure/discomfort, lower extremity edema, palpitations  Gastrointestinal:  negative for abdominal pain, constipation, diarrhea, nausea, vomiting  Neurological:  negative for dizziness, headache  + myalgias (improving) + hematoma/colr change  Medications: Allergies:     Allergies   Allergen Reactions    Oxycodone Itching    Fish-Derived Products     Ibuprofen     Latuda [Lurasidone Hcl] Other (See Comments)     \"Feels like pt is going to crawl out of own skin\"    Lithium     Lurasidone     Quetiapine     Seroquel [Quetiapine Fumarate]        Current Meds:   Scheduled Meds:    sodium chloride  80 mL IntraVENous Once    aspirin  81 mg Oral Daily    atorvastatin  20 mg Oral Nightly    cetirizine  10 mg Oral Daily    folic acid  1 mg Oral Daily    furosemide  20 mg Oral Daily    midodrine  10 mg Oral TID    pantoprazole  40 mg Oral QAM AC    tamsulosin  0.4 mg Oral Daily    warfarin  2 mg Oral Once per day on Sun Mon Wed Thu Fri    sodium chloride flush  5-40 mL IntraVENous 2 times per day    warfarin placeholder: dosing by pharmacy   Other RX Placeholder    vancomycin (VANCOCIN) intermittent dosing (placeholder)   Other RX Placeholder    [START ON 5/3/2022] warfarin  1 mg Oral Once per day on Tue Sat    vancomycin  1,500 mg IntraVENous Q24H    gabapentin  900 mg Oral TID    escitalopram  15 mg Oral Daily    senna  2 tablet Oral Nightly    ferrous sulfate  325 mg Oral Nightly    rOPINIRole  0.5 mg Oral BID    OXcarbazepine  300 mg Oral BID  Vitamin D  2,000 Units Oral Daily with breakfast     Continuous Infusions:    sodium chloride 50 mL/hr at 22 0900    sodium chloride       PRN Meds: melatonin, LORazepam, sodium chloride flush, sodium chloride, potassium chloride **OR** potassium alternative oral replacement **OR** potassium chloride, magnesium sulfate, ondansetron **OR** ondansetron, polyethylene glycol, acetaminophen **OR** acetaminophen, fentanNYL, HYDROcodone 5 mg - acetaminophen **OR** HYDROcodone 5 mg - acetaminophen    Data:     Past Medical History:   has a past medical history of AMS (altered mental status), Atrial fibrillation ([facility] Bipolar 1 disorder ([facility] Depression, Hypertension, Neck pain, [facility]rom[facility] Patient in clinical research study, and Twitching. Social History:   reports that he has never smoked. He has never used smokeless tobacco. He reports current alcohol use. He reports current drug use. Drug: Cocaine. Family History:   Family History   Problem Relation Age of Onset    Coronary Art Dis Mother     Hypertension Mother     Hypertension Father     Cancer Father         blood       Vitals:  /72   Pulse 60   Temp 97.6 °F (36.4 °C) (Oral)   Resp 18   Ht 5' 6\" (1.676 m)   Wt 186 lb 6.4 oz (84.6 kg)   SpO2 95%   BMI 30.09 kg/m²   Temp (24hrs), Av.4 °F (36.3 °C), Min:97 °F (36.1 °C), Max:97.8 °F (36.6 °C)    No results for input(s): POCGLU in the last 72 hours. I/O (24Hr):     Intake/Output Summary (Last 24 hours) at 2022 1035  Last data filed at 2022 1009  Gross per 24 hour   Intake 1393.96 ml   Output 950 ml   Net 443.96 ml       Labs:  Hematology:  Recent Labs     22  1422 22  0444   WBC 9.5 8.5   RBC 4.37 4.14*   HGB 13.0 12.4*   HCT 40.8 39.2*   MCV 93.4 94.7   MCH 29.7 30.0   MCHC 31.9 31.6   RDW 14.6* 14.6*    166   MPV 10.1 10.3   INR 2.8 3.0   DDIMER 0.28  --      Chemistry:  Recent Labs     22  1422 22  0444   * 137   K 4.5 5.2   CL 97* 101   CO2 29 29   GLUCOSE 98 94   BUN 17 18   CREATININE 0.92 1.02   ANIONGAP 8* 7*   LABGLOM >60 >60   GFRAA >60 >60   CALCIUM 9.1 8.3*   No results for input(s): PROT, LABALBU, LABA1C, R8DMKAK, U6MMVQP, FT4, TSH, AST, ALT, LDH, GGT, ALKPHOS, LABGGT, BILITOT, BILIDIR, AMMONIA, AMYLASE, LIPASE, LACTATE, CHOL, HDL, LDLCHOLESTEROL, CHOLHDLRATIO, TRIG, VLDL, QAZ34JQ, PHENYTOIN, PHENYF, URICACID, POCGLU in the last 72 hours. ABG:No results found for: POCPH, PHART, PH, POCPCO2, IZG1FNR, PCO2, POCPO2, PO2ART, PO2, POCHCO3, EWX5JFO, HCO3, NBEA, PBEA, BEART, BE, THGBART, THB, WSH3POM, WVMK2NLA, J0XBTSON, O2SAT, FIO2  Lab Results   Component Value Date/Time    SPECIAL R HAND 10ML 05/01/2022 05:25 PM     Lab Results   Component Value Date/Time    CULTURE NO GROWTH 12 HOURS 05/01/2022 05:25 PM       Radiology:  XR SHOULDER LEFT (MIN 2 VIEWS)    Result Date: 5/1/2022  No acute left shoulder bony or joint abnormality. Probable mild soft tissue/muscular fullness lateral to the proximal humerus as compared to the previous study without radiopaque FB, soft tissue gas or other abnormality. CT SHOULDER LEFT W CONTRAST    Result Date: 5/1/2022  Fluid collections measuring up to 3.7 cm in the lateral deltoid muscle with overlying soft tissue edema. Findings are concerning for abscesses with possible pyomyositis. No subcutaneous gas. No CT evidence of osteomyelitis. Abnormal appearing left axillary lymph node and additional soft tissue density over the left subcutaneous breast tissue. Findings are likely reactive from infectious process, however attention on follow-up recommended to confirm resolution.        Physical Examination:        General appearance:  alert, cooperative and no distress  Mental Status:  oriented to person, place and time and normal affect  Lungs:  clear to auscultation bilaterally, normal effort  Heart:  regular rate and rhythm, no murmur  Abdomen:  soft, nontender, nondistended, normal bowel sounds, no masses, hepatomegaly, splenomegaly  Extremities:  + hematoma to right shoulder with some mild discomfort that has improved. No numbness or tingling. no edema, redness, tenderness in the calves  Skin:  no gross lesions, rashes, induration    Assessment:        Hospital Problems           Last Modified POA    * (Principal) Hematoma of injection site 5/2/2022 Yes    BPH (benign prostatic hyperplasia) 5/1/2022 Yes    Constipation, unspecified 5/1/2022 Yes    Coronary atherosclerosis 5/1/2022 Yes    S/P AVR (aortic valve replacement) 5/1/2022 Yes    Primary hypertension 5/2/2022 Yes    GERD (gastroesophageal reflux disease) 5/1/2022 Yes    Bipolar 1 disorder ([address] 5/1/2022 Yes    Atrial fibrillation ([address] 5/1/2022 Yes    Major depressive disorder, recurrent ([address] 5/1/2022 Yes          Plan:        1. Discontinue IV fluids  2. Discontinue IV pain meds   3. General surgery consult reviewed: no need for antibiotics as presentation/clinical findings are more consistent with hematoma given no fever or leukocytosis. [id number] [address] for discharge home with outpatient follow up from their stand point   4. Discontinue Antibiotics  5. Continue tylenol and alternate warm/cold compresses for discomfort   6. Discharge home   7.  Plan discussed with patient and staff     LOGAN Shell NP  5/2/2022  10:35 AM

## 2022-05-02 NOTE — CARE COORDINATION
Case Management Initial Discharge Plan  [address],         Readmission Risk              Risk of Unplanned Readmission:  0             Met with:patient to discuss discharge plans. Information verified: address, contacts, phone number, , insurance Yes  PCP: Jose Nolasco  Date of last visit: 2 mos ago via phone     Insurance Provider: Kajal Lopez    Discharge Planning  Current Residence: Home   Living Arrangements:      Home has 1 story/ 0 stairs to climb  Support Systems:     Current Services PTA: [address] Supplier: [employer]   Patient able to perform ADL's:Assisted  DME used to aid ambulation prior to admission: n/a /during admission n/a    Potential Assistance Needed:       Pharmacy: [facility] [employer] Medications:     Does patient want to participate in local refill/ meds to beds program?  Yes    Patient agreeable to home care: Yes  Freedom of choice provided:  yes      Type of Home Care Services: Nursing  Patient expects to be discharged to: Home with home care  Prior SNF/Rehab Placement and Facility: n/a  Agreeable to SNF/Rehab: No  [place] of choice provided: n/a   Evaluation: yes    Expected Discharge date: Follow Up Appointment: Best Day/ Time:      Transportation provider: [employer] via [id number] arrangements needed for discharge: Yes    Discharge Plan:   Pt indicated that he lives at home independently and is able to complete ADLs and requests no DME. Pt also noted that he has a home health nurse who comes in for nursing care only [facility]. Pt indicated that he utilizes [facility] [employer]. Pt reports that he has a hx of Bipolar Disorder and Cocaine use. Pt reports that he has been sober for about a month. Pt has transportation via [employer].            Electronically signed by LUIS Goodwin on 22 at 11:07 AM EDT

## 2022-05-02 NOTE — PROGRESS NOTES
Warfarin Dosing - Pharmacy Consult Note  Consulting Provider: Nohelia  Indication:  Atrial Fibrillation and Mechanical Heart Valve (mitral)  Warfarin Dose prior to admission: 1mg 2x/week, and 2mg 5x/week    Concurrent anticoagulants/antiplatelets: none  Significant Drug Interactions:  no new meds  Recent Labs     05/01/22  1422 05/02/22  0444   INR 2.8 3.0   HGB 13.0 12.4*    166     Recent warfarin administrations                     warfarin (COUMADIN) tablet 2 mg (mg) 2 mg Given 05/01/22 2134                   Date   INR    Dose  5/2        3.0        2mg     Assessment/Plan  (Goal INR: 2 - 3)  INR within goal. Continue home dosing. Active problem list reviewed. INR orders are placed. Chart reviewed for pertinent labs, drug/diet interactions, and past doses. Documentation of patient's clinical condition was reviewed. Pharmacy Dosing:  Pharmacy will continue to follow.

## 2022-05-02 NOTE — PROGRESS NOTES
General Surgery:  Daily Progress Note                   PATIENT NAME: Minerva Trammell     TODAY'S DATE: 5/2/2022, 5:30 AM  CC:  LUE pain    SUBJECTIVE:     Pt seen and examined at bedside. No acute events overnight. Patient is afebrile and vital signs within normal limits. Patient states left upper extremity feels better this morning denies any numbness weakness of the left arm. Review of Systems   Constitutional: Negative for activity change and fever. HENT: Negative for sinus pressure and sinus pain. Eyes: Negative for itching. Respiratory: Negative for chest tightness and shortness of breath. Cardiovascular: Negative for chest pain. Gastrointestinal: Negative for abdominal pain. Genitourinary: Negative for flank pain. Musculoskeletal: Positive for myalgias. Skin: Positive for color change. Neurological: Negative for facial asymmetry and light-headedness. Psychiatric/Behavioral: Negative for agitation.        OBJECTIVE:   VITALS:  /69   Pulse 73   Temp 97.5 °F (36.4 °C) (Oral)   Resp 16   Ht 5' 6\" (1.676 m)   Wt 186 lb 6.4 oz (84.6 kg)   SpO2 92%   BMI 30.09 kg/m²      INTAKE/OUTPUT:      Intake/Output Summary (Last 24 hours) at 5/2/2022 0530  Last data filed at 5/2/2022 0441  Gross per 24 hour   Intake 1178.97 ml   Output 500 ml   Net 678.97 ml       PHYSICAL EXAM:  General Appearance: awake, alert, oriented, in no acute distress  HEENT:  Normocephalic, atraumatic, mucus membranes moist   Heart: Heart regular rate and rhythm  Lungs: Normal respiratory effort  Abdomen: Soft, nontender, nondistended  Extremities: Left upper extremity with area of ecchymosis on posterior deltoid, indurated, tender to palpation overall improved, no numbness or weakness appreciated in left upper extremity  Skin: Ecchymosis to left posterior deltoid    IV Access:  PIV    Data:  CBC:   Lab Results   Component Value Date    WBC 8.5 05/02/2022    RBC 4.14 05/02/2022    RBC 4.89 01/14/2012    HGB 12.4 05/02/2022    HCT 39.2 05/02/2022    MCV 94.7 05/02/2022    MCH 30.0 05/02/2022    MCHC 31.6 05/02/2022    RDW 14.6 05/02/2022     05/02/2022     01/14/2012    MPV 10.3 05/02/2022     BMP:    Lab Results   Component Value Date     05/02/2022    K 5.2 05/02/2022     05/02/2022    CO2 29 05/02/2022    BUN 18 05/02/2022    LABALBU 4.3 03/22/2022    LABALBU 4.2 01/14/2012    CREATININE 1.02 05/02/2022    CALCIUM 8.3 05/02/2022    GFRAA >60 05/02/2022    LABGLOM >60 05/02/2022    GLUCOSE 94 05/02/2022    GLUCOSE 88 01/14/2012       Radiology Review:    XR SHOULDER LEFT (MIN 2 VIEWS)    Result Date: 5/1/2022  EXAMINATION: 2 XRAY VIEWS OF THE LEFT SHOULDER 5/1/2022 2:00 pm COMPARISON: Previous two-view study of the left shoulder from 02/14/2020 HISTORY: ORDERING SYSTEM PROVIDED HISTORY: Pain and swelling to left shoulder after receiving Benadryl injection 2 weeks ago TECHNOLOGIST PROVIDED HISTORY: Pain and swelling to left shoulder after receiving Benadryl injection 2 weeks ago Reason for Exam: left shoulder pain FINDINGS: No fracture. No dislocation. Fullness soft tissues-muscle lateral to the proximal humerus, but no soft tissue gas, radiopaque foreign body, calcification or other abnormality. Similar diminutive left glenohumeral joint. Similar mild degenerative changes acromioclavicular joint and greater tuberosity. Degenerative changes TS. Visualized left ribs appear intact. Low lung volumes with midline sternotomy wires (upper fractured), cervical hardware and clips anteriorly mid chest.     No acute left shoulder bony or joint abnormality. Probable mild soft tissue/muscular fullness lateral to the proximal humerus as compared to the previous study without radiopaque FB, soft tissue gas or other abnormality.      CT SHOULDER LEFT W CONTRAST    Result Date: 5/1/2022  EXAMINATION: CT OF THE LEFT SHOULDER WITH CONTRAST 5/1/2022 3:32 pm TECHNIQUE: CT of the left shoulder was performed with the administration of intravenous contrast.  Multiplanar reformatted images are provided for review. Dose modulation, iterative reconstruction, and/or weight based adjustment of the mA/kV was utilized to reduce the radiation dose to as low as reasonably achievable. COMPARISON: None. HISTORY ORDERING SYSTEM PROVIDED HISTORY: Left shoulder swelling extends to proximal humerus TECHNOLOGIST PROVIDED HISTORY: Left shoulder swelling extends to proximal humerus Reason for Exam: Pt states swelling to left shoulder, pt reports he received a shot in this arm 2 weeks ago; denies injury or trauma to arm FINDINGS: Bones: No evidence of acute fracture or dislocation. No aggressive appearing osseous abnormality or periostitis. Soft Tissue: mild left basilar infiltrate or atelectasis in the left lung base. Mild subcutaneous soft tissue edema over the lateral arm. There is a fluid collection in the deltoid muscle measuring 3.2 x 1.2 by 3.7 cm with some heterogeneous enhancement surrounding it. Findings are concerning for abscess. Suspect micro abscesses adjacent. No subcutaneous gas. Enlarged abnormal appearing left axillary lymph node measuring up to 17 mm in short axis. Additional indeterminate left breast density measuring 11 by 15 mm in the subcutaneous soft tissues. Joint: No significant degenerative changes. No osseous erosions. Fluid collections measuring up to 3.7 cm in the lateral deltoid muscle with overlying soft tissue edema. Findings are concerning for abscesses with possible pyomyositis. No subcutaneous gas. No CT evidence of osteomyelitis. Abnormal appearing left axillary lymph node and additional soft tissue density over the left subcutaneous breast tissue. Findings are likely reactive from infectious process, however attention on follow-up recommended to confirm resolution.        ASSESSMENT:  Active Hospital Problems    Diagnosis Date Noted    Abscess of arm, left [L02.414] 05/01/2022     Priority: Medium  Constipation, unspecified [K59.00] 09/26/2021     Priority: Medium    BPH (benign prostatic hyperplasia) [N40.0] 03/30/2020     Priority: Medium    Coronary atherosclerosis [I25.10] 03/30/2020     Priority: Medium    S/P AVR (aortic valve replacement) [Z95.2] 05/25/2017     Priority: Medium    Major depressive disorder, recurrent ([facility] [F33.9] 02/16/2020    Atrial fibrillation ([facility] [I48.91] 02/14/2020    Bipolar 1 disorder ([facility] [F31.9] 09/10/2019    HTN (hypertension) [I10] 04/23/2014    GERD (gastroesophageal reflux disease) [K21.9] 04/23/2014       57 y.o. male with left upper extremity/deltoid hematoma. Plan:  1. Continue medical management and supportive care per primary team  2. No surgical intervention indicated at this time. Patient states that overall his left upper extremity feels better. There is ecchymotic skin changes but no signs of cellulitis or infection. Recommend ice area or heat for symptomatic relief. 3. Patient is appropriate for discharge from a surgery standpoint. 4. Follow-up with  as outpatient. Electronically signed by Delfino Triplett MD  on 5/2/2022 at 5:30 AM     Attending Physician Statement  I have discussed the case, including pertinent history and exam findings with the resident. I agree with the assessment, plan and orders as documented by the resident. Patient seen and examined. Agree with above. No evidence of an infected hematoma at this point. This should gradually resolve with time. The patient can be discharged from the surgery standpoint. Follow-up with Dr. Jaswant Patino in 7 to 10 days.

## 2022-05-02 NOTE — DISCHARGE INSTR - COC
Continuity of Care Form    Patient Name: Micki Francis   :  1960  MRN:  [id number]    Admit date:  2022  Discharge date:  ***    Code Status Order: Full Code   Advance Directives:      Admitting Physician:  Yvonne Wang DO  PCP: Deandra Perales    Discharging Nurse: [facility] Unit/Room#: 2103/2103-01  Discharging Unit Phone Number: ***    Emergency Contact:   Extended Emergency Contact Information  Primary Emergency Contact: Yaa Elizondo  Address: [place] [facility] Vince Kocher [address] Phone: [phone]  Mobile Phone: [phone]  Relation: Brother/Sister    Past Surgical History:  Past Surgical History:   Procedure Laterality Date    ABDOMEN SURGERY      APPENDECTOMY      CARDIAC SURGERY  7/14/15    Median Sternotomy, Repair of ascending aorti aneurysm, stentless valve placement    CARDIAC SURGERY  2015    Median stenotomy, repair of ascending aorti aneurysm, stentless valve placement     COLONOSCOPY  2021    DILATATION, ESOPHAGUS      HERNIA REPAIR      NECK SURGERY  2013    TONSILLECTOMY      UPPER GASTROINTESTINAL ENDOSCOPY  2021       Immunization History:   Immunization History   Administered Date(s) Administered    Tdap (Boostrix, Adacel) 2020       Active Problems:  Patient Active Problem List   Diagnosis Code    Light headed R42    Recurrent major depression in partial remission ([facility] [address] F33.41    Primary hypertension I10    Neck pain M54.2    GERD (gastroesophageal reflux disease) K21.9    Degenerative disc disease, cervical M50.30    Hyponatremia E87.1    Hypercalcemia E83.52    Aortic aneurysm (HCC) I71.9    MGUS (monoclonal gammopathy of unknown significance) D47.2    Chronic renal impairment, stage 3 (moderate) (HCC) N18.30    Depression F32. A    Crack cocaine use F14.90    Bipolar 1 disorder (HCC) F31.9    Altered mental status, unspecified R41.82    YONATHAN (acute kidney injury) ([facility] [address] N17.9    Altered mental status R41.82 Supratherapeutic INR R79.1    Atrial fibrillation (HCC) I48.91    Hypernatremia E87.0    Anemia D64.9    Major depression, recurrent (HCC) F33.9    Major depressive disorder, recurrent (HCC) F33.9    Acute kidney injury superimposed on chronic kidney disease (HCC) N17.9, N18.9    Schizoaffective disorder, bipolar type (HCC) F25.0    Schizoaffective disorder (HCC) F25.9    Hematoma of injection site T80.89XA    BPH (benign prostatic hyperplasia) N40.0    Constipation, unspecified K59.00    Coronary atherosclerosis I25.10    Neurofibromatosis, unspecified (HCC) Q85.00    S/P AVR (aortic valve replacement) Z95.2       Isolation/Infection:   Isolation            No Isolation          Patient Infection Status       None to display            Nurse Assessment:  Last Vital Signs: /72   Pulse 60   Temp 97.6 °F (36.4 °C) (Oral)   Resp 18   Ht 5' 6\" (1.676 m)   Wt 186 lb 6.4 oz (84.6 kg)   SpO2 95%   BMI 30.09 kg/m²     Last documented pain score (0-10 scale): Pain Level: 7  Last Weight:   Wt Readings from Last 1 Encounters:   05/02/22 186 lb 6.4 oz (84.6 kg)     Mental Status:  oriented    IV Access:  - None    Nursing Mobility/ADLs:  Walking   Independent  Transfer  Independent  Bathing  Independent  Dressing  Independent  Toileting  Independent  Feeding  Independent  Med Admin  Independent  Med Delivery   [facility] MED Delivery:[id number]    Wound Care Documentation and Therapy:        Elimination:  Continence: Bowel: {YES / AK:00643}  Bladder: {YES / DT:15630}  Urinary Catheter: None   Colostomy/Ileostomy/Ileal Conduit: No       Date of Last BM: 4/30/22    Intake/Output Summary (Last 24 hours) at 5/2/2022 1112  Last data filed at 5/2/2022 1009  Gross per 24 hour   Intake 1393.96 ml   Output 950 ml   Net 443.96 ml     I/O last 3 completed shifts: In: 8779 [P.O.:300;  I.V.:379.4; IV Piggyback:499.6]  Out: 500 [Urine:500]    Safety Concerns:     None    Impairments/Disabilities:      None    Nutrition Therapy:  Current Nutrition Therapy:   - Oral Diet:  General and Low Sodium (2gm)    Routes of Feeding: Oral  Liquids: No Restrictions  Daily Fluid Restriction: no  Last Modified Barium Swallow with Video (Video Swallowing Test): not done    Treatments at the Time of Hospital Discharge:   Respiratory Treatments: ***  Oxygen Therapy:  is not on home oxygen therapy. Ventilator:    - No ventilator support    Rehab Therapies: N/A  Weight Bearing Status/Restrictions: No weight bearing restrictions  Other Medical Equipment (for information only, NOT a DME order): Other Treatments:   1) SN for Assessment  2) SN for Medication Teaching & Compliance    Patient's personal belongings (please select all that are sent with patient):  {[facility] DME Belongings:[id number]}    RN SIGNATURE:  Electronically signed by Rola Wright RN on 5/2/22 at 3:24 PM EDT    CASE MANAGEMENT/SOCIAL WORK SECTION    Inpatient Status Date: Obs    Readmission Risk Assessment Score:  Readmission Risk              Risk of Unplanned Readmission:  0           Discharging to Facility/ Agency   Name: [facility]  Address:  Phone: [phone]  Fax: [phone]    Dialysis Facility (if applicable)   Name:  Address:  Dialysis Schedule:  Phone:  Fax:    / signature: Electronically signed by Sweetie Guzmán RN on 5/2/22 at 1:48 PM EDT    PHYSICIAN SECTION    Prognosis: Good    Condition at Discharge: Stable    Rehab Potential (if transferring to Rehab): Good    Recommended Labs or Other Treatments After Discharge: PT/OT eval and treat, RN assessment and treat     Physician Certification: I certify the above information and transfer of Virginia Vences  is necessary for the continuing treatment of the diagnosis listed and that he requires Home Care for less 30 days.      Update Admission H&P: No change in H&P    PHYSICIAN SIGNATURE:  Electronically signed by Uriel Dawkins DO on 5/2/22 at 1:58 PM EDT

## 2022-05-02 NOTE — FLOWSHEET NOTE
[facility] NOTE    Room # 2103/2103-01   Name: Julio Espitia              Reason for visit: Routine    I visited the patient. Admit Date & Time: 5/1/2022  1:39 PM    Assessment:  Julio Espitia is a 57 y.o. male. Upon entering the room patient states well, states no major needs or prayers. Patient kindly declines Spiritual Care visit.  leaves prayer card for patient for possible follow up as needed. Intervention:   provided a ministry presence. Outcome:  Patient grateful for the visit. Plan:  Chaplains will remain available to offer spiritual and emotional support as needed. Electronically signed by Chaplain Yesy, on 5/2/2022 at 2:57 PM.  Drake       05/02/22 5362   Encounter Summary   Service Provided For: Patient   Referral/Consult From: [facility]   Support System Unknown   Last Encounter  05/02/22   Complexity of Encounter Low   Begin Time 1422   End Time  1425   Total Time Calculated 3 min   Encounter    Type Initial Screen/Assessment   Assessment/Intervention/Outcome   Assessment Passive   Intervention Discussed illness injury and its impact   Outcome Refused/Declined

## 2022-05-02 NOTE — CARE COORDINATION
Discharge Planning:    Writer spoke with Tyrone Lauren from [facility] in [facility]. and she confirmed patient is on service for SN. They will accept patient at discharge. Will need to call to inform of discharge and fax MARLO.     [facility] in [address]: [phone]  F: [phone]

## 2022-05-02 NOTE — CARE COORDINATION
Discharge Planning:    Notified [facility] [place]. of patient's d/c and will fax MARLO once completed.

## 2022-05-02 NOTE — DISCHARGE SUMMARY
[facility]  Office: [address], DO, Francisco Reavesf, DO, Andi Mcrae, DO, Yazmin Singh Blood, DO, Lee Sandoval MD, Richa Marte MD, Jaime Hallman MD, Monie Vera MD, Kishore Dailey MD, Sendy Díaz MD, Allison Pompa MD, Dionisio Delvalley, DO, Robin Harness, DO, Christie Choudhury MD,  Adam Fletcher, DO, Rashid Parks MD, Megan Dawn MD, Rachel Goldberg MD, Jc Kearns DO, Aydin Stephens MD, Lia Colon MD, Ajith [facility], [facility], CNP, Ji Johnson, CNP, Gerilyn Cockayne, CNP, Tomasa Yanes, CNP, Camillo Schaumann, CNP, Rubén Gordillo PA-C, Tank Sandoval, [facility], Dot Easton, CNP, Tony Magaña, CNP, Karishma Caldwell, CNP, Clark Ríos, CNS, Gustavo Medrano, [facility], Dax Jackman, CNP, Kaylin Diaz, CNP, Yesenia Salcido, [facility]    Discharge Summary     Patient ID: Kady Godwin  :  1960   MRN: [id number]     ACCOUNT:  [de-identified]   Patient's PCP: Nba Malik  Admit Date: 2022   Discharge Date: 2022   Length of Stay: 0  Code Status:  Full Code  Admitting Physician: Flaquito Lino DO  Discharge Physician: LOGAN Trujillo NP     Active Discharge Diagnoses:     Hospital Problem Lists:  Principal Problem:    Hematoma of injection site  Active Problems:    BPH (benign prostatic hyperplasia)    Constipation, unspecified    Coronary atherosclerosis    S/P AVR (aortic valve replacement)    Primary hypertension    GERD (gastroesophageal reflux disease)    Bipolar 1 disorder (HCC)    Atrial fibrillation ([facility] [address]    Major depressive disorder, recurrent ([facility] [address]  Resolved Problems:    * No resolved hospital problems.  *      Admission Condition:  stable     Discharged Condition: stable    Hospital Stay:     Hospital Course:  Kady Godwin is a 57 y.o. male with a history of A-fib,AVR (on coumadin), bipolar 1 disorder, hypertension, depression and substance abuse who was admitted for the management of  Hematoma of injection site , presented to ER with left arm/shoulder pain. The patient reports getting a benadryl shot into his left shoulder 2 weeks ago for severe itching. The arm became sore and then progressively started to swell and become more painful. Initial imaging was concerning for abscess vs hematoma and he was started on IV antibiotics, pain control and general surgery was consulted for further evaluation. Patient was afebrile without leukocytosis or cellulitic changes. After further work up abscess was ruled out and it was determined to be a hematoma. Antibiotics have been discontinued and his pain has improved . Significant therapeutic interventions: see above     Significant Diagnostic Studies:   Labs / Micro:  CBC:   Lab Results   Component Value Date    WBC 8.5 05/02/2022    RBC 4.14 05/02/2022    RBC 4.89 01/14/2012    HGB 12.4 05/02/2022    HCT 39.2 05/02/2022    MCV 94.7 05/02/2022    MCH 30.0 05/02/2022    MCHC 31.6 05/02/2022    RDW 14.6 05/02/2022     05/02/2022     01/14/2012     BMP:    Lab Results   Component Value Date    GLUCOSE 94 05/02/2022    GLUCOSE 88 01/14/2012     05/02/2022    K 5.2 05/02/2022     05/02/2022    CO2 29 05/02/2022    ANIONGAP 7 05/02/2022    BUN 18 05/02/2022    CREATININE 1.02 05/02/2022    BUNCRER 18 05/02/2022    CALCIUM 8.3 05/02/2022    LABGLOM >60 05/02/2022    GFRAA >60 05/02/2022    GFR      05/02/2022     PT/INR:    Lab Results   Component Value Date    PROTIME 30.9 05/02/2022    INR 3.0 05/02/2022     Radiology:  XR SHOULDER LEFT (MIN 2 VIEWS)    Result Date: 5/1/2022  No acute left shoulder bony or joint abnormality. Probable mild soft tissue/muscular fullness lateral to the proximal humerus as compared to the previous study without radiopaque FB, soft tissue gas or other abnormality.      CT SHOULDER LEFT W CONTRAST    Result Date: 5/1/2022  Fluid collections measuring up to 3.7 cm in the lateral deltoid muscle with overlying soft tissue edema. Findings are concerning for abscesses with possible pyomyositis. No subcutaneous gas. No CT evidence of osteomyelitis. Abnormal appearing left axillary lymph node and additional soft tissue density over the left subcutaneous breast tissue. Findings are likely reactive from infectious process, however attention on follow-up recommended to confirm resolution. Consultations:    Consults:     Final Specialist Recommendations/Findings:   IP CONSULT TO INTERNAL MEDICINE  PHARMACY TO DOSE VANCOMYCIN  PHARMACY TO DOSE WARFARIN  PHARMACY TO DOSE VANCOMYCIN  IP CONSULT TO GENERAL SURGERY      The patient was seen and examined on day of discharge and this discharge summary is in conjunction with any daily progress note from day of discharge. Discharge plan:     Disposition: Home    Physician Follow Up:   Stephenie Gaspar MD  [address], [address] [facility] [address]    Schedule an appointment as soon as possible for a visit in 1 week  Follow up appointment    DALE Garrett in [address] [place] [address]  [address]  [phone]    In 1 week         Requiring Further Evaluation/Follow Up POST HOSPITALIZATION/Incidental Findings: outpatient follow up with general surgery     Diet: cardiac diet    Activity: As tolerated    Instructions to Patient: take all mediations as prescribed, follow up with healthcare providers as discussed, may apply warm/cold compresses to hematoma site as needed for discomfort for 15 min intervals     Discharge Medications:      Medication List      CHANGE how you take these medications    melatonin ER 1 MG Tbcr tablet  Take 1 tablet by mouth nightly as needed (insomnia)  What changed: how much to take     warfarin 2 MG tablet  Commonly known as: COUMADIN  Take 0.5 tablets by mouth four times a week Indications: takes 1 mg tabs tuesday and saturday, takes 2mg tabs mon, wed thurs fri sun  What changed:   · how much to take  · when to take this  · additional instructions        CONTINUE taking these medications    alendronate 70 MG tablet  Commonly known as: FOSAMAX     aspirin 81 MG chewable tablet  Take 1 tablet by mouth daily     atorvastatin 20 MG tablet  Commonly known as: LIPITOR  Take 1 tablet by mouth nightly     cetirizine 10 MG tablet  Commonly known as: ZYRTEC     FeroSul 325 (65 Fe) MG tablet  Generic drug: ferrous sulfate     folic acid 1 MG tablet  Commonly known as: FOLVITE     furosemide 20 MG tablet  Commonly known as: LASIX     gabapentin 300 MG capsule  Commonly known as: NEURONTIN     Lexapro 5 MG tablet  Generic drug: escitalopram     midodrine 10 MG tablet  Commonly known as: PROAMATINE     OXcarbazepine 300 MG tablet  Commonly known as: TRILEPTAL     pantoprazole sodium 40 MG Pack packet  Commonly known as: PROTONIX     rOPINIRole 2 MG tablet  Commonly known as: REQUIP     senna 8.6 MG tablet  Commonly known as: SENOKOT     tamsulosin 0.4 MG capsule  Commonly known as: FLOMAX     VITAMIN B 12 PO     Vitamin D3 50 MCG (2000 UT) Caps            No discharge procedures on file. Time Spent on discharge is  31 mins in patient examination, evaluation, counseling as well as medication reconciliation, prescriptions for required medications, discharge plan and follow up. Electronically signed by   LOGAN Curry NP  5/2/2022  1:01 PM      Thank you Dr. Javid Mcallister for the opportunity to be involved in this patient's care.

## 2022-05-02 NOTE — PROGRESS NOTES
RN spoke with Vee KIM from [facility] care. Phone number [phone]. RN reviewed home medication list with Vee. Nurse states patient recieves his prepackaged home meds [facility] and his Psych meds through [address] [place] [address]. 2055 RN reviewed updated list with Livia Shannon NP and pain medication. New orders received.

## 2022-05-02 NOTE — PROGRESS NOTES
[address] [facility] Pharmacokinetic Monitoring Service - Vancomycin    Consulting Provider: Dr. Chacho Lopez   Indication: SSTI (per surgery not infected)  Target Concentration: Goal trough of 10-15 mg/L and AUC/LUPE <500 mg*hr/L  Day of Therapy: 2  Additional Antimicrobials: none    Pertinent Laboratory Values: Wt Readings from Last 1 Encounters:   05/02/22 186 lb 6.4 oz (84.6 kg)     Temp Readings from Last 1 Encounters:   05/02/22 97.6 °F (36.4 °C) (Oral)     Estimated Creatinine Clearance: 77 mL/min (based on SCr of 1.02 mg/dL). Recent Labs     05/01/22  1422 05/02/22  0444   CREATININE 0.92 1.02   WBC 9.5 8.5         MRSA Nasal Swab: N/A. Non-respiratory infection.     Recent vancomycin administrations                     vancomycin (VANCOCIN) 2,000 mg in dextrose 5 % 500 mL IVPB (mg) 2,000 mg New Bag 05/01/22 1841                        Plan:  Current dosing regimen is  Vancomycin 1500mg IV q 24 hours  Continue current dose  Repeat vancomycin concentration ordered for 5/3 @ 0600   Pharmacy will continue to monitor patient and adjust therapy as indicated    Thank you for the consult,  CYNTHIA Hubbard [facility] - [place]  5/2/2022 10:02 AM

## 2022-05-06 LAB
CULTURE: NORMAL
CULTURE: NORMAL
Lab: NORMAL
Lab: NORMAL
SPECIMEN DESCRIPTION: NORMAL
SPECIMEN DESCRIPTION: NORMAL

## 2022-06-12 ENCOUNTER — HOSPITAL ENCOUNTER (INPATIENT)
Age: 62
LOS: 4 days | Discharge: HOME | DRG: 751 | End: 2022-06-16
Attending: STUDENT IN AN ORGANIZED HEALTH CARE EDUCATION/TRAINING PROGRAM | Admitting: PSYCHIATRY & NEUROLOGY
Payer: COMMERCIAL

## 2022-06-12 DIAGNOSIS — F32.A DEPRESSION WITH SUICIDAL IDEATION: Primary | ICD-10-CM

## 2022-06-12 DIAGNOSIS — R45.851 DEPRESSION WITH SUICIDAL IDEATION: Primary | ICD-10-CM

## 2022-06-12 LAB
ABSOLUTE EOS #: 0.2 K/UL (ref 0–0.4)
ABSOLUTE LYMPH #: 0.8 K/UL (ref 1–4.8)
ABSOLUTE MONO #: 0.6 K/UL (ref 0.1–1.3)
ALBUMIN SERPL-MCNC: 4.1 G/DL (ref 3.5–5.2)
ALP BLD-CCNC: 151 U/L (ref 40–129)
ALT SERPL-CCNC: 23 U/L (ref 5–41)
ANION GAP SERPL CALCULATED.3IONS-SCNC: 10 MMOL/L (ref 9–17)
AST SERPL-CCNC: 15 U/L
BASOPHILS # BLD: 1 % (ref 0–2)
BASOPHILS ABSOLUTE: 0.1 K/UL (ref 0–0.2)
BILIRUB SERPL-MCNC: 0.23 MG/DL (ref 0.3–1.2)
BUN BLDV-MCNC: 21 MG/DL (ref 8–23)
CALCIUM SERPL-MCNC: 9 MG/DL (ref 8.6–10.4)
CHLORIDE BLD-SCNC: 102 MMOL/L (ref 98–107)
CO2: 23 MMOL/L (ref 20–31)
CREAT SERPL-MCNC: 0.9 MG/DL (ref 0.7–1.2)
EOSINOPHILS RELATIVE PERCENT: 2 % (ref 0–4)
ETHANOL PERCENT: <0.01 %
ETHANOL: <10 MG/DL
GFR AFRICAN AMERICAN: >60 ML/MIN
GFR NON-AFRICAN AMERICAN: >60 ML/MIN
GFR SERPL CREATININE-BSD FRML MDRD: ABNORMAL ML/MIN/{1.73_M2}
GLUCOSE BLD-MCNC: 93 MG/DL (ref 70–99)
HCT VFR BLD CALC: 37.2 % (ref 41–53)
HEMOGLOBIN: 12.2 G/DL (ref 13.5–17.5)
INR BLD: 1.6
LYMPHOCYTES # BLD: 11 % (ref 24–44)
MCH RBC QN AUTO: 30.4 PG (ref 26–34)
MCHC RBC AUTO-ENTMCNC: 32.8 G/DL (ref 31–37)
MCV RBC AUTO: 92.5 FL (ref 80–100)
MONOCYTES # BLD: 8 % (ref 1–7)
PDW BLD-RTO: 16.6 % (ref 11.5–14.9)
PLATELET # BLD: 235 K/UL (ref 150–450)
PMV BLD AUTO: 7.1 FL (ref 6–12)
POTASSIUM SERPL-SCNC: 4.3 MMOL/L (ref 3.7–5.3)
PROTHROMBIN TIME: 19.3 SEC (ref 11.8–14.6)
RBC # BLD: 4.02 M/UL (ref 4.5–5.9)
SEG NEUTROPHILS: 78 % (ref 36–66)
SEGMENTED NEUTROPHILS ABSOLUTE COUNT: 5.7 K/UL (ref 1.3–9.1)
SODIUM BLD-SCNC: 135 MMOL/L (ref 135–144)
TOTAL PROTEIN: 6.6 G/DL (ref 6.4–8.3)
WBC # BLD: 7.3 K/UL (ref 3.5–11)

## 2022-06-12 PROCEDURE — 85610 PROTHROMBIN TIME: CPT

## 2022-06-12 PROCEDURE — 80053 COMPREHEN METABOLIC PANEL: CPT

## 2022-06-12 PROCEDURE — 99285 EMERGENCY DEPT VISIT HI MDM: CPT

## 2022-06-12 PROCEDURE — 36415 COLL VENOUS BLD VENIPUNCTURE: CPT

## 2022-06-12 PROCEDURE — 85025 COMPLETE CBC W/AUTO DIFF WBC: CPT

## 2022-06-12 PROCEDURE — G0480 DRUG TEST DEF 1-7 CLASSES: HCPCS

## 2022-06-12 PROCEDURE — 6370000000 HC RX 637 (ALT 250 FOR IP): Performed by: PSYCHIATRY & NEUROLOGY

## 2022-06-12 PROCEDURE — 1240000000 HC EMOTIONAL WELLNESS R&B

## 2022-06-12 RX ORDER — LORAZEPAM 2 MG/ML
2 INJECTION INTRAMUSCULAR EVERY 6 HOURS PRN
Status: DISCONTINUED | OUTPATIENT
Start: 2022-06-12 | End: 2022-06-16 | Stop reason: HOSPADM

## 2022-06-12 RX ORDER — TRAZODONE HYDROCHLORIDE 50 MG/1
50 TABLET ORAL NIGHTLY PRN
Status: DISCONTINUED | OUTPATIENT
Start: 2022-06-12 | End: 2022-06-13

## 2022-06-12 RX ORDER — DIPHENHYDRAMINE HYDROCHLORIDE 50 MG/ML
50 INJECTION INTRAMUSCULAR; INTRAVENOUS EVERY 6 HOURS PRN
Status: DISCONTINUED | OUTPATIENT
Start: 2022-06-12 | End: 2022-06-16 | Stop reason: HOSPADM

## 2022-06-12 RX ORDER — HALOPERIDOL 5 MG
5 TABLET ORAL EVERY 6 HOURS PRN
Status: DISCONTINUED | OUTPATIENT
Start: 2022-06-12 | End: 2022-06-16 | Stop reason: HOSPADM

## 2022-06-12 RX ORDER — POLYETHYLENE GLYCOL 3350 17 G/17G
17 POWDER, FOR SOLUTION ORAL DAILY PRN
Status: DISCONTINUED | OUTPATIENT
Start: 2022-06-12 | End: 2022-06-16 | Stop reason: HOSPADM

## 2022-06-12 RX ORDER — LORAZEPAM 1 MG/1
2 TABLET ORAL EVERY 6 HOURS PRN
Status: DISCONTINUED | OUTPATIENT
Start: 2022-06-12 | End: 2022-06-16 | Stop reason: HOSPADM

## 2022-06-12 RX ORDER — MAGNESIUM HYDROXIDE/ALUMINUM HYDROXICE/SIMETHICONE 120; 1200; 1200 MG/30ML; MG/30ML; MG/30ML
30 SUSPENSION ORAL EVERY 6 HOURS PRN
Status: DISCONTINUED | OUTPATIENT
Start: 2022-06-12 | End: 2022-06-16 | Stop reason: HOSPADM

## 2022-06-12 RX ORDER — HYDROXYZINE 50 MG/1
50 TABLET, FILM COATED ORAL 3 TIMES DAILY PRN
Status: DISCONTINUED | OUTPATIENT
Start: 2022-06-12 | End: 2022-06-16 | Stop reason: HOSPADM

## 2022-06-12 RX ORDER — ACETAMINOPHEN 500 MG
500 TABLET ORAL EVERY 6 HOURS PRN
COMMUNITY
Start: 2022-02-25 | End: 2023-02-25

## 2022-06-12 RX ORDER — ACETAMINOPHEN 325 MG/1
650 TABLET ORAL EVERY 6 HOURS PRN
Status: DISCONTINUED | OUTPATIENT
Start: 2022-06-12 | End: 2022-06-16 | Stop reason: HOSPADM

## 2022-06-12 RX ORDER — HALOPERIDOL 5 MG/ML
5 INJECTION INTRAMUSCULAR EVERY 6 HOURS PRN
Status: DISCONTINUED | OUTPATIENT
Start: 2022-06-12 | End: 2022-06-16 | Stop reason: HOSPADM

## 2022-06-12 RX ADMIN — HYDROXYZINE HYDROCHLORIDE 50 MG: 50 TABLET, FILM COATED ORAL at 21:27

## 2022-06-12 RX ADMIN — ACETAMINOPHEN 650 MG: 325 TABLET ORAL at 21:27

## 2022-06-12 ASSESSMENT — ENCOUNTER SYMPTOMS
RHINORRHEA: 0
VOMITING: 0
ABDOMINAL PAIN: 0
NAUSEA: 0
SHORTNESS OF BREATH: 0
COUGH: 0

## 2022-06-12 ASSESSMENT — SLEEP AND FATIGUE QUESTIONNAIRES
SLEEP PATTERN: DISTURBED/INTERRUPTED SLEEP
AVERAGE NUMBER OF SLEEP HOURS: 6
DO YOU USE A SLEEP AID: NO
DO YOU HAVE DIFFICULTY SLEEPING: YES

## 2022-06-12 ASSESSMENT — PAIN SCALES - GENERAL: PAINLEVEL_OUTOF10: 3

## 2022-06-12 ASSESSMENT — PATIENT HEALTH QUESTIONNAIRE - PHQ9: SUM OF ALL RESPONSES TO PHQ QUESTIONS 1-9: 11

## 2022-06-12 ASSESSMENT — PAIN - FUNCTIONAL ASSESSMENT: PAIN_FUNCTIONAL_ASSESSMENT: NONE - DENIES PAIN

## 2022-06-12 ASSESSMENT — LIFESTYLE VARIABLES
HOW MANY STANDARD DRINKS CONTAINING ALCOHOL DO YOU HAVE ON A TYPICAL DAY: PATIENT DECLINED
HOW OFTEN DO YOU HAVE A DRINK CONTAINING ALCOHOL: NEVER

## 2022-06-12 ASSESSMENT — PAIN DESCRIPTION - LOCATION: LOCATION: LEG

## 2022-06-12 NOTE — ED PROVIDER NOTES
[facility]  Emergency Department Encounter  Emergency Medicine Physician     Pt Name: Nataly Love  MRN: [id number]  [place] 1960  Date of evaluation: 6/12/22  PCP:  No primary care provider on file. CHIEF COMPLAINT       Chief Complaint   Patient presents with    Suicidal       HISTORY OF PRESENT ILLNESS  (Location/Symptom, Timing/Onset, Context/Setting, Quality, Duration, Modifying Factors, Severity.)    Nataly Love is a 57 y.o. male who presents with suicidality. Patient that states that has been having worsening thoughts of suicide over the past several days. He states that he now has a plan to go purchase a gun as soon as possible and shoot himself in the head. Patient states that he has a history of suicidal attempt several years ago with an overdose. He states that he is not homicidal.  He denies any alcohol use. He does endorse some crack/cocaine use yesterday. Denies any illicit substance use today however. States that otherwise he has been compliant with all of his medications, both psychiatric and medical.  He states that the people in his life especially the people that he has been living with have been \"pushing him over the edge\". PAST MEDICAL / SURGICAL / SOCIAL / FAMILY HISTORY    has a past medical history of AMS (altered mental status), Atrial fibrillation ([address] Bipolar 1 disorder ([address] Depression, Hypertension, Neck pain, Neurofibromatosis ([address] Patient in clinical research study, and Twitching.     has a past surgical history that includes hernia repair; Neck surgery (2013); Abdomen surgery; Tonsillectomy; Appendectomy; Dilatation, esophagus; Cardiac surgery (7/14/15); Cardiac surgery (07/14/2015); Colonoscopy (01/28/2021); and Upper gastrointestinal endoscopy (01/28/2021).     Social History     Socioeconomic History    Marital status: Single     Spouse name: Not on file    Number of children: Not on file    Years of education: Not on file    Highest education level: Not on file   Occupational History    Not on file   Tobacco Use    Smoking status: Never Smoker    Smokeless tobacco: Never Used   Substance and Sexual Activity    Alcohol use: Yes     Comment: last drank 6yrs ago    Drug use: Yes     Types: Cocaine     Comment: pt states he used today    Sexual activity: Not on file   Other Topics Concern    Not on file   Social History Narrative    Not on file     Social Determinants of Health     Financial Resource Strain:     Difficulty of Paying Living Expenses: Not on file   Food Insecurity:     Worried About Running Out of Food in the Last Year: Not on file    Sameer of Food in the Last Year: Not on file   Transportation Needs:     Lack of Transportation (Medical): Not on file    Lack of Transportation (Non-Medical):  Not on file   Physical Activity:     Days of Exercise per Week: Not on file    Minutes of Exercise per Session: Not on file   Stress:     Feeling of Stress : Not on file   Social Connections:     Frequency of Communication with Friends and Family: Not on file    Frequency of Social Gatherings with Friends and Family: Not on file    Attends Hinduism Services: Not on file    Active Member of [address] [place] [id number] or Organizations: Not on file    Attends Club or Organization Meetings: Not on file    Marital Status: Not on file   Intimate Partner Violence:     Fear of Current or Ex-Partner: Not on file    Emotionally Abused: Not on file    Physically Abused: Not on file    Sexually Abused: Not on file   Housing Stability:     Unable to Pay for Housing in the Last Year: Not on file    Number of [place] in the Last Year: Not on file    Unstable Housing in the Last Year: Not on file       Family History   Problem Relation Age of Onset    Coronary Art Dis Mother     Hypertension Mother     Hypertension Father     Cancer Father         blood       Allergies:    Oxycodone, Fish-derived products, Ibuprofen, Lenette Bibber hcl], Lithium, Lurasidone, Quetiapine, and Seroquel [quetiapine fumarate]    Home Medications:  Prior to Admission medications    Medication Sig Start Date End Date Taking? Authorizing Provider   Cholecalciferol (VITAMIN D3) 50 MCG (2000 UT) CAPS Take 1 capsule by mouth daily (with breakfast)    Historical Provider, MD   OXcarbazepine (TRILEPTAL) 300 MG tablet Take 300 mg by mouth 2 times daily    Historical Provider, MD   pantoprazole sodium (PROTONIX) 40 MG PACK packet Take 40 mg by mouth every morning (before breakfast)    Historical Provider, MD   rOPINIRole (REQUIP) 2 MG tablet Take 0.5 mg by mouth in the morning and at bedtime     Historical Provider, MD   escitalopram (LEXAPRO) 5 MG tablet Take 15 mg by mouth daily     Historical Provider, MD   FEROSUL 325 (65 Fe) MG tablet Take 325 mg by mouth nightly  7/7/21   Historical Provider, MD   gabapentin (NEURONTIN) 300 MG capsule Take 900 mg by mouth 3 times daily.  Pt. States he takes 900 mg. TID    Historical Provider, MD   aspirin 81 MG chewable tablet Take 1 tablet by mouth daily 7/8/20   Nanci Burch MD   atorvastatin (LIPITOR) 20 MG tablet Take 1 tablet by mouth nightly 7/7/20   Nanci Burch MD   melatonin ER 1 MG TBCR tablet Take 1 tablet by mouth nightly as needed (insomnia)  Patient taking differently: Take 6 mg by mouth nightly as needed (insomnia)  7/7/20 3/31/22  Nanci Burch MD   midodrine (PROAMATINE) 10 MG tablet Take 10 mg by mouth 3 times daily    Historical Provider, MD   senna (SENOKOT) 8.6 MG tablet Take 2 tablets by mouth daily     Historical Provider, MD   folic acid (FOLVITE) 1 MG tablet Take 1 mg by mouth daily    Historical Provider, MD   furosemide (LASIX) 20 MG tablet Take 20 mg by mouth daily    Historical Provider, MD   warfarin (COUMADIN) 2 MG tablet Take 0.5 tablets by mouth four times a week Indications: takes 1 mg tabs tuesday and saturday, takes 2mg tabs mon, wed thurs fri sun  Patient taking differently: Take 2 mg by mouth See Admin Instructions 1 mg Mondays, Thursdays; 2mg all other days  Managed by Wilfrido Miła [address] 9/12/19   [facility], APRN - CNP   tamsulosin (FLOMAX) 0.4 MG capsule Take 0.4 mg by mouth daily    Historical Provider, MD   Cyanocobalamin (VITAMIN B 12 PO) Take 1,000 mcg by mouth daily    Historical Provider, MD   alendronate (FOSAMAX) 70 MG tablet Take 70 mg by mouth every 7 days Patient takes on mondays. Historical Provider, MD   cetirizine (ZYRTEC) 10 MG tablet Take 10 mg by mouth daily    Historical Provider, MD       REVIEW OF SYSTEMS    (2-9 systems for level 4, 10 or more for level 5)    Review of Systems   Constitutional: Negative for chills, fatigue and fever. HENT: Negative for congestion and rhinorrhea. Respiratory: Negative for cough and shortness of breath. Cardiovascular: Negative for chest pain and palpitations. Gastrointestinal: Negative for abdominal pain, nausea and vomiting. Neurological: Negative for light-headedness and headaches. Psychiatric/Behavioral: Positive for sleep disturbance and suicidal ideas. Negative for confusion. The patient is nervous/anxious. All other systems reviewed and are negative. PHYSICAL EXAM   (up to 7 for level 4, 8 or more for level 5)    INITIAL VITALS:   ED Triage Vitals [06/12/22 1601]   BP Temp Temp Source Heart Rate Resp SpO2 Height Weight   (!) 148/93 96.9 °F (36.1 °C) Temporal 71 18 97 % -- 190 lb (86.2 kg)       Physical Exam  Vitals and nursing note reviewed. Constitutional:       General: He is not in acute distress. Appearance: Normal appearance. He is not ill-appearing. Cardiovascular:      Rate and Rhythm: Normal rate. Rhythm irregular. Pulses: Normal pulses. Radial pulses are 2+ on the right side and 2+ on the left side. Posterior tibial pulses are 2+ on the right side and 2+ on the left side. Heart sounds: Normal heart sounds. No murmur heard.       Pulmonary:      Effort: Pulmonary effort is normal. No respiratory distress. Breath sounds: Normal breath sounds. No decreased breath sounds, wheezing, rhonchi or rales. Abdominal:      Palpations: Abdomen is soft. Tenderness: There is no abdominal tenderness. Musculoskeletal:      Right lower leg: No edema. Left lower leg: No edema. Skin:     General: Skin is warm and dry. Capillary Refill: Capillary refill takes less than 2 seconds. Neurological:      General: No focal deficit present. Mental Status: He is alert and oriented to person, place, and time. GCS: GCS eye subscore is 4. GCS verbal subscore is 5. GCS motor subscore is 6. Cranial Nerves: No dysarthria or facial asymmetry. Psychiatric:         Attention and Perception: Attention normal. He does not perceive auditory or visual hallucinations. Mood and Affect: Affect normal. Mood is anxious and depressed. Speech: Speech normal.         Behavior: Behavior normal. Behavior is cooperative. Thought Content: Thought content includes suicidal ideation. Thought content does not include homicidal ideation. Thought content includes suicidal plan. Thought content does not include homicidal plan. Cognition and Memory: Memory is not impaired. DIFFERENTIAL  DIAGNOSIS   PLAN (LABS / IMAGING / EKG):  Orders Placed This Encounter   Procedures    CBC with Auto Differential    Comprehensive Metabolic Panel    Ethanol    Urine Drug Screen    Protime-INR    ADULT DIET; Regular    CONSTANT OBSERVATION    Suicide precautions       MEDICATIONS ORDERED:  No orders of the defined types were placed in this encounter.         DIAGNOSTIC RESULTS / EMERGENCYDEPARTMENT COURSE / MDM   LABS:  Labs Reviewed   CBC WITH AUTO DIFFERENTIAL - Abnormal; Notable for the following components:       Result Value    RBC 4.02 (*)     Hemoglobin 12.2 (*)     Hematocrit 37.2 (*)     RDW 16.6 (*)     Seg Neutrophils 78 (*)     Lymphocytes 11 (*) Monocytes 8 (*)     Absolute Lymph # 0.80 (*)     All other components within normal limits   COMPREHENSIVE METABOLIC PANEL - Abnormal; Notable for the following components:    Alkaline Phosphatase 151 (*)     Total Bilirubin 0.23 (*)     All other components within normal limits   PROTIME-INR - Abnormal; Notable for the following components:    Protime 19.3 (*)     All other components within normal limits   ETHANOL   URINE DRUG SCREEN       RADIOLOGY:  No results found. Impression:  Patient presents with acute suicidality. States that symptoms been worsening over the past few days. States the people in his life have been causing him significant stress. States that he plans to get a gun as soon as possible and shoot himself in the head. Denies any chest pain or palpitations. States he does have a history of atrial fibrillation but has been taking his Coumadin as prescribed. We will check INR as well as DIRK labs. We will plan for admission to the [facility]. EMERGENCY DEPARTMENT COURSE:  ED Course as of 06/12/22 2009   [place] Jun 12, 2022 2007 Patient medically cleared for admission to the [facility] [AP]      ED Course User Index  [AP] David Lopez DO       PROCEDURES:  none    CONSULTS:  None    CRITICAL CARE:  There was a high probability of clinically significant/life threatening deterioration in this patient's condition which required my urgent intervention. Total critical care time was 10 minutes. This excludes any time for separately reportable procedures. FINAL IMPRESSION     1. Depression with suicidal ideation          DISPOSITION / PLAN   DISPOSITION Decision To Admit 06/12/2022 04:33:12 PM      PATIENT REFERRED TO:  No follow-up provider specified.     DISCHARGE MEDICATIONS:  New Prescriptions    No medications on file       David Lopez DO  Emergency Medicine Attending    (Please note that portions of this note were completed with a voice recognition program.  Efforts were made to edit the dictations but occasionally words are mis-transcribed.)          Kesha Davenport DO  06/12/22 2009

## 2022-06-12 NOTE — ED NOTES
Provisional Diagnosis:   Hx of Schizoaffective Disorder     Psychosocial and Contextual Factors:   Patient has housing issues. Patient has relationship issues. (homelessness, barriers to treatment)    C-SSRS Summary:      Patient: X  Family:   Agency:       Present Suicidal Behavior: X    Verbal:  Patient reports suicidal ideations with a plan to shoot himself. Attempt:     Past Suicidal Behavior: X    Verbal: Patient reports a history of suicidal ideations. Attempt: Patient reports previous overdose attempt         Substance Abuse: Crack    Self-Injurious/Self-Mutilation: Patient denies. Violence Current or Past: Patient calm and cooperative in the ED. Trauma Identified:   Patient denies. Protective Factors:    Patient has housing. Patient has insurance. Patient has monthly income. Patient is linked with mental health provider. Patient is compliant with medications. Risk Factors:    Patient has previous suicide attempt. Patient has poor coping skills. Clinical Summary:    Patient is a 57year old male that is brought to the ED today via [facility] for suicidal ideations on a voluntary status. Patient reports he called 911 for himself today because he is \"fed up. \" Patient reports he is so upset and agitated by \"people around him\" that he wants to kill himself. Patient reports suicidal ideations with thoughts of shooting himself. Patient reports he does not currently have a gun but can access one. Patient reports previous suicide attempts by overdosing on pills. Patient denies H/I at this time. Patient denies auditory or visual hallucinations. Patient reports he is currently living in [place] [address] and is not happy. Patient reports the other people that live in apartments around him are taking advantage of him. Patient reports they always \"borrow\" or \"take\" his things. Patient reports he tries to tell them no but they will take things anyways.  Patient reports people also come to his apartment at all hours of the night and want to hang out. Patient reports he is trying to move but it is not happening fast enough. Patient has a history of schizoaffective disorder. Patient is linked with [facility] for outpatient mental health services. Patient reports being compliant with all treatment. Patient admits to using crack \"just a hit or two a month. \" Patient reports crack is not an issue for him and it is only used recreationally. Patient is voluntary. Level of Care Disposition:    Patient will be considered for inpatient psychiatric admission upon medical clearance. Report given to [facility] .

## 2022-06-12 NOTE — ED TRIAGE NOTES
Pt states \"Id like to go to the [facility] unit. \" Pt states SI with plan to \"blow my brains out. \"

## 2022-06-13 PROBLEM — F33.2 SEVERE RECURRENT MAJOR DEPRESSION WITHOUT PSYCHOTIC FEATURES (HCC): Status: ACTIVE | Noted: 2022-06-13

## 2022-06-13 LAB
AMPHETAMINE SCREEN URINE: NEGATIVE
BARBITURATE SCREEN URINE: NEGATIVE
BENZODIAZEPINE SCREEN, URINE: NEGATIVE
CANNABINOID SCREEN URINE: NEGATIVE
COCAINE METABOLITE, URINE: POSITIVE
INR BLD: 1.4
METHADONE SCREEN, URINE: NEGATIVE
OPIATES, URINE: NEGATIVE
OXYCODONE SCREEN URINE: NEGATIVE
PHENCYCLIDINE, URINE: NEGATIVE
PROTHROMBIN TIME: 17.4 SEC (ref 11.8–14.6)
TEST INFORMATION: ABNORMAL

## 2022-06-13 PROCEDURE — 6370000000 HC RX 637 (ALT 250 FOR IP): Performed by: PSYCHIATRY & NEUROLOGY

## 2022-06-13 PROCEDURE — APPSS60 APP SPLIT SHARED TIME 46-60 MINUTES: Performed by: PSYCHIATRY & NEUROLOGY

## 2022-06-13 PROCEDURE — 99223 1ST HOSP IP/OBS HIGH 75: CPT | Performed by: PSYCHIATRY & NEUROLOGY

## 2022-06-13 PROCEDURE — 99223 1ST HOSP IP/OBS HIGH 75: CPT | Performed by: INTERNAL MEDICINE

## 2022-06-13 PROCEDURE — 36415 COLL VENOUS BLD VENIPUNCTURE: CPT

## 2022-06-13 PROCEDURE — 80307 DRUG TEST PRSMV CHEM ANLYZR: CPT

## 2022-06-13 PROCEDURE — 1240000000 HC EMOTIONAL WELLNESS R&B

## 2022-06-13 PROCEDURE — 85610 PROTHROMBIN TIME: CPT

## 2022-06-13 RX ORDER — LANOLIN ALCOHOL/MO/W.PET/CERES
1000 CREAM (GRAM) TOPICAL DAILY
Status: DISCONTINUED | OUTPATIENT
Start: 2022-06-13 | End: 2022-06-16 | Stop reason: HOSPADM

## 2022-06-13 RX ORDER — MIDODRINE HYDROCHLORIDE 5 MG/1
10 TABLET ORAL 3 TIMES DAILY
Status: DISCONTINUED | OUTPATIENT
Start: 2022-06-13 | End: 2022-06-16 | Stop reason: HOSPADM

## 2022-06-13 RX ORDER — ATORVASTATIN CALCIUM 20 MG/1
20 TABLET, FILM COATED ORAL NIGHTLY
Status: DISCONTINUED | OUTPATIENT
Start: 2022-06-13 | End: 2022-06-16 | Stop reason: HOSPADM

## 2022-06-13 RX ORDER — FERROUS SULFATE 325(65) MG
325 TABLET ORAL
Status: DISCONTINUED | OUTPATIENT
Start: 2022-06-14 | End: 2022-06-16 | Stop reason: HOSPADM

## 2022-06-13 RX ORDER — PANTOPRAZOLE SODIUM 40 MG/1
40 TABLET, DELAYED RELEASE ORAL DAILY
COMMUNITY

## 2022-06-13 RX ORDER — WARFARIN SODIUM 2 MG/1
4 TABLET ORAL
Status: COMPLETED | OUTPATIENT
Start: 2022-06-13 | End: 2022-06-13

## 2022-06-13 RX ORDER — CETIRIZINE HYDROCHLORIDE 10 MG/1
10 TABLET ORAL DAILY
Status: DISCONTINUED | OUTPATIENT
Start: 2022-06-13 | End: 2022-06-16 | Stop reason: HOSPADM

## 2022-06-13 RX ORDER — OXCARBAZEPINE 600 MG/1
600 TABLET, FILM COATED ORAL 2 TIMES DAILY
Status: DISCONTINUED | OUTPATIENT
Start: 2022-06-13 | End: 2022-06-16 | Stop reason: HOSPADM

## 2022-06-13 RX ORDER — GABAPENTIN 300 MG/1
900 CAPSULE ORAL 3 TIMES DAILY
Status: DISCONTINUED | OUTPATIENT
Start: 2022-06-13 | End: 2022-06-16 | Stop reason: HOSPADM

## 2022-06-13 RX ORDER — PANTOPRAZOLE SODIUM 40 MG/1
40 TABLET, DELAYED RELEASE ORAL DAILY
Status: DISCONTINUED | OUTPATIENT
Start: 2022-06-13 | End: 2022-06-16 | Stop reason: HOSPADM

## 2022-06-13 RX ORDER — TAMSULOSIN HYDROCHLORIDE 0.4 MG/1
0.4 CAPSULE ORAL DAILY
Status: DISCONTINUED | OUTPATIENT
Start: 2022-06-13 | End: 2022-06-16 | Stop reason: HOSPADM

## 2022-06-13 RX ORDER — SENNA AND DOCUSATE SODIUM 50; 8.6 MG/1; MG/1
1 TABLET, FILM COATED ORAL DAILY
Status: DISCONTINUED | OUTPATIENT
Start: 2022-06-13 | End: 2022-06-16 | Stop reason: HOSPADM

## 2022-06-13 RX ORDER — FOLIC ACID 1 MG/1
1 TABLET ORAL DAILY
Status: DISCONTINUED | OUTPATIENT
Start: 2022-06-13 | End: 2022-06-16 | Stop reason: HOSPADM

## 2022-06-13 RX ORDER — WARFARIN SODIUM 2 MG/1
2 TABLET ORAL
COMMUNITY

## 2022-06-13 RX ORDER — DOCUSATE SODIUM 100 MG/1
200 CAPSULE, LIQUID FILLED ORAL DAILY
COMMUNITY

## 2022-06-13 RX ORDER — ASPIRIN 81 MG/1
81 TABLET, CHEWABLE ORAL DAILY
Status: DISCONTINUED | OUTPATIENT
Start: 2022-06-13 | End: 2022-06-16 | Stop reason: HOSPADM

## 2022-06-13 RX ORDER — CHOLECALCIFEROL (VITAMIN D3) 125 MCG
10 CAPSULE ORAL DAILY
Status: ON HOLD | COMMUNITY
End: 2022-06-16 | Stop reason: HOSPADM

## 2022-06-13 RX ORDER — PSEUDOEPHEDRINE HCL 30 MG
250 TABLET ORAL 2 TIMES DAILY
COMMUNITY

## 2022-06-13 RX ORDER — SENNA AND DOCUSATE SODIUM 50; 8.6 MG/1; MG/1
1 TABLET, FILM COATED ORAL DAILY
COMMUNITY

## 2022-06-13 RX ORDER — VITAMIN B COMPLEX
2000 TABLET ORAL
Status: DISCONTINUED | OUTPATIENT
Start: 2022-06-14 | End: 2022-06-16 | Stop reason: HOSPADM

## 2022-06-13 RX ORDER — DIPHENHYDRAMINE HCL 25 MG
25 TABLET ORAL EVERY 6 HOURS PRN
Status: ON HOLD | COMMUNITY
End: 2022-06-16 | Stop reason: HOSPADM

## 2022-06-13 RX ORDER — LANOLIN ALCOHOL/MO/W.PET/CERES
3 CREAM (GRAM) TOPICAL NIGHTLY PRN
Status: DISCONTINUED | OUTPATIENT
Start: 2022-06-13 | End: 2022-06-16 | Stop reason: HOSPADM

## 2022-06-13 RX ORDER — IBUPROFEN 200 MG
200 CAPSULE ORAL 2 TIMES DAILY
Status: DISCONTINUED | OUTPATIENT
Start: 2022-06-13 | End: 2022-06-16 | Stop reason: HOSPADM

## 2022-06-13 RX ORDER — DOCUSATE SODIUM 100 MG/1
200 CAPSULE, LIQUID FILLED ORAL DAILY
Status: DISCONTINUED | OUTPATIENT
Start: 2022-06-13 | End: 2022-06-16 | Stop reason: HOSPADM

## 2022-06-13 RX ORDER — FUROSEMIDE 20 MG/1
20 TABLET ORAL DAILY
Status: DISCONTINUED | OUTPATIENT
Start: 2022-06-13 | End: 2022-06-16 | Stop reason: HOSPADM

## 2022-06-13 RX ORDER — ALENDRONATE SODIUM 70 MG/1
70 TABLET ORAL
Status: DISCONTINUED | OUTPATIENT
Start: 2022-06-13 | End: 2022-06-13

## 2022-06-13 RX ADMIN — PANTOPRAZOLE SODIUM 40 MG: 40 TABLET, DELAYED RELEASE ORAL at 15:20

## 2022-06-13 RX ADMIN — CYANOCOBALAMIN TAB 1000 MCG 1000 MCG: 1000 TAB at 17:34

## 2022-06-13 RX ADMIN — MAGNESIUM HYDROXIDE 30 ML: 400 SUSPENSION ORAL at 22:03

## 2022-06-13 RX ADMIN — ACETAMINOPHEN 650 MG: 325 TABLET ORAL at 05:47

## 2022-06-13 RX ADMIN — HYDROXYZINE HYDROCHLORIDE 50 MG: 50 TABLET, FILM COATED ORAL at 22:02

## 2022-06-13 RX ADMIN — Medication 200 MG: at 22:02

## 2022-06-13 RX ADMIN — CETIRIZINE HYDROCHLORIDE 10 MG: 10 TABLET, FILM COATED ORAL at 15:19

## 2022-06-13 RX ADMIN — SENNOSIDES AND DOCUSATE SODIUM 1 TABLET: 50; 8.6 TABLET ORAL at 15:20

## 2022-06-13 RX ADMIN — Medication 3 MG: at 22:02

## 2022-06-13 RX ADMIN — ACETAMINOPHEN 650 MG: 325 TABLET ORAL at 19:24

## 2022-06-13 RX ADMIN — GABAPENTIN 900 MG: 300 CAPSULE ORAL at 22:02

## 2022-06-13 RX ADMIN — ACETAMINOPHEN 650 MG: 325 TABLET ORAL at 14:10

## 2022-06-13 RX ADMIN — FOLIC ACID 1 MG: 1 TABLET ORAL at 15:19

## 2022-06-13 RX ADMIN — ASPIRIN 81 MG: 81 TABLET, CHEWABLE ORAL at 15:19

## 2022-06-13 RX ADMIN — OXCARBAZEPINE 600 MG: 600 TABLET, FILM COATED ORAL at 22:02

## 2022-06-13 RX ADMIN — MIDODRINE HYDROCHLORIDE 10 MG: 5 TABLET ORAL at 15:19

## 2022-06-13 RX ADMIN — DOCUSATE SODIUM 200 MG: 100 CAPSULE, LIQUID FILLED ORAL at 15:19

## 2022-06-13 RX ADMIN — HYDROXYZINE HYDROCHLORIDE 50 MG: 50 TABLET, FILM COATED ORAL at 16:58

## 2022-06-13 RX ADMIN — FUROSEMIDE 20 MG: 20 TABLET ORAL at 15:19

## 2022-06-13 RX ADMIN — WARFARIN SODIUM 4 MG: 2 TABLET ORAL at 17:34

## 2022-06-13 RX ADMIN — GABAPENTIN 900 MG: 300 CAPSULE ORAL at 15:18

## 2022-06-13 RX ADMIN — TAMSULOSIN HYDROCHLORIDE 0.4 MG: 0.4 CAPSULE ORAL at 15:20

## 2022-06-13 RX ADMIN — ATORVASTATIN CALCIUM 20 MG: 20 TABLET, FILM COATED ORAL at 22:05

## 2022-06-13 RX ADMIN — MIDODRINE HYDROCHLORIDE 10 MG: 5 TABLET ORAL at 22:02

## 2022-06-13 ASSESSMENT — PAIN DESCRIPTION - ORIENTATION
ORIENTATION: RIGHT;LEFT
ORIENTATION: RIGHT;LEFT
ORIENTATION: LEFT;RIGHT

## 2022-06-13 ASSESSMENT — PAIN DESCRIPTION - LOCATION
LOCATION: LEG
LOCATION: LEG
LOCATION: HEAD
LOCATION: LEG
LOCATION: LEG

## 2022-06-13 ASSESSMENT — PAIN SCALES - GENERAL
PAINLEVEL_OUTOF10: 3
PAINLEVEL_OUTOF10: 3
PAINLEVEL_OUTOF10: 8
PAINLEVEL_OUTOF10: 3
PAINLEVEL_OUTOF10: 3
PAINLEVEL_OUTOF10: 8
PAINLEVEL_OUTOF10: 2
PAINLEVEL_OUTOF10: 1

## 2022-06-13 ASSESSMENT — PAIN - FUNCTIONAL ASSESSMENT: PAIN_FUNCTIONAL_ASSESSMENT: ACTIVITIES ARE NOT PREVENTED

## 2022-06-13 ASSESSMENT — PAIN DESCRIPTION - DESCRIPTORS: DESCRIPTORS: ACHING

## 2022-06-13 NOTE — PROGRESS NOTES
Pharmacy Medication History Note      List of current medications patient is taking is complete. [facility] (Spoke with Jeramy Demarco)    Changes made to medication list:  Medications removed (include reason, ex. therapy complete or physician discontinued, noncompliance): Ropinirole (list clean up), Escitalopram (list clean up)    Medications added/doses adjusted: Added Docusate 200 mg daily  Added Milk of Magnesia 30 mL nightly  Added Calcium citrate 250 mg twice daily  Added Diphenhydramine 25 mg every 6 hours as needed  Adjusted Oxcarbazepine to 600 mg twice daily  Adjusted Warfarin to 2 mg daily  Adjusted Melatonin to 10 mg nightly    Other notes (ex. Recent course of antibiotics, Coumadin dosing): The patient follows with [facility]. He was last seen in clinic on 6/6/22 with an INR of 3.5. He was instructed to take 2 mg daily.       Current Home Medication List at Time of Admission:  Prior to Admission medications    Medication Sig   sennosides-docusate sodium (SENOKOT-S) 8.6-50 MG tablet Take 1 tablet by mouth daily   melatonin 5 mg TABS tablet Take 10 mg by mouth daily   docusate sodium (COLACE) 100 mg capsule Take 200 mg by mouth daily   magnesium hydroxide (MILK OF MAGNESIA) 400 MG/5ML suspension Take 30 mLs by mouth nightly   calcium citrate (CALCITRATE) 250 MG TABS tablet Take 250 mg by mouth 2 times daily   diphenhydrAMINE (BENADRYL) 25 MG tablet Take 25 mg by mouth every 6 hours as needed   warfarin (COUMADIN) 2 MG tablet Take 2 mg by mouth   pantoprazole (PROTONIX) 40 MG tablet Take 40 mg by mouth daily   acetaminophen (TYLENOL) 500 MG tablet Take 500 mg by mouth every 6 hours as needed   Cholecalciferol (VITAMIN D3) 50 MCG (2000 UT) CAPS Take 1 capsule by mouth daily (with breakfast)   OXcarbazepine (TRILEPTAL) 300 MG tablet Take 600 mg by mouth 2 times daily    FEROSUL 325 (65 Fe) MG tablet Take 325 mg by mouth daily (with breakfast)    gabapentin (NEURONTIN) 300 MG capsule Take 900 mg by mouth 3 times daily. Pt. States he takes 900 mg. TID   aspirin 81 MG chewable tablet Take 1 tablet by mouth daily   atorvastatin (LIPITOR) 20 MG tablet Take 1 tablet by mouth nightly   midodrine (PROAMATINE) 10 MG tablet Take 10 mg by mouth 3 times daily   folic acid (FOLVITE) 1 MG tablet Take 1 mg by mouth daily   furosemide (LASIX) 20 MG tablet Take 20 mg by mouth daily   tamsulosin (FLOMAX) 0.4 MG capsule Take 0.4 mg by mouth daily   Cyanocobalamin (VITAMIN B 12) 500 MCG TABS Take 1,000 mcg by mouth daily    alendronate (FOSAMAX) 70 MG tablet Take 70 mg by mouth every 7 days Patient takes on mondays. cetirizine (ZYRTEC) 10 MG tablet Take 10 mg by mouth daily         Please let me know if you have any questions about this encounter. Thank you!     Electronically signed by Sylvia Silva [facility] on 6/13/2022 at 11:05 AM
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
## 2022-06-13 NOTE — PROGRESS NOTES
Dede Loya was ordered the oral bisphosphonate, alendronate 70mg tab. Oral Bisphosphonates have an increased risk of serious GI events (esophagitis, dysphagia, esophageal ulcers, esophageal erosions, and esophageal stricture) if the strict dosing instructions are not followed. Per the FDA labeling, oral bisphosphonates must be taken at least 30 min (60 min for ibandronate) before the first food, beverage or medication of the day. Patients MUST also avoid lying down for at least 30 min (60 minutes for ibandronate) after taking the oral bisphosphonate. Because these strict dosage instructions are difficult to follow in many hospitalized patients, orders for oral bisphosphonate therapy will be discontinued (including orders for a patient to take his/her home medication) per the [address] [place] [address]. Consider risk versus benefits when resuming the oral bisphosphonate at discharge.   [facility], [address] [place] [address]    6/13/2022   2:54 PM

## 2022-06-13 NOTE — H&P
[facility] Internal Medicine  Jeff Nova MD; Clifford Tim MD; Sudheer Oliver MD; MD General Jyotsna Conley MD; MD [facility]   [facility]    HISTORY AND PHYSICAL EXAMINATION            Date:   6/13/2022  Patient name:  Bhupendra Amado  Date of admission:  6/12/2022  4:02 PM  MRN:   [id number]  Account:  [de-identified]  YOB: 1960  PCP:    No primary care provider on file. Room:   [address] [place] [address]  Code Status:    Full Code    Chief Complaint:     Chief Complaint   Patient presents with    Suicidal   Multiple medical issues    History Obtained From:   Patient medical record nursing staff    History of Present Illness:     Bhupendra Amado is a 57 y.o. Non- / non  male who presents with Suicidal   and is admitted to the hospital for the management of Severe recurrent major depression without psychotic features ([address] HTN  Onset more than 2 years ago  rose mild to mod  Controlled with current po meds  Not associated with headaches or blurry vision  No chest pain    HLD  Onset more than 5 years ago  Severity is mild, not getting worse  Not associated with pancreatitis  Tolerating statin well no muscle pain    Hx of afib  Cad ca bg  Back pain  Onset more than 1year ago  Severity is moderate to severe  Not associated wt lossbut associated with radiation of pain on the legs  No bowel bladder incontinence   Aggravated with bending and better with pain meds and rest.  Controlled with current pain meds.         Past Medical History:     Past Medical History:   Diagnosis Date    AMS (altered mental status) 02/15/2020    Atrial fibrillation ([address]     on coumadin    Bipolar 1 disorder (HCC)     Depression     Hypertension     Neck pain     Neurofibromatosis ([address]     Lt leg pain    Patient in clinical research study [facility]/12/2018    [id number]    Severe recurrent major depression without psychotic features ([facility] 6/13/2022    Twitching         Past Surgical History:     Past Surgical History:   Procedure Laterality Date    ABDOMEN SURGERY      APPENDECTOMY      CARDIAC SURGERY  7/14/15    Median Sternotomy, Repair of ascending aorti aneurysm, stentless valve placement    CARDIAC SURGERY  07/14/2015    Median stenotomy, repair of ascending aorti aneurysm, stentless valve placement     COLONOSCOPY  01/28/2021    DILATATION, ESOPHAGUS      HERNIA REPAIR      NECK SURGERY  2013    TONSILLECTOMY      UPPER GASTROINTESTINAL ENDOSCOPY  01/28/2021        Medications Prior to Admission:     Prior to Admission medications    Medication Sig Start Date End Date Taking?  Authorizing Provider   sennosides-docusate sodium (SENOKOT-S) 8.6-50 MG tablet Take 1 tablet by mouth daily   Yes Historical Provider, MD   melatonin 5 mg TABS tablet Take 10 mg by mouth daily   Yes Historical Provider, MD   docusate sodium (COLACE) 100 mg capsule Take 200 mg by mouth daily   Yes Historical Provider, MD   magnesium hydroxide (MILK OF MAGNESIA) 400 MG/5ML suspension Take 30 mLs by mouth nightly   Yes Historical Provider, MD   calcium citrate (CALCITRATE) 250 MG TABS tablet Take 250 mg by mouth 2 times daily   Yes Historical Provider, MD   diphenhydrAMINE (BENADRYL) 25 MG tablet Take 25 mg by mouth every 6 hours as needed   Yes Historical Provider, MD   warfarin (COUMADIN) 2 MG tablet Take 2 mg by mouth   Yes Historical Provider, MD   pantoprazole (PROTONIX) 40 MG tablet Take 40 mg by mouth daily   Yes Historical Provider, MD   acetaminophen (TYLENOL) 500 MG tablet Take 500 mg by mouth every 6 hours as needed 2/25/22 2/25/23 Yes Historical Provider, MD   Cholecalciferol (VITAMIN D3) 50 MCG (2000 UT) CAPS Take 1 capsule by mouth daily (with breakfast)    Historical Provider, MD   OXcarbazepine (TRILEPTAL) 300 MG tablet Take 600 mg by mouth 2 times daily     Historical Provider, MD   FEROSUL 325 (65 Fe) MG tablet Take 325 mg by mouth daily (with breakfast)  7/7/21   Historical Provider, MD   gabapentin (NEURONTIN) 300 MG capsule Take 900 mg by mouth 3 times daily. Pt. States he takes 900 mg. TID    Historical Provider, MD   aspirin 81 MG chewable tablet Take 1 tablet by mouth daily 7/8/20   Cali Mathews MD   atorvastatin (LIPITOR) 20 MG tablet Take 1 tablet by mouth nightly 7/7/20   Cali Mathews MD   midodrine (PROAMATINE) 10 MG tablet Take 10 mg by mouth 3 times daily    Historical Provider, MD   folic acid (FOLVITE) 1 MG tablet Take 1 mg by mouth daily    Historical Provider, MD   furosemide (LASIX) 20 MG tablet Take 20 mg by mouth daily    Historical Provider, MD   tamsulosin (FLOMAX) 0.4 MG capsule Take 0.4 mg by mouth daily    Historical Provider, MD   Cyanocobalamin (VITAMIN B 12) 500 MCG TABS Take 1,000 mcg by mouth daily     Historical Provider, MD   alendronate (FOSAMAX) 70 MG tablet Take 70 mg by mouth every 7 days Patient takes on mondays. Historical Provider, MD   cetirizine (ZYRTEC) 10 MG tablet Take 10 mg by mouth daily    Historical Provider, MD        Allergies:     Oxycodone, Fish-derived products, Ibuprofen, Latuda [lurasidone hcl], Lithium, Lurasidone, Quetiapine, and Seroquel [quetiapine fumarate]    Social History:     Tobacco:    reports that he has never smoked. He has never used smokeless tobacco.  Alcohol:      reports current alcohol use. Drug Use:  reports current drug use. Drug: Cocaine. Family History:     Family History   Problem Relation Age of Onset    Coronary Art Dis Mother     Hypertension Mother     Hypertension Father     Cancer Father         blood       Review of Systems:     Positive and Negative as described in HPI.     CONSTITUTIONAL:  negative for fevers, chills, sweats, fatigue, weight loss  HEENT:  negative for vision, hearing changes, runny nose, throat pain  RESPIRATORY:  negative for shortness of breath, cough, congestion, wheezing  CARDIOVASCULAR:  negative for chest pain, palpitations  GASTROINTESTINAL:  negative for nausea, vomiting, diarrhea, constipation, change in bowel habits, abdominal pain   GENITOURINARY:  negative for difficulty of urination, burning with urination, frequency   INTEGUMENT:  negative for rash, skin lesions, easy bruising   HEMATOLOGIC/LYMPHATIC:  negative for swelling/edema   ALLERGIC/IMMUNOLOGIC:  negative for urticaria , itching  ENDOCRINE:  negative increase in drinking, increase in urination, hot or cold intolerance  MUSCULOSKELETAL:  negative joint pains, muscle aches, swelling of joints  NEUROLOGICAL:  negative for headaches, dizziness, lightheadedness, numbness, pain, tingling extremities      Physical Exam:   /85   Pulse 57   Temp 98.1 °F (36.7 °C) (Oral)   Resp 14   Ht 5' 6\" (1.676 m)   Wt 190 lb (86.2 kg)   SpO2 97%   BMI 30.67 kg/m²   Temp (24hrs), Av.2 °F (36.8 °C), Min:98.1 °F (36.7 °C), Max:98.2 °F (36.8 °C)    No results for input(s): POCGLU in the last 72 hours.   No intake or output data in the 24 hours ending 22 1748    General Appearance: alert, well appearing, and in no acute distress  Mental status: oriented to person, place, and time  Head: normocephalic, atraumatic  Eye: no icterus, redness, pupils equal and reactive, extraocular eye movements intact, conjunctiva clear  Ear: normal external ear, no discharge, hearing intact  Nose: no drainage noted  Mouth: mucous membranes moist  Edentulous  Neck: supple, no carotid bruits, thyroid not palpable  Lungs: Bilateral equal air entry, clear to ausculation, no wheezing, rales or rhonchi, normal effort  Cardiovascular: normal rate, regular rhythm, no murmur, gallop, rub  Abdomen: Soft, nontender, nondistended, normal bowel sounds, no hepatomegaly or splenomegaly  Midline laparotomy scar noted  Thoracotomy scar noted midline  In the back thoracic surgery scar noted on both right and left  Neurologic: There are no new focal motor or sensory deficits, normal muscle tone and bulk, no abnormal sensation, normal speech, cranial nerves II through XII grossly intact  Skin: No to be scar noted lesions, rashes, bruising or bleeding on exposed skin area  Extremities: peripheral pulses palpable, no pedal edema or calf pain with palpation      Investigations:      Laboratory Testing:  Recent Results (from the past 24 hour(s))   Urine Drug Screen    Collection Time: 06/13/22 10:14 AM   Result Value Ref Range    Amphetamine Screen, Ur NEGATIVE NEGATIVE    Barbiturate Screen, Ur NEGATIVE NEGATIVE    Benzodiazepine Screen, Urine NEGATIVE NEGATIVE    Cocaine Metabolite, Urine POSITIVE (A) NEGATIVE    Methadone Screen, Urine NEGATIVE NEGATIVE    Opiates, Urine NEGATIVE NEGATIVE    Phencyclidine, Urine NEGATIVE NEGATIVE    Cannabinoid Scrn, Ur NEGATIVE NEGATIVE    Oxycodone Screen, Ur NEGATIVE NEGATIVE    Test Information       Assay provides medical screening only. The absence of expected drug(s) and/or metabolite(s) may indicate diluted or adulterated urine, limitations of testing or timing of collection. Protime-INR    Collection Time: 06/13/22 11:26 AM   Result Value Ref Range    Protime 17.4 (H) 11.8 - 14.6 sec    INR 1.4        Imaging/Diagnostics:  No results found.     Assessment :      Hospital Problems           Last Modified POA    * (Principal) Severe recurrent major depression without psychotic features ([address] 6/13/2022 Yes    Neurofibromatosis, unspecified ([address] 6/13/2022 Yes    Depression with suicidal ideation 6/13/2022 Yes    GERD (gastroesophageal reflux disease) 6/13/2022 Yes    Aortic aneurysm ([address] 6/13/2022 Yes    MGUS (monoclonal gammopathy of unknown significance) 6/13/2022 Yes    Chronic renal impairment, stage 3 (moderate) ([address] 6/13/2022 Yes    Crack cocaine use 6/13/2022 Yes    Overview Signed 5/28/2018 11:44 AM by Junoir Smith MD     $300 a week         Atrial fibrillation ([address] 6/13/2022 Yes          Plan:     60-year-old pleasant  gentleman who is edentulous  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  With cocaine abuse and aortic aneurysm repair  Esophageal stricture with dilatation  Midline laparotomy scar noted from previous hiatus hernia surgery  Neurogenic bladder with a status post a bladder stimulator which is incompatible with MRI  Chronic back pain with the left leg weakness and neuropathic pain  Patient also has history of a bioprosthetic aortic valve replacement monoclonal gammopathy of unknown significance with chronic kidney disease  Coronary artery disease status post coronary artery bypass graft  Atrial fibrillation on Coumadin pharmacy to dose warfarin started        Alexander Georges MD  6/13/2022  5:48 PM    Copy sent to Dr. Garrett Sorensen primary care provider on file. Please note that this chart was generated using voice recognition Dragon dictation software. Although every effort was made to ensure the accuracy of this automated transcription, some errors in transcription may have occurred.

## 2022-06-13 NOTE — PROGRESS NOTES
Pharmacy Note  Warfarin Consult    Sacha Coyle is a 57 y.o. male for whom pharmacy has been consulted to manage warfarin therapy. Consulting Physician: Dayne  Reason for Admission: Depression with suicidal ideation    Warfarin dose prior to admission: 2 mg daily per [facility]  Warfarin indication: AFib  Target INR range: 2 to 3     Past Medical History:   Diagnosis Date    AMS (altered mental status) 02/15/2020    Atrial fibrillation ([facility]     on coumadin    Bipolar 1 disorder ([facility]     Depression     Hypertension     Neck pain     Neurofibromatosis ([facility]     Lt leg pain    Patient in clinical research study 04/12/2018    [id number]    Twitching            Recent Labs     06/13/22  1126   INR 1.4     Recent Labs     06/12/22  1727   HGB 12.2*   HCT 37.2*          Current warfarin drug-drug interactions: Acetaminophen      Date             INR        Dose   6/13/2022        1.4    4 mg    Patient was not given a dose yesterday. INR was subtherapeutic yesterday and today. Will give 4 mg today. Daily PT/INR while inpatient. Thank you for the consult. Will continue to follow.      Margarita Jett, PharmD, BCPS, BCPP  6/13/2022 12:55 PM  [phone]

## 2022-06-13 NOTE — PROGRESS NOTES
Pharmacy Med Education Group Note    Date: 6/13/22  Start Time: 12  End Time: 8424    Number Participants in Group:  5    Goal:  Patient will demonstrate an understanding of the medications intended purpose and possible adverse effects  Topic: [place] for Pharmacy Med Ed Group    Discipline Responsible:     OT  AT  [facility].  RT     X Other       Participation Level:     None  Minimal      X Active Listener    X Interactive    Monopolizing         Participation Quality:    X Appropriate  Inappropriate     X       Attentive        Intrusive          Sharing        Resistant          Supportive        Lethargic       Affective:     X Congruent  Incongruent  Blunted  Flat    Constricted  Anxious  Elated  Angry    Labile  Depressed  Other         Cognitive:    X Alert  Oriented PPTP     Concentration   X G  F  P   Attention Span   X G  F  P   Short-Term Memory   X G  F  P   Long-Term Memory  G  F  P   ProblemSolving/  Decision Making  G  F  P   Ability to Process  Information   X G  F  P      Contributing Factors             Delusional             Hallucinating             Flight of Ideas             Other:       Modes of Intervention:    X Education   X Support  Exploration    Clarifying  Problem Solving  Confrontation    Socialization  Limit Setting  Reality Testing    Activity  Movement  Media    Other:            Response to Learning:    X Able to verbalize current knowledge/experience    Able to verbalize/acknowledge new learning    Able to retain information    Capable of insight    Able to change behavior    Progressing to goal    Other:        Comments:     Jessika Villatoro PharmD, BCPS, BCPP  6/13/2022 2:16 PM  [phone]

## 2022-06-13 NOTE — BH NOTE
[facility]  Admission Note     Admission Type:   Admission Type: Voluntary    Reason for admission:  Reason for Admission: Patient arrives to [facility] with c/o suicidal thoughts to shoot self. Patient does not own a gun or have access to a gun. Patient is living in [place] [address] and is unhappy with his living arragement and neighboring tenants. PATIENT STRENGTHS:       Patient Strengths and Limitations:       Addictive Behavior:   Addictive Behavior  In the Past 3 Months, Have You Felt or Has Someone Told You That You Have a Problem With  : None    Medical Problems:   Past Medical History:   Diagnosis Date    AMS (altered mental status) 02/15/2020    Atrial fibrillation (HCC)     on coumadin    B[facility] disorder [facility]     Depression     Hypertension     Neck pain     Neurofibromatosis ([facility]     Lt leg pain    Patient in clinical research study 04/12/2018    [id number]    Twitching        Status EXAM:  Mental Status and Behavioral Exam  Normal: No  Level of Assistance: Independent/Self  Facial Expression: Flat  Affect: Blunt  Level of Consciousness: Alert  Frequency of Checks: 4 times per hour, close  Mood:Normal: No  Mood: Depressed,Anxious  Motor Activity:Normal: No  Motor Activity: Decreased  Eye Contact: Fair  Observed Behavior: Friendly,Cooperative  Sexual Misconduct History: Past - no  Preception: [place] to person,[place] to time,[place] to place,[place] to situation  Attention:Normal: No  Thought Processes: Circumstantial  Thought Content:Normal: No  Thought Content: Poverty of content  Depression Symptoms: Isolative,Loss of interest,Feelings of hopelessess  Anxiety Symptoms: Generalized  Mirian Symptoms: No problems reported or observed.   Hallucinations: None  Delusions: No  Memory:Normal: No  Memory: Poor recent  Insight and Judgment: No  Insight and Judgment: Poor judgment,Poor insight    Tobacco Screening:  Practical Counseling, on admission, georgette X, if applicable and completed (first 3 are required if patient doesn't refuse):            ( )  Recognizing danger situations (included triggers and roadblocks)                    ( )  Coping skills (new ways to manage stress, exercise, relaxation techniques, changing routine, distraction)                                                           ( )  Basic information about quitting (benefits of quitting, techniques in how to quit, available resources  ( ) Referral for counseling faxed to [facility]                                           ( ) Patient refused counseling  ( X) Patient has not smoked in the last 30 days    Metabolic Screening:    Lab Results   Component Value Date    LABA1C 5.4 02/17/2020       Lab Results   Component Value Date    CHOL 126 02/17/2020    CHOL 193 08/02/2018    CHOL 215 (H) 05/22/2018    CHOL 170 02/20/2018    CHOL 142 06/27/2016    CHOL 159 02/21/2013     Lab Results   Component Value Date    TRIG 116 02/17/2020    TRIG 132 08/02/2018    TRIG 162 (H) 05/22/2018    TRIG 158 (H) 02/20/2018    TRIG 204 (H) 06/27/2016    TRIG 103 02/21/2013     Lab Results   Component Value Date    HDL 28 (L) 02/01/2021    HDL 24 (L) 02/17/2020    HDL 28 (L) 08/02/2018    HDL 29 (L) 05/22/2018    HDL 30 (L) 02/20/2018    HDL 27 (L) 06/27/2016    HDL 36 (L) 02/21/2013     No components found for: LDLCAL  No results found for: LABVLDL      Body mass index is 30.67 kg/m². BP Readings from Last 2 Encounters:   06/12/22 (!) 156/85   05/02/22 102/61           Pt admitted with followings belongings:        Patient's home medications were need verified. Patient oriented to surroundings and program expectations and copy of patient rights given. Received admission packet:  Yes; everything read and explained verbally as the patient is not able to read. Consents reviewed, signed yes. Refused no. Patient verbalize understanding:  yes.   Patient education on precautions: yes    Patient arrives to [place] [address] with c/o suicidal thoughts to shoot self. Patient voluntary from [facility]. Patient does not own a gun or have access to a gun. Patient is living in [place] [address] and is unhappy with his living arragement and neighboring tenants. Patient admits to recreational crack use \"once in a blue moon\". Patient is A&O X 4 on arrival. Patient admits to [facility] but contracts for safety. Patient checked for contraband and oriented to room.  Patient denies meal on arrival.             Macy Cullen, [address]

## 2022-06-13 NOTE — PLAN OF CARE
Problem: Self Harm/Suicidality  Goal: Will have no self-injury during hospital stay  Description: INTERVENTIONS:  1. Q 30 MINUTES: Routine safety checks  2. Q SHIFT & PRN: Assess risk to determine if routine checks are adequate to maintain patient safety  Outcome: Progressing     Problem: Behavior  Goal: Pt/Family maintain appropriate behavior and adhere to behavioral management agreement, if implemented  Description: INTERVENTIONS:  1. Assess patient/family's coping skills and  non-compliant behavior (including use of illegal substances)  2. Notify security of behavior or suspected illegal substances which indicate the need for search of the patient and/or belongings  3. Encourage verbalization of thoughts and concerns in a socially appropriate manner  4. Utilize positive, consistent limit setting strategies supporting safety of patient, staff and others  5. Encourage participation in the decision making process about the behavioral management agreement  6. Implement a Health Care Agreement if patient meets criteria  7. If a patient's behavior jeopardizes the safety of the patient, staff, or others refer to organization policy. If a visitor's behavior poses a threat to safety call refer to organization policy. 8. Initiate consult with , Psychosocial CNS, Spiritual Care as appropriate  Outcome: Progressing     Patient denies thoughts of self harm during this shift. Patient is out in dayroom aloof of peers. Patient is attending groups and cooperative with staff during shift assessment. Patient is complaint with scheduled medications. Patient states he overacted prior to admission and regrets the statements he made in the ED. Patient remorseful and apologetic about incident. Q15 minute and random safety checks maintained.

## 2022-06-13 NOTE — CARE COORDINATION
BHI Biopsychosocial Assessment    Current Level of Psychosocial Functioning     Independent X  Dependent    Minimal Assist     Comments:    Psychosocial High Risk Factors (check all that apply)    Unable to obtain meds   Chronic illness/pain  X  Substance abuse X  Lack of Family Support   Financial stress   Isolation   Inadequate Community Resources  Suicide attempt(s)  Not taking medications   Victim of crime   Developmental Delay  Unable to manage personal needs    Age 72 or older   Homeless  No transportation   Readmission within 30 days  Unemployment  Traumatic Event    Comments:   Psychiatric Advanced Directives: n/a    Family to Involve in Treatment: not at this time    Sexual Orientation:  n/a    Patient Strengths: patient has housing, income, insurance, and linked to current provider    Patient Barriers: patient has problems with neighbors, substance use, suicidal thoughts, chronic pain      Opiate Education Provided:  No- patient does not use opiates      CMHC/mental health history: [facility]    Plan of Care   medication management, group/individual therapies, family meetings, psycho -education, treatment team meetings to assist with stabilization    Initial Discharge Plan:  Patient will discharge home and continue treatment with [facility]. He will also need reconnected to his current home health provider [facility] in [address] where he has a nurse visit once per week. He states he contacted his nurse to tell them he was hospitalized and social work verified with Rossi Crouch that they were aware. Patient also receives his meds in bubble packages through [facility] and [address] [place] [address] at [place]. Clinical Summary:      Gerri Martínez is a 58 y/o male admitted to [address] [place] [address] for suicidal ideations with a plan to purchase a firearm and shoot himself in the head due to escalating problems with his neighbors and increased uncontrolled pain.   Patient was seen for assessment and is polite stating he made a mistake saying he was suicidal he did not mean it he was just extremely overwhelmed. He stated he has been struggling with his neighbors asking for various grocery items and also a female friend asked to use his restroom then used drugs. Patient report recent use of crack as well and states he uses it \"every blue moon\". He denied current thoughts of suicide as well as homicidal ideations. Patient is not experiencing hallucinations of any kind. He states he is very active with [facility] team but said they have NOT been helping him find another place to live. He does not directly say he wants to find another home during assessment when asked. Patient did not have any needs from social work at this time. Will continue to engage patient in treatment and discharge planning as needed.

## 2022-06-13 NOTE — PLAN OF CARE
[facility]  Initial Interdisciplinary Treatment Plan NO      Original treatment plan Date & Time: 6/13/2022   0818    Admission Type:  Admission Type: Voluntary    Reason for admission:   Reason for Admission: Patient arrives to [facility] with c/o suicidal thoughts to shoot self. Patient does not own a gun or have access to a gun. Patient is living in [place] [address] and is unhappy with his living arragement and neighboring tenants. Estimated Length of Stay:  5-7days  Estimated Discharge Date: to be determined by physician    PATIENT STRENGTHS:  Patient Strengths:   Patient Strengths and Limitations:   Addictive Behavior: Addictive Behavior  In the Past 3 Months, Have You Felt or Has Someone Told You That You Have a Problem With  : None  Medical Problems:  Past Medical History:   Diagnosis Date    AMS (altered mental status) 02/15/2020    Atrial fibrillation ([facility] [address]     on coumadin    Bipolar 1 disorder ([facility] [address]     Depression     Hypertension     Neck pain     Neurofibromatosis ([facility]     Lt leg pain    Patient in clinical research study 04/12/2018    [id number]    Twitching      Status EXAM:Mental Status and Behavioral Exam  Normal: No  Level of Assistance: Independent/Self  Facial Expression: Flat  Affect: Blunt  Level of Consciousness: Alert  Frequency of Checks: 4 times per hour, close  Mood:Normal: No  Mood: Depressed,Anxious  Motor Activity:Normal: No  Motor Activity: Decreased  Eye Contact: Fair  Observed Behavior: Friendly,Cooperative  Sexual Misconduct History: Past - no  Preception: [place] to person,[place] to time,[place] to place,[place] to situation  Attention:Normal: No  Thought Processes: Circumstantial  Thought Content:Normal: No  Thought Content: Poverty of content  Depression Symptoms: Isolative,Loss of interest,Feelings of hopelessess  Anxiety Symptoms: Generalized  Mirian Symptoms: No problems reported or observed.   Hallucinations: None  Delusions: No  Memory:Normal: No  Memory: Poor recent  Insight and Judgment: No  Insight and Judgment: Poor judgment,Poor insight    EDUCATION:   Learner Progress Toward Treatment Goals: reviewed group plans and strategies for care    Method:group therapy, medication compliance, individualized assessments and care planning    Outcome: needs reinforcement    PATIENT GOALS: to be discussed with patient within 72 hours    PLAN/TREATMENT RECOMMENDATIONS:     continue group therapy , medications compliance, goal setting, individualized assessments and care, continue to monitor pt on unit      SHORT-TERM GOALS:   Time frame for Short-Term Goals: 5-7 days    LONG-TERM GOALS:  Time frame for Long-Term Goals: 6 months  Members Present in Team Meeting: See Signature Sheet    Leandro Stevenson

## 2022-06-13 NOTE — GROUP NOTE
Group Therapy Note    Date: 6/13/2022    Group Start Time: 1000  Group End Time: 5892  Group Topic: Psychotherapy    NORAH MARISCAL PIETER Antonio, LSW        Group Therapy Note    Attendees: 6/14         Patient's Goal:  Increase interpersonal relationship skills    Notes:  Patient was an active participant in group discussion    Status After Intervention:  Unchanged    Participation Level:  Active Listener and Interactive    Participation Quality: Appropriate, Attentive, Sharing and Supportive      Speech:  normal      Thought Process/Content: Logical  Linear      Affective Functioning: Congruent      Mood: depressed      Level of consciousness:  Alert, Oriented x4 and Attentive      Response to Learning: Able to verbalize current knowledge/experience, Able to verbalize/acknowledge new learning and Able to retain information      Endings: None Reported    Modes of Intervention: Support, Socialization and Exploration      Discipline Responsible: /Counselor      Signature:  PIETER Lynch, LSDARWIN

## 2022-06-13 NOTE — GROUP NOTE
Group Therapy Note    Date: 6/13/2022    Group Start Time: 1100  Group End Time: 2631  Group Topic: Recreational    [facility]    Sim Mon        Group Therapy Note    Pt did not attend recreational group d/t resting in room despite staff invitation to attend. 1:1 talk time offered as alternative to group session, pt declined.

## 2022-06-13 NOTE — PROGRESS NOTES
Nutrition Assessment     Type and Reason for Visit: Positive Nutrition Screen (Edentulous)    Nutrition Recommendations/Plan:   1. Continue current diet     Malnutrition Assessment:  Malnutrition Status: No malnutrition    Nutrition Assessment:  Positive nutrition screen for edentulous. Currently on Regular diet, no problem chewing. Patient knows what to order from the menu. Eating well consuming % of meals. Patient is at low nutrition risk. Will continue current diet. Nutrition Related Findings:     Wound Type: None    Current Nutrition Therapies:    ADULT DIET;  Regular    Anthropometric Measures:  · Height: 5' 6\" (167.6 cm)  · Current Body Wt: 190 lb (86.2 kg)   · BMI: 30.7    Nutrition Diagnosis:   · No nutrition diagnosis at this time related to   as evidenced by        Nutrition Interventions:   Food and/or Nutrient Delivery: Continue Current Diet  Nutrition Education/Counseling: Education not indicated  Coordination of Nutrition Care: No recommendation at this time                  Nutrition Monitoring and Evaluation:   Behavioral-Environmental Outcomes: None Identified  Food/Nutrient Intake Outcomes: Food and Nutrient Intake  Physical Signs/Symptoms Outcomes: Chewing or Swallowing,Biochemical Data,Weight    Discharge Planning:    Continue current diet       Some areas of assessment may be incomplete due to COVID-19 precautions    Aniket Piper RD, LD  Office phone [phone]

## 2022-06-13 NOTE — PROGRESS NOTES
Behavioral Services  Medicare Certification Upon Admission    I certify that this patient's inpatient psychiatric hospital admission is medically necessary for:    [x] (1) Treatment which could reasonably be expected to improve this patient's condition,       [x] (2) Or for diagnostic study;     AND     [x](2) The inpatient psychiatric services are provided while the individual is under the care of a physician and are included in the individualized plan of care.     Estimated length of stay/service 3-5 days    Plan for post-hospital care home with outpatient [facility] f/u    Electronically signed by Amadeo Martin MD on 6/13/2022 at 2:46 PM

## 2022-06-14 LAB
INR BLD: 1.3
PROTHROMBIN TIME: 16.4 SEC (ref 11.8–14.6)

## 2022-06-14 PROCEDURE — 6370000000 HC RX 637 (ALT 250 FOR IP): Performed by: PSYCHIATRY & NEUROLOGY

## 2022-06-14 PROCEDURE — 99232 SBSQ HOSP IP/OBS MODERATE 35: CPT | Performed by: INTERNAL MEDICINE

## 2022-06-14 PROCEDURE — 99232 SBSQ HOSP IP/OBS MODERATE 35: CPT | Performed by: PSYCHIATRY & NEUROLOGY

## 2022-06-14 PROCEDURE — APPSS30 APP SPLIT SHARED TIME 16-30 MINUTES: Performed by: PSYCHIATRY & NEUROLOGY

## 2022-06-14 PROCEDURE — 85610 PROTHROMBIN TIME: CPT

## 2022-06-14 PROCEDURE — 36415 COLL VENOUS BLD VENIPUNCTURE: CPT

## 2022-06-14 PROCEDURE — 1240000000 HC EMOTIONAL WELLNESS R&B

## 2022-06-14 RX ORDER — WARFARIN SODIUM 2 MG/1
4 TABLET ORAL
Status: COMPLETED | OUTPATIENT
Start: 2022-06-14 | End: 2022-06-14

## 2022-06-14 RX ADMIN — ACETAMINOPHEN 650 MG: 325 TABLET ORAL at 05:05

## 2022-06-14 RX ADMIN — ASPIRIN 81 MG: 81 TABLET, CHEWABLE ORAL at 08:31

## 2022-06-14 RX ADMIN — DOCUSATE SODIUM 200 MG: 100 CAPSULE, LIQUID FILLED ORAL at 08:31

## 2022-06-14 RX ADMIN — HYDROXYZINE HYDROCHLORIDE 50 MG: 50 TABLET, FILM COATED ORAL at 21:20

## 2022-06-14 RX ADMIN — ACETAMINOPHEN 650 MG: 325 TABLET ORAL at 12:59

## 2022-06-14 RX ADMIN — MAGNESIUM HYDROXIDE 30 ML: 400 SUSPENSION ORAL at 21:21

## 2022-06-14 RX ADMIN — FUROSEMIDE 20 MG: 20 TABLET ORAL at 08:32

## 2022-06-14 RX ADMIN — SENNOSIDES AND DOCUSATE SODIUM 1 TABLET: 50; 8.6 TABLET ORAL at 08:31

## 2022-06-14 RX ADMIN — PANTOPRAZOLE SODIUM 40 MG: 40 TABLET, DELAYED RELEASE ORAL at 08:31

## 2022-06-14 RX ADMIN — MIDODRINE HYDROCHLORIDE 10 MG: 5 TABLET ORAL at 13:32

## 2022-06-14 RX ADMIN — MIDODRINE HYDROCHLORIDE 10 MG: 5 TABLET ORAL at 21:20

## 2022-06-14 RX ADMIN — ACETAMINOPHEN 650 MG: 325 TABLET ORAL at 21:21

## 2022-06-14 RX ADMIN — Medication 3 MG: at 21:20

## 2022-06-14 RX ADMIN — WARFARIN SODIUM 4 MG: 2 TABLET ORAL at 17:30

## 2022-06-14 RX ADMIN — TAMSULOSIN HYDROCHLORIDE 0.4 MG: 0.4 CAPSULE ORAL at 08:45

## 2022-06-14 RX ADMIN — ATORVASTATIN CALCIUM 20 MG: 20 TABLET, FILM COATED ORAL at 21:21

## 2022-06-14 RX ADMIN — GABAPENTIN 900 MG: 300 CAPSULE ORAL at 21:21

## 2022-06-14 RX ADMIN — MIDODRINE HYDROCHLORIDE 10 MG: 5 TABLET ORAL at 08:31

## 2022-06-14 RX ADMIN — OXCARBAZEPINE 600 MG: 600 TABLET, FILM COATED ORAL at 21:21

## 2022-06-14 RX ADMIN — CYANOCOBALAMIN TAB 1000 MCG 1000 MCG: 1000 TAB at 08:37

## 2022-06-14 RX ADMIN — Medication 200 MG: at 08:37

## 2022-06-14 RX ADMIN — CETIRIZINE HYDROCHLORIDE 10 MG: 10 TABLET, FILM COATED ORAL at 08:31

## 2022-06-14 RX ADMIN — Medication 200 MG: at 21:21

## 2022-06-14 RX ADMIN — FOLIC ACID 1 MG: 1 TABLET ORAL at 08:31

## 2022-06-14 RX ADMIN — GABAPENTIN 900 MG: 300 CAPSULE ORAL at 08:32

## 2022-06-14 RX ADMIN — FERROUS SULFATE TAB 325 MG (65 MG ELEMENTAL FE) 325 MG: 325 (65 FE) TAB at 08:32

## 2022-06-14 RX ADMIN — OXCARBAZEPINE 600 MG: 600 TABLET, FILM COATED ORAL at 08:31

## 2022-06-14 RX ADMIN — GABAPENTIN 900 MG: 300 CAPSULE ORAL at 13:31

## 2022-06-14 RX ADMIN — Medication 2000 UNITS: at 08:40

## 2022-06-14 RX ADMIN — HYDROXYZINE HYDROCHLORIDE 50 MG: 50 TABLET, FILM COATED ORAL at 05:05

## 2022-06-14 ASSESSMENT — LIFESTYLE VARIABLES: HOW OFTEN DO YOU HAVE A DRINK CONTAINING ALCOHOL: NEVER

## 2022-06-14 ASSESSMENT — PAIN SCALES - GENERAL
PAINLEVEL_OUTOF10: 6
PAINLEVEL_OUTOF10: 3
PAINLEVEL_OUTOF10: 3
PAINLEVEL_OUTOF10: 0

## 2022-06-14 ASSESSMENT — PAIN DESCRIPTION - ORIENTATION: ORIENTATION: RIGHT;LEFT

## 2022-06-14 ASSESSMENT — PAIN DESCRIPTION - LOCATION
LOCATION: LEG
LOCATION: LEG
LOCATION: GENERALIZED

## 2022-06-14 ASSESSMENT — PAIN DESCRIPTION - PAIN TYPE: TYPE: CHRONIC PAIN

## 2022-06-14 ASSESSMENT — PAIN DESCRIPTION - DESCRIPTORS: DESCRIPTORS: ACHING

## 2022-06-14 NOTE — GROUP NOTE
Group Therapy Note    Date: 6/14/2022    Group Start Time: 0900  Group End Time: 0920  Group Topic: Group Documentation    [facility]    Serenity Lilly        Group Therapy Note    Attendees: 7/15         Patient's Goal:  Talk with the doctor about d/c plans  Notes:  Goal setting/support group    Status After Intervention:  Improved    Participation Level:  Active Listener and Interactive    Participation Quality: Sharing and Supportive      Speech:  normal      Thought Process/Content: Logical      Affective Functioning: Congruent      Mood: anxious      Level of consciousness:  Alert, Oriented x4 and Attentive      Response to Learning: Able to verbalize current knowledge/experience, Able to verbalize/acknowledge new learning and Able to retain information      Endings: None Reported    Modes of Intervention: Education, Support, Socialization and Problem-solving      Discipline Responsible: [address], [facility] Tech      Signature:  Serenity Lilly

## 2022-06-14 NOTE — PLAN OF CARE
Problem: Self Harm/Suicidality  Goal: Will have no self-injury during hospital stay  Description: INTERVENTIONS:  1. Q 30 MINUTES: Routine safety checks  2. Q SHIFT & PRN: Assess risk to determine if routine checks are adequate to maintain patient safety  6/14/2022 1024 by David Yanes RN  Outcome: Progressing     Problem: Behavior  Goal: Pt/Family maintain appropriate behavior and adhere to behavioral management agreement, if implemented  Description: INTERVENTIONS:  1. Assess patient/family's coping skills and  non-compliant behavior (including use of illegal substances)  2. Notify security of behavior or suspected illegal substances which indicate the need for search of the patient and/or belongings  3. Encourage verbalization of thoughts and concerns in a socially appropriate manner  4. Utilize positive, consistent limit setting strategies supporting safety of patient, staff and others  5. Encourage participation in the decision making process about the behavioral management agreement  6. Implement a Health Care Agreement if patient meets criteria  7. If a patient's behavior jeopardizes the safety of the patient, staff, or others refer to organization policy. If a visitor's behavior poses a threat to safety call refer to organization policy. 8. Initiate consult with , Psychosocial CNS, Spiritual Care as appropriate  6/14/2022 1024 by David Yanes RN  Outcome: Progressing     Problem: Pain  Goal: Verbalizes/displays adequate comfort level or baseline comfort level  6/14/2022 1024 by David Yanes RN  Outcome: Progressing    Pt denies thoughts of self harm and is agreeable to seeking out should thoughts of self harm arise. Safe environment maintained. Q15 minute checks for safety cont per unit policy. Will cont to monitor for safety and provides support and reassurance as needed. Patient is in behavior control, attending groups and medication compliant.  States he said he was suicidal prior to adm related to leg pain- takes neurontin routinely which helps him.

## 2022-06-14 NOTE — PROGRESS NOTES
Daily Progress Note  6/14/2022    Patient Name: Nona Harvey: Suicidal ideation with plan to purchase a gun and shoot himself in the head         SUBJECTIVE:    Nursing staff report the patient has maintained medication adherence, he has been visible on the milieu and has not required emergency medications since his admission yesterday. Mr. James Khan is agreeable to assessment in the common area where he remains very focused on disliking his current residence due to his neighbors requesting his help frequently. He insists that his statement was impulsive regarding shooting himself in the head and that his mood has improved. He reports his current mood as \"peachy \". He is very focused on melatonin stating that he usually takes 10 mg nightly and when discussed the importance of not over utilizing he verbalizes minimal understanding. Patient does report that his leg pain has improved and believes that it is related to his private hospital bed for which he is very grateful. Patient reports improved suicidal ideation and denies having additional questions or concerns regarding his treatment plan. Appetite:  [x] Normal/Adequate/Unchanged  [] Increased  [] Decreased      Sleep:       [x] Normal/Adequate/Unchanged  [] Fair  [] Poor      Group Attendance on Unit:   [] Yes  [x] Selectively    [] No    Medication Side Effects: Patient denies any medication side effects at the time of assessment. Mental Status Exam  Level of consciousness: Alert and awake. Appearance: Appropriate attire for setting, seated in chair, with fair  grooming and hygiene. Behavior/Motor: Approachable, no psychomotor abnormalities. Attitude toward examiner: Cooperative, attentive, good eye contact. Speech: Normal rate, normal volume, normal tone. Mood:  Patient reports \" peachy\". Affect: Congruent  Thought processes: Linear, goal directed and coherent. Thought content: Denies homicidal ideation.   Suicidal  Potassium 06/12/2022 4.3  3.7 - 5.3 mmol/L Final    Chloride 06/12/2022 102  98 - 107 mmol/L Final    CO2 06/12/2022 23  20 - 31 mmol/L Final    Anion Gap 06/12/2022 10  9 - 17 mmol/L Final    Alkaline Phosphatase 06/12/2022 151* 40 - 129 U/L Final    ALT 06/12/2022 23  5 - 41 U/L Final    AST 06/12/2022 15  <40 U/L Final    Total Bilirubin 06/12/2022 0.23* 0.3 - 1.2 mg/dL Final    Total Protein 06/12/2022 6.6  6.4 - 8.3 g/dL Final    Albumin 06/12/2022 4.1  3.5 - 5.2 g/dL Final    GFR Non- 06/12/2022 >60  >60 mL/min Final    GFR  06/12/2022 >60  >60 mL/min Final    GFR Comment 06/12/2022        Final    Comment: Average GFR for 61-76 years old:   80 mL/min/1.73sq m  Chronic Kidney Disease:   <60 mL/min/1.73sq m  Kidney failure:   <15 mL/min/1.73sq m              eGFR calculated using average adult body mass.  Additional eGFR calculator available at:        [employer]            Ethanol 06/12/2022 <10  <10 mg/dL Final    Ethanol percent 06/12/2022 <0.010  % Final    Amphetamine Screen, Ur 06/13/2022 NEGATIVE  NEGATIVE Final    Comment:       (Positive cutoff 1000 ng/mL)                  Barbiturate Screen, Ur 06/13/2022 NEGATIVE  NEGATIVE Final    Comment:       (Positive cutoff 200 ng/mL)                  Benzodiazepine Screen, Urine 06/13/2022 NEGATIVE  NEGATIVE Final    Comment:       (Positive cutoff 200 ng/mL)                  Cocaine Metabolite, Urine 06/13/2022 POSITIVE* NEGATIVE Final    Comment:       (Positive cutoff 300 ng/mL)                  Methadone Screen, Urine 06/13/2022 NEGATIVE  NEGATIVE Final    Comment:       (Positive cutoff 300 ng/mL)                  Opiates, Urine 06/13/2022 NEGATIVE  NEGATIVE Final    Comment:       (Positive cutoff 300 ng/mL)                  Phencyclidine, Urine 06/13/2022 NEGATIVE  NEGATIVE Final    Comment:       (Positive cutoff 25 ng/mL)                  Cannabinoid Scrn, Ur 06/13/2022 NEGATIVE  NEGATIVE Final    Comment:       (Positive cutoff 50 ng/mL)                  Oxycodone Screen, Ur 06/13/2022 NEGATIVE  NEGATIVE Final    Comment:       (Positive cutoff 100 ng/mL)                  Test Information 06/13/2022 Assay provides medical screening only. The absence of expected drug(s) and/or metabolite(s) may indicate diluted or adulterated urine, limitations of testing or timing of collection. Final    Comment: Testing for legal purposes should be confirmed by another method. To request confirmation   of test result, please call the lab within 7 days of sample submission.  Protime 06/12/2022 19.3* 11.8 - 14.6 sec Final    INR 06/12/2022 1.6   Final    Comment:       Non-therapeutic Range:     INR = 0.9-1.2  Therapeutic Range: Moderate Anticoagulant Intensity:     INR = 2.0-3.0   High Anticoagulant Intensity:     INR = 2.5-3.5            Protime 06/13/2022 17.4* 11.8 - 14.6 sec Final    INR 06/13/2022 1.4   Final    Comment:       Non-therapeutic Range:     INR = 0.9-1.2  Therapeutic Range: Moderate Anticoagulant Intensity:     INR = 2.0-3.0   High Anticoagulant Intensity:     INR = 2.5-3.5            Protime 06/14/2022 16.4* 11.8 - 14.6 sec Final    INR 06/14/2022 1.3   Final    Comment:       Non-therapeutic Range:     INR = 0.9-1.2  Therapeutic Range: Moderate Anticoagulant Intensity:     INR = 2.0-3.0   High Anticoagulant Intensity:     INR = 2.5-3.5                 Reviewed patient's current plan of care and vital signs with nursing staff.     Labs reviewed: [x] Yes  Last EKG in EMR reviewed: [x] Yes  QTc: 414    Medications  Current Facility-Administered Medications: warfarin (COUMADIN) tablet 4 mg, 4 mg, Oral, Once  warfarin placeholder: dosing by pharmacy, , Other, RX Placeholder  aspirin chewable tablet 81 mg, 81 mg, Oral, Daily  atorvastatin (LIPITOR) tablet 20 mg, 20 mg, Oral, Nightly  calcium citrate (CALCITRATE) tablet 200 mg, 200 mg, Oral, BID  cetirizine (ZYRTEC) tablet 10 mg, 10 mg, Oral, Daily  Vitamin D (CHOLECALCIFEROL) tablet 2,000 Units, 2,000 Units, Oral, Daily with breakfast  vitamin B-12 (CYANOCOBALAMIN) tablet 1,000 mcg, 1,000 mcg, Oral, Daily  docusate sodium (COLACE) capsule 200 mg, 200 mg, Oral, Daily  ferrous sulfate (IRON 325) tablet 325 mg, 325 mg, Oral, Daily with breakfast  folic acid (FOLVITE) tablet 1 mg, 1 mg, Oral, Daily  furosemide (LASIX) tablet 20 mg, 20 mg, Oral, Daily  gabapentin (NEURONTIN) capsule 900 mg, 900 mg, Oral, TID  magnesium hydroxide (MILK OF MAGNESIA) 400 MG/5ML suspension 30 mL, 30 mL, Oral, Nightly  midodrine (PROAMATINE) tablet 10 mg, 10 mg, Oral, TID  OXcarbazepine (TRILEPTAL) tablet 600 mg, 600 mg, Oral, BID  pantoprazole (PROTONIX) tablet 40 mg, 40 mg, Oral, Daily  sennosides-docusate sodium (SENOKOT-S) 8.6-50 MG tablet 1 tablet, 1 tablet, Oral, Daily  tamsulosin (FLOMAX) capsule 0.4 mg, 0.4 mg, Oral, Daily  melatonin tablet 3 mg, 3 mg, Oral, Nightly PRN  acetaminophen (TYLENOL) tablet 650 mg, 650 mg, Oral, Q6H PRN  hydrOXYzine HCl (ATARAX) tablet 50 mg, 50 mg, Oral, TID PRN  polyethylene glycol (GLYCOLAX) packet 17 g, 17 g, Oral, Daily PRN  aluminum & magnesium hydroxide-simethicone (MAALOX) 200-200-20 MG/5ML suspension 30 mL, 30 mL, Oral, Q6H PRN  haloperidol lactate (HALDOL) injection 5 mg, 5 mg, IntraMUSCular, Q6H PRN **AND** LORazepam (ATIVAN) injection 2 mg, 2 mg, IntraMUSCular, Q6H PRN **AND** diphenhydrAMINE (BENADRYL) injection 50 mg, 50 mg, IntraMUSCular, Q6H PRN  haloperidol (HALDOL) tablet 5 mg, 5 mg, Oral, Q6H PRN **AND** LORazepam (ATIVAN) tablet 2 mg, 2 mg, Oral, Q6H PRN    ASSESSMENT  Severe recurrent major depression without psychotic features ([facility] [address]         HANDOFF  Patient symptoms are:showing improvement  Medications as determined by attending physician  Encourage participation in groups and milieu.   Probable discharge is to be determined by MD    Electronically signed by Osvaldo Lyons Nabeel Auguste - CNP on 6/14/2022 at 4:26 PM    **This report has been created using voice recognition software. It may contain minor errors which are inherent in voice recognition technology. **      I independently saw and evaluated the patient. I reviewed the nurse practitioners documentation above. Any additional comments or changes to the nurse practitioners documentation are stated below otherwise agree with assessment. Plan will be as follows:  Patient symptoms trending in a positive direction. Denying side effects or concerns with medications and is author. We will work with his team to assess the status of any living situation change answers high likelihood of recurrence of symptoms if he returns to the same residence  PLAN  Patient s symptoms   are improving  Observation on current medication for now  Attempt to develop insight  Psycho-education conducted. Supportive Therapy conducted.   Probable discharge is undetermined at this time  Follow-up daily while on inpatient unit

## 2022-06-14 NOTE — GROUP NOTE
Group Therapy Note    Date: 6/14/2022    Group Start Time: 1330  Group End Time: 8360  Group Topic: Music Therapy    NORAH SMITH    Harlan Moya        Group Therapy Note    Attendees: 4/12         Patient's Goal:  Patient engaged in music appreciation group, sharing their preferred music and engaged in conversations with peers and staff. Goals to increase self-expression; Increase socialization; increase sense of community; and normalize the environemnt    Notes:  Patient attended and participated in group having positive interactions with peers and staff. Patient shared music and engaged appropriately in conversations with peers and staff    Status After Intervention:  Improved    Participation Level:  Active Listener and Interactive    Participation Quality: Appropriate, Attentive and Sharing      Speech:  normal      Thought Process/Content: Logical  Linear      Affective Functioning: Congruent      Mood: euthymic      Level of consciousness:  Alert and Attentive      Response to Learning: Able to verbalize current knowledge/experience and Progressing to goal      Endings: None Reported    Modes of Intervention: Socialization, Exploration, Activity, Media and Reality-testing      Discipline Responsible: Psychoeducational Specialist      Signature:  Harlan Moya

## 2022-06-14 NOTE — PLAN OF CARE
5 [facility]  Day 3 Interdisciplinary Treatment Plan NOTE    Review Date & Time: 6/14/2022   0931    Admission Type:   Admission Type: Voluntary    Reason for admission:  Reason for Admission: Patient arrives to [facility] with c/o suicidal thoughts to shoot self. Patient does not own a gun or have access to a gun. Patient is living in [place] [address] and is unhappy with his living arragement and neighboring tenants.   Estimated Length of Stay: 5-7 days  Estimated Discharge Date Update: to be determined by physician    PATIENT STRENGTHS:  Patient Strengths    Patient Strengths and Limitations:Limitations: Difficulty problem solving/relies on others to help solve problems,Tendency to isolate self,External locus of control,Inappropriate/potentially harmful leisure interests  Addictive Behavior:Addictive Behavior  In the Past 3 Months, Have You Felt or Has Someone Told You That You Have a Problem With  : None  Medical Problems:  Past Medical History:   Diagnosis Date    AMS (altered mental status) 02/15/2020    Atrial fibrillation ([facility]     on coumadin    Bipolar 1 disorder ([facility] [address]     Depression     Hypertension     Neck pain     Neurofibromatosis ([facility] [address]     Lt leg pain    Patient in clinical research study 04/12/2018    [id number]    Severe recurrent major depression without psychotic features ([facility] [address] 6/13/2022    Twitching        Risk:  Fall Risk   Jaycob Scale Jaycob Scale Score: 22  BVC    Change in scores no Changes to plan of Care no    Status EXAM:   Mental Status and Behavioral Exam  Normal: No  Level of Assistance: Independent/Self  Facial Expression: Flat  Affect: Blunt  Level of Consciousness: Alert  Frequency of Checks: 4 times per hour, close  Mood:Normal: No  Mood: Depressed,Anxious  Motor Activity:Normal: No  Motor Activity: Unusual posture/gait  Eye Contact: Fair  Observed Behavior: Preoccupied,Cooperative  Sexual Misconduct History: Current - no  Preception: [place] to person,[place] to time,[place] to place,[place] to situation  Attention:Normal: No  Attention: Unable to concentrate  Thought Processes: Blocking  Thought Content:Normal: No  Thought Content: Preoccupations  Depression Symptoms: Impaired concentration,Isolative  Anxiety Symptoms: Generalized  Mirian Symptoms: No problems reported or observed. Hallucinations: None  Delusions: No  Memory:Normal: No  Memory: Poor recent  Insight and Judgment: No  Insight and Judgment: Poor judgment,Poor insight    Daily Assessment Last Entry:   Daily Sleep (WDL): Within Defined Limits            Daily Nutrition (WDL): Within Defined Limits  Level of Assistance: Independent/Self    Patient Monitoring:  Frequency of Checks: 4 times per hour, close    Psychiatric Symptoms:   Depression Symptoms  Depression Symptoms: Impaired concentration,Isolative  Anxiety Symptoms  Anxiety Symptoms: Generalized  Mirian Symptoms  Mirian Symptoms: No problems reported or observed. Suicide Risk CSSR-S:  1) Within the past month, have you wished you were dead or wished you could go to sleep and not wake up? : Yes  2) Have you actually had any thoughts of killing yourself? : No  3) Have you been thinking about how you might kill yourself? : No  5) Have you started to work out or worked out the details of how to kill yourself?  Do you intend to carry out this plan? : No  6) Have you ever done anything, started to do anything, or prepared to do anything to end your life?: Yes  Change in Result NO Change in Plan of care NO      EDUCATION:   EDUCATION:   Learner Progress Toward Treatment Goals: Reviewed results and recommendations of this team, Reviewed group plan and strategies, Reviewed signs, symptoms and risk of self harm and violent behavior, Reviewed goals and plan of care    Method:small group, individual verbal education    Outcome:verbalized by patient, but needs reinforcement to obtain goals    PATIENT GOALS:  Short term: Improved boundaries and \"learning to say no\"; Medication stabilization  Long term: looking into assisted living;  Follow-up with outpatient    PLAN/TREATMENT RECOMMENDATIONS UPDATE: continue with group therapies, increased socialization, continue planning for after discharge goals, continue with medication compliance    SHORT-TERM GOALS UPDATE:   Time frame for Short-Term Goals: 5-7 days    LONG-TERM GOALS UPDATE:   Time frame for Long-Term Goals: 6 months  Members Present in Team Meeting: See Signature Sheet    Jackson Rios

## 2022-06-14 NOTE — GROUP NOTE
Group Therapy Note    Date: 6/14/2022    Group Start Time: 1000  Group End Time: 1025  Group Topic: Psychotherapy    PIETER Lam LSW        Group Therapy Note    Attendees: 8/15         Patient's Goal:  Increase interpersonal relationship skills    Notes:  Patient was an active participant in group discussion    Status After Intervention:  Improved    Participation Level:  Active Listener and Interactive    Participation Quality: Appropriate, Attentive and Sharing      Speech:  normal      Thought Process/Content: Logical      Affective Functioning: Congruent      Mood: euthymic      Level of consciousness:  Alert, Oriented x4 and Attentive      Response to Learning: Able to verbalize current knowledge/experience, Able to verbalize/acknowledge new learning and Able to retain information      Endings: None Reported    Modes of Intervention: Support, Socialization and Exploration      Discipline Responsible: /Counselor      Signature:  PIETER Erickson, LUIS

## 2022-06-14 NOTE — PROGRESS NOTES
Pharmacy Note  Warfarin Consult follow-up      Recent Labs     06/12/22  1727 06/13/22  1126 06/14/22  0624   INR 1.6 1.4 1.3     Recent Labs     06/12/22  1727   HGB 12.2*   HCT 37.2*          Significant Drug-Drug Interactions:  New warfarin drug-drug interactions: Aspirin  Discontinued drug-drug interactions: none  Current warfarin drug-drug interactions: Acetaminophen, Aspirin      Date             INR        Dose given previous day  Dose scheduled for today  6/14/2022        1.3       4 mg          4 mg        Notes:   INR remaining subtherapeutic likely from missed doses. Give 4 mg again today. Daily PT/INR while inpatient.      Meli Marcus, PharmD, BCPS, BCPP  6/14/2022 9:27 AM  [phone]

## 2022-06-14 NOTE — GROUP NOTE
Group Therapy Note    Date: 6/14/2022    Group Start Time: 1100  Group End Time: 1130  Group Topic: Recreational    NORAH SMITH    Jackson Rios        Group Therapy Note    Attendees: 5/13       Patient's Goal:  Patients engaged in leisurely conversation group where they had two pick between two related topics (I.e. \"cake or ice cream\" \"helicopter or hot air balloon\" \"sweet or salty\", and state their reasoning for making the decision. Goals to increase socialization; Increase sense of community; Increase self-expression; Practice turn taking and demonstrate impulse control    Notes:  Patient attended and participated in group having positive interactions with peers and staff throughout. Required some assistance reading, but was appropriately engaged throughout    Status After Intervention:  Improved    Participation Level:  Active Listener and Interactive    Participation Quality: Appropriate, Attentive and Sharing      Speech:  Normal      Thought Process/Content: Logical  Linear      Affective Functioning: Congruent      Mood: euthymic      Level of consciousness:  Alert and Attentive      Response to Learning: Able to verbalize current knowledge/experience, Progressing to goal      Endings: None Reported    Modes of Intervention: Socialization, Exploration, Activity, Limit-setting and Reality-testing      Discipline Responsible: Psychoeducational Specialist      Signature:  Jackson Rios

## 2022-06-14 NOTE — PROGRESS NOTES
[address] Internal Medicine  Raul Rhodes MD; Nickolas Zuñiga MD; Noelle Dobson MD; MD Taj Murcia Caro, MD; MD ERASTO Garcia [facility] Internal Medicine   [facility]    HISTORY AND PHYSICAL EXAMINATION            Date:   6/14/2022  Patient name:  Eva Cantor  Date of admission:  6/12/2022  4:02 PM  MRN:   [id number]  Account:  [de-identified]  YOB: 1960  PCP:    No primary care provider on file. Room:   [address] [place] [address]  Code Status:    Full Code    Chief Complaint:     Chief Complaint   Patient presents with    Suicidal   Multiple medical issues    History Obtained From:   Patient medical record nursing staff    History of Present Illness:     Eva Cantor is a 57 y.o. Non- / non  male who presents with Suicidal   and is admitted to the hospital for the management of Severe recurrent major depression without psychotic features ([address] HTN  Onset more than 2 years ago  rose mild to mod  Controlled with current po meds  Not associated with headaches or blurry vision  No chest pain    HLD  Onset more than 5 years ago  Severity is mild, not getting worse  Not associated with pancreatitis  Tolerating statin well no muscle pain    Hx of afib  Cad ca bg  Back pain  Onset more than 1year ago  Severity is moderate to severe  Not associated wt lossbut associated with radiation of pain on the legs  No bowel bladder incontinence   Aggravated with bending and better with pain meds and rest.  Controlled with current pain meds.         Past Medical History:     Past Medical History:   Diagnosis Date    AMS (altered mental status) 02/15/2020    Atrial fibrillation ([address]     on coumadin    Bipolar 1 disorder (HCC)     Depression     Hypertension     Neck pain     Neurofibromatosis ([address]     Lt leg pain    Patient in clinical research study 04/12/2018    [id number]    Severe recurrent major depression without psychotic features ([facility] [address] 6/13/2022    Twitching         Past Surgical History:     Past Surgical History:   Procedure Laterality Date    ABDOMEN SURGERY      APPENDECTOMY      CARDIAC SURGERY  7/14/15    Median Sternotomy, Repair of ascending aorti aneurysm, stentless valve placement    CARDIAC SURGERY  07/14/2015    Median stenotomy, repair of ascending aorti aneurysm, stentless valve placement     COLONOSCOPY  01/28/2021    DILATATION, ESOPHAGUS      HERNIA REPAIR      NECK SURGERY  2013    TONSILLECTOMY      UPPER GASTROINTESTINAL ENDOSCOPY  01/28/2021        Medications Prior to Admission:     Prior to Admission medications    Medication Sig Start Date End Date Taking?  Authorizing Provider   sennosides-docusate sodium (SENOKOT-S) 8.6-50 MG tablet Take 1 tablet by mouth daily   Yes Historical Provider, MD   melatonin 5 mg TABS tablet Take 10 mg by mouth daily   Yes Historical Provider, MD   docusate sodium (COLACE) 100 mg capsule Take 200 mg by mouth daily   Yes Historical Provider, MD   magnesium hydroxide (MILK OF MAGNESIA) 400 MG/5ML suspension Take 30 mLs by mouth nightly   Yes Historical Provider, MD   calcium citrate (CALCITRATE) 250 MG TABS tablet Take 250 mg by mouth 2 times daily   Yes Historical Provider, MD   diphenhydrAMINE (BENADRYL) 25 MG tablet Take 25 mg by mouth every 6 hours as needed   Yes Historical Provider, MD   warfarin (COUMADIN) 2 MG tablet Take 2 mg by mouth   Yes Historical Provider, MD   pantoprazole (PROTONIX) 40 MG tablet Take 40 mg by mouth daily   Yes Historical Provider, MD   acetaminophen (TYLENOL) 500 MG tablet Take 500 mg by mouth every 6 hours as needed 2/25/22 2/25/23 Yes Historical Provider, MD   Cholecalciferol (VITAMIN D3) 50 MCG (2000 UT) CAPS Take 1 capsule by mouth daily (with breakfast)    Historical Provider, MD   OXcarbazepine (TRILEPTAL) 300 MG tablet Take 600 mg by mouth 2 times daily     Historical Provider, MD   FEROSUL 325 (65 Fe) MG tablet Take 325 mg by mouth daily (with breakfast)  7/7/21   Historical Provider, MD   gabapentin (NEURONTIN) 300 MG capsule Take 900 mg by mouth 3 times daily. Pt. States he takes 900 mg. TID    Historical Provider, MD   aspirin 81 MG chewable tablet Take 1 tablet by mouth daily 7/8/20   Cali Mathews MD   atorvastatin (LIPITOR) 20 MG tablet Take 1 tablet by mouth nightly 7/7/20   Cali Mathews MD   midodrine (PROAMATINE) 10 MG tablet Take 10 mg by mouth 3 times daily    Historical Provider, MD   folic acid (FOLVITE) 1 MG tablet Take 1 mg by mouth daily    Historical Provider, MD   furosemide (LASIX) 20 MG tablet Take 20 mg by mouth daily    Historical Provider, MD   tamsulosin (FLOMAX) 0.4 MG capsule Take 0.4 mg by mouth daily    Historical Provider, MD   Cyanocobalamin (VITAMIN B 12) 500 MCG TABS Take 1,000 mcg by mouth daily     Historical Provider, MD   alendronate (FOSAMAX) 70 MG tablet Take 70 mg by mouth every 7 days Patient takes on mondays. Historical Provider, MD   cetirizine (ZYRTEC) 10 MG tablet Take 10 mg by mouth daily    Historical Provider, MD        Allergies:     Oxycodone, Fish-derived products, Ibuprofen, Latuda [lurasidone hcl], Lithium, Lurasidone, Quetiapine, and Seroquel [quetiapine fumarate]    Social History:     Tobacco:    reports that he has never smoked. He has never used smokeless tobacco.  Alcohol:      reports current alcohol use. Drug Use:  reports current drug use. Drug: Cocaine. Family History:     Family History   Problem Relation Age of Onset    Coronary Art Dis Mother     Hypertension Mother     Hypertension Father     Cancer Father         blood       Review of Systems:     Positive and Negative as described in HPI.     CONSTITUTIONAL:  negative for fevers, chills, sweats, fatigue, weight loss  HEENT:  negative for vision, hearing changes, runny nose, throat pain  RESPIRATORY:  negative for shortness of breath, cough, congestion, wheezing  CARDIOVASCULAR:  negative for chest pain, palpitations  GASTROINTESTINAL:  negative for nausea, vomiting, diarrhea, constipation, change in bowel habits, abdominal pain   GENITOURINARY:  negative for difficulty of urination, burning with urination, frequency   INTEGUMENT:  negative for rash, skin lesions, easy bruising   HEMATOLOGIC/LYMPHATIC:  negative for swelling/edema   ALLERGIC/IMMUNOLOGIC:  negative for urticaria , itching  ENDOCRINE:  negative increase in drinking, increase in urination, hot or cold intolerance  MUSCULOSKELETAL:  negative joint pains, muscle aches, swelling of joints  NEUROLOGICAL:  negative for headaches, dizziness, lightheadedness, numbness, pain, tingling extremities      Physical Exam:   /89   Pulse 57   Temp 97.9 °F (36.6 °C) (Oral)   Resp 14   Ht 5' 6\" (1.676 m)   Wt 190 lb (86.2 kg)   SpO2 97%   BMI 30.67 kg/m²   Temp (24hrs), Av °F (36.7 °C), Min:97.9 °F (36.6 °C), Max:98.1 °F (36.7 °C)    No results for input(s): POCGLU in the last 72 hours.   No intake or output data in the 24 hours ending 22 1633    General Appearance: alert, well appearing, and in no acute distress  Mental status: oriented to person, place, and time  Head: normocephalic, atraumatic  Eye: no icterus, redness, pupils equal and reactive, extraocular eye movements intact, conjunctiva clear  Ear: normal external ear, no discharge, hearing intact  Nose: no drainage noted  Mouth: mucous membranes moist  Edentulous  Neck: supple, no carotid bruits, thyroid not palpable  Lungs: Bilateral equal air entry, clear to ausculation, no wheezing, rales or rhonchi, normal effort  Cardiovascular: normal rate, regular rhythm, no murmur, gallop, rub  Abdomen: Soft, nontender, nondistended, normal bowel sounds, no hepatomegaly or splenomegaly  Midline laparotomy scar noted  Thoracotomy scar noted midline  In the back thoracic surgery scar noted on both right and left  Neurologic: There are no new focal motor or sensory deficits, normal muscle tone and to dose warfarin started  inr noted  eric caruso advised to pt ,to prevent incisional hernia        MD Eros  6/14/2022  4:33 PM    Copy sent to Dr. aGrrett Sorensen primary care provider on file. Please note that this chart was generated using voice recognition Dragon dictation software. Although every effort was made to ensure the accuracy of this automated transcription, some errors in transcription may have occurred.

## 2022-06-14 NOTE — PLAN OF CARE
Problem: Self Harm/Suicidality  Goal: Will have no self-injury during hospital stay  Description: INTERVENTIONS:  1. Q 30 MINUTES: Routine safety checks  2. Q SHIFT & PRN: Assess risk to determine if routine checks are adequate to maintain patient safety  6/13/2022 2107 by Altaf Ellison RN  Outcome: Progressing     Problem: Behavior  Goal: Pt/Family maintain appropriate behavior and adhere to behavioral management agreement, if implemented  Description: INTERVENTIONS:  1. Assess patient/family's coping skills and  non-compliant behavior (including use of illegal substances)  2. Notify security of behavior or suspected illegal substances which indicate the need for search of the patient and/or belongings  3. Encourage verbalization of thoughts and concerns in a socially appropriate manner  4. Utilize positive, consistent limit setting strategies supporting safety of patient, staff and others  5. Encourage participation in the decision making process about the behavioral management agreement  6. Implement a Health Care Agreement if patient meets criteria  7. If a patient's behavior jeopardizes the safety of the patient, staff, or others refer to organization policy. If a visitor's behavior poses a threat to safety call refer to organization policy. 8. Initiate consult with , Psychosocial CNS, Spiritual Care as appropriate  6/13/2022 2107 by Altaf Ellison RN  Outcome: Progressing   Patient denies thoughts of self harm. Patient reports depression and anxiety. Patient is isolative to self. Patient is medication compliant.

## 2022-06-15 LAB
INR BLD: 1.5
PROTHROMBIN TIME: 17.8 SEC (ref 11.8–14.6)

## 2022-06-15 PROCEDURE — 85610 PROTHROMBIN TIME: CPT

## 2022-06-15 PROCEDURE — 6370000000 HC RX 637 (ALT 250 FOR IP): Performed by: PSYCHIATRY & NEUROLOGY

## 2022-06-15 PROCEDURE — 36415 COLL VENOUS BLD VENIPUNCTURE: CPT

## 2022-06-15 PROCEDURE — APPSS30 APP SPLIT SHARED TIME 16-30 MINUTES: Performed by: PSYCHIATRY & NEUROLOGY

## 2022-06-15 PROCEDURE — 1240000000 HC EMOTIONAL WELLNESS R&B

## 2022-06-15 PROCEDURE — 99232 SBSQ HOSP IP/OBS MODERATE 35: CPT | Performed by: PSYCHIATRY & NEUROLOGY

## 2022-06-15 RX ORDER — WARFARIN SODIUM 2 MG/1
2 TABLET ORAL
Status: COMPLETED | OUTPATIENT
Start: 2022-06-15 | End: 2022-06-15

## 2022-06-15 RX ADMIN — HYDROXYZINE HYDROCHLORIDE 50 MG: 50 TABLET, FILM COATED ORAL at 21:19

## 2022-06-15 RX ADMIN — ASPIRIN 81 MG: 81 TABLET, CHEWABLE ORAL at 08:46

## 2022-06-15 RX ADMIN — ACETAMINOPHEN 650 MG: 325 TABLET ORAL at 16:31

## 2022-06-15 RX ADMIN — WARFARIN SODIUM 2 MG: 2 TABLET ORAL at 17:43

## 2022-06-15 RX ADMIN — MIDODRINE HYDROCHLORIDE 10 MG: 5 TABLET ORAL at 08:46

## 2022-06-15 RX ADMIN — FOLIC ACID 1 MG: 1 TABLET ORAL at 08:46

## 2022-06-15 RX ADMIN — PANTOPRAZOLE SODIUM 40 MG: 40 TABLET, DELAYED RELEASE ORAL at 08:46

## 2022-06-15 RX ADMIN — Medication 2000 UNITS: at 08:45

## 2022-06-15 RX ADMIN — SENNOSIDES AND DOCUSATE SODIUM 1 TABLET: 50; 8.6 TABLET ORAL at 08:45

## 2022-06-15 RX ADMIN — MAGNESIUM HYDROXIDE 30 ML: 400 SUSPENSION ORAL at 21:18

## 2022-06-15 RX ADMIN — MIDODRINE HYDROCHLORIDE 10 MG: 5 TABLET ORAL at 13:06

## 2022-06-15 RX ADMIN — FUROSEMIDE 20 MG: 20 TABLET ORAL at 08:46

## 2022-06-15 RX ADMIN — HYDROXYZINE HYDROCHLORIDE 50 MG: 50 TABLET, FILM COATED ORAL at 03:43

## 2022-06-15 RX ADMIN — ATORVASTATIN CALCIUM 20 MG: 20 TABLET, FILM COATED ORAL at 21:19

## 2022-06-15 RX ADMIN — DOCUSATE SODIUM 200 MG: 100 CAPSULE, LIQUID FILLED ORAL at 08:46

## 2022-06-15 RX ADMIN — HYDROXYZINE HYDROCHLORIDE 50 MG: 50 TABLET, FILM COATED ORAL at 13:06

## 2022-06-15 RX ADMIN — GABAPENTIN 900 MG: 300 CAPSULE ORAL at 08:45

## 2022-06-15 RX ADMIN — OXCARBAZEPINE 600 MG: 600 TABLET, FILM COATED ORAL at 08:45

## 2022-06-15 RX ADMIN — Medication 200 MG: at 21:22

## 2022-06-15 RX ADMIN — CYANOCOBALAMIN TAB 1000 MCG 1000 MCG: 1000 TAB at 08:45

## 2022-06-15 RX ADMIN — GABAPENTIN 900 MG: 300 CAPSULE ORAL at 13:06

## 2022-06-15 RX ADMIN — MIDODRINE HYDROCHLORIDE 10 MG: 5 TABLET ORAL at 21:19

## 2022-06-15 RX ADMIN — ACETAMINOPHEN 650 MG: 325 TABLET ORAL at 03:42

## 2022-06-15 RX ADMIN — CETIRIZINE HYDROCHLORIDE 10 MG: 10 TABLET, FILM COATED ORAL at 08:46

## 2022-06-15 RX ADMIN — Medication 3 MG: at 21:19

## 2022-06-15 RX ADMIN — TAMSULOSIN HYDROCHLORIDE 0.4 MG: 0.4 CAPSULE ORAL at 08:46

## 2022-06-15 RX ADMIN — GABAPENTIN 900 MG: 300 CAPSULE ORAL at 21:19

## 2022-06-15 RX ADMIN — Medication 200 MG: at 08:45

## 2022-06-15 RX ADMIN — OXCARBAZEPINE 600 MG: 600 TABLET, FILM COATED ORAL at 21:19

## 2022-06-15 RX ADMIN — FERROUS SULFATE TAB 325 MG (65 MG ELEMENTAL FE) 325 MG: 325 (65 FE) TAB at 08:46

## 2022-06-15 ASSESSMENT — PAIN SCALES - GENERAL
PAINLEVEL_OUTOF10: 4
PAINLEVEL_OUTOF10: 5
PAINLEVEL_OUTOF10: 6

## 2022-06-15 ASSESSMENT — PAIN DESCRIPTION - ORIENTATION
ORIENTATION: RIGHT;LEFT
ORIENTATION: RIGHT;LEFT

## 2022-06-15 ASSESSMENT — PAIN DESCRIPTION - LOCATION
LOCATION: LEG
LOCATION: LEG

## 2022-06-15 ASSESSMENT — PAIN DESCRIPTION - DESCRIPTORS: DESCRIPTORS: ACHING;DISCOMFORT

## 2022-06-15 NOTE — GROUP NOTE
Group Therapy Note    Date: 6/15/2022    Group Start Time: 0900  Group End Time: 0448  Group Topic: Community Meeting    [facility] [employer]        Group Therapy Note    Attendees: 6/14         Patient's Goal:  Go to groups, talk to doctor about discharge    Notes:  Controlled, pleasant    Status After Intervention:  Unchanged    Participation Level: Active Listener and Interactive    Participation Quality: Appropriate and Attentive      Speech:  normal      Thought Process/Content: Logical      Affective Functioning: Congruent      Mood: stable      Level of consciousness:  Alert and Attentive      Response to Learning: Able to verbalize current knowledge/experience and Progressing to goal      Endings: None Reported    Modes of Intervention: Education, Support and Socialization      Discipline Responsible: Behavorial Health Tech      Signature:   Lidia Andre

## 2022-06-15 NOTE — GROUP NOTE
Group Therapy Note    Date: 6/15/2022    Group Start Time: 1100  Group End Time: 6864  Group Topic: Psychoeducation    NORAH SMITH    Rose Tomlin        Group Therapy Note    Attendees: 7/13         Patient's Goal:  Patients engaged in [address] [facility] or [address] conversation group. Shared their opinions on a variety of topics and engaged in conversations with peers and staff. Following activity, patients discussed qualities of conversations that could make them productive or unproductive, and discussed effective communication skills. Goals to practice and engage in discussion about effective communication; Increase self-expression; Increase socialization; Increase sense of community;    Notes:  Patient attended and participate din group having positive interactions with peers and staff throughout. Engage appropriately in conversations and was pleasant    Status After Intervention:  Improved    Participation Level:  Active Listener and Interactive    Participation Quality: Appropriate, Attentive and Sharing      Speech:  normal      Thought Process/Content: Logical  Linear      Affective Functioning: Congruent      Mood: euthymic      Level of consciousness:  Alert and Attentive      Response to Learning: Able to verbalize current knowledge/experience and Progressing to goal      Endings: None Reported    Modes of Intervention: Education, Socialization, Exploration, Activity and Reality-testing      Discipline Responsible: Psychoeducational Specialist      Signature:  Rose Tomlin

## 2022-06-15 NOTE — GROUP NOTE
Group Therapy Note    Date: 6/15/2022    Group Start Time: 1000  Group End Time: 4328  Group Topic: Psychotherapy    NORAH BHPIETER Lauren, LUIS        Group Therapy Note    Attendees: 5/14         Patient's Goal:  Increase interpersonal relationship skills    Notes:  Patient was an active participant in group discussion    Status After Intervention:  Improved    Participation Level:  Active Listener and Interactive    Participation Quality: Appropriate, Attentive and Sharing      Speech:  normal      Thought Process/Content: Logical  Linear      Affective Functioning: Congruent      Mood: euthymic      Level of consciousness:  Alert, Oriented x4 and Attentive      Response to Learning: Able to verbalize current knowledge/experience, Able to verbalize/acknowledge new learning and Able to retain information      Endings: None Reported    Modes of Intervention: Support, Socialization and Exploration      Discipline Responsible: /Counselor      Signature:  Morna Blizzard, MSW, LUIS

## 2022-06-15 NOTE — PROGRESS NOTES
Pharmacy Note  Warfarin Consult follow-up      Recent Labs     06/12/22  1727 06/13/22  1126 06/14/22  0624   INR 1.6 1.4 1.3     Recent Labs     06/12/22  1727   HGB 12.2*   HCT 37.2*          Significant Drug-Drug Interactions:  New warfarin drug-drug interactions: Aspirin  Discontinued drug-drug interactions: none  Current warfarin drug-drug interactions: Acetaminophen, Aspirin      Date             INR        Dose given previous day  Dose scheduled for today  6/14/2022        1.3       4 mg          4 mg  6/15/2022        1.5             4 mg                                                  2 mg        Notes:   INR trending back up. Give 2 mg today. Daily PT/INR while inpatient.      Reno Halsted, PharmD, BCPS, BCPP  6/15/2022 2:05 PM  [phone]

## 2022-06-15 NOTE — GROUP NOTE
Group Therapy Note    Date: 6/15/2022    Group Start Time: 1330  Group End Time: 1042  Group Topic: Music Therapy    NORAH SMITH    Donna Odom        Group Therapy Note    Attendees: 5/13         Patient's Goal:  Patients shared preferred music and dedicated songs to important people in their lives. Goals to reflect on relationships; Increase socialization; Increase sense of community; Increase self-expression; Normalization of the environment;    Notes:  Patient attended and participated in group having positive interactions with peers and staff. Patient shared song and dedicated it to th mother of his child. Expressed they are no longer in contact, but if she needed anything he would help as best he could    Status After Intervention:  Improved    Participation Level:  Active Listener and Interactive    Participation Quality: Appropriate, Attentive and Sharing      Speech:  normal      Thought Process/Content: Logical  Linear      Affective Functioning: Congruent      Mood: euthymic      Level of consciousness:  Alert and Attentive      Response to Learning: Able to verbalize current knowledge/experience and Progressing to goal      Endings: None Reported    Modes of Intervention: Socialization, Exploration, Activity, Media and Reality-testing      Discipline Responsible: Psychoeducational Specialist      Signature:  Donna Odom

## 2022-06-15 NOTE — PROGRESS NOTES
Daily Progress Note  6/15/2022    Patient Name: Cecil Morales: Suicidal ideation with plan to purchase a gun and shoot himself in the head         SUBJECTIVE:    Nursing staff report the patient has maintained medication adherence and has not required emergency medications. Mr. Alicia Rucker is visible today engaging with nursing students and attending group programming. He is agreeable to assessment in the common area where he reports his mood as \"doin' Okay\". He verbalizes some forward thinking and that he will transition back to his residence as his symptoms have shown improvement. He is grateful for the comfort of the hospital bed and is requesting a referral so he may have one at home as his leg pain and neck discomfort has improved greatly. Neisha Menezes denies side effects related to his medications. He endorses good appetite and as noted improved sleep pattern. He also reports improved suicidal ideation and denies having additional questions or concerns regarding his treatment plan. Appetite:  [x] Normal/Adequate/Unchanged  [] Increased  [] Decreased      Sleep:       [x] Normal/Adequate/Unchanged  [] Fair  [] Poor      Group Attendance on Unit:   [] Yes  [x] Selectively    [] No    Medication Side Effects: Patient denies any medication side effects at the time of assessment. Mental Status Exam  Level of consciousness: Alert and awake. Appearance: Appropriate attire for setting, seated in chair, with fair  grooming and hygiene. Behavior/Motor: Approachable, no psychomotor abnormalities. Attitude toward examiner: Cooperative, attentive, good eye contact. Speech: Normal rate, normal volume, normal tone. Mood:  Patient reports \" peachy\". Affect: Congruent  Thought processes: Linear, goal directed and coherent. Thought content: Denies homicidal ideation.   Suicidal Ideation: Reports improvement in suicidal ideations, without current plan or intent, contracts for safety on the unit.   Delusions: No evidence of delusions. Denies paranoia. Perceptual Disturbance: Patient does not appear to be responding to internal stimuli. Denies auditory hallucinations. Denies visual hallucinations. Cognition: Oriented to self, location, time, and situation. Memory: Intact. Insight & Judgement: Showing improvement    Data   height is 5' 6\" (1.676 m) and weight is 190 lb (86.2 kg). His oral temperature is 97.9 °F (36.6 °C). His blood pressure is 131/81 and his pulse is 61. His respiration is 14 and oxygen saturation is 100%.    Labs:   Admission on 06/12/2022   Component Date Value Ref Range Status    WBC 06/12/2022 7.3  3.5 - 11.0 k/uL Final    RBC 06/12/2022 4.02* 4.5 - 5.9 m/uL Final    Hemoglobin 06/12/2022 12.2* 13.5 - 17.5 g/dL Final    Hematocrit 06/12/2022 37.2* 41 - 53 % Final    MCV 06/12/2022 92.5  80 - 100 fL Final    MCH 06/12/2022 30.4  26 - 34 pg Final    MCHC 06/12/2022 32.8  31 - 37 g/dL Final    RDW 06/12/2022 16.6* 11.5 - 14.9 % Final    Platelets 96/95/6706 235  150 - 450 k/uL Final    MPV 06/12/2022 7.1  6.0 - 12.0 fL Final    Seg Neutrophils 06/12/2022 78* 36 - 66 % Final    Lymphocytes 06/12/2022 11* 24 - 44 % Final    Monocytes 06/12/2022 8* 1 - 7 % Final    Eosinophils % 06/12/2022 2  0 - 4 % Final    Basophils 06/12/2022 1  0 - 2 % Final    Segs Absolute 06/12/2022 5.70  1.3 - 9.1 k/uL Final    Absolute Lymph # 06/12/2022 0.80* 1.0 - 4.8 k/uL Final    Absolute Mono # 06/12/2022 0.60  0.1 - 1.3 k/uL Final    Absolute Eos # 06/12/2022 0.20  0.0 - 0.4 k/uL Final    Basophils Absolute 06/12/2022 0.10  0.0 - 0.2 k/uL Final    Glucose 06/12/2022 93  70 - 99 mg/dL Final    BUN 06/12/2022 21  8 - 23 mg/dL Final    CREATININE 06/12/2022 0.90  0.70 - 1.20 mg/dL Final    Calcium 06/12/2022 9.0  8.6 - 10.4 mg/dL Final    Sodium 06/12/2022 135  135 - 144 mmol/L Final    Potassium 06/12/2022 4.3  3.7 - 5.3 mmol/L Final    Chloride 06/12/2022 102  98 - 107 mmol/L Final    CO2 06/12/2022 23  20 - 31 mmol/L Final    Anion Gap 06/12/2022 10  9 - 17 mmol/L Final    Alkaline Phosphatase 06/12/2022 151* 40 - 129 U/L Final    ALT 06/12/2022 23  5 - 41 U/L Final    AST 06/12/2022 15  <40 U/L Final    Total Bilirubin 06/12/2022 0.23* 0.3 - 1.2 mg/dL Final    Total Protein 06/12/2022 6.6  6.4 - 8.3 g/dL Final    Albumin 06/12/2022 4.1  3.5 - 5.2 g/dL Final    GFR Non- 06/12/2022 >60  >60 mL/min Final    GFR  06/12/2022 >60  >60 mL/min Final    GFR Comment 06/12/2022        Final    Comment: Average GFR for 61-76 years old:   80 mL/min/1.73sq m  Chronic Kidney Disease:   <60 mL/min/1.73sq m  Kidney failure:   <15 mL/min/1.73sq m              eGFR calculated using average adult body mass.  Additional eGFR calculator available at:        [employer]            Ethanol 06/12/2022 <10  <10 mg/dL Final    Ethanol percent 06/12/2022 <0.010  % Final    Amphetamine Screen, Ur 06/13/2022 NEGATIVE  NEGATIVE Final    Comment:       (Positive cutoff 1000 ng/mL)                  Barbiturate Screen, Ur 06/13/2022 NEGATIVE  NEGATIVE Final    Comment:       (Positive cutoff 200 ng/mL)                  Benzodiazepine Screen, Urine 06/13/2022 NEGATIVE  NEGATIVE Final    Comment:       (Positive cutoff 200 ng/mL)                  Cocaine Metabolite, Urine 06/13/2022 POSITIVE* NEGATIVE Final    Comment:       (Positive cutoff 300 ng/mL)                  Methadone Screen, Urine 06/13/2022 NEGATIVE  NEGATIVE Final    Comment:       (Positive cutoff 300 ng/mL)                  Opiates, Urine 06/13/2022 NEGATIVE  NEGATIVE Final    Comment:       (Positive cutoff 300 ng/mL)                  Phencyclidine, Urine 06/13/2022 NEGATIVE  NEGATIVE Final    Comment:       (Positive cutoff 25 ng/mL)                  Cannabinoid Scrn, Ur 06/13/2022 NEGATIVE  NEGATIVE Final    Comment:       (Positive cutoff 50 ng/mL)  Oxycodone Screen, Ur 06/13/2022 NEGATIVE  NEGATIVE Final    Comment:       (Positive cutoff 100 ng/mL)                  Test Information 06/13/2022 Assay provides medical screening only. The absence of expected drug(s) and/or metabolite(s) may indicate diluted or adulterated urine, limitations of testing or timing of collection. Final    Comment: Testing for legal purposes should be confirmed by another method. To request confirmation   of test result, please call the lab within 7 days of sample submission.  Protime 06/12/2022 19.3* 11.8 - 14.6 sec Final    INR 06/12/2022 1.6   Final    Comment:       Non-therapeutic Range:     INR = 0.9-1.2  Therapeutic Range: Moderate Anticoagulant Intensity:     INR = 2.0-3.0   High Anticoagulant Intensity:     INR = 2.5-3.5            Protime 06/13/2022 17.4* 11.8 - 14.6 sec Final    INR 06/13/2022 1.4   Final    Comment:       Non-therapeutic Range:     INR = 0.9-1.2  Therapeutic Range: Moderate Anticoagulant Intensity:     INR = 2.0-3.0   High Anticoagulant Intensity:     INR = 2.5-3.5            Protime 06/14/2022 16.4* 11.8 - 14.6 sec Final    INR 06/14/2022 1.3   Final    Comment:       Non-therapeutic Range:     INR = 0.9-1.2  Therapeutic Range: Moderate Anticoagulant Intensity:     INR = 2.0-3.0   High Anticoagulant Intensity:     INR = 2.5-3.5            Protime 06/15/2022 17.8* 11.8 - 14.6 sec Final    INR 06/15/2022 1.5   Final    Comment:       Non-therapeutic Range:     INR = 0.9-1.2  Therapeutic Range: Moderate Anticoagulant Intensity:     INR = 2.0-3.0   High Anticoagulant Intensity:     INR = 2.5-3.5                 Reviewed patient's current plan of care and vital signs with nursing staff.     Labs reviewed: [x] Yes  Last EKG in EMR reviewed: [x] Yes  QTc: 414    Medications  Current Facility-Administered Medications: warfarin (COUMADIN) tablet 2 mg, 2 mg, Oral, Once  warfarin placeholder: dosing by pharmacy, , Other, Clarence Lara Placeholder  aspirin chewable tablet 81 mg, 81 mg, Oral, Daily  atorvastatin (LIPITOR) tablet 20 mg, 20 mg, Oral, Nightly  calcium citrate (CALCITRATE) tablet 200 mg, 200 mg, Oral, BID  cetirizine (ZYRTEC) tablet 10 mg, 10 mg, Oral, Daily  Vitamin D (CHOLECALCIFEROL) tablet 2,000 Units, 2,000 Units, Oral, Daily with breakfast  vitamin B-12 (CYANOCOBALAMIN) tablet 1,000 mcg, 1,000 mcg, Oral, Daily  docusate sodium (COLACE) capsule 200 mg, 200 mg, Oral, Daily  ferrous sulfate (IRON 325) tablet 325 mg, 325 mg, Oral, Daily with breakfast  folic acid (FOLVITE) tablet 1 mg, 1 mg, Oral, Daily  furosemide (LASIX) tablet 20 mg, 20 mg, Oral, Daily  gabapentin (NEURONTIN) capsule 900 mg, 900 mg, Oral, TID  magnesium hydroxide (MILK OF MAGNESIA) 400 MG/5ML suspension 30 mL, 30 mL, Oral, Nightly  midodrine (PROAMATINE) tablet 10 mg, 10 mg, Oral, TID  OXcarbazepine (TRILEPTAL) tablet 600 mg, 600 mg, Oral, BID  pantoprazole (PROTONIX) tablet 40 mg, 40 mg, Oral, Daily  sennosides-docusate sodium (SENOKOT-S) 8.6-50 MG tablet 1 tablet, 1 tablet, Oral, Daily  tamsulosin (FLOMAX) capsule 0.4 mg, 0.4 mg, Oral, Daily  melatonin tablet 3 mg, 3 mg, Oral, Nightly PRN  acetaminophen (TYLENOL) tablet 650 mg, 650 mg, Oral, Q6H PRN  hydrOXYzine HCl (ATARAX) tablet 50 mg, 50 mg, Oral, TID PRN  polyethylene glycol (GLYCOLAX) packet 17 g, 17 g, Oral, Daily PRN  aluminum & magnesium hydroxide-simethicone (MAALOX) 200-200-20 MG/5ML suspension 30 mL, 30 mL, Oral, Q6H PRN  haloperidol lactate (HALDOL) injection 5 mg, 5 mg, IntraMUSCular, Q6H PRN **AND** LORazepam (ATIVAN) injection 2 mg, 2 mg, IntraMUSCular, Q6H PRN **AND** diphenhydrAMINE (BENADRYL) injection 50 mg, 50 mg, IntraMUSCular, Q6H PRN  haloperidol (HALDOL) tablet 5 mg, 5 mg, Oral, Q6H PRN **AND** LORazepam (ATIVAN) tablet 2 mg, 2 mg, Oral, Q6H PRN    ASSESSMENT  Severe recurrent major depression without psychotic features ([facility] [address]         HANDOFF  Patient symptoms are:showing improvement  Medications as determined by attending physician  Encourage participation in groups and milieu. Probable discharge is to be determined by MD    Electronically signed by LOGAN Mg CNP on 6/15/2022 at 4:09 PM    **This report has been created using voice recognition software. It may contain minor errors which are inherent in voice recognition technology. **      I independently saw and evaluated the patient. I reviewed the nurse practitioners documentation above. Any additional comments or changes to the nurse practitioners documentation are stated below otherwise agree with assessment. Plan will be as follows:  Reporting improvement in mood and symptoms. Feeling safe. Discussed coordinating with our team.  Appreciate care coordination note. Patient will likely discharge back to his apartment. Discussed possible discharge tomorrow if stable and patient is in agreement  PLAN  Patient s symptoms   are improving  Continue with current medication for now  Attempt to develop insight  Psycho-education conducted. Supportive Therapy conducted.   Probable discharge is tomorrow  Follow-up daily while on inpatient unit

## 2022-06-15 NOTE — CARE COORDINATION
DEIDRE spoke with Rosalba Pedroza from the [place] [facility] team, who shared the pt is housed in [employer] and they work with or speak with pt on a daily biases. Rosalba Pedroza shared pt does not know how to read and does get easily frustrated when the \"neighbors\" ask pt for help or to borrow things, the ACT team is working with pt on establishing boundaries with the neighbors, they are also aware that pt tends to fluctuate between wanting to move and wanting to stay in the apartment. The pt is also struggling with the change of staff, ACT will continue to provide support when pt is discharged.

## 2022-06-15 NOTE — PLAN OF CARE
Problem: Self Harm/Suicidality  Goal: Will have no self-injury during hospital stay  Description: INTERVENTIONS:  1. Q 30 MINUTES: Routine safety checks  2. Q SHIFT & PRN: Assess risk to determine if routine checks are adequate to maintain patient safety  6/15/2022 0951 by Fidel Patel  Outcome: Progressing  Pt. Denies suicidal thoughts. Pt is medication compliant and attends groups. Selectively social on unit. Pt. Remains safe on the unit. Q 15 minute checks for safety maintained. Problem: Behavior  Goal: Pt/Family maintain appropriate behavior and adhere to behavioral management agreement, if implemented  Description: INTERVENTIONS:  1. Assess patient/family's coping skills and  non-compliant behavior (including use of illegal substances)  2. Notify security of behavior or suspected illegal substances which indicate the need for search of the patient and/or belongings  3. Encourage verbalization of thoughts and concerns in a socially appropriate manner  4. Utilize positive, consistent limit setting strategies supporting safety of patient, staff and others  5. Encourage participation in the decision making process about the behavioral management agreement  6. Implement a Health Care Agreement if patient meets criteria  7. If a patient's behavior jeopardizes the safety of the patient, staff, or others refer to organization policy. If a visitor's behavior poses a threat to safety call refer to organization policy. 8. Initiate consult with , Psychosocial CNS, Spiritual Care as appropriate  6/15/2022 0951 by Fidel Patel  Outcome: Progressing  Pt is behaviorally controlled, friendly and cooperative with others. Pt. Showered, ate, took medications and attends groups. Denies hallucinations. Denies suicidal or homicidal ideation.       Problem: Pain  Goal: Verbalizes/displays adequate comfort level or baseline comfort level  6/15/2022 0951 by Fidel Patel  Outcome: Progressing  Denies current pain. Will continue to monitor. Problem: ABCDS Injury Assessment  Goal: Absence of physical injury  6/15/2022 0951 by Lillian Dominguez  Outcome: Progressing  Pt. Remains free of injury. Pt. Remains safe on the unit. Q 15 minute checks for safety maintained.

## 2022-06-16 VITALS
SYSTOLIC BLOOD PRESSURE: 141 MMHG | WEIGHT: 190 LBS | HEIGHT: 66 IN | BODY MASS INDEX: 30.53 KG/M2 | DIASTOLIC BLOOD PRESSURE: 90 MMHG | RESPIRATION RATE: 14 BRPM | TEMPERATURE: 98 F | HEART RATE: 61 BPM | OXYGEN SATURATION: 100 %

## 2022-06-16 LAB
INR BLD: 1.6
PROTHROMBIN TIME: 18.6 SEC (ref 11.8–14.6)

## 2022-06-16 PROCEDURE — 99239 HOSP IP/OBS DSCHRG MGMT >30: CPT | Performed by: PSYCHIATRY & NEUROLOGY

## 2022-06-16 PROCEDURE — 6370000000 HC RX 637 (ALT 250 FOR IP): Performed by: PSYCHIATRY & NEUROLOGY

## 2022-06-16 PROCEDURE — 36415 COLL VENOUS BLD VENIPUNCTURE: CPT

## 2022-06-16 PROCEDURE — 85610 PROTHROMBIN TIME: CPT

## 2022-06-16 RX ORDER — WARFARIN SODIUM 2 MG/1
2 TABLET ORAL
Status: DISCONTINUED | OUTPATIENT
Start: 2022-06-16 | End: 2022-06-16 | Stop reason: HOSPADM

## 2022-06-16 RX ORDER — HYDROXYZINE 50 MG/1
50 TABLET, FILM COATED ORAL 3 TIMES DAILY PRN
Qty: 30 TABLET | Refills: 0 | Status: SHIPPED | OUTPATIENT
Start: 2022-06-16 | End: 2022-06-26

## 2022-06-16 RX ADMIN — SENNOSIDES AND DOCUSATE SODIUM 1 TABLET: 50; 8.6 TABLET ORAL at 08:12

## 2022-06-16 RX ADMIN — Medication 200 MG: at 08:11

## 2022-06-16 RX ADMIN — GABAPENTIN 900 MG: 300 CAPSULE ORAL at 08:11

## 2022-06-16 RX ADMIN — MIDODRINE HYDROCHLORIDE 10 MG: 5 TABLET ORAL at 14:31

## 2022-06-16 RX ADMIN — TAMSULOSIN HYDROCHLORIDE 0.4 MG: 0.4 CAPSULE ORAL at 08:12

## 2022-06-16 RX ADMIN — OXCARBAZEPINE 600 MG: 600 TABLET, FILM COATED ORAL at 08:11

## 2022-06-16 RX ADMIN — PANTOPRAZOLE SODIUM 40 MG: 40 TABLET, DELAYED RELEASE ORAL at 08:12

## 2022-06-16 RX ADMIN — ASPIRIN 81 MG: 81 TABLET, CHEWABLE ORAL at 08:11

## 2022-06-16 RX ADMIN — MIDODRINE HYDROCHLORIDE 10 MG: 5 TABLET ORAL at 08:11

## 2022-06-16 RX ADMIN — ACETAMINOPHEN 650 MG: 325 TABLET ORAL at 05:54

## 2022-06-16 RX ADMIN — FOLIC ACID 1 MG: 1 TABLET ORAL at 08:12

## 2022-06-16 RX ADMIN — DOCUSATE SODIUM 200 MG: 100 CAPSULE, LIQUID FILLED ORAL at 08:11

## 2022-06-16 RX ADMIN — CETIRIZINE HYDROCHLORIDE 10 MG: 10 TABLET, FILM COATED ORAL at 08:11

## 2022-06-16 RX ADMIN — GABAPENTIN 900 MG: 300 CAPSULE ORAL at 14:31

## 2022-06-16 RX ADMIN — CYANOCOBALAMIN TAB 1000 MCG 1000 MCG: 1000 TAB at 08:12

## 2022-06-16 RX ADMIN — FUROSEMIDE 20 MG: 20 TABLET ORAL at 08:12

## 2022-06-16 RX ADMIN — Medication 2000 UNITS: at 08:11

## 2022-06-16 RX ADMIN — FERROUS SULFATE TAB 325 MG (65 MG ELEMENTAL FE) 325 MG: 325 (65 FE) TAB at 08:11

## 2022-06-16 ASSESSMENT — PAIN SCALES - GENERAL
PAINLEVEL_OUTOF10: 3
PAINLEVEL_OUTOF10: 5

## 2022-06-16 ASSESSMENT — PAIN DESCRIPTION - ORIENTATION: ORIENTATION: RIGHT;LEFT

## 2022-06-16 ASSESSMENT — PAIN DESCRIPTION - LOCATION: LOCATION: LEG

## 2022-06-16 ASSESSMENT — PAIN - FUNCTIONAL ASSESSMENT: PAIN_FUNCTIONAL_ASSESSMENT: ACTIVITIES ARE NOT PREVENTED

## 2022-06-16 ASSESSMENT — PAIN DESCRIPTION - DESCRIPTORS: DESCRIPTORS: ACHING

## 2022-06-16 NOTE — BH NOTE
[facility]  Discharge Note    Pt discharged with followings belongings:   Dental Appliances: None  Vision - Corrective Lenses: None  Hearing Aid: None  Jewelry: None  Body Piercings Removed: N/A  Clothing: Footwear,Shorts,Shirt,Undergarments  Other Valuables: [address] [facility] sent home with patient or returned to patient. Patient education on aftercare instructions: Yes  Information faxed to [facility] by Nurse  at 3:12 PM .Patient verbalize understanding of AVS:  Yes. Status EXAM upon discharge:  Mental Status and Behavioral Exam  Normal: Yes  Level of Assistance: Independent/Self  Facial Expression: Brightened  Affect: Appropriate  Level of Consciousness: Alert  Frequency of Checks: 4 times per hour, close  Mood:Normal: Yes  Mood: Depressed  Motor Activity:Normal: Yes  Motor Activity: Increased  Eye Contact: Good  Observed Behavior: Cooperative,Friendly  Sexual Misconduct History: Current - no  Preception: Proctor to situation,Proctor to person,Proctor to time,Proctor to place  Attention:Normal: Yes  Attention: Unable to concentrate  Thought Processes: Circumstantial  Thought Content:Normal: Yes  Thought Content: Other (comment) (WNL)  Depression Symptoms: No problems reported or observed. Anxiety Symptoms: Generalized  Mirian Symptoms: No problems reported or observed.   Hallucinations: None  Delusions: No  Memory:Normal: Yes  Memory: Poor recent  Insight and Judgment: Yes  Insight and Judgment: Poor judgment,Poor insight      Metabolic Screening:    Lab Results   Component Value Date    LABA1C 5.4 02/17/2020       Lab Results   Component Value Date    CHOL 126 02/17/2020    CHOL 193 08/02/2018    CHOL 215 (H) 05/22/2018    CHOL 170 02/20/2018    CHOL 142 06/27/2016    CHOL 159 02/21/2013     Lab Results   Component Value Date    TRIG 116 02/17/2020    TRIG 132 08/02/2018    TRIG 162 (H) 05/22/2018    TRIG 158 (H) 02/20/2018    TRIG 204 (H) 06/27/2016    TRIG 103 02/21/2013     Lab Results Component Value Date    HDL 28 (L) 02/01/2021    HDL 24 (L) 02/17/2020    HDL 28 (L) 08/02/2018    HDL 29 (L) 05/22/2018    HDL 30 (L) 02/20/2018    HDL 27 (L) 06/27/2016    HDL 36 (L) 02/21/2013     No components found for: LDLCAL  No results found for: LABVLDL    Patient discharged to home. Patient alert and oriented X4. Patient denies thoughts of harm to self or others. Patient discharged with all belongings.      Alissa Childress RN

## 2022-06-16 NOTE — GROUP NOTE
Group Therapy Note    Date: 6/16/2022    Group Start Time: 1000  Group End Time: 1030  Group Topic: Psychotherapy    [facility] PIETER Martell, LSW        Group Therapy Note    Attendees: 5/16         Patient's Goal:  Increase interpersonal relationship skills    Notes:  Patient was an active participant in group discussion    Status After Intervention:  Improved    Participation Level:  Active Listener and Interactive    Participation Quality: Appropriate, Attentive and Sharing      Speech:  normal      Thought Process/Content: Logical  Linear      Affective Functioning: Congruent      Mood: euthymic      Level of consciousness:  Alert, Oriented x4 and Attentive      Response to Learning: Able to verbalize current knowledge/experience, Able to verbalize/acknowledge new learning and Able to retain information      Endings: None Reported    Modes of Intervention: Support, Socialization and Exploration      Discipline Responsible: /Counselor      Signature:  PIETER Juarez, LSW

## 2022-06-16 NOTE — GROUP NOTE
Group Therapy Note    Date: 6/16/2022    Group Start Time: 1100  Group End Time: 0075  Group Topic: Music Therapy    NORAH SMITH    Jackson Rios        Group Therapy Note    Attendees: 7/16       Patients engaged in education about the use of the DBT skill Positive Affirmations. Patient shared music that they feel reflects something positive about themselves, a goal they are working on, or a strength they have. Patients then framed these into a positive affirmation about themselves, and shared it with the group. Goals to engage in education; Increase self-esteem; Increase self-expression;      Notes:  Patient attended and participated in group, having positive interactions with peers and staff throughout. Chau shared a song and created an affirmation with some assistance. Talked about the song reminding him of his gabriela mother, and expressed that he feels he is a good friends to her    Status After Intervention:  Improved    Participation Level:  Active Listener and Interactive    Participation Quality: Appropriate, Attentive and Sharing      Speech:  normal      Thought Process/Content: Logical  Linear      Affective Functioning: Congruent      Mood: euthymic      Level of consciousness:  Alert and Attentive      Response to Learning: Able to verbalize current knowledge/experience, Able to verbalize/acknowledge new learning and Progressing to goal      Endings: None Reported    Modes of Intervention: Education, Socialization, Exploration, Activity, Media and Reality-testing      Discipline Responsible: Psychoeducational Specialist      Signature:  Jackson Rios

## 2022-06-16 NOTE — CARE COORDINATION
DEIDRE scheduled an appointment for pt at [facility] coumadin clinic, the staff at the clinic are asking for pt to be discharged with enough medication to last until pts appointment on Tuesday June 21st.

## 2022-06-16 NOTE — CARE COORDINATION
Name: Lolly Fulton    : 1960    Discharge Date: 2022    Primary Auth/Cert #: [id number]    Destination: home    Discharge Medications:      Medication List      START taking these medications    hydrOXYzine HCl 50 MG tablet  Commonly known as: ATARAX  Take 1 tablet by mouth 3 times daily as needed for Anxiety  Notes to patient: Anxiety        CHANGE how you take these medications    pantoprazole 40 MG tablet  Commonly known as: PROTONIX  What changed: Another medication with the same name was removed. Continue taking this medication, and follow the directions you see here.   Notes to patient: Acid Reflux        CONTINUE taking these medications    acetaminophen 500 MG tablet  Commonly known as: TYLENOL  Notes to patient: Pain     alendronate 70 MG tablet  Commonly known as: FOSAMAX  Notes to patient: Osteoporosis     aspirin 81 MG chewable tablet  Take 1 tablet by mouth daily  Notes to patient: Anti platelet     atorvastatin 20 MG tablet  Commonly known as: LIPITOR  Take 1 tablet by mouth nightly  Notes to patient: High cholesterol     calcium citrate 250 MG Tabs tablet  Commonly known as: CALCITRATE  Notes to patient: Supplement     cetirizine 10 MG tablet  Commonly known as: ZYRTEC  Notes to patient: Allergies     docusate sodium 100 MG capsule  Commonly known as: COLACE  Notes to patient: Stool softener     FeroSul 325 (65 Fe) MG tablet  Generic drug: ferrous sulfate  Notes to patient: Supplement     folic acid 1 MG tablet  Commonly known as: Nadine   Notes to patient: Supplement     furosemide 20 MG tablet  Commonly known as: LASIX  Notes to patient: Diuretic/swelling/blood pressure     gabapentin 300 MG capsule  Commonly known as: NEURONTIN  Notes to patient: Nerve pain     magnesium hydroxide 400 MG/5ML suspension  Commonly known as: MILK OF MAGNESIA  Notes to patient: Constipation     midodrine 10 MG tablet  Commonly known as: PROAMATINE  Notes to patient: Low blood pressure/dizziness OXcarbazepine 300 MG tablet  Commonly known as: TRILEPTAL  Notes to patient: Mood stabilizer     sennosides-docusate sodium 8.6-50 MG tablet  Commonly known as: SENOKOT-S  Notes to patient: Constipation     tamsulosin 0.4 MG capsule  Commonly known as: FLOMAX  Notes to patient: Prostate     Vitamin B 12 500 MCG Tabs  Notes to patient: Supplement     Vitamin D3 50 MCG (2000 UT) Caps  Notes to patient: Supplement     warfarin 2 MG tablet  Commonly known as: COUMADIN        STOP taking these medications    diphenhydrAMINE 25 MG tablet  Commonly known as: BENADRYL     melatonin 5 MG Tabs tablet     senna 8.6 MG tablet  Commonly known as: SENOKOT           Where to Get Your Medications      These medications were sent to [facility], [address] [place] [address], [address] [address]    Phone: [phone]   · hydrOXYzine HCl 50 MG tablet         Follow Up Appointment: Mirella [address] [place] [address] KEVIN [facility] [place]   [address] [address]  [phone]    On 6/22/2022  Pt has appointment with Dr. Saintclair Labella @ 2:20pm     [facility]  [facility], [address] [facility]  On 6/21/2022  Pt has appointment at 7:30 am

## 2022-06-16 NOTE — PROGRESS NOTES
Pharmacy Note  Warfarin Consult follow-up      Recent Labs     06/12/22  1727 06/13/22  1126 06/14/22  0624   INR 1.6 1.4 1.3     Recent Labs     06/12/22  1727   HGB 12.2*   HCT 37.2*          Significant Drug-Drug Interactions:  New warfarin drug-drug interactions: Aspirin  Discontinued drug-drug interactions: none  Current warfarin drug-drug interactions: Acetaminophen, Aspirin      Date             INR        Dose given previous day  Dose scheduled for today  6/14/2022        1.3       4 mg          4 mg  6/15/2022        1.5             4 mg                                                  2 mg  6/16/2022        1.6             2 mg                                                  2 mg        Notes:   INR trending back up. Give 2 mg today. Notified [facility] of patient's possible discharge today and provided inpatient dosing information.      Liss Shields, PharmD, BCPS, BCPP  6/15/2022 2:05 PM  [phone]

## 2022-06-16 NOTE — PLAN OF CARE
Problem: Self Harm/Suicidality  Goal: Will have no self-injury during hospital stay  Description: INTERVENTIONS:  1. Q 30 MINUTES: Routine safety checks  2. Q SHIFT & PRN: Assess risk to determine if routine checks are adequate to maintain patient safety  6/15/2022 2234 by Pancho Aguilar  Outcome: Adequate for Discharge  Pt denies suicidal ideation, no self harm behaviors exhibited  Problem: Behavior  Goal: Pt/Family maintain appropriate behavior and adhere to behavioral management agreement, if implemented  Description: INTERVENTIONS:  1. Assess patient/family's coping skills and  non-compliant behavior (including use of illegal substances)  2. Notify security of behavior or suspected illegal substances which indicate the need for search of the patient and/or belongings  3. Encourage verbalization of thoughts and concerns in a socially appropriate manner  4. Utilize positive, consistent limit setting strategies supporting safety of patient, staff and others  5. Encourage participation in the decision making process about the behavioral management agreement  6. Implement a Health Care Agreement if patient meets criteria  7. If a patient's behavior jeopardizes the safety of the patient, staff, or others refer to organization policy. If a visitor's behavior poses a threat to safety call refer to organization policy.   8. Initiate consult with , Psychosocial CNS, Spiritual Care as appropriate  6/15/2022 2234 by Pancho Aguilar  Outcome: Adequate for Discharge  Flowsheets (Taken 6/15/2022 1943 by Boo Blackwood RN)  Patient/family maintains appropriate behavior and adheres to behavioral management agreement, if implemented:   Assess patient/familys coping skills and  non-compliant behavior (including use of illegal substances)   Encourage verbalization of thoughts and concerns in a socially appropriate manner   Encourage participation in the decision making process about the behavioral management agreement  Pt is cooperative, compliant with medicine, good appetite. Verbalizes positive coping skills, linked to [employer] he is ready for D/C    Problem: Pain  Goal: Verbalizes/displays adequate comfort level or baseline comfort level  6/15/2022 2234 by Dominique Arriaza  Outcome: Adequate for Discharge  Pt attends group with good participation. He is active & social.  Problem: ABCDS Injury Assessment  Goal: Absence of physical injury  6/15/2022 2234 by Dominique Arriaza  Outcome: Adequate for Discharge  Flowsheets  Taken 6/15/2022 1947 by Eva Elam RN  Absence of Physical Injury: Implement safety measures based on patient assessment  Taken 6/15/2022 1141 by Iwona Bond RN  Absence of Physical Injury: Implement safety measures based on patient assessment  Pt relates experiencing leg pain. Pt also relates walking helps with pain. Pt has good insight into his pain & life stressors.

## 2022-06-16 NOTE — GROUP NOTE
Group Therapy Note    Date: 6/15/2022    Group Start Time: 2030  Group End Time: 2105  Group Topic: Wrap-Up    JEANA GUILLERMOPATRICIA STEPHANIE Euceda        Group Therapy Note    Attendees: 7         Patient's Goal:  D/C tomorrow    Notes:  I am linked to [place]. I know what to do when I need help    Status After Intervention:  Improved    Participation Level:  Active Listener and Interactive    Participation Quality: Appropriate, Attentive and Sharing      Speech:  normal      Thought Process/Content: Logical      Affective Functioning: Congruent      Mood: WNL      Level of consciousness:  Alert, Oriented x4 and Attentive      Response to Learning: Able to verbalize current knowledge/experience      Endings: None Reported    Modes of Intervention: Problem-solving      Discipline Responsible: [employer]      Signature:  Enid Euceda
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
## 2022-06-16 NOTE — PLAN OF CARE
Patient meets criteria for discharge per provider. Care plan activities completed. Problem: Self Harm/Suicidality  Goal: Will have no self-injury during hospital stay  Description: INTERVENTIONS:  1. Q 30 MINUTES: Routine safety checks  2. Q SHIFT & PRN: Assess risk to determine if routine checks are adequate to maintain patient safety  Outcome: Completed     Problem: Behavior  Goal: Pt/Family maintain appropriate behavior and adhere to behavioral management agreement, if implemented  Description: INTERVENTIONS:  1. Assess patient/family's coping skills and  non-compliant behavior (including use of illegal substances)  2. Notify security of behavior or suspected illegal substances which indicate the need for search of the patient and/or belongings  3. Encourage verbalization of thoughts and concerns in a socially appropriate manner  4. Utilize positive, consistent limit setting strategies supporting safety of patient, staff and others  5. Encourage participation in the decision making process about the behavioral management agreement  6. Implement a Health Care Agreement if patient meets criteria  7. If a patient's behavior jeopardizes the safety of the patient, staff, or others refer to organization policy. If a visitor's behavior poses a threat to safety call refer to organization policy.   8. Initiate consult with , Psychosocial CNS, Spiritual Care as appropriate  Outcome: Completed  Flowsheets  Taken 6/16/2022 0906  Patient/family maintains appropriate behavior and adheres to behavioral management agreement, if implemented: Encourage verbalization of thoughts and concerns in a socially appropriate manner  Taken 6/16/2022 0745  Patient/family maintains appropriate behavior and adheres to behavioral management agreement, if implemented: Encourage verbalization of thoughts and concerns in a socially appropriate manner     Problem: Pain  Goal: Verbalizes/displays adequate comfort level or baseline comfort level  Outcome: Completed     Problem: ABCDS Injury Assessment  Goal: Absence of physical injury  Outcome: Completed  Flowsheets (Taken 6/16/2022 0903)  Absence of Physical Injury: Implement safety measures based on patient assessment

## 2022-06-16 NOTE — GROUP NOTE
Group Therapy Note    Date: 6/16/2022    Group Start Time: 0000  Group End Time: 3079  Group Topic: Community Meeting    [facility]        Group Therapy Note    Attendees:5/16         Patient's Goal:  Discharge today    Notes:  controlled    Status After Intervention:  Unchanged    Participation Level: Active Listener and Interactive    Participation Quality: Appropriate and Attentive      Speech:  normal      Thought Process/Content: Logical      Affective Functioning: Congruent      Mood: stable      Level of consciousness:  Alert, Oriented x4 and Attentive      Response to Learning: Able to verbalize current knowledge/experience and Capable of insight      Endings: None Reported    Modes of Intervention: Education, Support and Socialization      Discipline Responsible: [employer]      Signature:   Virgilio Khan

## 2022-08-04 RX ORDER — HYDROXYZINE 50 MG/1
50 TABLET, FILM COATED ORAL 3 TIMES DAILY PRN
Qty: 30 TABLET | Refills: 0 | OUTPATIENT
Start: 2022-08-04 | End: 2022-08-14

## 2022-09-16 ENCOUNTER — HOSPITAL ENCOUNTER (INPATIENT)
Age: 62
LOS: 7 days | Discharge: HOME | DRG: 753 | End: 2022-09-23
Attending: EMERGENCY MEDICINE | Admitting: PSYCHIATRY & NEUROLOGY
Payer: COMMERCIAL

## 2022-09-16 DIAGNOSIS — R45.851 DEPRESSION WITH SUICIDAL IDEATION: Primary | ICD-10-CM

## 2022-09-16 DIAGNOSIS — F32.A DEPRESSION WITH SUICIDAL IDEATION: Primary | ICD-10-CM

## 2022-09-16 LAB
ABSOLUTE EOS #: 0.1 K/UL (ref 0–0.4)
ABSOLUTE LYMPH #: 0.4 K/UL (ref 1–4.8)
ABSOLUTE MONO #: 0.4 K/UL (ref 0.1–1.3)
ALBUMIN SERPL-MCNC: 4.4 G/DL (ref 3.5–5.2)
ALP BLD-CCNC: 122 U/L (ref 40–129)
ALT SERPL-CCNC: 23 U/L (ref 5–41)
AMPHETAMINE SCREEN URINE: NEGATIVE
ANION GAP SERPL CALCULATED.3IONS-SCNC: 10 MMOL/L (ref 9–17)
AST SERPL-CCNC: 20 U/L
BACTERIA: NORMAL
BARBITURATE SCREEN URINE: NEGATIVE
BASOPHILS # BLD: 1 % (ref 0–2)
BASOPHILS ABSOLUTE: 0 K/UL (ref 0–0.2)
BENZODIAZEPINE SCREEN, URINE: NEGATIVE
BILIRUB SERPL-MCNC: 0.4 MG/DL (ref 0.3–1.2)
BILIRUBIN URINE: NEGATIVE
BUN BLDV-MCNC: 12 MG/DL (ref 8–23)
CALCIUM SERPL-MCNC: 9.2 MG/DL (ref 8.6–10.4)
CANNABINOID SCREEN URINE: NEGATIVE
CASTS UA: NORMAL /LPF
CHLORIDE BLD-SCNC: 98 MMOL/L (ref 98–107)
CO2: 27 MMOL/L (ref 20–31)
COCAINE METABOLITE, URINE: NEGATIVE
COLOR: YELLOW
CREAT SERPL-MCNC: 0.9 MG/DL (ref 0.7–1.2)
EOSINOPHILS RELATIVE PERCENT: 2 % (ref 0–4)
EPITHELIAL CELLS UA: NORMAL /HPF
ETHANOL PERCENT: <0.01 %
ETHANOL: <10 MG/DL
FENTANYL URINE: NEGATIVE
GFR AFRICAN AMERICAN: >60 ML/MIN
GFR NON-AFRICAN AMERICAN: >60 ML/MIN
GFR SERPL CREATININE-BSD FRML MDRD: NORMAL ML/MIN/{1.73_M2}
GLUCOSE BLD-MCNC: 90 MG/DL (ref 70–99)
GLUCOSE URINE: NEGATIVE
HCT VFR BLD CALC: 40.6 % (ref 41–53)
HEMOGLOBIN: 13.5 G/DL (ref 13.5–17.5)
INR BLD: 2.3
KETONES, URINE: NEGATIVE
LEUKOCYTE ESTERASE, URINE: NEGATIVE
LYMPHOCYTES # BLD: 7 % (ref 24–44)
MCH RBC QN AUTO: 29.9 PG (ref 26–34)
MCHC RBC AUTO-ENTMCNC: 33.3 G/DL (ref 31–37)
MCV RBC AUTO: 89.8 FL (ref 80–100)
METHADONE SCREEN, URINE: NEGATIVE
MONOCYTES # BLD: 8 % (ref 1–7)
NITRITE, URINE: NEGATIVE
OPIATES, URINE: NEGATIVE
OXYCODONE SCREEN URINE: NEGATIVE
PDW BLD-RTO: 15.6 % (ref 11.5–14.9)
PH UA: 5.5 (ref 5–8)
PHENCYCLIDINE, URINE: NEGATIVE
PLATELET # BLD: 150 K/UL (ref 150–450)
PMV BLD AUTO: 6.9 FL (ref 6–12)
POTASSIUM SERPL-SCNC: 4.2 MMOL/L (ref 3.7–5.3)
PROTEIN UA: NEGATIVE
PROTHROMBIN TIME: 25.7 SEC (ref 11.8–14.6)
RBC # BLD: 4.52 M/UL (ref 4.5–5.9)
RBC UA: NORMAL /HPF
SEG NEUTROPHILS: 82 % (ref 36–66)
SEGMENTED NEUTROPHILS ABSOLUTE COUNT: 4.8 K/UL (ref 1.3–9.1)
SODIUM BLD-SCNC: 135 MMOL/L (ref 135–144)
SPECIFIC GRAVITY UA: 1.01 (ref 1–1.03)
TEST INFORMATION: NORMAL
TOTAL PROTEIN: 7.1 G/DL (ref 6.4–8.3)
TURBIDITY: CLEAR
URINE HGB: ABNORMAL
UROBILINOGEN, URINE: NORMAL
WBC # BLD: 5.7 K/UL (ref 3.5–11)
WBC UA: NORMAL /HPF

## 2022-09-16 PROCEDURE — G0480 DRUG TEST DEF 1-7 CLASSES: HCPCS

## 2022-09-16 PROCEDURE — 80053 COMPREHEN METABOLIC PANEL: CPT

## 2022-09-16 PROCEDURE — 80307 DRUG TEST PRSMV CHEM ANLYZR: CPT

## 2022-09-16 PROCEDURE — 6370000000 HC RX 637 (ALT 250 FOR IP): Performed by: PSYCHIATRY & NEUROLOGY

## 2022-09-16 PROCEDURE — 36415 COLL VENOUS BLD VENIPUNCTURE: CPT

## 2022-09-16 PROCEDURE — 1240000000 HC EMOTIONAL WELLNESS R&B

## 2022-09-16 PROCEDURE — 85025 COMPLETE CBC W/AUTO DIFF WBC: CPT

## 2022-09-16 PROCEDURE — 6370000000 HC RX 637 (ALT 250 FOR IP): Performed by: EMERGENCY MEDICINE

## 2022-09-16 PROCEDURE — 99285 EMERGENCY DEPT VISIT HI MDM: CPT

## 2022-09-16 PROCEDURE — 85610 PROTHROMBIN TIME: CPT

## 2022-09-16 PROCEDURE — 81001 URINALYSIS AUTO W/SCOPE: CPT

## 2022-09-16 RX ORDER — HYDROXYZINE 50 MG/1
50 TABLET, FILM COATED ORAL 3 TIMES DAILY PRN
Status: DISCONTINUED | OUTPATIENT
Start: 2022-09-16 | End: 2022-09-23 | Stop reason: HOSPADM

## 2022-09-16 RX ORDER — WARFARIN SODIUM 2 MG/1
4 TABLET ORAL
COMMUNITY

## 2022-09-16 RX ORDER — MAGNESIUM HYDROXIDE/ALUMINUM HYDROXICE/SIMETHICONE 120; 1200; 1200 MG/30ML; MG/30ML; MG/30ML
30 SUSPENSION ORAL EVERY 6 HOURS PRN
Status: DISCONTINUED | OUTPATIENT
Start: 2022-09-16 | End: 2022-09-23 | Stop reason: HOSPADM

## 2022-09-16 RX ORDER — TRAZODONE HYDROCHLORIDE 50 MG/1
50 TABLET ORAL NIGHTLY PRN
Status: DISCONTINUED | OUTPATIENT
Start: 2022-09-16 | End: 2022-09-18

## 2022-09-16 RX ORDER — ACETAMINOPHEN 325 MG/1
650 TABLET ORAL EVERY 6 HOURS PRN
Status: DISCONTINUED | OUTPATIENT
Start: 2022-09-16 | End: 2022-09-23 | Stop reason: HOSPADM

## 2022-09-16 RX ORDER — LORAZEPAM 1 MG/1
2 TABLET ORAL ONCE
Status: COMPLETED | OUTPATIENT
Start: 2022-09-16 | End: 2022-09-16

## 2022-09-16 RX ORDER — POLYETHYLENE GLYCOL 3350 17 G/17G
17 POWDER, FOR SOLUTION ORAL DAILY PRN
Status: DISCONTINUED | OUTPATIENT
Start: 2022-09-16 | End: 2022-09-23 | Stop reason: HOSPADM

## 2022-09-16 RX ADMIN — LORAZEPAM 2 MG: 1 TABLET ORAL at 16:54

## 2022-09-16 RX ADMIN — HYDROXYZINE HYDROCHLORIDE 50 MG: 50 TABLET ORAL at 21:00

## 2022-09-16 RX ADMIN — TRAZODONE HYDROCHLORIDE 50 MG: 50 TABLET ORAL at 21:00

## 2022-09-16 ASSESSMENT — SLEEP AND FATIGUE QUESTIONNAIRES
SLEEP PATTERN: DISTURBED/INTERRUPTED SLEEP
DO YOU USE A SLEEP AID: NO
DO YOU HAVE DIFFICULTY SLEEPING: YES
AVERAGE NUMBER OF SLEEP HOURS: 6

## 2022-09-16 ASSESSMENT — LIFESTYLE VARIABLES
HOW OFTEN DO YOU HAVE A DRINK CONTAINING ALCOHOL: NEVER
HOW OFTEN DO YOU HAVE A DRINK CONTAINING ALCOHOL: NEVER
HOW MANY STANDARD DRINKS CONTAINING ALCOHOL DO YOU HAVE ON A TYPICAL DAY: PATIENT DOES NOT DRINK
HOW MANY STANDARD DRINKS CONTAINING ALCOHOL DO YOU HAVE ON A TYPICAL DAY: PATIENT DOES NOT DRINK

## 2022-09-16 ASSESSMENT — PAIN - FUNCTIONAL ASSESSMENT: PAIN_FUNCTIONAL_ASSESSMENT: NONE - DENIES PAIN

## 2022-09-16 ASSESSMENT — PAIN SCALES - GENERAL: PAINLEVEL_OUTOF10: 0

## 2022-09-16 ASSESSMENT — PATIENT HEALTH QUESTIONNAIRE - PHQ9
SUM OF ALL RESPONSES TO PHQ QUESTIONS 1-9: 25
SUM OF ALL RESPONSES TO PHQ QUESTIONS 1-9: 18
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
## 2022-09-16 NOTE — ED PROVIDER NOTES
EMERGENCY DEPARTMENT ENCOUNTER    Pt Name: Abbey Escamilla  MRN: [id number]  [place] 1960  Date of evaluation: 9/16/22  CHIEF COMPLAINT     No chief complaint on file. HISTORY OF PRESENT ILLNESS     Mental Health Problem  Presenting symptoms: depression, suicidal thoughts and suicidal threats    Degree of incapacity (severity):  Severe  Onset quality:  Gradual  Timing:  Constant  Progression:  Worsening  Chronicity:  Recurrent  Context: not alcohol use and not drug abuse    Treatment compliance: All of the time  Relieved by:  Nothing  Worsened by:  Nothing  Ineffective treatments:  Antidepressants  Goes to [facility], they told him to come here      REVIEW OF SYSTEMS     Review of Systems   Psychiatric/Behavioral:  Positive for suicidal ideas. All other systems reviewed and are negative. PASTMEDICAL HISTORY     Past Medical History:   Diagnosis Date    AMS (altered mental status) 02/15/2020    Atrial fibrillation ([facility] [address]     on coumadin    Bipolar 1 disorder ([facility])     Depression     Hypertension     Neck pain     Neurofibromatosis ([facility] [address]     Lt leg pain    Patient in clinical research study 04/12/2018    [id number]    Severe recurrent major depression without psychotic features ([facility] [address] 6/13/2022    Twitching      Past Problem List  Patient Active Problem List   Diagnosis Code    Light headed R42    Recurrent major depression in partial remission ([facility] [address] F33.41    Primary hypertension I10    Neck pain M54.2    GERD (gastroesophageal reflux disease) K21.9    Degenerative disc disease, cervical M50.30    Hyponatremia E87.1    Hypercalcemia E83.52    Aortic aneurysm ([facility]) I71.9    MGUS (monoclonal gammopathy of unknown significance) D47.2    Chronic renal impairment, stage 3 (moderate) ([facility]) N18.30    Depression F32. A    Crack cocaine use F14.90    Bipolar 1 disorder (HCC) F31.9    Altered mental status, unspecified R41.82    YONATHAN (acute kidney injury) ([address] N17.9    Altered mental status R41.82    Supratherapeutic INR R79.1    Atrial fibrillation (HCC) I48.91    Hypernatremia E87.0    Anemia D64.9    Major depression, recurrent (HCC) F33.9    Major depressive disorder, recurrent (HCC) F33.9    Acute kidney injury superimposed on chronic kidney disease (HCC) N17.9, N18.9    Schizoaffective disorder, bipolar type ([address] F25.0    Schizoaffective disorder ([address] F25.9    Hematoma of injection site T80.89XA    BPH (benign prostatic hyperplasia) N40.0    Constipation, unspecified K59.00    Coronary atherosclerosis I25.10    Neurofibromatosis, unspecified ([address] Q85.00    S/P AVR (aortic valve replacement) Z95.2    Depression with suicidal ideation F32. A, R45.851    Severe recurrent major depression without psychotic features ([facility] [address] F33.2     SURGICAL HISTORY       Past Surgical History:   Procedure Laterality Date    ABDOMEN SURGERY      APPENDECTOMY      CARDIAC SURGERY  7/14/15    Median Sternotomy, Repair of ascending aorti aneurysm, stentless valve placement    CARDIAC SURGERY  07/14/2015    Median stenotomy, repair of ascending aorti aneurysm, stentless valve placement     COLONOSCOPY  01/28/2021    DILATATION, ESOPHAGUS      HERNIA REPAIR      NECK SURGERY  2013    TONSILLECTOMY      UPPER GASTROINTESTINAL ENDOSCOPY  01/28/2021   Bladder stent  CURRENT MEDICATIONS       Previous Medications    ACETAMINOPHEN (TYLENOL) 500 MG TABLET    Take 500 mg by mouth every 6 hours as needed    ALENDRONATE (FOSAMAX) 70 MG TABLET    Take 70 mg by mouth every 7 days Patient takes on mondays.     ASPIRIN 81 MG CHEWABLE TABLET    Take 1 tablet by mouth daily    ATORVASTATIN (LIPITOR) 20 MG TABLET    Take 1 tablet by mouth nightly    CALCIUM CITRATE (CALCITRATE) 250 MG TABS TABLET    Take 250 mg by mouth 2 times daily    CETIRIZINE (ZYRTEC) 10 MG TABLET    Take 10 mg by mouth daily    CHOLECALCIFEROL (VITAMIN D3) 50 MCG (2000 UT) CAPS    Take 1 capsule by mouth daily (with breakfast)    CYANOCOBALAMIN (VITAMIN B 12) 500 MCG TABS    Take 1,000 mcg by mouth daily     DOCUSATE SODIUM (COLACE) 100 MG CAPSULE    Take 200 mg by mouth daily    FEROSUL 325 (65 FE) MG TABLET    Take 325 mg by mouth daily (with breakfast)     FOLIC ACID (FOLVITE) 1 MG TABLET    Take 1 mg by mouth daily    FUROSEMIDE (LASIX) 20 MG TABLET    Take 20 mg by mouth daily    GABAPENTIN (NEURONTIN) 300 MG CAPSULE    Take 900 mg by mouth 3 times daily. Pt. States he takes 900 mg. TID    MAGNESIUM HYDROXIDE (MILK OF MAGNESIA) 400 MG/5ML SUSPENSION    Take 30 mLs by mouth nightly    MIDODRINE (PROAMATINE) 10 MG TABLET    Take 10 mg by mouth 3 times daily    OXCARBAZEPINE (TRILEPTAL) 300 MG TABLET    Take 600 mg by mouth 2 times daily     PANTOPRAZOLE (PROTONIX) 40 MG TABLET    Take 40 mg by mouth daily    SENNOSIDES-DOCUSATE SODIUM (SENOKOT-S) 8.6-50 MG TABLET    Take 1 tablet by mouth daily    TAMSULOSIN (FLOMAX) 0.4 MG CAPSULE    Take 0.4 mg by mouth daily    WARFARIN (COUMADIN) 2 MG TABLET    Take 2 mg by mouth daily      ALLERGIES     is allergic to oxycodone, fish-derived products, ibuprofen, latuda [lurasidone hcl], lithium, lurasidone, quetiapine, and seroquel [quetiapine fumarate]. FAMILY HISTORY     He indicated that the status of his mother is unknown. He indicated that the status of his father is unknown. SOCIAL HISTORY       Social History     Tobacco Use    Smoking status: Never    Smokeless tobacco: Never   Substance Use Topics    Alcohol use: Yes     Comment: last drank 6yrs ago    Drug use: Yes     Types: Cocaine     Comment: pt states he used today     PHYSICAL EXAM     INITIAL VITALS: /72   Pulse 81   Temp 97.8 °F (36.6 °C) (Oral)   Resp 16   Ht 5' 6\" (1.676 m)   Wt 188 lb (85.3 kg)   SpO2 98%   BMI 30.34 kg/m²    Physical Exam  Constitutional:       General: He is not in acute distress. Appearance: Normal appearance. He is well-developed. He is not diaphoretic. HENT:      Head: Normocephalic and atraumatic.       Right Ear: External ear normal.      Left Ear: External ear normal.      Nose: Nose normal. No congestion. Mouth/Throat:      Mouth: Mucous membranes are moist.      Pharynx: Oropharynx is clear. Eyes:      General:         Right eye: No discharge. Left eye: No discharge. Conjunctiva/sclera: Conjunctivae normal.      Pupils: Pupils are equal, round, and reactive to light. Neck:      Trachea: No tracheal deviation. Cardiovascular:      Rate and Rhythm: Normal rate and regular rhythm. Pulses: Normal pulses. Heart sounds: Normal heart sounds. Pulmonary:      Effort: Pulmonary effort is normal. No respiratory distress. Breath sounds: Normal breath sounds. No stridor. No wheezing or rales. Abdominal:      Palpations: Abdomen is soft. Tenderness: There is no abdominal tenderness. There is no guarding or rebound. Musculoskeletal:         General: No tenderness or deformity. Normal range of motion. Cervical back: Normal range of motion and neck supple. Skin:     General: Skin is warm and dry. Capillary Refill: Capillary refill takes less than 2 seconds. Findings: No erythema or rash. Neurological:      General: No focal deficit present. Mental Status: He is alert and oriented to person, place, and time. Coordination: Coordination normal.   Psychiatric:         Behavior: Behavior normal.         Judgment: Judgment normal.      Comments: Depressed, anxious, SI with thoughts of shooting himself with gun, no HI, no hallucinations       MEDICAL DECISION MAKING:   Medically clear for bhi admit           Procedures    DIAGNOSTIC RESULTS       LABS: All lab results were reviewed by myself, and all abnormals are listed below.   Labs Reviewed   CBC WITH AUTO DIFFERENTIAL - Abnormal; Notable for the following components:       Result Value    Hematocrit 40.6 (*)     RDW 15.6 (*)     Seg Neutrophils 82 (*)     Lymphocytes 7 (*)     Monocytes 8 (*)     Absolute Lymph # 0.40 (*)     All other components within normal limits   PROTIME-INR - Abnormal; Notable for the following components:    Protime 25.7 (*)     All other components within normal limits   COMPREHENSIVE METABOLIC PANEL   ETHANOL   URINE DRUG SCREEN   URINALYSIS WITH REFLEX TO CULTURE       EMERGENCY DEPARTMENTCOURSE:         Vitals:    Vitals:    09/16/22 1630 09/16/22 1710   BP: 126/72    Pulse: 81    Resp: 16    Temp: 97.8 °F (36.6 °C)    TempSrc: Oral    SpO2: 98%    Weight: 188 lb (85.3 kg) 188 lb (85.3 kg)   Height: 5' 6\" (1.676 m) 5' 6\" (1.676 m)       The patient was given the following medications while in the emergency department:  Orders Placed This Encounter   Medications    LORazepam (ATIVAN) tablet 2 mg     FINAL IMPRESSION      1. Depression with suicidal ideation          DISPOSITION/PLAN   DISPOSITION Decision To Admit 09/16/2022 04:39:55 PM      PATIENT REFERRED TO:  No follow-up provider specified. DISCHARGE MEDICATIONS:  New Prescriptions    No medications on file     The care is provided during an unprecedented national emergency due to the novel coronavirus, COVID 19.   MD Teri Crain MD  09/16/22 2777

## 2022-09-16 NOTE — PROGRESS NOTES
[facility]  Admission Note     Admission Type:   Admission Type: Voluntary    Reason for admission:  Reason for Admission: argument with neighbor, depression, suicidal thoughts      Addictive Behavior:   Addictive Behavior  In the Past 3 Months, Have You Felt or Has Someone Told You That You Have a Problem With  : None    Medical Problems:   Past Medical History:   Diagnosis Date    AMS (altered mental status) 02/15/2020    Atrial fibrillation ([facility] [address]     on coumadin    Bipolar 1 disorder ([facility] [address]     Depression     Hypertension     Neck pain     Neurofibromatosis ([facility] [place] [address]     Lt leg pain    Patient in clinical research study 04/12/2018    [id number]    Severe recurrent major depression without psychotic features ([facility] [place] [address] 6/13/2022    Twitching        Status EXAM:  Mental Status and Behavioral Exam  Normal: No  Level of Assistance: Independent/Self  Facial Expression: Flat, Sad  Affect: Blunt, Congruent  Level of Consciousness: Alert  Frequency of Checks: 4 times per hour, close  Mood:Normal: No  Mood: Depressed, Anxious  Motor Activity:Normal: Yes  Eye Contact: Good  Observed Behavior: Withdrawn, Cooperative, Friendly  Sexual Misconduct History: Current - no  Preception: Others (comment) (oriented x 4)  Attention:Normal: Yes  Thought Processes: Other (comment) (logical)  Thought Content:Normal: Yes  Depression Symptoms: Feelings of helplessness, Feelings of hopelessess, Isolative, Loss of interest  Anxiety Symptoms: Generalized  Mirian Symptoms: No problems reported or observed.   Hallucinations: None  Delusions: No  Memory:Normal: Yes  Insight and Judgment: No  Insight and Judgment: Poor insight    Tobacco Screening:  Practical Counseling, on admission, georgette X, if applicable and completed (first 3 are required if patient doesn't refuse):            ( ) Recognizing danger situations (included triggers and roadblocks)                    ( ) Coping skills (new ways to manage stress,relaxation techniques, changing routine, distraction)                                                           ( ) Basic information about quitting (benefits of quitting, techniques in how to quit, available resources  ( ) Referral for counseling faxed to [facility]                                                                                                                   ( ) Patient refused counseling  (x ) Patient has not smoked in the last 30 days    Metabolic Screening:    Lab Results   Component Value Date    LABA1C 5.4 02/17/2020       Lab Results   Component Value Date    CHOL 126 02/17/2020    CHOL 193 08/02/2018    CHOL 215 (H) 05/22/2018    CHOL 170 02/20/2018    CHOL 142 06/27/2016    CHOL 159 02/21/2013     Lab Results   Component Value Date    TRIG 116 02/17/2020    TRIG 132 08/02/2018    TRIG 162 (H) 05/22/2018    TRIG 158 (H) 02/20/2018    TRIG 204 (H) 06/27/2016    TRIG 103 02/21/2013     Lab Results   Component Value Date    HDL 28 (L) 02/01/2021    HDL 24 (L) 02/17/2020    HDL 28 (L) 08/02/2018    HDL 29 (L) 05/22/2018    HDL 30 (L) 02/20/2018    HDL 27 (L) 06/27/2016    HDL 36 (L) 02/21/2013     No components found for: LDLCAL  No results found for: LABVLDL      Body mass index is 30.02 kg/m². BP Readings from Last 2 Encounters:   09/16/22 136/89   06/16/22 (!) 141/90           Pt admitted with followings belongings:  Dental Appliances: None  Vision - Corrective Lenses: None  Hearing Aid: None  Jewelry: None  Body Piercings Removed: No  Clothing: Shirt, Shorts, Socks, Footwear, Undergarments  Other Valuables: Wallet, Other (Comment) (State id, ss card,insurance card, [id number])    Lauren Brooks RN       Voluntary [facility]. Argument with neighbor, depressed with suicidal thoughts. Cooperative with admit process.

## 2022-09-17 PROBLEM — F31.30 BIPOLAR I DISORDER, MOST RECENT EPISODE DEPRESSED (HCC): Status: ACTIVE | Noted: 2019-09-10

## 2022-09-17 LAB
INR BLD: 1.6
PROTHROMBIN TIME: 18.8 SEC (ref 11.8–14.6)

## 2022-09-17 PROCEDURE — 6370000000 HC RX 637 (ALT 250 FOR IP): Performed by: INTERNAL MEDICINE

## 2022-09-17 PROCEDURE — 90792 PSYCH DIAG EVAL W/MED SRVCS: CPT | Performed by: PSYCHIATRY & NEUROLOGY

## 2022-09-17 PROCEDURE — 99223 1ST HOSP IP/OBS HIGH 75: CPT | Performed by: INTERNAL MEDICINE

## 2022-09-17 PROCEDURE — 36415 COLL VENOUS BLD VENIPUNCTURE: CPT

## 2022-09-17 PROCEDURE — 6370000000 HC RX 637 (ALT 250 FOR IP): Performed by: PSYCHIATRY & NEUROLOGY

## 2022-09-17 PROCEDURE — 85610 PROTHROMBIN TIME: CPT

## 2022-09-17 PROCEDURE — APPSS60 APP SPLIT SHARED TIME 46-60 MINUTES

## 2022-09-17 PROCEDURE — 6370000000 HC RX 637 (ALT 250 FOR IP)

## 2022-09-17 PROCEDURE — 1240000000 HC EMOTIONAL WELLNESS R&B

## 2022-09-17 RX ORDER — ASPIRIN 81 MG/1
81 TABLET, CHEWABLE ORAL DAILY
Status: DISCONTINUED | OUTPATIENT
Start: 2022-09-17 | End: 2022-09-23 | Stop reason: HOSPADM

## 2022-09-17 RX ORDER — WARFARIN SODIUM 5 MG/1
5 TABLET ORAL
Status: COMPLETED | OUTPATIENT
Start: 2022-09-17 | End: 2022-09-17

## 2022-09-17 RX ORDER — ALENDRONATE SODIUM 70 MG/1
70 TABLET ORAL
Status: DISCONTINUED | OUTPATIENT
Start: 2022-09-17 | End: 2022-09-17

## 2022-09-17 RX ORDER — WARFARIN SODIUM 2 MG/1
2 TABLET ORAL
Status: DISCONTINUED | OUTPATIENT
Start: 2022-09-19 | End: 2022-09-17

## 2022-09-17 RX ORDER — PANTOPRAZOLE SODIUM 40 MG/1
40 TABLET, DELAYED RELEASE ORAL DAILY
Status: DISCONTINUED | OUTPATIENT
Start: 2022-09-17 | End: 2022-09-23 | Stop reason: HOSPADM

## 2022-09-17 RX ORDER — CETIRIZINE HYDROCHLORIDE 10 MG/1
10 TABLET ORAL DAILY
Status: DISCONTINUED | OUTPATIENT
Start: 2022-09-17 | End: 2022-09-23 | Stop reason: HOSPADM

## 2022-09-17 RX ORDER — VITAMIN B COMPLEX
2 TABLET ORAL
Status: DISCONTINUED | OUTPATIENT
Start: 2022-09-18 | End: 2022-09-23 | Stop reason: HOSPADM

## 2022-09-17 RX ORDER — FUROSEMIDE 20 MG/1
20 TABLET ORAL DAILY
Status: DISCONTINUED | OUTPATIENT
Start: 2022-09-17 | End: 2022-09-23 | Stop reason: HOSPADM

## 2022-09-17 RX ORDER — OXCARBAZEPINE 600 MG/1
600 TABLET, FILM COATED ORAL 2 TIMES DAILY
Status: DISCONTINUED | OUTPATIENT
Start: 2022-09-17 | End: 2022-09-23 | Stop reason: HOSPADM

## 2022-09-17 RX ORDER — SENNA AND DOCUSATE SODIUM 50; 8.6 MG/1; MG/1
1 TABLET, FILM COATED ORAL DAILY
Status: DISCONTINUED | OUTPATIENT
Start: 2022-09-17 | End: 2022-09-23 | Stop reason: HOSPADM

## 2022-09-17 RX ORDER — FOLIC ACID 1 MG/1
1 TABLET ORAL DAILY
Status: DISCONTINUED | OUTPATIENT
Start: 2022-09-17 | End: 2022-09-23 | Stop reason: HOSPADM

## 2022-09-17 RX ORDER — TAMSULOSIN HYDROCHLORIDE 0.4 MG/1
0.4 CAPSULE ORAL DAILY
Status: DISCONTINUED | OUTPATIENT
Start: 2022-09-17 | End: 2022-09-23 | Stop reason: HOSPADM

## 2022-09-17 RX ORDER — FERROUS SULFATE 325(65) MG
325 TABLET ORAL
Status: DISCONTINUED | OUTPATIENT
Start: 2022-09-18 | End: 2022-09-23 | Stop reason: HOSPADM

## 2022-09-17 RX ORDER — MIDODRINE HYDROCHLORIDE 10 MG/1
10 TABLET ORAL 3 TIMES DAILY
Status: DISCONTINUED | OUTPATIENT
Start: 2022-09-17 | End: 2022-09-20

## 2022-09-17 RX ORDER — GABAPENTIN 300 MG/1
900 CAPSULE ORAL 3 TIMES DAILY
Status: DISCONTINUED | OUTPATIENT
Start: 2022-09-17 | End: 2022-09-23 | Stop reason: HOSPADM

## 2022-09-17 RX ORDER — ATORVASTATIN CALCIUM 20 MG/1
20 TABLET, FILM COATED ORAL NIGHTLY
Status: DISCONTINUED | OUTPATIENT
Start: 2022-09-17 | End: 2022-09-23 | Stop reason: HOSPADM

## 2022-09-17 RX ORDER — IBUPROFEN 200 MG
200 CAPSULE ORAL 2 TIMES DAILY
Status: DISCONTINUED | OUTPATIENT
Start: 2022-09-17 | End: 2022-09-23 | Stop reason: HOSPADM

## 2022-09-17 RX ORDER — LANOLIN ALCOHOL/MO/W.PET/CERES
1000 CREAM (GRAM) TOPICAL DAILY
Status: DISCONTINUED | OUTPATIENT
Start: 2022-09-17 | End: 2022-09-23 | Stop reason: HOSPADM

## 2022-09-17 RX ORDER — DOCUSATE SODIUM 100 MG/1
200 CAPSULE, LIQUID FILLED ORAL DAILY
Status: DISCONTINUED | OUTPATIENT
Start: 2022-09-17 | End: 2022-09-23 | Stop reason: HOSPADM

## 2022-09-17 RX ADMIN — WARFARIN SODIUM 5 MG: 5 TABLET ORAL at 18:13

## 2022-09-17 RX ADMIN — OXCARBAZEPINE 600 MG: 600 TABLET, FILM COATED ORAL at 21:13

## 2022-09-17 RX ADMIN — SENNOSIDES AND DOCUSATE SODIUM 1 TABLET: 50; 8.6 TABLET ORAL at 11:45

## 2022-09-17 RX ADMIN — TRAZODONE HYDROCHLORIDE 50 MG: 50 TABLET ORAL at 21:13

## 2022-09-17 RX ADMIN — GABAPENTIN 900 MG: 300 CAPSULE ORAL at 13:16

## 2022-09-17 RX ADMIN — DOCUSATE SODIUM 200 MG: 100 CAPSULE, LIQUID FILLED ORAL at 11:45

## 2022-09-17 RX ADMIN — PANTOPRAZOLE SODIUM 40 MG: 40 TABLET, DELAYED RELEASE ORAL at 11:45

## 2022-09-17 RX ADMIN — Medication 200 MG: at 21:12

## 2022-09-17 RX ADMIN — GABAPENTIN 900 MG: 300 CAPSULE ORAL at 21:13

## 2022-09-17 RX ADMIN — ASPIRIN 81 MG 81 MG: 81 TABLET ORAL at 11:46

## 2022-09-17 RX ADMIN — HYDROXYZINE HYDROCHLORIDE 50 MG: 50 TABLET ORAL at 07:39

## 2022-09-17 RX ADMIN — MIDODRINE HYDROCHLORIDE 10 MG: 10 TABLET ORAL at 13:14

## 2022-09-17 RX ADMIN — HYDROXYZINE HYDROCHLORIDE 50 MG: 50 TABLET ORAL at 02:03

## 2022-09-17 RX ADMIN — ACETAMINOPHEN 650 MG: 325 TABLET, FILM COATED ORAL at 02:03

## 2022-09-17 RX ADMIN — ACETAMINOPHEN 650 MG: 325 TABLET, FILM COATED ORAL at 14:08

## 2022-09-17 RX ADMIN — CETIRIZINE HYDROCHLORIDE 10 MG: 10 TABLET, FILM COATED ORAL at 11:45

## 2022-09-17 RX ADMIN — FUROSEMIDE 20 MG: 20 TABLET ORAL at 13:14

## 2022-09-17 RX ADMIN — OXCARBAZEPINE 600 MG: 600 TABLET, FILM COATED ORAL at 11:45

## 2022-09-17 RX ADMIN — MIDODRINE HYDROCHLORIDE 10 MG: 10 TABLET ORAL at 21:41

## 2022-09-17 RX ADMIN — ATORVASTATIN CALCIUM 20 MG: 20 TABLET, FILM COATED ORAL at 21:13

## 2022-09-17 RX ADMIN — CYANOCOBALAMIN TAB 1000 MCG 1000 MCG: 1000 TAB at 11:45

## 2022-09-17 RX ADMIN — HYDROXYZINE HYDROCHLORIDE 50 MG: 50 TABLET ORAL at 21:13

## 2022-09-17 RX ADMIN — TAMSULOSIN HYDROCHLORIDE 0.4 MG: 0.4 CAPSULE ORAL at 11:45

## 2022-09-17 RX ADMIN — FOLIC ACID 1 MG: 1 TABLET ORAL at 11:45

## 2022-09-17 RX ADMIN — Medication 200 MG: at 14:12

## 2022-09-17 ASSESSMENT — PAIN SCALES - GENERAL
PAINLEVEL_OUTOF10: 0
PAINLEVEL_OUTOF10: 4
PAINLEVEL_OUTOF10: 5

## 2022-09-17 ASSESSMENT — PAIN DESCRIPTION - LOCATION
LOCATION: LEG
LOCATION: LEG

## 2022-09-17 ASSESSMENT — LIFESTYLE VARIABLES
HOW MANY STANDARD DRINKS CONTAINING ALCOHOL DO YOU HAVE ON A TYPICAL DAY: PATIENT DOES NOT DRINK
HOW OFTEN DO YOU HAVE A DRINK CONTAINING ALCOHOL: NEVER

## 2022-09-17 NOTE — PLAN OF CARE
Problem: Depression  Goal: Will be euthymic at discharge  Description: INTERVENTIONS:  1. Administer medication as ordered  2. Provide emotional support via 1:1 interaction with staff  3. Encourage involvement in milieu/groups/activities  4. Monitor for social isolation  Outcome: Progressing  Note: Patient admits to feelings of depression about not having a place to go after discharge. Denies feelings of anxiety, suicidal and homicidal ideations. Denies auditory and visual hallucinations as well. Patient is out in dayroom and is friendly, cooperative, and social with staff and peers. Reminded patient to seek out staff if feelings of depression, anxiety, thoughts of self harm, or hurting others arise. Patient verbalized understanding an agreed to seek out staff if these thoughts arise. Q15 minute checks maintained. Patient remains safe at this time.

## 2022-09-17 NOTE — H&P
[place] [address] [facility]    HISTORY AND PHYSICAL EXAMINATION/ CONSULT NOTE            Date:   9/17/2022  Patient name:  Emmett Williamson  Date of admission:  9/16/2022  4:24 PM  MRN:   [id number]  Account:  [de-identified]  YOB: 1960  PCP:    No primary care provider on file. Room:   [address] [place] [address]  Code Status:    Full Code    Physician Requesting Consult: Saida Hanley MD    Reason for Consult: History and physical, medical management    Chief Complaint:     No chief complaint on file.     Medical management    History Obtained From:     Patient, EMR, nursing staff    History of Present Illness:     69-year-old male admitted for depression with suicidal ideation    Medical history includes A. fib on Coumadin,  neurofibromatosis, history of cardiac valve replacement  Patient is frequently hypotensive-takes midodrine for blood pressure  Also history of osteoporosis on Fosamax weekly    Past Medical History:     Past Medical History:   Diagnosis Date    AMS (altered mental status) 02/15/2020    Atrial fibrillation ([address]     on coumadin    Bipolar 1 disorder ([address]     Depression     Hypertension     Neck pain     Neurofibromatosis ([address]     Lt leg pain    Patient in clinical research study 04/12/2018    [id number]    Severe recurrent major depression without psychotic features ([address] 6/13/2022    Twitching         Past Surgical History:     Past Surgical History:   Procedure Laterality Date    ABDOMEN SURGERY      APPENDECTOMY      CARDIAC SURGERY  7/14/15    Median Sternotomy, Repair of ascending aorti aneurysm, stentless valve placement    CARDIAC SURGERY  07/14/2015    Median stenotomy, repair of ascending aorti aneurysm, stentless valve placement     COLONOSCOPY  01/28/2021    DILATATION, ESOPHAGUS      HERNIA REPAIR      NECK SURGERY  2013    TONSILLECTOMY      UPPER GASTROINTESTINAL ENDOSCOPY  01/28/2021        Medications Prior to Admission:     Prior to Admission medications    Medication Sig Start Date End Date Taking? Authorizing Provider   warfarin (COUMADIN) 2 MG tablet Take 4 mg by mouth four times a week Indications: Tues, Thurs, Fri, Sat Managed by Rachana Hurley Medication Management   Yes Historical Provider, MD   sennosides-docusate sodium (SENOKOT-S) 8.6-50 MG tablet Take 1 tablet by mouth daily    Historical Provider, MD   docusate sodium (COLACE) 100 mg capsule Take 200 mg by mouth daily    Historical Provider, MD   calcium citrate (CALCITRATE) 250 MG TABS tablet Take 250 mg by mouth 2 times daily    Historical Provider, MD   warfarin (COUMADIN) 2 MG tablet Take 2 mg by mouth three times a week Indications: Sun, Mon, Wed Managed by Rachana Hurley Medication Management    Historical Provider, MD   pantoprazole (PROTONIX) 40 MG tablet Take 40 mg by mouth daily    Historical Provider, MD   acetaminophen (TYLENOL) 500 MG tablet Take 500 mg by mouth every 6 hours as needed 2/25/22 2/25/23  Historical Provider, MD   Cholecalciferol (VITAMIN D3) 50 MCG (2000 UT) CAPS Take 1 capsule by mouth daily (with breakfast)    Historical Provider, MD   OXcarbazepine (TRILEPTAL) 300 MG tablet Take 600 mg by mouth 2 times daily     Historical Provider, MD   FEROSUL 325 (65 Fe) MG tablet Take 325 mg by mouth daily (with breakfast)  7/7/21   Historical Provider, MD   gabapentin (NEURONTIN) 300 MG capsule Take 900 mg by mouth 3 times daily.  Pt. States he takes 900 mg. TID    Historical Provider, MD   aspirin 81 MG chewable tablet Take 1 tablet by mouth daily 7/8/20   Ruma Correia MD   atorvastatin (LIPITOR) 20 MG tablet Take 1 tablet by mouth nightly 7/7/20   Ruma Correia MD   midodrine (PROAMATINE) 10 MG tablet Take 10 mg by mouth 3 times daily    Historical Provider, MD   folic acid (FOLVITE) 1 MG tablet Take 1 mg by mouth daily    Historical Provider, MD   furosemide (LASIX) 20 MG tablet Take 20 mg by mouth daily    Historical Provider, MD   tamsulosin (FLOMAX) 0.4 MG capsule Take 0.4 mg by mouth daily    Historical Provider, MD   Cyanocobalamin (VITAMIN B 12) 500 MCG TABS Take 1,000 mcg by mouth daily     Historical Provider, MD   alendronate (FOSAMAX) 70 MG tablet Take 70 mg by mouth every 7 days Patient takes on . Historical Provider, MD   cetirizine (ZYRTEC) 10 MG tablet Take 10 mg by mouth daily    Historical Provider, MD        Allergies:     Oxycodone, Fish-derived products, Ibuprofen, Latuda [lurasidone hcl], Lithium, Lurasidone, Quetiapine, and Seroquel [quetiapine fumarate]    Social History:     Tobacco:    reports that he has never smoked. He has never been exposed to tobacco smoke. He has never used smokeless tobacco.  Alcohol:      reports that he does not currently use alcohol. Drug Use:  reports that he does not currently use drugs. Family History:     Family History   Problem Relation Age of Onset    Coronary Art Dis Mother     Hypertension Mother     Hypertension Father     Cancer Father         blood       Review of Systems:     Positive and Negative as described in HPI. Denies any shortness of breath or cough  Denies chest pain or palpitations  Denies abdominal pain, diarrhea vomiting  Denies any new numbness tremors or weakness. 10 point review of systems was negative other than mentioned above    Physical Exam:     /77   Pulse 69   Temp 97.7 °F (36.5 °C) (Temporal)   Resp 16   Ht 5' 6\" (1.676 m)   Wt 186 lb (84.4 kg)   SpO2 98%   BMI 30.02 kg/m²   Temp (24hrs), Av.7 °F (36.5 °C), Min:97.6 °F (36.4 °C), Max:97.8 °F (36.6 °C)    No results for input(s): POCGLU in the last 72 hours. No intake or output data in the 24 hours ending 22 1026    General Appearance:  alert, well appearing, and in no acute distress  Head:  normocephalic, atraumatic.   Eye: no icterus, redness, pupils equal and reactive, extraocular eye movements intact, conjunctiva clear  Ear: normal external ear, no discharge, hearing intact  Nose:  no drainage noted  Mouth: mucous membranes moist  Neck: supple, no carotid bruits, thyroid not palpable  Lungs: Bilateral equal air entry, clear to ausculation, no wheezing, rales or rhonchi, normal effort  Cardiovascular: normal rate, regular rhythm, no murmur, gallop, rub.   Abdomen: Soft, nontender, nondistended, normal bowel sounds, no hepatomegaly or splenomegaly  Neurologic: There are no new focal motor or sensory deficits, normal muscle tone and bulk, no abnormal sensation, normal speech, cranial nerves II through XII grossly intact  Skin: No gross lesions, rashes, bruising or bleeding on exposed skin area  Extremities:  No joint swelling, no pedal edema or calf pain with palpation      Investigations:      Laboratory Testing:  Recent Results (from the past 24 hour(s))   CBC with Auto Differential    Collection Time: 09/16/22  4:57 PM   Result Value Ref Range    WBC 5.7 3.5 - 11.0 k/uL    RBC 4.52 4.5 - 5.9 m/uL    Hemoglobin 13.5 13.5 - 17.5 g/dL    Hematocrit 40.6 (L) 41 - 53 %    MCV 89.8 80 - 100 fL    MCH 29.9 26 - 34 pg    MCHC 33.3 31 - 37 g/dL    RDW 15.6 (H) 11.5 - 14.9 %    Platelets 933 148 - 423 k/uL    MPV 6.9 6.0 - 12.0 fL    Seg Neutrophils 82 (H) 36 - 66 %    Lymphocytes 7 (L) 24 - 44 %    Monocytes 8 (H) 1 - 7 %    Eosinophils % 2 0 - 4 %    Basophils 1 0 - 2 %    Segs Absolute 4.80 1.3 - 9.1 k/uL    Absolute Lymph # 0.40 (L) 1.0 - 4.8 k/uL    Absolute Mono # 0.40 0.1 - 1.3 k/uL    Absolute Eos # 0.10 0.0 - 0.4 k/uL    Basophils Absolute 0.00 0.0 - 0.2 k/uL   Comprehensive Metabolic Panel    Collection Time: 09/16/22  4:57 PM   Result Value Ref Range    Glucose 90 70 - 99 mg/dL    BUN 12 8 - 23 mg/dL    Creatinine 0.90 0.70 - 1.20 mg/dL    Calcium 9.2 8.6 - 10.4 mg/dL    Sodium 135 135 - 144 mmol/L    Potassium 4.2 3.7 - 5.3 mmol/L    Chloride 98 98 - 107 mmol/L    CO2 27 20 - 31 mmol/L    Anion Gap 10 9 - 17 mmol/L    Alkaline Phosphatase 122 40 - 129 U/L    ALT 23 5 - 41 U/L    AST 20 <40 U/L    Total Bilirubin 0.4 0.3 - 1.2 mg/dL    Total Protein 7.1 6.4 - 8.3 g/dL    Albumin 4.4 3.5 - 5.2 g/dL    GFR Non-African American >60 >60 mL/min    GFR African American >60 >60 mL/min    GFR Comment         Ethanol    Collection Time: 09/16/22  4:57 PM   Result Value Ref Range    Ethanol <10 <10 mg/dL    Ethanol percent <0.010 %   Protime-INR    Collection Time: 09/16/22  4:57 PM   Result Value Ref Range    Protime 25.7 (H) 11.8 - 14.6 sec    INR 2.3    Urine Drug Screen    Collection Time: 09/16/22  9:15 PM   Result Value Ref Range    Amphetamine Screen, Ur NEGATIVE NEGATIVE    Barbiturate Screen, Ur NEGATIVE NEGATIVE    Benzodiazepine Screen, Urine NEGATIVE     Cocaine Metabolite, Urine NEGATIVE NEGATIVE    Methadone Screen, Urine NEGATIVE NEGATIVE    Opiates, Urine NEGATIVE NEGATIVE    Phencyclidine, Urine NEGATIVE NEGATIVE    Cannabinoid Scrn, Ur NEGATIVE NEGATIVE    Oxycodone Screen, Ur NEGATIVE NEGATIVE    Fentanyl, Ur NEGATIVE NEGATIVE    Test Information       Assay provides medical screening only. The absence of expected drug(s) and/or metabolite(s) may indicate diluted or adulterated urine, limitations of testing or timing of collection.    Urinalysis with Reflex to Culture    Collection Time: 09/16/22  9:15 PM    Specimen: Urine, clean catch   Result Value Ref Range    Color, UA Yellow Yellow    Turbidity UA Clear Clear    Glucose, Ur NEGATIVE NEGATIVE    Bilirubin Urine NEGATIVE NEGATIVE    Ketones, Urine NEGATIVE NEGATIVE    Specific [place], UA 1.015 1.000 - 1.030    Urine Hgb SMALL (A) NEGATIVE    pH, UA 5.5 5.0 - 8.0    Protein, UA NEGATIVE NEGATIVE    Urobilinogen, Urine Normal Normal    Nitrite, Urine NEGATIVE NEGATIVE    Leukocyte Esterase, Urine NEGATIVE NEGATIVE   Microscopic Urinalysis    Collection Time: 09/16/22  9:15 PM   Result Value Ref Range    WBC, UA 0 TO 2 /HPF    RBC, UA 3 to 5 /HPF    Casts UA 0 TO 2 /LPF    Epithelial Cells UA 0 TO 2 /HPF    Bacteria, UA None None       Imaging/Diagonstics:  Recent data reviewed    Assessment :      Primary Problem  Depression with suicidal ideation    Active Hospital Problems    Diagnosis Date Noted    Depression with suicidal ideation [F32. A, R45.851] 06/12/2022     Priority: Medium       Plan:     Reason for consult: General medical management     Depression with suicidal ideation-management per psychiatry  MICHAEL gutierrez currently rate controlled continue Coumadin, aspirin, statin, INR 2.3 on admission, pharmacy consulted  History of aortic valve replacement-continue Coumadin  ARIELLE on CPAP, home CPAP ordered  osteoporosis-continue weekly Fosamax  BPH-continue home dose tamsulosin  Neurofibromatosis-with resultant peripheral neuropathy-Neurontin resumed    DVT prophylaxis-not required, patient is mobile    Consultations:   [id number] Kelsie Carpenter MD  9/17/2022  10:26 AM    Copy sent to No primary care provider on file. Please note that this chart was generated using voice recognition Dragon dictation software. Although every effort was made to ensure the accuracy of this automated transcription, some errors in transcription may have occurred.

## 2022-09-17 NOTE — PROGRESS NOTES
Pharmacy Note  Warfarin Consult    Fernando Nathan is a 57 y.o. male for whom pharmacy has been consulted to manage warfarin therapy. Consulting Physician: Dr. George Leahy   Reason for Admission: depression w/ SI    Warfarin dose prior to admission: 2 mg on Sun/Mon/Wed and 4 mg on the remaining days  Warfarin indication: afib   Target INR range: 2-3     Past Medical History:   Diagnosis Date    AMS (altered mental status) 02/15/2020    Atrial fibrillation ([facility]     on coumadin    Bipolar 1 disorder ([facility]     Depression     Hypertension     Neck pain     Neurofibromatosis ([facility]     Lt leg pain    Patient in clinical research study 04/12/2018    [id number]    Severe recurrent major depression without psychotic features ([facility] 6/13/2022    Twitching                 Recent Labs     09/17/22  1304   INR 1.6     Recent Labs     09/16/22  1657   HGB 13.5   HCT 40.6*          Current warfarin drug-drug interactions: aspirin, lipitor, trazodone       Date             INR        Dose   9/17/2022            1.6 (goal 2-3)       5 mg    Daily PT/INR while inpatient. Thank you for the consult. Will continue to follow.     Matt Woodard, ChristD, BCPS  9/17/2022 1:56 PM
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
## 2022-09-17 NOTE — PROGRESS NOTES
Behavioral Services  Medicare Certification Upon Admission    I certify that this patient's inpatient psychiatric hospital admission is medically necessary for:    [x] (1) Treatment which could reasonably be expected to improve this patient's condition,       [x] (2) Or for diagnostic study;     AND     [x](2) The inpatient psychiatric services are provided while the individual is under the care of a physician and are included in the individualized plan of care.     Estimated length of stay/service 3-5 days    Plan for post-hospital care hc    Electronically signed by Jeniffer Hernández MD on 9/17/2022 at 9:15 AM

## 2022-09-17 NOTE — GROUP NOTE
Group Therapy Note    Date: 9/17/2022    Group Start Time: 0900  Group End Time: 0930  Group Topic: Community Meeting    [address] [facility]    [address] [facility] Meeting Group Note        Date: September 17, 2022 Start Time: 9am  End Time: 10am      Number of Participants in Group & Unit Census:  13/19    Topic: Goals Group    Goal of Group:Discuss daily goals the patient would like to achieve throughout the day. Comments:     Patient did not participate in [employer] group, despite staff encouragement and explanation of benefits. Patient remain seclusive to self. Q15 minute safety checks maintained for patient safety and will continue to encourage patient to attend unit programming.      Group Therapy Note    Attendees: 13/19    Signature:  Ernestina Addison

## 2022-09-17 NOTE — CARE COORDINATION
BHI Biopsychosocial Assessment    Current Level of Psychosocial Functioning     Independent   Dependent    Minimal Assist X    Psychosocial High Risk Factors (check all that apply)    Unable to obtain meds   Chronic illness/pain    Substance abuse X Cocaine   Lack of Family Support X  Financial stress   Isolation   Inadequate Community Resources  Suicide attempt(s)   Not taking medications   Victim of crime   Developmental Delay  Unable to manage personal needs  X  Age 72 or older   Homeless  No transportation   Readmission within 30 days  Unemployment  Traumatic Event     Psychiatric Advanced Directives: none reported     Family to Involve in Treatment: Lack of family support     Sexual Orientation:  MICHELLE    Patient Strengths: adequate housing, Linked with the [facility] Team for outpatient Treatment, reports medication compliance, SSDI income, insurance     Patient Barriers: Presenting on admission with suicidal ideation, reports a recent disagreement with resident's at his apartment complex. Opiate Education Provided: N/A Pt denies and does not have any documented history of Opiate or Heroin use/abuse. Pt presents with past Crack/Cocaine use and he reports that he hasn't used since June 2022. Pt drug screen upon admission was negative for all substances. CMHC/mental health history: Pt is linked with the [facility] 2 Team    Plan of Care   medication management, group/individual therapies, family meetings, psycho -education, treatment team meetings to assist with stabilization, referral to community resources. Initial Discharge Plan: Pt reports a plan to return to his apartment in [facility] at discharge and he also reports a plan to continue with outpatient Treatment with the [place] [facility] [id number] Team.        Clinical Summary:  Javier Beauchamp is a 57 y.o. male who presents on admission for suicidal ideation with a plan to \"get a gun and shoot myself. \" Patient states he does not currently have access to a gun. Patient reports worsening depression over the last couple of weeks. Pt endorses feelings of helplessness, hopelessness, and worthlessness. Patient reports the increase in suicidal ideation and depression stems from issues at his apartment. . Patient  reports disagreements with another resident in the apartment complex. Pt last Inpatient Psychiatric hospitalization at [facility] was from 6/12-6/16/2022. Pt is linked with the [facility] 2 Team. Pt reports treatment and medication compliance. Pt presents with past Crack/Cocaine use and he reports that he hasn't used since June 2022. Pt drug screen upon admission was negative for all substances.

## 2022-09-18 LAB
INR BLD: 1.3
PROTHROMBIN TIME: 16.4 SEC (ref 11.8–14.6)

## 2022-09-18 PROCEDURE — 85610 PROTHROMBIN TIME: CPT

## 2022-09-18 PROCEDURE — 6370000000 HC RX 637 (ALT 250 FOR IP): Performed by: PSYCHIATRY & NEUROLOGY

## 2022-09-18 PROCEDURE — 6370000000 HC RX 637 (ALT 250 FOR IP)

## 2022-09-18 PROCEDURE — 6370000000 HC RX 637 (ALT 250 FOR IP): Performed by: INTERNAL MEDICINE

## 2022-09-18 PROCEDURE — 36415 COLL VENOUS BLD VENIPUNCTURE: CPT

## 2022-09-18 PROCEDURE — 1240000000 HC EMOTIONAL WELLNESS R&B

## 2022-09-18 PROCEDURE — 99232 SBSQ HOSP IP/OBS MODERATE 35: CPT

## 2022-09-18 RX ORDER — AMITRIPTYLINE HYDROCHLORIDE 25 MG/1
25 TABLET, FILM COATED ORAL NIGHTLY
Status: DISCONTINUED | OUTPATIENT
Start: 2022-09-18 | End: 2022-09-23 | Stop reason: HOSPADM

## 2022-09-18 RX ORDER — WARFARIN SODIUM 5 MG/1
5 TABLET ORAL
Status: COMPLETED | OUTPATIENT
Start: 2022-09-18 | End: 2022-09-18

## 2022-09-18 RX ORDER — TRAZODONE HYDROCHLORIDE 100 MG/1
100 TABLET ORAL NIGHTLY PRN
Status: DISCONTINUED | OUTPATIENT
Start: 2022-09-18 | End: 2022-09-21

## 2022-09-18 RX ADMIN — Medication 2000 UNITS: at 10:33

## 2022-09-18 RX ADMIN — TAMSULOSIN HYDROCHLORIDE 0.4 MG: 0.4 CAPSULE ORAL at 08:43

## 2022-09-18 RX ADMIN — DOCUSATE SODIUM 200 MG: 100 CAPSULE, LIQUID FILLED ORAL at 08:43

## 2022-09-18 RX ADMIN — FOLIC ACID 1 MG: 1 TABLET ORAL at 08:43

## 2022-09-18 RX ADMIN — TRAZODONE HYDROCHLORIDE 100 MG: 100 TABLET ORAL at 21:48

## 2022-09-18 RX ADMIN — AMITRIPTYLINE HYDROCHLORIDE 25 MG: 25 TABLET, FILM COATED ORAL at 21:53

## 2022-09-18 RX ADMIN — MIDODRINE HYDROCHLORIDE 10 MG: 10 TABLET ORAL at 10:19

## 2022-09-18 RX ADMIN — GABAPENTIN 900 MG: 300 CAPSULE ORAL at 21:49

## 2022-09-18 RX ADMIN — CETIRIZINE HYDROCHLORIDE 10 MG: 10 TABLET, FILM COATED ORAL at 08:44

## 2022-09-18 RX ADMIN — GABAPENTIN 900 MG: 300 CAPSULE ORAL at 08:43

## 2022-09-18 RX ADMIN — ATORVASTATIN CALCIUM 20 MG: 20 TABLET, FILM COATED ORAL at 21:48

## 2022-09-18 RX ADMIN — MIDODRINE HYDROCHLORIDE 10 MG: 10 TABLET ORAL at 14:27

## 2022-09-18 RX ADMIN — HYDROXYZINE HYDROCHLORIDE 50 MG: 50 TABLET ORAL at 21:48

## 2022-09-18 RX ADMIN — HYDROXYZINE HYDROCHLORIDE 50 MG: 50 TABLET ORAL at 14:27

## 2022-09-18 RX ADMIN — Medication 200 MG: at 21:57

## 2022-09-18 RX ADMIN — OXCARBAZEPINE 600 MG: 600 TABLET, FILM COATED ORAL at 21:48

## 2022-09-18 RX ADMIN — SENNOSIDES AND DOCUSATE SODIUM 1 TABLET: 50; 8.6 TABLET ORAL at 08:43

## 2022-09-18 RX ADMIN — WARFARIN SODIUM 5 MG: 5 TABLET ORAL at 18:08

## 2022-09-18 RX ADMIN — ACETAMINOPHEN 650 MG: 325 TABLET, FILM COATED ORAL at 21:48

## 2022-09-18 RX ADMIN — GABAPENTIN 900 MG: 300 CAPSULE ORAL at 14:26

## 2022-09-18 RX ADMIN — PANTOPRAZOLE SODIUM 40 MG: 40 TABLET, DELAYED RELEASE ORAL at 08:43

## 2022-09-18 RX ADMIN — CYANOCOBALAMIN TAB 1000 MCG 1000 MCG: 1000 TAB at 08:44

## 2022-09-18 RX ADMIN — ASPIRIN 81 MG 81 MG: 81 TABLET ORAL at 08:43

## 2022-09-18 RX ADMIN — Medication 200 MG: at 08:44

## 2022-09-18 RX ADMIN — MIDODRINE HYDROCHLORIDE 10 MG: 10 TABLET ORAL at 21:49

## 2022-09-18 RX ADMIN — FERROUS SULFATE TAB 325 MG (65 MG ELEMENTAL FE) 325 MG: 325 (65 FE) TAB at 08:43

## 2022-09-18 RX ADMIN — FUROSEMIDE 20 MG: 20 TABLET ORAL at 10:19

## 2022-09-18 RX ADMIN — OXCARBAZEPINE 600 MG: 600 TABLET, FILM COATED ORAL at 08:43

## 2022-09-18 NOTE — PLAN OF CARE
Patient was out in the milieu social with select peers. Patient is very anxious this evening stating that he feels very anxious today because he doesn't feel safe on the unit. Patient stated that he thinks there are a lot of young trouble makers on the unit currently. Support and reassurance given. Patient was moved to a new room because he stated he did not feel safe in the room he was in. Patient was compliant with all scheduled medications this evening. Patient denies any suicidal thoughts at this time. Patient agrees to come talk with staff if having any thoughts to harm himself this shift. 15 min rounds continued for patient safety. Problem: Pain  Goal: Verbalizes/displays adequate comfort level or baseline comfort level  Outcome: Progressing     Problem: Self Harm/Suicidality  Goal: Will have no self-injury during hospital stay  Description: INTERVENTIONS:  1. Q 30 MINUTES: Routine safety checks  2. Q SHIFT & PRN: Assess risk to determine if routine checks are adequate to maintain patient safety  Outcome: Progressing     Problem: Depression  Goal: Will be euthymic at discharge  Description: INTERVENTIONS:  1. Administer medication as ordered  2. Provide emotional support via 1:1 interaction with staff  3. Encourage involvement in milieu/groups/activities  4.  Monitor for social isolation  Outcome: Progressing

## 2022-09-18 NOTE — PROGRESS NOTES
Daily Progress Note  9/18/2022    Patient Name: Rossy Díaz: Depression with suicidal ideation         SUBJECTIVE:      Patient is seen today for a follow up assessment. Patient has been compliant scheduled medication at this time and has not required emergency medication in the past 24 hours. Impression interview patient endorses significant depression with suicidal ideation. Patient endorses plan to overdose or shoot himself with a gun. Patient denies having access to a gun at home. Patient reports depression continues related to racing thoughts which is worsened by thinking about discharge. Patient states he is worried about continued disagreements with his neighbor and his exploring other options for living. Patient reports not being able to think of any alternate response to situation with his neighbor. Patient is unable to contract for safety outside the hospital at this time. Patient reports continued discomfort in his legs and states his neurologist at [address] prescribed him Elavil. Care Everywhere confirms patient was on 25 mg Elavil nightly, will enter order and continue to monitor for symptoms. Additionally patient states that his sleep was poor, plan discussed to increase trazodone to 100 mg, patient was agreeable. Writer encouraged patient to attend groups on the unit. At this time, the patient is not appropriate for a lower level of care. There is risk of decompensation and patient warrants further hospitalization for safety and stabilization. Appetite:  [] Adequate/Unchanged  [x] Fair   [] Decreased      Sleep:       [] Adequate/Unchanged  [] Fair  [x] Poor      Group Attendance on Unit:   [] Yes   [] Selectively    [] No    Medication Side Effects: Denies         Mental Status Exam  Level of consciousness: Alert and awake. Appearance: Appropriate attire for setting, seated in chair, with fair  grooming and hygiene.    Behavior/Motor: Approachable, compliant with interview  Attitude toward examiner: Cooperative, attentive, good eye contact. Speech: normal rate and normal volume   Mood:  Patient reports \" down\". Affect: Congruent  Thought processes: Linear and coherent. Thought content: Denies homicidal ideation. Suicidal Ideation: Active suicidal ideations, with a  current plan, denies intent to harm self on unit. Unable to contract for safety off unit. Delusions: No evidence of delusions. Denies paranoia. Perceptual Disturbance: Patient does not appear to be responding to internal stimuli. Denies auditory hallucinations. Denies visual hallucinations. Cognition: Oriented to self, location, time, and situation. Memory: Intact. Insight & Judgement: Poor. Data   height is 5' 6\" (1.676 m) and weight is 186 lb (84.4 kg). His temporal temperature is 97.5 °F (36.4 °C). His blood pressure is 147/86 (abnormal) and his pulse is 65. His respiration is 16 and oxygen saturation is 100%.    Labs:   Admission on 09/16/2022   Component Date Value Ref Range Status    WBC 09/16/2022 5.7  3.5 - 11.0 k/uL Final    RBC 09/16/2022 4.52  4.5 - 5.9 m/uL Final    Hemoglobin 09/16/2022 13.5  13.5 - 17.5 g/dL Final    Hematocrit 09/16/2022 40.6 (A) 41 - 53 % Final    MCV 09/16/2022 89.8  80 - 100 fL Final    MCH 09/16/2022 29.9  26 - 34 pg Final    MCHC 09/16/2022 33.3  31 - 37 g/dL Final    RDW 09/16/2022 15.6 (A) 11.5 - 14.9 % Final    Platelets 09/49/7867 150  150 - 450 k/uL Final    MPV 09/16/2022 6.9  6.0 - 12.0 fL Final    Seg Neutrophils 09/16/2022 82 (A) 36 - 66 % Final    Lymphocytes 09/16/2022 7 (A) 24 - 44 % Final    Monocytes 09/16/2022 8 (A) 1 - 7 % Final    Eosinophils % 09/16/2022 2  0 - 4 % Final    Basophils 09/16/2022 1  0 - 2 % Final    Segs Absolute 09/16/2022 4.80  1.3 - 9.1 k/uL Final    Absolute Lymph # 09/16/2022 0.40 (A) 1.0 - 4.8 k/uL Final    Absolute Mono # 09/16/2022 0.40  0.1 - 1.3 k/uL Final    Absolute Eos # 09/16/2022 0.10  0.0 - 0.4 k/uL Final Basophils Absolute 09/16/2022 0.00  0.0 - 0.2 k/uL Final    Glucose 09/16/2022 90  70 - 99 mg/dL Final    BUN 09/16/2022 12  8 - 23 mg/dL Final    Creatinine 09/16/2022 0.90  0.70 - 1.20 mg/dL Final    Calcium 09/16/2022 9.2  8.6 - 10.4 mg/dL Final    Sodium 09/16/2022 135  135 - 144 mmol/L Final    Potassium 09/16/2022 4.2  3.7 - 5.3 mmol/L Final    Chloride 09/16/2022 98  98 - 107 mmol/L Final    CO2 09/16/2022 27  20 - 31 mmol/L Final    Anion Gap 09/16/2022 10  9 - 17 mmol/L Final    Alkaline Phosphatase 09/16/2022 122  40 - 129 U/L Final    ALT 09/16/2022 23  5 - 41 U/L Final    AST 09/16/2022 20  <40 U/L Final    Total Bilirubin 09/16/2022 0.4  0.3 - 1.2 mg/dL Final    Total Protein 09/16/2022 7.1  6.4 - 8.3 g/dL Final    Albumin 09/16/2022 4.4  3.5 - 5.2 g/dL Final    GFR Non- 09/16/2022 >60  >60 mL/min Final    GFR  09/16/2022 >60  >60 mL/min Final    GFR Comment 09/16/2022        Final    Comment: Average GFR for 61-76 years old:   80 mL/min/1.73sq m  Chronic Kidney Disease:   <60 mL/min/1.73sq m  Kidney failure:   <15 mL/min/1.73sq m              eGFR calculated using average adult body mass.  Additional eGFR calculator available at:        [employer]            Ethanol 09/16/2022 <10  <10 mg/dL Final    Ethanol percent 09/16/2022 <0.010  % Final    Amphetamine Screen, Ur 09/16/2022 NEGATIVE  NEGATIVE Final    Comment:       (Positive cutoff 1000 ng/mL)                  Barbiturate Screen, Ur 09/16/2022 NEGATIVE  NEGATIVE Final    Comment:       (Positive cutoff 200 ng/mL)                  Benzodiazepine Screen, Urine 09/16/2022 NEGATIVE   Final    Comment:       (Positive cutoff 200 ng/mL)                  Cocaine Metabolite, Urine 09/16/2022 NEGATIVE  NEGATIVE Final    Comment:       (Positive cutoff 300 ng/mL)                  Methadone Screen, Urine 09/16/2022 NEGATIVE  NEGATIVE Final    Comment:       (Positive cutoff 300 ng/mL) Opiates, Urine 09/16/2022 NEGATIVE  NEGATIVE Final    Comment:       (Positive cutoff 300 ng/mL)                  Phencyclidine, Urine 09/16/2022 NEGATIVE  NEGATIVE Final    Comment:       (Positive cutoff 25 ng/mL)                  Cannabinoid Scrn, Ur 09/16/2022 NEGATIVE  NEGATIVE Final    Comment:       (Positive cutoff 50 ng/mL)                  Oxycodone Screen, Ur 09/16/2022 NEGATIVE  NEGATIVE Final    Comment:       (Positive cutoff 100 ng/mL)                  Fentanyl, Ur 09/16/2022 NEGATIVE  NEGATIVE Final    Comment:       (Positive cutoff  5 ng/ml)            Test Information 09/16/2022 Assay provides medical screening only. The absence of expected drug(s) and/or metabolite(s) may indicate diluted or adulterated urine, limitations of testing or timing of collection. Final    Comment: Testing for legal purposes should be confirmed by another method. To request confirmation   of test result, please call the lab within 7 days of sample submission. Color, UA 09/16/2022 Yellow  Yellow Final    Turbidity UA 09/16/2022 Clear  Clear Final    Glucose, Ur 09/16/2022 NEGATIVE  NEGATIVE Final    Bilirubin Urine 09/16/2022 NEGATIVE  NEGATIVE Final    Ketones, Urine 09/16/2022 NEGATIVE  NEGATIVE Final    Specific Gravity, UA 09/16/2022 1.015  1.000 - 1.030 Final    Urine Hgb 09/16/2022 SMALL (A) NEGATIVE Final    pH, UA 09/16/2022 5.5  5.0 - 8.0 Final    Protein, UA 09/16/2022 NEGATIVE  NEGATIVE Final    Urobilinogen, Urine 09/16/2022 Normal  Normal Final    Nitrite, Urine 09/16/2022 NEGATIVE  NEGATIVE Final    Leukocyte Esterase, Urine 09/16/2022 NEGATIVE  NEGATIVE Final    Protime 09/16/2022 25.7 (A) 11.8 - 14.6 sec Final    INR 09/16/2022 2.3   Final    Comment:       Non-therapeutic Range:     INR = 0.9-1.2  Therapeutic Range:    Moderate Anticoagulant Intensity:     INR = 2.0-3.0   High Anticoagulant Intensity:     INR = 2.5-3.5            WBC, UA 09/16/2022 0 TO 2  /HPF Final    RBC, UA 600 mg, 600 mg, Oral, BID  sennosides-docusate sodium (SENOKOT-S) 8.6-50 MG tablet 1 tablet, 1 tablet, Oral, Daily  warfarin placeholder: dosing by pharmacy, , Other, RX Placeholder  acetaminophen (TYLENOL) tablet 650 mg, 650 mg, Oral, Q6H PRN  hydrOXYzine HCl (ATARAX) tablet 50 mg, 50 mg, Oral, TID PRN  polyethylene glycol (GLYCOLAX) packet 17 g, 17 g, Oral, Daily PRN  aluminum & magnesium hydroxide-simethicone (MAALOX) 200-200-20 MG/5ML suspension 30 mL, 30 mL, Oral, Q6H PRN  nicotine polacrilex (NICORETTE) gum 2 mg, 2 mg, Oral, Q2H PRN    ASSESSMENT  Bipolar I disorder, most recent episode depressed (HCC)         PLAN  Patient symptoms: Remain unstable  Modify order: Increase trazodone to 100 mg nightly as needed  New order: Elavil 25 mg nightly  Monitor need and frequency of PRN medications. Encourage participation in groups and milieu. Attempt to develop insight. Psycho-education conducted. Supportive Therapy conducted. Probable discharge is per attending physician. Follow-up daily while inpatient. Patient continues to be monitored in the inpatient psychiatric facility at [facility] for safety and stabilization. Patient continues to need, on a daily basis, active treatment furnished directly by or requiring the supervision of inpatient psychiatric personnel. Electronically signed by LOGAN Luciano CNP on 9/18/2022 at 5:09 PM    **This report has been created using voice recognition software. It may contain minor errors which are inherent in voice recognition technology. **

## 2022-09-18 NOTE — PLAN OF CARE
self or other. Patient states that his chronic generalized pain is manageable with scheduled medications. Patient has been observed sitting out in the dayroom most of day socializing with peers. Patient has been medication and behavioral compliant.

## 2022-09-18 NOTE — PROGRESS NOTES
Pharmacy Note  Warfarin Consult follow-up      Recent Labs     09/16/22  1657 09/17/22  1304 09/18/22  0716   INR 2.3 1.6 1.3     Recent Labs     09/16/22  1657   HGB 13.5   HCT 40.6*          Significant Drug-Drug Interactions:  New warfarin drug-drug interactions: gabapentin, oxcarbazepine  Discontinued drug-drug interactions: none  Current warfarin drug-drug interactions: aspirin, lipitor, trazodone       Date             INR        Dose given previous day  Dose scheduled for today  9/18/2022            1.3       5mg            5 mg        Notes:                   INR subtherapeutic at at 1.3. Give 5 mg dose today. Daily PT/INR while inpatient.       Keegan Rico, PharmD, [address]  PGY-1 Pharmacy Resident  9/18/2022 8:35 AM

## 2022-09-18 NOTE — BH NOTE
[facility]  Initial Interdisciplinary Treatment Plan NO      Original treatment plan Date & Time: 9/17/22 13:00    Admission Type:  Admission Type: Voluntary    Reason for admission:   Reason for Admission: argument with neighbor, depression, suicidal thoughts    Estimated Length of Stay:  5-7days  Estimated Discharge Date: to be determined by physician    PATIENT STRENGTHS:  Patient Strengths:   Patient Strengths and Limitations:Limitations: Difficult relationships / poor social skills, Difficulty problem solving/relies on others to help solve problems, Lacks leisure interests, Multiple barriers to leisure interests, Tendency to isolate self  Addictive Behavior: Addictive Behavior  In the Past 3 Months, Have You Felt or Has Someone Told You That You Have a Problem With  : None  Medical Problems:  Past Medical History:   Diagnosis Date    AMS (altered mental status) 02/15/2020    Atrial fibrillation ([facility] [address]     on coumadin    Bipolar 1 disorder ([facility] [address]     Depression     Hypertension     Neck pain     Neurofibromatosis ([facility] [address]     Lt leg pain    Patient in clinical research study 04/12/2018    [id number]    Severe recurrent major depression without psychotic features ([facility] [address] 6/13/2022    Twitching      Status EXAM:Mental Status and Behavioral Exam  Normal: No  Level of Assistance: Independent/Self  Facial Expression: Flat  Affect: Appropriate  Level of Consciousness: Alert  Frequency of Checks: 4 times per hour, close  Mood:Normal: No  Mood: Depressed  Motor Activity:Normal: Yes  Eye Contact: Good  Observed Behavior: Cooperative, Friendly  Sexual Misconduct History: Current - no  Preception: [place] to person, [place] to time, [place] to place, [place] to situation  Attention:Normal: Yes  Thought Processes: Other (comment) (logical)  Thought Content:Normal: Yes  Depression Symptoms: Feelings of helplessness, Feelings of hopelessess  Anxiety Symptoms: No problems reported or observed.   Mirian Symptoms: No problems reported or observed.   Hallucinations: None  Delusions: No  Memory:Normal: Yes  Insight and Judgment: No  Insight and Judgment: Poor judgment, Poor insight    EDUCATION:   Learner Progress Toward Treatment Goals: reviewed group plans and strategies for care    Method:group therapy, medication compliance, individualized assessments and care planning    Outcome: needs reinforcement    PATIENT GOALS: to be discussed with patient within 72 hours    PLAN/TREATMENT RECOMMENDATIONS:     continue group therapy , medications compliance, goal setting, individualized assessments and care, continue to monitor pt on unit      SHORT-TERM GOALS:   Time frame for Short-Term Goals: 5-7 days    LONG-TERM GOALS:  Time frame for Long-Term Goals: 6 months  Members Present in Team Meeting: See Alhaji Edwards, RN

## 2022-09-19 PROBLEM — F31.4 SEVERE DEPRESSED BIPOLAR I DISORDER WITHOUT PSYCHOTIC FEATURES (HCC): Status: ACTIVE | Noted: 2022-09-19

## 2022-09-19 LAB
INR BLD: 1.6
PROTHROMBIN TIME: 19.3 SEC (ref 11.8–14.6)

## 2022-09-19 PROCEDURE — 6370000000 HC RX 637 (ALT 250 FOR IP)

## 2022-09-19 PROCEDURE — 36415 COLL VENOUS BLD VENIPUNCTURE: CPT

## 2022-09-19 PROCEDURE — 6370000000 HC RX 637 (ALT 250 FOR IP): Performed by: INTERNAL MEDICINE

## 2022-09-19 PROCEDURE — 99232 SBSQ HOSP IP/OBS MODERATE 35: CPT | Performed by: INTERNAL MEDICINE

## 2022-09-19 PROCEDURE — APPSS30 APP SPLIT SHARED TIME 16-30 MINUTES

## 2022-09-19 PROCEDURE — 6370000000 HC RX 637 (ALT 250 FOR IP): Performed by: PSYCHIATRY & NEUROLOGY

## 2022-09-19 PROCEDURE — 85610 PROTHROMBIN TIME: CPT

## 2022-09-19 PROCEDURE — 99232 SBSQ HOSP IP/OBS MODERATE 35: CPT | Performed by: PSYCHIATRY & NEUROLOGY

## 2022-09-19 PROCEDURE — 1240000000 HC EMOTIONAL WELLNESS R&B

## 2022-09-19 PROCEDURE — 90833 PSYTX W PT W E/M 30 MIN: CPT | Performed by: PSYCHIATRY & NEUROLOGY

## 2022-09-19 RX ORDER — WARFARIN SODIUM 5 MG/1
5 TABLET ORAL
Status: COMPLETED | OUTPATIENT
Start: 2022-09-19 | End: 2022-09-19

## 2022-09-19 RX ORDER — RISPERIDONE 0.5 MG/1
0.5 TABLET, FILM COATED ORAL 2 TIMES DAILY
Status: DISCONTINUED | OUTPATIENT
Start: 2022-09-20 | End: 2022-09-23 | Stop reason: HOSPADM

## 2022-09-19 RX ORDER — RISPERIDONE 0.5 MG/1
0.5 TABLET, FILM COATED ORAL DAILY
Status: DISCONTINUED | OUTPATIENT
Start: 2022-09-19 | End: 2022-09-19

## 2022-09-19 RX ADMIN — FERROUS SULFATE TAB 325 MG (65 MG ELEMENTAL FE) 325 MG: 325 (65 FE) TAB at 09:01

## 2022-09-19 RX ADMIN — FUROSEMIDE 20 MG: 20 TABLET ORAL at 11:38

## 2022-09-19 RX ADMIN — MIDODRINE HYDROCHLORIDE 10 MG: 10 TABLET ORAL at 15:26

## 2022-09-19 RX ADMIN — TAMSULOSIN HYDROCHLORIDE 0.4 MG: 0.4 CAPSULE ORAL at 09:02

## 2022-09-19 RX ADMIN — DOCUSATE SODIUM 200 MG: 100 CAPSULE, LIQUID FILLED ORAL at 09:02

## 2022-09-19 RX ADMIN — POLYETHYLENE GLYCOL 3350 17 G: 17 POWDER, FOR SOLUTION ORAL at 18:24

## 2022-09-19 RX ADMIN — Medication 200 MG: at 09:02

## 2022-09-19 RX ADMIN — Medication 200 MG: at 22:19

## 2022-09-19 RX ADMIN — PANTOPRAZOLE SODIUM 40 MG: 40 TABLET, DELAYED RELEASE ORAL at 09:03

## 2022-09-19 RX ADMIN — HYDROXYZINE HYDROCHLORIDE 50 MG: 50 TABLET ORAL at 22:19

## 2022-09-19 RX ADMIN — OXCARBAZEPINE 600 MG: 600 TABLET, FILM COATED ORAL at 22:20

## 2022-09-19 RX ADMIN — ACETAMINOPHEN 650 MG: 325 TABLET, FILM COATED ORAL at 11:35

## 2022-09-19 RX ADMIN — CETIRIZINE HYDROCHLORIDE 10 MG: 10 TABLET, FILM COATED ORAL at 09:04

## 2022-09-19 RX ADMIN — WARFARIN SODIUM 5 MG: 5 TABLET ORAL at 19:06

## 2022-09-19 RX ADMIN — GABAPENTIN 900 MG: 300 CAPSULE ORAL at 09:02

## 2022-09-19 RX ADMIN — MIDODRINE HYDROCHLORIDE 10 MG: 10 TABLET ORAL at 11:37

## 2022-09-19 RX ADMIN — ATORVASTATIN CALCIUM 20 MG: 20 TABLET, FILM COATED ORAL at 22:20

## 2022-09-19 RX ADMIN — GABAPENTIN 900 MG: 300 CAPSULE ORAL at 15:26

## 2022-09-19 RX ADMIN — OXCARBAZEPINE 600 MG: 600 TABLET, FILM COATED ORAL at 09:02

## 2022-09-19 RX ADMIN — Medication 2000 UNITS: at 09:03

## 2022-09-19 RX ADMIN — ACETAMINOPHEN 650 MG: 325 TABLET, FILM COATED ORAL at 22:19

## 2022-09-19 RX ADMIN — RISPERIDONE 0.5 MG: 0.5 TABLET ORAL at 11:38

## 2022-09-19 RX ADMIN — HYDROXYZINE HYDROCHLORIDE 50 MG: 50 TABLET ORAL at 11:37

## 2022-09-19 RX ADMIN — TRAZODONE HYDROCHLORIDE 100 MG: 100 TABLET ORAL at 22:20

## 2022-09-19 RX ADMIN — FOLIC ACID 1 MG: 1 TABLET ORAL at 09:04

## 2022-09-19 RX ADMIN — ASPIRIN 81 MG 81 MG: 81 TABLET ORAL at 09:02

## 2022-09-19 RX ADMIN — SENNOSIDES AND DOCUSATE SODIUM 1 TABLET: 50; 8.6 TABLET ORAL at 09:02

## 2022-09-19 RX ADMIN — CYANOCOBALAMIN TAB 1000 MCG 1000 MCG: 1000 TAB at 09:04

## 2022-09-19 RX ADMIN — AMITRIPTYLINE HYDROCHLORIDE 25 MG: 25 TABLET, FILM COATED ORAL at 22:20

## 2022-09-19 RX ADMIN — GABAPENTIN 900 MG: 300 CAPSULE ORAL at 22:20

## 2022-09-19 ASSESSMENT — PAIN DESCRIPTION - ORIENTATION
ORIENTATION: RIGHT;LEFT
ORIENTATION: RIGHT;LEFT
ORIENTATION: RIGHT;LEFT;LOWER

## 2022-09-19 ASSESSMENT — PAIN DESCRIPTION - PAIN TYPE: TYPE: CHRONIC PAIN

## 2022-09-19 ASSESSMENT — PAIN DESCRIPTION - DESCRIPTORS
DESCRIPTORS: ACHING
DESCRIPTORS: ACHING;DISCOMFORT

## 2022-09-19 ASSESSMENT — PAIN SCALES - GENERAL
PAINLEVEL_OUTOF10: 6
PAINLEVEL_OUTOF10: 6
PAINLEVEL_OUTOF10: 7

## 2022-09-19 ASSESSMENT — PAIN DESCRIPTION - LOCATION
LOCATION: LEG
LOCATION: HEAD;LEG
LOCATION: LEG;BACK

## 2022-09-19 ASSESSMENT — PAIN SCALES - WONG BAKER: WONGBAKER_NUMERICALRESPONSE: 0

## 2022-09-19 ASSESSMENT — PAIN - FUNCTIONAL ASSESSMENT: PAIN_FUNCTIONAL_ASSESSMENT: ACTIVITIES ARE NOT PREVENTED

## 2022-09-19 NOTE — PLAN OF CARE
Impaired concentration  Anxiety Symptoms: Generalized  Mirian Symptoms: No problems reported or observed. Hallucinations: None  Delusions: No  Memory:Normal: No  Memory: Poor recent, Poor remote  Insight and Judgment: No  Insight and Judgment: Poor judgment, Poor insight    Daily Assessment Last Entry:   Daily Sleep (WDL): Within Defined Limits            Daily Nutrition (WDL): Within Defined Limits  Level of Assistance: Independent/Self    Patient Monitoring:  Frequency of Checks: 4 times per hour, close    Psychiatric Symptoms:   Depression Symptoms  Depression Symptoms: Feelings of worthlessness, Impaired concentration  Anxiety Symptoms  Anxiety Symptoms: Generalized  Mirian Symptoms  Mirian Symptoms: No problems reported or observed. Suicide Risk CSSR-S:  1) Within the past month, have you wished you were dead or wished you could go to sleep and not wake up? : Yes  2) Have you actually had any thoughts of killing yourself? : Yes  3) Have you been thinking about how you might kill yourself? : No  5) Have you started to work out or worked out the details of how to kill yourself?  Do you intend to carry out this plan? : No  6) Have you ever done anything, started to do anything, or prepared to do anything to end your life?: No  Change in Result: no Change in Plan of care: no      EDUCATION:   Learner Progress Toward Treatment Goals: Reviewed results and recommendations of this team, Reviewed group plan and strategies, Reviewed signs, symptoms and risk of self harm and violent behavior, Reviewed goals and plan of care    Method: small group, individual verbal education    Outcome: Verbalized by patient but needs reinforcement to obtain goals    PATIENT GOALS:  Short term: Control emotions  Long term:  Utilize coping skills    PLAN/TREATMENT RECOMMENDATIONS UPDATE:  continue with group therapies, increased socialization, continue planning for after discharge goals, continue with medication compliance    SHORT-TERM GOALS UPDATE:   Time frame for Short-Term Goals: 5-7 days    LONG-TERM GOALS UPDATE:   Time frame for Long-Term Goals: 6 months    Members Present in Team Meeting:   See signature sheet    Jackelin Mercedes RN
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
## 2022-09-19 NOTE — PROGRESS NOTES
Pharmacy Note  Warfarin Consult follow-up      Recent Labs     09/17/22  1304 09/18/22  0716 09/19/22  0732   INR 1.6 1.3 1.6     Recent Labs     09/16/22  1657   HGB 13.5   HCT 40.6*          Significant Drug-Drug Interactions:  New warfarin drug-drug interactions: none  Discontinued drug-drug interactions: none  Current warfarin drug-drug interactions: aspirin, lipitor, trazodone       Date             INR        Dose given previous day  Dose scheduled for today  9/19/2022            1.6       5 mg          5 mg        Notes:                   INR subtherapeutic at 1.6. Give 5 mg dose today. Daily PT/INR while inpatient.       Missy Oliveros, PharmD, [address]  PGY-1 Pharmacy Resident  9/19/2022 8:51 AM

## 2022-09-19 NOTE — GROUP NOTE
Group Therapy Note    Date: 9/19/2022    Group Start Time: 1330  Group End Time: 3018  Group Topic: Psychoeducation    NORAH BHPATRICIA Mcmahan, CRISTINOS    Group Therapy Note    Attendees: 10       Patient's Goal:  Patient will demonstrate improved interpersonal skills    Notes:  Patient attended group and participated. Status After Intervention:  Improved    Participation Level:  Active Listener and Interactive    Participation Quality: Appropriate, Attentive, Sharing, and Supportive      Speech:  normal      Thought Process/Content: Logical  Linear      Affective Functioning: Constricted      Mood: euthymic      Level of consciousness:  Alert, Oriented x4, and Attentive      Response to Learning: Able to verbalize current knowledge/experience, Able to verbalize/acknowledge new learning, Able to retain information, Capable of insight, Able to change behavior, and Progressing to goal      Endings: None Reported    Modes of Intervention: Education, Support, Socialization, and Exploration      Discipline Responsible: Psychoeducational Specialist      Signature:  Fabi Brady, [address] Negative

## 2022-09-19 NOTE — PROGRESS NOTES
[facility] Internal Medicine  Bo Colon MD; Reed Fajardo MD; Rolando Ryan MD; MD Pernell Barber MD; MD [facility]STO [facility]   [facility]     Progress Note    9/19/2022    10:58 AM    Name:   Aniceto Rhodes  MRN:     [id number]     Acct:      [de-identified]   Room:   [address] [place] [address] Day:  3  Admit Date:  9/16/2022  4:24 PM    PCP:   No primary care provider on file. Code Status:  Full Code    Subjective:     C/C: No chief complaint on file. Depressed mood  Interval History Status: improved. Brief History:     Hospital Problems             Last Modified POA    * (Principal) Bipolar I disorder, most recent episode depressed ([facility] [address] 9/17/2022 Yes    Depression with suicidal ideation 9/17/2022 Yes       Interval hx ; No new complaints/symptoms . On Admission ;  57-year-old male admitted for depression with suicidal ideation     Medical history includes A. fib on Coumadin,  neurofibromatosis, history of cardiac valve replacement  Patient is frequently hypotensive-takes midodrine for blood pressure  Also history of osteoporosis on Fosamax weekly       Review of Systems:         As recorded in interval hx                                    ---     Medications: Allergies:     Allergies   Allergen Reactions    Oxycodone Itching    Fish-Derived Products     Ibuprofen     Latuda [Lurasidone Hcl] Other (See Comments)     \"Feels like pt is going to crawl out of own skin\"    Lithium     Lurasidone     Quetiapine     Seroquel [Quetiapine Fumarate]        Current Meds:   Scheduled Meds:    warfarin  5 mg Oral Once    amitriptyline  25 mg Oral Nightly    aspirin  81 mg Oral Daily    atorvastatin  20 mg Oral Nightly    ferrous sulfate  325 mg Oral Daily with breakfast    furosemide  20 mg Oral Daily    gabapentin  900 mg Oral TID    midodrine  10 mg Oral TID    pantoprazole  40 mg Oral Daily    tamsulosin  0.4 mg Oral Daily    calcium citrate  200 mg Oral BID    cetirizine  10 mg Oral Daily    Vitamin D  2 tablet Oral Daily with breakfast    vitamin B-12  1,000 mcg Oral Daily    docusate sodium  200 mg Oral Daily    folic acid  1 mg Oral Daily    OXcarbazepine  600 mg Oral BID    sennosides-docusate sodium  1 tablet Oral Daily    warfarin placeholder: dosing by pharmacy   Other RX Placeholder     Continuous Infusions:   PRN Meds: traZODone, acetaminophen, hydrOXYzine HCl, polyethylene glycol, aluminum & magnesium hydroxide-simethicone, nicotine polacrilex    Data:     I/O (24Hr): No intake or output data in the 24 hours ending 09/19/22 1058    Labs:  Significant last 24 hr data reviewed ;   Vitals:    09/17/22 2030 09/18/22 0809 09/18/22 1945 09/19/22 0745   BP: 119/79 (!) 147/86 (!) 114/57 113/74   Pulse: 78 65 77 62   Resp: 14 16 14 16   Temp: 97.8 °F (36.6 °C) 97.5 °F (36.4 °C) 97.7 °F (36.5 °C) 98.1 °F (36.7 °C)   TempSrc: Oral Temporal Oral Temporal   SpO2: 100%  98%    Weight:       Height:         No results for input(s): POCGLU in the last 72 hours. Recent Results (from the past 24 hour(s))   Protime-INR    Collection Time: 09/19/22  7:32 AM   Result Value Ref Range    Protime 19.3 (H) 11.8 - 14.6 sec    INR 1.6      No results for input(s): POCGLU in the last 72 hours.                 URINE ANALYSIS: No results found for: LABURIN     CBC:  Lab Results   Component Value Date/Time    WBC 5.7 09/16/2022 04:57 PM    WBC 7.3 06/12/2022 05:27 PM    WBC 8.5 05/02/2022 04:44 AM    HGB 13.5 09/16/2022 04:57 PM    HGB 12.2 06/12/2022 05:27 PM    HGB 12.4 05/02/2022 04:44 AM     09/16/2022 04:57 PM     06/12/2022 05:27 PM     05/02/2022 04:44 AM     01/14/2012 06:53 AM     09/14/2011 08:27 PM        BMP:    Lab Results   Component Value Date/Time     09/16/2022 04:57 PM     06/12/2022 05:27 PM     05/02/2022 04:44 AM    K 4.2 09/16/2022 04:57 PM    K 4.3 06/12/2022 05:27 PM    K 5.2 05/02/2022 04:44 AM    CL 98 09/16/2022 04:57 PM     06/12/2022 05:27 PM     05/02/2022 04:44 AM    CO2 27 09/16/2022 04:57 PM    CO2 23 06/12/2022 05:27 PM    CO2 29 05/02/2022 04:44 AM    BUN 12 09/16/2022 04:57 PM    BUN 21 06/12/2022 05:27 PM    BUN 18 05/02/2022 04:44 AM    CREATININE 0.90 09/16/2022 04:57 PM    CREATININE 0.90 06/12/2022 05:27 PM    CREATININE 1.02 05/02/2022 04:44 AM    GLUCOSE 90 09/16/2022 04:57 PM    GLUCOSE 93 06/12/2022 05:27 PM    GLUCOSE 94 05/02/2022 04:44 AM    GLUCOSE 88 01/14/2012 06:53 AM    GLUCOSE 89 09/14/2011 08:27 PM      LIVER PROFILE:  Lab Results   Component Value Date/Time    ALT 23 09/16/2022 04:57 PM    AST 20 09/16/2022 04:57 PM    PROT 7.1 09/16/2022 04:57 PM    BILITOT 0.4 09/16/2022 04:57 PM    BILIDIR <0.08 06/27/2016 04:14 PM    LABALBU 4.4 09/16/2022 04:57 PM    LABALBU 4.2 01/14/2012 06:53 AM               Lab Results   Component Value Date/Time    SPECIAL R HAND 10ML 05/01/2022 05:25 PM     Lab Results   Component Value Date/Time    CULTURE NO GROWTH 5 DAYS 05/01/2022 05:25 PM       Radiology:  No results found. Physical Examination:      Physical Exam   Vitals:    Vitals:    09/17/22 2030 09/18/22 0809 09/18/22 1945 09/19/22 0745   BP: 119/79 (!) 147/86 (!) 114/57 113/74   Pulse: 78 65 77 62   Resp: 14 16 14 16   Temp: 97.8 °F (36.6 °C) 97.5 °F (36.4 °C) 97.7 °F (36.5 °C) 98.1 °F (36.7 °C)   TempSrc: Oral Temporal Oral Temporal   SpO2: 100%  98%    Weight:       Height:                        Body mass index is 30.02 kg/m². General Appearance:   alert                                                        Pulmonary/Chest:        Clear to auscultation bilaterally . No wheezes, rales or rhonchi .                                                                                                  Cardiovascular:            Normal rate, regular rhythm,                                          No murmur or  Gallop . Abdomen:                       Soft, non-tender                                                                                    Extremities:                    No  Edema . Assessment:        Hospital Problems             Last Modified POA    * (Principal) Bipolar I disorder, most recent episode depressed ([address] 9/17/2022 Yes    Depression with suicidal ideation 9/17/2022 Yes       Plan:           Depression with suicidal ideation-management per psychiatry  A. fib currently rate controlled continue Coumadin, aspirin, statin, INR 2.3 on admission, pharmacy consulted  History of aortic valve replacement-continue Coumadin  ARIELLE on CPAP, home CPAP ordered  osteoporosis-continue weekly Fosamax  BPH-continue home dose tamsulosin  Neurofibromatosis-with resultant peripheral neuropathy-Neurontin resumed        9/19/22    No new symptoms  Vitals stable . Cardiopulmonary stable . Continue current rx ,    Vitals stable      URINE ANALYSIS: No results found for: LABURIN     CBC:  Lab Results   Component Value Date/Time    WBC 5.7 09/16/2022 04:57 PM    HGB 13.5 09/16/2022 04:57 PM     09/16/2022 04:57 PM     01/14/2012 06:53 AM        BMP:    Lab Results   Component Value Date/Time     09/16/2022 04:57 PM    K 4.2 09/16/2022 04:57 PM    CL 98 09/16/2022 04:57 PM    CO2 27 09/16/2022 04:57 PM    BUN 12 09/16/2022 04:57 PM    CREATININE 0.90 09/16/2022 04:57 PM    GLUCOSE 90 09/16/2022 04:57 PM    GLUCOSE 88 01/14/2012 06:53 AM      LIVER PROFILE:  Lab Results   Component Value Date/Time    ALT 23 09/16/2022 04:57 PM    AST 20 09/16/2022 04:57 PM    PROT 7.1 09/16/2022 04:57 PM    BILITOT 0.4 09/16/2022 04:57 PM    BILIDIR <0.08 06/27/2016 04:14 PM    LABALBU 4.4 09/16/2022 04:57 PM    LABALBU 4.2 01/14/2012 06:53 AM         Internal Medicine Service - Will sign off .   Will see as needed basis .  Please  feel free to call again , if neeeded . Disposition ---    Shravan Alvarez MD  9/19/2022  10:58 AM     Please note that this chart was generated using voice recognition Dragon dictation software. Although every effort was made to ensure the accuracy of this automated transcription, some errors in transcription may have occurred.

## 2022-09-19 NOTE — PLAN OF CARE
Problem: Pain  Goal: Verbalizes/displays adequate comfort level or baseline comfort level  9/19/2022 1902 by Marni Walker LPN  Outcome: Progressing  9/19/2022 1302 by Rahat Rider RN  Outcome: Progressing     Problem: Self Harm/Suicidality  Goal: Will have no self-injury during hospital stay  Description: INTERVENTIONS:  1. Q 30 MINUTES: Routine safety checks  2. Q SHIFT & PRN: Assess risk to determine if routine checks are adequate to maintain patient safety  9/19/2022 1902 by Marni Walker LPN  Outcome: Progressing  9/19/2022 1302 by Rahat Rider RN  Outcome: Progressing     Problem: Depression  Goal: Will be euthymic at discharge  Description: INTERVENTIONS:  1. Administer medication as ordered  2. Provide emotional support via 1:1 interaction with staff  3. Encourage involvement in milieu/groups/activities  4. Monitor for social isolation  9/19/2022 1902 by Marni Walker LPN  Outcome: Progressing  9/19/2022 1302 by Rahat Rider RN  Outcome: Progressing     Patient denies suicidal ideation or thoughts of self-harm. 15 minute safety checks in place and will continue. Patient is out and social with peers and attending groups. Patient endorses continued anxiety and depression.

## 2022-09-19 NOTE — PLAN OF CARE
isolation  9/18/2022 2105 by Zay Elam RN  Outcome: Progressing  9/18/2022 1543 by Chito Walls LPN  Outcome: Progressing  Note: Patient is alert and orientated X4. Patient reports that he is depressed and anxious. Patient denies auditory/visual hallucinations. Patient denies thoughts of harming self or other. Patient states that his chronic generalized pain is manageable with scheduled medications. Patient has been observed sitting out in the dayroom most of day socializing with peers. Patient has been medication and behavioral compliant.

## 2022-09-20 LAB
INR BLD: 2
PROTHROMBIN TIME: 23 SEC (ref 11.8–14.6)

## 2022-09-20 PROCEDURE — 6370000000 HC RX 637 (ALT 250 FOR IP): Performed by: PSYCHIATRY & NEUROLOGY

## 2022-09-20 PROCEDURE — 99232 SBSQ HOSP IP/OBS MODERATE 35: CPT | Performed by: PSYCHIATRY & NEUROLOGY

## 2022-09-20 PROCEDURE — 1240000000 HC EMOTIONAL WELLNESS R&B

## 2022-09-20 PROCEDURE — 6370000000 HC RX 637 (ALT 250 FOR IP)

## 2022-09-20 PROCEDURE — 6370000000 HC RX 637 (ALT 250 FOR IP): Performed by: INTERNAL MEDICINE

## 2022-09-20 PROCEDURE — 90833 PSYTX W PT W E/M 30 MIN: CPT | Performed by: PSYCHIATRY & NEUROLOGY

## 2022-09-20 PROCEDURE — 85610 PROTHROMBIN TIME: CPT

## 2022-09-20 PROCEDURE — 36415 COLL VENOUS BLD VENIPUNCTURE: CPT

## 2022-09-20 RX ORDER — WARFARIN SODIUM 2 MG/1
4 TABLET ORAL
Status: COMPLETED | OUTPATIENT
Start: 2022-09-20 | End: 2022-09-20

## 2022-09-20 RX ORDER — LANOLIN ALCOHOL/MO/W.PET/CERES
1.5 CREAM (GRAM) TOPICAL NIGHTLY PRN
Status: DISCONTINUED | OUTPATIENT
Start: 2022-09-20 | End: 2022-09-21

## 2022-09-20 RX ORDER — TRAZODONE HYDROCHLORIDE 100 MG/1
200 TABLET ORAL NIGHTLY
Status: DISCONTINUED | OUTPATIENT
Start: 2022-09-20 | End: 2022-09-21

## 2022-09-20 RX ORDER — MIDODRINE HYDROCHLORIDE 10 MG/1
10 TABLET ORAL 3 TIMES DAILY
Status: DISCONTINUED | OUTPATIENT
Start: 2022-09-20 | End: 2022-09-23 | Stop reason: HOSPADM

## 2022-09-20 RX ADMIN — GABAPENTIN 900 MG: 300 CAPSULE ORAL at 08:21

## 2022-09-20 RX ADMIN — GABAPENTIN 900 MG: 300 CAPSULE ORAL at 13:58

## 2022-09-20 RX ADMIN — OXCARBAZEPINE 600 MG: 600 TABLET, FILM COATED ORAL at 08:21

## 2022-09-20 RX ADMIN — WARFARIN SODIUM 4 MG: 2 TABLET ORAL at 18:14

## 2022-09-20 RX ADMIN — ASPIRIN 81 MG 81 MG: 81 TABLET ORAL at 08:21

## 2022-09-20 RX ADMIN — FUROSEMIDE 20 MG: 20 TABLET ORAL at 08:21

## 2022-09-20 RX ADMIN — HYDROXYZINE HYDROCHLORIDE 50 MG: 50 TABLET ORAL at 22:10

## 2022-09-20 RX ADMIN — CETIRIZINE HYDROCHLORIDE 10 MG: 10 TABLET, FILM COATED ORAL at 08:20

## 2022-09-20 RX ADMIN — ACETAMINOPHEN 650 MG: 325 TABLET, FILM COATED ORAL at 14:07

## 2022-09-20 RX ADMIN — Medication 200 MG: at 08:22

## 2022-09-20 RX ADMIN — Medication 2000 UNITS: at 08:20

## 2022-09-20 RX ADMIN — AMITRIPTYLINE HYDROCHLORIDE 25 MG: 25 TABLET, FILM COATED ORAL at 22:19

## 2022-09-20 RX ADMIN — OXCARBAZEPINE 600 MG: 600 TABLET, FILM COATED ORAL at 22:10

## 2022-09-20 RX ADMIN — ATORVASTATIN CALCIUM 20 MG: 20 TABLET, FILM COATED ORAL at 22:10

## 2022-09-20 RX ADMIN — HYDROXYZINE HYDROCHLORIDE 50 MG: 50 TABLET ORAL at 14:34

## 2022-09-20 RX ADMIN — TRAZODONE HYDROCHLORIDE 200 MG: 100 TABLET ORAL at 22:10

## 2022-09-20 RX ADMIN — FERROUS SULFATE TAB 325 MG (65 MG ELEMENTAL FE) 325 MG: 325 (65 FE) TAB at 08:20

## 2022-09-20 RX ADMIN — RISPERIDONE 0.5 MG: 0.5 TABLET ORAL at 22:10

## 2022-09-20 RX ADMIN — POLYETHYLENE GLYCOL 3350 17 G: 17 POWDER, FOR SOLUTION ORAL at 18:14

## 2022-09-20 RX ADMIN — DOCUSATE SODIUM 200 MG: 100 CAPSULE, LIQUID FILLED ORAL at 08:20

## 2022-09-20 RX ADMIN — GABAPENTIN 900 MG: 300 CAPSULE ORAL at 22:10

## 2022-09-20 RX ADMIN — HYDROXYZINE HYDROCHLORIDE 50 MG: 50 TABLET ORAL at 08:23

## 2022-09-20 RX ADMIN — CYANOCOBALAMIN TAB 1000 MCG 1000 MCG: 1000 TAB at 08:20

## 2022-09-20 RX ADMIN — RISPERIDONE 0.5 MG: 0.5 TABLET ORAL at 08:21

## 2022-09-20 RX ADMIN — FOLIC ACID 1 MG: 1 TABLET ORAL at 08:20

## 2022-09-20 RX ADMIN — TAMSULOSIN HYDROCHLORIDE 0.4 MG: 0.4 CAPSULE ORAL at 08:20

## 2022-09-20 RX ADMIN — PANTOPRAZOLE SODIUM 40 MG: 40 TABLET, DELAYED RELEASE ORAL at 08:21

## 2022-09-20 RX ADMIN — SENNOSIDES AND DOCUSATE SODIUM 1 TABLET: 50; 8.6 TABLET ORAL at 08:20

## 2022-09-20 ASSESSMENT — PAIN DESCRIPTION - ORIENTATION: ORIENTATION: RIGHT;LEFT

## 2022-09-20 ASSESSMENT — PAIN SCALES - GENERAL: PAINLEVEL_OUTOF10: 7

## 2022-09-20 ASSESSMENT — PAIN DESCRIPTION - DESCRIPTORS: DESCRIPTORS: ACHING

## 2022-09-20 ASSESSMENT — PAIN DESCRIPTION - LOCATION: LOCATION: KNEE;LEG

## 2022-09-20 NOTE — GROUP NOTE
Group Therapy Note    Date: 9/20/2022    Group Start Time: 1110  Group End Time: 1150  Group Topic: Recreational    STCZ LOIDA Mcmahan, CRISTINOS    Group Therapy Note    Attendees: 8       Patient's Goal:  Patient will demonstrate improved interpersonal skills    Notes:  Patient attended group and participated. Status After Intervention:  Improved    Participation Level:  Active Listener and Interactive    Participation Quality: Appropriate      Speech:  normal      Thought Process/Content: Logical  Linear      Affective Functioning: Constricted/Restricted      Mood: Euthymic      Level of consciousness:  Alert      Response to Learning: Able to verbalize current knowledge/experience, Able to verbalize/acknowledge new learning      Endings: None Reported    Modes of Intervention: Education, Socialization, and Exploration      Discipline Responsible: Psychoeducational Specialist      Signature:  Franck Suero, [address]

## 2022-09-20 NOTE — GROUP NOTE
Group Therapy Note    Date: 9/20/2022    Group Start Time: 0900  Group End Time: 0930  Group Topic: Community Meeting    NORAH LIPATRICIA MARGO Reinoso        Group Therapy Note    Attendees: 10/19       Patient's Goal:  Talk with doctor about discharge. Stay focused and go to groups throughout the day. Notes:  Goals Group    Status After Intervention:  Unchanged    Participation Level:  Active Listener and Interactive    Participation Quality: Appropriate, Attentive, Sharing, and Supportive      Speech:  normal      Thought Process/Content: Logical  Linear      Affective Functioning: Congruent      Mood: euthymic      Level of consciousness:  Alert and Oriented x4      Response to Learning: Progressing to goal      Endings: None Reported    Modes of Intervention: Support      Discipline Responsible: InToTally      Signature:  Meseret Reinoso

## 2022-09-20 NOTE — GROUP NOTE
Group Therapy Note    Date: 9/20/2022    Group Start Time: 1000  Group End Time: 1055  Group Topic: Psychotherapy    NORAH LOIDA LUIS Landis        Group Therapy Note    Attendees: 11/19       Patient's Goal:  expression of feeling    Notes:  therapeutic worksheets provided to group for discussion, pt refused worksheets and minimally participated     Status After Intervention:  Unchanged    Participation Level: Interactive    Participation Quality: Attentive      Speech:  normal      Thought Process/Content: Logical      Affective Functioning: Blunted      Mood: anxious and irritable      Level of consciousness:  Alert and Oriented x4      Response to Learning: Progressing to goal      Endings: None Reported    Modes of Intervention: Education and Support      Discipline Responsible: /Counselor      Signature:  LUIS Watson

## 2022-09-20 NOTE — PROGRESS NOTES
Pharmacy Note  Warfarin Consult follow-up      Recent Labs     09/18/22  0716 09/19/22  0732 09/20/22  0650   INR 1.3 1.6 2.0     No results for input(s): HGB, HCT, PLT in the last 72 hours. Significant Drug-Drug Interactions:  New warfarin drug-drug interactions: amitriptyline  Discontinued drug-drug interactions: none  Current warfarin drug-drug interactions: aspirin, lipitor, trazodone       Date             INR        Dose given previous day  Dose scheduled for today  9/20/2022            2.0       5 mg          4 mg        Notes:                   INR therapeutic at 2. Give 4 mg dose today. Daily PT/INR while inpatient.       Christ BenitesD, [place]  PGY-1 Pharmacy Resident  9/20/2022 8:15 AM

## 2022-09-20 NOTE — GROUP NOTE
Group Therapy Note    Date: 9/20/2022    Group Start Time: 1330  Group End Time: 3915  Group Topic: Psychoeducation    NORAH BHCRISTINO BennettS    Group Therapy Note    Attendees: 9     Patient's Goal:  Patient will identify techniques and benefits of journaling for coping    Notes:  Patient attended group and participated. Status After Intervention:  Improved    Participation Level:  Active Listener and Interactive    Participation Quality: Appropriate, Attentive, and Sharing      Speech:  normal      Thought Process/Content: Logical  Linear      Affective Functioning: Constricted      Mood: depressed      Level of consciousness:  Alert, Oriented x4    Response to Learning: Able to verbalize current knowledge/experience, Able to verbalize/acknowledge new learning      Endings: None Reported    Modes of Intervention: Education, Support, Socialization, and Exploration      Discipline Responsible: Psychoeducational Specialist      Signature:  Lakesha Collazo, [address]

## 2022-09-20 NOTE — PLAN OF CARE
Problem: Self Harm/Suicidality  Goal: Will have no self-injury during hospital stay  Description: INTERVENTIONS:  1. Q 30 MINUTES: Routine safety checks  2. Q SHIFT & PRN: Assess risk to determine if routine checks are adequate to maintain patient safety  9/20/2022 0222 by Zeb Sandhu RN  Outcome: Progressing  Note: Pt denies suicidal ideations at this time. Pt agreed to seek staff and report any intrusive thoughts of hurting self or others. Regular rounding 4 times an hour and spontaneous patient checks done for safety and per unit policy. Pt remains free from any self-harm, safe environment maintained. Will continue to provide emotional support and reassurance as needed. Problem: Depression  Goal: Will be euthymic at discharge  Description: INTERVENTIONS:  1. Administer medication as ordered  2. Provide emotional support via 1:1 interaction with staff  3. Encourage involvement in milieu/groups/activities  4. Monitor for social isolation  9/20/2022 0222 by Zeb Sandhu RN  Outcome: Progressing  Note: Pt reports moderate depression and anxiety rates both at 7/1-10. Pt denies any suicidal or homicidal ideation, denies any hallucinations. Pt out in the milieu social and playing games with peers. Pt compliant with medications, no behavior issues noted. Problem: Pain  Goal: Verbalizes/displays adequate comfort level or baseline comfort level  9/20/2022 0222 by Zeb Sandhu RN  Outcome: Progressing  Flowsheets (Taken 9/20/2022 0221)  Verbalizes/displays adequate comfort level or baseline comfort level:   Encourage patient to monitor pain and request assistance   Assess pain using appropriate pain scale   Administer analgesics based on type and severity of pain and evaluate response   Implement non-pharmacological measures as appropriate and evaluate response   Consider cultural and social influences on pain and pain management  Note: Pt complains of bilateral legs pain rates at 6/10. Staff educated pt of non pharmacological pain intervention such as distraction, relaxation, and rest, but no relief. Pt requested PRN pain medication,  states pain medication helps decrease pain level.

## 2022-09-20 NOTE — PLAN OF CARE
Problem: Pain  Goal: Verbalizes/displays adequate comfort level or baseline comfort level  9/20/2022 1438 by Juan Francisco Cota LPN  Outcome: Progressing  9/20/2022 0222 by Dakota Kraft RN  Outcome: Progressing  Flowsheets (Taken 9/20/2022 0221)  Verbalizes/displays adequate comfort level or baseline comfort level:   Encourage patient to monitor pain and request assistance   Assess pain using appropriate pain scale   Administer analgesics based on type and severity of pain and evaluate response   Implement non-pharmacological measures as appropriate and evaluate response   Consider cultural and social influences on pain and pain management     Problem: Self Harm/Suicidality  Goal: Will have no self-injury during hospital stay  Description: INTERVENTIONS:  1. Q 30 MINUTES: Routine safety checks  2. Q SHIFT & PRN: Assess risk to determine if routine checks are adequate to maintain patient safety  9/20/2022 1438 by Juan Francisco Cota LPN  Outcome: Progressing  Patient remains safe on unit and without injury or self-harm. Patient denies suicidal ideations at this time. 15 minute safety checks in place and will continue. Problem: Depression  Goal: Will be euthymic at discharge  Description: INTERVENTIONS:  1. Administer medication as ordered  2. Provide emotional support via 1:1 interaction with staff  3. Encourage involvement in milieu/groups/activities  4. Monitor for social isolation  9/20/2022 1438 by Juan Francisco Cota LPN  Outcome: Progressing  Patient continues to endorse anxiety and depression. Patient is out and social with peers and is attending groups.

## 2022-09-21 PROBLEM — F31.9 BIPOLAR 1 DISORDER, DEPRESSED (HCC): Status: ACTIVE | Noted: 2022-09-21

## 2022-09-21 LAB
INR BLD: 2.1
PROTHROMBIN TIME: 23.6 SEC (ref 11.8–14.6)

## 2022-09-21 PROCEDURE — 36415 COLL VENOUS BLD VENIPUNCTURE: CPT

## 2022-09-21 PROCEDURE — APPSS30 APP SPLIT SHARED TIME 16-30 MINUTES

## 2022-09-21 PROCEDURE — 6370000000 HC RX 637 (ALT 250 FOR IP): Performed by: PSYCHIATRY & NEUROLOGY

## 2022-09-21 PROCEDURE — 6370000000 HC RX 637 (ALT 250 FOR IP): Performed by: INTERNAL MEDICINE

## 2022-09-21 PROCEDURE — 99232 SBSQ HOSP IP/OBS MODERATE 35: CPT | Performed by: PSYCHIATRY & NEUROLOGY

## 2022-09-21 PROCEDURE — 6370000000 HC RX 637 (ALT 250 FOR IP)

## 2022-09-21 PROCEDURE — 85610 PROTHROMBIN TIME: CPT

## 2022-09-21 PROCEDURE — 90833 PSYTX W PT W E/M 30 MIN: CPT | Performed by: PSYCHIATRY & NEUROLOGY

## 2022-09-21 PROCEDURE — 1240000000 HC EMOTIONAL WELLNESS R&B

## 2022-09-21 RX ORDER — LANOLIN ALCOHOL/MO/W.PET/CERES
1.5 CREAM (GRAM) TOPICAL NIGHTLY
Status: DISCONTINUED | OUTPATIENT
Start: 2022-09-21 | End: 2022-09-23 | Stop reason: HOSPADM

## 2022-09-21 RX ORDER — WARFARIN SODIUM 2 MG/1
2 TABLET ORAL
Status: COMPLETED | OUTPATIENT
Start: 2022-09-21 | End: 2022-09-21

## 2022-09-21 RX ADMIN — DICLOFENAC SODIUM 2 G: 10 GEL TOPICAL at 21:12

## 2022-09-21 RX ADMIN — GABAPENTIN 900 MG: 300 CAPSULE ORAL at 21:09

## 2022-09-21 RX ADMIN — FUROSEMIDE 20 MG: 20 TABLET ORAL at 08:49

## 2022-09-21 RX ADMIN — ATORVASTATIN CALCIUM 20 MG: 20 TABLET, FILM COATED ORAL at 21:09

## 2022-09-21 RX ADMIN — OXCARBAZEPINE 600 MG: 600 TABLET, FILM COATED ORAL at 08:49

## 2022-09-21 RX ADMIN — OXCARBAZEPINE 600 MG: 600 TABLET, FILM COATED ORAL at 21:09

## 2022-09-21 RX ADMIN — Medication 1.5 MG: at 21:09

## 2022-09-21 RX ADMIN — CETIRIZINE HYDROCHLORIDE 10 MG: 10 TABLET, FILM COATED ORAL at 08:49

## 2022-09-21 RX ADMIN — TAMSULOSIN HYDROCHLORIDE 0.4 MG: 0.4 CAPSULE ORAL at 08:49

## 2022-09-21 RX ADMIN — POLYETHYLENE GLYCOL 3350 17 G: 17 POWDER, FOR SOLUTION ORAL at 17:08

## 2022-09-21 RX ADMIN — GABAPENTIN 900 MG: 300 CAPSULE ORAL at 14:39

## 2022-09-21 RX ADMIN — ACETAMINOPHEN 650 MG: 325 TABLET, FILM COATED ORAL at 21:09

## 2022-09-21 RX ADMIN — FERROUS SULFATE TAB 325 MG (65 MG ELEMENTAL FE) 325 MG: 325 (65 FE) TAB at 08:49

## 2022-09-21 RX ADMIN — HYDROXYZINE HYDROCHLORIDE 50 MG: 50 TABLET ORAL at 21:09

## 2022-09-21 RX ADMIN — ACETAMINOPHEN 650 MG: 325 TABLET, FILM COATED ORAL at 08:49

## 2022-09-21 RX ADMIN — GABAPENTIN 900 MG: 300 CAPSULE ORAL at 08:49

## 2022-09-21 RX ADMIN — Medication 2000 UNITS: at 08:49

## 2022-09-21 RX ADMIN — DOCUSATE SODIUM 200 MG: 100 CAPSULE, LIQUID FILLED ORAL at 08:49

## 2022-09-21 RX ADMIN — PANTOPRAZOLE SODIUM 40 MG: 40 TABLET, DELAYED RELEASE ORAL at 08:49

## 2022-09-21 RX ADMIN — WARFARIN SODIUM 2 MG: 2 TABLET ORAL at 17:57

## 2022-09-21 RX ADMIN — AMITRIPTYLINE HYDROCHLORIDE 25 MG: 25 TABLET, FILM COATED ORAL at 21:09

## 2022-09-21 RX ADMIN — CYANOCOBALAMIN TAB 1000 MCG 1000 MCG: 1000 TAB at 08:49

## 2022-09-21 RX ADMIN — RISPERIDONE 0.5 MG: 0.5 TABLET ORAL at 21:09

## 2022-09-21 RX ADMIN — HYDROXYZINE HYDROCHLORIDE 50 MG: 50 TABLET ORAL at 08:49

## 2022-09-21 RX ADMIN — SENNOSIDES AND DOCUSATE SODIUM 1 TABLET: 50; 8.6 TABLET ORAL at 08:49

## 2022-09-21 RX ADMIN — Medication 200 MG: at 08:49

## 2022-09-21 RX ADMIN — DICLOFENAC SODIUM 2 G: 10 GEL TOPICAL at 17:58

## 2022-09-21 RX ADMIN — FOLIC ACID 1 MG: 1 TABLET ORAL at 08:55

## 2022-09-21 RX ADMIN — ASPIRIN 81 MG 81 MG: 81 TABLET ORAL at 08:49

## 2022-09-21 RX ADMIN — RISPERIDONE 0.5 MG: 0.5 TABLET ORAL at 08:49

## 2022-09-21 ASSESSMENT — PAIN SCALES - GENERAL
PAINLEVEL_OUTOF10: 1
PAINLEVEL_OUTOF10: 3
PAINLEVEL_OUTOF10: 5
PAINLEVEL_OUTOF10: 4
PAINLEVEL_OUTOF10: 5

## 2022-09-21 ASSESSMENT — PAIN DESCRIPTION - LOCATION: LOCATION: NECK

## 2022-09-21 NOTE — PROGRESS NOTES
Daily Progress Note  9/21/2022    Patient Name: Mitch Harvey: Depression with suicidal ideation         SUBJECTIVE:      Patient is seen today for a follow up assessment. Patient is accepting to follow-up assessment to own room. Patient continues to report anxiety and depression rating both at 6 out of 10 (0 being none and 10 being the worst). Upon assessment patient continues to report poor sleep even after increasing Trazodone 100 mg yesterday. Patient reports \"sleeping 4 to 5 hours\" which he reports this no change prior to increase of trazodone. Additionally he reports trazodone \"makes me feel funny\". He reports fleeting suicidal ideation, unable to contract for safety at this time. Reports to have \"fair\" mood due to somatic symptoms such as constipation and knee pain. Writer discussed with patient the importance of not over use of medication as it can cause undesired side effects. Patient currently on Colace 200 mg daily Senokot 8.6-50 daily and Dulcolax as needed. Patient requires frequent reassurance and redirection regarding to his symptoms. Patient currently denies homicidal ideation, auditory and visual hallucination. 's patient currently reports having a stable appetite. Patient continues to be in behavioral control, no emergency medication use in the past 24 hours. He continues to attend group no other concerns at this time. Patient warrants for further hospitalizations due to inability to contract for safety out in the community.     Appetite:  [x] Adequate/Unchanged  [] Increased  [] Decreased      Sleep:       [] Adequate/Unchanged  [] Fair  [x] Poor      Group Attendance on Unit:   [x] Yes   [] Selectively    [] No    Compliant with scheduled medications: [x] Yes  [] No    Received emergency medications in past 24 hrs: [] Yes   [x] No    Medication Side Effects: Denies          Mental Status Exam  Level of consciousness: Alert and awake   Appearance: Appropriate attire for setting, seated on bed, with good  grooming and hygiene   Behavior/Motor: Approachable,   Attitude toward examiner: Cooperative, attentive, good eye contact  Speech: spontaneous, normal rate, normal volume, and well articulated   Mood: Patient reports \"fair\"  Affect: Sad  Thought processes: linear, goal directed, and coherent   Thought content:  Denies homicidal ideation  Suicidal Ideation: Fleeting suicidal ideations, unable to contract for safety on the unit. Delusions: No evidence of delusions. Perceptual Disturbance: Patient denies any perceptual disturbance. Does not appear to be responding to internal stimuli. Cognition: Oriented to self, location, time, and situation  Memory: Age-appropriate  Insight: poor   Judgement: poor       Data   height is 5' 6\" (1.676 m) and weight is 186 lb (84.4 kg). His temperature is 98 °F (36.7 °C). His blood pressure is 132/95 (abnormal) and his pulse is 95. His respiration is 18 and oxygen saturation is 98%.    Labs:   Admission on 09/16/2022   Component Date Value Ref Range Status    WBC 09/16/2022 5.7  3.5 - 11.0 k/uL Final    RBC 09/16/2022 4.52  4.5 - 5.9 m/uL Final    Hemoglobin 09/16/2022 13.5  13.5 - 17.5 g/dL Final    Hematocrit 09/16/2022 40.6 (A) 41 - 53 % Final    MCV 09/16/2022 89.8  80 - 100 fL Final    MCH 09/16/2022 29.9  26 - 34 pg Final    MCHC 09/16/2022 33.3  31 - 37 g/dL Final    RDW 09/16/2022 15.6 (A) 11.5 - 14.9 % Final    Platelets 21/86/1066 150  150 - 450 k/uL Final    MPV 09/16/2022 6.9  6.0 - 12.0 fL Final    Seg Neutrophils 09/16/2022 82 (A) 36 - 66 % Final    Lymphocytes 09/16/2022 7 (A) 24 - 44 % Final    Monocytes 09/16/2022 8 (A) 1 - 7 % Final    Eosinophils % 09/16/2022 2  0 - 4 % Final    Basophils 09/16/2022 1  0 - 2 % Final    Segs Absolute 09/16/2022 4.80  1.3 - 9.1 k/uL Final    Absolute Lymph # 09/16/2022 0.40 (A) 1.0 - 4.8 k/uL Final    Absolute Mono # 09/16/2022 0.40  0.1 - 1.3 k/uL Final    Absolute Eos # 09/16/2022 0. 10  0.0 - 0.4 k/uL Final    Basophils Absolute 09/16/2022 0.00  0.0 - 0.2 k/uL Final    Glucose 09/16/2022 90  70 - 99 mg/dL Final    BUN 09/16/2022 12  8 - 23 mg/dL Final    Creatinine 09/16/2022 0.90  0.70 - 1.20 mg/dL Final    Calcium 09/16/2022 9.2  8.6 - 10.4 mg/dL Final    Sodium 09/16/2022 135  135 - 144 mmol/L Final    Potassium 09/16/2022 4.2  3.7 - 5.3 mmol/L Final    Chloride 09/16/2022 98  98 - 107 mmol/L Final    CO2 09/16/2022 27  20 - 31 mmol/L Final    Anion Gap 09/16/2022 10  9 - 17 mmol/L Final    Alkaline Phosphatase 09/16/2022 122  40 - 129 U/L Final    ALT 09/16/2022 23  5 - 41 U/L Final    AST 09/16/2022 20  <40 U/L Final    Total Bilirubin 09/16/2022 0.4  0.3 - 1.2 mg/dL Final    Total Protein 09/16/2022 7.1  6.4 - 8.3 g/dL Final    Albumin 09/16/2022 4.4  3.5 - 5.2 g/dL Final    GFR Non- 09/16/2022 >60  >60 mL/min Final    GFR  09/16/2022 >60  >60 mL/min Final    GFR Comment 09/16/2022        Final    Comment: Average GFR for 61-76 years old:   80 mL/min/1.73sq m  Chronic Kidney Disease:   <60 mL/min/1.73sq m  Kidney failure:   <15 mL/min/1.73sq m              eGFR calculated using average adult body mass.  Additional eGFR calculator available at:        Data [email or web address]            Ethanol 09/16/2022 <10  <10 mg/dL Final    Ethanol percent 09/16/2022 <0.010  % Final    Amphetamine Screen, Ur 09/16/2022 NEGATIVE  NEGATIVE Final    Comment:       (Positive cutoff 1000 ng/mL)                  Barbiturate Screen, Ur 09/16/2022 NEGATIVE  NEGATIVE Final    Comment:       (Positive cutoff 200 ng/mL)                  Benzodiazepine Screen, Urine 09/16/2022 NEGATIVE   Final    Comment:       (Positive cutoff 200 ng/mL)                  Cocaine Metabolite, Urine 09/16/2022 NEGATIVE  NEGATIVE Final    Comment:       (Positive cutoff 300 ng/mL)                  Methadone Screen, Urine 09/16/2022 NEGATIVE  NEGATIVE Final    Comment: (Positive cutoff 300 ng/mL)                  Opiates, Urine 09/16/2022 NEGATIVE  NEGATIVE Final    Comment:       (Positive cutoff 300 ng/mL)                  Phencyclidine, Urine 09/16/2022 NEGATIVE  NEGATIVE Final    Comment:       (Positive cutoff 25 ng/mL)                  Cannabinoid Scrn, Ur 09/16/2022 NEGATIVE  NEGATIVE Final    Comment:       (Positive cutoff 50 ng/mL)                  Oxycodone Screen, Ur 09/16/2022 NEGATIVE  NEGATIVE Final    Comment:       (Positive cutoff 100 ng/mL)                  Fentanyl, Ur 09/16/2022 NEGATIVE  NEGATIVE Final    Comment:       (Positive cutoff  5 ng/ml)            Test Information 09/16/2022 Assay provides medical screening only. The absence of expected drug(s) and/or metabolite(s) may indicate diluted or adulterated urine, limitations of testing or timing of collection. Final    Comment: Testing for legal purposes should be confirmed by another method. To request confirmation   of test result, please call the lab within 7 days of sample submission. Color, UA 09/16/2022 Yellow  Yellow Final    Turbidity UA 09/16/2022 Clear  Clear Final    Glucose, Ur 09/16/2022 NEGATIVE  NEGATIVE Final    Bilirubin Urine 09/16/2022 NEGATIVE  NEGATIVE Final    Ketones, Urine 09/16/2022 NEGATIVE  NEGATIVE Final    Specific Gravity, UA 09/16/2022 1.015  1.000 - 1.030 Final    Urine Hgb 09/16/2022 SMALL (A) NEGATIVE Final    pH, UA 09/16/2022 5.5  5.0 - 8.0 Final    Protein, UA 09/16/2022 NEGATIVE  NEGATIVE Final    Urobilinogen, Urine 09/16/2022 Normal  Normal Final    Nitrite, Urine 09/16/2022 NEGATIVE  NEGATIVE Final    Leukocyte Esterase, Urine 09/16/2022 NEGATIVE  NEGATIVE Final    Protime 09/16/2022 25.7 (A) 11.8 - 14.6 sec Final    INR 09/16/2022 2.3   Final    Comment:       Non-therapeutic Range:     INR = 0.9-1.2  Therapeutic Range:    Moderate Anticoagulant Intensity:     INR = 2.0-3.0   High Anticoagulant Intensity:     INR = 2.5-3.5            WBC, UA 09/16/2022 0 TO 2  /HPF Final    RBC, UA 09/16/2022 3 to 5  /HPF Final    Casts UA 09/16/2022 0 TO 2  /LPF Final    Epithelial Cells UA 09/16/2022 0 TO 2  /HPF Final    Bacteria, UA 09/16/2022 None  None Final    Protime 09/17/2022 18.8 (A) 11.8 - 14.6 sec Final    INR 09/17/2022 1.6   Final    Comment:       Non-therapeutic Range:     INR = 0.9-1.2  Therapeutic Range: Moderate Anticoagulant Intensity:     INR = 2.0-3.0   High Anticoagulant Intensity:     INR = 2.5-3.5            Protime 09/18/2022 16.4 (A) 11.8 - 14.6 sec Final    INR 09/18/2022 1.3   Final    Comment:       Non-therapeutic Range:     INR = 0.9-1.2  Therapeutic Range: Moderate Anticoagulant Intensity:     INR = 2.0-3.0   High Anticoagulant Intensity:     INR = 2.5-3.5            Protime 09/19/2022 19.3 (A) 11.8 - 14.6 sec Final    INR 09/19/2022 1.6   Final    Comment:       Non-therapeutic Range:     INR = 0.9-1.2  Therapeutic Range: Moderate Anticoagulant Intensity:     INR = 2.0-3.0   High Anticoagulant Intensity:     INR = 2.5-3.5            Protime 09/20/2022 23.0 (A) 11.8 - 14.6 sec Final    INR 09/20/2022 2.0   Final    Comment:       Non-therapeutic Range:     INR = 0.9-1.2  Therapeutic Range: Moderate Anticoagulant Intensity:     INR = 2.0-3.0   High Anticoagulant Intensity:     INR = 2.5-3.5            Protime 09/21/2022 23.6 (A) 11.8 - 14.6 sec Final    INR 09/21/2022 2.1   Final    Comment:       Non-therapeutic Range:     INR = 0.9-1.2  Therapeutic Range: Moderate Anticoagulant Intensity:     INR = 2.0-3.0   High Anticoagulant Intensity:     INR = 2.5-3.5                 Reviewed patient's current plan of care and vital signs with nursing staff.     Labs reviewed: [x] Yes  INR 2.1, prothrombin 23.6  Medications  Current Facility-Administered Medications: warfarin (COUMADIN) tablet 2 mg, 2 mg, Oral, Once  melatonin tablet 1.5 mg, 1.5 mg, Oral, Nightly  midodrine (PROAMATINE) tablet 10 mg, 10 mg, Oral, TID  risperiDONE (RISPERDAL) tablet 0.5 mg, 0.5 mg, Oral, BID  amitriptyline (ELAVIL) tablet 25 mg, 25 mg, Oral, Nightly  aspirin chewable tablet 81 mg, 81 mg, Oral, Daily  atorvastatin (LIPITOR) tablet 20 mg, 20 mg, Oral, Nightly  ferrous sulfate (IRON 325) tablet 325 mg, 325 mg, Oral, Daily with breakfast  furosemide (LASIX) tablet 20 mg, 20 mg, Oral, Daily  gabapentin (NEURONTIN) capsule 900 mg, 900 mg, Oral, TID  pantoprazole (PROTONIX) tablet 40 mg, 40 mg, Oral, Daily  tamsulosin (FLOMAX) capsule 0.4 mg, 0.4 mg, Oral, Daily  calcium citrate (CALCITRATE) tablet 200 mg, 200 mg, Oral, BID  cetirizine (ZYRTEC) tablet 10 mg, 10 mg, Oral, Daily  Vitamin D (CHOLECALCIFEROL) tablet 2,000 Units, 2 tablet, Oral, Daily with breakfast  vitamin B-12 (CYANOCOBALAMIN) tablet 1,000 mcg, 1,000 mcg, Oral, Daily  docusate sodium (COLACE) capsule 200 mg, 200 mg, Oral, Daily  folic acid (FOLVITE) tablet 1 mg, 1 mg, Oral, Daily  OXcarbazepine (TRILEPTAL) tablet 600 mg, 600 mg, Oral, BID  sennosides-docusate sodium (SENOKOT-S) 8.6-50 MG tablet 1 tablet, 1 tablet, Oral, Daily  warfarin placeholder: dosing by pharmacy, , Other, RX Placeholder  acetaminophen (TYLENOL) tablet 650 mg, 650 mg, Oral, Q6H PRN  hydrOXYzine HCl (ATARAX) tablet 50 mg, 50 mg, Oral, TID PRN  polyethylene glycol (GLYCOLAX) packet 17 g, 17 g, Oral, Daily PRN  aluminum & magnesium hydroxide-simethicone (MAALOX) 200-200-20 MG/5ML suspension 30 mL, 30 mL, Oral, Q6H PRN  nicotine polacrilex (NICORETTE) gum 2 mg, 2 mg, Oral, Q2H PRN    ASSESSMENT  Severe depressed bipolar I disorder without psychotic features ([facility] [address]         HANDOFF  Patient symptoms are: No change  Medications as determined by attending physician:  Provider discontinued Trazodone, started scheduled Melatonin 1.5mg HS, Voltaren gel started to be slide to bilateral knee joints as needed for pain  Encourage participation in groups and milieu.   Probable discharge is to be determined by MD    Electronically signed by LOGAN Reyes CNP on 9/21/2022 at 2:30 PM    **This report has been created using voice recognition software. It may contain minor errors which are inherent in voice recognition technology. **     I independently saw and evaluated the patient. I reviewed the nurse practitioners documentation above. Any additional comments or changes to the nurse practitioners documentation are stated below otherwise agree with assessment. Plan will be as follows:  Patient overall reporting some improvement in his mood. He is denying side effects to medications with the exception of trazodone, clearly states that increasing the trazodone made sleep worse. Agreeable to discontinue trazodone and monitor on melatonin along. Targeting potential discharge on Friday. Spent 30 minutes with the patient, of that greater than 16 minutes was spent in supportive psychotherapy  PLAN  Patient s symptoms   are improving  Discontinue at bedtime trazodone  Attempt to develop insight  Psycho-education conducted. Supportive Therapy conducted.   Probable discharge is Friday  Follow-up daily while on inpatient unit

## 2022-09-21 NOTE — GROUP NOTE
Group Therapy Note    Date: 9/20/2022    Group Start Time: 2000  Group End Time: 2020  Group Topic: Wrap-Up    [facility]    Valerie Cruz RN        Group Therapy Note    Attendees: 12       Patient's Goal: wrap up group    Notes:  pt was an active listener and participant    Status After Intervention:  Unchanged    Participation Level:  Active Listener and Interactive    Participation Quality: Appropriate, Attentive, Sharing, and Supportive      Speech:  normal      Thought Process/Content: Linear      Affective Functioning: flat      Mood: anxious and depressed      Level of consciousness:  Alert, Oriented x4, and Attentive      Response to Learning: Able to verbalize current knowledge/experience, Able to verbalize/acknowledge new learning, Able to retain information, Capable of insight, Able to change behavior, and Progressing to goal      Endings: None Reported    Modes of Intervention: Education, Support, Socialization, Exploration, Clarifying, Activity, and Media      Discipline Responsible: Registered Nurse      Signature:  Valerie Cruz RN

## 2022-09-21 NOTE — GROUP NOTE
Group Therapy Note    Date: 9/21/2022    Group Start Time: 1005  Group End Time: 1055  Group Topic: Psychotherapy    NORAH LOIDA LUIS Pal        Group Therapy Note    Attendees: 7/19       Patient's Goal:  expression of feeling     Notes:  therapeutic worksheet provided and discussed     Status After Intervention:  Improved    Participation Level:  Active Listener and Interactive    Participation Quality: Appropriate and Attentive      Speech:  normal      Thought Process/Content: Logical      Affective Functioning: Blunted      Mood: anxious      Level of consciousness:  Alert and Oriented x4      Response to Learning: Progressing to goal      Endings: None Reported    Modes of Intervention: Education and Support      Discipline Responsible: /Counselor      Signature:  LUIS Hamilton

## 2022-09-21 NOTE — PLAN OF CARE
Problem: Pain  Goal: Verbalizes/displays adequate comfort level or baseline comfort level  Outcome: Progressing  Patient verbalizes adequate comfort level; patient remains to have somatic complaints     Problem: Self Harm/Suicidality  Goal: Will have no self-injury during hospital stay  Description: INTERVENTIONS:  1. Q 30 MINUTES: Routine safety checks  2. Q SHIFT & PRN: Assess risk to determine if routine checks are adequate to maintain patient safety  Outcome: Progressing  Patient remained free from self-injury; patient agrees to seek out staff if thoughts to self-injure arise; Q15 rounding continued     Problem: Depression  Goal: Will be euthymic at discharge  Description: INTERVENTIONS:  1. Administer medication as ordered  2. Provide emotional support via 1:1 interaction with staff  3. Encourage involvement in milieu/groups/activities  4.  Monitor for social isolation  Outcome: Progressing  Patient is not yet euthymic, still acknowledges anxiety and depression

## 2022-09-21 NOTE — GROUP NOTE
Psych-Ed/Relapse Prevention Group Note        Date: September 21, 2022 Start Time: 11am  End Time: 11:45am      Number of Participants in Group & Unit Census:  7    Topic: Communication skills    Goal of Group:Patient will identify benefits of using various methods of communication to improve relationships      Comments:     Patient did not participate in Psych-Ed/Relapse Prevention group, despite staff encouragement and explanation of benefits. Patient remain seclusive to self. Q15 minute safety checks maintained for patient safety and will continue to encourage patient to attend unit programming.          Signature:  Fabi Brady [place]

## 2022-09-21 NOTE — PROGRESS NOTES
Pharmacy Note  Warfarin Consult follow-up      Recent Labs     09/19/22  0732 09/20/22  0650 09/21/22  0743   INR 1.6 2.0 2.1     No results for input(s): HGB, HCT, PLT in the last 72 hours. Significant Drug-Drug Interactions:  New warfarin drug-drug interactions: none  Discontinued drug-drug interactions: none  Current warfarin drug-drug interactions: amitriptyline, aspirin, lipitor, trazodone       Date             INR        Dose given previous day  Dose scheduled for today  9/21/2022            2.1       4 mg           2 mg        Notes:                   INR therapeutic at 2.1. Give 2 mg dose today (home regimen resumed yesterday). Daily PT/INR while inpatient.       Ryland Banuelos, PharmD, [address]  PGY-1 Pharmacy Resident  9/21/2022 10:23 AM

## 2022-09-21 NOTE — PROGRESS NOTES
Pharmacy Med Education Group Note    Date: 09/21/22  Start Time: 1330  End Time: 1400    Number Participants in Group:  8/19    Goal:  Patient will demonstrate an understanding of the medications intended purpose and possible adverse effects    Topic: [place] for Pharmacy Med Ed Group    Discipline Responsible:     OT  AT  [facility].  RT     X Other       Participation Level:     None  Minimal      X Active Listener    X Interactive    Monopolizing         Participation Quality:    X Appropriate  Inappropriate     X       Attentive        Intrusive          Sharing        Resistant          Supportive        Lethargic       Affective:     X Congruent  Incongruent  Blunted  Flat    Constricted  Anxious  Elated  Angry    Labile  Depressed  Other         Cognitive:    X Alert  Oriented PPTP     Concentration   X G  F  P   Attention Span   X G  F  P   Short-Term Memory   X G  F  P   Long-Term Memory  G  F  P   ProblemSolving/  Decision Making  G  F  P   Ability to Process  Information   X G  F  P      Contributing Factors             Delusional             Hallucinating             Flight of Ideas             Other:       Modes of Intervention:    X Education   X Support  Exploration    Clarifying  Problem Solving  Confrontation    Socialization  Limit Setting  Reality Testing    Activity  Movement  Media    Other:            Response to Learning:    X Able to verbalize current knowledge/experience    Able to verbalize/acknowledge new learning    Able to retain information    Capable of insight    Able to change behavior    Progressing to goal    Other:      Power Barragan, PharmD, [address]  PGY-1 Pharmacy Resident  9/21/2022 2:14 PM

## 2022-09-21 NOTE — PLAN OF CARE
Problem: Self Harm/Suicidality  Goal: Will have no self-injury during hospital stay  Description: INTERVENTIONS:  1. Q 30 MINUTES: Routine safety checks  2. Q SHIFT & PRN: Assess risk to determine if routine checks are adequate to maintain patient safety  9/20/2022 2240 by Mallory Lundberg LPN  Outcome: Progressing     Problem: Depression  Goal: Will be euthymic at discharge  Description: INTERVENTIONS:  1. Administer medication as ordered  2. Provide emotional support via 1:1 interaction with staff  3. Encourage involvement in milieu/groups/activities  4. Monitor for social isolation  9/20/2022 2240 by Mallory Lundberg LPN  Outcome: Progressing   Denies suicidal thoughts and remains free from harm at this time. Reports continued depression and anxiety. Is out to the day room and social with select peers.

## 2022-09-21 NOTE — GROUP NOTE
Group Therapy Note    Date: 9/21/2022    Group Start Time: 0900  Group End Time: 0915  Group Topic: Community Meeting    NORAH Dixon        Group Therapy Note    Attendees: 8/19       Patient's Goal:  To attend groups, stay focused and work on discharge planning    Notes: Goals Group    Status After Intervention:  Unchanged    Participation Level:  Active Listener and Interactive    Participation Quality: Appropriate, Attentive, Sharing, and Supportive      Speech:  normal      Thought Process/Content: Logical  Linear      Affective Functioning: Congruent      Mood: euthymic      Level of consciousness:  Alert and Oriented x4      Response to Learning: Progressing to goal      Endings: None Reported    Modes of Intervention: Support, Socialization, and Exploration      Discipline Responsible: Behavorial Health Tech      Signature:  Beatriz Dixon

## 2022-09-21 NOTE — PROGRESS NOTES
Called patient voice mail is full reason for call need to reschedule upcoming test 05/20/2020 if patient calls back please move to the month of June before apt on 06/11/2020 with Dr Hoffmann    Daily Progress Note  Chris Avelar MD  9/20/2022  CHIEF COMPLAINT: Depression with suicidal ideation    Reviewed patient's current plan of care and vital signs with nursing staff. Sleep:  a few hours last night  Attending groups: Yes    SUBJECTIVE:    Patient reporting poor sleep. [place] he did not get a good nights rest.  Requesting increase in nighttime medication for sleep. Did feel the increase of trazodone to 100 was helpful. Not yet able to contract for safety on the unit. Fair appetite. Spent time in supportive psychotherapy. Patient complaining that he does not always feel that he communicates well. Struggles to communicate what he struggling with. Feels he is misunderstood at times. Denying side effects to recently added Risperdal.  Agreeable to continue current dose and may consider further increase pending observation    Mental Status Exam  Level of consciousness:  Within normal limits  Appearance: Hospital attire, seated in chair, with good grooming and hygiene   Behavior/Motor: No abnormalities noted  Attitude toward examiner:  Cooperative, attentive, good eye contact  Speech:  spontaneous, normal rate, normal volume and well articulated  Mood: Sad  Affect: Congruent  Thought processes:  linear, goal directed and coherent  Thought content:  denies homicidal ideation  Suicidal Ideation: Endorses suicidal ideation  Delusions:  no evidence of delusions  Perceptual Disturbance:  denies any perceptual disturbance  Cognition:  Oriented to self, location, time, and situation  Memory: age appropriate  Insight & Judgement: improving  Medication side effects:  denies       Data   height is 5' 6\" (1.676 m) and weight is 186 lb (84.4 kg). His oral temperature is 98 °F (36.7 °C). His blood pressure is 142/87 (abnormal) and his pulse is 66. His respiration is 14 and oxygen saturation is 98%.    Labs:   Admission on 09/16/2022   Component Date Value Ref Range Status    WBC 09/16/2022 5.7  3.5 - 11.0 k/uL Final RBC 09/16/2022 4.52  4.5 - 5.9 m/uL Final    Hemoglobin 09/16/2022 13.5  13.5 - 17.5 g/dL Final    Hematocrit 09/16/2022 40.6 (A) 41 - 53 % Final    MCV 09/16/2022 89.8  80 - 100 fL Final    MCH 09/16/2022 29.9  26 - 34 pg Final    MCHC 09/16/2022 33.3  31 - 37 g/dL Final    RDW 09/16/2022 15.6 (A) 11.5 - 14.9 % Final    Platelets 57/08/7121 150  150 - 450 k/uL Final    MPV 09/16/2022 6.9  6.0 - 12.0 fL Final    Seg Neutrophils 09/16/2022 82 (A) 36 - 66 % Final    Lymphocytes 09/16/2022 7 (A) 24 - 44 % Final    Monocytes 09/16/2022 8 (A) 1 - 7 % Final    Eosinophils % 09/16/2022 2  0 - 4 % Final    Basophils 09/16/2022 1  0 - 2 % Final    Segs Absolute 09/16/2022 4.80  1.3 - 9.1 k/uL Final    Absolute Lymph # 09/16/2022 0.40 (A) 1.0 - 4.8 k/uL Final    Absolute Mono # 09/16/2022 0.40  0.1 - 1.3 k/uL Final    Absolute Eos # 09/16/2022 0.10  0.0 - 0.4 k/uL Final    Basophils Absolute 09/16/2022 0.00  0.0 - 0.2 k/uL Final    Glucose 09/16/2022 90  70 - 99 mg/dL Final    BUN 09/16/2022 12  8 - 23 mg/dL Final    Creatinine 09/16/2022 0.90  0.70 - 1.20 mg/dL Final    Calcium 09/16/2022 9.2  8.6 - 10.4 mg/dL Final    Sodium 09/16/2022 135  135 - 144 mmol/L Final    Potassium 09/16/2022 4.2  3.7 - 5.3 mmol/L Final    Chloride 09/16/2022 98  98 - 107 mmol/L Final    CO2 09/16/2022 27  20 - 31 mmol/L Final    Anion Gap 09/16/2022 10  9 - 17 mmol/L Final    Alkaline Phosphatase 09/16/2022 122  40 - 129 U/L Final    ALT 09/16/2022 23  5 - 41 U/L Final    AST 09/16/2022 20  <40 U/L Final    Total Bilirubin 09/16/2022 0.4  0.3 - 1.2 mg/dL Final    Total Protein 09/16/2022 7.1  6.4 - 8.3 g/dL Final    Albumin 09/16/2022 4.4  3.5 - 5.2 g/dL Final    GFR Non- 09/16/2022 >60  >60 mL/min Final    GFR  09/16/2022 >60  >60 mL/min Final    GFR Comment 09/16/2022        Final    Comment: Average GFR for 61-76 years old:   80 mL/min/1.73sq m  Chronic Kidney Disease:   <60 mL/min/1.73sq m  Kidney failure:   <15 mL/min/1.73sq m              eGFR calculated using average adult body mass. Additional eGFR calculator available at:        GetThis.[employer]            Ethanol 09/16/2022 <10  <10 mg/dL Final    Ethanol percent 09/16/2022 <0.010  % Final    Amphetamine Screen, Ur 09/16/2022 NEGATIVE  NEGATIVE Final    Comment:       (Positive cutoff 1000 ng/mL)                  Barbiturate Screen, Ur 09/16/2022 NEGATIVE  NEGATIVE Final    Comment:       (Positive cutoff 200 ng/mL)                  Benzodiazepine Screen, Urine 09/16/2022 NEGATIVE   Final    Comment:       (Positive cutoff 200 ng/mL)                  Cocaine Metabolite, Urine 09/16/2022 NEGATIVE  NEGATIVE Final    Comment:       (Positive cutoff 300 ng/mL)                  Methadone Screen, Urine 09/16/2022 NEGATIVE  NEGATIVE Final    Comment:       (Positive cutoff 300 ng/mL)                  Opiates, Urine 09/16/2022 NEGATIVE  NEGATIVE Final    Comment:       (Positive cutoff 300 ng/mL)                  Phencyclidine, Urine 09/16/2022 NEGATIVE  NEGATIVE Final    Comment:       (Positive cutoff 25 ng/mL)                  Cannabinoid Scrn, Ur 09/16/2022 NEGATIVE  NEGATIVE Final    Comment:       (Positive cutoff 50 ng/mL)                  Oxycodone Screen, Ur 09/16/2022 NEGATIVE  NEGATIVE Final    Comment:       (Positive cutoff 100 ng/mL)                  Fentanyl, Ur 09/16/2022 NEGATIVE  NEGATIVE Final    Comment:       (Positive cutoff  5 ng/ml)            Test Information 09/16/2022 Assay provides medical screening only. The absence of expected drug(s) and/or metabolite(s) may indicate diluted or adulterated urine, limitations of testing or timing of collection. Final    Comment: Testing for legal purposes should be confirmed by another method. To request confirmation   of test result, please call the lab within 7 days of sample submission.       Color, UA 09/16/2022 Yellow  Yellow Final    Turbidity UA 09/16/2022 Clear  Clear Final    Glucose, Ur 09/16/2022 NEGATIVE  NEGATIVE Final    Bilirubin Urine 09/16/2022 NEGATIVE  NEGATIVE Final    Ketones, Urine 09/16/2022 NEGATIVE  NEGATIVE Final    Specific Gravity, UA 09/16/2022 1.015  1.000 - 1.030 Final    Urine Hgb 09/16/2022 SMALL (A) NEGATIVE Final    pH, UA 09/16/2022 5.5  5.0 - 8.0 Final    Protein, UA 09/16/2022 NEGATIVE  NEGATIVE Final    Urobilinogen, Urine 09/16/2022 Normal  Normal Final    Nitrite, Urine 09/16/2022 NEGATIVE  NEGATIVE Final    Leukocyte Esterase, Urine 09/16/2022 NEGATIVE  NEGATIVE Final    Protime 09/16/2022 25.7 (A) 11.8 - 14.6 sec Final    INR 09/16/2022 2.3   Final    Comment:       Non-therapeutic Range:     INR = 0.9-1.2  Therapeutic Range: Moderate Anticoagulant Intensity:     INR = 2.0-3.0   High Anticoagulant Intensity:     INR = 2.5-3.5            WBC, UA 09/16/2022 0 TO 2  /HPF Final    RBC, UA 09/16/2022 3 to 5  /HPF Final    Casts UA 09/16/2022 0 TO 2  /LPF Final    Epithelial Cells UA 09/16/2022 0 TO 2  /HPF Final    Bacteria, UA 09/16/2022 None  None Final    Protime 09/17/2022 18.8 (A) 11.8 - 14.6 sec Final    INR 09/17/2022 1.6   Final    Comment:       Non-therapeutic Range:     INR = 0.9-1.2  Therapeutic Range: Moderate Anticoagulant Intensity:     INR = 2.0-3.0   High Anticoagulant Intensity:     INR = 2.5-3.5            Protime 09/18/2022 16.4 (A) 11.8 - 14.6 sec Final    INR 09/18/2022 1.3   Final    Comment:       Non-therapeutic Range:     INR = 0.9-1.2  Therapeutic Range: Moderate Anticoagulant Intensity:     INR = 2.0-3.0   High Anticoagulant Intensity:     INR = 2.5-3.5            Protime 09/19/2022 19.3 (A) 11.8 - 14.6 sec Final    INR 09/19/2022 1.6   Final    Comment:       Non-therapeutic Range:     INR = 0.9-1.2  Therapeutic Range:    Moderate Anticoagulant Intensity:     INR = 2.0-3.0   High Anticoagulant Intensity:     INR = 2.5-3.5            Protime 09/20/2022 23.0 (A) 11.8 - 14.6 sec Final    INR 09/20/2022 2.0 Final    Comment:       Non-therapeutic Range:     INR = 0.9-1.2  Therapeutic Range:    Moderate Anticoagulant Intensity:     INR = 2.0-3.0   High Anticoagulant Intensity:     INR = 2.5-3.5                    Medications  Current Facility-Administered Medications: midodrine (PROAMATINE) tablet 10 mg, 10 mg, Oral, TID  traZODone (DESYREL) tablet 200 mg, 200 mg, Oral, Nightly  melatonin tablet 1.5 mg, 1.5 mg, Oral, Nightly PRN  risperiDONE (RISPERDAL) tablet 0.5 mg, 0.5 mg, Oral, BID  amitriptyline (ELAVIL) tablet 25 mg, 25 mg, Oral, Nightly  traZODone (DESYREL) tablet 100 mg, 100 mg, Oral, Nightly PRN  aspirin chewable tablet 81 mg, 81 mg, Oral, Daily  atorvastatin (LIPITOR) tablet 20 mg, 20 mg, Oral, Nightly  ferrous sulfate (IRON 325) tablet 325 mg, 325 mg, Oral, Daily with breakfast  furosemide (LASIX) tablet 20 mg, 20 mg, Oral, Daily  gabapentin (NEURONTIN) capsule 900 mg, 900 mg, Oral, TID  pantoprazole (PROTONIX) tablet 40 mg, 40 mg, Oral, Daily  tamsulosin (FLOMAX) capsule 0.4 mg, 0.4 mg, Oral, Daily  calcium citrate (CALCITRATE) tablet 200 mg, 200 mg, Oral, BID  cetirizine (ZYRTEC) tablet 10 mg, 10 mg, Oral, Daily  Vitamin D (CHOLECALCIFEROL) tablet 2,000 Units, 2 tablet, Oral, Daily with breakfast  vitamin B-12 (CYANOCOBALAMIN) tablet 1,000 mcg, 1,000 mcg, Oral, Daily  docusate sodium (COLACE) capsule 200 mg, 200 mg, Oral, Daily  folic acid (FOLVITE) tablet 1 mg, 1 mg, Oral, Daily  OXcarbazepine (TRILEPTAL) tablet 600 mg, 600 mg, Oral, BID  sennosides-docusate sodium (SENOKOT-S) 8.6-50 MG tablet 1 tablet, 1 tablet, Oral, Daily  warfarin placeholder: dosing by pharmacy, , Other, RX Placeholder  acetaminophen (TYLENOL) tablet 650 mg, 650 mg, Oral, Q6H PRN  hydrOXYzine HCl (ATARAX) tablet 50 mg, 50 mg, Oral, TID PRN  polyethylene glycol (GLYCOLAX) packet 17 g, 17 g, Oral, Daily PRN  aluminum & magnesium hydroxide-simethicone (MAALOX) 200-200-20 MG/5ML suspension 30 mL, 30 mL, Oral, Q6H PRN  nicotine polacrilex (NICORETTE) gum 2 mg, 2 mg, Oral, Q2H PRN    ASSESSMENT  Severe depressed bipolar I disorder without psychotic features [facility])     PLAN  Patient s symptoms   show no change  Increase at bedtime trazodone to 200 mg by mouth daily  Attempt to develop insight  Psycho-education conducted. Supportive Therapy conducted. Probable discharge is undetermined at this time  Follow-up daily while in the inpatient unit    More than 16 mins of the 30-minute session was spent doing Supportive psychotherapy. Electronically signed by Saravanan Correia MD on 9/20/22 at 9:00 PM EDT    **This report has been created using voice recognition software. It may contain minor errors which are inherent in voice recognition technology. **

## 2022-09-22 LAB
INR BLD: 2.5
PROTHROMBIN TIME: 26.7 SEC (ref 11.8–14.6)

## 2022-09-22 PROCEDURE — 1240000000 HC EMOTIONAL WELLNESS R&B

## 2022-09-22 PROCEDURE — APPSS30 APP SPLIT SHARED TIME 16-30 MINUTES

## 2022-09-22 PROCEDURE — 6370000000 HC RX 637 (ALT 250 FOR IP): Performed by: INTERNAL MEDICINE

## 2022-09-22 PROCEDURE — 90833 PSYTX W PT W E/M 30 MIN: CPT | Performed by: PSYCHIATRY & NEUROLOGY

## 2022-09-22 PROCEDURE — 6370000000 HC RX 637 (ALT 250 FOR IP): Performed by: PSYCHIATRY & NEUROLOGY

## 2022-09-22 PROCEDURE — 99232 SBSQ HOSP IP/OBS MODERATE 35: CPT | Performed by: PSYCHIATRY & NEUROLOGY

## 2022-09-22 PROCEDURE — 85610 PROTHROMBIN TIME: CPT

## 2022-09-22 PROCEDURE — 6370000000 HC RX 637 (ALT 250 FOR IP)

## 2022-09-22 PROCEDURE — 36415 COLL VENOUS BLD VENIPUNCTURE: CPT

## 2022-09-22 RX ORDER — AMITRIPTYLINE HYDROCHLORIDE 25 MG/1
25 TABLET, FILM COATED ORAL NIGHTLY
Qty: 30 TABLET | Refills: 3 | Status: ON HOLD
Start: 2022-09-22 | End: 2022-09-29 | Stop reason: HOSPADM

## 2022-09-22 RX ORDER — WARFARIN SODIUM 2 MG/1
4 TABLET ORAL
Status: COMPLETED | OUTPATIENT
Start: 2022-09-22 | End: 2022-09-22

## 2022-09-22 RX ORDER — LANOLIN ALCOHOL/MO/W.PET/CERES
1.5 CREAM (GRAM) TOPICAL NIGHTLY
Qty: 15 TABLET | Refills: 0 | Status: ON HOLD | OUTPATIENT
Start: 2022-09-22 | End: 2022-09-26 | Stop reason: DRUGHIGH

## 2022-09-22 RX ORDER — POLYETHYLENE GLYCOL 3350 17 G/17G
17 POWDER, FOR SOLUTION ORAL DAILY PRN
Qty: 527 G | Refills: 1 | Status: SHIPPED | OUTPATIENT
Start: 2022-09-22 | End: 2022-10-22

## 2022-09-22 RX ORDER — RISPERIDONE 0.5 MG/1
0.5 TABLET, FILM COATED ORAL 2 TIMES DAILY
Qty: 60 TABLET | Refills: 0 | Status: SHIPPED | OUTPATIENT
Start: 2022-09-22

## 2022-09-22 RX ORDER — HYDROXYZINE 50 MG/1
50 TABLET, FILM COATED ORAL 3 TIMES DAILY PRN
Qty: 30 TABLET | Refills: 0 | Status: SHIPPED | OUTPATIENT
Start: 2022-09-22 | End: 2022-10-02

## 2022-09-22 RX ADMIN — FUROSEMIDE 20 MG: 20 TABLET ORAL at 08:23

## 2022-09-22 RX ADMIN — Medication 200 MG: at 20:58

## 2022-09-22 RX ADMIN — FOLIC ACID 1 MG: 1 TABLET ORAL at 08:28

## 2022-09-22 RX ADMIN — GABAPENTIN 900 MG: 300 CAPSULE ORAL at 08:26

## 2022-09-22 RX ADMIN — GABAPENTIN 900 MG: 300 CAPSULE ORAL at 20:56

## 2022-09-22 RX ADMIN — ACETAMINOPHEN 650 MG: 325 TABLET, FILM COATED ORAL at 20:56

## 2022-09-22 RX ADMIN — AMITRIPTYLINE HYDROCHLORIDE 25 MG: 25 TABLET, FILM COATED ORAL at 20:56

## 2022-09-22 RX ADMIN — WARFARIN SODIUM 4 MG: 2 TABLET ORAL at 18:19

## 2022-09-22 RX ADMIN — ATORVASTATIN CALCIUM 20 MG: 20 TABLET, FILM COATED ORAL at 20:56

## 2022-09-22 RX ADMIN — MIDODRINE HYDROCHLORIDE 10 MG: 10 TABLET ORAL at 18:19

## 2022-09-22 RX ADMIN — RISPERIDONE 0.5 MG: 0.5 TABLET ORAL at 20:56

## 2022-09-22 RX ADMIN — PANTOPRAZOLE SODIUM 40 MG: 40 TABLET, DELAYED RELEASE ORAL at 08:24

## 2022-09-22 RX ADMIN — HYDROXYZINE HYDROCHLORIDE 50 MG: 50 TABLET ORAL at 14:41

## 2022-09-22 RX ADMIN — Medication 1.5 MG: at 20:57

## 2022-09-22 RX ADMIN — DICLOFENAC SODIUM 2 G: 10 GEL TOPICAL at 20:56

## 2022-09-22 RX ADMIN — RISPERIDONE 0.5 MG: 0.5 TABLET ORAL at 08:25

## 2022-09-22 RX ADMIN — Medication 2000 UNITS: at 08:26

## 2022-09-22 RX ADMIN — CETIRIZINE HYDROCHLORIDE 10 MG: 10 TABLET, FILM COATED ORAL at 08:28

## 2022-09-22 RX ADMIN — DICLOFENAC SODIUM 2 G: 10 GEL TOPICAL at 09:06

## 2022-09-22 RX ADMIN — OXCARBAZEPINE 600 MG: 600 TABLET, FILM COATED ORAL at 20:56

## 2022-09-22 RX ADMIN — MIDODRINE HYDROCHLORIDE 10 MG: 10 TABLET ORAL at 08:24

## 2022-09-22 RX ADMIN — HYDROXYZINE HYDROCHLORIDE 50 MG: 50 TABLET ORAL at 20:56

## 2022-09-22 RX ADMIN — POLYETHYLENE GLYCOL 3350 17 G: 17 POWDER, FOR SOLUTION ORAL at 14:40

## 2022-09-22 RX ADMIN — DICLOFENAC SODIUM 2 G: 10 GEL TOPICAL at 18:30

## 2022-09-22 RX ADMIN — OXCARBAZEPINE 600 MG: 600 TABLET, FILM COATED ORAL at 08:27

## 2022-09-22 RX ADMIN — DOCUSATE SODIUM 200 MG: 100 CAPSULE, LIQUID FILLED ORAL at 08:25

## 2022-09-22 RX ADMIN — CYANOCOBALAMIN TAB 1000 MCG 1000 MCG: 1000 TAB at 08:27

## 2022-09-22 RX ADMIN — MIDODRINE HYDROCHLORIDE 10 MG: 10 TABLET ORAL at 14:40

## 2022-09-22 RX ADMIN — ASPIRIN 81 MG 81 MG: 81 TABLET ORAL at 08:27

## 2022-09-22 RX ADMIN — FERROUS SULFATE TAB 325 MG (65 MG ELEMENTAL FE) 325 MG: 325 (65 FE) TAB at 08:26

## 2022-09-22 RX ADMIN — GABAPENTIN 900 MG: 300 CAPSULE ORAL at 14:41

## 2022-09-22 RX ADMIN — SENNOSIDES AND DOCUSATE SODIUM 1 TABLET: 50; 8.6 TABLET ORAL at 08:25

## 2022-09-22 RX ADMIN — Medication 200 MG: at 08:22

## 2022-09-22 RX ADMIN — TAMSULOSIN HYDROCHLORIDE 0.4 MG: 0.4 CAPSULE ORAL at 08:25

## 2022-09-22 ASSESSMENT — PAIN SCALES - GENERAL
PAINLEVEL_OUTOF10: 1
PAINLEVEL_OUTOF10: 6
PAINLEVEL_OUTOF10: 3

## 2022-09-22 ASSESSMENT — PAIN DESCRIPTION - LOCATION: LOCATION: NECK

## 2022-09-22 NOTE — GROUP NOTE
Psych-Ed/Relapse Prevention Group Note        Date: September 22, 2022 Start Time: 1:30pm  End Time:  2:00pm      Number of Participants in Group & Unit Census:  10    Topic: Community Education and Support    Goal of Group: Patient will participate in guest speaker activity and identify benefits of community support. Comments:     Patient participated in  [facility] [address]  group. Patient was interactive with speaker.          Signature:  Fabi Brady, [address]

## 2022-09-22 NOTE — PROGRESS NOTES
Pharmacy Note  Warfarin Consult follow-up      Recent Labs     09/20/22  0650 09/21/22  0743 09/22/22  0716   INR 2.0 2.1 2.5     No results for input(s): HGB, HCT, PLT in the last 72 hours. Significant Drug-Drug Interactions:  New warfarin drug-drug interactions: none  Discontinued drug-drug interactions: trazodone  Current warfarin drug-drug interactions: amitriptyline, aspirin, lipitor      Date             INR        Dose given previous day  Dose scheduled for today  9/22/2022            2.5       2 mg          4 mg        Notes:                   INR therapeutic at 2.5. Give 4 mg dose today (home regimen resumed 09/20). Daily PT/INR while inpatient.      Zenaida Neal, PharmD, [address]  PGY-1 Pharmacy Resident  9/22/2022 8:19 AM

## 2022-09-22 NOTE — GROUP NOTE
Group Therapy Note    Date: 9/22/2022    Group Start Time: 1115  Group End Time: 1150  Group Topic: Psychoeducation    CRISTINO ValdovinosS    Group Therapy Note    Attendees: 7       Patient's Goal:  Patient will identify benefits of music for coping and stress management. Notes:  Patient attended group and participated    Status After Intervention:  Improved    Participation Level:  Active Listener and Interactive    Participation Quality: Appropriate, Attentive, Sharing, and Supportive      Speech:  normal      Thought Process/Content: Logical  Linear      Affective Functioning: Congruent      Mood: euthymic      Level of consciousness:  Alert, Oriented x4, and Attentive      Response to Learning: Able to verbalize current knowledge/experience, Able to verbalize/acknowledge new learning, Able to retain information, Capable of insight, Able to change behavior, and Progressing to goal      Endings: None Reported    Modes of Intervention: Education, Support, Socialization, and Exploration      Discipline Responsible: Psychoeducational Specialist      Signature:  Vinh Skinner, [address]

## 2022-09-22 NOTE — PLAN OF CARE
Problem: Depression  Goal: Will be euthymic at discharge  Description: INTERVENTIONS:  1. Administer medication as ordered  2. Provide emotional support via 1:1 interaction with staff  3. Encourage involvement in milieu/groups/activities  4. Monitor for social isolation  9/22/2022 0733 by Sita Lees  Outcome: Progressing  Note: Patient relates experiencing moderate feelings of depression, but cannot describe a reason for experiencing these feelings. Patient states that he utilizes distractions in order to cope with the feelings of depression he experiences. Patient denies experiencing feelings of anxiety, thoughts of wanting to hurt himself or others, as well as any hallucinations. Patient is comfortable approaching staff for any requests or questions. Patient is compliant with lab checks, and cooperative with all staff. Patient relates that he feels like he is eating and sleeping sufficiently, and is out and social in the dayroom. Patient states he has been attending group sessions, and staff will continue to encourage involvement. Patient remains safe at this time, and agrees to notify staff if any thoughts, feelings, or concerns need to be brought to their attention. Safety precautions and Q15 minute checks maintained.

## 2022-09-22 NOTE — PROGRESS NOTES
Daily Progress Note  9/22/2022    Patient Name: Theodore Houser: Depression with suicidal ideation         SUBJECTIVE:      Patient is seen today for a follow up assessment. Patient is accepting to follow-up assessment to own room. Patient is seclusive to self and appears improvement in somatic symptoms. He reports having a small bowel movement early in the morning and continues to take bowel regiment medication. Patient reports improvement in anxiety and depression. He report Trazodone and starting Melatonin scheduled. He improvement in suicidal ideation, currently denies homicidal ideation, auditory and visual hallucination. Patient continues to reports having a stable appetite. Patient continues to be in behavioral control, no emergency medication use in the past 24 hours. He continues to attend group no other concerns at this time. Appetite:  [x] Adequate/Unchanged  [] Increased  [] Decreased      Sleep:       [] Adequate/Unchanged  [] Fair  [x] Poor      Group Attendance on Unit:   [x] Yes   [] Selectively    [] No    Compliant with scheduled medications: [x] Yes  [] No    Received emergency medications in past 24 hrs: [] Yes   [x] No    Medication Side Effects: Denies          Mental Status Exam  Level of consciousness: Alert and awake   Appearance: Appropriate attire for setting, seated on bed, with good  grooming and hygiene   Behavior/Motor: Approachable,   Attitude toward examiner: Cooperative, attentive, good eye contact  Speech: spontaneous, normal rate, normal volume, and well articulated   Mood: Patient reports \"better\"  Affect: Sad  Thought processes: linear, goal directed, and coherent   Thought content:  Denies homicidal ideation  Suicidal Ideation: Improvement in suicidal ideations, unable to contract for safety on the unit. Delusions: No evidence of delusions. Perceptual Disturbance: Patient denies any perceptual disturbance.   Does not appear to be responding to internal stimuli. Cognition: Oriented to self, location, time, and situation  Memory: Age-appropriate  Insight: poor   Judgement: poor       Data   height is 5' 6\" (1.676 m) and weight is 186 lb (84.4 kg). His temporal temperature is 97.5 °F (36.4 °C). His blood pressure is 117/79 and his pulse is 68. His respiration is 16 and oxygen saturation is 99%.    Labs:   Admission on 09/16/2022   Component Date Value Ref Range Status    WBC 09/16/2022 5.7  3.5 - 11.0 k/uL Final    RBC 09/16/2022 4.52  4.5 - 5.9 m/uL Final    Hemoglobin 09/16/2022 13.5  13.5 - 17.5 g/dL Final    Hematocrit 09/16/2022 40.6 (A) 41 - 53 % Final    MCV 09/16/2022 89.8  80 - 100 fL Final    MCH 09/16/2022 29.9  26 - 34 pg Final    MCHC 09/16/2022 33.3  31 - 37 g/dL Final    RDW 09/16/2022 15.6 (A) 11.5 - 14.9 % Final    Platelets 42/91/7682 150  150 - 450 k/uL Final    MPV 09/16/2022 6.9  6.0 - 12.0 fL Final    Seg Neutrophils 09/16/2022 82 (A) 36 - 66 % Final    Lymphocytes 09/16/2022 7 (A) 24 - 44 % Final    Monocytes 09/16/2022 8 (A) 1 - 7 % Final    Eosinophils % 09/16/2022 2  0 - 4 % Final    Basophils 09/16/2022 1  0 - 2 % Final    Segs Absolute 09/16/2022 4.80  1.3 - 9.1 k/uL Final    Absolute Lymph # 09/16/2022 0.40 (A) 1.0 - 4.8 k/uL Final    Absolute Mono # 09/16/2022 0.40  0.1 - 1.3 k/uL Final    Absolute Eos # 09/16/2022 0.10  0.0 - 0.4 k/uL Final    Basophils Absolute 09/16/2022 0.00  0.0 - 0.2 k/uL Final    Glucose 09/16/2022 90  70 - 99 mg/dL Final    BUN 09/16/2022 12  8 - 23 mg/dL Final    Creatinine 09/16/2022 0.90  0.70 - 1.20 mg/dL Final    Calcium 09/16/2022 9.2  8.6 - 10.4 mg/dL Final    Sodium 09/16/2022 135  135 - 144 mmol/L Final    Potassium 09/16/2022 4.2  3.7 - 5.3 mmol/L Final    Chloride 09/16/2022 98  98 - 107 mmol/L Final    CO2 09/16/2022 27  20 - 31 mmol/L Final    Anion Gap 09/16/2022 10  9 - 17 mmol/L Final    Alkaline Phosphatase 09/16/2022 122  40 - 129 U/L Final    ALT 09/16/2022 23  5 - 41 U/L Final    AST 09/16/2022 20  <40 U/L Final    Total Bilirubin 09/16/2022 0.4  0.3 - 1.2 mg/dL Final    Total Protein 09/16/2022 7.1  6.4 - 8.3 g/dL Final    Albumin 09/16/2022 4.4  3.5 - 5.2 g/dL Final    GFR Non- 09/16/2022 >60  >60 mL/min Final    GFR  09/16/2022 >60  >60 mL/min Final    GFR Comment 09/16/2022        Final    Comment: Average GFR for 61-76 years old:   80 mL/min/1.73sq m  Chronic Kidney Disease:   <60 mL/min/1.73sq m  Kidney failure:   <15 mL/min/1.73sq m              eGFR calculated using average adult body mass.  Additional eGFR calculator available at:        [employer]            Ethanol 09/16/2022 <10  <10 mg/dL Final    Ethanol percent 09/16/2022 <0.010  % Final    Amphetamine Screen, Ur 09/16/2022 NEGATIVE  NEGATIVE Final    Comment:       (Positive cutoff 1000 ng/mL)                  Barbiturate Screen, Ur 09/16/2022 NEGATIVE  NEGATIVE Final    Comment:       (Positive cutoff 200 ng/mL)                  Benzodiazepine Screen, Urine 09/16/2022 NEGATIVE   Final    Comment:       (Positive cutoff 200 ng/mL)                  Cocaine Metabolite, Urine 09/16/2022 NEGATIVE  NEGATIVE Final    Comment:       (Positive cutoff 300 ng/mL)                  Methadone Screen, Urine 09/16/2022 NEGATIVE  NEGATIVE Final    Comment:       (Positive cutoff 300 ng/mL)                  Opiates, Urine 09/16/2022 NEGATIVE  NEGATIVE Final    Comment:       (Positive cutoff 300 ng/mL)                  Phencyclidine, Urine 09/16/2022 NEGATIVE  NEGATIVE Final    Comment:       (Positive cutoff 25 ng/mL)                  Cannabinoid Scrn, Ur 09/16/2022 NEGATIVE  NEGATIVE Final    Comment:       (Positive cutoff 50 ng/mL)                  Oxycodone Screen, Ur 09/16/2022 NEGATIVE  NEGATIVE Final    Comment:       (Positive cutoff 100 ng/mL)                  Fentanyl, Ur 09/16/2022 NEGATIVE  NEGATIVE Final    Comment:       (Positive cutoff  5 ng/ml)            Test Information 09/16/2022 Assay provides medical screening only. The absence of expected drug(s) and/or metabolite(s) may indicate diluted or adulterated urine, limitations of testing or timing of collection. Final    Comment: Testing for legal purposes should be confirmed by another method. To request confirmation   of test result, please call the lab within 7 days of sample submission. Color, UA 09/16/2022 Yellow  Yellow Final    Turbidity UA 09/16/2022 Clear  Clear Final    Glucose, Ur 09/16/2022 NEGATIVE  NEGATIVE Final    Bilirubin Urine 09/16/2022 NEGATIVE  NEGATIVE Final    Ketones, Urine 09/16/2022 NEGATIVE  NEGATIVE Final    Specific Gravity, UA 09/16/2022 1.015  1.000 - 1.030 Final    Urine Hgb 09/16/2022 SMALL (A) NEGATIVE Final    pH, UA 09/16/2022 5.5  5.0 - 8.0 Final    Protein, UA 09/16/2022 NEGATIVE  NEGATIVE Final    Urobilinogen, Urine 09/16/2022 Normal  Normal Final    Nitrite, Urine 09/16/2022 NEGATIVE  NEGATIVE Final    Leukocyte Esterase, Urine 09/16/2022 NEGATIVE  NEGATIVE Final    Protime 09/16/2022 25.7 (A) 11.8 - 14.6 sec Final    INR 09/16/2022 2.3   Final    Comment:       Non-therapeutic Range:     INR = 0.9-1.2  Therapeutic Range: Moderate Anticoagulant Intensity:     INR = 2.0-3.0   High Anticoagulant Intensity:     INR = 2.5-3.5            WBC, UA 09/16/2022 0 TO 2  /HPF Final    RBC, UA 09/16/2022 3 to 5  /HPF Final    Casts UA 09/16/2022 0 TO 2  /LPF Final    Epithelial Cells UA 09/16/2022 0 TO 2  /HPF Final    Bacteria, UA 09/16/2022 None  None Final    Protime 09/17/2022 18.8 (A) 11.8 - 14.6 sec Final    INR 09/17/2022 1.6   Final    Comment:       Non-therapeutic Range:     INR = 0.9-1.2  Therapeutic Range:    Moderate Anticoagulant Intensity:     INR = 2.0-3.0   High Anticoagulant Intensity:     INR = 2.5-3.5            Protime 09/18/2022 16.4 (A) 11.8 - 14.6 sec Final    INR 09/18/2022 1.3   Final    Comment:       Non-therapeutic Range:     INR = 0.9-1.2  Therapeutic Range: Moderate Anticoagulant Intensity:     INR = 2.0-3.0   High Anticoagulant Intensity:     INR = 2.5-3.5            Protime 09/19/2022 19.3 (A) 11.8 - 14.6 sec Final    INR 09/19/2022 1.6   Final    Comment:       Non-therapeutic Range:     INR = 0.9-1.2  Therapeutic Range: Moderate Anticoagulant Intensity:     INR = 2.0-3.0   High Anticoagulant Intensity:     INR = 2.5-3.5            Protime 09/20/2022 23.0 (A) 11.8 - 14.6 sec Final    INR 09/20/2022 2.0   Final    Comment:       Non-therapeutic Range:     INR = 0.9-1.2  Therapeutic Range: Moderate Anticoagulant Intensity:     INR = 2.0-3.0   High Anticoagulant Intensity:     INR = 2.5-3.5            Protime 09/21/2022 23.6 (A) 11.8 - 14.6 sec Final    INR 09/21/2022 2.1   Final    Comment:       Non-therapeutic Range:     INR = 0.9-1.2  Therapeutic Range: Moderate Anticoagulant Intensity:     INR = 2.0-3.0   High Anticoagulant Intensity:     INR = 2.5-3.5            Protime 09/22/2022 26.7 (A) 11.8 - 14.6 sec Final    INR 09/22/2022 2.5   Final    Comment:       Non-therapeutic Range:     INR = 0.9-1.2  Therapeutic Range: Moderate Anticoagulant Intensity:     INR = 2.0-3.0   High Anticoagulant Intensity:     INR = 2.5-3.5                 Reviewed patient's current plan of care and vital signs with nursing staff.     Labs reviewed: [x] Yes  INR 2.5, prothrombin 26.7  Medications  Current Facility-Administered Medications: warfarin (COUMADIN) tablet 4 mg, 4 mg, Oral, Once  melatonin tablet 1.5 mg, 1.5 mg, Oral, Nightly  diclofenac sodium (VOLTAREN) 1 % gel 2 g, 2 g, Topical, 4x Daily PRN  midodrine (PROAMATINE) tablet 10 mg, 10 mg, Oral, TID  risperiDONE (RISPERDAL) tablet 0.5 mg, 0.5 mg, Oral, BID  amitriptyline (ELAVIL) tablet 25 mg, 25 mg, Oral, Nightly  aspirin chewable tablet 81 mg, 81 mg, Oral, Daily  atorvastatin (LIPITOR) tablet 20 mg, 20 mg, Oral, Nightly  ferrous sulfate (IRON 325) tablet 325 mg, 325 mg, Oral, Daily with breakfast  furosemide (LASIX) tablet 20 mg, 20 mg, Oral, Daily  gabapentin (NEURONTIN) capsule 900 mg, 900 mg, Oral, TID  pantoprazole (PROTONIX) tablet 40 mg, 40 mg, Oral, Daily  tamsulosin (FLOMAX) capsule 0.4 mg, 0.4 mg, Oral, Daily  calcium citrate (CALCITRATE) tablet 200 mg, 200 mg, Oral, BID  cetirizine (ZYRTEC) tablet 10 mg, 10 mg, Oral, Daily  Vitamin D (CHOLECALCIFEROL) tablet 2,000 Units, 2 tablet, Oral, Daily with breakfast  vitamin B-12 (CYANOCOBALAMIN) tablet 1,000 mcg, 1,000 mcg, Oral, Daily  docusate sodium (COLACE) capsule 200 mg, 200 mg, Oral, Daily  folic acid (FOLVITE) tablet 1 mg, 1 mg, Oral, Daily  OXcarbazepine (TRILEPTAL) tablet 600 mg, 600 mg, Oral, BID  sennosides-docusate sodium (SENOKOT-S) 8.6-50 MG tablet 1 tablet, 1 tablet, Oral, Daily  warfarin placeholder: dosing by pharmacy, , Other, RX Placeholder  acetaminophen (TYLENOL) tablet 650 mg, 650 mg, Oral, Q6H PRN  hydrOXYzine HCl (ATARAX) tablet 50 mg, 50 mg, Oral, TID PRN  polyethylene glycol (GLYCOLAX) packet 17 g, 17 g, Oral, Daily PRN  aluminum & magnesium hydroxide-simethicone (MAALOX) 200-200-20 MG/5ML suspension 30 mL, 30 mL, Oral, Q6H PRN  nicotine polacrilex (NICORETTE) gum 2 mg, 2 mg, Oral, Q2H PRN    ASSESSMENT  Severe depressed bipolar I disorder without psychotic features ([facility] [address]         HANDOFF  Patient symptoms are: No change  Medications as determined by attending physician:  Provider discontinued Trazodone, started scheduled Melatonin 1.5mg HS, Voltaren gel started to be slide to bilateral knee joints as needed for pain  Encourage participation in groups and milieu. Probable discharge is to be determined by MD    Electronically signed by LOGAN Deng CNP on 9/22/2022 at 2:35 PM    **This report has been created using voice recognition software. It may contain minor errors which are inherent in voice recognition technology. **   I independently saw and evaluated the patient. I reviewed the nurse practitioners documentation above. Any additional comments or changes to the nurse practitioners documentation are stated below otherwise agree with assessment. Plan will be as follows:  Spent 30 minutes with the patient, of that greater than 16 minutes was spent in supportive psychotherapy. Patient denying side effects to medication. Reporting improvement in mood. Denying suicidal or homicidal ideation intent or plan. Denying psychotic symptoms. Forward-looking and constructive. Discussed if continued stability or improvement through tomorrow would consider discharge and patient is in agreement    PLAN  Patient s symptoms   are improving  Continue with current medication for now  Attempt to develop insight  Psycho-education conducted. Supportive Therapy conducted.   Probable discharge is tomorrow  Follow-up daily while on inpatient unit

## 2022-09-22 NOTE — PLAN OF CARE
Problem: Self Harm/Suicidality  Goal: Will have no self-injury during hospital stay  Description: INTERVENTIONS:  1. Q 30 MINUTES: Routine safety checks  2. Q SHIFT & PRN: Assess risk to determine if routine checks are adequate to maintain patient safety  9/21/2022 2235 by Aroldo Barragan RN  Outcome: Progressing     Problem: Depression  Goal: Will be euthymic at discharge  Description: INTERVENTIONS:  1. Administer medication as ordered  2. Provide emotional support via 1:1 interaction with staff  3. Encourage involvement in milieu/groups/activities  4. Monitor for social isolation  9/21/2022 2235 by Aroldo Barragan RN  Outcome: Progressing     Patient denies suicidal ideations. Patient remains free from injury. Patient reports depression and anxiety. Patient is isolative to self. Patient is compliant with medications.

## 2022-09-22 NOTE — DISCHARGE INSTRUCTIONS
Information:  Medications:   Medication summary provided   I understand that I should take only the medications on my list.     -why and when I need to take each medicine.     -which side effects to watch for.     -that I should carry my medication information at all times in case of     Emergency situations. I will take all of my medicines to follow up appointments.     -check with my physician or pharmacist before taking any new    Medication, over the counter product or drink alcohol.    -Ask about food, drug or dietary supplement interactions.    -discard old lists and update records with medication providers. Notify Physician:  Notify physician if you notice:   Always call 911 if you feel your life is in danger  In case of an emergency call 911 immediately! If 911 is not available call your local emergency medical system for help    Behavioral Health Follow Up:  Original Referral Source:[facility]/Progress toward goals: Patient making progress towards his goals  Recommendations for Level of Care: Follow up with outpatient provider  Patient status at discharge: Baseline  My hospital  was: [facility]  Aftercare plan faxed: YES, at time of patient discharge   -faxed by: Staff at time of discharge     risperidone (oral)  Pronunciation:  ris PER i done  Brand:  RisperDAL  What is the most important information I should know about risperidone? Risperidone is not approved for use in older adults with dementia-related psychosis. What is risperidone? Risperidone is a antipsychotic medicine that works by changing the effects of chemicals in the brain. Risperidone is used to treat schizophrenia in adults and children who are at least 15years old. Risperidone is also used to treat symptoms of bipolar disorder (manic depression) in adults and children who are at least 8years old.   Risperidone is also used to treat symptoms of irritability in autistic children who are 5 to 16 years old.  Risperidone may also be used for purposes not listed in this medication guide. What should I discuss with my healthcare provider before taking risperidone? You should not use risperidone if you are allergic to it. Risperidone may increase the risk of death in older adults with dementia-related psychosis and is not approved for this use. Tell your doctor if you have ever had:  heart disease, high blood pressure, heart rhythm problems, stroke or heart attack;  diabetes (or risk factors such as obesity or family history of diabetes);  low white blood cell (WBC) counts;  liver or kidney disease;  seizures;  breast cancer;  low bone mineral density;  trouble swallowing;  Parkinson's disease; or  if you are dehydrated. The risperidone orally disintegrating tablet may contain phenylalanine. Tell your doctor if you have phenylketonuria (PKU). Taking antipsychotic medication during the last 3 months of pregnancy may cause problems in the , such as withdrawal symptoms, breathing problems, feeding problems, fussiness, tremors, and limp or stiff muscles. However, you may have withdrawal symptoms or other problems if you stop taking your medicine during pregnancy. If you become pregnant while taking risperidone, do not stop taking it without your doctor's advice. If you are pregnant, your name may be listed on a pregnancy registry to track the effects of risperidone on the baby. This medicine may temporarily affect fertility (ability to have children) in women. Risperidone can pass into breast milk and may cause side effects in the baby. If you breast-feed while using this medicine, tell your doctor if the baby has symptoms such as drowsiness, tremors, or involuntary muscle movements. Do not give this medicine to a child without a doctor's advice. How should I take risperidone? Follow all directions on your prescription label and read all medication guides or instruction sheets.  Use the medicine exactly as directed. Risperidone can be taken with or without food. Remove an orally disintegrating tablet from the package only when you are ready to take the medicine. Place the tablet in your mouth and allow it to dissolve, without chewing. Swallow several times as the tablet dissolves. Measure liquid medicine carefully. Use the dosing syringe provided, or use a medicine dose-measuring device (not a kitchen spoon). Do not mix the risperidone liquid with cola or tea. It may take up to several weeks before your symptoms improve. Keep using the medication as directed and tell your doctor if your symptoms do not improve. Store at room temperature away from moisture, heat, and light. Do not liquid medicine to freeze. What happens if I miss a dose? Take the medicine as soon as you can, but skip the missed dose if it is almost time for your next dose. Do not take two doses at one time. Get your prescription refilled before you run out of medicine completely. What happens if I overdose? Seek emergency medical attention or call the Poison Help line at 1-609.475.4077. Overdose symptoms may include severe drowsiness, fast heart rate, feeling light-headed, fainting, and restless muscle movements in your eyes, tongue, jaw, or neck. What should I avoid while taking risperidone? Avoid drinking alcohol. Dangerous side effects could occur. While you are taking risperidone, you may be more sensitive to very hot conditions. Avoid becoming overheated or dehydrated. Drink plenty of fluids, especially in hot weather and during exercise. Avoid driving or hazardous activity until you know how this medicine will affect you. Your reactions could be impaired. Avoid getting up too fast from a sitting or lying position, or you may feel dizzy. Dizziness or severe drowsiness can cause falls, fractures, or other injuries. What are the possible side effects of risperidone?   Get emergency medical help if you have signs of an allergic reaction: hives; difficulty breathing; swelling of your face, lips, tongue, or throat. Call your doctor at once if you have:  uncontrolled muscle movements in your face (chewing, lip smacking, frowning, tongue movement, blinking or eye movement);  breast swelling or tenderness (in men or women), nipple discharge, impotence, lack of interest in sex, missed menstrual periods;  severe nervous system reaction --very stiff (rigid) muscles, high fever, sweating, confusion, fast or uneven heartbeats, tremors, feeling like you might pass out;  low white blood cells --sudden weakness or ill feeling, fever, chills, sore throat, mouth sores, red or swollen gums, trouble swallowing, skin sores, cold or flu symptoms, cough, trouble breathing;  low levels of platelets in your blood --easy bruising, unusual bleeding (nose, mouth, vagina, or rectum), purple or red pinpoint spots under your skin;  high blood sugar --increased thirst, increased urination, dry mouth, fruity breath odor; or  penis erection that is painful or lasts 4 hours or longer. Common side effects may include:  headache;  dizziness, drowsiness, feeling tired;  tremors, twitching or uncontrollable muscle movements;  agitation, anxiety, restless feeling;  depressed mood;  dry mouth, upset stomach, diarrhea, constipation;  weight gain; or  cold symptoms such as stuffy nose, sneezing, sore throat. This is not a complete list of side effects and others may occur. Call your doctor for medical advice about side effects. You may report side effects to FDA at 1-589-FDA-4224. What other drugs will affect risperidone? Taking risperidone with other drugs that make you sleepy or slow your breathing can cause dangerous or life-threatening side effects. Ask your doctor before using opioid medication, a sleeping pill, a muscle relaxer, or medicine for anxiety or seizures.   Tell your doctor about all your other medicines, especially:  blood pressure medication;  carbamazepine;  clozapine;  fluoxetine (Prozac) or paroxetine (Paxil); or  levodopa. This list is not complete. Other drugs may affect risperidone, including prescription and over-the-counter medicines, vitamins, and herbal products. Not all possible drug interactions are listed here. Where can I get more information? Your pharmacist can provide more information about risperidone. Remember, keep this and all other medicines out of the reach of children, never share your medicines with others, and use this medication only for the indication prescribed. Every effort has been made to ensure that the information provided by [address] is accurate, up-to-date, and complete, but no guarantee is made to that effect. Drug information contained herein may be time sensitive. [facility] information has been compiled for use by healthcare practitioners and consumers in the [place] and therefore [employer] [facility] does not warrant that uses outside of the [place] are appropriate, unless specifically indicated otherwise. [facility]'s drug information does not endorse drugs, diagnose patients or recommend therapy. [facility] drug information is an informational resource designed to assist licensed healthcare practitioners in caring for their patients and/or to serve consumers viewing this service as a supplement to, and not a substitute for, the expertise, skill, knowledge and judgment of healthcare practitioners. The absence of a warning for a given drug or drug combination in no way should be construed to indicate that the drug or drug combination is safe, effective or appropriate for any given patient. [facility] does not assume any responsibility for any aspect of healthcare administered with the aid of information [facility] provides. The information contained herein is not intended to cover all possible uses, directions, precautions, warnings, drug interactions, allergic reactions, or adverse effects.  If you have questions about the drugs you are taking, check with your doctor, nurse or pharmacist.  Copyright 1760-6092 [facility] T[place]. Version: 21.03. Revision date: 2/5/2020. Care instructions adapted under license by [facility] ([facility]). If you have questions about a medical condition or this instruction, always ask your healthcare professional. [facility] [address] any warranty or liability for your use of this information. polyethylene glycol electrolyte solution  Pronunciation:  pall ee ETH il een GLYE kol ee LEK troe lyte  Brand:  Colyte with Flavor Packs, GaviLyte-C, GoLYTELY, MoviPrep, NuLYTELY Orange, NuLYTELY with Flavor Packs, PEG-3350 with Electolytes, Plenvu, TriLyte with Flavor Packs  What is the most important information I should know about polyethylene glycol electrolyte solution? Do not use this medicine if you have a perforated bowel, a bowel obstruction or severe constipation, or colitis or toxic megacolon. Polyethylene glycol electrolyte solution can cause dangerous or life-threatening side effects in people with these conditions. What is polyethylene glycol electrolyte solution? Polyethylene glycol electrolyte solution is a laxative solution that stimulates bowel movements. This medication also contains minerals to replace electrolytes that are passed from the body in the stool. Polyethylene glycol electrolyte solution is used to clean the bowel before colonoscopy, a barium x-ray, or other intestinal procedures. Polyethylene glycol electrolyte solution may also be used for purposes not listed in this medication guide. What should I discuss with my healthcare provider before taking polyethylene glycol electrolyte solution?   You should not use this medicine if you are allergic to polyethylene glycol or any other electrolyte solutions (such as Pedialyte or Gatorade), or if you have:  a perforated bowel;  a bowel obstruction or severe constipation; or  colitis or toxic megacolon. Polyethylene glycol electrolyte solution can cause dangerous or life-threatening side effects in people with these conditions. People with eating disorders (such as anorexia or bulimia) should not use this medicine without the advice of a doctor. Tell your doctor if you have ever had:  heart problems, or a heart attack;  an electrolyte imbalance (such as low levels of potassium or sodium in your blood);  kidney disease;  a seizure;  gastroesophageal reflux disease (GERD), ulcerative colitis, or other stomach or bowel disorder;  trouble swallowing, aspiration (accidentally inhaling food or drink);  a genetic enzyme deficiency called glucose-6-phosphate dehydrogenase (G6PD) deficiency; or  a drug or alcohol addiction. Tell your doctor if you are pregnant or breast-feeding. This medicine may contain phenylalanine. Talk to your doctor before using if you have phenylketonuria (PKU). How should I take polyethylene glycol electrolyte solution? Follow all directions on your prescription label and read all medication guides or instruction sheets. Use the medicine exactly as directed. Polyethylene glycol electrolyte powder must be mixed with water into a solution before using it. Do not add any flavorings such as sugar, honey, artificial sweetener, fruit juices, or other beverages. Shake the mixture well just before you measure a dose. Drink this medicine in the exact portions at the exact time intervals prescribed by your doctor. This medicine comes with instructions about when and what to eat or drink on your first day of treatment. Each brand may have different instructions. Do not drink polyethylene glycol electrolyte solution if it has been less than 1 hour since you last ate solid food. For best results, take the medicine 2 to 4 hours after you last ate. The first watery stool should appear within 1 hour after you start drinking polyethylene glycol electrolyte solution.  Keep taking the medicine until you have completed all doses prescribed by your doctor. Drink plenty of clear liquids (water, broth, black coffee, tea, clear soda) before, during, and after you take this medicine. Avoid foods and beverages that contain pulp or are red or purple in color. You may also eat popsicles (not fruit bars or fudge bars) or gelatin without fruit pieces or toppings. Do not eat or drink anything within 2 hours before your colonoscopy or other medical test.   Store the unmixed powder at room temperature away from moisture and heat. Store the mixed solution in a refrigerator in an upright position. Throw away any polyethylene glycol electrolyte solution you have not used within 24 to 48 hours after it was mixed (follow directions for your specific brand of this medicine). What happens if I miss a dose? Talk to your doctor if you cannot drink all of the solution prescribed for you. Your test or procedure may need to be rescheduled if your bowel is not completely cleansed. What happens if I overdose? Seek emergency medical attention or call the Poison Help line at 1-424.112.3338. What should I avoid while taking polyethylene glycol electrolyte solution? Avoid taking other medications, vitamins, or mineral supplements within 1 hour before drinking polyethylene glycol electrolyte solution. A bowel cleansing can make it harder for your body to absorb other medicines you take by mouth. Do not use other laxatives while using polyethylene glycol electrolyte solution unless your doctor has told you to. What are the possible side effects of polyethylene glycol electrolyte solution? Get emergency medical help if you have signs of an allergic reaction: hives; difficult breathing; swelling of your face, lips, tongue, or throat.   Call your doctor at once if you have:  no bowel movement within 2 hours after use;  vomiting;  dizziness, feeling like you might pass out;  little or no urination;  a seizure; or  signs of an electrolyte imbalance --increased thirst or urination, dry mouth, confusion, constipation, muscle pain or weakness, leg cramps, irregular heartbeats, tingly feeling. You may need to drink the liquid more slowly, or stop using it for a short time if you have certain side effects. Call your doctor for instructions if you have:  gagging, choking, severe stomach pain or bloating;  nausea, vomiting, headache, trouble drinking liquids, little or no urinating; or  fever, sudden or severe stomach pain, severe diarrhea, rectal bleeding or bright red bowel movements. Common side effects may include:  vomiting, stomach pain, indigestion, bloating;  rectal pain or irritation;  hunger, thirst, mild nausea;  trouble sleeping; or  dizziness, chills. This is not a complete list of side effects and others may occur. Call your doctor for medical advice about side effects. You may report side effects to FDA at 9-439-FDA-8729. What other drugs will affect polyethylene glycol electrolyte solution? Tell your doctor about all your other medicines, especially:  heart or blood pressure medication;  a diuretic or \"water pill\";  medicine to treat anxiety, depression, or mental illness;  medications to treat kidney problems or low sodium levels (hyponatremia);  seizure medication; or  NSAIDs (nonsteroidal anti-inflammatory drugs) --aspirin, ibuprofen (Advil, Motrin), naproxen (Aleve), celecoxib, diclofenac, indomethacin, meloxicam, and others. This list is not complete. Other drugs may affect polyethylene glycol electrolyte solution, including prescription and over-the-counter medicines, vitamins, and herbal products. Not all possible drug interactions are listed here. Where can I get more information? Your doctor or pharmacist can provide more information about polyethylene glycol electrolyte solution.   Remember, keep this and all other medicines out of the reach of children, never share your medicines with others, and use this medication only for the indication prescribed. Every effort has been made to ensure that the information provided by Leyda Nicolas [address] is accurate, up-to-date, and complete, but no guarantee is made to that effect. Drug information contained herein may be time sensitive. [facility] information has been compiled for use by healthcare practitioners and consumers in the [place] and therefore [facility] does not warrant that uses outside of the [place] are appropriate, unless specifically indicated otherwise. [facility]'s drug information does not endorse drugs, diagnose patients or recommend therapy. [facility]'s drug information is an informational resource designed to assist licensed healthcare practitioners in caring for their patients and/or to serve consumers viewing this service as a supplement to, and not a substitute for, the expertise, skill, knowledge and judgment of healthcare practitioners. The absence of a warning for a given drug or drug combination in no way should be construed to indicate that the drug or drug combination is safe, effective or appropriate for any given patient. [facility] does not assume any responsibility for any aspect of healthcare administered with the aid of information [facility] provides. The information contained herein is not intended to cover all possible uses, directions, precautions, warnings, drug interactions, allergic reactions, or adverse effects. If you have questions about the drugs you are taking, check with your doctor, nurse or pharmacist.  Copyright 2754-7971 [address] [place] [address] Dr TUCKER. Version: 5.02. Revision date: 6/25/2019. Care instructions adapted under license by [facility] ([facility]). If you have questions about a medical condition or this instruction, always ask your healthcare professional. [place] [address] any warranty or liability for your use of this information.        melatonin  Pronunciation:  adore cara TOE giuliana  Brand:  Advanced Sleep Melatonin, Dual Spectrum Melatonin, Melatonin, Melatonin Gummies, Melatonin Time Release, Nature's Bounty Dual Spectrum Melatonin, Sleep3  What is the most important information I should know about melatonin? Follow all directions on the product label and package. Tell each of your healthcare providers about all your medical conditions, allergies, and all medicines you use. What is melatonin? Melatonin has been used in alternative medicine as a likely effective aid in treating insomnia (trouble falling asleep or staying asleep). Melatonin is also likely effective in treating sleep disorders in people who are blind. Melatonin is also possibly effective in treating jet lag, high blood pressure, tumors, low blood platelets (blood cells that help your blood to clot), insomnia caused by withdrawal from drug addiction, or anxiety caused by surgery. A topical form of melatonin applied to the skin is possibly effective in preventing sunburn. Melatonin has also been used to treat infertility, to improve sleep problems caused by shift work, or to enhance athletic performance. However, research has shown that melatonin may not be effective in treating these conditions. Other uses not proven with research have included treating depression, bipolar disorder, dementia, macular degeneration, attention deficit hyperactivity disorder, enlarged prostate, chronic fatigue syndrome, fibromyalgia, restless leg syndrome, stomach ulcers, irritable bowel syndrome, nicotine withdrawal, and many other conditions. It is not certain whether melatonin is effective in treating any medical condition. Medicinal use of this product has not been approved by the FDA. Melatonin should not be used in place of medication prescribed for you by your doctor. Melatonin is often sold as an herbal supplement.  There are no regulated manufacturing standards in place for many herbal compounds and some marketed supplements have been found to be contaminated with toxic metals or other drugs. Herbal/health supplements should be purchased from a reliable source to minimize the risk of contamination. Melatonin may also be used for purposes not listed in this product guide. What should I discuss with my health care provider before taking melatonin? You should not use melatonin if you are allergic to it. Before using melatonin, talk to your healthcare provider. You may not be able to use melatonin if you have certain medical conditions, especially:  diabetes;  depression;  a bleeding or blood clotting disorder such as hemophilia;  high or low blood pressure;  epilepsy or other seizure disorder; or  if you are using any medicine to prevent organ transplant rejection. Ask a doctor before using this product if you are pregnant or breastfeeding. High doses of melatonin may affect ovulation, making it difficult for you to get pregnant. Do not give any herbal/health supplement to a child without medical advice. How should I take melatonin? When considering the use of herbal supplements, seek the advice of your doctor. You may also consider consulting a practitioner who is trained in the use of herbal/health supplements. If you choose to use melatonin, use it as directed on the package or as directed by your doctor, pharmacist, or other healthcare provider. Do not use more of this product than is recommended on the label. Use the lowest dose of melatonin when you first start taking this product. Take melatonin at bedtime, or when you are getting ready for sleep. If you use this product to treat jet lag, take the melatonin at bedtime on the day you arrive at your destination and keep using this product for 2 to 5 days. If you take this product to treat other conditions not related to sleep, follow your healthcare provider's instructions about when and how to take melatonin. Do not crush, chew, or break an extended-release tablet. Swallow it whole.   Do not swallow the orally disintegrating tablet whole. Allow it to dissolve in your mouth without chewing. If desired, you may drink liquid to help swallow the dissolved tablet. Measure liquid melatonin carefully. Use the dosing syringe provided, or use a medicine dose-measuring device (not a kitchen spoon). Call your doctor if the condition you are treating with melatonin does not improve, or if it gets worse while using this product. Store at room temperature away from moisture and heat. What happens if I miss a dose? Since melatonin is used when needed, you may not be on a dosing schedule. Skip any missed dose if it's almost time for your next dose. Do not use two doses at one time. What happens if I overdose? Seek emergency medical attention or call the Poison Help line at 1-167.425.2172. What should I avoid while taking melatonin? Melatonin may impair your thinking or reactions. Avoid driving or operating machinery for a least 4 hours after taking melatonin. This product may also affect your sleep-wake cycle for several days if you are traveling through many different time zones. Avoid using melatonin with other herbal/health supplements. Melatonin and many other herbal products can increase your risk of bleeding, seizures, or low blood pressure. Using certain products together can increase these risks. Avoid coffee, tea, cola, energy drinks, or other products that contain caffeine. What are the possible side effects of melatonin? Get emergency medical help if you have signs of an allergic reaction: hives; difficult breathing; swelling of your face, lips, tongue, or throat. Although not all side effects are known, melatonin is thought to be possibly safe when taken for a short period of time (up to 2 years in some people). Common side effects may include:  daytime drowsiness;  depressed mood, feeling irritable;  stomach pain;  headache; or  dizziness.   This is not a complete list of side effects and others may occur. Call your doctor for medical advice about side effects. You may report side effects to FDA at 2-849-FDA-8292. What other drugs will affect melatonin? Using melatonin with other drugs that make you drowsy can worsen this effect. Ask your doctor before using opioid medication, a sleeping pill, a muscle relaxer, medicine for anxiety or seizures, or herbal/health supplements may also cause drowsiness (tryptophan, [place] poppy, chamomile, gotu noe, kava, Mika's wort, skullcap, valerian, and others). Do not take melatonin without medical advice if you are using any of the following medications:  an antibiotic;  aspirin or acetaminophen (Tylenol);  birth control pills;  insulin or oral diabetes medicine;  narcotic pain medicine;  stomach medicine --lansoprazole (Prevacid), omeprazole (Prilosec), ondansetron (Zofran); ADHD medication- -methylphenidate, Adderall, Ritalin, and others;  heart or blood pressure medicine --mexiletine, propranolol, verapamil;  medicine to treat or prevent blood clots --clopidogrel (Plavix), warfarin (Coumadin, Jantoven);  NSAIDs (nonsteroidal anti-inflammatory drugs) --ibuprofen (Advil, Motrin), naproxen (Aleve), celecoxib, diclofenac, indomethacin, meloxicam, and others; or  steroid medicine --prednisone, and others. This list is not complete. Other drugs may affect melatonin, including prescription and over-the-counter medicines, vitamins, and herbal products. Not all possible drug interactions are listed here. Where can I get more information? Your doctor, pharmacist, or health care provider may have more information about melatonin. Remember, keep this and all other medicines out of the reach of children, never share your medicines with others, and use this medication only for the indication prescribed.    Every effort has been made to ensure that the information provided by Leyda Nicolas Dr is accurate, up-to-date, and complete, but no guarantee is made to that effect. Drug information contained herein may be time sensitive. [employer] information has been compiled for use by healthcare practitioners and consumers in the [place] and therefore [employer] does not warrant that uses outside of the [place] are appropriate, unless specifically indicated otherwise. [facility]'s drug information does not endorse drugs, diagnose patients or recommend therapy. [facility] drug information is an informational resource designed to assist licensed healthcare practitioners in caring for their patients and/or to serve consumers viewing this service as a supplement to, and not a substitute for, the expertise, skill, knowledge and judgment of healthcare practitioners. The absence of a warning for a given drug or drug combination in no way should be construed to indicate that the drug or drug combination is safe, effective or appropriate for any given patient. [facility] does not assume any responsibility for any aspect of healthcare administered with the aid of information [facility] provides. The information contained herein is not intended to cover all possible uses, directions, precautions, warnings, drug interactions, allergic reactions, or adverse effects. If you have questions about the drugs you are taking, check with your doctor, nurse or pharmacist.  Copyright [id number] [address] [place] [address] Dr TUCKER. Version: 4.01. Revision date: 3/29/2022. Care instructions adapted under license by [facility] ([facility]). If you have questions about a medical condition or this instruction, always ask your healthcare professional. [address]  any warranty or liability for your use of this information. hydroxyzine  Pronunciation:  maxwell DROX ee zeen  Brand:  Vistaril  What is the most important information I should know about hydroxyzine? You should not use hydroxyzine if you are pregnant, especially during the first or second trimester.    Hydroxyzine can cause a serious heart problem, especially if you use certain medicines at the same time. Tell your doctor about all your current medicines and any you start or stop using. What is hydroxyzine? Hydroxyzine reduces activity in the central nervous system. It also acts as an antihistamine that reduces the effects of natural chemical histamine in the body. Histamine can produce symptoms of itching, or hives on the skin. Hydroxyzine is used as a sedative to treat anxiety and tension. It is also used together with other medications given during and after general anesthesia. Hydroxyzine is also used to treat allergic skin reactions such as hives or contact dermatitis. Hydroxyzine may also be used for purposes not listed in this medication guide. What should I discuss with my healthcare provider before taking hydroxyzine? You should not use hydroxyzine if you are allergic to it, or if:  you have long QT syndrome;  you are allergic to cetirizine (Zyrtec) or levocetirizine (Xyzal); or  you are in the first trimester of pregnancy. You should not use hydroxyzine if you are pregnant, especially during the first or second trimester. Hydroxyzine could harm the unborn baby or cause birth defects. Use effective birth control to prevent pregnancy while you are using this medicine. To make sure hydroxyzine is safe for you, tell your doctor if you have:  blockage in your digestive tract (stomach or intestines);  bladder obstruction or other urination problems;  glaucoma;  heart disease, slow heartbeats;  personal or family history of long QT syndrome;  an electrolyte imbalance (such as high or low levels of potassium in your blood);  if you have recently had a heart attack. It is not known whether hydroxyzine passes into breast milk or if it could harm a nursing baby. You should not breast-feed while using this medicine. Do not give this medicine to a child without medical advice. How should I take hydroxyzine?   Follow all directions on your prescription label. Your doctor may occasionally change your dose. Do not use this medicine in larger or smaller amounts or for longer than recommended. Shake the oral suspension (liquid) well just before you measure a dose. Measure liquid medicine with the dosing syringe provided, or with a special dose-measuring spoon or medicine cup. If you do not have a dose-measuring device, ask your pharmacist for one. Hydroxyzine is for short-term use only. You should not take this medicine for longer than 4 months. Call your doctor if your anxiety symptoms do not improve, or if they get worse. Store at room temperature away from moisture and heat. What happens if I miss a dose? Take the missed dose as soon as you remember. Skip the missed dose if it is almost time for your next scheduled dose. Do not take extra medicine to make up the missed dose. What happens if I overdose? Seek emergency medical attention or call the Poison Help line at 1-877.579.3228. Overdose symptoms may include severe drowsiness, nausea, vomiting, uncontrolled muscle movements, or seizure (convulsions). What should I avoid while taking hydroxyzine? This medicine may impair your thinking or reactions. Be careful if you drive or do anything that requires you to be alert. Drinking alcohol with this medicine can cause side effects. What are the possible side effects of hydroxyzine? Get emergency medical help if you have signs of an allergic reaction:  hives; difficult breathing; swelling of your face, lips, tongue, or throat. In rare cases, hydroxyzine may cause a severe skin reaction. Stop taking this medicine and call your doctor right away if you have sudden skin redness or a rash that spreads and causes white or yellow pustules, blistering, or peeling. Stop using hydroxyzine and call your doctor at once if you have:  fast or pounding heartbeats;  headache with chest pain;  severe dizziness, fainting; or  a seizure (convulsions).   Side effects such as drowsiness and confusion may be more likely in older adults. Common side effects may include:  drowsiness;  headache;  dry mouth; or  skin rash. This is not a complete list of side effects and others may occur. Call your doctor for medical advice about side effects. You may report side effects to FDA at 4-674-IFX-0433. What other drugs will affect hydroxyzine? Taking this medicine with other drugs that make you sleepy can worsen this effect. Ask your doctor before taking hydroxyzine with a sleeping pill, narcotic pain medicine, muscle relaxer, or medicine for anxiety, depression, or seizures. Hydroxyzine can cause a serious heart problem, especially if you use certain medicines at the same time, including antibiotics, antidepressants, heart rhythm medicine, antipsychotic medicines, and medicines to treat cancer, malaria, HIV or AIDS. Tell your doctor about all medicines you use, and those you start or stop using during your treatment with hydroxyzine. Other drugs may interact with hydroxyzine, including prescription and over-the-counter medicines, vitamins, and herbal products. Not all possible interactions are listed here. Tell each of your health care providers about all medicines you use now and any medicine you start or stop using. Where can I get more information? Your pharmacist can provide more information about hydroxyzine. Remember, keep this and all other medicines out of the reach of children, never share your medicines with others, and use this medication only for the indication prescribed. Every effort has been made to ensure that the information provided by Leyda Nicolas Dr is accurate, up-to-date, and complete, but no guarantee is made to that effect. Drug information contained herein may be time sensitive.  [facility] information has been compiled for use by healthcare practitioners and consumers in the [place] and therefore [facility] does not warrant that uses outside of the [place] are appropriate, unless specifically indicated otherwise. [facility] drug information does not endorse drugs, diagnose patients or recommend therapy. [facility]'s drug information is an informational resource designed to assist licensed healthcare practitioners in caring for their patients and/or to serve consumers viewing this service as a supplement to, and not a substitute for, the expertise, skill, knowledge and judgment of healthcare practitioners. The absence of a warning for a given drug or drug combination in no way should be construed to indicate that the drug or drug combination is safe, effective or appropriate for any given patient. [facility] does not assume any responsibility for any aspect of healthcare administered with the aid of information [facility] provides. The information contained herein is not intended to cover all possible uses, directions, precautions, warnings, drug interactions, allergic reactions, or adverse effects. If you have questions about the drugs you are taking, check with your doctor, nurse or pharmacist.  Copyright [id number] [address] [place] [address] Dr TUCKER. Version: 8.01. Revision date: 3/15/2017. Care instructions adapted under license by [facility] ([facility]). If you have questions about a medical condition or this instruction, always ask your healthcare professional. [place] [address] any warranty or liability for your use of this information. capsaicin and diclofenac (topical)  Pronunciation:  jaiden SAY i sin and dye JOSEOE fen ak TOP ik al  Brand:  DermacinRx Lexitral PharmaPak, Diclotral Cali  What is the most important information I should know about capsaicin and diclofenac? Diclofenac can increase your risk of fatal heart attack or stroke. Do not use this medicine just before or after heart bypass surgery (coronary artery bypass graft, or CABG).  Diclofenac may also cause stomach or intestinal bleeding, which can be fatal.  What is capsaicin and diclofenac? Capsaicin is the active ingredient in chili peppers that makes them hot. Capsaicin is used in medicated creams and lotions to relieve muscle or joint pain. Diclofenac is a nonsteroidal anti-inflammatory drug (NSAID). Capsaicin and diclofenac topical (for the skin) is a combination medicine used to treat joint pain, stiffness, and swelling caused by osteoarthritis of the knees. This medicine may not be effective in treating arthritis pain elsewhere in the body. Capsaicin and diclofenac may also be used for purposes not listed in this medication guide. What should I discuss with my healthcare provider before using capsaicin and diclofenac? Diclofenac can increase your risk of fatal heart attack or stroke, even if you don't have any risk factors. Do not use this medicine just before or after heart bypass surgery (coronary artery bypass graft, or CABG). Diclofenac may also cause stomach or intestinal bleeding, which can be fatal. These conditions can occur without warning while you are using capsaicin and diclofenac, especially in older adults. You should not use this medicine if you are allergic to capsaicin or diclofenac, or if you have ever had an asthma attack or severe allergic reaction after taking aspirin or an NSAID. Tell your doctor if you have ever had:  heart disease, high blood pressure, high cholesterol, diabetes, or if you smoke;  a heart attack, stroke, or blood clot;  stomach ulcers or bleeding;  asthma;  liver or kidney disease; or  fluid retention. If you are pregnant, you should not use capsaicin and diclofenac unless your doctor tells you to. Using an NSAID during the last 20 weeks of pregnancy can cause serious heart or kidney problems in the unborn baby and possible complications with your pregnancy. It may not be safe to breastfeed while using this medicine. Ask your doctor about any risk. This medicine is not approved for use by anyone younger than 25years old.   How Skip any missed dose if it's almost time for your next dose. Do not use two doses at one time. What happens if I overdose? Seek emergency medical attention or call the Poison Help line at 1-828.221.1596, especially if anyone has accidentally swallowed it. What should I avoid while using capsaicin and diclofenac? Avoid exposing treated skin to heat, sunlight, or tanning beds. Avoid taking a bath or shower within 30 minutes before or after you apply this medicine to your skin. Also avoid vigorous exercise. Warm water or perspiration can increase the burning sensation caused by capsaicin. Avoid getting this medicine in your eyes, nose, or mouth. Avoid drinking alcohol. It may increase your risk of stomach bleeding. Ask a doctor or pharmacist before using other medicines for pain, fever, swelling, or cold/flu symptoms. They may contain ingredients similar to diclofenac (such as aspirin, ibuprofen, ketoprofen, or naproxen). What are the possible side effects of capsaicin and diclofenac? Get emergency medical help if you have signs of an allergic reaction (sneezing, runny or stuffy nose, hives, wheezing or trouble breathing, swelling in your face or throat) or a severe skin reaction (fever, sore throat, burning eyes, skin pain, red or purple skin rash with blistering and peeling). Serious side effects are unlikely when capsaicin and diclofenac is applied to the skin, but can occur if the medicine is absorbed into your bloodstream.  Get emergency medical help if you have signs of a heart attack or stroke: chest pain spreading to your jaw or shoulder, sudden numbness or weakness on one side of the body, slurred speech, feeling short of breath.   Stop using this medicine and call your doctor at once if you have:  a skin rash, no matter how mild;  shortness of breath (even with mild exertion), swelling, rapid weight gain;  signs of stomach bleeding --bloody or tarry stools, coughing up blood or vomit that looks like coffee grounds;  liver problems --loss of appetite, stomach pain (upper right side), tiredness, itching, dark urine, shell-colored stools, jaundice (yellowing of the skin or eyes);  kidney problems --little or no urination, swelling in your feet or ankles, feeling tired or short of breath; or  low red blood cells (anemia) --pale skin, unusual tiredness, feeling light-headed or short of breath, cold hands and feet. Common side effects may include:  skin rash, itching, redness, blistering, tingling, or other irritation where the medicine was applied; or  dryness or hardening of treated skin. This is not a complete list of side effects and others may occur. Call your doctor for medical advice about side effects. You may report side effects to FDA at 7-132-FDA-7318. What other drugs will affect capsaicin and diclofenac? Tell your doctor about all your other medicines, especially:  cyclosporine;  lithium;  methotrexate;  a blood thinner (warfarin, Coumadin, Jantoven);  heart or blood pressure medication, including a diuretic or \"water pill\"; or  steroid medicine (prednisone and others). This list is not complete. Other drugs may affect capsaicin and diclofenac, including prescription and over-the-counter medicines, vitamins, and herbal products. Not all possible drug interactions are listed here. Where can I get more information? Your pharmacist can provide more information about capsaicin and diclofenac. Remember, keep this and all other medicines out of the reach of children, never share your medicines with others, and use this medication only for the indication prescribed. Every effort has been made to ensure that the information provided by Leyda Nicolas Dr is accurate, up-to-date, and complete, but no guarantee is made to that effect. Drug information contained herein may be time sensitive.  [facility] information has been compiled for use by healthcare practitioners and consumers in the [facility] light headedness, fainting, sweating, nausea or    Shortness of breath. 3. Upper abdominal pressure or discomfort. 4. Lower chest pain, back pain, unusual fatigue, shortness of breath, nausea   Or dizziness. General Information:   Questions regarding your bill: Call HELP program [phone]     Suicide Hotline (rescue crisis) (384) [phone]      Learning About Coronavirus [phone])  What is coronavirus (COVID-19)? COVID-19 is a disease caused by a type of coronavirus. This illness was first found in December 2019. It has since spread worldwide. Coronaviruses are a large group of viruses. They cause the common cold. They also cause more serious illnesses like [place] respiratory syndrome (MERS) and severe acute respiratory syndrome (SARS). COVID-19 is caused by a novel coronavirus. That means it's a new type that has not been seen in people before. What are the symptoms? COVID-19 symptoms may include:  Fever. Cough. Trouble breathing. Chills or repeated shaking with chills. Muscle and body aches. Headache. Sore throat. New loss of taste or smell. Vomiting. Diarrhea. In severe cases, COVID-19 can cause pneumonia and make it hard to breathe without help from a machine. It can cause death. How is it diagnosed? COVID-19 is diagnosed with a viral test. This may also be called a PCR test or antigen test. It looks for evidence of the virus in your breathing passages or lungs (respiratory system). The test is most often done on a sample from the nose, throat, or lungs. It's sometimes done on a sample of saliva. One way a sample is collected is by putting a long swab into the back of your nose. If you have questions about COVID-19 testing, ask your doctor or go to cdc.gov to use the COVID-19 Viral Testing Tool. How is it treated? Mild cases of COVID-19 can be treated at home.  Serious cases need treatment in the hospital. Treatment may include medicines, plus breathing support such as oxygen therapy or a ventilator. Some people may be placed on their belly to help their oxygen levels. Treatments that may help people who have COVID-19 include:  Antiviral medicines. These medicines treat viral infections. Immune-based therapy. These medicines help the immune system fight COVID-19. Examples include monoclonal antibodies. Blood thinners. These medicines help prevent blood clots. People with severe illness are at risk for blood clots. How can you protect yourself and others? Stay up to date on your COVID-19 vaccines. Avoid sick people, and stay away from others if you are sick. Stay at least 6 feet away from other people. Avoid crowds, especially inside. Get tested for COVID-19 before you have an indoor visit with people who don't live with you. Improve the airflow when you spend time indoors with people who don't live with you. If you can, open windows and doors. Or you can use a fan to blow air away from people and out a window. Cover your mouth with a tissue when you cough or sneeze. Wash your hands often, especially after you cough or sneeze. Use soap and water, and scrub for at least 20 seconds. If soap and water aren't available, use an alcohol-based hand . Avoid touching your mouth, nose, and eyes. Check the CDC website at cdc.gov for the most current information on how to protect yourself. And if you have questions, ask your doctor or go to cdc.gov to use the COVID-19 Quarantine and Isolation Calculator. Here are some other steps you may need to take. If you are not up to date on your COVID-19 vaccines:  Wear a mask with the best fit, protection, and comfort for you. A mask can protect you even when others aren't wearing one. This might be especially important if you:  Have certain health conditions. Live with someone who has a compromised immune system. Live with someone who is not up to date on their COVID-19 vaccines.   If you have been exposed to the virus AND a separate bathroom. Clean and disinfect your home every day. Use household  and disinfectant wipes or sprays. Take special care to clean things that you touch with your hands. How can you self-isolate when you have COVID-19? If you have COVID-19, there are things you can do to help avoid spreading the virus to others. Stay home, and avoid contact with other people. Limit contact with people in your home. If possible, stay in a separate bedroom and use a separate bathroom. Wear a well-fitting mask when you are around other people. Avoid contact with pets and other animals. Cover your mouth and nose with a tissue when you cough or sneeze. Then throw it in the trash right away. Wash your hands often, especially after you cough or sneeze. Use soap and water, and scrub for at least 20 seconds. If soap and water aren't available, use an alcohol-based hand . Don't share personal household items. These include bedding, towels, cups and glasses, and eating utensils. [address] in the warmest water allowed for the fabric type, and dry it completely. It's okay to wash other people's laundry with yours. Clean and disinfect your home. Use household  and disinfectant wipes or sprays. If you have questions, visit cdc.gov to check the Quarantine and Isolation Calculator. When should you call for help? Call 911 anytime you think you may need emergency care. For example, call if you have life-threatening symptoms, such as: You have severe trouble breathing. (You can't talk at all.)     You have constant chest pain or pressure. You are severely dizzy or lightheaded. You are confused or can't think clearly. You have pale, gray, or blue-colored skin or lips. You pass out (lose consciousness) or are very hard to wake up. You have loss of balance or trouble walking. You have trouble seeing out of one or both eyes.      You have weakness or drooping on one side of the face.     You have weakness or numbness in an arm or a leg. You have trouble speaking. You have a severe headache. You have a seizure. Call your doctor now or seek immediate medical care if:    You have moderate trouble breathing. (You can't speak a full sentence.)     You are coughing up blood. You have signs of low blood pressure. These include feeling lightheaded; being too weak to stand; and having cold, pale, clammy skin. Watch closely for changes in your health, and be sure to contact your doctor if:    Your symptoms get worse. You are not getting better as expected. You have new or worse symptoms of anxiety, depression, nightmares, or flashbacks. Call before you go to the doctor's office. Follow their instructions. And wear a mask. Where can you learn more? Go to [email or web address] [email or web address].[email or web address] org and sign in to your [employer] account. Enter C008 in the [employer] box to learn more about \"Learning About Coronavirus (COVID-19). \"     If you do not have an account, please click on the \"Sign Up Now\" link. Current as of: July 28, 2022               Content Version: 13.4  © 9908-4933 [employer]. Care instructions adapted under license by [facility] ([facility]). If you have questions about a medical condition or this instruction, always ask your healthcare professional. [address] any warranty or liability for your use of this information. amitriptyline  Pronunciation:  a nancy TRIP cristin jalloh  Brand:  Armin  What is the most important information I should know about amitriptyline? You should not use this medicine if you have recently had a heart attack. Do not use amitriptyline if you have used an MAO inhibitor in the past 14 days, such as isocarboxazid, linezolid, methylene blue injection, phenelzine, rasagiline, selegiline, or tranylcypromine. Some young people have thoughts about suicide when first taking an antidepressant. Stay alert to changes in your mood or symptoms. Report any new or worsening symptoms to your doctor. What is amitriptyline? Amitriptyline is a tricyclic antidepressant that is used to treat symptoms of depression. Amitriptyline may also be used for purposes not listed in this medication guide. What should I discuss with my healthcare provider before taking amitriptyline? You should not use amitriptyline if you are allergic to it, or:  if you have recently had a heart attack. Do not use amitriptyline if you have used an MAO inhibitor in the past 14 days. A dangerous drug interaction could occur. MAO inhibitors include isocarboxazid, linezolid, methylene blue injection, phenelzine, rasagiline, selegiline, tranylcypromine, and others. Tell your doctor if you have used an \"SSRI\" antidepressant in the past 5 weeks, such as citalopram, escitalopram, fluoxetine (Prozac), fluvoxamine, paroxetine, sertraline (Zoloft), trazodone, or vilazodone. Tell your doctor if you have ever had:  bipolar disorder (manic-depression) or schizophrenia;  mental illness or psychosis;  liver disease;  heart disease;  a heart attack, stroke, or seizures;  diabetes (amitriptyline may raise or lower blood sugar);  glaucoma; or  problems with urination. Some young people have thoughts about suicide when first taking an antidepressant. Your doctor should check your progress at regular visits. Your family or other caregivers should also be alert to changes in your mood or symptoms. Tell your doctor if you are pregnant or breastfeeding. Amitriptyline is not approved for use by anyone younger than 15years old. How should I take amitriptyline? Follow all directions on your prescription label and read all medication guides or instruction sheets. Your doctor may occasionally change your dose. Use the medicine exactly as directed. It may take up to 4 weeks before your symptoms improve.  Keep using the medication as directed and tell your doctor if your symptoms do not improve. If you need surgery, tell your surgeon you currently use this medicine. You may need to stop for a short time. Do not stop using amitriptyline suddenly, or you could have unpleasant withdrawal symptoms. Ask your doctor how to safely stop using amitriptyline. Store at room temperature away from moisture and heat. Keep the bottle tightly closed when not in use. What happens if I miss a dose? Take the medicine as soon as you can, but skip the missed dose if it is almost time for your next dose. Do not take two doses at one time. What happens if I overdose? Seek emergency medical attention or call the Poison Help line at 1-800.918.1652. An overdose of amitriptyline can be fatal.  Overdose symptoms may include irregular heart rhythm, feeling like you might pass out, seizures, or coma. What should I avoid while taking amitriptyline? Do not drink alcohol. Dangerous side effects or death can occur when alcohol is combined with amitriptyline. Avoid driving or hazardous activity until you know how this medicine will affect you. Your reactions could be impaired. Avoid exposure to sunlight or tanning beds. Amitriptyline can make you sunburn more easily. Wear protective clothing and use sunscreen (SPF 30 or higher) when you are outdoors. What are the possible side effects of amitriptyline? Get emergency medical help if you have signs of an allergic reaction: hives; difficulty breathing; swelling of your face, lips, tongue, or throat. Report any new or worsening symptoms to your doctor, such as: mood or behavior changes, anxiety, panic attacks, trouble sleeping, or if you feel impulsive, irritable, agitated, hostile, aggressive, restless, hyperactive (mentally or physically), more depressed, or have thoughts about suicide or hurting yourself.   Call your doctor at once if you have:  signs of a blood clot --sudden numbness or weakness, problems with vision or speech, swelling or redness in an arm or leg;  unusual thoughts or behavior;  a light-headed feeling, like you might pass out;  chest pain or pressure, pain spreading to your jaw or shoulder, nausea, sweating;  pounding heartbeats or fluttering in your chest;  confusion, hallucinations;  a seizure (convulsions);  painful or difficult urination;  severe constipation;  easy bruising, unusual bleeding; or  fever, chills, sore throat, mouth sores. Common side effects may include:  constipation, diarrhea;  nausea, vomiting, upset stomach;  mouth pain, unusual taste, black tongue;  appetite or weight changes;  urinating less than usual;  itching or rash;  breast swelling (in men or women); or  decreased sex drive, impotence, or difficulty having an orgasm. This is not a complete list of side effects and others may occur. Call your doctor for medical advice about side effects. You may report side effects to FDA at 7-134-FDA-4339. What other drugs will affect amitriptyline? Taking this medicine with other drugs that make you sleepy can worsen this effect. Ask your doctor before taking amitriptyline with a sleeping pill, narcotic pain medicine, muscle relaxer, or medicine for anxiety, depression, or seizures. Sometimes it is not safe to use certain medications at the same time. Some drugs can affect your blood levels of other drugs you take, which may increase side effects or make the medications less effective. Tell your doctor about all your other medicines, especially:  other antidepressants;  medicine to treat depression, anxiety, mood disorders, or mental illness;  cold or allergy medicine (Benadryl and others);  medicine to treat Parkinson's disease;  medicine to treat stomach problems, motion sickness, or irritable bowel syndrome;  medicine to treat overactive bladder; or  bronchodilator asthma medication. This list is not complete.  Other drugs may affect amitriptyline, including prescription and over-the-counter medicines, vitamins, and herbal products. Not all possible drug interactions are listed here. Where can I get more information? Your pharmacist can provide more information about amitriptyline. Remember, keep this and all other medicines out of the reach of children, never share your medicines with others, and use this medication only for the indication prescribed. Every effort has been made to ensure that the information provided by [address] is accurate, up-to-date, and complete, but no guarantee is made to that effect. Drug information contained herein may be time sensitive. [facility] information has been compiled for use by healthcare practitioners and consumers in the [place] and therefore [facility] does not warrant that uses outside of the [place] are appropriate, unless specifically indicated otherwise. [facility]'s drug information does not endorse drugs, diagnose patients or recommend therapy. [facility]'s drug information is an informational resource designed to assist licensed healthcare practitioners in caring for their patients and/or to serve consumers viewing this service as a supplement to, and not a substitute for, the expertise, skill, knowledge and judgment of healthcare practitioners. The absence of a warning for a given drug or drug combination in no way should be construed to indicate that the drug or drug combination is safe, effective or appropriate for any given patient. [facility] does not assume any responsibility for any aspect of healthcare administered with the aid of information [facility] provides. The information contained herein is not intended to cover all possible uses, directions, precautions, warnings, drug interactions, allergic reactions, or adverse effects. If you have questions about the drugs you are taking, check with your doctor, nurse or pharmacist.  Copyright 5115-1935 [address] [place] [address] Dr TUCKER. Version: 10.01. Revision date: 6/11/2020.   Care instructions adapted under license by [facility] ([facility]). If you have questions about a medical condition or this instruction, always ask your healthcare professional. [place] [address] any warranty or liability for your use of this information.

## 2022-09-22 NOTE — GROUP NOTE
Group Therapy Note    Date: 9/22/2022    Group Start Time: 0900  Group End Time: 0930  Group Topic: Group Therapy    NORAH Harp        Group Therapy Note    Attendees: 9/17       Patient's Goal:  go with the flow of the day    Notes:  morning goal group session    Status After Intervention:  Improved    Participation Level:  Active Listener and Interactive    Participation Quality: Appropriate, Attentive, and Sharing      Speech:  normal      Thought Process/Content: Logical      Affective Functioning: Congruent      Mood: euthymic      Level of consciousness:  Alert, Oriented x4, and Attentive      Response to Learning: Capable of insight, Able to change behavior, and Progressing to goal      Endings: None Reported    Modes of Intervention: Support and Socialization      Discipline Responsible: Behavorial Health Tech      Signature:  Hortensia Babinski

## 2022-09-22 NOTE — GROUP NOTE
Group Therapy Note    Date: 9/22/2022    Group Start Time: 1000  Group End Time: 1492  Group Topic: Psychotherapy    [facility]    LUIS Vora        Group Therapy Note    Attendees: 6/17       Patient's Goal:  expression of feeling     Notes:  therapeutic worksheet discussed     Status After Intervention:  Improved    Participation Level:  Active Listener    Participation Quality: Appropriate      Speech:  normal      Thought Process/Content: Logical      Affective Functioning: Congruent      Mood: euthymic      Level of consciousness:  Alert and Oriented x4      Response to Learning: Able to verbalize current knowledge/experience and Able to verbalize/acknowledge new learning      Endings: None Reported    Modes of Intervention: Education and Support      Discipline Responsible: /Counselor      Signature:  LUIS Vora

## 2022-09-22 NOTE — DISCHARGE INSTR - DIET

## 2022-09-23 VITALS
HEART RATE: 71 BPM | SYSTOLIC BLOOD PRESSURE: 123 MMHG | OXYGEN SATURATION: 99 % | HEIGHT: 66 IN | BODY MASS INDEX: 29.89 KG/M2 | WEIGHT: 186 LBS | RESPIRATION RATE: 14 BRPM | TEMPERATURE: 97 F | DIASTOLIC BLOOD PRESSURE: 83 MMHG

## 2022-09-23 LAB
INR BLD: 2.4
PROTHROMBIN TIME: 25.8 SEC (ref 11.8–14.6)

## 2022-09-23 PROCEDURE — 6370000000 HC RX 637 (ALT 250 FOR IP): Performed by: PSYCHIATRY & NEUROLOGY

## 2022-09-23 PROCEDURE — 6370000000 HC RX 637 (ALT 250 FOR IP)

## 2022-09-23 PROCEDURE — 99239 HOSP IP/OBS DSCHRG MGMT >30: CPT | Performed by: PSYCHIATRY & NEUROLOGY

## 2022-09-23 PROCEDURE — 85610 PROTHROMBIN TIME: CPT

## 2022-09-23 PROCEDURE — 36415 COLL VENOUS BLD VENIPUNCTURE: CPT

## 2022-09-23 PROCEDURE — 6370000000 HC RX 637 (ALT 250 FOR IP): Performed by: INTERNAL MEDICINE

## 2022-09-23 RX ORDER — WARFARIN SODIUM 2 MG/1
4 TABLET ORAL
Status: DISCONTINUED | OUTPATIENT
Start: 2022-09-23 | End: 2022-09-23 | Stop reason: HOSPADM

## 2022-09-23 RX ADMIN — FUROSEMIDE 20 MG: 20 TABLET ORAL at 08:43

## 2022-09-23 RX ADMIN — ASPIRIN 81 MG 81 MG: 81 TABLET ORAL at 08:45

## 2022-09-23 RX ADMIN — PANTOPRAZOLE SODIUM 40 MG: 40 TABLET, DELAYED RELEASE ORAL at 08:45

## 2022-09-23 RX ADMIN — HYDROXYZINE HYDROCHLORIDE 50 MG: 50 TABLET ORAL at 05:59

## 2022-09-23 RX ADMIN — SENNOSIDES AND DOCUSATE SODIUM 1 TABLET: 50; 8.6 TABLET ORAL at 08:44

## 2022-09-23 RX ADMIN — DOCUSATE SODIUM 200 MG: 100 CAPSULE, LIQUID FILLED ORAL at 08:42

## 2022-09-23 RX ADMIN — FERROUS SULFATE TAB 325 MG (65 MG ELEMENTAL FE) 325 MG: 325 (65 FE) TAB at 08:45

## 2022-09-23 RX ADMIN — Medication 2000 UNITS: at 08:44

## 2022-09-23 RX ADMIN — CYANOCOBALAMIN TAB 1000 MCG 1000 MCG: 1000 TAB at 08:45

## 2022-09-23 RX ADMIN — OXCARBAZEPINE 600 MG: 600 TABLET, FILM COATED ORAL at 08:43

## 2022-09-23 RX ADMIN — MIDODRINE HYDROCHLORIDE 10 MG: 10 TABLET ORAL at 08:42

## 2022-09-23 RX ADMIN — GABAPENTIN 900 MG: 300 CAPSULE ORAL at 08:44

## 2022-09-23 RX ADMIN — TAMSULOSIN HYDROCHLORIDE 0.4 MG: 0.4 CAPSULE ORAL at 08:43

## 2022-09-23 RX ADMIN — RISPERIDONE 0.5 MG: 0.5 TABLET ORAL at 08:43

## 2022-09-23 RX ADMIN — Medication 200 MG: at 08:42

## 2022-09-23 RX ADMIN — FOLIC ACID 1 MG: 1 TABLET ORAL at 08:43

## 2022-09-23 RX ADMIN — CETIRIZINE HYDROCHLORIDE 10 MG: 10 TABLET, FILM COATED ORAL at 08:43

## 2022-09-23 NOTE — CARE COORDINATION
Name: Mariam Peralta    : 1960    Discharge Date: 22    Primary Auth/Cert #: [id number]    Destination: Patient transported to [facility]  and home via Mariana Brown and Charlotte Gomez thru [address] and [employer].     Discharge Medications:      Medication List        START taking these medications      amitriptyline 25 MG tablet  Commonly known as: ELAVIL  Take 1 tablet by mouth nightly Patient reports home supply  Notes to patient:  Nerve pain medication     diclofenac sodium 1 % Gel  Commonly known as: VOLTAREN  Apply 2 g topically 4 times daily as needed for Pain  Notes to patient: Pain medication     hydrOXYzine HCl 50 MG tablet  Commonly known as: ATARAX  Take 1 tablet by mouth 3 times daily as needed for Anxiety  Notes to patient: anxiety     melatonin 3 MG Tabs tablet  Take 0.5 tablets by mouth at bedtime  Notes to patient: Sleep aid     polyethylene glycol 17 g packet  Commonly known as: GLYCOLAX  Take 17 g by mouth daily as needed for Constipation  Notes to patient: Constipation     risperiDONE 0.5 MG tablet  Commonly known as: RISPERDAL  Take 1 tablet by mouth 2 times daily  Notes to patient: Mood            CONTINUE taking these medications      acetaminophen 500 MG tablet  Commonly known as: TYLENOL  Notes to patient: Pain     alendronate 70 MG tablet  Commonly known as: FOSAMAX  Notes to patient: prevent osteoporosis     aspirin 81 MG chewable tablet  Take 1 tablet by mouth daily  Notes to patient: anticoagulant     atorvastatin 20 MG tablet  Commonly known as: LIPITOR  Take 1 tablet by mouth nightly  Notes to patient: treat high cholesterol and triglyceride level     calcium citrate 250 MG Tabs tablet  Commonly known as: CALCITRATE  Notes to patient: dietary supplements     cetirizine 10 MG tablet  Commonly known as: ZYRTEC  Notes to patient: hay fever and allergy symptoms     docusate sodium 100 MG capsule  Commonly known as: COLACE  Notes to patient: constipation     FeroSul 325 (65 Fe) MG [facility].   Danna Whitman [address]  [phone]    Follow up on 9/28/2022  Pt has an appointment at 12:00 pm with Dr. Susi Seals

## 2022-09-23 NOTE — PLAN OF CARE
Problem: Pain  Goal: Verbalizes/displays adequate comfort level or baseline comfort level  Outcome: Progressing     Problem: Self Harm/Suicidality  Goal: Will have no self-injury during hospital stay  Outcome: Progressing     Problem: Depression  Goal: Will be euthymic at discharge  Outcome: Progressing     Patient denies suicidal and homicidal ideation at this time. Patient was out in the dayroom for a short time this evening but aloof of peers. Patient was accepting of tylenol for pain as prescribed by physician. Patient is free of self harm at this time. Patient agrees to seek out staff if thoughts to harm self arise. Staff will provide support and reassurance as needed. Safety checks maintained every 15 minutes.

## 2022-09-23 NOTE — BH NOTE
As needed medication follow-up note:    As needed medication of Atarax 50 mg po was effective as evidence by patient stating they are feeling better. Patient denies medication side effects. Will continue to monitor and provide support as needed.

## 2022-09-23 NOTE — BH NOTE
Patient is complaining of anxiety at this time. Stating that they feel restless and is having trouble calming down. Medication was given as prescribed for increased anxiety.

## 2022-09-23 NOTE — PROGRESS NOTES
Pharmacy Note  Warfarin Consult follow-up      Recent Labs     09/21/22  0743 09/22/22  0716 09/23/22  0749   INR 2.1 2.5 2.4     No results for input(s): HGB, HCT, PLT in the last 72 hours. Significant Drug-Drug Interactions:  New warfarin drug-drug interactions: none  Discontinued drug-drug interactions: none  Current warfarin drug-drug interactions: trazodone, amitriptyline, aspirin, lipitor      Date             INR        Dose given previous day  Dose scheduled for today  9/23/2022            2.4       4 mg          4 mg        Notes:                   INR therapeutic at 2.4. Give 4 mg dose today (home regimen resumed 09/20). Daily PT/INR while inpatient.       Birdie Severs, PharmD, [place]  PGY-1 Pharmacy Resident  9/23/2022 10:14 AM 10-Oct-2018 20:10

## 2022-09-23 NOTE — BH NOTE
resources  ( ) Referral for counseling faxed to [facility]                                                                                                                   ( ) Patient refused counseling  ( ) Patient refused referral  ( ) Patient refused prescription upon discharge  ( x) Patient has not smoked in the last 30 days    Metabolic Screening:    Lab Results   Component Value Date    LABA1C 5.4 02/17/2020       Lab Results   Component Value Date    CHOL 126 02/17/2020    CHOL 193 08/02/2018    CHOL 215 (H) 05/22/2018    CHOL 170 02/20/2018    CHOL 142 06/27/2016    CHOL 159 02/21/2013     Lab Results   Component Value Date    TRIG 116 02/17/2020    TRIG 132 08/02/2018    TRIG 162 (H) 05/22/2018    TRIG 158 (H) 02/20/2018    TRIG 204 (H) 06/27/2016    TRIG 103 02/21/2013     Lab Results   Component Value Date    HDL 28 (L) 02/01/2021    HDL 24 (L) 02/17/2020    HDL 28 (L) 08/02/2018    HDL 29 (L) 05/22/2018    HDL 30 (L) 02/20/2018    HDL 27 (L) 06/27/2016    HDL 36 (L) 02/21/2013     No components found for: LDLCAL  No results found for: LABVLDL    Patient transported to [facility]  and home via Yara Martinez and Arlette Eubanks thru [address] and [employer] Goldberg RN

## 2022-09-23 NOTE — GROUP NOTE
Group Therapy Note    Date: 9/23/2022    Group Start Time: 0930  Group End Time: 2197  Group Topic: Group Therapy    NORAH Harp        Group Therapy Note    Attendees: 7/17           Patient's Goal:  sit in the sun after he gets home    Notes:  morning goal group session    Status After Intervention:  Improved    Participation Level:  Active Listener and Interactive    Participation Quality: Appropriate, Attentive, and Sharing      Speech:  normal      Thought Process/Content: Logical      Affective Functioning: Congruent      Mood: euthymic      Level of consciousness:  Alert, Oriented x4, and Attentive      Response to Learning: Capable of insight, Able to change behavior, and Progressing to goal      Endings: None Reported    Modes of Intervention: Support and Socialization      Discipline Responsible: Behavorial Health Tech      Signature:  Hortensia Babinski

## 2022-09-23 NOTE — GROUP NOTE
Group Therapy Note    Date: 9/22/2022    Group Start Time: 1958  Group End Time: 2025  Group Topic: Wrap-Up    NORAH Morgan        Group Therapy Note    Attendees: 7/16       Status After Intervention:  Improved    Participation Level:  Active Listener    Participation Quality: Appropriate      Speech:  normal      Thought Process/Content: Logical      Affective Functioning: Congruent      Mood: elevated      Level of consciousness:  Alert      Response to Learning: Able to verbalize current knowledge/experience      Endings: None Reported    Modes of Intervention: Support and Socialization      Discipline Responsible: Behavorial Health Tech      Signature:  Gómez Us

## 2022-09-24 ENCOUNTER — HOSPITAL ENCOUNTER (OUTPATIENT)
Age: 62
Setting detail: SPECIMEN
Discharge: HOME | DRG: 426 | End: 2022-09-24
Payer: COMMERCIAL

## 2022-09-24 ENCOUNTER — HOSPITAL ENCOUNTER (INPATIENT)
Age: 62
LOS: 5 days | Discharge: OTHER INSTITUTION | DRG: 426 | End: 2022-09-29
Attending: EMERGENCY MEDICINE
Payer: COMMERCIAL

## 2022-09-24 DIAGNOSIS — R74.01 ELEVATED TRANSAMINASE LEVEL: ICD-10-CM

## 2022-09-24 DIAGNOSIS — D47.2 MGUS (MONOCLONAL GAMMOPATHY OF UNKNOWN SIGNIFICANCE): ICD-10-CM

## 2022-09-24 DIAGNOSIS — E87.1 HYPONATREMIA: Primary | ICD-10-CM

## 2022-09-24 PROBLEM — E03.8 OTHER SPECIFIED HYPOTHYROIDISM: Chronic | Status: ACTIVE | Noted: 2022-05-23

## 2022-09-24 PROBLEM — E03.8 OTHER SPECIFIED HYPOTHYROIDISM: Status: ACTIVE | Noted: 2022-05-23

## 2022-09-24 PROBLEM — Z95.2 PRESENCE OF PROSTHETIC HEART VALVE: Chronic | Status: ACTIVE | Noted: 2022-04-04

## 2022-09-24 PROBLEM — Z95.2 S/P AVR (AORTIC VALVE REPLACEMENT): Chronic | Status: ACTIVE | Noted: 2017-05-25

## 2022-09-24 PROBLEM — G47.33 OSA ON CPAP: Status: ACTIVE | Noted: 2022-09-24

## 2022-09-24 PROBLEM — Z95.2 PRESENCE OF PROSTHETIC HEART VALVE: Status: ACTIVE | Noted: 2022-04-04

## 2022-09-24 PROBLEM — G25.81 RESTLESS LEG SYNDROME: Status: ACTIVE | Noted: 2020-08-23

## 2022-09-24 PROBLEM — I25.10 ATHSCL HEART DISEASE OF NATIVE CORONARY ARTERY W/O ANG PCTRS: Status: ACTIVE | Noted: 2022-05-09

## 2022-09-24 PROBLEM — Z99.89 OSA ON CPAP: Status: ACTIVE | Noted: 2022-09-24

## 2022-09-24 PROBLEM — E87.5 HYPERKALEMIA: Status: ACTIVE | Noted: 2022-09-24

## 2022-09-24 LAB
ABSOLUTE EOS #: 0 K/UL (ref 0–0.4)
ABSOLUTE IMMATURE GRANULOCYTE: 0.08 K/UL (ref 0–0.3)
ABSOLUTE LYMPH #: 0.4 K/UL (ref 1–4.8)
ABSOLUTE MONO #: 0.87 K/UL (ref 0.1–0.8)
ALBUMIN SERPL-MCNC: 3.7 G/DL (ref 3.5–5.2)
ALBUMIN SERPL-MCNC: 4.2 G/DL (ref 3.5–5.2)
ALBUMIN/GLOBULIN RATIO: 1.6 (ref 1–2.5)
ALP BLD-CCNC: 191 U/L (ref 40–129)
ALP BLD-CCNC: 201 U/L (ref 40–129)
ALT SERPL-CCNC: 249 U/L (ref 5–41)
ALT SERPL-CCNC: 303 U/L (ref 5–41)
ANION GAP SERPL CALCULATED.3IONS-SCNC: 11 MMOL/L (ref 9–17)
ANION GAP SERPL CALCULATED.3IONS-SCNC: 8 MMOL/L (ref 9–17)
AST SERPL-CCNC: 128 U/L
AST SERPL-CCNC: 92 U/L
BASOPHILS # BLD: 0 % (ref 0–2)
BASOPHILS ABSOLUTE: 0 K/UL (ref 0–0.2)
BILIRUB SERPL-MCNC: 0.2 MG/DL (ref 0.3–1.2)
BILIRUB SERPL-MCNC: 0.3 MG/DL (ref 0.3–1.2)
BUN BLDV-MCNC: 14 MG/DL (ref 8–23)
BUN BLDV-MCNC: 17 MG/DL (ref 8–23)
BUN/CREAT BLD: 19 (ref 9–20)
CALCIUM SERPL-MCNC: 8.9 MG/DL (ref 8.6–10.4)
CALCIUM SERPL-MCNC: 9.3 MG/DL (ref 8.6–10.4)
CHLORIDE BLD-SCNC: 89 MMOL/L (ref 98–107)
CHLORIDE BLD-SCNC: 93 MMOL/L (ref 98–107)
CO2: 21 MMOL/L (ref 20–31)
CO2: 26 MMOL/L (ref 20–31)
CREAT SERPL-MCNC: 0.89 MG/DL (ref 0.7–1.2)
CREAT SERPL-MCNC: 0.98 MG/DL (ref 0.7–1.2)
EOSINOPHILS RELATIVE PERCENT: 0 % (ref 1–4)
FREE KAPPA/LAMBDA RATIO: 5.01 (ref 0.26–1.65)
GFR AFRICAN AMERICAN: >60 ML/MIN
GFR AFRICAN AMERICAN: >60 ML/MIN
GFR NON-AFRICAN AMERICAN: >60 ML/MIN
GFR NON-AFRICAN AMERICAN: >60 ML/MIN
GFR SERPL CREATININE-BSD FRML MDRD: ABNORMAL ML/MIN/{1.73_M2}
GFR SERPL CREATININE-BSD FRML MDRD: ABNORMAL ML/MIN/{1.73_M2}
GLUCOSE BLD-MCNC: 108 MG/DL (ref 70–99)
GLUCOSE BLD-MCNC: 97 MG/DL (ref 70–99)
HCT VFR BLD CALC: 37.3 % (ref 40.7–50.3)
HEMOGLOBIN: 13.3 G/DL (ref 13–17)
IMMATURE GRANULOCYTES: 1 %
KAPPA FREE LIGHT CHAINS QNT: 9.56 MG/DL (ref 0.37–1.94)
LAMBDA FREE LIGHT CHAINS QNT: 1.91 MG/DL (ref 0.57–2.63)
LYMPHOCYTES # BLD: 5 % (ref 24–44)
MCH RBC QN AUTO: 31.5 PG (ref 25.2–33.5)
MCHC RBC AUTO-ENTMCNC: 35.7 G/DL (ref 28.4–34.8)
MCV RBC AUTO: 88.4 FL (ref 82.6–102.9)
MONOCYTES # BLD: 11 % (ref 1–7)
MORPHOLOGY: NORMAL
NRBC AUTOMATED: 0 PER 100 WBC
PDW BLD-RTO: 14.2 % (ref 11.8–14.4)
PLATELET # BLD: 187 K/UL (ref 138–453)
PMV BLD AUTO: 10.1 FL (ref 8.1–13.5)
POTASSIUM SERPL-SCNC: 4.4 MMOL/L (ref 3.7–5.3)
POTASSIUM SERPL-SCNC: 6.1 MMOL/L (ref 3.7–5.3)
RBC # BLD: 4.22 M/UL (ref 4.21–5.77)
SEG NEUTROPHILS: 83 % (ref 36–66)
SEGMENTED NEUTROPHILS ABSOLUTE COUNT: 6.55 K/UL (ref 1.8–7.7)
SODIUM BLD-SCNC: 123 MMOL/L (ref 135–144)
SODIUM BLD-SCNC: 125 MMOL/L (ref 135–144)
TOTAL PROTEIN: 6.5 G/DL (ref 6.4–8.3)
TOTAL PROTEIN: 6.8 G/DL (ref 6.4–8.3)
WBC # BLD: 7.9 K/UL (ref 3.5–11.3)

## 2022-09-24 PROCEDURE — 83521 IG LIGHT CHAINS FREE EACH: CPT

## 2022-09-24 PROCEDURE — 80074 ACUTE HEPATITIS PANEL: CPT

## 2022-09-24 PROCEDURE — 1200000000 HC SEMI PRIVATE

## 2022-09-24 PROCEDURE — 84165 PROTEIN E-PHORESIS SERUM: CPT

## 2022-09-24 PROCEDURE — 99222 1ST HOSP IP/OBS MODERATE 55: CPT | Performed by: NURSE PRACTITIONER

## 2022-09-24 PROCEDURE — 80053 COMPREHEN METABOLIC PANEL: CPT

## 2022-09-24 PROCEDURE — 36415 COLL VENOUS BLD VENIPUNCTURE: CPT

## 2022-09-24 PROCEDURE — 99285 EMERGENCY DEPT VISIT HI MDM: CPT

## 2022-09-24 PROCEDURE — 2580000003 HC RX 258: Performed by: PHYSICIAN ASSISTANT

## 2022-09-24 PROCEDURE — 6370000000 HC RX 637 (ALT 250 FOR IP): Performed by: NURSE PRACTITIONER

## 2022-09-24 PROCEDURE — 86334 IMMUNOFIX E-PHORESIS SERUM: CPT

## 2022-09-24 PROCEDURE — 93005 ELECTROCARDIOGRAM TRACING: CPT | Performed by: PHYSICIAN ASSISTANT

## 2022-09-24 PROCEDURE — 84155 ASSAY OF PROTEIN SERUM: CPT

## 2022-09-24 PROCEDURE — 85025 COMPLETE CBC W/AUTO DIFF WBC: CPT

## 2022-09-24 RX ORDER — ASPIRIN 81 MG/1
81 TABLET, CHEWABLE ORAL DAILY
Status: DISCONTINUED | OUTPATIENT
Start: 2022-09-25 | End: 2022-09-29 | Stop reason: HOSPADM

## 2022-09-24 RX ORDER — LANOLIN ALCOHOL/MO/W.PET/CERES
1000 CREAM (GRAM) TOPICAL DAILY
Status: DISCONTINUED | OUTPATIENT
Start: 2022-09-25 | End: 2022-09-29 | Stop reason: HOSPADM

## 2022-09-24 RX ORDER — TAMSULOSIN HYDROCHLORIDE 0.4 MG/1
0.4 CAPSULE ORAL DAILY
Status: DISCONTINUED | OUTPATIENT
Start: 2022-09-25 | End: 2022-09-26

## 2022-09-24 RX ORDER — ONDANSETRON 4 MG/1
4 TABLET, ORALLY DISINTEGRATING ORAL EVERY 8 HOURS PRN
Status: DISCONTINUED | OUTPATIENT
Start: 2022-09-24 | End: 2022-09-29 | Stop reason: HOSPADM

## 2022-09-24 RX ORDER — SODIUM CHLORIDE 9 MG/ML
INJECTION, SOLUTION INTRAVENOUS PRN
Status: DISCONTINUED | OUTPATIENT
Start: 2022-09-24 | End: 2022-09-29 | Stop reason: HOSPADM

## 2022-09-24 RX ORDER — ONDANSETRON 2 MG/ML
4 INJECTION INTRAMUSCULAR; INTRAVENOUS EVERY 6 HOURS PRN
Status: DISCONTINUED | OUTPATIENT
Start: 2022-09-24 | End: 2022-09-29 | Stop reason: HOSPADM

## 2022-09-24 RX ORDER — DOCUSATE SODIUM 100 MG/1
200 CAPSULE, LIQUID FILLED ORAL DAILY
Status: DISCONTINUED | OUTPATIENT
Start: 2022-09-25 | End: 2022-09-25

## 2022-09-24 RX ORDER — RISPERIDONE 0.25 MG/1
0.5 TABLET, FILM COATED ORAL 2 TIMES DAILY
Status: DISCONTINUED | OUTPATIENT
Start: 2022-09-24 | End: 2022-09-29 | Stop reason: HOSPADM

## 2022-09-24 RX ORDER — MAGNESIUM SULFATE 1 G/100ML
1000 INJECTION INTRAVENOUS PRN
Status: DISCONTINUED | OUTPATIENT
Start: 2022-09-24 | End: 2022-09-29 | Stop reason: HOSPADM

## 2022-09-24 RX ORDER — SODIUM CHLORIDE 0.9 % (FLUSH) 0.9 %
10 SYRINGE (ML) INJECTION PRN
Status: DISCONTINUED | OUTPATIENT
Start: 2022-09-24 | End: 2022-09-29 | Stop reason: HOSPADM

## 2022-09-24 RX ORDER — LANOLIN ALCOHOL/MO/W.PET/CERES
1.5 CREAM (GRAM) TOPICAL NIGHTLY
Status: DISCONTINUED | OUTPATIENT
Start: 2022-09-24 | End: 2022-09-26

## 2022-09-24 RX ORDER — OXCARBAZEPINE 300 MG/1
600 TABLET, FILM COATED ORAL 2 TIMES DAILY
Status: DISCONTINUED | OUTPATIENT
Start: 2022-09-24 | End: 2022-09-26

## 2022-09-24 RX ORDER — LANOLIN ALCOHOL/MO/W.PET/CERES
325 CREAM (GRAM) TOPICAL
Status: DISCONTINUED | OUTPATIENT
Start: 2022-09-25 | End: 2022-09-29 | Stop reason: HOSPADM

## 2022-09-24 RX ORDER — PANTOPRAZOLE SODIUM 40 MG/1
40 TABLET, DELAYED RELEASE ORAL
Status: DISCONTINUED | OUTPATIENT
Start: 2022-09-25 | End: 2022-09-29 | Stop reason: HOSPADM

## 2022-09-24 RX ORDER — HYDROXYZINE HYDROCHLORIDE 25 MG/1
50 TABLET, FILM COATED ORAL 3 TIMES DAILY PRN
Status: DISCONTINUED | OUTPATIENT
Start: 2022-09-24 | End: 2022-09-29 | Stop reason: HOSPADM

## 2022-09-24 RX ORDER — POLYETHYLENE GLYCOL 3350 17 G/17G
17 POWDER, FOR SOLUTION ORAL DAILY PRN
Status: DISCONTINUED | OUTPATIENT
Start: 2022-09-24 | End: 2022-09-29 | Stop reason: HOSPADM

## 2022-09-24 RX ORDER — WARFARIN SODIUM 2 MG/1
4 TABLET ORAL
Status: DISCONTINUED | OUTPATIENT
Start: 2022-09-25 | End: 2022-09-24 | Stop reason: DRUGHIGH

## 2022-09-24 RX ORDER — FOLIC ACID 1 MG/1
1 TABLET ORAL DAILY
Status: DISCONTINUED | OUTPATIENT
Start: 2022-09-25 | End: 2022-09-29 | Stop reason: HOSPADM

## 2022-09-24 RX ORDER — POTASSIUM CHLORIDE 7.45 MG/ML
10 INJECTION INTRAVENOUS PRN
Status: DISCONTINUED | OUTPATIENT
Start: 2022-09-24 | End: 2022-09-29 | Stop reason: HOSPADM

## 2022-09-24 RX ORDER — VITAMIN B COMPLEX
2000 TABLET ORAL
Status: DISCONTINUED | OUTPATIENT
Start: 2022-09-25 | End: 2022-09-29 | Stop reason: HOSPADM

## 2022-09-24 RX ORDER — PSEUDOEPHEDRINE HCL 30 MG
250 TABLET ORAL 2 TIMES DAILY
Status: DISCONTINUED | OUTPATIENT
Start: 2022-09-24 | End: 2022-09-29 | Stop reason: HOSPADM

## 2022-09-24 RX ORDER — 0.9 % SODIUM CHLORIDE 0.9 %
500 INTRAVENOUS SOLUTION INTRAVENOUS ONCE
Status: COMPLETED | OUTPATIENT
Start: 2022-09-24 | End: 2022-09-24

## 2022-09-24 RX ORDER — FUROSEMIDE 20 MG/1
20 TABLET ORAL DAILY
Status: DISCONTINUED | OUTPATIENT
Start: 2022-09-25 | End: 2022-09-29 | Stop reason: HOSPADM

## 2022-09-24 RX ORDER — MIDODRINE HYDROCHLORIDE 10 MG/1
10 TABLET ORAL 3 TIMES DAILY
Status: DISCONTINUED | OUTPATIENT
Start: 2022-09-24 | End: 2022-09-29 | Stop reason: HOSPADM

## 2022-09-24 RX ORDER — CETIRIZINE HYDROCHLORIDE 10 MG/1
10 TABLET ORAL DAILY
Status: DISCONTINUED | OUTPATIENT
Start: 2022-09-25 | End: 2022-09-29 | Stop reason: HOSPADM

## 2022-09-24 RX ORDER — ATORVASTATIN CALCIUM 20 MG/1
20 TABLET, FILM COATED ORAL NIGHTLY
Status: DISCONTINUED | OUTPATIENT
Start: 2022-09-24 | End: 2022-09-29 | Stop reason: HOSPADM

## 2022-09-24 RX ORDER — POTASSIUM CHLORIDE 20 MEQ/1
40 TABLET, EXTENDED RELEASE ORAL PRN
Status: DISCONTINUED | OUTPATIENT
Start: 2022-09-24 | End: 2022-09-29 | Stop reason: HOSPADM

## 2022-09-24 RX ORDER — WARFARIN SODIUM 2 MG/1
2 TABLET ORAL
Status: DISCONTINUED | OUTPATIENT
Start: 2022-09-26 | End: 2022-09-24 | Stop reason: DRUGHIGH

## 2022-09-24 RX ORDER — SENNA AND DOCUSATE SODIUM 50; 8.6 MG/1; MG/1
1 TABLET, FILM COATED ORAL DAILY
Status: DISCONTINUED | OUTPATIENT
Start: 2022-09-25 | End: 2022-09-29 | Stop reason: HOSPADM

## 2022-09-24 RX ORDER — POLYETHYLENE GLYCOL 3350 17 G/17G
17 POWDER, FOR SOLUTION ORAL DAILY PRN
Status: DISCONTINUED | OUTPATIENT
Start: 2022-09-24 | End: 2022-09-24 | Stop reason: SDUPTHER

## 2022-09-24 RX ORDER — GABAPENTIN 300 MG/1
900 CAPSULE ORAL 3 TIMES DAILY
Status: DISCONTINUED | OUTPATIENT
Start: 2022-09-24 | End: 2022-09-29 | Stop reason: HOSPADM

## 2022-09-24 RX ORDER — ACETAMINOPHEN 650 MG/1
650 SUPPOSITORY RECTAL EVERY 6 HOURS PRN
Status: DISCONTINUED | OUTPATIENT
Start: 2022-09-24 | End: 2022-09-29 | Stop reason: HOSPADM

## 2022-09-24 RX ORDER — SODIUM CHLORIDE 9 MG/ML
INJECTION, SOLUTION INTRAVENOUS CONTINUOUS
Status: DISCONTINUED | OUTPATIENT
Start: 2022-09-24 | End: 2022-09-25

## 2022-09-24 RX ORDER — ENOXAPARIN SODIUM 100 MG/ML
40 INJECTION SUBCUTANEOUS DAILY
Status: DISCONTINUED | OUTPATIENT
Start: 2022-09-25 | End: 2022-09-24

## 2022-09-24 RX ORDER — SODIUM CHLORIDE 0.9 % (FLUSH) 0.9 %
5-40 SYRINGE (ML) INJECTION EVERY 12 HOURS SCHEDULED
Status: DISCONTINUED | OUTPATIENT
Start: 2022-09-24 | End: 2022-09-29 | Stop reason: HOSPADM

## 2022-09-24 RX ORDER — ACETAMINOPHEN 325 MG/1
650 TABLET ORAL EVERY 6 HOURS PRN
Status: DISCONTINUED | OUTPATIENT
Start: 2022-09-24 | End: 2022-09-29 | Stop reason: HOSPADM

## 2022-09-24 RX ADMIN — HYDROXYZINE HYDROCHLORIDE 50 MG: 25 TABLET, FILM COATED ORAL at 23:16

## 2022-09-24 RX ADMIN — GABAPENTIN 900 MG: 300 CAPSULE ORAL at 23:16

## 2022-09-24 RX ADMIN — SODIUM CHLORIDE 500 ML: 9 INJECTION, SOLUTION INTRAVENOUS at 21:46

## 2022-09-24 ASSESSMENT — ENCOUNTER SYMPTOMS
BACK PAIN: 0
EYE PAIN: 0
EYE DISCHARGE: 0
EYE ITCHING: 0
SHORTNESS OF BREATH: 0
SORE THROAT: 0
VOMITING: 0
COUGH: 0
RHINORRHEA: 0
WHEEZING: 0
NAUSEA: 0

## 2022-09-24 ASSESSMENT — PAIN DESCRIPTION - LOCATION: LOCATION: LEG

## 2022-09-24 ASSESSMENT — PAIN SCALES - GENERAL: PAINLEVEL_OUTOF10: 7

## 2022-09-24 ASSESSMENT — PAIN DESCRIPTION - ORIENTATION: ORIENTATION: LEFT;RIGHT

## 2022-09-24 ASSESSMENT — PAIN DESCRIPTION - DESCRIPTORS: DESCRIPTORS: ACHING

## 2022-09-24 ASSESSMENT — PAIN DESCRIPTION - FREQUENCY: FREQUENCY: INTERMITTENT

## 2022-09-24 ASSESSMENT — PAIN - FUNCTIONAL ASSESSMENT: PAIN_FUNCTIONAL_ASSESSMENT: 0-10

## 2022-09-25 ENCOUNTER — APPOINTMENT (OUTPATIENT)
Dept: GENERAL RADIOLOGY | Age: 62
DRG: 426 | End: 2022-09-25
Payer: COMMERCIAL

## 2022-09-25 LAB
ANION GAP SERPL CALCULATED.3IONS-SCNC: 9 MMOL/L (ref 9–17)
BUN BLDV-MCNC: 19 MG/DL (ref 8–23)
BUN/CREAT BLD: 20 (ref 9–20)
CALCIUM SERPL-MCNC: 8.7 MG/DL (ref 8.6–10.4)
CHLORIDE BLD-SCNC: 97 MMOL/L (ref 98–107)
CO2: 23 MMOL/L (ref 20–31)
CORTISOL: 7 UG/DL (ref 2.7–18.4)
CREAT SERPL-MCNC: 0.96 MG/DL (ref 0.7–1.2)
EKG ATRIAL RATE: 88 BPM
EKG P AXIS: 52 DEGREES
EKG P-R INTERVAL: 158 MS
EKG Q-T INTERVAL: 366 MS
EKG QRS DURATION: 92 MS
EKG QTC CALCULATION (BAZETT): 442 MS
EKG R AXIS: 12 DEGREES
EKG T AXIS: 45 DEGREES
EKG VENTRICULAR RATE: 88 BPM
GFR AFRICAN AMERICAN: >60 ML/MIN
GFR NON-AFRICAN AMERICAN: >60 ML/MIN
GFR SERPL CREATININE-BSD FRML MDRD: ABNORMAL ML/MIN/{1.73_M2}
GLUCOSE BLD-MCNC: 96 MG/DL (ref 70–99)
HAV IGM SER IA-ACNC: NONREACTIVE
HEPATITIS B CORE IGM ANTIBODY: NONREACTIVE
HEPATITIS B SURFACE ANTIGEN: NONREACTIVE
HEPATITIS C ANTIBODY: NONREACTIVE
INR BLD: 2.4
INR BLD: 2.4
OSMOLALITY URINE: 235 MOSM/KG (ref 80–1300)
POTASSIUM SERPL-SCNC: 5 MMOL/L (ref 3.7–5.3)
PROTHROMBIN TIME: 25.8 SEC (ref 11.5–14.2)
PROTHROMBIN TIME: 26.2 SEC (ref 11.5–14.2)
SERUM OSMOLALITY: 274 MOSM/KG (ref 275–295)
SODIUM BLD-SCNC: 125 MMOL/L (ref 135–144)
SODIUM BLD-SCNC: 127 MMOL/L (ref 135–144)
SODIUM BLD-SCNC: 127 MMOL/L (ref 135–144)
SODIUM BLD-SCNC: 128 MMOL/L (ref 135–144)
SODIUM BLD-SCNC: 129 MMOL/L (ref 135–144)
SODIUM,UR: 49 MMOL/L

## 2022-09-25 PROCEDURE — 73562 X-RAY EXAM OF KNEE 3: CPT

## 2022-09-25 PROCEDURE — 84295 ASSAY OF SERUM SODIUM: CPT

## 2022-09-25 PROCEDURE — 1200000000 HC SEMI PRIVATE

## 2022-09-25 PROCEDURE — 2580000003 HC RX 258: Performed by: INTERNAL MEDICINE

## 2022-09-25 PROCEDURE — 80048 BASIC METABOLIC PNL TOTAL CA: CPT

## 2022-09-25 PROCEDURE — 97162 PT EVAL MOD COMPLEX 30 MIN: CPT

## 2022-09-25 PROCEDURE — 97530 THERAPEUTIC ACTIVITIES: CPT

## 2022-09-25 PROCEDURE — 83930 ASSAY OF BLOOD OSMOLALITY: CPT

## 2022-09-25 PROCEDURE — 82533 TOTAL CORTISOL: CPT

## 2022-09-25 PROCEDURE — 6370000000 HC RX 637 (ALT 250 FOR IP): Performed by: NURSE PRACTITIONER

## 2022-09-25 PROCEDURE — 83935 ASSAY OF URINE OSMOLALITY: CPT

## 2022-09-25 PROCEDURE — 2580000003 HC RX 258: Performed by: NURSE PRACTITIONER

## 2022-09-25 PROCEDURE — 99232 SBSQ HOSP IP/OBS MODERATE 35: CPT | Performed by: INTERNAL MEDICINE

## 2022-09-25 PROCEDURE — 51702 INSERT TEMP BLADDER CATH: CPT

## 2022-09-25 PROCEDURE — 82024 ASSAY OF ACTH: CPT

## 2022-09-25 PROCEDURE — 97116 GAIT TRAINING THERAPY: CPT

## 2022-09-25 PROCEDURE — 97166 OT EVAL MOD COMPLEX 45 MIN: CPT

## 2022-09-25 PROCEDURE — 84300 ASSAY OF URINE SODIUM: CPT

## 2022-09-25 PROCEDURE — 36415 COLL VENOUS BLD VENIPUNCTURE: CPT

## 2022-09-25 PROCEDURE — 85610 PROTHROMBIN TIME: CPT

## 2022-09-25 RX ORDER — WARFARIN SODIUM 2 MG/1
2 TABLET ORAL
Status: COMPLETED | OUTPATIENT
Start: 2022-09-25 | End: 2022-09-25

## 2022-09-25 RX ORDER — DEXTROSE MONOHYDRATE 50 MG/ML
INJECTION, SOLUTION INTRAVENOUS CONTINUOUS
Status: DISCONTINUED | OUTPATIENT
Start: 2022-09-25 | End: 2022-09-25

## 2022-09-25 RX ADMIN — Medication 1.5 MG: at 00:04

## 2022-09-25 RX ADMIN — Medication 2000 UNITS: at 07:54

## 2022-09-25 RX ADMIN — ACETAMINOPHEN 650 MG: 325 TABLET ORAL at 08:02

## 2022-09-25 RX ADMIN — OXCARBAZEPINE 600 MG: 300 TABLET, FILM COATED ORAL at 07:54

## 2022-09-25 RX ADMIN — SENNOSIDES AND DOCUSATE SODIUM 1 TABLET: 50; 8.6 TABLET ORAL at 07:54

## 2022-09-25 RX ADMIN — SODIUM CHLORIDE, PRESERVATIVE FREE 10 ML: 5 INJECTION INTRAVENOUS at 00:05

## 2022-09-25 RX ADMIN — TAMSULOSIN HYDROCHLORIDE 0.4 MG: 0.4 CAPSULE ORAL at 07:54

## 2022-09-25 RX ADMIN — FOLIC ACID 1 MG: 1 TABLET ORAL at 07:54

## 2022-09-25 RX ADMIN — OXCARBAZEPINE 600 MG: 300 TABLET, FILM COATED ORAL at 20:30

## 2022-09-25 RX ADMIN — Medication 1.5 MG: at 20:19

## 2022-09-25 RX ADMIN — OXCARBAZEPINE 600 MG: 300 TABLET, FILM COATED ORAL at 00:04

## 2022-09-25 RX ADMIN — GABAPENTIN 900 MG: 300 CAPSULE ORAL at 20:19

## 2022-09-25 RX ADMIN — ATORVASTATIN CALCIUM 20 MG: 20 TABLET, FILM COATED ORAL at 20:19

## 2022-09-25 RX ADMIN — GABAPENTIN 900 MG: 300 CAPSULE ORAL at 07:54

## 2022-09-25 RX ADMIN — DEXTROSE MONOHYDRATE: 50 INJECTION, SOLUTION INTRAVENOUS at 08:02

## 2022-09-25 RX ADMIN — DOCUSATE SODIUM 200 MG: 100 CAPSULE, LIQUID FILLED ORAL at 07:53

## 2022-09-25 RX ADMIN — ACETAMINOPHEN 650 MG: 325 TABLET ORAL at 01:40

## 2022-09-25 RX ADMIN — PANTOPRAZOLE SODIUM 40 MG: 40 TABLET, DELAYED RELEASE ORAL at 06:05

## 2022-09-25 RX ADMIN — RISPERIDONE 0.5 MG: 0.25 TABLET ORAL at 00:04

## 2022-09-25 RX ADMIN — MIDODRINE HYDROCHLORIDE 10 MG: 10 TABLET ORAL at 14:30

## 2022-09-25 RX ADMIN — GABAPENTIN 900 MG: 300 CAPSULE ORAL at 14:30

## 2022-09-25 RX ADMIN — RISPERIDONE 0.5 MG: 0.25 TABLET ORAL at 07:54

## 2022-09-25 RX ADMIN — ASPIRIN 81 MG CHEWABLE TABLET 81 MG: 81 TABLET CHEWABLE at 07:54

## 2022-09-25 RX ADMIN — WARFARIN SODIUM 2 MG: 2 TABLET ORAL at 18:03

## 2022-09-25 RX ADMIN — ACETAMINOPHEN 650 MG: 325 TABLET ORAL at 14:30

## 2022-09-25 RX ADMIN — FERROUS SULFATE TAB EC 325 MG (65 MG FE EQUIVALENT) 325 MG: 325 (65 FE) TABLET DELAYED RESPONSE at 07:54

## 2022-09-25 RX ADMIN — CYANOCOBALAMIN TAB 1000 MCG 1000 MCG: 1000 TAB at 07:54

## 2022-09-25 RX ADMIN — DICLOFENAC SODIUM 4 G: 10 GEL TOPICAL at 11:21

## 2022-09-25 RX ADMIN — HYDROXYZINE HYDROCHLORIDE 50 MG: 25 TABLET, FILM COATED ORAL at 11:21

## 2022-09-25 RX ADMIN — CETIRIZINE HYDROCHLORIDE 10 MG: 10 TABLET ORAL at 07:54

## 2022-09-25 RX ADMIN — SODIUM CHLORIDE, PRESERVATIVE FREE 10 ML: 5 INJECTION INTRAVENOUS at 20:20

## 2022-09-25 RX ADMIN — MIDODRINE HYDROCHLORIDE 10 MG: 10 TABLET ORAL at 20:20

## 2022-09-25 RX ADMIN — HYDROXYZINE HYDROCHLORIDE 50 MG: 25 TABLET, FILM COATED ORAL at 22:16

## 2022-09-25 RX ADMIN — SODIUM CHLORIDE, PRESERVATIVE FREE 10 ML: 5 INJECTION INTRAVENOUS at 07:56

## 2022-09-25 RX ADMIN — ACETAMINOPHEN 650 MG: 325 TABLET ORAL at 20:19

## 2022-09-25 RX ADMIN — DICLOFENAC SODIUM 4 G: 10 GEL TOPICAL at 03:58

## 2022-09-25 RX ADMIN — RISPERIDONE 0.5 MG: 0.25 TABLET ORAL at 20:19

## 2022-09-25 RX ADMIN — ATORVASTATIN CALCIUM 20 MG: 20 TABLET, FILM COATED ORAL at 00:03

## 2022-09-25 ASSESSMENT — PAIN DESCRIPTION - LOCATION
LOCATION: LEG
LOCATION: LEG
LOCATION: KNEE

## 2022-09-25 ASSESSMENT — PAIN DESCRIPTION - PAIN TYPE
TYPE: CHRONIC PAIN
TYPE: CHRONIC PAIN

## 2022-09-25 ASSESSMENT — PAIN SCALES - GENERAL
PAINLEVEL_OUTOF10: 8
PAINLEVEL_OUTOF10: 6
PAINLEVEL_OUTOF10: 8
PAINLEVEL_OUTOF10: 7
PAINLEVEL_OUTOF10: 8

## 2022-09-25 ASSESSMENT — PAIN DESCRIPTION - DESCRIPTORS
DESCRIPTORS: ACHING

## 2022-09-25 ASSESSMENT — PAIN DESCRIPTION - FREQUENCY: FREQUENCY: INTERMITTENT

## 2022-09-25 ASSESSMENT — PAIN DESCRIPTION - ONSET
ONSET: ON-GOING
ONSET: ON-GOING

## 2022-09-25 ASSESSMENT — PAIN DESCRIPTION - ORIENTATION
ORIENTATION: LEFT;RIGHT
ORIENTATION: LEFT;RIGHT

## 2022-09-25 NOTE — PROGRESS NOTES
Warfarin Dosing - Pharmacy Consult Note  Consulting Provider: Richard Anderson CNP  Indication:  Atrial Fibrillation  Warfarin Dose prior to admission: 2mg MWSun, 4mg AOD   Concurrent anticoagulants/antiplatelets: aspirin  Significant Drug Interactions: No obvious interactions  Recent Labs     09/23/22  0749 09/24/22  1253 09/24/22 2025 09/25/22  0039 09/25/22  0554   INR 2.4  --   --  2.4 2.4   HGB  --  13.3  --   --   --    PLT  --  187  --   --   --    LABALBU  --  4.2 3.7  --   --      Recent warfarin administrations                     warfarin (COUMADIN) tablet 4 mg (mg) 4 mg Given 09/22/22 1819                   Date   INR    Dose  9/24/22   2.4       took at home  9/25        2.4          Assessment/Plan  (Goal INR: 2 - 3)  INR therapeutic, continue home dose of 2mg tonight. Active problem list reviewed. INR orders are placed. Chart reviewed for pertinent labs, drug/diet interactions, and past doses. Documentation of patient's clinical condition was reviewed. Pharmacy Dosing:  Pharmacy will continue to follow.

## 2022-09-25 NOTE — PROGRESS NOTES
Occupational Therapy  Facility/Department: [facility] MED SURG  Occupational Therapy Initial Assessment    Name: Nayely Dangelo  : 1960  MRN: [id number]  Date of Service: 2022    JUDY Ordaz reports patient is medically stable for therapy treatment this date. Chart reviewed prior to treatment and patient is agreeable for therapy. All lines intact and patient positioned comfortably at end of treatment. All patient needs addressed prior to ending therapy session. Discharge Recommendations:  Patient would benefit from continued therapy after discharge  Pt currently functioning below baseline. Recommend daily inpatient skilled therapy at time of discharge to maximize long term outcomes and prevent re-admission. Please refer to AM-PAC score for current level of function. OT Equipment Recommendations  Equipment Needed: Yes  Mobility Devices: ADL Assistive Devices  ADL Assistive Devices: Long-handled Shoe Horn;Long-handled Sponge;Reacher;Sock-Aid Hard       Patient Diagnosis(es): The primary encounter diagnosis was Hyponatremia. A diagnosis of Elevated transaminase level was also pertinent to this visit. Past Medical History:  has a past medical history of AMS (altered mental status), Atrial fibrillation ([address] Bipolar 1 disorder ([address] Depression, Hypertension, Neck pain, Neurofibromatosis ([address] Patient in clinical research study, Severe recurrent major depression without psychotic features ([address] and Twitching. Past Surgical History:  has a past surgical history that includes hernia repair; Neck surgery (); Abdomen surgery; Tonsillectomy; Appendectomy; Dilatation, esophagus; Cardiac surgery (7/14/15); Cardiac surgery (2015); Colonoscopy (2021); and Upper gastrointestinal endoscopy (2021). PER H&P: Nayely Dangelo is a 57 y.o. male who presents with possible high potassium.   Patient states that he had blood work drawn today for his Dr. Bernarda aMrrero and received a call tonight that his potassium was high. Patient denies any chest pain shortness of breath abdominal pain nausea or vomiting. He states that he gets his blood work done every 6 months to [facility] for cancer\". Patient states that he did recently get out of the psychiatric unit and they did start him on Elavil but no other medication changes. He does take warfarin and is compliant with his dosing. He denies any palpitations. No alcohol use/      Assessment   Performance deficits / Impairments: Decreased functional mobility ; Decreased ADL status; Decreased strength;Decreased endurance;Decreased high-level IADLs;Decreased sensation;Decreased fine motor control;Decreased cognition;Decreased safe awareness;Decreased posture  Assessment: Pt would benefit from continued skilled OT services to increase I and safety during functional tasks to return home at [facility] as able.   Prognosis: Good  Decision Making: Medium Complexity  Activity Tolerance  Activity Tolerance: Patient limited by fatigue;Patient limited by pain  Activity Tolerance Comments: fair +, pt cooperative throughout session, limited somewhat by fatigue and pain        Plan   Plan  Times per Week: 4-5x/wk 1x/day as yecenia  Current Treatment Recommendations: Strengthening, Balance training, Functional mobility training, Endurance training, Safety education & training, Equipment evaluation, education, & procurement, Self-Care / ADL, Home management training, Patient/Caregiver education & training     Restrictions  Restrictions/Precautions  Restrictions/Precautions: General Precautions, Fall Risk  Required Braces or Orthoses?: No  Position Activity Restriction  Other position/activity restrictions: Up w/ assist, ALARMS, LUE IV    Subjective   General  Chart Reviewed: Yes  Patient assessed for rehabilitation services?: Yes  Family / Caregiver Present: No  Subjective  Subjective: Pt resting in bed, agreeable to OT eval.     Social/Functional History  Social/Functional History  Lives With: Alone  Type of Home: Apartment  Home Layout: One level  Home Access: Level entry  Bathroom Shower/Tub: Tub/Shower unit  Bathroom Toilet: Standard  Bathroom Equipment: Shower chair  Home Equipment: Domenica Prather, quad  Has the patient had two or more falls in the past year or any fall with injury in the past year?: Yes (Pt reports 4-5 falls within the last year, states he \"blacks out and falls\")  ADL Assistance: [address] [place] [address] Avenue: Independent  Homemaking Responsibilities: Yes  Ambulation Assistance: Independent (No AD use)  Transfer Assistance: Independent  Active : No  Patient's  Info: black and white cab  Occupation: On disability  Leisure & Hobbies: walk       Objective   Observation/Palpation  Posture: Fair  Safety Devices  Type of Devices: Left in bed;Call light within reach; Bed alarm in place;Gait belt;Nurse notified; Patient at risk for falls; All fall risk precautions in place      Balance  Sitting:  (SBA)  Standing:  (Mod-Max with RW)  Functional Mobility   Overall Level of Assistance: Moderate assistance;Maximum assistance (Pt stood at EOB attempted steps in place w/o RW with buckling noted B knees. Pt give RW for increased support ambulated from bed to window and back x2. Pt unsteady ataxic gait with buckling noted throughout.)  Interventions: Tactile cues; Verbal cues (Pt given Mod verbal cues/tactile assist for pacing self, upright posture, RW safety, scanning environment, pursed lip breathing, awareness/assist with lines and slowing down all to increase safety.)     AROM: Within functional limits  PROM: Within functional limits  Strength: Generally decreased, functional (B UE ~ 4-/5)  Coordination: Generally decreased, functional (slowed/delayed B opposition noted)  Tone: Normal  Sensation: Impaired (Pt unable to report specifics)      ADL  Feeding: Setup  Grooming: Setup;Stand by assistance (seated)  UE Bathing: Setup;Minimal assistance  LE Bathing: Setup; Moderate assistance  UE Dressing: Setup;Minimal assistance (to tie/adjust hosp gown seated on EOB)  LE Dressing: Moderate assistance  Toileting: Moderate assistance;Minimal assistance  Additional Comments: Pt is currently limited by fatigue, decreased balance and safety awareness. Activity Tolerance  Activity Tolerance: Patient limited by pain; Patient limited by endurance      Bed mobility  Supine to Sit: Contact guard assistance  Sit to Supine: Contact guard assistance  Scooting: Contact guard assistance  Bed Mobility Comments: Pt completed bed mobility without significant difficulties this date. Pt given Mod verbal cues for awareness of lines, slowing down and pacing self all to increase safety. Pt denied any dizziness onces seated on EOB. Transfers  Sit to stand: Moderate assistance (with RW)  Stand to sit: Moderate assistance  Transfer Comments: Pt required Mod verbal cues for pacing self, RW safety, upright posture, controlled stand to sit, squaring self/AD up to surface and reaching back prior to sitting, pursed lip breathing all to increase safety. Vision  Vision: Impaired (\"supposed to\")  Vision Exceptions: Wears glasses at all times  Hearing  Hearing: Within functional limits      Cognition  Overall Cognitive Status: Exceptions  Arousal/Alertness: Appropriate responses to stimuli  Following Commands: Follows one step commands with repetition; Follows one step commands with increased time  Attention Span: Appears intact  Memory: Decreased long term memory;Decreased short term memory;Decreased recall of recent events  Safety Judgement: Decreased awareness of need for assistance;Decreased awareness of need for safety  Problem Solving: Decreased awareness of errors;Assistance required to identify errors made;Assistance required to generate solutions;Assistance required to implement solutions;Assistance required to correct errors made  Insights: Decreased awareness of deficits  Initiation: Requires cues for some  Sequencing: Requires cues for some  Orientation  Overall Orientation Status: Within Functional Limits                  Education Given To: Patient  Education Provided: Role of Therapy;Transfer Training;Plan of Care;Energy Conservation; ADL Adaptive Strategies; Fall Prevention Strategies  Education Provided Comments: benefits of being oob, pursed lip breathing, OT POC, role of OT in acute care, safety in function, proper RW use, fall prevention/call light use, pressure relief education  Education Method: Verbal  Barriers to Learning: None  Education Outcome: Verbalized understanding;Continued education needed                                 AM-PAC Score        AM-PAC Inpatient Daily Activity Raw Score: 17 (09/25/22 1153)  AM-PAC Inpatient ADL T-Scale Score : 37.26 (09/25/22 1153)  ADL Inpatient CMS 0-100% Score: 50.11 (09/25/22 1153)  ADL Inpatient CMS G-Code Modifier : CK (09/25/22 1153)         Goals  Short Term Goals  Time Frame for Short term goals: By discharge, pt to demo  Short Term Goal 1: ADL trasnfers and functional mobility to CGA with use of AD as needed. Short Term Goal 2: increased B UE strength by 1/2 grade to assist with functional tasks/I with B UE HEP with use of handouts as needed. Short Term Goal 3: increased standing yecenia > 8 min with Min A using AD as needed to reduce risk of falls during functional tasks. Short Term Goal 4: UB ADLs to Set up and LB ADLs to Min A with use of AD/AE  as needed. Short Term Goal 5: toileting to SBA with use of AD/grab bars as needed. Long Term Goals  Long Term Goal 1: Pt to be I with fall prevention education, EC/WS tech, recommendations for discharge/AE with use of handouts as needed. Patient Goals   Patient goals : To go home!        Therapy Time   Individual Concurrent Group Co-treatment   Time In 0809 (Plus 10 min for chart review/RN communication)         Time Out 0828         Minutes 19+ 10 = 29          Tx time: 10 min        Álvaro Pastor OT

## 2022-09-25 NOTE — PROGRESS NOTES
Physical Therapy  Facility/Department: [facility] MED SURG  Physical Therapy Initial Assessment    Name: Casey Benjamin  : 1960  MRN: [id number]  Date of Service: 2022  RN Burgess Lesch reports patient is medically stable for therapy treatment this date. Chart reviewed prior to treatment and patient is agreeable for therapy. All lines intact and patient positioned comfortably at end of treatment. All patient needs addressed prior to ending therapy session. Discharge Recommendations:  Pt currently functioning below baseline. Recommend daily inpatient skilled therapy at time of discharge to maximize long term outcomes and prevent re-admission. Please refer to AM-PAC score for current level of function. Patient would benefit from continued therapy after discharge     PT Equipment Recommendations  Equipment Needed: Yes  Mobility Devices: Lord Sanchez: Rolling    Per H&P:Larry Rebolledo Aas is a 57 y.o. male who presents with possible high potassium. Patient states that he had blood work drawn today for his Dr. Pedro Estrada and received a call tonight that his potassium was high. Patient denies any chest pain shortness of breath abdominal pain nausea or vomiting. He states that he gets his blood work done every 6 months to [facility] for [facility]\". Patient states that he did recently get out of the psychiatric unit and they did start him on Elavil but no other medication changes. He does take warfarin and is compliant with his dosing. He denies any palpitations. No alcohol use/      Patient Diagnosis(es): The primary encounter diagnosis was Hyponatremia. A diagnosis of Elevated transaminase level was also pertinent to this visit.   Past Medical History:  has a past medical history of AMS (altered mental status), Atrial fibrillation [address] [address] Bipolar 1 disorder ([facility] [address] Depression, Hypertension, Neck pain, Neurofibromatosis ([facility] [address] Patient in clinical research study, Severe recurrent major depression without psychotic features ([facility] [address] and Twitching. Past Surgical History:  has a past surgical history that includes hernia repair; Neck surgery (2013); Abdomen surgery; Tonsillectomy; Appendectomy; Dilatation, esophagus; Cardiac surgery (7/14/15); Cardiac surgery (07/14/2015); Colonoscopy (01/28/2021); and Upper gastrointestinal endoscopy (01/28/2021). Assessment   Body Structures, Functions, Activity Limitations Requiring Skilled Therapeutic Intervention: Decreased functional mobility ; Decreased balance;Decreased endurance;Decreased safe awareness;Decreased strength;Decreased high-level IADLs;Decreased posture; Increased pain  Assessment: Pt tolerated PT eval fair. Pt currently presenting w/ deficits in strength, balance, and gait. Recommending continued use of RW for all ambulation at this time. Pt is a HIGH fall risk given current deficits, decreased safety awareness, and hx of falls. Pt would benefit from continued skilled PT to address deficits in order to return to PLOF as able. Therapy Prognosis: Good  Decision Making: Medium Complexity  Requires PT Follow-Up: Yes  Activity Tolerance  Activity Tolerance: Patient limited by pain; Patient limited by endurance     Plan   Plan  Plan: 5-7 times per week  Current Treatment Recommendations: Strengthening, Balance training, Functional mobility training, Transfer training, Neuromuscular re-education, Gait training, Endurance training, Pain management, Home exercise program, Safety education & training, Patient/Caregiver education & training, Equipment evaluation, education, & procurement, Therapeutic activities  Safety Devices  Type of Devices: Bed alarm in place, Call light within reach, Left in bed, Gait belt, Nurse notified  Restraints  Restraints Initially in Place: No     Restrictions  Restrictions/Precautions  Restrictions/Precautions: General Precautions, Fall Risk  Required Braces or Orthoses?: No  Position Activity Restriction  Other position/activity restrictions: Up w/ assist, ALARMS, LUE IV     Subjective   General  Patient assessed for rehabilitation services?: Yes  Response To Previous Treatment: Not applicable  Family / Caregiver Present: No  Follows Commands: Within Functional Limits  General Comment  Comments: RN and pt agreeable to therapy. Pt supine in bed upon arrival.  Pt pleasant and cooperative throughout. Subjective  Subjective: Pt reporting feeling \"fine\" at time of PT eval.           Social/Functional History  Social/Functional History  Lives With: Alone  Type of Home: Apartment  Home Layout: One level  Home Access: Level entry  Bathroom Shower/Tub: Tub/Shower unit  Bathroom Toilet: Standard  Bathroom Equipment: Shower chair  Home Equipment: [place] Jose Miguel, quad  Has the patient had two or more falls in the past year or any fall with injury in the past year?: Yes (Pt reports 4-5 falls within the last year, states he \"blacks out and falls\")  ADL Assistance: [address] [place] [address] Avenue: Independent  Homemaking Responsibilities: Yes  Ambulation Assistance: Independent (No AD use)  Transfer Assistance: Independent  Active : No  Patient's  Info: black and white cab  Occupation: On disability  Leisure & Hobbies: walk  [address]: Impaired (\"supposed to\")  Vision Exceptions: Wears glasses at all times  Hearing  Hearing: Within functional limits    Cognition   Orientation  Overall Orientation Status: Within Functional Limits  Cognition  Overall Cognitive Status: Exceptions  Arousal/Alertness: Appropriate responses to stimuli  Following Commands: Follows one step commands with repetition; Follows one step commands with increased time  Attention Span: Appears intact  Memory: Decreased long term memory;Decreased short term memory;Decreased recall of recent events  Safety Judgement: Decreased awareness of need for assistance;Decreased awareness of need for safety  Problem Solving: Decreased awareness of errors;Assistance required to identify errors made;Assistance required to generate solutions;Assistance required to implement solutions;Assistance required to correct errors made  Insights: Decreased awareness of deficits  Initiation: Requires cues for some  Sequencing: Requires cues for some     Objective   Observation/Palpation  Posture: Fair  Gross Assessment  Sensation: Impaired (Pt unable to report details)     AROM RLE (degrees)  RLE AROM: WFL  AROM LLE (degrees)  LLE AROM : WFL  AROM RUE (degrees)  RUE General AROM: See OT assessment for detail  AROM LUE (degrees)  LUE General AROM: See OT assessment for detail  Strength RLE  Strength RLE: Exception  Comment: Grossly 4-/5 except hip flex 3/5  Strength LLE  Strength LLE: Exception  Comment: Grossly 4-/5 except hip flex 3/5  Strength RUE  Comment: See OT assesement for detail  Strength LUE  Comment: See OT assesement for detail          Bed mobility  Supine to Sit: Contact guard assistance  Sit to Supine: Contact guard assistance  Scooting: Contact guard assistance  Bed Mobility Comments: Pt w/o significant difficulty throughout bed mobility this date. Pt requiring mod verbal cueing for pacing and safety w/ lines throughout w/ fair return demo. Upon sitting at EOB, pt requiring min verbal cueing to scoot hips forward in order to place B feet on floor prior to initiating transfers w/ fair return demo. Pt denying any dizziness/lightheadedness throughout position changes. Transfers  Sit to Stand: Moderate Assistance  Stand to sit: Moderate Assistance  Comment: Pt performed 3 STS transfers throughout session (1x no AD, 2x RW) demonstrating poor steadiness throughout. Pt requiring use of rocking motion to initiate transfers. Pt initially stood at EOB and attempted pre-gait lateral weight shifting and standing marches w/o AD demonstrating POOR steadiness throughout w/ difficulty picking up LLE for progression. Multiple episodes of knee buckling noted R > L.   Pt then stood a second time w/ RW requiring max verbal cueing for proper hand placement throughout transfers w/ poor return demo. Upon standing at EOB w/ RW, pt again attempted pre-gait lateral weight shifting and standing marches to assess steadiness demonstrating improved balance w/ RW vs w/o AD however continuing to have episodes of knee buckling requiring heavy reliance on RW for UE support throughout. Pt also demonstrating poor eccentric quad control throughout stand>sit transfers requiring MaxA to control descent. Ambulation  Surface: level tile  Device: Rolling Walker  Assistance: Maximum assistance  Quality of Gait: poor steadiness, ataxic pattern, narrow MANNY, staggering steps, knee buckling  Gait Deviations: Slow Noelle;Decreased step length;Decreased step height;Staggers  Distance: 15ft x 2  Comments: Pt ambulating within room demonstrating poor steadiness throughout. Pt appearing ataxic and ambulating w/ staggering steps throughout. Pt requiring mod verbal cueing to maintain MANNY within RW w/ fair return demo. Pt also requiring MAX verbal cueing to maintain RW on floor throughout turns w/ poor return demo. More Ambulation?: No  Stairs/Curb  Stairs?: No     Balance  Posture: Fair  Sitting - Static: Good;-  Sitting - Dynamic: Fair;+  Standing - Static: Poor  Standing - Dynamic: Poor;-  Single Leg Stance R Le  Single Leg Stance L Le  Comments: Standing balance assessed w/ RW           AM-PAC Score  AM-PAC Inpatient Mobility Raw Score : 15 (22)  AM-PAC Inpatient T-Scale Score : 39.45 (22)  Mobility Inpatient CMS 0-100% Score: 57.7 (22)  Mobility Inpatient CMS G-Code Modifier : CK (22)          Functional Outcome Measure-   Single Leg Stance Test:  0 sec.  (<5 sec.= fall risk)      Goals  Short Term Goals  Time Frame for Short term goals: 12 visits  Short term goal 1: Pt to demonstrate bed mobility independently  Short term goal 2: Pt to perform STS transfers w/ most appropriate AD Lea  Short term goal 3: Pt to ambulate at least 100ft w/ most appropriate AD Lea  Short term goal 4: Pt to actively participate in at least 30 minutes of physical therapy for ther act, ther ex, balance, gait, and endurance training  Patient Goals   Patient goals : Less pain       Education  Patient Education  Education Given To: Patient  Education Provided: Role of Therapy;Plan of Care;Transfer Training; Fall Prevention Strategies; Equipment  Education Provided Comments: Pt educated on: purpose of acute PT eval, importance of continued mobility throughout admission, general safety awareness, fall risk prevention, safe transfers & ambulation w/ RW, pursed lip breathing, benefits of RW use, and PT POC. Pt w/ poor return demo. Pt requires continued reinforcement of education.   Education Method: Verbal  Education Outcome: Continued education needed      Therapy Time   Individual Concurrent Group Co-treatment   Time In 1166         Time Out 0818         Minutes 30         Treatment time: 15 minutes     Darren Dee PT

## 2022-09-25 NOTE — PLAN OF CARE
Problem: Pain  Goal: Verbalizes/displays adequate comfort level or baseline comfort level  9/25/2022 1627 by Michaela Duarte RN  Outcome: Not Progressing  Note: Heat, voltaren gel applied to bilateral knees/neck for chronic pain  Tylenol given  Patient c/o no improvement in pain  Requesting additional pain medication, xrays  Dr. Danilo Mcqueen notified, no new orders received  9/25/2022 0340 by Simba Perez RN  Outcome: Progressing     Problem: Discharge Planning  Goal: Discharge to home or other facility with appropriate resources  9/25/2022 1627 by Michaela Duarte RN  Outcome: Progressing  9/25/2022 0340 by Simba Perez RN  Outcome: Progressing     Problem: Safety - Adult  Goal: Free from fall injury  9/25/2022 1627 by Michaela Duarte RN  Outcome: Progressing  Note: Siderails up x 2  Hourly rounding  Call light in reach  Instructed to call for assist before attempting out of bed.   Remains free from falls and accidental injury at this time   Floor free from obstacles  Bed is locked and in lowest position  Adequate lighting provided  Bed alarm on, [place] star and Stay with Me signs posted      9/25/2022 0340 by Simba Perez RN  Outcome: Progressing     Problem: Pain  Goal: Verbalizes/displays adequate comfort level or baseline comfort level  9/25/2022 1627 by Michaela Duarte RN  Outcome: Not Progressing  Note: Heat, voltaren gel applied to bilateral knees/neck for chronic pain  Tylenol given  Patient c/o no improvement in pain  Requesting additional pain medication, xrays  Dr. Danilo Mcqueen notified, no new orders received  9/25/2022 0340 by Simba Perez RN  Outcome: Progressing

## 2022-09-25 NOTE — ED PROVIDER NOTES
eMERGENCY dEPARTMENT eNCOUnter   Independent Attestation     Pt Name: Aminta Huizar  MRN: [id number]  [place] 1960  Date of evaluation: 9/24/22     Aminta Huizar is a 57 y.o. male with CC: Abnormal Lab (States that Dr Higinio Guardado sent him here, had labs drawn today and potassium high)        This visit was performed by both a physician and an APC. I performed all aspects of the MDM as documented. The care is provided during an unprecedented national emergency due to the novel coronavirus, COVID 19.     Earline Vázquez MD  Attending Emergency Physician    EKG sinus rhythm ventricular rate 88  Nonspecific T wave flattening  No STEMI criteria    QTc 442           Roger Chavira MD  09/24/22 2156       Roger Chavira MD  09/24/22 2220

## 2022-09-25 NOTE — PROGRESS NOTES
[facility]  Office: [address], DO, [place] Ar[place], DO, Judy Sawyer, DO, Holden Fredyd Blood, DO, Segundo Beard MD, Jennifer Mohr MD, Shelby Louis MD, Tessa Gonzales MD,  Richard Lopez MD, Meagan Mcneal MD, Ayan Queen, DO, Meggan Lazar MD,  Brie De Souza MD, Dana Hines MD, Tori Moritz, DO, Denisse Erwin MD, Lily Gimenez MD, Shan Marc MD, Cira Stokes MD, Gonzalez Hansen MD, Dangelo Pagan MD, Yassine Sage DO, Shakir Dunham MD, Elizabet Lee MD, Rumalda Snellen, CNP,  Rolando Mendez, CNP, Hernan Pastrana, CNP, Tere La, CNP,  Nila Mcdaniels, [facility], Natalia Khoury, CNP, Sofya Brewer, CNP, Cara Chester, CNP, Amanda Parham, CNP, Lea Baez, CNP, JOSE MorinC, Ervni Sousa, CNS, Catalina Farah, [facility], Kendal Pro, CNP, Sonia Eaton, CNP, Damari Matta, [facility]    Progress Note    9/25/2022    10:53 AM    Name:   Mart Key  MRN:     [id number]     Acct:      [de-identified]   Room:   2024/2024-01   Day:  1  Admit Date:  9/24/2022  8:01 PM    PCP:   LOGAN King NP  Code Status:  Full Code    Subjective:     C/C:   Chief Complaint   Patient presents with    Abnormal Lab     States that Dr Paul Chow sent him here, had labs drawn today and potassium high     Interval History Status: not changed. Patient doing well with no symptoms. Sodium corrected quickly overnight. Hyperkalemia improved. Brief History:     The patient presented the ER with high potassium. Patient states that he had blood work drawn today for his Dr. Aracely Granado and received a call tonight that his potassium was high. Patient denies any chest pain shortness of breath abdominal pain nausea or vomiting. He states that he gets his blood work done every 6 months to [facility] for cancer\". He states he typically drinks 3-4 bottles of water and a couple Gatorade's a day. He denies excessive thirst or urination. Patient states that he did recently get out of the psychiatric unit and they did start him on Elavil but no other medication changes. He does take warfarin and is compliant with his dosing. He denies any palpitations. No recent alcohol use. History includes monoclonal gammopathy, ARIELLE with CPAP, atrial fibs, prosthetic valve, hypothyroidism, hypertension, bipolar disorder, crack cocaine use, and chronic renal insufficiency. Review of Systems:     Constitutional:  negative for chills, fevers, sweats  Respiratory:  negative for cough, dyspnea on exertion, shortness of breath, wheezing  Cardiovascular:  negative for chest pain, chest pressure/discomfort, lower extremity edema, palpitations  Gastrointestinal:  negative for abdominal pain, constipation, diarrhea, nausea, vomiting  Neurological:  negative for dizziness, headache    Medications: Allergies:     Allergies   Allergen Reactions    Oxycodone Itching    Fish-Derived Products     Ibuprofen     Latuda [Lurasidone Hcl] Other (See Comments)     \"Feels like pt is going to crawl out of own skin\"    Lithium     Lurasidone     Quetiapine     Seroquel [Quetiapine Fumarate]        Current Meds:   Scheduled Meds:    warfarin  2 mg Oral Once    aspirin  81 mg Oral Daily    atorvastatin  20 mg Oral Nightly    calcium citrate  250 mg Oral BID    cetirizine  10 mg Oral Daily    Vitamin D  2,000 Units Oral Daily with breakfast    vitamin B-12  1,000 mcg Oral Daily    ferrous sulfate  325 mg Oral Daily with breakfast    folic acid  1 mg Oral Daily    [Held by provider] furosemide  20 mg Oral Daily    gabapentin  900 mg Oral TID    melatonin  1.5 mg Oral Nightly    midodrine  10 mg Oral TID    OXcarbazepine  600 mg Oral BID    pantoprazole  40 mg Oral QAM AC    risperiDONE  0.5 mg Oral BID    sennosides-docusate sodium  1 tablet Oral Daily    tamsulosin  0.4 mg Oral Daily    sodium chloride flush  5-40 mL IntraVENous 2 times per --    RDW  --  14.2  --   --    PLT  --  187  --   --    MPV  --  10.1  --   --    INR 2.4  --  2.4 2.4     Chemistry:  Recent Labs     09/24/22  1253 09/24/22 2025 09/25/22  0039 09/25/22  0554 09/25/22  0914   * 125* 127* 129* 128*   K 6.1* 4.4  --  5.0  --    CL 89* 93*  --  97*  --    CO2 26 21  --  23  --    GLUCOSE 108* 97  --  96  --    BUN 14 17  --  19  --    CREATININE 0.98 0.89  --  0.96  --    ANIONGAP 8* 11  --  9  --    LABGLOM >60 >60  --  >60  --    GFRAA >60 >60  --  >60  --    CALCIUM 9.3 8.9  --  8.7  --      Recent Labs     09/24/22  1253 09/24/22 2025   PROT 6.3*  6.8 6.5   LABALBU 4.2 3.7   * 92*   * 249*   ALKPHOS 201* 191*   BILITOT 0.3 0.2*     ABG:No results found for: POCPH, PHART, PH, POCPCO2, WQI4FLM, PCO2, POCPO2, PO2ART, PO2, POCHCO3, VKK0KIF, HCO3, NBEA, PBEA, BEART, BE, THGBART, THB, PKB3KYC, OMWV2LSM, Q4ARWDDA, O2SAT, FIO2  Lab Results   Component Value Date/Time    SPECIAL R HAND 10ML 05/01/2022 05:25 PM     Lab Results   Component Value Date/Time    CULTURE NO GROWTH 5 DAYS 05/01/2022 05:25 PM       Radiology:  No results found.     Physical Examination:        General appearance:  alert, cooperative and no distress  Mental Status:  oriented to person, place and time and normal affect  Lungs:  clear to auscultation bilaterally, normal effort  Heart:  regular rate and rhythm, no murmur  Abdomen:  soft, nontender, nondistended, normal bowel sounds, no masses, hepatomegaly, splenomegaly  Extremities:  no edema, redness, tenderness in the calves  Skin:  no gross lesions, rashes, induration    Assessment:        Hospital Problems             Last Modified POA    * (Principal) Hyponatremia 9/24/2022 Yes    BPH (benign prostatic hyperplasia) 9/24/2022 Yes    Coronary atherosclerosis 9/24/2022 Yes    Bipolar 1 disorder, depressed ([address] 9/24/2022 Yes    Hyperkalemia 9/24/2022 Yes    ARIELLE on CPAP 9/24/2022 Yes    Other specified hypothyroidism (Chronic) 9/24/2022 Yes Presence of prosthetic heart valve (Chronic) 9/24/2022 Yes    Restless leg syndrome 9/25/2022 Yes    Primary hypertension 9/24/2022 Yes    GERD (gastroesophageal reflux disease) 9/24/2022 Yes    Chronic renal impairment, stage 3 (moderate) ([address] 9/24/2022 Yes    Atrial fibrillation ([address] 9/24/2022 Yes       Plan:        Hyponatremia - no changes >5-8meq in 24 hours, D5 for now, re-eval tomorrow.  Check urine electrolytes   Hyperkalemia - resolved  Bipolar Disorder - restart home meds  Atrial Fibrillation - continue warfarin   Hypothroidism - home meds   GERD  CKD - stable  RLS  HTN   ARIELLE  CAD  BPH  Dispo: jessica Chino DO  9/25/2022  10:53 AM

## 2022-09-25 NOTE — CONSULTS
Warfarin Dosing - Pharmacy Consult Note  Consulting Provider: Babak Walters CNP  Indication:  Atrial Fibrillation  Warfarin Dose prior to admission: 2mg MWSun, 4mg AOD   Concurrent anticoagulants/antiplatelets: Lovenox (will PS to dc)  Significant Drug Interactions: No obvious interactions  Recent Labs     09/22/22  0716 09/23/22  0749 09/24/22  1253 09/24/22 2025   INR 2.5 2.4  --   --    HGB  --   --  13.3  --    PLT  --   --  187  --    LABALBU  --   --  4.2 3.7     Recent warfarin administrations                     warfarin (COUMADIN) tablet 4 mg (mg) 4 mg Given 09/22/22 1819                   Date   INR    Dose  9/24/22       Assessment/Plan  (Goal INR: 2 - 3)  Will check INR and if pt has taken a dose today at home and dose accordingly    Active problem list reviewed. INR orders are placed. Chart reviewed for pertinent labs, drug/diet interactions, and past doses. Documentation of patient's clinical condition was reviewed. Pharmacy Dosing:  Pharmacy will continue to follow.

## 2022-09-25 NOTE — CARE COORDINATION
09/25/22 1202   Service Assessment   Patient Orientation Alert and Oriented;Person;Place;Situation;Self   Cognition Alert   History Provided By Patient   Primary [address] Family Members   Patient's Healthcare Decision Maker is: Legal Next of Glenna Arellano   PCP Verified by CM Yes   Last Visit to PCP Within last 3 months   Prior Functional Level Independent in ADLs/IADLs   Current Functional Level Independent in ADLs/IADLs   Can patient return to prior living arrangement Yes   Ability to make needs known: Good   Family able to assist with home care needs: Yes   Would you like for me to discuss the discharge plan with any other family members/significant others, and if so, who? No   Social/Functional History   Lives With Alone   Type of Home Apartment   Home Layout One level   Bathroom Shower/Tub Tub/Shower unit   Bathroom Toilet Standard   Bathroom Accessibility [address] Help From Family   ADL Assistance Independent   Homemaking Assistance Independent   Ambulation Assistance Independent   Active  No   Patient's  Info uses M6R cab   Occupation On disability   Discharge Planning   [address] [place] Medic[facility] No   Type of [address] Services;OT;PT;Nursing Services   Patient expects to be discharged to: House   Follow Up Appointment: Best Day/Time  Monday AM   One/Two Story Residence One story   History of falls? 0   Services At/After Discharge   Transition of Care Consult (CM Consult) [address] Provided? No   Mode of Transport at Discharge Other (see comment)  (cab)   PT/OT to [facility]. Plan is home. Lives alone and uses a cane. Wants partners in home care. Referral sent. Continue to follow.

## 2022-09-25 NOTE — ED PROVIDER NOTES
[address] [place] [address] ED  Emergency Department Encounter  Advanced Practice Provider   The care is provided during an unprecedented national emergency due to the novel coronavirus COVID 19    Pt Name: Jose Kong  MRN: [id number]  Haydeegfcaleb 1960  Date of evaluation: 9/24/22  PCP:  LOGAN Castillo NP    CHIEF COMPLAINT       Chief Complaint   Patient presents with    Abnormal Lab     States that Dr Hank Dawkins sent him here, had labs drawn today and potassium high       HISTORY OF PRESENT ILLNESS  (Location/Symptom, Timing/Onset,Context/Setting, Quality, Duration, Modifying Factors, Severity.)      Jose Kong is a 57 y.o. male who presents with possible high potassium. Patient states that he had blood work drawn today for his Dr. Keke Graham and received a call tonight that his potassium was high. Patient denies any chest pain shortness of breath abdominal pain nausea or vomiting. He states that he gets his blood work done every 6 months to [facility] for cancer\". Patient states that he did recently get out of the psychiatric unit and they did start him on Elavil but no other medication changes. He does take warfarin and is compliant with his dosing. He denies any palpitations. No alcohol use/    PAST MEDICAL /SURGICAL / SOCIAL / FAMILY HISTORY      has a past medical history of AMS (altered mental status), Atrial fibrillation ([address] Bipolar 1 disorder ([address] Depression, Hypertension, Neck pain, Neurofibromatosis ([address] Patient in clinical research study, Severe recurrent major depression without psychotic features ([address] and Twitching.     has a past surgical history that includes hernia repair; Neck surgery (2013); Abdomen surgery; Tonsillectomy; Appendectomy; Dilatation, esophagus; Cardiac surgery (7/14/15); Cardiac surgery (07/14/2015); Colonoscopy (01/28/2021); and Upper gastrointestinal endoscopy (01/28/2021).     Social History     Socioeconomic History    Marital status: Single     Spouse name: Not on file    Number of children: Not on file    Years of education: Not on file    Highest education level: Not on file   Occupational History    Not on file   Tobacco Use    Smoking status: Never     Passive exposure: Never    Smokeless tobacco: Never   Vaping Use    Vaping Use: Never used   Substance and Sexual Activity    Alcohol use: Not Currently     Comment: last drank 6yrs ago    Drug use: Not Currently     Comment: denied during admit    Sexual activity: Not Currently   Other Topics Concern    Not on file   Social History Narrative    Not on file     Social Determinants of Health     Financial Resource Strain: Not on file   Food Insecurity: Not on file   Transportation Needs: Not on file   Physical Activity: Not on file   Stress: Not on file   Social Connections: Not on file   Intimate Partner Violence: Not on file   Housing Stability: Not on file       Family History   Problem Relation Age of Onset    Coronary Art Dis Mother     Hypertension Mother     Hypertension Father     Cancer Father         blood       Allergies:  Oxycodone, Fish-derived products, Ibuprofen, Latuda [lurasidone hcl], Lithium, Lurasidone, Quetiapine, and Seroquel [quetiapine fumarate]    Home Medications:  Prior to Admission medications    Medication Sig Start Date End Date Taking?  Authorizing Provider   amitriptyline (ELAVIL) 25 MG tablet Take 1 tablet by mouth nightly Patient reports home supply 9/22/22   Sumi Patel MD   diclofenac sodium (VOLTAREN) 1 % GEL Apply 2 g topically 4 times daily as needed for Pain 9/22/22   Sumi Patel MD   hydrOXYzine HCl (ATARAX) 50 MG tablet Take 1 tablet by mouth 3 times daily as needed for Anxiety 9/22/22 10/2/22  Sumi Patel MD   melatonin 3 MG TABS tablet Take 0.5 tablets by mouth at bedtime 9/22/22   Sumi Patel MD   polyethylene glycol (GLYCOLAX) 17 g packet Take 17 g by mouth daily as needed for Constipation 9/22/22 10/22/22  Sumi Patel MD   risperiDONE (RISPERDAL) 0.5 MG tablet Take 1 tablet by mouth 2 times daily 9/22/22   Mabel Shine MD   warfarin (COUMADIN) 2 MG tablet Take 4 mg by mouth four times a week Indications: Tues, Thurs, Fri, Sat Managed by [facility] Medication Management    Historical Provider, MD   sennosides-docusate sodium (SENOKOT-S) 8.6-50 MG tablet Take 1 tablet by mouth daily    Historical Provider, MD   docusate sodium (COLACE) 100 mg capsule Take 200 mg by mouth daily    Historical Provider, MD   calcium citrate (CALCITRATE) 250 MG TABS tablet Take 250 mg by mouth 2 times daily    Historical Provider, MD   warfarin (COUMADIN) 2 MG tablet Take 2 mg by mouth three times a week Indications: Sun, Mon, Wed Managed by [facility] Medication Management    Historical Provider, MD   pantoprazole (PROTONIX) 40 MG tablet Take 40 mg by mouth daily    Historical Provider, MD   acetaminophen (TYLENOL) 500 MG tablet Take 500 mg by mouth every 6 hours as needed 2/25/22 2/25/23  Historical Provider, MD   Cholecalciferol (VITAMIN D3) 50 MCG (2000 UT) CAPS Take 1 capsule by mouth daily (with breakfast)    Historical Provider, MD   OXcarbazepine (TRILEPTAL) 300 MG tablet Take 600 mg by mouth 2 times daily     Historical Provider, MD   FEROSUL 325 (65 Fe) MG tablet Take 325 mg by mouth daily (with breakfast)  7/7/21   Historical Provider, MD   gabapentin (NEURONTIN) 300 MG capsule Take 900 mg by mouth 3 times daily.  Pt. States he takes 900 mg. TID    Historical Provider, MD   aspirin 81 MG chewable tablet Take 1 tablet by mouth daily 7/8/20   Sivan St MD   atorvastatin (LIPITOR) 20 MG tablet Take 1 tablet by mouth nightly 7/7/20   Sivan St MD   midodrine (PROAMATINE) 10 MG tablet Take 10 mg by mouth 3 times daily    Historical Provider, MD   folic acid (FOLVITE) 1 MG tablet Take 1 mg by mouth daily    Historical Provider, MD   furosemide (LASIX) 20 MG tablet Take 20 mg by mouth daily    Historical Provider, MD   tamsulosin (FLOMAX) 0.4 MG capsule Take 0.4 mg by mouth daily Historical Provider, MD   Cyanocobalamin (VITAMIN B 12) 500 MCG TABS Take 1,000 mcg by mouth daily     Historical Provider, MD   alendronate (FOSAMAX) 70 MG tablet Take 70 mg by mouth every 7 days Patient takes on mondays. Historical Provider, MD   cetirizine (ZYRTEC) 10 MG tablet Take 10 mg by mouth daily    Historical Provider, MD       patient's medication list has been reviewed as entered by the nursing staff. REVIEW OF SYSTEMS    (2-9 systems for level 4, 10 or more for level 5)      Review of Systems   Constitutional:  Negative for chills and fever. HENT:  Negative for ear pain, rhinorrhea and sore throat. Eyes:  Negative for pain, discharge and itching. Respiratory:  Negative for cough, shortness of breath and wheezing. Cardiovascular:  Negative for chest pain and palpitations. Gastrointestinal:  Negative for nausea and vomiting. Genitourinary:  Negative for difficulty urinating, dysuria and hematuria. Musculoskeletal:  Negative for back pain and myalgias. Skin:  Negative for rash and wound. Neurological:  Negative for dizziness and headaches. Psychiatric/Behavioral:  Negative for dysphoric mood. PHYSICAL EXAM  (up to 7 for level 4, 8 or more for level 5)      INITIAL VITALS:  height is 5' 6\" (1.676 m) and weight is 190 lb (86.2 kg). His oral temperature is 97.7 °F (36.5 °C). His blood pressure is 110/74 and his pulse is 83. His respiration is 23 and oxygen saturation is 97%. Physical Exam  Constitutional:       Appearance: He is well-developed. He is not diaphoretic. HENT:      Head: Normocephalic and atraumatic. Right Ear: External ear normal.      Left Ear: External ear normal.   Eyes:      General: No scleral icterus. Left eye: No discharge. Neck:      Trachea: No tracheal deviation. Cardiovascular:      Rate and Rhythm: Normal rate and regular rhythm. Heart sounds: Normal heart sounds. No murmur heard. No gallop.    Pulmonary:      Effort: - 10.4 mg/dL    Sodium 125 (L) 135 - 144 mmol/L    Potassium 4.4 3.7 - 5.3 mmol/L    Chloride 93 (L) 98 - 107 mmol/L    CO2 21 20 - 31 mmol/L    Anion Gap 11 9 - 17 mmol/L    Alkaline Phosphatase 191 (H) 40 - 129 U/L     (H) 5 - 41 U/L    AST 92 (H) <40 U/L    Total Bilirubin 0.2 (L) 0.3 - 1.2 mg/dL    Total Protein 6.5 6.4 - 8.3 g/dL    Albumin 3.7 3.5 - 5.2 g/dL    GFR Non-African American >60 >60 mL/min    GFR African American >60 >60 mL/min    GFR Comment         EKG 12 Lead   Result Value Ref Range    Ventricular Rate 88 BPM    Atrial Rate 88 BPM    P-R Interval 158 ms    QRS Duration 92 ms    Q-T Interval 366 ms    QTc Calculation (Bazett) 442 ms    P Axis 52 degrees    R Axis 12 degrees    T Axis 45 degrees         CONSULTS:  None    PROCEDURES:  None    FINAL IMPRESSION      1. Hyponatremia    2. Elevated transaminase level          DISPOSITION / PLAN     DISPOSITION     PATIENT REFERRED TO:  No follow-up provider specified.     DISCHARGE MEDICATIONS:  New Prescriptions    No medications on file       Severo Mayer PA-C   Emergency Medicine Physician Assistant    (Please note that portions of this note were completed with a voice recognition program.  Efforts were made to edit thedictations but occasionally words are mis-transcribed.)        Severo Mayer PA-C  09/24/22 [id number]

## 2022-09-25 NOTE — H&P
[facility]  Office: [address], DO, Ann , DO, Kathleen Sender, DO, Missael December Blood, DO, Ceci Cardoza MD, Mamadou Alford MD, Amy Steiner MD, Marie Evans MD,  Stephen Orona MD, Jing Ferris MD, Sharon Thompson, DO, Melene Boast, MD,  Yuliet Ballesteros MD, Nona Prescott MD, Landon Villarreal DO, Selvin Waldron MD, Yeny Oliver MD, Jose Barr MD, Jona Szymanski MD, Winston Salazar MD, Barb Ramirez MD, Fernand Dance, DO, Geremias Sullivan MD, James Wood MD, Lisandro Hickman, CNP,  Leonel Saucedo, CNP, Raul Ibrahim, CNP, Tana Guerrier, CNP,  Jame Denton, [facility], Gunnar Nguyen, CNP, Joe Nicholas, CNP, Jovana Luna, CNP, Anupam Walker, CNP, Taye Bose, CNP, Nicolas Hennessy PA-C, America Forrester, CNS, Poppy Murray, [facility], Ursula Li, CNP, Ingrid Moura, CNP, Edin Ferris, [facility]    HISTORY AND PHYSICAL EXAMINATION            Date:   9/24/2022  Patient name:  Rochelle Mayer  Date of admission:  9/24/2022  8:01 PM  MRN:   [id number]  Account:  [de-identified]  YOB: 1960  PCP:    Isabel Boas, APRN - NP  Room:   2024/2024-01  Code Status:    Full Code    Chief Complaint:     Chief Complaint   Patient presents with    Abnormal Lab     States that Dr Mari Delgadillo sent him here, had labs drawn today and potassium high       History Obtained From:     patient, electronic medical record    History of Present Illness:     Rochelle Mayer is a 57 y.o. Non- / non  male who presents with Abnormal Lab (States that Dr Mari Delgadillo sent him here, had labs drawn today and potassium high)   and is admitted to the hospital for the management of Hyponatremia. The patient presented the ER with high potassium. Patient states that he had blood work drawn today for his Dr. Giselle Ward and received a call tonight that his potassium was high.   Patient denies any chest pain shortness of breath abdominal pain nausea or vomiting. He states that he gets his blood work done every 6 months to [facility] for cancer\". He states he typically drinks 3-4 bottles of water and a couple Gatorade's a day. He denies excessive thirst or urination. Patient states that he did recently get out of the psychiatric unit and they did start him on Elavil but no other medication changes. He does take warfarin and is compliant with his dosing. He denies any palpitations. No recent alcohol use. History includes monoclonal gammopathy, ARIELLE with CPAP, atrial fibs, prosthetic valve, hypothyroidism, hypertension, bipolar disorder, crack cocaine use, and chronic renal insufficiency.       Outpatient sodium at 1253 was 123 sodium when he got here was 125  Outpatient Potassium was 6.1 and on his recheck on arrival to the ER 4.4  [address]  Ast 128- Liver enzymes chronically elevated    Will check urine sodium, urine osmolality, and serum osmolality  Hold Lasix for now      Past Medical History:     Past Medical History:   Diagnosis Date    AMS (altered mental status) 02/15/2020    Atrial fibrillation ([address]     on coumadin    Bipolar 1 disorder ([address]     Depression     Hypertension     Neck pain     Neurofibromatosis ([address]     Lt leg pain    Patient in clinical research study 04/12/2018    [id number]    Severe recurrent major depression without psychotic features ([address] 6/13/2022    Twitching         Past Surgical History:     Past Surgical History:   Procedure Laterality Date    ABDOMEN SURGERY      APPENDECTOMY      CARDIAC SURGERY  7/14/15    Median Sternotomy, Repair of ascending aorti aneurysm, stentless valve placement    CARDIAC SURGERY  07/14/2015    Median stenotomy, repair of ascending aorti aneurysm, stentless valve placement     COLONOSCOPY  01/28/2021    DILATATION, ESOPHAGUS      HERNIA REPAIR      NECK SURGERY  2013    TONSILLECTOMY      UPPER GASTROINTESTINAL ENDOSCOPY  01/28/2021        Medications Prior to Admission:     Prior to Admission medications    Medication Sig Start Date End Date Taking?  Authorizing Provider   amitriptyline (ELAVIL) 25 MG tablet Take 1 tablet by mouth nightly Patient reports home supply 9/22/22   Lucina Doss MD   diclofenac sodium (VOLTAREN) 1 % GEL Apply 2 g topically 4 times daily as needed for Pain 9/22/22   Lucina Doss MD   hydrOXYzine HCl (ATARAX) 50 MG tablet Take 1 tablet by mouth 3 times daily as needed for Anxiety 9/22/22 10/2/22  Lucina Doss MD   melatonin 3 MG TABS tablet Take 0.5 tablets by mouth at bedtime 9/22/22   Lucina Doss MD   polyethylene glycol (GLYCOLAX) 17 g packet Take 17 g by mouth daily as needed for Constipation 9/22/22 10/22/22  Lucina Doss MD   risperiDONE (RISPERDAL) 0.5 MG tablet Take 1 tablet by mouth 2 times daily 9/22/22   Lucina Doss MD   warfarin (COUMADIN) 2 MG tablet Take 4 mg by mouth four times a week Indications: Tues, Thurs, Fri, Sat Managed by [facility] Medication Management    Historical Provider, MD   sennosides-docusate sodium (SENOKOT-S) 8.6-50 MG tablet Take 1 tablet by mouth daily    Historical Provider, MD   docusate sodium (COLACE) 100 mg capsule Take 200 mg by mouth daily    Historical Provider, MD   calcium citrate (CALCITRATE) 250 MG TABS tablet Take 250 mg by mouth 2 times daily    Historical Provider, MD   warfarin (COUMADIN) 2 MG tablet Take 2 mg by mouth three times a week Indications: Sun, Mon, Wed Managed by [facility] Medication Management    Historical Provider, MD   pantoprazole (PROTONIX) 40 MG tablet Take 40 mg by mouth daily    Historical Provider, MD   acetaminophen (TYLENOL) 500 MG tablet Take 500 mg by mouth every 6 hours as needed 2/25/22 2/25/23  Historical Provider, MD   Cholecalciferol (VITAMIN D3) 50 MCG (2000 UT) CAPS Take 1 capsule by mouth daily (with breakfast)    Historical Provider, MD   OXcarbazepine (TRILEPTAL) 300 MG tablet Take 600 mg by mouth 2 times daily     Historical Provider, MD   FEROSUL 325 (65 Fe) MG tablet Take 325 mg by mouth daily (with breakfast)  7/7/21   Historical Provider, MD   gabapentin (NEURONTIN) 300 MG capsule Take 900 mg by mouth 3 times daily. Pt. States he takes 900 mg. TID    Historical Provider, MD   aspirin 81 MG chewable tablet Take 1 tablet by mouth daily 7/8/20   Britany Lane MD   atorvastatin (LIPITOR) 20 MG tablet Take 1 tablet by mouth nightly 7/7/20   Britany Lane MD   midodrine (PROAMATINE) 10 MG tablet Take 10 mg by mouth 3 times daily    Historical Provider, MD   folic acid (FOLVITE) 1 MG tablet Take 1 mg by mouth daily    Historical Provider, MD   furosemide (LASIX) 20 MG tablet Take 20 mg by mouth daily    Historical Provider, MD   tamsulosin (FLOMAX) 0.4 MG capsule Take 0.4 mg by mouth daily    Historical Provider, MD   Cyanocobalamin (VITAMIN B 12) 500 MCG TABS Take 1,000 mcg by mouth daily     Historical Provider, MD   alendronate (FOSAMAX) 70 MG tablet Take 70 mg by mouth every 7 days Patient takes on mondays. Historical Provider, MD   cetirizine (ZYRTEC) 10 MG tablet Take 10 mg by mouth daily    Historical Provider, MD        Allergies:     Oxycodone, Fish-derived products, Ibuprofen, Latuda [lurasidone hcl], Lithium, Lurasidone, Quetiapine, and Seroquel [quetiapine fumarate]    Social History:     Tobacco:    reports that he has never smoked. He has never been exposed to tobacco smoke. He has never used smokeless tobacco.  Alcohol:      reports that he does not currently use alcohol. Drug Use:  reports that he does not currently use drugs. Family History:     Family History   Problem Relation Age of Onset    Coronary Art Dis Mother     Hypertension Mother     Hypertension Father     Cancer Father         blood       Review of Systems:     Positive and Negative as described in HPI. Review of Systems   All other systems reviewed and are negative.     Physical Exam:   /67   Pulse 79   Temp 97.8 °F (36.6 °C) (Axillary)   Resp 18   Ht 5' 6\" (1.676 m)   Wt 190 lb (86.2 kg)   SpO2 98%   BMI 30.67 kg/m²   Temp (24hrs), Av.8 °F (36.6 °C), Min:97.7 °F (36.5 °C), Max:97.8 °F (36.6 °C)    No results for input(s): POCGLU in the last 72 hours. No intake or output data in the 24 hours ending 22 234    Physical Exam  Vitals and nursing note reviewed. HENT:      Mouth/Throat:      Mouth: Mucous membranes are dry. Eyes:      Conjunctiva/sclera: Conjunctivae normal.   Cardiovascular:      Rate and Rhythm: Normal rate and regular rhythm. Pulses: Normal pulses. Heart sounds: Normal heart sounds. Pulmonary:      Effort: Pulmonary effort is normal.      Breath sounds: Normal breath sounds. Abdominal:      General: Bowel sounds are normal.      Palpations: Abdomen is soft. Musculoskeletal:         General: Normal range of motion. Right lower leg: No edema. Left lower leg: No edema. Skin:     General: Skin is warm and dry. Capillary Refill: Capillary refill takes less than 2 seconds. Neurological:      Mental Status: He is alert and oriented to person, place, and time. Psychiatric:         Mood and Affect: Mood normal.         Thought Content:  Thought content normal.       Investigations:      Laboratory Testing:  Recent Results (from the past 24 hour(s))   CBC with Auto Differential    Collection Time: 22 12:53 PM   Result Value Ref Range    WBC 7.9 3.5 - 11.3 k/uL    RBC 4.22 4.21 - 5.77 m/uL    Hemoglobin 13.3 13.0 - 17.0 g/dL    Hematocrit 37.3 (L) 40.7 - 50.3 %    MCV 88.4 82.6 - 102.9 fL    MCH 31.5 25.2 - 33.5 pg    MCHC 35.7 (H) 28.4 - 34.8 g/dL    RDW 14.2 11.8 - 14.4 %    Platelets 629 845 - 928 k/uL    MPV 10.1 8.1 - 13.5 fL    NRBC Automated 0.0 0.0 per 100 WBC    Immature Granulocytes 1 (H) 0 %    Seg Neutrophils 83 (H) 36 - 66 %    Lymphocytes 5 (L) 24 - 44 %    Monocytes 11 (H) 1 - 7 %    Eosinophils % 0 (L) 1 - 4 %    Basophils 0 0 - 2 %    Absolute Immature Granulocyte 0.08 0.00 - 0.30 k/uL    Segs Absolute 6.55 1.8 - 7.7 k/uL    Absolute Lymph # 0.40 (L) 1.0 - 4.8 k/uL    Absolute Mono # 0.87 (H) 0.1 - 0.8 k/uL    Absolute Eos # 0.00 0.0 - 0.4 k/uL    Basophils Absolute 0.00 0.0 - 0.2 k/uL    Morphology Normal    Comprehensive Metabolic Panel    Collection Time: 09/24/22 12:53 PM   Result Value Ref Range    Glucose 108 (H) 70 - 99 mg/dL    BUN 14 8 - 23 mg/dL    Creatinine 0.98 0.70 - 1.20 mg/dL    Calcium 9.3 8.6 - 10.4 mg/dL    Sodium 123 (L) 135 - 144 mmol/L    Potassium 6.1 (HH) 3.7 - 5.3 mmol/L    Chloride 89 (L) 98 - 107 mmol/L    CO2 26 20 - 31 mmol/L    Anion Gap 8 (L) 9 - 17 mmol/L    Alkaline Phosphatase 201 (H) 40 - 129 U/L     (H) 5 - 41 U/L     (H) <40 U/L    Total Bilirubin 0.3 0.3 - 1.2 mg/dL    Total Protein 6.8 6.4 - 8.3 g/dL    Albumin 4.2 3.5 - 5.2 g/dL    Albumin/Globulin Ratio 1.6 1.0 - 2.5    GFR Non-African American >60 >60 mL/min    GFR African American >60 >60 mL/min    GFR Comment         Electrophoresis Protein, Serum    Collection Time: 09/24/22 12:53 PM   Result Value Ref Range    Total Protein 6.3 (L) 6.4 - 8.3 g/dL    Albumin (calculated) PENDING g/dL    Albumin % PENDING %    Alpha-1-Globulin PENDING g/dL    Alpha 1 % PENDING %    Alpha-2-Globulin PENDING g/dL    Alpha 2 % PENDING %    Beta Globulin PENDING g/dL    Beta Percent PENDING %    Gamma Globulin PENDING g/dL    Gamma Globulin % PENDING %    Total Prot. Sum PENDING g/dL    Total Prot.  Sum,% PENDING %    Protein Electrophoresis, Serum PENDING     Pathologist PENDING    Kappa/Lambda Quantitative Free Light Chains, Serum    Collection Time: 09/24/22 12:53 PM   Result Value Ref Range    Kappa Free Light Chains QNT 9.56 (H) 0.37 - 1.94 mg/dL    Lambda Free Light Chains QNT 1.91 0.57 - 2.63 mg/dL    Free Kappa/Lambda Ratio 5.01 (H) 0.26 - 1.65   EKG 12 Lead    Collection Time: 09/24/22  8:17 PM   Result Value Ref Range    Ventricular Rate 88 BPM    Atrial Rate 88 BPM    P-R Interval 158 ms    QRS Duration 92 ms    Q-T Interval 366 ms    QTc Calculation (Bazett) 442 ms    P Axis 52 degrees    R Axis 12 degrees    T Axis 45 degrees   Comprehensive Metabolic Panel w/ Reflex to MG    Collection Time: 09/24/22  8:25 PM   Result Value Ref Range    Glucose 97 70 - 99 mg/dL    BUN 17 8 - 23 mg/dL    Creatinine 0.89 0.70 - 1.20 mg/dL    Bun/Cre Ratio 19 9 - 20    Calcium 8.9 8.6 - 10.4 mg/dL    Sodium 125 (L) 135 - 144 mmol/L    Potassium 4.4 3.7 - 5.3 mmol/L    Chloride 93 (L) 98 - 107 mmol/L    CO2 21 20 - 31 mmol/L    Anion Gap 11 9 - 17 mmol/L    Alkaline Phosphatase 191 (H) 40 - 129 U/L     (H) 5 - 41 U/L    AST 92 (H) <40 U/L    Total Bilirubin 0.2 (L) 0.3 - 1.2 mg/dL    Total Protein 6.5 6.4 - 8.3 g/dL    Albumin 3.7 3.5 - 5.2 g/dL    GFR Non-African American >60 >60 mL/min    GFR African American >60 >60 mL/min    GFR Comment             Imaging/Diagnostics:  No results found.     Assessment :      Hospital Problems             Last Modified POA    * (Principal) Hyponatremia 9/24/2022 Yes    BPH (benign prostatic hyperplasia) 9/24/2022 Yes    Coronary atherosclerosis 9/24/2022 Yes    Bipolar 1 disorder, depressed ([address] 9/24/2022 Yes    Hyperkalemia 9/24/2022 Yes    ARIELLE on CPAP 9/24/2022 Yes    Other specified hypothyroidism (Chronic) 9/24/2022 Yes    Presence of prosthetic heart valve (Chronic) 9/24/2022 Yes    Primary hypertension 9/24/2022 Yes    GERD (gastroesophageal reflux disease) 9/24/2022 Yes    Chronic renal impairment, stage 3 (moderate) ([address] 9/24/2022 Yes    Atrial fibrillation ([address] 9/24/2022 Yes       Plan:     Patient status inpatient in the  Med/Surge    Hyponatremia  Hold Lasix pending urine sodium, urine osmolality and serum osmolality  Fluid restriction  Repeat sodium every 4 hours  Hold Elavil  Monitor I&O    Hyperkalemia   Currently stable  Repeat BMP in am    Bipolar disorder  Continue home Trileptal and Risperdal    Primary hypertension  Hold Lasix for now  Monitor for hypertension    GERD  Home PPI    Restless leg syndrome  Resume home gabapentin    History of BPH  Resume home Flomax    CKD  Avoid nephrotoxic agents  Monitor I&O    Cad with AVR  Continue home aspirin, Lipitor and Coumadin  Pharmacy consulted to dose Coumadin    Atrial fib  Continue Coumadin with pharmacy dosing    Okay to use home CPAP      Consultations:   IP CONSULT TO HOSPITALIST  PHARMACY TO DOSE WARFARIN    Patient is admitted as inpatient status because of co-morbidities listed above, severity of signs and symptoms as outlined, requirement for current medical therapies and most importantly because of direct risk to patient if care not provided in a hospital setting. Expected length of stay > 48 hours.     LOGAN Arellano - CNP  9/24/2022  10:46 PM    Copy sent to LOGAN Wheat - NP

## 2022-09-26 ENCOUNTER — APPOINTMENT (OUTPATIENT)
Dept: CT IMAGING | Age: 62
DRG: 426 | End: 2022-09-26
Payer: COMMERCIAL

## 2022-09-26 LAB
ADRENOCORTICOTROPIC HORMONE: 18 PG/ML (ref 7–69)
ALBUMIN (CALCULATED): 4.2 G/DL (ref 3.2–5.2)
ALBUMIN PERCENT: 66 % (ref 45–65)
ALBUMIN SERPL-MCNC: 3.6 G/DL (ref 3.5–5.2)
ALPHA 1 PERCENT: 3 % (ref 3–6)
ALPHA 2 PERCENT: 11 % (ref 6–13)
ALPHA-1-GLOBULIN: 0.2 G/DL (ref 0.1–0.4)
ALPHA-2-GLOBULIN: 0.7 G/DL (ref 0.5–0.9)
ANION GAP SERPL CALCULATED.3IONS-SCNC: 10 MMOL/L (ref 9–17)
ANION GAP SERPL CALCULATED.3IONS-SCNC: 10 MMOL/L (ref 9–17)
ANION GAP SERPL CALCULATED.3IONS-SCNC: 9 MMOL/L (ref 9–17)
ANION GAP SERPL CALCULATED.3IONS-SCNC: 9 MMOL/L (ref 9–17)
BETA GLOBULIN: 0.7 G/DL (ref 0.5–1.1)
BETA PERCENT: 10 % (ref 11–19)
BUN BLDV-MCNC: 15 MG/DL (ref 8–23)
BUN BLDV-MCNC: 16 MG/DL (ref 8–23)
BUN BLDV-MCNC: 17 MG/DL (ref 8–23)
BUN BLDV-MCNC: 18 MG/DL (ref 8–23)
BUN/CREAT BLD: 15 (ref 9–20)
BUN/CREAT BLD: 19 (ref 9–20)
BUN/CREAT BLD: 20 (ref 9–20)
BUN/CREAT BLD: 21 (ref 9–20)
CALCIUM SERPL-MCNC: 8.6 MG/DL (ref 8.6–10.4)
CALCIUM SERPL-MCNC: 8.7 MG/DL (ref 8.6–10.4)
CHLORIDE BLD-SCNC: 91 MMOL/L (ref 98–107)
CHLORIDE BLD-SCNC: 92 MMOL/L (ref 98–107)
CHLORIDE BLD-SCNC: 92 MMOL/L (ref 98–107)
CHLORIDE BLD-SCNC: 93 MMOL/L (ref 98–107)
CO2: 23 MMOL/L (ref 20–31)
CO2: 23 MMOL/L (ref 20–31)
CO2: 24 MMOL/L (ref 20–31)
CO2: 24 MMOL/L (ref 20–31)
CREAT SERPL-MCNC: 0.83 MG/DL (ref 0.7–1.2)
CREAT SERPL-MCNC: 0.85 MG/DL (ref 0.7–1.2)
CREAT SERPL-MCNC: 0.85 MG/DL (ref 0.7–1.2)
CREAT SERPL-MCNC: 0.97 MG/DL (ref 0.7–1.2)
GAMMA GLOBULIN %: 10 % (ref 9–20)
GAMMA GLOBULIN: 0.7 G/DL (ref 0.5–1.5)
GFR AFRICAN AMERICAN: >60 ML/MIN
GFR NON-AFRICAN AMERICAN: >60 ML/MIN
GFR SERPL CREATININE-BSD FRML MDRD: ABNORMAL ML/MIN/{1.73_M2}
GLUCOSE BLD-MCNC: 102 MG/DL (ref 70–99)
GLUCOSE BLD-MCNC: 115 MG/DL (ref 70–99)
GLUCOSE BLD-MCNC: 91 MG/DL (ref 70–99)
GLUCOSE BLD-MCNC: 94 MG/DL (ref 70–99)
HCT VFR BLD CALC: 35.1 % (ref 40.7–50.3)
HEMOGLOBIN: 11.6 G/DL (ref 13–17)
INR BLD: 2.4
MAGNESIUM: 1.7 MG/DL (ref 1.6–2.6)
MCH RBC QN AUTO: 29.3 PG (ref 25.2–33.5)
MCHC RBC AUTO-ENTMCNC: 33 G/DL (ref 28.4–34.8)
MCV RBC AUTO: 88.6 FL (ref 82.6–102.9)
NRBC AUTOMATED: 0 PER 100 WBC
PATHOLOGIST: ABNORMAL
PATHOLOGIST: NORMAL
PDW BLD-RTO: 14.4 % (ref 11.8–14.4)
PHOSPHORUS: 3.9 MG/DL (ref 2.5–4.5)
PLATELET # BLD: 162 K/UL (ref 138–453)
PMV BLD AUTO: 9.7 FL (ref 8.1–13.5)
POTASSIUM SERPL-SCNC: 4.5 MMOL/L (ref 3.7–5.3)
POTASSIUM SERPL-SCNC: 4.5 MMOL/L (ref 3.7–5.3)
POTASSIUM SERPL-SCNC: 4.6 MMOL/L (ref 3.7–5.3)
POTASSIUM SERPL-SCNC: 4.7 MMOL/L (ref 3.7–5.3)
PROSTATE SPECIFIC ANTIGEN: 1.53 NG/ML
PROTEIN ELECTROPHORESIS, SERUM: ABNORMAL
PROTHROMBIN TIME: 25.8 SEC (ref 11.5–14.2)
RBC # BLD: 3.96 M/UL (ref 4.21–5.77)
SERUM IFX INTERP: NORMAL
SODIUM BLD-SCNC: 124 MMOL/L (ref 135–144)
SODIUM BLD-SCNC: 125 MMOL/L (ref 135–144)
SODIUM BLD-SCNC: 125 MMOL/L (ref 135–144)
SODIUM BLD-SCNC: 126 MMOL/L (ref 135–144)
TOTAL PROT. SUM,%: 100 % (ref 98–102)
TOTAL PROT. SUM: 6.5 G/DL (ref 6.3–8.2)
TOTAL PROTEIN: 6.3 G/DL (ref 6.4–8.3)
WBC # BLD: 8.2 K/UL (ref 3.5–11.3)

## 2022-09-26 PROCEDURE — 80048 BASIC METABOLIC PNL TOTAL CA: CPT

## 2022-09-26 PROCEDURE — 97110 THERAPEUTIC EXERCISES: CPT

## 2022-09-26 PROCEDURE — 74176 CT ABD & PELVIS W/O CONTRAST: CPT

## 2022-09-26 PROCEDURE — 36415 COLL VENOUS BLD VENIPUNCTURE: CPT

## 2022-09-26 PROCEDURE — 84153 ASSAY OF PSA TOTAL: CPT

## 2022-09-26 PROCEDURE — 80069 RENAL FUNCTION PANEL: CPT

## 2022-09-26 PROCEDURE — 6370000000 HC RX 637 (ALT 250 FOR IP): Performed by: UROLOGY

## 2022-09-26 PROCEDURE — 85610 PROTHROMBIN TIME: CPT

## 2022-09-26 PROCEDURE — 97116 GAIT TRAINING THERAPY: CPT

## 2022-09-26 PROCEDURE — 1200000000 HC SEMI PRIVATE

## 2022-09-26 PROCEDURE — 2580000003 HC RX 258: Performed by: INTERNAL MEDICINE

## 2022-09-26 PROCEDURE — 6370000000 HC RX 637 (ALT 250 FOR IP): Performed by: NURSE PRACTITIONER

## 2022-09-26 PROCEDURE — 83735 ASSAY OF MAGNESIUM: CPT

## 2022-09-26 PROCEDURE — 2580000003 HC RX 258: Performed by: NURSE PRACTITIONER

## 2022-09-26 PROCEDURE — 99232 SBSQ HOSP IP/OBS MODERATE 35: CPT | Performed by: INTERNAL MEDICINE

## 2022-09-26 PROCEDURE — 6370000000 HC RX 637 (ALT 250 FOR IP): Performed by: INTERNAL MEDICINE

## 2022-09-26 PROCEDURE — 85027 COMPLETE CBC AUTOMATED: CPT

## 2022-09-26 RX ORDER — ALBUTEROL SULFATE 90 UG/1
2 AEROSOL, METERED RESPIRATORY (INHALATION) EVERY 6 HOURS PRN
COMMUNITY

## 2022-09-26 RX ORDER — MAGNESIUM OXIDE 500 MG
10 TABLET ORAL NIGHTLY
Status: ON HOLD | COMMUNITY
End: 2022-09-29 | Stop reason: HOSPADM

## 2022-09-26 RX ORDER — WARFARIN SODIUM 2 MG/1
2 TABLET ORAL
Status: COMPLETED | OUTPATIENT
Start: 2022-09-26 | End: 2022-09-26

## 2022-09-26 RX ORDER — TAMSULOSIN HYDROCHLORIDE 0.4 MG/1
0.4 CAPSULE ORAL ONCE
Status: COMPLETED | OUTPATIENT
Start: 2022-09-26 | End: 2022-09-26

## 2022-09-26 RX ORDER — TRAMADOL HYDROCHLORIDE 50 MG/1
50 TABLET ORAL EVERY 6 HOURS PRN
Status: DISCONTINUED | OUTPATIENT
Start: 2022-09-26 | End: 2022-09-29 | Stop reason: HOSPADM

## 2022-09-26 RX ORDER — PHENOL 1.4 %
10 AEROSOL, SPRAY (ML) MUCOUS MEMBRANE NIGHTLY
Status: ON HOLD | COMMUNITY
Start: 2022-08-31 | End: 2022-09-26 | Stop reason: ALTCHOICE

## 2022-09-26 RX ORDER — ESCITALOPRAM OXALATE 10 MG/1
10 TABLET ORAL DAILY
Status: ON HOLD | COMMUNITY
End: 2022-09-29 | Stop reason: HOSPADM

## 2022-09-26 RX ORDER — TAMSULOSIN HYDROCHLORIDE 0.4 MG/1
0.8 CAPSULE ORAL EVERY EVENING
Status: DISCONTINUED | OUTPATIENT
Start: 2022-09-27 | End: 2022-09-29 | Stop reason: HOSPADM

## 2022-09-26 RX ORDER — OXCARBAZEPINE 300 MG/1
300 TABLET, FILM COATED ORAL 2 TIMES DAILY
Status: DISCONTINUED | OUTPATIENT
Start: 2022-09-26 | End: 2022-09-29 | Stop reason: HOSPADM

## 2022-09-26 RX ORDER — LANOLIN ALCOHOL/MO/W.PET/CERES
6 CREAM (GRAM) TOPICAL NIGHTLY
Status: DISCONTINUED | OUTPATIENT
Start: 2022-09-26 | End: 2022-09-28

## 2022-09-26 RX ORDER — SODIUM CHLORIDE 9 MG/ML
INJECTION, SOLUTION INTRAVENOUS CONTINUOUS
Status: DISCONTINUED | OUTPATIENT
Start: 2022-09-26 | End: 2022-09-27

## 2022-09-26 RX ORDER — ESCITALOPRAM OXALATE 10 MG/1
10 TABLET ORAL DAILY
Status: DISCONTINUED | OUTPATIENT
Start: 2022-09-26 | End: 2022-09-29 | Stop reason: HOSPADM

## 2022-09-26 RX ADMIN — GABAPENTIN 900 MG: 300 CAPSULE ORAL at 14:56

## 2022-09-26 RX ADMIN — ASPIRIN 81 MG CHEWABLE TABLET 81 MG: 81 TABLET CHEWABLE at 08:25

## 2022-09-26 RX ADMIN — HYDROXYZINE HYDROCHLORIDE 50 MG: 25 TABLET, FILM COATED ORAL at 22:35

## 2022-09-26 RX ADMIN — MIDODRINE HYDROCHLORIDE 10 MG: 10 TABLET ORAL at 08:25

## 2022-09-26 RX ADMIN — MIDODRINE HYDROCHLORIDE 10 MG: 10 TABLET ORAL at 22:34

## 2022-09-26 RX ADMIN — RISPERIDONE 0.5 MG: 0.25 TABLET ORAL at 08:25

## 2022-09-26 RX ADMIN — TAMSULOSIN HYDROCHLORIDE 0.4 MG: 0.4 CAPSULE ORAL at 18:12

## 2022-09-26 RX ADMIN — CETIRIZINE HYDROCHLORIDE 10 MG: 10 TABLET ORAL at 08:25

## 2022-09-26 RX ADMIN — TRAMADOL HYDROCHLORIDE 50 MG: 50 TABLET ORAL at 15:56

## 2022-09-26 RX ADMIN — WARFARIN SODIUM 2 MG: 2 TABLET ORAL at 18:12

## 2022-09-26 RX ADMIN — SODIUM CHLORIDE: 9 INJECTION, SOLUTION INTRAVENOUS at 10:13

## 2022-09-26 RX ADMIN — GABAPENTIN 900 MG: 300 CAPSULE ORAL at 22:34

## 2022-09-26 RX ADMIN — ATORVASTATIN CALCIUM 20 MG: 20 TABLET, FILM COATED ORAL at 22:35

## 2022-09-26 RX ADMIN — SENNOSIDES AND DOCUSATE SODIUM 1 TABLET: 50; 8.6 TABLET ORAL at 08:25

## 2022-09-26 RX ADMIN — ESCITALOPRAM OXALATE 10 MG: 10 TABLET ORAL at 14:57

## 2022-09-26 RX ADMIN — DICLOFENAC SODIUM 4 G: 10 GEL TOPICAL at 08:27

## 2022-09-26 RX ADMIN — MIDODRINE HYDROCHLORIDE 10 MG: 10 TABLET ORAL at 14:57

## 2022-09-26 RX ADMIN — GABAPENTIN 900 MG: 300 CAPSULE ORAL at 08:24

## 2022-09-26 RX ADMIN — Medication 6 MG: at 22:35

## 2022-09-26 RX ADMIN — PANTOPRAZOLE SODIUM 40 MG: 40 TABLET, DELAYED RELEASE ORAL at 06:50

## 2022-09-26 RX ADMIN — SODIUM CHLORIDE, PRESERVATIVE FREE 10 ML: 5 INJECTION INTRAVENOUS at 08:26

## 2022-09-26 RX ADMIN — SODIUM CHLORIDE: 9 INJECTION, SOLUTION INTRAVENOUS at 22:39

## 2022-09-26 RX ADMIN — OXCARBAZEPINE 600 MG: 300 TABLET, FILM COATED ORAL at 08:25

## 2022-09-26 RX ADMIN — FOLIC ACID 1 MG: 1 TABLET ORAL at 08:24

## 2022-09-26 RX ADMIN — CYANOCOBALAMIN TAB 1000 MCG 1000 MCG: 1000 TAB at 08:25

## 2022-09-26 RX ADMIN — HYDROXYZINE HYDROCHLORIDE 50 MG: 25 TABLET, FILM COATED ORAL at 10:16

## 2022-09-26 RX ADMIN — ACETAMINOPHEN 650 MG: 325 TABLET ORAL at 04:47

## 2022-09-26 RX ADMIN — Medication 2000 UNITS: at 08:25

## 2022-09-26 RX ADMIN — FERROUS SULFATE TAB EC 325 MG (65 MG FE EQUIVALENT) 325 MG: 325 (65 FE) TABLET DELAYED RESPONSE at 08:25

## 2022-09-26 RX ADMIN — TAMSULOSIN HYDROCHLORIDE 0.4 MG: 0.4 CAPSULE ORAL at 08:24

## 2022-09-26 RX ADMIN — OXCARBAZEPINE 300 MG: 300 TABLET, FILM COATED ORAL at 22:35

## 2022-09-26 RX ADMIN — DICLOFENAC SODIUM 4 G: 10 GEL TOPICAL at 14:59

## 2022-09-26 RX ADMIN — RISPERIDONE 0.5 MG: 0.25 TABLET ORAL at 22:34

## 2022-09-26 RX ADMIN — TRAMADOL HYDROCHLORIDE 50 MG: 50 TABLET ORAL at 22:35

## 2022-09-26 RX ADMIN — ACETAMINOPHEN 650 MG: 325 TABLET ORAL at 10:16

## 2022-09-26 ASSESSMENT — PAIN DESCRIPTION - FREQUENCY
FREQUENCY: CONTINUOUS

## 2022-09-26 ASSESSMENT — PAIN DESCRIPTION - ORIENTATION
ORIENTATION: RIGHT;LEFT
ORIENTATION: RIGHT;LEFT
ORIENTATION: LEFT;RIGHT

## 2022-09-26 ASSESSMENT — PAIN DESCRIPTION - LOCATION
LOCATION: KNEE
LOCATION: BACK;KNEE

## 2022-09-26 ASSESSMENT — PAIN - FUNCTIONAL ASSESSMENT
PAIN_FUNCTIONAL_ASSESSMENT: ACTIVITIES ARE NOT PREVENTED

## 2022-09-26 ASSESSMENT — PAIN DESCRIPTION - ONSET
ONSET: ON-GOING

## 2022-09-26 ASSESSMENT — PAIN DESCRIPTION - PAIN TYPE
TYPE: CHRONIC PAIN

## 2022-09-26 ASSESSMENT — PAIN SCALES - GENERAL
PAINLEVEL_OUTOF10: 3
PAINLEVEL_OUTOF10: 5
PAINLEVEL_OUTOF10: 3
PAINLEVEL_OUTOF10: 7
PAINLEVEL_OUTOF10: 8
PAINLEVEL_OUTOF10: 1

## 2022-09-26 ASSESSMENT — PAIN DESCRIPTION - DESCRIPTORS
DESCRIPTORS: SORE
DESCRIPTORS: ACHING

## 2022-09-26 NOTE — PROGRESS NOTES
Transitions of Care Pharmacy Service   Medication Review    The patient's list of current home medications has been reviewed. Patients maintenance medications with the exception of his warfarin is filled in a bubblepack by [facility]. Patient then administers to himself. Warfarin is managed by [facility]. Source(s) of information: Patient/ Surescripts/ EPIC    Based on information provided by the above source(s), I have updated the patient's home med list as described below. Please review the ACTION REQUESTED section of this note below for any discrepancies on current hospital orders. I changed or updated the following medications on the patient's home medication list:  Removed none     Added Lexapro 10mg PO dailyt  Albuterol inhaler 2 puffs q6hrs prn     Adjusted   Trileptal from 600mg BID to 300mg BID  Flomax from 0.4mg to 0.8mg nightly  Melatonin from 1.5mg to 10mg CR nightly   Other Notes none         PROVIDER ACTION REQUESTED  Medications that need to be addressed by a physician/nurse practitioner:    Medication Action Requested   Lexapro  Trileptal  Flomax  melatonin     Please order as appropriate  Please adjust to 300mg PO BID  Please adjust to 0.8mg nightly  Please adjust to 10mg CR nightly         Please feel free to call me with any questions about this encounter. Thank you.     Debo Carey, [facility]   Transitions of Care Pharmacy Service  Phone:  [phone]  Fax: [phone]      Electronically signed by Debo Carey [facility] on 9/26/2022 at 11:16 AM         Medications Prior to Admission: Melatonin ER 5 MG TBCR, Take 10 mg by mouth at bedtime  escitalopram (LEXAPRO) 10 MG tablet, Take 10 mg by mouth daily  albuterol sulfate HFA (VENTOLIN HFA) 108 (90 Base) MCG/ACT inhaler, Inhale 2 puffs into the lungs every 6 hours as needed for Wheezing  amitriptyline (ELAVIL) 25 MG tablet, Take 1 tablet by mouth nightly Patient reports home supply  diclofenac sodium (VOLTAREN) 1 % GEL, Apply 2 g topically 4 times daily as needed for Pain  hydrOXYzine HCl (ATARAX) 50 MG tablet, Take 1 tablet by mouth 3 times daily as needed for Anxiety  polyethylene glycol (GLYCOLAX) 17 g packet, Take 17 g by mouth daily as needed for Constipation  risperiDONE (RISPERDAL) 0.5 MG tablet, Take 1 tablet by mouth 2 times daily  warfarin (COUMADIN) 2 MG tablet, Take 4 mg by mouth four times a week Indications: Tues, Thurs, Fri, Sat Managed by [facility] Medication Management  sennosides-docusate sodium (SENOKOT-S) 8.6-50 MG tablet, Take 1 tablet by mouth daily  docusate sodium (COLACE) 100 mg capsule, Take 200 mg by mouth daily  calcium citrate (CALCITRATE) 250 MG TABS tablet, Take 250 mg by mouth 2 times daily  warfarin (COUMADIN) 2 MG tablet, Take 2 mg by mouth three times a week Indications: Sun, Mon, Wed Managed by [facility] Medication Management  pantoprazole (PROTONIX) 40 MG tablet, Take 40 mg by mouth daily  acetaminophen (TYLENOL) 500 MG tablet, Take 500 mg by mouth every 6 hours as needed  Cholecalciferol (VITAMIN D3) 50 MCG (2000 UT) CAPS, Take 1 capsule by mouth daily (with breakfast)  OXcarbazepine (TRILEPTAL) 300 MG tablet, Take 300 mg by mouth 2 times daily  FEROSUL 325 (65 Fe) MG tablet, Take 325 mg by mouth daily (with breakfast)   gabapentin (NEURONTIN) 300 MG capsule, Take 900 mg by mouth 3 times daily. Pt. States he takes 900 mg. TID  aspirin 81 MG chewable tablet, Take 1 tablet by mouth daily  atorvastatin (LIPITOR) 20 MG tablet, Take 1 tablet by mouth nightly  midodrine (PROAMATINE) 10 MG tablet, Take 10 mg by mouth 3 times daily  folic acid (FOLVITE) 1 MG tablet, Take 1 mg by mouth daily  furosemide (LASIX) 20 MG tablet, Take 20 mg by mouth daily  tamsulosin (FLOMAX) 0.4 MG capsule, Take 0.8 mg by mouth daily  Cyanocobalamin (VITAMIN B 12) 500 MCG TABS, Take 1,000 mcg by mouth daily   alendronate (FOSAMAX) 70 MG tablet, Take 70 mg by mouth every 7 days Patient takes on mondays.   cetirizine (ZYRTEC) 10 MG tablet, Take 10 mg by mouth daily

## 2022-09-26 NOTE — PROGRESS NOTES
Occupational Therapy  Facility/Department: [facility]  Rehabilitation Occupational Therapy Daily Treatment Note    Date: 22  Patient Name: Danny Chang       Room:   MRN: [id number]  Account: [de-identified]   : 1960  (63 y.o.) Gender: male     Past Medical History:  has a past medical history of AMS (altered mental status), Atrial fibrillation ([address] Bipolar 1 disorder ([address] Depression, Hypertension, Neck pain, Neurofibromatosis ([address] Patient in clinical research study, Severe recurrent major depression without psychotic features ([address] and Twitching. Past Surgical History:   has a past surgical history that includes hernia repair; Neck surgery (); Abdomen surgery; Tonsillectomy; Appendectomy; Dilatation, esophagus; Cardiac surgery (7/14/15); Cardiac surgery (2015); Colonoscopy (2021); and Upper gastrointestinal endoscopy (2021). Restrictions  Restrictions/Precautions: General Precautions; Fall Risk  Other position/activity restrictions: Up w/ assist, ALARMS, LUE IV  Required Braces or Orthoses?: No    Subjective  Subjective: \"I'm doing good . ...... I already got cleaned up and went for a long walk\". Restrictions/Precautions: General Precautions; Fall Risk    Objective     Cognition  Overall Cognitive Status: Exceptions  Arousal/Alertness: Appropriate responses to stimuli  Following Commands: Follows one step commands with repetition; Follows one step commands with increased time  Attention Span: Appears intact  Memory: Decreased long term memory;Decreased short term memory;Decreased recall of recent events  Safety Judgement: Decreased awareness of need for assistance;Decreased awareness of need for safety  Problem Solving: Decreased awareness of errors;Assistance required to identify errors made;Assistance required to generate solutions;Assistance required to implement solutions;Assistance required to correct errors made  Insights: Decreased awareness of deficits  Initiation: Requires cues for some  Sequencing: Requires cues for some  Orientation  Overall Orientation Status: Within Functional Limits         ADL  Tub/Shower Transfers  Skilled Clinical Factors: ADLS already completed this a.m. with nursing staff. OT Exercises  Exercise Treatment: Pt. issued B UE exercise program with graded theraband for home. Pt. demonstrated each exercise with success and displayed proper form. Pt. provided with program through [address] [place] [address] with illustrated pictures. Assessment  Assessment  Assessment: Pt. completed HEP with graded theraband. Pt. displayed some fatigue requiring rest breaks between sets. Skilled OT warranted to return pt to prior living arrangement with assistance as needed. Activity Tolerance: Patient limited by endurance; Patient limited by fatigue  Discharge Recommendations: Patient would benefit from continued therapy after discharge  OT Equipment Recommendations  ADL Assistive Devices: Long-handled Shoe Horn;Long-handled Sponge;Reacher;Sock-Aid Hard  Safety Devices  Safety Devices in place: Yes  Type of devices: Bed alarm in place;Call light within reach; Left in bed;Nurse notified  Restraints  Initially in place: No    Patient Education  Education  Education Given To: Patient  Education Provided: Role of Therapy;Home Exercise Program;Plan of Care; Mobility Training  Education Provided Comments: Pt. provided and educated on HEP with illustrated handout and graded theraband. Pt. displayed knowledge of proper use of theraband with good form. Education Method: Demonstration;Verbal  Barriers to Learning: Cognition  Education Outcome: Verbalized understanding;Continued education needed    Plan  Plan  Times per Week: 4-5x/wk 1x/day as yecenia  Current Treatment Recommendations: Strengthening;Balance training;Functional mobility training; Endurance training; Safety education & training;Equipment evaluation, education, & procurement;Self-Care / ADL; Home management training;Patient/Caregiver education & training  Plan Comment: Cont with stated POC    Goals  Patient Goals   Patient goals : To go home! Short Term Goals  Time Frame for Short term goals: By discharge, pt to demo  Short Term Goal 1: ADL trasnfers and functional mobility to CGA with use of AD as needed. Short Term Goal 2: increased B UE strength by 1/2 grade to assist with functional tasks/I with B UE HEP with use of handouts as needed. Short Term Goal 3: increased standing yecenia > 8 min with Min A using AD as needed to reduce risk of falls during functional tasks. Short Term Goal 4: UB ADLs to Set up and LB ADLs to Min A with use of AD/AE  as needed. Short Term Goal 5: toileting to SBA with use of AD/grab bars as needed. Additional Goals?: No  Long Term Goals  Long Term Goal 1: Pt to be I with fall prevention education, EC/WS tech, recommendations for discharge/AE with use of handouts as needed. AM-PAC Score        AM-[facility] Inpatient Daily Activity Raw Score: 17 (09/26/22 1231)  AM-PAC Inpatient ADL T-Scale Score : 37.26 (09/26/22 1231)  ADL Inpatient CMS 0-100% Score: 50.11 (09/26/22 1231)  ADL Inpatient CMS G-Code Modifier : CK (09/26/22 1231)      Therapy Time   Individual Concurrent Group Co-treatment   Time In 0910         Time Out 0927         Minutes 16             RN reports patient is medically stable for therapy treatment this date. Chart reviewed prior to treatment and patient is agreeable for therapy. All lines intact and patient positioned comfortably at end of treatment. All patient needs addressed prior to ending therapy session.        MERCEDES Cr

## 2022-09-26 NOTE — CARE COORDINATION
Social work: Precert pending for [employer]. HENS started. Kimberly/Nati HUNTER updated ([phone]), attempted to update sister, no answer.

## 2022-09-26 NOTE — CARE COORDINATION
DC Planning    Received phone call from Makayla Gregory from [employer] in [address] [place] [address].

## 2022-09-26 NOTE — PLAN OF CARE
Problem: Discharge Planning  Goal: Discharge to home or other facility with appropriate resources  9/26/2022 1230 by Leonel Beltran RN  Outcome: Progressing  Flowsheets (Taken 9/26/2022 7852)  Discharge to home or other facility with appropriate resources:   Identify barriers to discharge with patient and caregiver   Arrange for needed discharge resources and transportation as appropriate   Identify discharge learning needs (meds, wound care, etc)   Refer to discharge planning if patient needs post-hospital services based on physician order or complex needs related to functional status, cognitive ability or social support system     Problem: Pain  Goal: Verbalizes/displays adequate comfort level or baseline comfort level  9/26/2022 1230 by Leonel Beltran RN  Outcome: Progressing  Flowsheets (Taken 9/26/2022 1016)  Verbalizes/displays adequate comfort level or baseline comfort level:   Encourage patient to monitor pain and request assistance   Assess pain using appropriate pain scale   Administer analgesics based on type and severity of pain and evaluate response   Implement non-pharmacological measures as appropriate and evaluate response   Notify Licensed Independent Practitioner if interventions unsuccessful or patient reports new pain     Problem: Safety - Adult  Goal: Free from fall injury  9/26/2022 1230 by Leonel Beltran RN  Outcome: Progressing  Flowsheets (Taken 9/26/2022 1230)  Free From Fall Injury: Instruct family/caregiver on patient safety     Problem: Neurosensory - Adult  Goal: Achieves stable or improved neurological status  Outcome: Progressing  Flowsheets (Taken 9/26/2022 1230)  Achieves stable or improved neurological status:   Assess for and report changes in neurological status   Initiate measures to prevent increased intracranial pressure   Maintain blood pressure and fluid volume within ordered parameters to optimize cerebral perfusion and minimize risk of hemorrhage   Monitor temperature, glucose, and sodium.  Initiate appropriate interventions as ordered     Problem: Cardiovascular - Adult  Goal: Maintains optimal cardiac output and hemodynamic stability  Outcome: Progressing  Flowsheets (Taken 9/26/2022 1230)  Maintains optimal cardiac output and hemodynamic stability:   Monitor blood pressure and heart rate   Monitor urine output and notify Licensed Independent Practitioner for values outside of normal range   Assess for signs of decreased cardiac output     Problem: Cardiovascular - Adult  Goal: Absence of cardiac dysrhythmias or at baseline  Outcome: Progressing  Flowsheets (Taken 9/26/2022 1230)  Absence of cardiac dysrhythmias or at baseline:   Monitor cardiac rate and rhythm   Assess for signs of decreased cardiac output   Administer antiarrhythmia medication and electrolyte replacement as ordered     Problem: Skin/Tissue Integrity - Adult  Goal: Skin integrity remains intact  Outcome: Progressing  Flowsheets (Taken 9/26/2022 1230)  Skin Integrity Remains Intact: Monitor for areas of redness and/or skin breakdown     Problem: Musculoskeletal - Adult  Goal: Return mobility to safest level of function  Outcome: Progressing  Flowsheets (Taken 9/26/2022 1230)  Return Mobility to Safest Level of Function:   Assess patient stability and activity tolerance for standing, transferring and ambulating with or without assistive devices   Assist with transfers and ambulation using safe patient handling equipment as needed   Ensure adequate protection for wounds/incisions during mobilization   Obtain physical therapy/occupational therapy consults as needed   Apply continuous passive motion per provider or physical therapy orders to increase flexion toward goal   Instruct patient/family in ordered activity level     Problem: Musculoskeletal - Adult  Goal: Return ADL status to a safe level of function  Outcome: Progressing  Flowsheets (Taken 9/26/2022 1230)  Return ADL Status to a Safe Level of Function: Administer medication as ordered   Assess activities of daily living deficits and provide assistive devices as needed   Obtain physical therapy/occupational therapy consults as needed   Assist and instruct patient to increase activity and self care as tolerated     Problem: Gastrointestinal - Adult  Goal: Minimal or absence of nausea and vomiting  Outcome: Progressing  Flowsheets (Taken 9/26/2022 1230)  Minimal or absence of nausea and vomiting: Administer IV fluids as ordered to ensure adequate hydration     Problem: Gastrointestinal - Adult  Goal: Maintains or returns to baseline bowel function  Outcome: Progressing  Flowsheets (Taken 9/26/2022 1230)  Maintains or returns to baseline bowel function:   Assess bowel function   Encourage mobilization and activity   Administer ordered medications as needed     Problem: Gastrointestinal - Adult  Goal: Maintains adequate nutritional intake  Outcome: Progressing  Flowsheets (Taken 9/26/2022 1230)  Maintains adequate nutritional intake: Identify factors contributing to decreased intake, treat as appropriate     Problem: Genitourinary - Adult  Goal: Absence of urinary retention  Outcome: Progressing     Problem: Genitourinary - Adult  Goal: Urinary catheter remains patent  Outcome: Progressing  Flowsheets (Taken 9/26/2022 1230)  Urinary catheter remains patent:   Assess patency of urinary catheter   Irrigate catheter per Licensed Independent Practitioner order if indicated and notify Licensed Independent Practitioner if unable to irrigate     Problem: Metabolic/Fluid and Electrolytes - Adult  Goal: Electrolytes maintained within normal limits  Outcome: Progressing  Flowsheets (Taken 9/26/2022 1230)  Electrolytes maintained within normal limits:   Monitor labs and assess patient for signs and symptoms of electrolyte imbalances   Administer electrolyte replacement as ordered   Monitor response to electrolyte replacements, including repeat lab results as appropriate

## 2022-09-26 NOTE — PROGRESS NOTES
Physical Therapy  Facility/Department: [facility] MED [facility]  Rehabilitation Physical Therapy Treatment Note    NAME: Caron Poe  : 1960 (75 y.o.)  MRN: [id number]  CODE STATUS: Full Code    Date of Service: 22   Pt currently functioning below baseline. Recommend daily inpatient skilled therapy at time of discharge to maximize long term outcomes and prevent re-admission. Please refer to AM-PAC score for current level of function. Restrictions:  Restrictions/Precautions: General Precautions; Fall Risk  Position Activity Restriction  Other position/activity restrictions: Up w/ assist, ALARMS, LUE IV     SUBJECTIVE  Subjective  Subjective: Patient up in bed upon therapists arrival.  Patient agreeable to PT treatment. JUDY Vargas states patient is appropriate for PT treatment     OBJECTIVE  Cognition  Overall Cognitive Status: Exceptions  Arousal/Alertness: Appropriate responses to stimuli  Following Commands: Follows one step commands with repetition; Follows one step commands with increased time  Attention Span: Appears intact  Memory: Decreased long term memory;Decreased short term memory;Decreased recall of recent events  Safety Judgement: Decreased awareness of need for assistance;Decreased awareness of need for safety  Problem Solving: Decreased awareness of errors;Assistance required to identify errors made;Assistance required to generate solutions;Assistance required to implement solutions;Assistance required to correct errors made  Insights: Decreased awareness of deficits  Initiation: Requires cues for some  Sequencing: Requires cues for some  Orientation  Overall Orientation Status: Within Functional Limits    Functional Mobility  Bed Mobility  Overall Assistance Level: Stand By Assist;Contact Guard Assist  Additional Factors: Set-up; Verbal cues; Increased time to complete  Roll Left  Assistance Level: Stand by assist  Roll Right  Assistance Level: Stand by assist  Sit to Supine  Assistance Level: Stand by assist;Contact guard assist  Supine to Sit  Assistance Level: Contact guard assist  Skilled Clinical Factors: Patient requires CGA for stability and trunk progression technique to upright seated posture. Scooting  Assistance Level: Contact guard assist  Skilled Clinical Factors: Mod vc's for scooting all the way out and placing feet flat on the floor for increased stability and support. Balance  Sitting Balance: Modified independent   Standing Balance: Contact guard assistance  Standing Balance  Activity: seated EOB posture performing LAXMI LE AROM, Standing EOB static, performing WS and Marches x15 seconds each. Comments: Patient requires CGA for initial stance for stability and support. Patient with increased toe out in stance increasing MANNY. Transfers  Surface: To bed;From bed  Additional Factors: Set-up; Verbal cues; Hand placement cues; Increased time to complete  Device: Walker  Sit to Stand  Assistance Level: Contact guard assist  Stand to Sit  Assistance Level: Contact guard assist      Environmental Mobility  Ambulation  Surface: Level surface  Device: Rolling walker  Distance: 250 feet x1  Activity: Within Unit  Activity Comments: noted increased toe out in ambulation increasing MANNY  Additional Factors: Set-up; Verbal cues; Hand placement cues; Increased time to complete  Assistance Level: Contact guard assist  Gait Deviations: Slow christin; Wide base of support;Path deviations  Patient with decreased safety awareness and required education for proper use of Assistive device as patient tends to  device and walk away. Neuromuscular Education  Neuromuscular Education: Yes      PT Exercises  Exercise Treatment: yes  A/AROM Exercises: Seated LAXMI LE Exercises 1 set x10 reps including LAQ, Marches, Hip Abd  Circulation/Endurance Exercises: AP  Pressure Relief Exercises: glute sets and seated WS to promote pressure relief techniques.   Static Standing Balance Exercises: WS in stance  Dynamic Standing Balance Exercises: Marches in stance      ASSESSMENT/PROGRESS TOWARDS GOALS  AM-[facility] Inpatient Mobility Raw Score  17   AM-[facility] Inpatient T-Scale Score  42.13   Mobility Inpatient CMS 0-100% Score 50.57   Mobility Inpatient CMS G-Code Modifier  CK     Vital Signs  BP Location: Right upper arm  O2 Device: None (Room air)    Assessment  Assessment: Patient with decreased aerobic capacity this date with complaints of LLE pain RN Sam informed and provided medication. Patient requries a CGA with RW for safety noted increased toe out and MANNY in ambulation with path deviation. Patient would benefit from continued skilled PT treatment to maximize return to PLOF. Activity Tolerance: Patient limited by endurance; Patient limited by fatigue  Discharge Recommendations: Patient would benefit from continued therapy after discharge  PT Equipment Recommendations  Equipment Needed: Yes  Walker: Rolling    Goals  Patient Goals   Patient goals : Less pain  Short Term Goals  Time Frame for Short term goals: 12 visits  Short term goal 1: Pt to demonstrate bed mobility independently  Short term goal 2: Pt to perform STS transfers w/ most appropriate AD Lea  Short term goal 3: Pt to ambulate at least 100ft w/ most appropriate AD Lea  Short term goal 4: Pt to actively participate in at least 30 minutes of physical therapy for ther act, ther ex, balance, gait, and endurance training    PLAN OF CARE/SAFETY  Plan  Plan: 5-7 times per week  Current Treatment Recommendations: Strengthening;Balance training;Functional mobility training;Transfer training;Neuromuscular re-education;Gait training; Endurance training;Pain management;Home exercise program;Safety education & training;Patient/Caregiver education & training;Equipment evaluation, education, & procurement; Therapeutic activities  Safety Devices  Type of Devices: Bed alarm in place;Call light within reach; Left in bed;Gait belt;Nurse notified  Restraints  Restraints Initially in Place: No    EDUCATION  Education  Education Given To: Patient  Education Provided: Safety;Transfer Training;Mobility Training  Education Method: Verbal;Teach Back  Education Outcome: Verbalized understanding        Therapy Time   Individual Concurrent Group Co-treatment   Time In 9268         Time Out 1610         Minutes [address] [place], [place], 09/26/22 at 4:24 PM

## 2022-09-26 NOTE — PROGRESS NOTES
Patient stated to writer that he has a bladder stimulator but left the box at home.  It was placed at [facility] either this year or last.

## 2022-09-26 NOTE — PLAN OF CARE
Problem: Discharge Planning  Goal: Discharge to home or other facility with appropriate resources  9/26/2022 0222 by Piotr Watkins RN  Outcome: Progressing  Flowsheets (Taken 9/26/2022 0222)  Discharge to home or other facility with appropriate resources:   Identify barriers to discharge with patient and caregiver   Arrange for needed discharge resources and transportation as appropriate   Identify discharge learning needs (meds, wound care, etc)     Problem: Pain  Goal: Verbalizes/displays adequate comfort level or baseline comfort level  9/26/2022 0222 by Piotr Watkins RN  Outcome: Progressing  Flowsheets (Taken 9/26/2022 0222)  Verbalizes/displays adequate comfort level or baseline comfort level:   Encourage patient to monitor pain and request assistance   Assess pain using appropriate pain scale   Administer analgesics based on type and severity of pain and evaluate response   Implement non-pharmacological measures as appropriate and evaluate response     Problem: Safety - Adult  Goal: Free from fall injury  9/26/2022 0222 by Piotr Watkins RN  Outcome: Progressing     Problem: Pain  Goal: Verbalizes/displays adequate comfort level or baseline comfort level  9/26/2022 0222 by Piotr Watkins RN  Outcome: Progressing  Flowsheets (Taken 9/26/2022 0222)  Verbalizes/displays adequate comfort level or baseline comfort level:   Encourage patient to monitor pain and request assistance   Assess pain using appropriate pain scale   Administer analgesics based on type and severity of pain and evaluate response   Implement non-pharmacological measures as appropriate and evaluate response  9/25/2022 1627 by Mary Strong RN  Outcome: Not Progressing  Note: Heat, voltaren gel applied to bilateral knees/neck for chronic pain  Tylenol given  Patient c/o no improvement in pain  Requesting additional pain medication, xrays  Dr. Arnold Mor notified, no new orders received

## 2022-09-26 NOTE — PROGRESS NOTES
[facility]  Office: [address], DO, Ines Syed, DO, Timothy Velmabarbara, DO, Chetan Mcdonald Blood, DO, Lola Lindsey MD, Nallely Giraldo MD, Binh Damon MD, Josh Archibald MD,  Jillian Blake MD, Moe Norton MD, Nichelle Linares, DO, Ramón Amador MD,  Guero Granados MD, Nissa Gee MD, Heriberto Armijo, DO, Armida Euceda MD, Lera Mcardle, MD, Alexsander Castro MD, Mason Smith MD, Keith Lombard, MD, Hayley Teixeira MD, Sepideh Beckwith, DO, Ophelia Arias MD, Ava Howard MD, Magda Mcnulty, CNP,  Mary Bobo, CNP, Andreia Orellana, CNP, Edie Juan, CNP,  Merry Briscoe, [facility], Roberto Carlos Jernigan, CNP, Agustina Levin, CNP, Al Pacheco, CNP, Babar Mercado, CNP, Keyanna Rivera, CNP, Maribeth Krause, PAAlleyC, Javier Vaca, CNS, Genny Darling, DNP, Milton Gram, CNP, Nader Grullon, CNP, Giuseppe Campos    Progress Note    9/26/2022    2:00 PM    Name:   Elmira Arriaza  MRN:     [id number]     Acct:      [de-identified]   Room:   2024/2024-01   Day:  2  Admit Date:  9/24/2022  8:01 PM    PCP:   Shan Homans, APRN - NP  Code Status:  Full Code    Subjective:     C/C:   Chief Complaint   Patient presents with    Abnormal Lab     States that Dr Bandar Milner sent him here, had labs drawn today and potassium high     Interval History Status: not changed. Patient seen and examined. Discussed knee pain. Right knee isolated pain with diffuse left foot pain. Brief History:     The patient presented the ER with high potassium. Patient states that he had blood work drawn today for his Dr. Dex Forrest and received a call tonight that his potassium was high. Patient denies any chest pain shortness of breath abdominal pain nausea or vomiting. He states that he gets his blood work done every 6 months to [facility] for cancer\".   He states he typically drinks 3-4 bottles of water and a couple Gatorade's a day.  He denies excessive thirst or urination. Patient states that he did recently get out of the psychiatric unit and they did start him on Elavil but no other medication changes. He does take warfarin and is compliant with his dosing. He denies any palpitations. No recent alcohol use. History includes monoclonal gammopathy, ARIELLE with CPAP, atrial fibs, prosthetic valve, hypothyroidism, hypertension, bipolar disorder, crack cocaine use, and chronic renal insufficiency. Review of Systems:     Constitutional:  negative for chills, fevers, sweats  Respiratory:  negative for cough, dyspnea on exertion, shortness of breath, wheezing  Cardiovascular:  negative for chest pain, chest pressure/discomfort, lower extremity edema, palpitations  Gastrointestinal:  negative for abdominal pain, constipation, diarrhea, nausea, vomiting  Neurological:  negative for dizziness, headache    Medications: Allergies:     Allergies   Allergen Reactions    Oxycodone Itching    Fish-Derived Products     Ibuprofen     Latuda [Lurasidone Hcl] Other (See Comments)     \"Feels like pt is going to crawl out of own skin\"    Lithium     Lurasidone     Quetiapine     Seroquel [Quetiapine Fumarate]        Current Meds:   Scheduled Meds:    warfarin  2 mg Oral Once    aspirin  81 mg Oral Daily    atorvastatin  20 mg Oral Nightly    calcium citrate  250 mg Oral BID    cetirizine  10 mg Oral Daily    Vitamin D  2,000 Units Oral Daily with breakfast    vitamin B-12  1,000 mcg Oral Daily    ferrous sulfate  325 mg Oral Daily with breakfast    folic acid  1 mg Oral Daily    [Held by provider] furosemide  20 mg Oral Daily    gabapentin  900 mg Oral TID    melatonin  1.5 mg Oral Nightly    midodrine  10 mg Oral TID    OXcarbazepine  600 mg Oral BID    pantoprazole  40 mg Oral QAM AC    risperiDONE  0.5 mg Oral BID    sennosides-docusate sodium  1 tablet Oral Daily    tamsulosin  0.4 mg Oral Daily    sodium chloride flush  5-40 mL IntraVENous 2 times per day    warfarin placeholder: dosing by pharmacy   Other RX Placeholder     Continuous Infusions:    sodium chloride 75 mL/hr at 22 1013    sodium chloride       PRN Meds: diclofenac sodium, hydrOXYzine HCl, sodium chloride flush, sodium chloride, potassium chloride **OR** potassium alternative oral replacement **OR** potassium chloride, magnesium sulfate, ondansetron **OR** ondansetron, polyethylene glycol, acetaminophen **OR** acetaminophen    Data:     Past Medical History:   has a past medical history of AMS (altered mental status), Atrial fibrillation ([facility] Bipolar 1 disorder ([facility] [address] Depression, Hypertension, Neck pain, [facility]fibromatosis [facility] Patient in clinical research study, Severe recurrent major depression without psychotic features ([facility] and Twitching. Social History:   reports that he has never smoked. He has never been exposed to tobacco smoke. He has never used smokeless tobacco. He reports that he does not currently use alcohol. He reports that he does not currently use drugs. Family History:   Family History   Problem Relation Age of Onset    Coronary Art Dis Mother     Hypertension Mother     Hypertension Father     Cancer Father         blood       Vitals:  /79   Pulse 69   Temp 97.5 °F (36.4 °C) (Oral)   Resp 18   Ht 5' 6\" (1.676 m)   Wt 193 lb (87.5 kg)   SpO2 97%   BMI 31.15 kg/m²   Temp (24hrs), Av.9 °F (36.6 °C), Min:97.5 °F (36.4 °C), Max:98.2 °F (36.8 °C)    No results for input(s): POCGLU in the last 72 hours. I/O (24Hr):     Intake/Output Summary (Last 24 hours) at 2022 1400  Last data filed at 2022 1015  Gross per 24 hour   Intake 968.54 ml   Output 1650 ml   Net -681.46 ml         Labs:  Hematology:  Recent Labs     22  1253 22  0039 22  0554 22  0548   WBC 7.9  --   --  8.2   RBC 4.22  --   --  3.96*   HGB 13.3  --   --  11.6*   HCT 37.3*  --   --  35.1*   MCV 88.4  --   --  88.6   MCH 31.5  --   --  29.3 MCHC 35.7*  --   --  33.0   RDW 14.2  --   --  14.4     --   --  162   MPV 10.1  --   --  9.7   INR  --  2.4 2.4 2.4       Chemistry:  Recent Labs     09/26/22  0147 09/26/22  0548 09/26/22  0950   * 125* 126*   K 4.5 4.7 4.6   CL 91* 92* 93*   CO2 24 23 24   GLUCOSE 102* 94 91   BUN 18 17 16   CREATININE 0.85 0.85 0.83   MG  --  1.7  --    ANIONGAP 10 10 9   LABGLOM >60 >60 >60   GFRAA >60 >60 >60   CALCIUM 8.7 8.7 8.7   PHOS  --  3.9  --    PSA  --  1.53  --        Recent Labs     09/24/22  1253 09/24/22 2025 09/26/22  0548   PROT 6.3*  6.8 6.5  --    LABALBU 4.2 3.7 3.6   * 92*  --    * 249*  --    ALKPHOS 201* 191*  --    BILITOT 0.3 0.2*  --        ABG:No results found for: POCPH, PHART, PH, POCPCO2, BWO1VJB, PCO2, POCPO2, PO2ART, PO2, POCHCO3, HYD8ISI, HCO3, NBEA, PBEA, BEART, BE, THGBART, THB, OEX2ZAX, TOBA0SAP, Y4TTOQJO, O2SAT, FIO2  Lab Results   Component Value Date/Time    SPECIAL R HAND 10ML 05/01/2022 05:25 PM     Lab Results   Component Value Date/Time    CULTURE NO GROWTH 5 DAYS 05/01/2022 05:25 PM       Radiology:  No results found.     Physical Examination:        General appearance:  alert, cooperative and no distress  Mental Status:  oriented to person, place and time and normal affect  Lungs:  clear to auscultation bilaterally, normal effort  Heart:  regular rate and rhythm, no murmur  Abdomen:  soft, nontender, nondistended, normal bowel sounds, no masses, hepatomegaly, splenomegaly  Extremities:  no edema, redness, tenderness in the calves  Skin:  no gross lesions, rashes, induration    Assessment:        Hospital Problems             Last Modified POA    * (Principal) Hyponatremia 9/24/2022 Yes    BPH (benign prostatic hyperplasia) 9/24/2022 Yes    Coronary atherosclerosis 9/24/2022 Yes    Bipolar 1 disorder, depressed ([address] 9/24/2022 Yes    Hyperkalemia 9/24/2022 Yes    ARIELLE on CPAP 9/24/2022 Yes    Other specified hypothyroidism (Chronic) 9/24/2022 Yes Presence of prosthetic heart valve (Chronic) 9/24/2022 Yes    Restless leg syndrome 9/25/2022 Yes    Primary hypertension 9/24/2022 Yes    GERD (gastroesophageal reflux disease) 9/24/2022 Yes    Chronic renal impairment, stage 3 (moderate) ([address] 9/24/2022 Yes    Atrial fibrillation ([address] 9/24/2022 Yes     Plan:        Hyponatremia - no changes >5-8meq in 24 hours, NS today, change back to normal  Bipolar Disorder - restart home meds  Urinary Retention - restart home meds, urology consulted. Wall in place. Has stimulator.    Atrial Fibrillation - continue warfarin   Hypothroidism - home meds   GERD  CKD - stable  RLS  HTN   ARIELLE  CAD  BPH  Dispo: home     Jose Malcolm DO  9/26/2022  2:00 PM

## 2022-09-26 NOTE — CONSULTS
Warfarin Dosing - Pharmacy Consult Note  Consulting Provider: Mauro Ferguson CNP  Indication:  Atrial Fibrillation  Warfarin Dose prior to admission: 2mg MWSun, 4mg AOD   Concurrent anticoagulants/antiplatelets: aspirin  Significant Drug Interactions: No obvious interactions  Recent Labs     09/24/22  1253 09/24/22 2025 09/25/22  0039 09/25/22  0554 09/26/22  0548   INR  --   --  2.4 2.4 2.4   HGB 13.3  --   --   --  11.6*     --   --   --  162   LABALBU 4.2 3.7  --   --  3.6     Recent warfarin administrations                     warfarin (COUMADIN) tablet 2 mg (mg) 2 mg Given 09/25/22 1803                   Date   INR    Dose  9/24/22   2.4       took at home  9/25        2.4       2 mg   9/26        2.4    Assessment/Plan  (Goal INR: 2 - 3)  INR therapeutic, continue home dose of 2mg tonight. Active problem list reviewed. INR orders are placed. Chart reviewed for pertinent labs, drug/diet interactions, and past doses. Documentation of patient's clinical condition was reviewed. Pharmacy Dosing:  Pharmacy will continue to follow.

## 2022-09-26 NOTE — CONSULTS
[facility]  Urology Consultation    Patient:  Geronimo Castillo  MRN: [id number]  YOB: 1960    CHIEF COMPLAINT:  urinary retention, 800 ML residual    HISTORY OF PRESENT ILLNESS:   The patient is a 57 y.o. male who presents with concerns about abnormal lab work, the patient follows up with oncology for a history of MGUS  The patient recently had a procedure instillation of a InterStim device at the [facility] because of overactive bladder dysfunction. The patient developed urinary retention he states he does not have the equipment provided by his urologist to manage of bladder    Patient's old records, notes and chart reviewed and summarized above.     Past Medical History:    Past Medical History:   Diagnosis Date    AMS (altered mental status) 02/15/2020    Atrial fibrillation ([address]     on coumadin    Bipolar 1 disorder ([address]     Depression     Hypertension     Neck pain     Neurofibromatosis ([address]     Lt leg pain    Patient in clinical research study 04/12/2018    [id number]    Severe recurrent major depression without psychotic features ([address] 6/13/2022    Twitching        Past Surgical History:    Past Surgical History:   Procedure Laterality Date    ABDOMEN SURGERY      APPENDECTOMY      CARDIAC SURGERY  7/14/15    Median Sternotomy, Repair of ascending aorti aneurysm, stentless valve placement    CARDIAC SURGERY  07/14/2015    Median stenotomy, repair of ascending aorti aneurysm, stentless valve placement     COLONOSCOPY  01/28/2021    DILATATION, ESOPHAGUS      HERNIA REPAIR      NECK SURGERY  2013    TONSILLECTOMY      UPPER GASTROINTESTINAL ENDOSCOPY  01/28/2021     Previous  surgery:  iimplantation of InterStim bladder device      Medications:    Scheduled Meds:   aspirin  81 mg Oral Daily    atorvastatin  20 mg Oral Nightly    calcium citrate  250 mg Oral BID    cetirizine  10 mg Oral Daily    Vitamin D  2,000 Units Oral Daily with breakfast    vitamin B-12 1,000 mcg Oral Daily    ferrous sulfate  325 mg Oral Daily with breakfast    folic acid  1 mg Oral Daily    [Held by provider] furosemide  20 mg Oral Daily    gabapentin  900 mg Oral TID    melatonin  1.5 mg Oral Nightly    midodrine  10 mg Oral TID    OXcarbazepine  600 mg Oral BID    pantoprazole  40 mg Oral QAM AC    risperiDONE  0.5 mg Oral BID    sennosides-docusate sodium  1 tablet Oral Daily    tamsulosin  0.4 mg Oral Daily    sodium chloride flush  5-40 mL IntraVENous 2 times per day    warfarin placeholder: dosing by pharmacy   Other RX Placeholder     Continuous Infusions:   sodium chloride       PRN Meds:.diclofenac sodium, hydrOXYzine HCl, sodium chloride flush, sodium chloride, potassium chloride **OR** potassium alternative oral replacement **OR** potassium chloride, magnesium sulfate, ondansetron **OR** ondansetron, polyethylene glycol, acetaminophen **OR** acetaminophen    Allergies:  Oxycodone, Fish-derived products, Ibuprofen, Latuda [lurasidone hcl], Lithium, Lurasidone, Quetiapine, and Seroquel [quetiapine fumarate]    Social History:    Social History     Socioeconomic History    Marital status: Single     Spouse name: Not on file    Number of children: Not on file    Years of education: Not on file    Highest education level: Not on file   Occupational History    Not on file   Tobacco Use    Smoking status: Never     Passive exposure: Never    Smokeless tobacco: Never   Vaping Use    Vaping Use: Never used   Substance and Sexual Activity    Alcohol use: Not Currently     Comment: last drank 6yrs ago    Drug use: Not Currently     Comment: states last use was 2 months ago    Sexual activity: Not Currently   Other Topics Concern    Not on file   Social History Narrative    Not on file     Social Determinants of Health     Financial Resource Strain: Not on file   Food Insecurity: Not on file   Transportation Needs: Not on file   Physical Activity: Not on file   Stress: Not on file   Social Connections: Not on file   Intimate Partner Violence: Not on file   Housing Stability: Not on file       Family History:    Family History   Problem Relation Age of Onset    Coronary Art Dis Mother     Hypertension Mother     Hypertension Father     Cancer Father         blood     Previous Urologic Family history: none    REVIEW OF SYSTEMS:  Constitutional: negative  Eyes: negative  Respiratory: negative  Cardiovascular: negative  Gastrointestinal: negative  Genitourinary: see HPI  Musculoskeletal: negative  Skin: negative   Neurological: negative  Hematological/Lymphatic: negative  Psychological: negative    Physical Exam:    This a 57 y.o. male   Patient Vitals for the past 24 hrs:   BP Temp Temp src Pulse Resp SpO2   09/25/22 1908 138/77 98.1 °F (36.7 °C) Oral 73 16 98 %   09/25/22 1514 127/68 97.7 °F (36.5 °C) Oral 75 16 98 %   09/25/22 1139 (!) 118/58 98 °F (36.7 °C) Oral 75 18 95 %   09/25/22 0749 (!) 107/58 98.2 °F (36.8 °C) Oral 78 16 94 %     Constitutional: Patient in no acute distress; Neuro: alert and oriented to person place and time. Psych: Mood and affect normal.  Skin: Normal  Lungs: Respiratory effort normal  Cardiovascular:  Normal peripheral pulses  Abdomen: Soft, non-tender, non-distended with no CVA, flank pain, hepatosplenomegaly or hernia. Kidneys normal.  Bladder non-tender and not distended.   Lymphatics: no palpable lymphadenopathy  Penis normal and circumcised  Urethral meatus normal  Scrotal exam normal  Testicles normal bilaterally  Epididymis normal bilaterally  No evidence of inguinal hernia  Wall catheter was inserted, bladder decompressed    LABS:  Recent Labs     09/24/22  1253   WBC 7.9   HGB 13.3   HCT 37.3*   MCV 88.4        Recent Labs     09/24/22  2025 09/25/22  0039 09/25/22  0554 09/25/22  0914 09/25/22  1310 09/25/22  1647 09/26/22  0147   *   < > 129*   < > 127* 125* 125*   K 4.4  --  5.0  --   --   --  4.5   CL 93*  --  97*  --   --   --  91*   CO2 21 --  23  --   --   --  24   BUN 17  --  19  --   --   --  18   CREATININE 0.89  --  0.96  --   --   --  0.85    < > = values in this interval not displayed. Lab Results   Component Value Date    PSA 0.62 06/27/2016    PSA 0.47 09/12/2013    PSA 0.49 02/21/2013       Additional Lab/culture results:    Urinalysis: No results for input(s): COLORU, PHUR, LABCAST, WBCUA, RBCUA, MUCUS, TRICHOMONAS, YEAST, BACTERIA, CLARITYU, SPECGRAV, LEUKOCYTESUR, UROBILINOGEN, Gwynneth Maxime in the last 72 hours. Invalid input(s): NITRATE, GLUCOSEUKETONESUAMORPHOUS     -----------------------------------------------------------------  Imaging Results:  urinary tract calculus or collecting system dilatation. No focal renal   lesion. No focal abnormality in the liver. No calcified gallstones or biliary ductal   dilatation. The spleen is unchanged in appearance without focal lesion. The   pancreas and the adrenal glands are unremarkable. GI/Bowel: The appendix is normal.  No bowel obstruction. Mild-moderate   distal colonic diverticulosis without evidence for diverticulitis. Pelvis: There is a Wall catheter in an underdistended bladder which is   otherwise unremarkable. Mild prostatomegaly. Peritoneum/Retroperitoneum: No abdominal lymphadenopathy or ascites. Bones/Soft Tissues: Sacral stimulator is in place. No acute osseous   abnormality.        Assessment and Plan   Impression: urinary retention bladder outlet obstruction   Patient Active Problem List   Diagnosis    Light headed    Recurrent major depression in partial remission ([facility] [address]    Primary hypertension    Neck pain    GERD (gastroesophageal reflux disease)    Degenerative disc disease, cervical    Hyponatremia    Hypercalcemia    Aortic aneurysm (HCC)    MGUS (monoclonal gammopathy of unknown significance)    Chronic renal impairment, stage 3 (moderate) (HCC)    Depression    Crack cocaine use    Bipolar I disorder, most recent episode depressed ([address]    Altered mental status, unspecified    YONATHAN (acute kidney injury) ([address]    Altered mental status    Supratherapeutic INR    Atrial fibrillation (HCC)    Hypernatremia    Anemia    Major depression, recurrent (HCC)    Major depressive disorder, recurrent ([address]    Acute kidney injury superimposed on chronic kidney disease ([address]    Schizoaffective disorder, bipolar type ([address]    Schizoaffective disorder ([address]    Hematoma of injection site    BPH (benign prostatic hyperplasia)    Constipation, unspecified    Coronary atherosclerosis    Neurofibromatosis, unspecified ([address]    S/P AVR (aortic valve replacement)    Depression with suicidal ideation    Severe recurrent major depression without psychotic features ([address]    Severe depressed bipolar I disorder without psychotic features ([address]    Bipolar 1 disorder, depressed (HCC)    Hyperkalemia    ARIELLE on CPAP    Other specified hypothyroidism    Presence of prosthetic heart valve    Restless leg syndrome    Athscl heart disease of native coronary artery w/o ang pctrs       Plan: maintain an indwelling Wall at this time, patient started on Flomax, management of InterStim device by the patient's urologist at the [facility] upon discharge    Bran Thacker MD  5:02 AM 9/26/2022

## 2022-09-27 LAB
ALBUMIN SERPL-MCNC: 3.5 G/DL (ref 3.5–5.2)
ANION GAP SERPL CALCULATED.3IONS-SCNC: 10 MMOL/L (ref 9–17)
ANION GAP SERPL CALCULATED.3IONS-SCNC: 8 MMOL/L (ref 9–17)
ANION GAP SERPL CALCULATED.3IONS-SCNC: 8 MMOL/L (ref 9–17)
BACTERIA: ABNORMAL
BILIRUBIN URINE: NEGATIVE
BUN BLDV-MCNC: 12 MG/DL (ref 8–23)
BUN BLDV-MCNC: 14 MG/DL (ref 8–23)
BUN BLDV-MCNC: 15 MG/DL (ref 8–23)
BUN/CREAT BLD: 15 (ref 9–20)
BUN/CREAT BLD: 16 (ref 9–20)
BUN/CREAT BLD: 16 (ref 9–20)
CALCIUM SERPL-MCNC: 8.6 MG/DL (ref 8.6–10.4)
CALCIUM SERPL-MCNC: 8.6 MG/DL (ref 8.6–10.4)
CALCIUM SERPL-MCNC: 9.2 MG/DL (ref 8.6–10.4)
CHLORIDE BLD-SCNC: 89 MMOL/L (ref 98–107)
CHLORIDE BLD-SCNC: 92 MMOL/L (ref 98–107)
CHLORIDE BLD-SCNC: 94 MMOL/L (ref 98–107)
CO2: 23 MMOL/L (ref 20–31)
CO2: 24 MMOL/L (ref 20–31)
CO2: 24 MMOL/L (ref 20–31)
COLOR: YELLOW
CREAT SERPL-MCNC: 0.79 MG/DL (ref 0.7–1.2)
CREAT SERPL-MCNC: 0.88 MG/DL (ref 0.7–1.2)
CREAT SERPL-MCNC: 0.94 MG/DL (ref 0.7–1.2)
EPITHELIAL CELLS UA: ABNORMAL /HPF (ref 0–5)
GFR AFRICAN AMERICAN: >60 ML/MIN
GFR NON-AFRICAN AMERICAN: >60 ML/MIN
GFR SERPL CREATININE-BSD FRML MDRD: ABNORMAL ML/MIN/{1.73_M2}
GLUCOSE BLD-MCNC: 106 MG/DL (ref 70–99)
GLUCOSE BLD-MCNC: 111 MG/DL (ref 75–110)
GLUCOSE BLD-MCNC: 114 MG/DL (ref 70–99)
GLUCOSE BLD-MCNC: 159 MG/DL (ref 75–110)
GLUCOSE BLD-MCNC: 93 MG/DL (ref 70–99)
GLUCOSE URINE: NEGATIVE
HCT VFR BLD CALC: 33.6 % (ref 40.7–50.3)
HEMOGLOBIN: 11.3 G/DL (ref 13–17)
INR BLD: 2
KETONES, URINE: NEGATIVE
LEUKOCYTE ESTERASE, URINE: NEGATIVE
MAGNESIUM: 1.7 MG/DL (ref 1.6–2.6)
MCH RBC QN AUTO: 30.1 PG (ref 25.2–33.5)
MCHC RBC AUTO-ENTMCNC: 33.6 G/DL (ref 28.4–34.8)
MCV RBC AUTO: 89.4 FL (ref 82.6–102.9)
NITRITE, URINE: NEGATIVE
NRBC AUTOMATED: 0 PER 100 WBC
PDW BLD-RTO: 14.2 % (ref 11.8–14.4)
PH UA: 7 (ref 5–8)
PHOSPHORUS: 3.8 MG/DL (ref 2.5–4.5)
PLATELET # BLD: 151 K/UL (ref 138–453)
PMV BLD AUTO: 9 FL (ref 8.1–13.5)
POTASSIUM SERPL-SCNC: 4.4 MMOL/L (ref 3.7–5.3)
POTASSIUM SERPL-SCNC: 4.5 MMOL/L (ref 3.7–5.3)
POTASSIUM SERPL-SCNC: 5.7 MMOL/L (ref 3.7–5.3)
PROTEIN UA: NEGATIVE
PROTHROMBIN TIME: 22.6 SEC (ref 11.5–14.2)
RBC # BLD: 3.76 M/UL (ref 4.21–5.77)
RBC UA: ABNORMAL /HPF (ref 0–2)
SODIUM BLD-SCNC: 122 MMOL/L (ref 135–144)
SODIUM BLD-SCNC: 124 MMOL/L (ref 135–144)
SODIUM BLD-SCNC: 126 MMOL/L (ref 135–144)
SPECIFIC GRAVITY UA: 1.01 (ref 1–1.03)
TURBIDITY: CLEAR
URINE HGB: ABNORMAL
UROBILINOGEN, URINE: NORMAL
WBC # BLD: 7.1 K/UL (ref 3.5–11.3)
WBC UA: ABNORMAL /HPF (ref 0–5)

## 2022-09-27 PROCEDURE — 84300 ASSAY OF URINE SODIUM: CPT

## 2022-09-27 PROCEDURE — 82436 ASSAY OF URINE CHLORIDE: CPT

## 2022-09-27 PROCEDURE — 83935 ASSAY OF URINE OSMOLALITY: CPT

## 2022-09-27 PROCEDURE — 80048 BASIC METABOLIC PNL TOTAL CA: CPT

## 2022-09-27 PROCEDURE — 2580000003 HC RX 258: Performed by: INTERNAL MEDICINE

## 2022-09-27 PROCEDURE — 83930 ASSAY OF BLOOD OSMOLALITY: CPT

## 2022-09-27 PROCEDURE — 1200000000 HC SEMI PRIVATE

## 2022-09-27 PROCEDURE — 85610 PROTHROMBIN TIME: CPT

## 2022-09-27 PROCEDURE — 82947 ASSAY GLUCOSE BLOOD QUANT: CPT

## 2022-09-27 PROCEDURE — 36415 COLL VENOUS BLD VENIPUNCTURE: CPT

## 2022-09-27 PROCEDURE — 99232 SBSQ HOSP IP/OBS MODERATE 35: CPT | Performed by: INTERNAL MEDICINE

## 2022-09-27 PROCEDURE — 6360000002 HC RX W HCPCS: Performed by: NURSE PRACTITIONER

## 2022-09-27 PROCEDURE — 6370000000 HC RX 637 (ALT 250 FOR IP): Performed by: INTERNAL MEDICINE

## 2022-09-27 PROCEDURE — 85027 COMPLETE CBC AUTOMATED: CPT

## 2022-09-27 PROCEDURE — 83735 ASSAY OF MAGNESIUM: CPT

## 2022-09-27 PROCEDURE — 6360000002 HC RX W HCPCS: Performed by: INTERNAL MEDICINE

## 2022-09-27 PROCEDURE — 80069 RENAL FUNCTION PANEL: CPT

## 2022-09-27 PROCEDURE — 81001 URINALYSIS AUTO W/SCOPE: CPT

## 2022-09-27 PROCEDURE — 6370000000 HC RX 637 (ALT 250 FOR IP): Performed by: NURSE PRACTITIONER

## 2022-09-27 RX ORDER — IPRATROPIUM BROMIDE AND ALBUTEROL SULFATE 2.5; .5 MG/3ML; MG/3ML
1 SOLUTION RESPIRATORY (INHALATION)
Status: DISCONTINUED | OUTPATIENT
Start: 2022-09-27 | End: 2022-09-27

## 2022-09-27 RX ORDER — BISACODYL 10 MG
10 SUPPOSITORY, RECTAL RECTAL ONCE
Status: COMPLETED | OUTPATIENT
Start: 2022-09-27 | End: 2022-09-27

## 2022-09-27 RX ORDER — FUROSEMIDE 10 MG/ML
20 INJECTION INTRAMUSCULAR; INTRAVENOUS ONCE
Status: COMPLETED | OUTPATIENT
Start: 2022-09-27 | End: 2022-09-27

## 2022-09-27 RX ORDER — DEXTROSE MONOHYDRATE 100 MG/ML
INJECTION, SOLUTION INTRAVENOUS CONTINUOUS PRN
Status: DISCONTINUED | OUTPATIENT
Start: 2022-09-27 | End: 2022-09-29 | Stop reason: HOSPADM

## 2022-09-27 RX ORDER — DIPHENHYDRAMINE HYDROCHLORIDE 50 MG/ML
25 INJECTION INTRAMUSCULAR; INTRAVENOUS ONCE
Status: COMPLETED | OUTPATIENT
Start: 2022-09-27 | End: 2022-09-27

## 2022-09-27 RX ORDER — ALBUTEROL SULFATE 90 UG/1
2 AEROSOL, METERED RESPIRATORY (INHALATION) EVERY 6 HOURS PRN
Status: DISCONTINUED | OUTPATIENT
Start: 2022-09-27 | End: 2022-09-29 | Stop reason: HOSPADM

## 2022-09-27 RX ORDER — WARFARIN SODIUM 2 MG/1
4 TABLET ORAL
Status: COMPLETED | OUTPATIENT
Start: 2022-09-27 | End: 2022-09-27

## 2022-09-27 RX ADMIN — GABAPENTIN 900 MG: 300 CAPSULE ORAL at 08:49

## 2022-09-27 RX ADMIN — FERROUS SULFATE TAB EC 325 MG (65 MG FE EQUIVALENT) 325 MG: 325 (65 FE) TABLET DELAYED RESPONSE at 08:49

## 2022-09-27 RX ADMIN — FOLIC ACID 1 MG: 1 TABLET ORAL at 08:49

## 2022-09-27 RX ADMIN — HYDROXYZINE HYDROCHLORIDE 50 MG: 25 TABLET, FILM COATED ORAL at 22:40

## 2022-09-27 RX ADMIN — RISPERIDONE 0.5 MG: 0.25 TABLET ORAL at 22:23

## 2022-09-27 RX ADMIN — TAMSULOSIN HYDROCHLORIDE 0.8 MG: 0.4 CAPSULE ORAL at 17:43

## 2022-09-27 RX ADMIN — ASPIRIN 81 MG CHEWABLE TABLET 81 MG: 81 TABLET CHEWABLE at 08:49

## 2022-09-27 RX ADMIN — Medication 6 MG: at 22:22

## 2022-09-27 RX ADMIN — DIPHENHYDRAMINE HYDROCHLORIDE 25 MG: 50 INJECTION, SOLUTION INTRAMUSCULAR; INTRAVENOUS at 22:23

## 2022-09-27 RX ADMIN — GABAPENTIN 900 MG: 300 CAPSULE ORAL at 22:22

## 2022-09-27 RX ADMIN — ACETAMINOPHEN 650 MG: 325 TABLET ORAL at 01:22

## 2022-09-27 RX ADMIN — SENNOSIDES AND DOCUSATE SODIUM 1 TABLET: 50; 8.6 TABLET ORAL at 08:49

## 2022-09-27 RX ADMIN — PANTOPRAZOLE SODIUM 40 MG: 40 TABLET, DELAYED RELEASE ORAL at 05:25

## 2022-09-27 RX ADMIN — INSULIN HUMAN 10 UNITS: 100 INJECTION, SOLUTION PARENTERAL at 22:31

## 2022-09-27 RX ADMIN — OXCARBAZEPINE 300 MG: 300 TABLET, FILM COATED ORAL at 22:22

## 2022-09-27 RX ADMIN — GABAPENTIN 900 MG: 300 CAPSULE ORAL at 15:09

## 2022-09-27 RX ADMIN — WARFARIN SODIUM 4 MG: 2 TABLET ORAL at 17:43

## 2022-09-27 RX ADMIN — FUROSEMIDE 20 MG: 10 INJECTION, SOLUTION INTRAMUSCULAR; INTRAVENOUS at 22:23

## 2022-09-27 RX ADMIN — ATORVASTATIN CALCIUM 20 MG: 20 TABLET, FILM COATED ORAL at 22:23

## 2022-09-27 RX ADMIN — ONDANSETRON 4 MG: 2 INJECTION INTRAMUSCULAR; INTRAVENOUS at 11:18

## 2022-09-27 RX ADMIN — CETIRIZINE HYDROCHLORIDE 10 MG: 10 TABLET ORAL at 08:49

## 2022-09-27 RX ADMIN — BISACODYL 10 MG: 10 SUPPOSITORY RECTAL at 15:09

## 2022-09-27 RX ADMIN — DEXTROSE MONOHYDRATE 250 ML: 100 INJECTION, SOLUTION INTRAVENOUS at 22:36

## 2022-09-27 RX ADMIN — ESCITALOPRAM OXALATE 10 MG: 10 TABLET ORAL at 08:50

## 2022-09-27 RX ADMIN — Medication 2000 UNITS: at 08:49

## 2022-09-27 RX ADMIN — DICLOFENAC SODIUM 4 G: 10 GEL TOPICAL at 01:23

## 2022-09-27 RX ADMIN — OXCARBAZEPINE 300 MG: 300 TABLET, FILM COATED ORAL at 08:49

## 2022-09-27 RX ADMIN — HYDROXYZINE HYDROCHLORIDE 50 MG: 25 TABLET, FILM COATED ORAL at 16:21

## 2022-09-27 RX ADMIN — SODIUM ZIRCONIUM CYCLOSILICATE 10 G: 10 POWDER, FOR SUSPENSION ORAL at 22:23

## 2022-09-27 RX ADMIN — CYANOCOBALAMIN TAB 1000 MCG 1000 MCG: 1000 TAB at 08:50

## 2022-09-27 RX ADMIN — MIDODRINE HYDROCHLORIDE 10 MG: 10 TABLET ORAL at 08:49

## 2022-09-27 RX ADMIN — POLYETHYLENE GLYCOL 3350 17 G: 17 POWDER, FOR SOLUTION ORAL at 02:55

## 2022-09-27 RX ADMIN — ONDANSETRON 4 MG: 2 INJECTION INTRAMUSCULAR; INTRAVENOUS at 17:49

## 2022-09-27 RX ADMIN — RISPERIDONE 0.5 MG: 0.25 TABLET ORAL at 08:49

## 2022-09-27 ASSESSMENT — PAIN DESCRIPTION - ONSET
ONSET: ON-GOING
ONSET: ON-GOING

## 2022-09-27 ASSESSMENT — PAIN DESCRIPTION - FREQUENCY
FREQUENCY: CONTINUOUS
FREQUENCY: CONTINUOUS

## 2022-09-27 ASSESSMENT — PAIN - FUNCTIONAL ASSESSMENT
PAIN_FUNCTIONAL_ASSESSMENT: PREVENTS OR INTERFERES SOME ACTIVE ACTIVITIES AND ADLS
PAIN_FUNCTIONAL_ASSESSMENT: PREVENTS OR INTERFERES SOME ACTIVE ACTIVITIES AND ADLS

## 2022-09-27 ASSESSMENT — PAIN DESCRIPTION - ORIENTATION
ORIENTATION: RIGHT;LEFT;LOWER
ORIENTATION: RIGHT;LEFT;LOWER

## 2022-09-27 ASSESSMENT — PAIN DESCRIPTION - DESCRIPTORS
DESCRIPTORS: ACHING
DESCRIPTORS: ACHING

## 2022-09-27 ASSESSMENT — PAIN DESCRIPTION - PAIN TYPE
TYPE: CHRONIC PAIN
TYPE: CHRONIC PAIN

## 2022-09-27 ASSESSMENT — PAIN DESCRIPTION - LOCATION
LOCATION: LEG
LOCATION: LEG

## 2022-09-27 ASSESSMENT — PAIN SCALES - GENERAL
PAINLEVEL_OUTOF10: 7
PAINLEVEL_OUTOF10: 3

## 2022-09-27 NOTE — PROGRESS NOTES
[facility]   Urology Progress Note            Subjective: follow-up urinary retention indwelling Wall    Patient Vitals for the past 24 hrs:   BP Temp Temp src Pulse Resp SpO2 Weight   09/27/22 0303 -- -- -- -- -- -- 199 lb (90.3 kg)   09/26/22 2235 -- -- -- -- 18 -- --   09/26/22 1948 115/68 97.7 °F (36.5 °C) Oral 74 18 96 % --   09/26/22 1536 131/77 97.7 °F (36.5 °C) Oral 72 16 97 % --   09/26/22 1127 126/79 97.5 °F (36.4 °C) Oral 69 18 97 % --   09/26/22 0755 (!) 151/89 98.2 °F (36.8 °C) Oral 73 18 95 % --       Intake/Output Summary (Last 24 hours) at 9/27/2022 0753  Last data filed at 9/27/2022 0740  Gross per 24 hour   Intake 1468.85 ml   Output 2575 ml   Net -1106.15 ml       Recent Labs     09/24/22  1253 09/26/22  0548 09/27/22  0213   WBC 7.9 8.2 7.1   HGB 13.3 11.6* 11.3*   HCT 37.3* 35.1* 33.6*   MCV 88.4 88.6 89.4    162 151     Recent Labs     09/26/22  0548 09/26/22  0950 09/26/22  1818 09/27/22  0213   * 126* 124* 124*   K 4.7 4.6 4.5 4.4   CL 92* 93* 92* 92*   CO2 23 24 23 24   PHOS 3.9  --   --  3.8   BUN 17 16 15 15   CREATININE 0.85 0.83 0.97 0.94       No results for input(s): COLORU, PHUR, LABCAST, WBCUA, RBCUA, MUCUS, TRICHOMONAS, YEAST, BACTERIA, CLARITYU, SPECGRAV, LEUKOCYTESUR, UROBILINOGEN, BILIRUBINUR, BLOODU in the last 72 hours. Invalid input(s): NITRATE, GLUCOSEUKETONESUAMORPHOUS    Additional Lab/culture results:    Physical Exam: patient alert and oriented not in acute distress, the patient has neurogenic bladder dysfunction, he has an InterStim bladder stimulator but he does not carry the device for adjustment patient states he has left it at home.     Because of high postvoid residual and we have inserted a Wall catheter he has been doing well    Interval Imaging Findings:    Impression:    Patient Active Problem List   Diagnosis    Light headed    Recurrent major depression in partial remission ([facility]    Primary hypertension Neck pain    GERD (gastroesophageal reflux disease)    Degenerative disc disease, cervical    Hyponatremia    Hypercalcemia    Aortic aneurysm (HCC)    MGUS (monoclonal gammopathy of unknown significance)    Chronic renal impairment, stage 3 (moderate) (HCC)    Depression    Crack cocaine use    Bipolar I disorder, most recent episode depressed (HCC)    Altered mental status, unspecified    YONATHAN (acute kidney injury) ([address]    Altered mental status    Supratherapeutic INR    Atrial fibrillation (HCC)    Hypernatremia    Anemia    Major depression, recurrent (HCC)    Major depressive disorder, recurrent (HCC)    Acute kidney injury superimposed on chronic kidney disease (HCC)    Schizoaffective disorder, bipolar type ([address]    Schizoaffective disorder ([address]    Hematoma of injection site    BPH (benign prostatic hyperplasia)    Constipation, unspecified    Coronary atherosclerosis    Neurofibromatosis, unspecified ([address]    S/P AVR (aortic valve replacement)    Depression with suicidal ideation    Severe recurrent major depression without psychotic features ([address]    Severe depressed bipolar I disorder without psychotic features ([address]    Bipolar 1 disorder, depressed (HCC)    Hyperkalemia    ARIELLE on CPAP    Other specified hypothyroidism    Presence of prosthetic heart valve    Restless leg syndrome    Athscl heart disease of native coronary artery w/o ang pctrs       Plan:  continue Flomax with a void trial prior to discharge  We will make arrangements for the patient follow-up with [facility] urology    Murry Nissen, MD  7:53 AM 9/27/2022

## 2022-09-27 NOTE — PROGRESS NOTES
Physical Therapy  DATE: 2022    NAME: Caron Poe  MRN: [id number]   : 1960    Patient not seen this date for Physical Therapy due to:      [] Cancel by RN or physician due to:    [] Hemodialysis    [] Critical Lab Value Level     [] Blood transfusion in progress    [] Acute or unstable cardiovascular status   _MAP < 55 or more than >115  _HR < 40 or > 130    [] Acute or unstable pulmonary status   -FiO2 > 60%   _RR < 5 or >40    _O2 sats < 85%    [] Strict Bedrest    [] Off Unit for surgery or procedure    [] Off Unit for testing       [] Pending imaging to R/O fracture    [x] Refusal by Patient Pt not feeling well     [] Other      [] PT being discontinued at this time. Patient independent. No further needs. [] PT being discontinued at this time as the patient has been transferred to hospice care. No further needs.       JAMA GARCIA, PTA

## 2022-09-27 NOTE — PROGRESS NOTES
DATE: 2022    NAME: Abebe Card  MRN: [id number]   : 1960    Patient not seen this date for Occupational Therapy due to:      [] Cancel by RN or physician due to:    [] Hemodialysis    [] Critical Lab Value Level     [] Blood transfusion in progress    [] Acute or unstable cardiovascular status   _MAP < 55 or more than >115  _HR < 40 or > 130    [] Acute or unstable pulmonary status   -FiO2 > 60%   _RR < 5 or >40    _O2 sats < 85%    [] Strict Bedrest    [] Off Unit for surgery or procedure    [] Off Unit for testing       [] Pending imaging to R/O fracture    [x] Refusal by Patient : Multiple check backs completed with pt declining treatment stating \"I don't feel good today\". OT will cont. To follow. [] Other      [] OT being discontinued at this time. Patient independent. No further needs. [] OT being discontinued at this time as the patient has been transferred to hospice care. No further needs.       Suzanne Dc MERCEDES

## 2022-09-27 NOTE — PLAN OF CARE
Problem: Discharge Planning  Goal: Discharge to home or other facility with appropriate resources  9/27/2022 1146 by Yarelis Asencio RN  Outcome: Progressing  Flowsheets (Taken 9/27/2022 0845)  Discharge to home or other facility with appropriate resources:   Identify barriers to discharge with patient and caregiver   Arrange for needed discharge resources and transportation as appropriate   Identify discharge learning needs (meds, wound care, etc)   Refer to discharge planning if patient needs post-hospital services based on physician order or complex needs related to functional status, cognitive ability or social support system     Problem: Pain  Goal: Verbalizes/displays adequate comfort level or baseline comfort level  9/27/2022 1146 by Yarelis Asencio RN  Outcome: Progressing  Flowsheets (Taken 9/27/2022 1138)  Verbalizes/displays adequate comfort level or baseline comfort level:   Encourage patient to monitor pain and request assistance   Assess pain using appropriate pain scale   Administer analgesics based on type and severity of pain and evaluate response   Implement non-pharmacological measures as appropriate and evaluate response   Notify Licensed Independent Practitioner if interventions unsuccessful or patient reports new pain     Problem: Safety - Adult  Goal: Free from fall injury  9/27/2022 1146 by Yarelis Asencio RN  Outcome: Progressing  Flowsheets (Taken 9/26/2022 1230)  Free From Fall Injury: Instruct family/caregiver on patient safety     Problem: Neurosensory - Adult  Goal: Achieves stable or improved neurological status  9/27/2022 1146 by Yarelis Asencio RN  Outcome: Progressing  Flowsheets (Taken 9/27/2022 0845)  Achieves stable or improved neurological status: Assess for and report changes in neurological status     Problem: Cardiovascular - Adult  Goal: Maintains optimal cardiac output and hemodynamic stability  9/27/2022 1146 by Yarelis Asencio RN  Outcome: Progressing  Flowsheets (Taken 9/27/2022 0554)  Maintains optimal cardiac output and hemodynamic stability:   Monitor blood pressure and heart rate   Monitor urine output and notify Licensed Independent Practitioner for values outside of normal range   Assess for signs of decreased cardiac output     Problem: Cardiovascular - Adult  Goal: Absence of cardiac dysrhythmias or at baseline  9/27/2022 1146 by Silverio Morales RN  Outcome: Progressing  Flowsheets (Taken 9/27/2022 0845)  Absence of cardiac dysrhythmias or at baseline:   Monitor cardiac rate and rhythm   Assess for signs of decreased cardiac output   Administer antiarrhythmia medication and electrolyte replacement as ordered     Problem: Skin/Tissue Integrity - Adult  Goal: Skin integrity remains intact  9/27/2022 1146 by Silverio Morales RN  Outcome: Progressing  Flowsheets (Taken 9/27/2022 0845)  Skin Integrity Remains Intact: Monitor for areas of redness and/or skin breakdown     Problem: Musculoskeletal - Adult  Goal: Return mobility to safest level of function  9/27/2022 1146 by Silverio Morales RN  Outcome: Progressing  Flowsheets (Taken 9/27/2022 0845)  Return Mobility to Safest Level of Function:   Assess patient stability and activity tolerance for standing, transferring and ambulating with or without assistive devices   Assist with transfers and ambulation using safe patient handling equipment as needed   Ensure adequate protection for wounds/incisions during mobilization   Obtain physical therapy/occupational therapy consults as needed   Apply continuous passive motion per provider or physical therapy orders to increase flexion toward goal   Instruct patient/family in ordered activity level     Problem: Musculoskeletal - Adult  Goal: Return ADL status to a safe level of function  9/27/2022 1146 by Silverio Morales RN  Outcome: Progressing  Flowsheets (Taken 9/27/2022 0845)  Return ADL Status to a Safe Level of Function:   Administer medication as ordered   Assess activities of daily living deficits and provide assistive devices as needed   Obtain physical therapy/occupational therapy consults as needed   Assist and instruct patient to increase activity and self care as tolerated     Problem: Gastrointestinal - Adult  Goal: Minimal or absence of nausea and vomiting  9/27/2022 1146 by Tania Rubio RN  Outcome: Progressing  Flowsheets (Taken 9/27/2022 0845)  Minimal or absence of nausea and vomiting: Administer ordered antiemetic medications as needed     Problem: Gastrointestinal - Adult  Goal: Maintains or returns to baseline bowel function  9/27/2022 1146 by Tania Rubio RN  Outcome: Progressing  Flowsheets (Taken 9/27/2022 0845)  Maintains or returns to baseline bowel function:   Assess bowel function   Encourage mobilization and activity     Problem: Gastrointestinal - Adult  Goal: Maintains adequate nutritional intake  9/27/2022 1146 by Tania Rubio RN  Outcome: Progressing  Flowsheets (Taken 9/27/2022 0845)  Maintains adequate nutritional intake: Monitor intake and output, weight and lab values     Problem: Genitourinary - Adult  Goal: Absence of urinary retention  Outcome: Progressing  Flowsheets (Taken 9/27/2022 0845)  Absence of urinary retention:   Assess patients ability to void and empty bladder   Monitor intake/output and perform bladder scan as needed     Problem: Metabolic/Fluid and Electrolytes - Adult  Goal: Electrolytes maintained within normal limits  9/27/2022 1146 by Tania Rubio RN  Outcome: Progressing  Flowsheets (Taken 9/27/2022 0845)  Electrolytes maintained within normal limits:   Monitor labs and assess patient for signs and symptoms of electrolyte imbalances   Administer electrolyte replacement as ordered   Monitor response to electrolyte replacements, including repeat lab results as appropriate   Fluid restriction as ordered   Instruct patient on fluid and nutrition restrictions as appropriate     Problem: Metabolic/Fluid and Electrolytes - Adult  Goal: Hemodynamic stability and optimal renal function maintained  9/27/2022 1146 by Mya Choi RN  Outcome: Progressing  Flowsheets (Taken 9/27/2022 0845)  Hemodynamic stability and optimal renal function maintained:   Monitor labs and assess for signs and symptoms of volume excess or deficit   Monitor intake, output and patient weight   Monitor urine specific gravity, serum osmolarity and serum sodium as indicated or ordered   Monitor response to interventions for patient's volume status, including labs, urine output, blood pressure (other measures as available)   Instruct patient on fluid and nutrition restrictions as appropriate

## 2022-09-27 NOTE — DISCHARGE INSTR - COC
Continuity of Care Form    Patient Name: Iram Urbano   :  1960  MRN:  [id number]    Admit date:  2022  Discharge date:  22    Code Status Order: Full Code   Advance Directives:     Admitting Physician:  No admitting provider for patient encounter. PCP: LOGAN Ellis NP    Discharging Nurse:  [facility] Unit/Room#:   Discharging Unit Phone Number: [phone]    Emergency Contact:   Extended Emergency Contact Information  Primary Emergency Contact: Yaa Elizondo  Address: [address] Phone: [phone]  Mobile Phone: [phone]  Relation: Brother/Sister    Past Surgical History:  Past Surgical History:   Procedure Laterality Date    ABDOMEN SURGERY      APPENDECTOMY      CARDIAC SURGERY  7/14/15    Median Sternotomy, Repair of ascending aorti aneurysm, stentless valve placement    CARDIAC SURGERY  2015    Median stenotomy, repair of ascending aorti aneurysm, stentless valve placement     COLONOSCOPY  2021    DILATATION, ESOPHAGUS      HERNIA REPAIR      NECK SURGERY  2013    TONSILLECTOMY      UPPER GASTROINTESTINAL ENDOSCOPY  2021       Immunization History:   Immunization History   Administered Date(s) Administered    COVID-19, MODERNA BLUE border, Primary or Immunocompromised, (age 12y+), IM, 100 mcg/0.5mL 03/10/2021, 2021    COVID-19, PFIZER Bivalent BOOSTER, (age 12y+), IM, 27 mcg/0.3 mL dose 09/15/2022    Tdap (Boostrix, Adacel) 2020       Active Problems:  Patient Active Problem List   Diagnosis Code    Light headed R42    Recurrent major depression in partial remission ([facility] [address] F33.41    Primary hypertension I10    Neck pain M54.2    GERD (gastroesophageal reflux disease) K21.9    Degenerative disc disease, cervical M50.30    Hyponatremia E87.1    Hypercalcemia E83.52    Aortic aneurysm (HCC) I71.9    MGUS (monoclonal gammopathy of unknown significance) D47.2 Chronic renal impairment, stage 3 (moderate) ([facility]) N18.30    Depression F32. A    Crack cocaine use F14.90    Bipolar I disorder, most recent episode depressed ([address] F31.30    Altered mental status, unspecified R41.82    YONATHAN (acute kidney injury) ([address] N17.9    Altered mental status R41.82    Supratherapeutic INR R79.1    Atrial fibrillation ([address] I48.91    Hypernatremia E87.0    Anemia D64.9    Major depression, recurrent ([address] F33.9    Major depressive disorder, recurrent ([address] F33.9    Acute kidney injury superimposed on chronic kidney disease ([address] N17.9, N18.9    Schizoaffective disorder, bipolar type ([address] F25.0    Schizoaffective disorder ([address] F25.9    Hematoma of injection site T80.89XA    BPH (benign prostatic hyperplasia) N40.0    Constipation, unspecified K59.00    Coronary atherosclerosis I25.10    Neurofibromatosis, unspecified ([facility]) Q85.00    S/P AVR (aortic valve replacement) Z95.2    Depression with suicidal ideation F32. A, R45.851    Severe recurrent major depression without psychotic features ([facility]) F33.2    Severe depressed bipolar I disorder without psychotic features ([address] F31.4    Bipolar 1 disorder, depressed ([address] F31.9    Hyperkalemia E87.5    ARIELLE on CPAP G47.33, Z99.89    Other specified hypothyroidism E03.8    Presence of prosthetic heart valve Z95.2    Restless leg syndrome G25.81    Athscl heart disease of native coronary artery w/o ang pctrs I25.10       Isolation/Infection:   Isolation            No Isolation          Patient Infection Status       None to display            Nurse Assessment:  Last Vital Signs: BP (!) 143/62   Pulse 74   Temp 97.5 °F (36.4 °C) (Oral)   Resp 15   Ht 5' 6\" (1.676 m)   Wt 199 lb (90.3 kg)   SpO2 93%   BMI 32.12 kg/m²     Last documented pain score (0-10 scale): Pain Level: 3  Last Weight:   Wt Readings from Last 1 Encounters:   09/27/22 199 lb (90.3 kg)     Mental Status:  oriented and alert    IV Access:  - None    Nursing Mobility/ADLs:  Walking Assisted  Transfer  Assisted  Bathing  Independent  Dressing  Independent  Toileting  Independent  Feeding  Independent  Med Admin  Independent  Med Delivery   whole    Wound Care Documentation and Therapy:        Elimination:  Continence: Bowel: Yes  Bladder: Yes  Urinary Catheter: None   Colostomy/Ileostomy/Ileal Conduit: No       Date of Last BM: 9/28/22    Intake/Output Summary (Last 24 hours) at 9/27/2022 1408  Last data filed at 9/27/2022 1112  Gross per 24 hour   Intake 1912.01 ml   Output 3025 ml   Net -1112.99 ml     I/O last 3 completed shifts: In: 1468.9 [I.V.:1468.9]  Out: 4070 [Urine:3375]    Safety Concerns: At Risk for Falls    Impairments/Disabilities:      None    Nutrition Therapy:  Current Nutrition Therapy:   - Oral Diet:  General    Routes of Feeding: Oral  Liquids: No Restrictions  Daily Fluid Restriction: 1500  Last Modified Barium Swallow with Video (Video Swallowing Test): not done    Treatments at the Time of Hospital Discharge:   Respiratory Treatments: ***  Oxygen Therapy:  is not on home oxygen therapy.   Ventilator:    - No ventilator support    Rehab Therapies: Physical Therapy and Occupational Therapy  Weight Bearing Status/Restrictions: No weight bearing restrictions  Other Medical Equipment (for information only, NOT a DME order):  walker  Other Treatments: ***    Patient's personal belongings (please select all that are sent with patient):  clothing    RN SIGNATURE:  Electronically signed by Ebony Hernández RN on 9/29/22 at 3:59 PM EDT    CASE MANAGEMENT/SOCIAL WORK SECTION    Inpatient Status Date: ***    Readmission Risk Assessment Score:  Readmission Risk              Risk of Unplanned Readmission:  43           Discharging to Facility/ Agency   Name: [facility]  Location:  [address] [place] [address]  [facility]  [address] [place] [place] [address] [address]  Phone:   [phone]   Fax:  [phone]    / signature: Electronically signed by Rodrigo Russell KIMBERLEEW on 9/27/22 at 2:08 PM EDT    PHYSICIAN SECTION    Prognosis: Fair    Condition at Discharge: Stable    Rehab Potential (if transferring to Rehab): Fair    Recommended Labs or Other Treatments After Discharge: PT OT, recheck BMP after 2 days, send results to PCP and nephrology (Dr. Bimal Laguerre)    Physician Certification: I certify the above information and transfer of Bautista Miranda  is necessary for the continuing treatment of the diagnosis listed and that he requires [facility] for greater 30 days.      Update Admission H&P: No change in H&P    PHYSICIAN SIGNATURE:  Electronically signed by Rayo Freris MD on 9/29/22 at 2:06 PM EDT

## 2022-09-27 NOTE — CONSULTS
Warfarin Dosing - Pharmacy Consult Note  Consulting Provider: Gucci Ovalle CNP  Indication:  Atrial Fibrillation  Warfarin Dose prior to admission: 2mg MWSun, 4mg AOD   Concurrent anticoagulants/antiplatelets: aspirin  Significant Drug Interactions: No obvious interactions  Recent Labs     09/24/22  1253 09/24/22  2025 09/25/22  0039 09/25/22  0554 09/26/22  0548 09/27/22  0213   INR  --   --    < > 2.4 2.4 2.0   HGB 13.3  --   --   --  11.6* 11.3*     --   --   --  162 151   LABALBU 4.2 3.7  --   --  3.6 3.5    < > = values in this interval not displayed. Recent warfarin administrations                     warfarin (COUMADIN) tablet 2 mg (mg) 2 mg Given 09/26/22 1812    warfarin (COUMADIN) tablet 2 mg (mg) 2 mg Given 09/25/22 1803                   Date   INR    Dose  9/24/22   2.4       took at home  9/25        2.4       2 mg   9/26        2.4       2 mg  9/27        2.0    Assessment/Plan  (Goal INR: 2 - 3)  INR still on low side of therapeutic, continue home dose of 4 mg tonight. Active problem list reviewed. INR orders are placed. Chart reviewed for pertinent labs, drug/diet interactions, and past doses. Documentation of patient's clinical condition was reviewed. Pharmacy Dosing:  Pharmacy will continue to follow.

## 2022-09-27 NOTE — PROGRESS NOTES
[facility]  Office: [address], DO, Kala Roach, DO, Emily Crain, DO, Nikolai Nava Blood, DO, Lnius Grimes MD, Mack Dancer, MD, Niles Palomino MD, Sameer De Jesus MD,  Cora Quinones MD, Ruthie Jose MD, Melquiades Jorgensen, DO, Amena Santos MD,  Rick Elmore MD, Ryan Lozano MD, Annabel Porras, DO, Miguel Crandall MD, Juan Carlos Caldwell MD, Estephanie Smith MD, Skylar Miller MD, Pete Foster MD, Jaquan Pena MD, Madison Gutierrez, DO, Agustín Sweeney MD, Sapna Fraser MD, Leigh Ann Laughlin, CNP,  Jacqueline Lutz, CNP, Bill Beaulieu, CNP, Loc Bang, CNP,  Soo Martinez, DNP, Tito Prescott, CNP, Maria Guadalupe Rodriguez, CNP, Arash Lawrence, CNP, Jl Moss, CNP, Ghassan Santana, CNP, Won Wilson, PA-C, Murali Jose, CNS, Angel Mayes, DNP, Jeovany Tinsley, CNP, Cheli England, CNP, Unknown Pump, [facility]    Progress Note    9/27/2022    3:05 PM    Name:   Jose Kong  MRN:     [id number]     Acct:      [de-identified]   Room:   2024/2024-01  IP Day:  3  Admit Date:  9/24/2022  8:01 PM    PCP:   LOGAN Castillo NP  Code Status:  Full Code    Subjective:     C/C:   Chief Complaint   Patient presents with    Abnormal Lab     States that Dr Hank Dawkins sent him here, had labs drawn today and potassium high     Interval History Status: . Urology recommended to place [facility] catheter due to neurogenic bladder dysfunction, he has left InterStim bladder stimulator at home  Sodium 124 today, he was getting normal saline fluids  Is also getting SSRI  He did complain of constipation to nurse  Complains of wheezing  Brief History:   The patient presented the ER with high potassium. Patient states that he had blood work drawn today for his Dr. Nadiya Hebert and received a call tonight that his potassium was high. Patient denies any chest pain shortness of breath abdominal pain nausea or vomiting.   He states that he gets his blood work done every 6 months to [facility] for cancer\". He states he typically drinks 3-4 bottles of water and a couple Gatorade's a day. He denies excessive thirst or urination. Patient states that he did recently get out of the psychiatric unit and they did start him on Elavil but no other medication changes. He does take warfarin and is compliant with his dosing. He denies any palpitations. No recent alcohol use. History includes monoclonal gammopathy, ARIELLE with CPAP, atrial fibs, prosthetic valve, hypothyroidism, hypertension, bipolar disorder, crack cocaine use, and chronic renal insufficiency      Review of Systems:     Constitutional:  negative for chills, fevers, sweats  Respiratory:  negative for cough, dyspnea on exertion, shortness of breath, wheezing  Cardiovascular:  negative for chest pain, chest pressure/discomfort, lower extremity edema, palpitations  Gastrointestinal:  negative for abdominal pain, constipation, diarrhea, nausea, vomiting  Neurological:  negative for dizziness, headache    Medications: Allergies:     Allergies   Allergen Reactions    Oxycodone Itching    Fish-Derived Products     Ibuprofen     Latuda [Lurasidone Hcl] Other (See Comments)     \"Feels like pt is going to crawl out of own skin\"    Lithium     Lurasidone     Quetiapine     Seroquel [Quetiapine Fumarate]        Current Meds:   Scheduled Meds:    warfarin  4 mg Oral Once    bisacodyl  10 mg Rectal Once    [Held by provider] escitalopram  10 mg Oral Daily    OXcarbazepine  300 mg Oral BID    tamsulosin  0.8 mg Oral QPM    melatonin  6 mg Oral Nightly    aspirin  81 mg Oral Daily    atorvastatin  20 mg Oral Nightly    calcium citrate  250 mg Oral BID    cetirizine  10 mg Oral Daily    Vitamin D  2,000 Units Oral Daily with breakfast    vitamin B-12  1,000 mcg Oral Daily    ferrous sulfate  325 mg Oral Daily with breakfast    folic acid  1 mg Oral Daily    [Held by provider] furosemide  20 mg Oral Daily    gabapentin  900 mg Oral TID    midodrine  10 mg Oral TID    pantoprazole  40 mg Oral QAM AC    risperiDONE  0.5 mg Oral BID    sennosides-docusate sodium  1 tablet Oral Daily    sodium chloride flush  5-40 mL IntraVENous 2 times per day    warfarin placeholder: dosing by pharmacy   Other RX Placeholder     Continuous Infusions:    sodium chloride       PRN Meds: albuterol sulfate HFA, traMADol, diclofenac sodium, hydrOXYzine HCl, sodium chloride flush, sodium chloride, potassium chloride **OR** potassium alternative oral replacement **OR** potassium chloride, magnesium sulfate, ondansetron **OR** ondansetron, polyethylene glycol, acetaminophen **OR** acetaminophen    Data:     Past Medical History:   has a past medical history of AMS (altered mental status), Atrial fibrillation ([facility] [address] Bipolar 1 disorder ([facility] [address] Depression, Hypertension, Neck pain, Neurofibromatosis ([facility] [address] Patient in clinical research study, Severe recurrent major depression without psychotic features ([facility] [address] and Twitching. Social History:   reports that he has never smoked. He has never been exposed to tobacco smoke. He has never used smokeless tobacco. He reports that he does not currently use alcohol. He reports that he does not currently use drugs. Family History:   Family History   Problem Relation Age of Onset    Coronary Art Dis Mother     Hypertension Mother     Hypertension Father     Cancer Father         blood       Vitals:  BP (!) 143/62   Pulse 74   Temp 97.5 °F (36.4 °C) (Oral)   Resp 15   Ht 5' 6\" (1.676 m)   Wt 199 lb (90.3 kg)   SpO2 93%   BMI 32.12 kg/m²   Temp (24hrs), Av.7 °F (36.5 °C), Min:97.5 °F (36.4 °C), Max:97.9 °F (36.6 °C)    No results for input(s): POCGLU in the last 72 hours. I/O (24Hr):     Intake/Output Summary (Last 24 hours) at 2022 1505  Last data filed at 2022 1112  Gross per 24 hour   Intake 1912.01 ml   Output 3025 ml   Net -1112.99 ml       Labs:  Hematology:  Recent Labs 09/25/22 0554 09/26/22 0548 09/27/22 0213   WBC  --  8.2 7.1   RBC  --  3.96* 3.76*   HGB  --  11.6* 11.3*   HCT  --  35.1* 33.6*   MCV  --  88.6 89.4   MCH  --  29.3 30.1   MCHC  --  33.0 33.6   RDW  --  14.4 14.2   PLT  --  162 151   MPV  --  9.7 9.0   INR 2.4 2.4 2.0     Chemistry:  Recent Labs     09/26/22  0548 09/26/22  0950 09/26/22  1818 09/27/22 0213 09/27/22  0939   *   < > 124* 124* 126*   K 4.7   < > 4.5 4.4 4.5   CL 92*   < > 92* 92* 94*   CO2 23   < > 23 24 24   GLUCOSE 94   < > 115* 93 114*   BUN 17   < > 15 15 14   CREATININE 0.85   < > 0.97 0.94 0.88   MG 1.7  --   --  1.7  --    ANIONGAP 10   < > 9 8* 8*   LABGLOM >60   < > >60 >60 >60   GFRAA >60   < > >60 >60 >60   CALCIUM 8.7   < > 8.6 8.6 8.6   PHOS 3.9  --   --  3.8  --    PSA 1.53  --   --   --   --     < > = values in this interval not displayed. Recent Labs     09/24/22 2025 09/26/22 0548 09/27/22 0213   PROT 6.5  --   --    LABALBU 3.7 3.6 3.5   AST 92*  --   --    *  --   --    ALKPHOS 191*  --   --    BILITOT 0.2*  --   --      ABG:No results found for: POCPH, PHART, PH, POCPCO2, EZF7NPC, PCO2, POCPO2, PO2ART, PO2, POCHCO3, EOS2NNN, HCO3, NBEA, PBEA, BEART, BE, THGBART, THB, WMG3KNG, RVTO7EDK, R7CJJKMX, O2SAT, FIO2  Lab Results   Component Value Date/Time    SPECIAL R HAND 10ML 05/01/2022 05:25 PM     Lab Results   Component Value Date/Time    CULTURE NO GROWTH 5 DAYS 05/01/2022 05:25 PM       Radiology:  CT ABDOMEN PELVIS WO CONTRAST Additional Contrast? None    Result Date: 9/26/2022  A Wall catheter is present in an underdistended bladder. No hydronephrosis or urinary tract calculus. No acute process in the abdomen or pelvis. XR KNEE LEFT (3 VIEWS)    Result Date: 9/25/2022  No acute osseous abnormality of the left knee. XR KNEE RIGHT (3 VIEWS)    Result Date: 9/25/2022  No acute osseous abnormality of the right knee.        Physical Examination:        General appearance:  alert, cooperative and no distress  Mental Status:  oriented to person, place and time and normal affect  Lungs: Prolonged expiratory phase, few wheezing   heart:  regular rate and rhythm, no murmur  Abdomen:  soft, nontender, nondistended, normal bowel sounds, no masses, hepatomegaly, splenomegaly  Extremities:  no edema, redness, tenderness in the calves  Skin:  no gross lesions, rashes, induration    Assessment:        Hospital Problems             Last Modified POA    * (Principal) Hyponatremia 9/24/2022 Yes    BPH (benign prostatic hyperplasia) 9/24/2022 Yes    Coronary atherosclerosis 9/24/2022 Yes    Bipolar 1 disorder, depressed ([address] 9/24/2022 Yes    Hyperkalemia 9/24/2022 Yes    ARIELLE on CPAP 9/24/2022 Yes    Other specified hypothyroidism (Chronic) 9/24/2022 Yes    Presence of prosthetic heart valve (Chronic) 9/24/2022 Yes    Restless leg syndrome 9/25/2022 Yes    Primary hypertension 9/24/2022 Yes    GERD (gastroesophageal reflux disease) 9/24/2022 Yes    Chronic renal impairment, stage 3 (moderate) ([address] 9/24/2022 Yes    Atrial fibrillation ([address] 9/24/2022 Yes   Constipation  Bronchospasm  Plan:        Hyponatremia suspected SIADH-fluid restriction of 1500 ML, hold SSRI  Bipolar Disorder -continue home medicines for now  Urinary Retention - restart home meds, urology consulted. Wall in place. Has stimulator-left at home.    Paroxysmal Atrial Fibrillation - continue warfarin   Hypothroidism - home meds   GERD  CKD - stable  RLS  HTN   ARIELLE  CAD  BPH  Bronchospasm-start DuoNeb aerosols  Constipation-Dulcolax suppository once and continue Senokot  Dispo: home when stable    Barby Patrick MD  9/27/2022  3:05 PM

## 2022-09-27 NOTE — RT PROTOCOL NOTE
RT Inhaler-Nebulizer Bronchodilator Protocol Note    There is a bronchodilator order in the chart from a provider indicating to follow the RT Bronchodilator Protocol and there is an Initiate RT Inhaler-Nebulizer Bronchodilator Protocol order as well (see protocol at bottom of note). CXR Findings:  No results found. The findings from the last RT Protocol Assessment were as follows:   History Pulmonary Disease: Chronic pulmonary disease  Respiratory Pattern: Regular pattern and RR 12-20 bpm  Breath Sounds: Clear breath sounds  Cough: Strong, spontaneous, non-productive  Indication for Bronchodilator Therapy: On home bronchodilators  Bronchodilator Assessment Score: 2    Aerosolized bronchodilator medication orders have been revised according to the RT Inhaler-Nebulizer Bronchodilator Protocol below. Respiratory Therapist to perform RT Therapy Protocol Assessment initially then follow the protocol. Repeat RT Therapy Protocol Assessment PRN for score 0-3 or on second treatment, BID, and PRN for scores above 3. No Indications - adjust the frequency to every 6 hours PRN wheezing or bronchospasm, if no treatments needed after 48 hours then discontinue using Per Protocol order mode. If indication present, adjust the RT bronchodilator orders based on the Bronchodilator Assessment Score as indicated below. Use Inhaler orders unless patient has one or more of the following: on home nebulizer, not able to hold breath for 10 seconds, is not alert and oriented, cannot activate and use MDI correctly, or respiratory rate 25 breaths per minute or more, then use the equivalent nebulizer order(s) with same Frequency and PRN reasons based on the score. If a patient is on this medication at home then do not decrease Frequency below that used at home.     0-3 - enter or revise RT bronchodilator order(s) to equivalent RT Bronchodilator order with Frequency of every 4 hours PRN for wheezing or increased work of breathing using Per Protocol order mode. 4-6 - enter or revise RT Bronchodilator order(s) to two equivalent RT bronchodilator orders with one order with BID Frequency and one order with Frequency of every 4 hours PRN wheezing or increased work of breathing using Per Protocol order mode. 7-10 - enter or revise RT Bronchodilator order(s) to two equivalent RT bronchodilator orders with one order with TID Frequency and one order with Frequency of every 4 hours PRN wheezing or increased work of breathing using Per Protocol order mode. 11-13 - enter or revise RT Bronchodilator order(s) to one equivalent RT bronchodilator order with QID Frequency and an Albuterol order with Frequency of every 4 hours PRN wheezing or increased work of breathing using Per Protocol order mode. Greater than 13 - enter or revise RT Bronchodilator order(s) to one equivalent RT bronchodilator order with every 4 hours Frequency and an Albuterol order with Frequency of every 2 hours PRN wheezing or increased work of breathing using Per Protocol order mode. RT to enter RT Home Evaluation for COPD & MDI Assessment order using Per Protocol order mode.     Electronically signed by Genesis Eubanks RCP on 9/27/2022 at 10:37 AM

## 2022-09-27 NOTE — CARE COORDINATION
Assessment:  hypothyroidism control uncertain and needs improvement.   Plan: current treatment plan effective, no change in therapy  following changes made to the medical regimen - continue Synthroid 25 mcg recheck level 6 to 8 weeks.    Discussed risks and benefits of medication use.  ER precautions were given.   Social work: Authorization obtained for admission to [employer]. Await dc order. TAWNY started.

## 2022-09-27 NOTE — PLAN OF CARE
Problem: Discharge Planning  Goal: Discharge to home or other facility with appropriate resources  9/27/2022 0013 by Gustav Goldmann, RN  Outcome: Progressing  Flowsheets (Taken 9/26/2022 1945)  Discharge to home or other facility with appropriate resources:   Identify barriers to discharge with patient and caregiver   Arrange for needed discharge resources and transportation as appropriate   Identify discharge learning needs (meds, wound care, etc)     Problem: Pain  Goal: Verbalizes/displays adequate comfort level or baseline comfort level  9/27/2022 0013 by Gustav Goldmann, RN  Outcome: Progressing  Flowsheets (Taken 9/26/2022 1016 by Aida Dukes RN)  Verbalizes/displays adequate comfort level or baseline comfort level:   Encourage patient to monitor pain and request assistance   Assess pain using appropriate pain scale   Administer analgesics based on type and severity of pain and evaluate response   Implement non-pharmacological measures as appropriate and evaluate response   Notify Licensed Independent Practitioner if interventions unsuccessful or patient reports new pain     Problem: Safety - Adult  Goal: Free from fall injury  9/27/2022 0013 by Gustav Goldmann, RN  Outcome: Progressing  Flowsheets (Taken 9/26/2022 1230 by Aida Dukes RN)  Free From Fall Injury: Instruct family/caregiver on patient safety     Problem: Neurosensory - Adult  Goal: Achieves stable or improved neurological status  9/27/2022 0013 by Gustav Goldmann, RN  Outcome: Progressing  Flowsheets (Taken 9/26/2022 1945)  Achieves stable or improved neurological status: Assess for and report changes in neurological status     Problem: Cardiovascular - Adult  Goal: Maintains optimal cardiac output and hemodynamic stability  9/27/2022 0013 by Gustav Goldmann, RN  Outcome: Progressing  Flowsheets (Taken 9/26/2022 1945)  Maintains optimal cardiac output and hemodynamic stability:   Monitor blood pressure and heart rate   Monitor urine output and notify Licensed Independent Practitioner for values outside of normal range   Assess for signs of decreased cardiac output   Administer fluid and/or volume expanders as ordered   Administer vasoactive medications as ordered     Problem: Cardiovascular - Adult  Goal: Absence of cardiac dysrhythmias or at baseline  9/27/2022 0013 by Brandy Marquez RN  Outcome: Progressing  Flowsheets (Taken 9/26/2022 1945)  Absence of cardiac dysrhythmias or at baseline:   Monitor cardiac rate and rhythm   Assess for signs of decreased cardiac output     Problem: Skin/Tissue Integrity - Adult  Goal: Skin integrity remains intact  9/27/2022 0013 by Brandy Marquez RN  Outcome: Progressing  Flowsheets  Taken 9/26/2022 2343  Skin Integrity Remains Intact:   Monitor for areas of redness and/or skin breakdown   Assess vascular access sites hourly  Taken 9/26/2022 1945  Skin Integrity Remains Intact:   Monitor for areas of redness and/or skin breakdown   Assess vascular access sites hourly     Problem: Musculoskeletal - Adult  Goal: Return mobility to safest level of function  9/27/2022 0013 by Brandy Marquez RN  Outcome: Progressing  Flowsheets (Taken 9/26/2022 1945)  Return Mobility to Safest Level of Function:   Assess patient stability and activity tolerance for standing, transferring and ambulating with or without assistive devices   Assist with transfers and ambulation using safe patient handling equipment as needed   Ensure adequate protection for wounds/incisions during mobilization   Obtain physical therapy/occupational therapy consults as needed   Instruct patient/family in ordered activity level     Problem: Musculoskeletal - Adult  Goal: Return ADL status to a safe level of function  9/27/2022 0013 by Brandy Marquez RN  Outcome: Progressing  Flowsheets (Taken 9/26/2022 1945)  Return ADL Status to a Safe Level of Function:   Administer medication as ordered   Assess activities of daily living deficits and provide assistive devices as needed   Obtain physical therapy/occupational therapy consults as needed   Assist and instruct patient to increase activity and self care as tolerated     Problem: Gastrointestinal - Adult  Goal: Minimal or absence of nausea and vomiting  9/27/2022 0013 by Shelley Hamlin RN  Outcome: Progressing  Flowsheets (Taken 9/26/2022 1945)  Minimal or absence of nausea and vomiting:   Administer IV fluids as ordered to ensure adequate hydration   Administer ordered antiemetic medications as needed   Provide nonpharmacologic comfort measures as appropriate     Problem: Gastrointestinal - Adult  Goal: Maintains or returns to baseline bowel function  9/27/2022 0013 by Shelley Hamlin RN  Outcome: Progressing  Flowsheets (Taken 9/26/2022 1945)  Maintains or returns to baseline bowel function:   Assess bowel function   Administer ordered medications as needed   Encourage mobilization and activity     Problem: Gastrointestinal - Adult  Goal: Maintains adequate nutritional intake  9/27/2022 0013 by Shelley Hamlin RN  Outcome: Progressing  Flowsheets (Taken 9/26/2022 1945)  Maintains adequate nutritional intake:   Monitor percentage of each meal consumed   Assist with meals as needed   Monitor intake and output, weight and lab values     Problem: Genitourinary - Adult  Goal: Urinary catheter remains patent  9/27/2022 0013 by Shelley Hamlin RN  Outcome: Progressing  Flowsheets (Taken 9/26/2022 1945)  Urinary catheter remains patent: Assess patency of urinary catheter     Problem: Metabolic/Fluid and Electrolytes - Adult  Goal: Electrolytes maintained within normal limits  9/27/2022 0013 by Shelley Hamlin RN  Outcome: Progressing  Flowsheets (Taken 9/26/2022 1945)  Electrolytes maintained within normal limits:   Monitor labs and assess patient for signs and symptoms of electrolyte imbalances   Administer electrolyte replacement as ordered   Monitor response to electrolyte replacements, including repeat lab results as appropriate     Problem: Metabolic/Fluid and Electrolytes - Adult  Goal: Hemodynamic stability and optimal renal function maintained  9/27/2022 0013 by Ventura Emerson RN  Outcome: Progressing  Flowsheets (Taken 9/26/2022 1945)  Hemodynamic stability and optimal renal function maintained:   Monitor labs and assess for signs and symptoms of volume excess or deficit   Monitor intake, output and patient weight   Monitor urine specific gravity, serum osmolarity and serum sodium as indicated or ordered   Monitor response to interventions for patient's volume status, including labs, urine output, blood pressure (other measures as available)

## 2022-09-28 LAB
ALBUMIN SERPL-MCNC: 4.1 G/DL (ref 3.5–5.2)
ANION GAP SERPL CALCULATED.3IONS-SCNC: 10 MMOL/L (ref 9–17)
ANION GAP SERPL CALCULATED.3IONS-SCNC: 13 MMOL/L (ref 9–17)
ANION GAP SERPL CALCULATED.3IONS-SCNC: 14 MMOL/L (ref 9–17)
ANION GAP SERPL CALCULATED.3IONS-SCNC: 9 MMOL/L (ref 9–17)
BUN BLDV-MCNC: 14 MG/DL (ref 8–23)
BUN BLDV-MCNC: 15 MG/DL (ref 8–23)
BUN BLDV-MCNC: 15 MG/DL (ref 8–23)
BUN/CREAT BLD: 17 (ref 9–20)
CALCIUM SERPL-MCNC: 8.9 MG/DL (ref 8.6–10.4)
CALCIUM SERPL-MCNC: 9 MG/DL (ref 8.6–10.4)
CALCIUM SERPL-MCNC: 9.1 MG/DL (ref 8.6–10.4)
CHLORIDE BLD-SCNC: 87 MMOL/L (ref 98–107)
CHLORIDE BLD-SCNC: 88 MMOL/L (ref 98–107)
CHLORIDE BLD-SCNC: 90 MMOL/L (ref 98–107)
CHLORIDE BLD-SCNC: 92 MMOL/L (ref 98–107)
CHLORIDE, UR: 77 MMOL/L
CO2: 21 MMOL/L (ref 20–31)
CO2: 23 MMOL/L (ref 20–31)
CO2: 24 MMOL/L (ref 20–31)
CO2: 27 MMOL/L (ref 20–31)
CORTISOL: 11.6 UG/DL (ref 2.7–18.4)
CREAT SERPL-MCNC: 0.83 MG/DL (ref 0.7–1.2)
CREAT SERPL-MCNC: 0.88 MG/DL (ref 0.7–1.2)
CREAT SERPL-MCNC: 0.89 MG/DL (ref 0.7–1.2)
GFR AFRICAN AMERICAN: >60 ML/MIN
GFR NON-AFRICAN AMERICAN: >60 ML/MIN
GFR SERPL CREATININE-BSD FRML MDRD: ABNORMAL ML/MIN/{1.73_M2}
GLUCOSE BLD-MCNC: 102 MG/DL (ref 70–99)
GLUCOSE BLD-MCNC: 104 MG/DL (ref 75–110)
GLUCOSE BLD-MCNC: 105 MG/DL (ref 75–110)
GLUCOSE BLD-MCNC: 105 MG/DL (ref 75–110)
GLUCOSE BLD-MCNC: 107 MG/DL (ref 70–99)
GLUCOSE BLD-MCNC: 108 MG/DL (ref 75–110)
GLUCOSE BLD-MCNC: 113 MG/DL (ref 70–99)
GLUCOSE BLD-MCNC: 124 MG/DL (ref 75–110)
GLUCOSE BLD-MCNC: 97 MG/DL (ref 75–110)
HCT VFR BLD CALC: 36.8 % (ref 40.7–50.3)
HEMOGLOBIN: 12.6 G/DL (ref 13–17)
INR BLD: 1.4
MAGNESIUM: 1.8 MG/DL (ref 1.6–2.6)
MCH RBC QN AUTO: 30.1 PG (ref 25.2–33.5)
MCHC RBC AUTO-ENTMCNC: 34.2 G/DL (ref 28.4–34.8)
MCV RBC AUTO: 88 FL (ref 82.6–102.9)
NRBC AUTOMATED: 0 PER 100 WBC
OSMOLALITY URINE: 248 MOSM/KG (ref 80–1300)
PDW BLD-RTO: 14.1 % (ref 11.8–14.4)
PHOSPHORUS: 4 MG/DL (ref 2.5–4.5)
PLATELET # BLD: 182 K/UL (ref 138–453)
PMV BLD AUTO: 9 FL (ref 8.1–13.5)
POTASSIUM SERPL-SCNC: 4.1 MMOL/L (ref 3.7–5.3)
POTASSIUM SERPL-SCNC: 4.2 MMOL/L (ref 3.7–5.3)
POTASSIUM SERPL-SCNC: 4.3 MMOL/L (ref 3.7–5.3)
POTASSIUM SERPL-SCNC: 4.3 MMOL/L (ref 3.7–5.3)
PROTHROMBIN TIME: 17.3 SEC (ref 11.5–14.2)
RBC # BLD: 4.18 M/UL (ref 4.21–5.77)
SERUM OSMOLALITY: 265 MOSM/KG (ref 275–295)
SODIUM BLD-SCNC: 122 MMOL/L (ref 135–144)
SODIUM BLD-SCNC: 124 MMOL/L (ref 135–144)
SODIUM BLD-SCNC: 124 MMOL/L (ref 135–144)
SODIUM BLD-SCNC: 128 MMOL/L (ref 135–144)
SODIUM,UR: 83 MMOL/L
TSH SERPL DL<=0.05 MIU/L-ACNC: 1.19 UIU/ML (ref 0.3–5)
WBC # BLD: 12.9 K/UL (ref 3.5–11.3)

## 2022-09-28 PROCEDURE — 97110 THERAPEUTIC EXERCISES: CPT

## 2022-09-28 PROCEDURE — 82533 TOTAL CORTISOL: CPT

## 2022-09-28 PROCEDURE — 6370000000 HC RX 637 (ALT 250 FOR IP): Performed by: NURSE PRACTITIONER

## 2022-09-28 PROCEDURE — 99232 SBSQ HOSP IP/OBS MODERATE 35: CPT | Performed by: INTERNAL MEDICINE

## 2022-09-28 PROCEDURE — 84443 ASSAY THYROID STIM HORMONE: CPT

## 2022-09-28 PROCEDURE — 36415 COLL VENOUS BLD VENIPUNCTURE: CPT

## 2022-09-28 PROCEDURE — 97535 SELF CARE MNGMENT TRAINING: CPT

## 2022-09-28 PROCEDURE — 6370000000 HC RX 637 (ALT 250 FOR IP): Performed by: INTERNAL MEDICINE

## 2022-09-28 PROCEDURE — 97116 GAIT TRAINING THERAPY: CPT

## 2022-09-28 PROCEDURE — 80051 ELECTROLYTE PANEL: CPT

## 2022-09-28 PROCEDURE — 80048 BASIC METABOLIC PNL TOTAL CA: CPT

## 2022-09-28 PROCEDURE — 83735 ASSAY OF MAGNESIUM: CPT

## 2022-09-28 PROCEDURE — 80069 RENAL FUNCTION PANEL: CPT

## 2022-09-28 PROCEDURE — 85027 COMPLETE CBC AUTOMATED: CPT

## 2022-09-28 PROCEDURE — 85610 PROTHROMBIN TIME: CPT

## 2022-09-28 PROCEDURE — 82947 ASSAY GLUCOSE BLOOD QUANT: CPT

## 2022-09-28 PROCEDURE — 2580000003 HC RX 258: Performed by: NURSE PRACTITIONER

## 2022-09-28 PROCEDURE — 1200000000 HC SEMI PRIVATE

## 2022-09-28 RX ORDER — BISACODYL 10 MG
10 SUPPOSITORY, RECTAL RECTAL DAILY PRN
Status: DISCONTINUED | OUTPATIENT
Start: 2022-09-28 | End: 2022-09-29 | Stop reason: HOSPADM

## 2022-09-28 RX ORDER — ZOLPIDEM TARTRATE 5 MG/1
5 TABLET ORAL NIGHTLY PRN
Status: DISCONTINUED | OUTPATIENT
Start: 2022-09-28 | End: 2022-09-29 | Stop reason: HOSPADM

## 2022-09-28 RX ORDER — WARFARIN SODIUM 2 MG/1
4 TABLET ORAL
Status: COMPLETED | OUTPATIENT
Start: 2022-09-28 | End: 2022-09-28

## 2022-09-28 RX ORDER — BISACODYL 10 MG
10 SUPPOSITORY, RECTAL RECTAL ONCE
Status: COMPLETED | OUTPATIENT
Start: 2022-09-28 | End: 2022-09-28

## 2022-09-28 RX ORDER — DOCUSATE SODIUM 100 MG/1
100 CAPSULE, LIQUID FILLED ORAL 2 TIMES DAILY
Status: DISCONTINUED | OUTPATIENT
Start: 2022-09-28 | End: 2022-09-29 | Stop reason: HOSPADM

## 2022-09-28 RX ORDER — SODIUM CHLORIDE 1000 MG
2 TABLET, SOLUBLE MISCELLANEOUS 2 TIMES DAILY WITH MEALS
Status: DISCONTINUED | OUTPATIENT
Start: 2022-09-28 | End: 2022-09-29

## 2022-09-28 RX ORDER — WARFARIN SODIUM 2 MG/1
2 TABLET ORAL
Status: COMPLETED | OUTPATIENT
Start: 2022-09-28 | End: 2022-09-28

## 2022-09-28 RX ADMIN — CYANOCOBALAMIN TAB 1000 MCG 1000 MCG: 1000 TAB at 12:19

## 2022-09-28 RX ADMIN — TRAMADOL HYDROCHLORIDE 50 MG: 50 TABLET ORAL at 22:22

## 2022-09-28 RX ADMIN — GABAPENTIN 900 MG: 300 CAPSULE ORAL at 22:00

## 2022-09-28 RX ADMIN — ZOLPIDEM TARTRATE 5 MG: 5 TABLET ORAL at 22:00

## 2022-09-28 RX ADMIN — WARFARIN SODIUM 4 MG: 2 TABLET ORAL at 17:51

## 2022-09-28 RX ADMIN — CETIRIZINE HYDROCHLORIDE 10 MG: 10 TABLET ORAL at 11:51

## 2022-09-28 RX ADMIN — GABAPENTIN 900 MG: 300 CAPSULE ORAL at 11:51

## 2022-09-28 RX ADMIN — TAMSULOSIN HYDROCHLORIDE 0.8 MG: 0.4 CAPSULE ORAL at 17:51

## 2022-09-28 RX ADMIN — FERROUS SULFATE TAB EC 325 MG (65 MG FE EQUIVALENT) 325 MG: 325 (65 FE) TABLET DELAYED RESPONSE at 11:53

## 2022-09-28 RX ADMIN — SENNOSIDES AND DOCUSATE SODIUM 1 TABLET: 50; 8.6 TABLET ORAL at 11:52

## 2022-09-28 RX ADMIN — SODIUM CHLORIDE 2 G: 1 TABLET ORAL at 17:51

## 2022-09-28 RX ADMIN — SODIUM CHLORIDE, PRESERVATIVE FREE 10 ML: 5 INJECTION INTRAVENOUS at 22:00

## 2022-09-28 RX ADMIN — ONDANSETRON 4 MG: 4 TABLET, ORALLY DISINTEGRATING ORAL at 08:43

## 2022-09-28 RX ADMIN — SODIUM CHLORIDE, PRESERVATIVE FREE 10 ML: 5 INJECTION INTRAVENOUS at 08:44

## 2022-09-28 RX ADMIN — TRAMADOL HYDROCHLORIDE 50 MG: 50 TABLET ORAL at 12:56

## 2022-09-28 RX ADMIN — ATORVASTATIN CALCIUM 20 MG: 20 TABLET, FILM COATED ORAL at 22:00

## 2022-09-28 RX ADMIN — PANTOPRAZOLE SODIUM 40 MG: 40 TABLET, DELAYED RELEASE ORAL at 05:11

## 2022-09-28 RX ADMIN — FOLIC ACID 1 MG: 1 TABLET ORAL at 11:53

## 2022-09-28 RX ADMIN — DOCUSATE SODIUM 100 MG: 100 CAPSULE, LIQUID FILLED ORAL at 11:53

## 2022-09-28 RX ADMIN — Medication 2000 UNITS: at 12:19

## 2022-09-28 RX ADMIN — DOCUSATE SODIUM 100 MG: 100 CAPSULE, LIQUID FILLED ORAL at 22:00

## 2022-09-28 RX ADMIN — TRAMADOL HYDROCHLORIDE 50 MG: 50 TABLET ORAL at 05:11

## 2022-09-28 RX ADMIN — RISPERIDONE 0.5 MG: 0.25 TABLET ORAL at 11:52

## 2022-09-28 RX ADMIN — ASPIRIN 81 MG CHEWABLE TABLET 81 MG: 81 TABLET CHEWABLE at 11:51

## 2022-09-28 RX ADMIN — GABAPENTIN 900 MG: 300 CAPSULE ORAL at 17:51

## 2022-09-28 RX ADMIN — WARFARIN SODIUM 2 MG: 2 TABLET ORAL at 00:36

## 2022-09-28 RX ADMIN — RISPERIDONE 0.5 MG: 0.25 TABLET ORAL at 22:00

## 2022-09-28 RX ADMIN — HYDROXYZINE HYDROCHLORIDE 50 MG: 25 TABLET, FILM COATED ORAL at 20:22

## 2022-09-28 RX ADMIN — BISACODYL 10 MG: 10 SUPPOSITORY RECTAL at 12:20

## 2022-09-28 RX ADMIN — SODIUM CHLORIDE 2 G: 1 TABLET ORAL at 11:42

## 2022-09-28 ASSESSMENT — PAIN - FUNCTIONAL ASSESSMENT
PAIN_FUNCTIONAL_ASSESSMENT: PREVENTS OR INTERFERES SOME ACTIVE ACTIVITIES AND ADLS

## 2022-09-28 ASSESSMENT — PAIN SCALES - GENERAL
PAINLEVEL_OUTOF10: 8
PAINLEVEL_OUTOF10: 7
PAINLEVEL_OUTOF10: 8

## 2022-09-28 ASSESSMENT — PAIN DESCRIPTION - DESCRIPTORS
DESCRIPTORS: ACHING
DESCRIPTORS: ACHING;DISCOMFORT
DESCRIPTORS: ACHING;DISCOMFORT

## 2022-09-28 ASSESSMENT — PAIN DESCRIPTION - PAIN TYPE
TYPE: CHRONIC PAIN

## 2022-09-28 ASSESSMENT — PAIN DESCRIPTION - LOCATION
LOCATION: NECK
LOCATION: LEG
LOCATION: KNEE

## 2022-09-28 ASSESSMENT — PAIN DESCRIPTION - FREQUENCY
FREQUENCY: CONTINUOUS
FREQUENCY: CONTINUOUS

## 2022-09-28 ASSESSMENT — PAIN DESCRIPTION - ONSET
ONSET: ON-GOING
ONSET: ON-GOING

## 2022-09-28 ASSESSMENT — PAIN DESCRIPTION - ORIENTATION
ORIENTATION: RIGHT;LEFT;LOWER
ORIENTATION: RIGHT
ORIENTATION: LEFT;RIGHT

## 2022-09-28 NOTE — PROGRESS NOTES
[facility]  Office: [address], DO, [facility], DO, Robin Leventhal, DO, Anna Chloé Blood, DO, Anmol Jackson MD, Shazia Duong MD, Fernando Krishnamurthy MD, Lisa Eduardo MD,  Lisbeth Hollingsworth MD, Nedra Price MD, Roselyn Cisse, DO, Nayeli Glass MD,  Hawk Cates MD, Yeni Alves MD, Christiano Jonas, DO, Yony Mtz MD, Donaldo Karimi MD, Christine Gudino MD, Linda Martin MD, Latha Beltre MD, Waqar Baugh MD, Gabbi Rosales, DO, Anika Conte MD, Rock Hamlin MD, Dave Shipman, CNP,  Levy Rubio, CNP, Lolly Garcia, CNP, Ayanna Coronado, CNP,  Justin Lopez, [facility], Catalina Brady, CNP, Juan Gonzalez, CNP, Latasha Mallory, CNP, Mare Pierre, CNP, Mitch Pedro, CNP, JOSE PascualC, Rubén Richardson, CNS, Pato Doran, [facility], Justen Kessler, CNP, Dre Mayes, [facility], [facility]    Progress Note    9/28/2022    11:58 AM    Name:   Pepe Li  MRN:     [id number]     Acct:      [de-identified]   Room:   2024/2024-01  IP Day:  4  Admit Date:  9/24/2022  8:01 PM    PCP:   LOGAN Gunderson NP  Code Status:  Full Code    Subjective:     C/C:   Chief Complaint   Patient presents with    Abnormal Lab     States that Dr Ricardo Rodriguez sent him here, had labs drawn today and potassium high     Interval History Status: . Urology recommended to place [facility] catheter due to neurogenic bladder dysfunction, he has left InterStim bladder stimulator at home  Sodium still remains 124 today, psychotropic medicines are on hold    Still having constipation despite of Dulcolax suppository  Wall's catheter was removed by urology and is getting Flomax-voiding  Brief History:   The patient presented the ER with high potassium. Patient states that he had blood work drawn today for his Dr. Adrian Gibson and received a call tonight that his potassium was high.   Patient denies any chest pain shortness of breath abdominal pain nausea or vomiting. He states that he gets his blood work done every 6 months to [facility] for cancer\". He states he typically drinks 3-4 bottles of water and a couple Gatorade's a day. He denies excessive thirst or urination. Patient states that he did recently get out of the psychiatric unit and they did start him on Elavil but no other medication changes. He does take warfarin and is compliant with his dosing. He denies any palpitations. No recent alcohol use. History includes monoclonal gammopathy, ARIELLE with CPAP, atrial fibs, prosthetic valve, hypothyroidism, hypertension, bipolar disorder, crack cocaine use, and chronic renal insufficiency      Review of Systems:     Constitutional:  negative for chills, fevers, sweats  Respiratory:  negative for cough, dyspnea on exertion, shortness of breath, wheezing  Cardiovascular:  negative for chest pain, chest pressure/discomfort, lower extremity edema, palpitations  Gastrointestinal:  negative for abdominal pain, +constipation, denies nausea, vomiting  Neurological:  negative for dizziness, headache    Medications: Allergies:     Allergies   Allergen Reactions    Oxycodone Itching    Fish-Derived Products     Ibuprofen     Latuda [Lurasidone Hcl] Other (See Comments)     \"Feels like pt is going to crawl out of own skin\"    Lithium     Lurasidone     Quetiapine     Seroquel [Quetiapine Fumarate]        Current Meds:   Scheduled Meds:    warfarin  4 mg Oral Once    docusate sodium  100 mg Oral BID    bisacodyl  10 mg Rectal Once    sodium chloride  2 g Oral BID WC    [Held by provider] escitalopram  10 mg Oral Daily    [Held by provider] OXcarbazepine  300 mg Oral BID    tamsulosin  0.8 mg Oral QPM    aspirin  81 mg Oral Daily    atorvastatin  20 mg Oral Nightly    calcium citrate  250 mg Oral BID    cetirizine  10 mg Oral Daily    Vitamin D  2,000 Units Oral Daily with breakfast    vitamin B-12  1,000 mcg Oral Daily    ferrous sulfate  325 mg Oral Daily with breakfast    folic acid  1 mg Oral Daily    [Held by provider] furosemide  20 mg Oral Daily    gabapentin  900 mg Oral TID    midodrine  10 mg Oral TID    pantoprazole  40 mg Oral QAM AC    risperiDONE  0.5 mg Oral BID    sennosides-docusate sodium  1 tablet Oral Daily    sodium chloride flush  5-40 mL IntraVENous 2 times per day    warfarin placeholder: dosing by pharmacy   Other RX Placeholder     Continuous Infusions:    dextrose      sodium chloride       PRN Meds: bisacodyl, zolpidem, albuterol sulfate HFA, glucose, dextrose bolus **OR** dextrose bolus, glucagon (rDNA), dextrose, traMADol, diclofenac sodium, hydrOXYzine HCl, sodium chloride flush, sodium chloride, potassium chloride **OR** potassium alternative oral replacement **OR** potassium chloride, magnesium sulfate, ondansetron **OR** ondansetron, polyethylene glycol, acetaminophen **OR** acetaminophen    Data:     Past Medical History:   has a past medical history of AMS (altered mental status), Atrial fibrillation ([facility] [address] Bipolar 1 disorder ([facility] Depression, Hypertension, Neck pain, Neurofibromatosis ([facility] [address] Patient in clinical research study, Severe recurrent major depression without psychotic features ([facility] [address] and Twitching. Social History:   reports that he has never smoked. He has never been exposed to tobacco smoke. He has never used smokeless tobacco. He reports that he does not currently use alcohol. He reports that he does not currently use drugs.      Family History:   Family History   Problem Relation Age of Onset    Coronary Art Dis Mother     Hypertension Mother     Hypertension Father     Cancer Father         blood       Vitals:  /71   Pulse 75   Temp 97.9 °F (36.6 °C) (Oral)   Resp 16   Ht 5' 6\" (1.676 m)   Wt 186 lb 14.4 oz (84.8 kg)   SpO2 94%   BMI 30.17 kg/m²   Temp (24hrs), Av.2 °F (36.8 °C), Min:97.3 °F (36.3 °C), Max:99 °F (37.2 °C)    Recent Labs     22  0038 22  0151 09/28/22  0636 09/28/22  1117   POCGLU 105 104 108 124*       I/O (24Hr): Intake/Output Summary (Last 24 hours) at 9/28/2022 1158  Last data filed at 9/28/2022 0603  Gross per 24 hour   Intake 310.13 ml   Output 2550 ml   Net -2239.87 ml         Labs:  Hematology:  Recent Labs     09/26/22  0548 09/27/22  0213 09/28/22  0452   WBC 8.2 7.1 12.9*   RBC 3.96* 3.76* 4.18*   HGB 11.6* 11.3* 12.6*   HCT 35.1* 33.6* 36.8*   MCV 88.6 89.4 88.0   MCH 29.3 30.1 30.1   MCHC 33.0 33.6 34.2   RDW 14.4 14.2 14.1    151 182   MPV 9.7 9.0 9.0   INR 2.4 2.0 1.4       Chemistry:  Recent Labs     09/26/22  0548 09/26/22  0950 09/27/22  0213 09/27/22  0939 09/28/22  0209 09/28/22  0452 09/28/22  0935   *   < > 124*   < > 122* 124* 124*   K 4.7   < > 4.4   < > 4.3 4.3 4.1   CL 92*   < > 92*   < > 87* 88* 90*   CO2 23   < > 24   < > 21 23 24   GLUCOSE 94   < > 93   < > 107* 102* 113*   BUN 17   < > 15   < > 14 15 15   CREATININE 0.85   < > 0.94   < > 0.83 0.89 0.88   MG 1.7  --  1.7  --   --  1.8  --    ANIONGAP 10   < > 8*   < > 14 13 10   LABGLOM >60   < > >60   < > >60 >60 >60   GFRAA >60   < > >60   < > >60 >60 >60   CALCIUM 8.7   < > 8.6   < > 9.1 9.0 8.9   PHOS 3.9  --  3.8  --   --  4.0  --    PSA 1.53  --   --   --   --   --   --     < > = values in this interval not displayed.        Recent Labs     09/26/22  0548 09/27/22  0213 09/27/22  2256 09/27/22  2336 09/28/22  0038 09/28/22  0155 09/28/22  0452 09/28/22  0636 09/28/22  1117   LABALBU 3.6 3.5  --   --   --   --  4.1  --   --    POCGLU  --   --  159* 111* 105 104  --  108 124*       ABG:No results found for: POCPH, PHART, PH, POCPCO2, KMD5YSE, PCO2, POCPO2, PO2ART, PO2, POCHCO3, ISS3BDQ, HCO3, NBEA, PBEA, BEART, BE, THGBART, THB, YPE8VDN, RFIQ6BMG, T1HYTYZB, O2SAT, FIO2  Lab Results   Component Value Date/Time    SPECIAL R HAND 10ML 05/01/2022 05:25 PM     Lab Results   Component Value Date/Time    CULTURE NO GROWTH 5 DAYS 05/01/2022 05:25 PM Radiology:  CT ABDOMEN PELVIS WO CONTRAST Additional Contrast? None    Result Date: 9/26/2022  A Wall catheter is present in an underdistended bladder. No hydronephrosis or urinary tract calculus. No acute process in the abdomen or pelvis. XR KNEE LEFT (3 VIEWS)    Result Date: 9/25/2022  No acute osseous abnormality of the left knee. XR KNEE RIGHT (3 VIEWS)    Result Date: 9/25/2022  No acute osseous abnormality of the right knee. Physical Examination:        General appearance:  alert, cooperative and no distress  Mental Status:  oriented to person, place and time and normal affect  Lungs: Prolonged expiratory phase, few wheezing   heart:  regular rate and rhythm, no murmur  Abdomen:  soft, nontender, nondistended, normal bowel sounds, no masses, hepatomegaly, splenomegaly  Extremities:  no edema, redness, tenderness in the calves  Skin:  no gross lesions, rashes, induration    Assessment:        Hospital Problems             Last Modified POA    * (Principal) Hyponatremia 9/24/2022 Yes    BPH (benign prostatic hyperplasia) 9/24/2022 Yes    Coronary atherosclerosis 9/24/2022 Yes    Bipolar 1 disorder, depressed ([address] 9/24/2022 Yes    Hyperkalemia 9/24/2022 Yes    ARIELLE on CPAP 9/24/2022 Yes    Other specified hypothyroidism (Chronic) 9/24/2022 Yes    Presence of prosthetic heart valve (Chronic) 9/24/2022 Yes    Restless leg syndrome 9/25/2022 Yes    Primary hypertension 9/24/2022 Yes    GERD (gastroesophageal reflux disease) 9/24/2022 Yes    Chronic renal impairment, stage 3 (moderate) ([address] 9/24/2022 Yes    Atrial fibrillation ([address] 9/24/2022 Yes   Constipation  Bronchospasm  Plan:        Hyponatremia suspected SIADH due to psychotropic medicines-fluid restriction of 1500 ML, hold psychiatric medicines for now, salt tablets started by nephrology  Bipolar Disorder -hold medications for now  Urinary Retention - urology consulted. Flomax started, has stimulator-left at home.    Paroxysmal Atrial Fibrillation - continue warfarin   Hypothroidism - home meds   GERD  CKD - stable  RLS  HTN   ARIELLE  CAD  BPH  Bronchospasm-continue DuoNeb aerosols  Constipation-Dulcolax suppository once and continue Senokot and Colace  Dispo: home when stable    Alveria MD Breanna  9/28/2022  11:58 AM

## 2022-09-28 NOTE — CONSULTS
NEPHROLOGY     REASON FOR CONSULT:   Hyponatremia with sodium of 124      HISTORY OF PRESENTING ILLNESS                 This is a 57 y.o. male who was recommended to be admitted to the hospital when routine labs done by hematology oncology showed evidence of hyperkalemia 6.1 and sodium of 123. He follows up with oncology because of elevated serum free light chain ratio. He reported no symptoms of chest pain/shortness of breath/abdominal pain/nausea vomiting. He drinks 3-4 bottles of water and couple of Gatorade every day. He recently got out of psychiatric unit and they started him on amitriptyline but no other medication changes. Initial assessment showed stable vitals with investigations in the ER showed potassium of 4.4 but sodium at 125. Urine studies consistent with SIADH. His Lasix was on hold by primary team and fluid restriction initiated. Sodium continues to be in the range of 1 24-1 25 which prompted nephrology consultation.     His baseline sodium is normal.  Reported history of malignancy/cortisol problems of hypothyroidism  Has history of bipolar disorder and on psychotropic medications  Denies any history of excess free water ingestion  Renal function normal  No symptoms of urinary retention    Urine output  No history of contrast exposure  No history of hypotension  No history of NSAID/ACE/ARB/nephrotoxic agents  No history suggestive of obstruction  No history of nausea/vomiting/diarrhoea  No history of YONATHAN in past  No history of recurrent UTI infection/surgeries to KUB      PAST MEDICAL HISTORY         Diagnosis Date    AMS (altered mental status) 02/15/2020    Atrial fibrillation ([address]     on coumadin    Bipolar 1 disorder ([address]     Depression     Hypertension     Neck pain     Neurofibromatosis ([address]     Lt leg pain    Patient in clinical research study 04/12/2018    [id number]    Severe recurrent major depression without psychotic features ([address] 6/13/2022    Twitching        PAST SURGICAL HISTORY          Procedure Laterality Date    ABDOMEN SURGERY      APPENDECTOMY      CARDIAC SURGERY  7/14/15    Median Sternotomy, Repair of ascending aorti aneurysm, stentless valve placement    CARDIAC SURGERY  07/14/2015    Median stenotomy, repair of ascending aorti aneurysm, stentless valve placement     COLONOSCOPY  01/28/2021    DILATATION, ESOPHAGUS      HERNIA REPAIR      NECK SURGERY  2013    TONSILLECTOMY      UPPER GASTROINTESTINAL ENDOSCOPY  01/28/2021       MEDICATIONS     Home Meds:                Medications Prior to Admission: Melatonin ER 5 MG TBCR, Take 10 mg by mouth at bedtime  escitalopram (LEXAPRO) 10 MG tablet, Take 10 mg by mouth daily  albuterol sulfate HFA (VENTOLIN HFA) 108 (90 Base) MCG/ACT inhaler, Inhale 2 puffs into the lungs every 6 hours as needed for Wheezing  amitriptyline (ELAVIL) 25 MG tablet, Take 1 tablet by mouth nightly Patient reports home supply  diclofenac sodium (VOLTAREN) 1 % GEL, Apply 2 g topically 4 times daily as needed for Pain  hydrOXYzine HCl (ATARAX) 50 MG tablet, Take 1 tablet by mouth 3 times daily as needed for Anxiety  polyethylene glycol (GLYCOLAX) 17 g packet, Take 17 g by mouth daily as needed for Constipation  risperiDONE (RISPERDAL) 0.5 MG tablet, Take 1 tablet by mouth 2 times daily  warfarin (COUMADIN) 2 MG tablet, Take 4 mg by mouth four times a week Indications: Tues, Thurs, Fri, Sat Managed by [facility] Medication Management  sennosides-docusate sodium (SENOKOT-S) 8.6-50 MG tablet, Take 1 tablet by mouth daily  docusate sodium (COLACE) 100 mg capsule, Take 200 mg by mouth daily  calcium citrate (CALCITRATE) 250 MG TABS tablet, Take 250 mg by mouth 2 times daily  warfarin (COUMADIN) 2 MG tablet, Take 2 mg by mouth three times a week Indications: Sun, Mon, Wed Managed by [facility] Medication Management  pantoprazole (PROTONIX) 40 MG tablet, Take 40 mg by mouth daily  acetaminophen (TYLENOL) 500 MG tablet, Take 500 mg by mouth every 6 hours as needed  Cholecalciferol (VITAMIN D3) 50 MCG (2000 UT) CAPS, Take 1 capsule by mouth daily (with breakfast)  OXcarbazepine (TRILEPTAL) 300 MG tablet, Take 300 mg by mouth 2 times daily  FEROSUL 325 (65 Fe) MG tablet, Take 325 mg by mouth daily (with breakfast)   gabapentin (NEURONTIN) 300 MG capsule, Take 900 mg by mouth 3 times daily. Pt. States he takes 900 mg. TID  aspirin 81 MG chewable tablet, Take 1 tablet by mouth daily  atorvastatin (LIPITOR) 20 MG tablet, Take 1 tablet by mouth nightly  midodrine (PROAMATINE) 10 MG tablet, Take 10 mg by mouth 3 times daily  folic acid (FOLVITE) 1 MG tablet, Take 1 mg by mouth daily  furosemide (LASIX) 20 MG tablet, Take 20 mg by mouth daily  tamsulosin (FLOMAX) 0.4 MG capsule, Take 0.8 mg by mouth daily  Cyanocobalamin (VITAMIN B 12) 500 MCG TABS, Take 1,000 mcg by mouth daily   alendronate (FOSAMAX) 70 MG tablet, Take 70 mg by mouth every 7 days Patient takes on mondays.   cetirizine (ZYRTEC) 10 MG tablet, Take 10 mg by mouth daily  Scheduled Meds:    warfarin  4 mg Oral Once    docusate sodium  100 mg Oral BID    bisacodyl  10 mg Rectal Once    sodium chloride  2 g Oral BID WC    [Held by provider] escitalopram  10 mg Oral Daily    [Held by provider] OXcarbazepine  300 mg Oral BID    tamsulosin  0.8 mg Oral QPM    aspirin  81 mg Oral Daily    atorvastatin  20 mg Oral Nightly    calcium citrate  250 mg Oral BID    cetirizine  10 mg Oral Daily    Vitamin D  2,000 Units Oral Daily with breakfast    vitamin B-12  1,000 mcg Oral Daily    ferrous sulfate  325 mg Oral Daily with breakfast    folic acid  1 mg Oral Daily    [Held by provider] furosemide  20 mg Oral Daily    gabapentin  900 mg Oral TID    midodrine  10 mg Oral TID    pantoprazole  40 mg Oral QAM AC    risperiDONE  0.5 mg Oral BID    sennosides-docusate sodium  1 tablet Oral Daily    sodium chloride flush  5-40 mL IntraVENous 2 times per day    warfarin placeholder: dosing by pharmacy   Other Enid Walters Continuous Infusions:    dextrose      sodium chloride       PRN Meds:  bisacodyl, zolpidem, albuterol sulfate HFA, glucose, dextrose bolus **OR** dextrose bolus, glucagon (rDNA), dextrose, traMADol, diclofenac sodium, hydrOXYzine HCl, sodium chloride flush, sodium chloride, potassium chloride **OR** potassium alternative oral replacement **OR** potassium chloride, magnesium sulfate, ondansetron **OR** ondansetron, polyethylene glycol, acetaminophen **OR** acetaminophen    ALLERGY     Oxycodone, Fish-derived products, Ibuprofen, Latuda [lurasidone hcl], Lithium, Lurasidone, Quetiapine, and Seroquel [quetiapine fumarate]       SOCIAL HISTORY     Social History     Socioeconomic History    Marital status: Single     Spouse name: Not on file    Number of children: Not on file    Years of education: Not on file    Highest education level: Not on file   Occupational History    Not on file   Tobacco Use    Smoking status: Never     Passive exposure: Never    Smokeless tobacco: Never   Vaping Use    Vaping Use: Never used   Substance and Sexual Activity    Alcohol use: Not Currently     Comment: last drank 6yrs ago    Drug use: Not Currently     Comment: states last use was 2 months ago    Sexual activity: Not Currently   Other Topics Concern    Not on file   Social History Narrative    Not on file     Social Determinants of Health     Financial Resource Strain: Not on file   Food Insecurity: Not on file   Transportation Needs: Not on file   Physical Activity: Not on file   Stress: Not on file   Social Connections: Not on file   Intimate Partner Violence: Not on file   Housing Stability: Not on file       FAMILY HISTORY     Family History   Problem Relation Age of Onset    Coronary Art Dis Mother     Hypertension Mother     Hypertension Father     Cancer Father         blood          REVIEW OF SYSTEM     Constitutional: No asthenia/weight loss/anorexia    HEENT : No epistaxis/visual blurriness/rhinorrhoea/sorethroat/trauma  Cardiovascular:No chest pain/palpitation/SOB  Respiratory: No cough/fever/SOB/Wheezing    Gastrointestinal: No abdominal pain/nausea/vomiting/diarrhoea/constipation  Genitourinary: No dysuria/pyuria/hematuria/incomplete emptying of bladder  Musculoskeletal:  No gait disturbance/weakness or joint complaints  Integumentary: No rash or pruritis. Neurological: No headache/diplopia/change in muscle strength/numbness or tingling. No change in gait, balance, coordination, mood, affect, memory, mentation, behavior. Psychiatric: No anxiety/depression. Endocrine: No temperature intolerance. No excessive thirst, fluid intake, or urination. No tremor. Hematologic/Lymphatic: No abnormal bruising or bleeding, blood clots or swolle lymph nodes. Allergic/Immunologic: No nasal congestion or hives      PHYSICAL EXAM     Vitals:    09/28/22 0153 09/28/22 0511 09/28/22 0703 09/28/22 1141   BP: 133/84  121/76 120/71   Pulse: 88  83 75   Resp: 18 18 18 16   Temp: 98.6 °F (37 °C)  98.4 °F (36.9 °C) 97.9 °F (36.6 °C)   TempSrc: Oral  Oral Oral   SpO2: 93%  92% 94%   Weight:       Height:         24HR INTAKE/OUTPUT:    Intake/Output Summary (Last 24 hours) at 9/28/2022 1143  Last data filed at 9/28/2022 9018  Gross per 24 hour   Intake 310.13 ml   Output 2550 ml   Net -2239.87 ml       General appearance:Awake, alert, in no acute distress  Skin: warm and dry, no rash or erythema  Eyes: conjunctivae normal and sclera anicteric  ENT: no thrush no pharyngeal congestion  orodental hygiene   Neck: No JVD, Lymphadenopathty or thyromegaly  Respiratory: vesicular breath sounds,no wheeze/crackles  Cardiovascular: S1 S2 normal,no gallop or organic murmur. No carotid bruit  Abdomen:Non tender/non distended. Bowel sounds present  Extremities: No Cyanosis or Clubbing,Lower extremity edema  Neurological:Alert and oriented. No abnormalities of mood, affect, memory, mentation, or behavior are noted    INVESTIGATIONS      CBC:   Recent Labs     09/26/22  0548 09/27/22  0213 09/28/22  0452   WBC 8.2 7.1 12.9*   RBC 3.96* 3.76* 4.18*   HGB 11.6* 11.3* 12.6*   HCT 35.1* 33.6* 36.8*   MCV 88.6 89.4 88.0   MCH 29.3 30.1 30.1   MCHC 33.0 33.6 34.2   RDW 14.4 14.2 14.1    151 182   MPV 9.7 9.0 9.0      BMP:   Recent Labs     09/28/22  0209 09/28/22  0452 09/28/22  0935   * 124* 124*   K 4.3 4.3 4.1   CL 87* 88* 90*   CO2 21 23 24   BUN 14 15 15   CREATININE 0.83 0.89 0.88   GLUCOSE 107* 102* 113*   CALCIUM 9.1 9.0 8.9        Phosphorus:    Recent Labs     09/26/22  0548 09/27/22  0213 09/28/22  0452   PHOS 3.9 3.8 4.0     Magnesium:   Recent Labs     09/26/22  0548 09/27/22  0213 09/28/22  0452   MG 1.7 1.7 1.8     Albumin:   Recent Labs     09/26/22  0548 09/27/22  0213 09/28/22  0452   LABALBU 3.6 3.5 4.1       Radiology:  Reviewed as available. ASSESSMENT     #1 hyponatremia secondary to SIADH (meds-psychotropic)  #2 history of bipolar disorder  #3 history of aortic valve replacement and ascending aneurysm of aorta repair  #4 history of atrial fibrillation  #5 ARIELLE  #6 osteoporosis  Serum free light chain ratio elevated and follows up with hematology     PLAN:     #1 fluid restriction 1.5 L over 24 hours  #2 salt tablet 2 g twice daily  #3 check electrolytes in the evening  #4 plan to correct sodium to around 1 28-1 31 till tomorrow morning  #5 check TSH and cortisol         Thank you for the consultation. Please do not hesitate to call with questions. This note is created with the assistance of a speech-recognition program. While intending to generate a document that actually reflects the content of the visit, no guarantees can be provided that every mistake has been identified and corrected by editing.     Savana Gandhi MD MD, MRCP Kwame Fernandez), FACP   9/28/2022 11:43 AM    [facility] [place] [facility]

## 2022-09-28 NOTE — PROGRESS NOTES
[facility]   Urology Progress Note            Subjective: Patient seen in conjunction with nursing staff   follow-up urinary retention, the catheter has been removed    Patient Vitals for the past 24 hrs:   BP Temp Temp src Pulse Resp SpO2 Weight   09/28/22 1517 115/72 97.3 °F (36.3 °C) Oral 73 15 91 % --   09/28/22 1141 120/71 97.9 °F (36.6 °C) Oral 75 16 94 % --   09/28/22 0703 121/76 98.4 °F (36.9 °C) Oral 83 18 92 % --   09/28/22 0511 -- -- -- -- 18 -- --   09/28/22 0153 133/84 98.6 °F (37 °C) Oral 88 18 93 % --   09/28/22 0146 -- -- -- -- -- -- 186 lb 14.4 oz (84.8 kg)   09/27/22 2000 (!) 155/90 98.2 °F (36.8 °C) Oral 80 18 92 % --   09/27/22 1904 (!) 157/99 99 °F (37.2 °C) Oral 80 16 90 % --   09/27/22 1647 (!) 151/88 -- -- -- -- -- --       Intake/Output Summary (Last 24 hours) at 9/28/2022 1634  Last data filed at 9/28/2022 1516  Gross per 24 hour   Intake 310.13 ml   Output 2275 ml   Net -1964.87 ml       Recent Labs     09/26/22  0548 09/27/22  0213 09/28/22  0452   WBC 8.2 7.1 12.9*   HGB 11.6* 11.3* 12.6*   HCT 35.1* 33.6* 36.8*   MCV 88.6 89.4 88.0    151 182     Recent Labs     09/26/22  0548 09/26/22  0950 09/27/22  0213 09/27/22  0939 09/28/22  0209 09/28/22  0452 09/28/22  0935   *   < > 124*   < > 122* 124* 124*   K 4.7   < > 4.4   < > 4.3 4.3 4.1   CL 92*   < > 92*   < > 87* 88* 90*   CO2 23   < > 24   < > 21 23 24   PHOS 3.9  --  3.8  --   --  4.0  --    BUN 17   < > 15   < > 14 15 15   CREATININE 0.85   < > 0.94   < > 0.83 0.89 0.88    < > = values in this interval not displayed.        Recent Labs     09/27/22  2255   COLORU Yellow   PHUR 7.0   WBCUA 0 TO 2   RBCUA 10 TO 20   BACTERIA FEW*   SPECGRAV 1.010   LEUKOCYTESUR NEGATIVE   UROBILINOGEN Normal   BILIRUBINUR NEGATIVE       Additional Lab/culture results:    Physical Exam: 19-year-old male with neurogenic bladder dysfunction, with InterStim bladder stimulator, found to have greater than 800 mL in the bladder cath was left indwelling yesterday, we discussed with the patient  void trial, so far he has been voiding well    Interval Imaging Findings:    Impression:    Patient Active Problem List   Diagnosis    Light headed    Recurrent major depression in partial remission ([address]    Primary hypertension    Neck pain    GERD (gastroesophageal reflux disease)    Degenerative disc disease, cervical    Hyponatremia    Hypercalcemia    Aortic aneurysm (HCC)    MGUS (monoclonal gammopathy of unknown significance)    Chronic renal impairment, stage 3 (moderate) (HCC)    Depression    Crack cocaine use    Bipolar I disorder, most recent episode depressed (HCC)    Altered mental status, unspecified    YONATHAN (acute kidney injury) ([address]    Altered mental status    Supratherapeutic INR    Atrial fibrillation (HCC)    Hypernatremia    Anemia    Major depression, recurrent (HCC)    Major depressive disorder, recurrent ([address]    Acute kidney injury superimposed on chronic kidney disease ([address]    Schizoaffective disorder, bipolar type ([address]    Schizoaffective disorder ([address]    Hematoma of injection site    BPH (benign prostatic hyperplasia)    Constipation, unspecified    Coronary atherosclerosis    Neurofibromatosis, unspecified ([address]    S/P AVR (aortic valve replacement)    Depression with suicidal ideation    Severe recurrent major depression without psychotic features (HCC)    Severe depressed bipolar I disorder without psychotic features ([address]    Bipolar 1 disorder, depressed (HCC)    Hyperkalemia    ARIELLE on CPAP    Other specified hypothyroidism    Presence of prosthetic heart valve    Restless leg syndrome    Athscl heart disease of native coronary artery w/o ang pctrs       Plan: Monitor postvoid residual by bladder scan, discussed with nursing patient agreeable    Sergo Menchaca MD  4:34 PM 9/28/2022 Banner Transposition Flap Text: The defect edges were debeveled with a #15 scalpel blade.  Given the location of the defect and the proximity to free margins a Banner transposition flap was deemed most appropriate.  Using a sterile surgical marker, an appropriate flap drawn around the defect. The area thus outlined was incised deep to adipose tissue with a #15 scalpel blade.  The skin margins were undermined to an appropriate distance in all directions utilizing iris scissors.

## 2022-09-28 NOTE — PROGRESS NOTES
[facility]  PROGRESS NOTE    Room # 2024/2024-01   Name: Abi Hernandez            Episcopalian: Non Hindu     Reason for visit: Routine    I visited the patient. Admit Date & Time: 9/24/2022  8:01 PM    Assessment:  Abi Hernandez is a 57 y.o. male in the hospital because he has hyponatremia. Upon entering the room pt. Is found resting in chair next to bed, watching television. Intervention:  I introduced myself and my title as  and offered space for pt. To share thoughts feelings and concerns about his hospital stay.  offered active listening as pt shared the events that led to his stay. Pt. Open to emotional support. Outcome:  Pt. Calm and coping. Plan:  Chaplains will remain available to offer spiritual and emotional support as needed. Electronically signed by Anil Vasquez on 9/28/2022 at 4:26 PM.  [place]      09/28/22 1623   Encounter Summary   Service Provided For: Patient   Referral/Consult From: [address] Family members   Last Encounter  09/28/22   Complexity of Encounter Moderate   Begin Time 1315   End Time  1340   Total Time Calculated 25 min   Encounter    Type Initial Screen/Assessment   Spiritual/Emotional needs   Type Spiritual Support   Assessment/Intervention/Outcome   Assessment Anxious; Impaired resilience   Intervention Active listening;Discussed illness injury and its impact; Explored/Affirmed feelings, thoughts, concerns;Sustaining Presence/Ministry of presence   Outcome Comfort;Expressed feelings, needs, and concerns;Receptive

## 2022-09-28 NOTE — PLAN OF CARE
Problem: Discharge Planning  Goal: Discharge to home or other facility with appropriate resources  9/28/2022 0133 by Beatrice Duggan RN  Outcome: Progressing  Flowsheets (Taken 9/27/2022 1930)  Discharge to home or other facility with appropriate resources:   Identify barriers to discharge with patient and caregiver   Arrange for needed discharge resources and transportation as appropriate   Identify discharge learning needs (meds, wound care, etc)     Problem: Pain  Goal: Verbalizes/displays adequate comfort level or baseline comfort level  9/28/2022 0133 by Beatrice Duggan RN  Outcome: Progressing  Flowsheets (Taken 9/27/2022 1138 by Jem Ramires RN)  Verbalizes/displays adequate comfort level or baseline comfort level:   Encourage patient to monitor pain and request assistance   Assess pain using appropriate pain scale   Administer analgesics based on type and severity of pain and evaluate response   Implement non-pharmacological measures as appropriate and evaluate response   Notify Licensed Independent Practitioner if interventions unsuccessful or patient reports new pain     Problem: Safety - Adult  Goal: Free from fall injury  9/28/2022 0133 by Beatrice Duggan RN  Outcome: Progressing  Flowsheets (Taken 9/26/2022 1230 by Jem Ramires RN)  Free From Fall Injury: Instruct family/caregiver on patient safety     Problem: Neurosensory - Adult  Goal: Achieves stable or improved neurological status  9/28/2022 0133 by Beatrice Duggan RN  Outcome: Progressing  Flowsheets (Taken 9/27/2022 1930)  Achieves stable or improved neurological status: Assess for and report changes in neurological status     Problem: Cardiovascular - Adult  Goal: Maintains optimal cardiac output and hemodynamic stability  9/28/2022 0133 by Beatrice Duggan RN  Outcome: Progressing  Flowsheets (Taken 9/27/2022 1930)  Maintains optimal cardiac output and hemodynamic stability:   Monitor blood pressure and heart rate   Monitor urine output and notify Licensed Independent Practitioner for values outside of normal range   Assess for signs of decreased cardiac output     Problem: Cardiovascular - Adult  Goal: Absence of cardiac dysrhythmias or at baseline  9/28/2022 0133 by Aracely Talbot RN  Outcome: Progressing  Flowsheets (Taken 9/27/2022 1930)  Absence of cardiac dysrhythmias or at baseline:   Monitor cardiac rate and rhythm   Assess for signs of decreased cardiac output     Problem: Skin/Tissue Integrity - Adult  Goal: Skin integrity remains intact  9/28/2022 0133 by Aracely Talbot RN  Outcome: Progressing  Flowsheets  Taken 9/28/2022 0133  Skin Integrity Remains Intact:   Monitor for areas of redness and/or skin breakdown   Assess vascular access sites hourly  Taken 9/28/2022 0131  Skin Integrity Remains Intact: Assess vascular access sites hourly  Taken 9/27/2022 1930  Skin Integrity Remains Intact:   Monitor for areas of redness and/or skin breakdown   Assess vascular access sites hourly     Problem: Musculoskeletal - Adult  Goal: Return mobility to safest level of function  9/28/2022 0133 by Aracely Talbot RN  Outcome: Progressing  Flowsheets (Taken 9/27/2022 1930)  Return Mobility to Safest Level of Function:   Assess patient stability and activity tolerance for standing, transferring and ambulating with or without assistive devices   Assist with transfers and ambulation using safe patient handling equipment as needed   Ensure adequate protection for wounds/incisions during mobilization   Obtain physical therapy/occupational therapy consults as needed   Instruct patient/family in ordered activity level     Problem: Musculoskeletal - Adult  Goal: Return ADL status to a safe level of function  9/28/2022 0133 by Aracely Talbot RN  Outcome: Progressing  Flowsheets (Taken 9/27/2022 1930)  Return ADL Status to a Safe Level of Function:   Administer medication as ordered   Assess activities of daily living deficits and provide assistive maintained  9/28/2022 0133 by Beatrice Duggan RN  Outcome: Progressing  Flowsheets (Taken 9/27/2022 1930)  Hemodynamic stability and optimal renal function maintained:   Monitor labs and assess for signs and symptoms of volume excess or deficit   Monitor intake, output and patient weight   Monitor urine specific gravity, serum osmolarity and serum sodium as indicated or ordered   Monitor response to interventions for patient's volume status, including labs, urine output, blood pressure (other measures as available)

## 2022-09-28 NOTE — PROGRESS NOTES
Physical Therapy  Facility/Department: [facility] MED SURG  Rehabilitation Physical Therapy Treatment Note    NAME: Jose Kong  : 1960 (57 y.o.)  MRN: [id number]  CODE STATUS: Full Code    Date of Service: 22  Assessment: Pt trialed SPC vs RW this date during gait training, decreased stability noted and advised pt to continue use of RW until more stable. Pt displays deficits regarding safety awareness, balance, and gait and would benefit from cont skilled PT in order to safely return to [facility]. Activity Tolerance: Patient limited by endurance  Discharge Recommendations: Patient would benefit from continued therapy after discharge  PT Equipment Recommendations  Equipment Needed: Yes  Walker: Rolling    Restrictions:  Restrictions/Precautions: General Precautions; Fall Risk  Position Activity Restriction  Other position/activity restrictions: Up w/ assist, ALARMS, LUE IV     SUBJECTIVE  Subjective  Subjective: Pt sup in bed, awake and alert upon entry. Pt and RN agreeable with PT. Pain: Initially declines pain, 7/10 BLE pain with activity    OBJECTIVE  Cognition  Overall Cognitive Status: Exceptions  Arousal/Alertness: Appropriate responses to stimuli  Following Commands: Follows one step commands with increased time  Attention Span: Appears intact  Memory: Decreased long term memory;Decreased short term memory;Decreased recall of recent events  Safety Judgement: Decreased awareness of need for assistance;Decreased awareness of need for safety  Problem Solving: Decreased awareness of errors;Assistance required to identify errors made;Assistance required to generate solutions;Assistance required to implement solutions;Assistance required to correct errors made  Insights: Decreased awareness of deficits  Initiation: Requires cues for some  Sequencing: Requires cues for some  Cognition Comment: Pt recalls multiple past falls and continues to display decreased regards towards safety.   Orientation  Overall Orientation Status: Within Functional Limits    Functional Mobility  Bed Mobility  Overall Assistance Level: Supervision  Additional Factors: Head of bed raised; With handrails  Sit to Supine  Assistance Level: Supervision  Supine to Sit  Assistance Level: Supervision  Skilled Clinical Factors: HOB raised with bed moblity. Use of hand rails PRN. Scooting  Assistance Level: Supervision  Skilled Clinical Factors: Supine towards HOB (HOB raised)  Balance  Sitting Balance: Modified independent  (~5mins exercise EOB)  Standing Balance: Contact guard assistance  Standing Balance  Time: ~2mins  Activity: standing reaches OOBOS with RW (limited mobility in B shoulders) cues to facillitate hip flexion when performing reaches. No LOB  Comments: Pt took a couple steps in room without AD with decreased stability noted however no LOB. Transfers  Surface: From bed; To chair with arms; To bed  Additional Factors: Set-up; Increased time to complete;Verbal cues  Device: Walker  Sit to Stand  Assistance Level: Contact guard assist  Stand to Sit  Assistance Level: Contact guard assist    Environmental Mobility  Ambulation  Surface: Level surface  Device: Rolling walker;Cane (trialed SPC with ambulation outside room, RW utilized in room)  Distance: >250ft with SPC  Activity: Within Unit  Activity Comments: Pt trialed use of SPC this date with decreased stability noted. Will cont use of RW at this time until pt more stable. Additional Factors: Set-up; Verbal cues; Increased time to complete  Assistance Level: Contact guard assist;Minimal assistance  Gait Deviations: Unsteady gait; Slow christin; Path deviations (Lateral trunk sway)  Skilled Clinical Factors: Pt edu on sequencing/gait training with SPC this date with fair return demo momentarily and returns to uncoordinated gait. Will cont with RW at this time as pt demos improved stability with RW vs SPC.  Pt did report having a SPC at home but does not use it and recalls several falls in the past.    PT Exercises  A/AROM Exercises: Seated marches, LAQ, ankle pumps x20  Resistive Exercises: seated orange band resisted hamstring curls and hip abd x20  Dynamic Standing Balance Exercises: Standing reaches OOBOS with RW to challenge balance and coordination    Goals  Patient Goals   Patient goals : Less pain  Short Term Goals  Time Frame for Short term goals: 12 visits  Short term goal 1: Pt to demonstrate bed mobility independently  Short term goal 2: Pt to perform STS transfers w/ most appropriate AD Lea  Short term goal 3: Pt to ambulate at least 100ft w/ most appropriate AD Lea  Short term goal 4: Pt to actively participate in at least 30 minutes of physical therapy for ther act, ther ex, balance, gait, and endurance training    [address] 5-7 times per week  Current Treatment Recommendations: Strengthening;Balance training;Functional mobility training;Transfer training;Neuromuscular re-education;Gait training; Endurance training;Pain management;Home exercise program;Safety education & training;Patient/Caregiver education & training;Equipment evaluation, education, & procurement; Therapeutic activities  Safety Devices  Type of Devices: Left in bed;Bed alarm in place;Call light within reach;Gait belt;Nurse notified  Restraints  Restraints Initially in Place: No    EDUCATION  Education  Education Given To: Patient  Education Provided: Plan of Care;Role of Therapy;Transfer Training; Safety;Equipment  Education Provided Comments: Edu pt on safe use of AD and emphasized safety awareness throughout. Education Method: Verbal  Barriers to Learning: Cognition  Education Outcome: Verbalized understanding;Continued education needed     09/28/22 1328   AM-PAC Mobility Inpatient    How much difficulty turning over in bed? 4   How much difficulty sitting down on / standing up from a chair with arms? 4   How much difficulty moving from lying on back to sitting on side of bed?  4   How much help from another person moving to and from a bed to a chair? 3   How much help from another person needed to walk in hospital room? 3   How much help from another person for climbing 3-5 steps with a railing?  2   AM-PAC Inpatient Mobility Raw Score  20   AM-PAC Inpatient T-Scale Score  47.67   Mobility Inpatient CMS 0-100% Score 35.83   Mobility Inpatient CMS G-Code Modifier  CJ     Therapy Time   Individual Concurrent Group Co-treatment   Time In 1145         Time Out 1216         [place]utes [place], [place], 09/28/22 at 1:32 PM

## 2022-09-28 NOTE — PROGRESS NOTES
Occupational Therapy  Facility/Department: [facility]  Rehabilitation Occupational Therapy Daily Treatment Note    Date: 22  Patient Name: Zia Lindsey       Room:   MRN: [id number]  Account: [de-identified]   : 1960  (63 y.o.) Gender: male       Past Medical History:  has a past medical history of AMS (altered mental status), Atrial fibrillation ([address] Bipolar 1 disorder ([facility] [address] Depression, Hypertension, Neck pain, Neurofibromatosis ([facility] [address] Patient in clinical research study, Severe recurrent major depression without psychotic features ([facility] [address] and Twitching. Past Surgical History:   has a past surgical history that includes hernia repair; Neck surgery (); Abdomen surgery; Tonsillectomy; Appendectomy; Dilatation, esophagus; Cardiac surgery (7/14/15); Cardiac surgery (2015); Colonoscopy (2021); and Upper gastrointestinal endoscopy (2021). Restrictions  Restrictions/Precautions: General Precautions; Fall Risk  Other position/activity restrictions: Up w/ assist, ALARMS, LUE IV  Required Braces or Orthoses?: No    Subjective  Subjective: \"I feel better today. .. Macario Ortega I can do some exercises with you\". Restrictions/Precautions: General Precautions; Fall Risk       Objective     Cognition  Overall Cognitive Status: Exceptions  Arousal/Alertness: Appropriate responses to stimuli  Following Commands: Follows one step commands with repetition; Follows one step commands with increased time  Attention Span: Appears intact  Memory: Decreased long term memory;Decreased short term memory;Decreased recall of recent events  Safety Judgement: Decreased awareness of need for assistance;Decreased awareness of need for safety  Problem Solving: Decreased awareness of errors;Assistance required to identify errors made;Assistance required to generate solutions;Assistance required to implement solutions;Assistance required to correct errors made  Insights: Decreased awareness of deficits  Initiation: Requires cues for some  Sequencing: Requires cues for some  Orientation  Overall Orientation Status: Within Functional Limits         ADL  Grooming/Oral Hygiene  Assistance Level: Stand by assist;Set-up  Skilled Clinical Factors: Sink side grooming completed with SBA with use of RW  Putting On/Taking Off Footwear  Assistance Level: Supervision  Skilled Clinical Factors: Able to frank  socks with sitting EOB with supervision  Toileting  Assistance Level: Stand by assist  Skilled Clinical Factors: SBA with use of RW  Toilet Transfers  Technique: Stand step  Equipment: Standard toilet  Additional Factors: Set-up; Verbal cues  Assistance Level: Stand by assist  Skilled Clinical Factors: SBA with use of RW  Tub/Shower Transfers  Skilled Clinical Factors: Pt. completed sink side task with supervision/SBA with use of RW          Functional Mobility  Device: Standard walker  Activity: To/From bathroom  Assistance Level: Stand by assist  Bed Mobility  Overall Assistance Level: Independent  Additional Factors: Set-up  Bridging  Assistance Level: Independent  Roll Left  Assistance Level: Independent  Roll Right  Assistance Level: Independent  Sit to Supine  Assistance Level: Supervision  Supine to Sit  Assistance Level: Supervision  Scooting  Assistance Level: Independent  Skilled Clinical Factors: with use of side rails. Transfers  Surface: From bed;Standard toilet  Additional Factors: Set-up; With handrails  Device: Walker  Sit to Stand  Assistance Level: Supervision  Stand to [employer] Level: Supervision  Stand Pivot  Assistance Level: Stand by assist   OT Exercises  Exercise Treatment: Pt. completed graded theraband exercises while sitting at bedside. Pt. completed 5 sets of 10 reps with min rest breaks between sets to recover from fatigue. Pt. completed exercises to promote shoulder flexion/extension, elbow flexion/extension, and horizontal ab/adduction for increasing ADL skills.  Pt. completed each set with success and good form. Assessment  Assessment  Assessment: Pt. complete sinkside task with supervision and SBA with use of RW. Pt. displayed independent bed mobility. Functional B UE exercise program completed with success and with min rest breaks between sets. Skilled OT warranted to promote I/safety to return pt to prior living arrangement with assist as needed. Activity Tolerance: Patient limited by endurance; Patient tolerated treatment well  Discharge Recommendations: Patient would benefit from continued therapy after discharge  OT Equipment Recommendations  Equipment Needed: Yes  Mobility Devices: Stan Lacrosse: Rolling  ADL Assistive Devices: Long-handled Shoe Horn;Long-handled Sponge;Reacher;Sock-Aid Hard  Safety Devices  Safety Devices in place: Yes  Type of devices: Bed alarm in place;Call light within reach; Left in bed;Nurse notified  Restraints  Initially in place: No    Patient Education  Education  Education Given To: Patient  Education Provided: Role of Therapy;Home Exercise Program;Plan of Care; Mobility Training  Education Provided Comments: Pt. educated on safety awareness with use of RW in home and small spaces i.e. rest room. Pt. appeared attentive and receptive to information. Education Method: Demonstration;Verbal  Barriers to Learning: None  Education Outcome: Verbalized understanding;Continued education needed    Plan  Plan  Times per Week: 4-5x/wk 1x/day as yecenia  Times per Day: Daily  Current Treatment Recommendations: Strengthening;Balance training;Functional mobility training; Endurance training; Safety education & training;Equipment evaluation, education, & procurement;Self-Care / ADL; Home management training;Patient/Caregiver education & training  Plan Comment: Cont with stated POC    Goals  Patient Goals   Patient goals : To go home!   Short Term Goals  Time Frame for Short term goals: By discharge, pt to demo  Short Term Goal 1: ADL trasnfers and functional mobility to CGA with use of AD as needed. Short Term Goal 2: increased B UE strength by 1/2 grade to assist with functional tasks/I with B UE HEP with use of handouts as needed. Short Term Goal 3: increased standing yecenia > 8 min with Min A using AD as needed to reduce risk of falls during functional tasks. Short Term Goal 4: UB ADLs to Set up and LB ADLs to Min A with use of AD/AE  as needed. Short Term Goal 5: toileting to SBA with use of AD/grab bars as needed. Additional Goals?: No  Long Term Goals  Long Term Goal 1: Pt to be I with fall prevention education, EC/WS tech, recommendations for discharge/AE with use of handouts as needed. AM-PAC Score        AM-PAC Inpatient Daily Activity Raw Score: 19 (09/28/22 1139)  AM-PAC Inpatient ADL T-Scale Score : 40.22 (09/28/22 1139)  ADL Inpatient CMS 0-100% Score: 42.8 (09/28/22 1139)  ADL Inpatient CMS G-Code Modifier : CK (09/28/22 1139)      Therapy Time   Individual Concurrent Group Co-treatment   Time In 1118         Time Out 1141         Minutes 23             RN reports patient is medically stable for therapy treatment this date. Chart reviewed prior to treatment and patient is agreeable for therapy. All lines intact and patient positioned comfortably at end of treatment. All patient needs addressed prior to ending therapy session.        MERCEDES Ly

## 2022-09-28 NOTE — CONSULTS
NOTE FROM 9/28 00:29 - Warfarin 2mg x 1 ordered. RN found warfarin 2mg x 2 which was supposed to be the dose today on the floor and patient was not aware of what happened. Day shift RN documented warfarin 4mg given today, but appears that pt has not taken a dose for some reason. Because of the uncertainty of the situation, reordering warfarin 2mg x 1 may be a safe option. RN talked to provider and warfarin 2mg x 1 ordered. Warfarin Dosing - Pharmacy Consult Note  Consulting Provider: Prudence Zavala CNP  Indication:  Atrial Fibrillation  Warfarin Dose prior to admission: 2mg MWSun, 4mg AOD   Concurrent anticoagulants/antiplatelets: aspirin  Significant Drug Interactions: No obvious interactions  Recent Labs     09/26/22  0548 09/27/22  0213 09/28/22  0452   INR 2.4 2.0 1.4   HGB 11.6* 11.3* 12.6*    151 182   LABALBU 3.6 3.5 4.1     Recent warfarin administrations                     warfarin (COUMADIN) tablet 2 mg (mg) 2 mg Given 09/28/22 0036    warfarin (COUMADIN) tablet 4 mg (mg) 4 mg Given 09/27/22 1743    warfarin (COUMADIN) tablet 2 mg (mg) 2 mg Given 09/26/22 1812    warfarin (COUMADIN) tablet 2 mg (mg) 2 mg Given 09/25/22 1803                   Date   INR    Dose  9/24/22   2.4       took at home  9/25        2.4       2 mg   9/26        2.4       2 mg  9/27        2.0       2 mg (+ 4 mg?)  9/28        1.4    Assessment/Plan  (Goal INR: 2 - 3)  INR dropped to 1.4 last night. Based on the assumption that patient received  2 mg dose (vs 6 mg) last night, will increase usual home Wednesday dose from 2 mg to 4 mg tonight. Recheck INR in am.      Active problem list reviewed. INR orders are placed. Chart reviewed for pertinent labs, drug/diet interactions, and past doses. Documentation of patient's clinical condition was reviewed. Pharmacy Dosing:  Pharmacy will continue to follow.

## 2022-09-29 VITALS
SYSTOLIC BLOOD PRESSURE: 111 MMHG | HEART RATE: 73 BPM | DIASTOLIC BLOOD PRESSURE: 88 MMHG | WEIGHT: 186.9 LBS | TEMPERATURE: 97.7 F | RESPIRATION RATE: 16 BRPM | BODY MASS INDEX: 30.04 KG/M2 | OXYGEN SATURATION: 94 % | HEIGHT: 66 IN

## 2022-09-29 LAB
ALBUMIN SERPL-MCNC: 4.1 G/DL (ref 3.5–5.2)
ANION GAP SERPL CALCULATED.3IONS-SCNC: 10 MMOL/L (ref 9–17)
ANION GAP SERPL CALCULATED.3IONS-SCNC: 11 MMOL/L (ref 9–17)
ANION GAP SERPL CALCULATED.3IONS-SCNC: 9 MMOL/L (ref 9–17)
BUN BLDV-MCNC: 18 MG/DL (ref 8–23)
BUN/CREAT BLD: 17 (ref 9–20)
CALCIUM SERPL-MCNC: 9.5 MG/DL (ref 8.6–10.4)
CHLORIDE BLD-SCNC: 94 MMOL/L (ref 98–107)
CHLORIDE BLD-SCNC: 97 MMOL/L (ref 98–107)
CHLORIDE BLD-SCNC: 98 MMOL/L (ref 98–107)
CO2: 26 MMOL/L (ref 20–31)
CO2: 26 MMOL/L (ref 20–31)
CO2: 27 MMOL/L (ref 20–31)
CREAT SERPL-MCNC: 1.07 MG/DL (ref 0.7–1.2)
GFR AFRICAN AMERICAN: >60 ML/MIN
GFR NON-AFRICAN AMERICAN: >60 ML/MIN
GFR SERPL CREATININE-BSD FRML MDRD: ABNORMAL ML/MIN/{1.73_M2}
GLUCOSE BLD-MCNC: 75 MG/DL (ref 75–110)
GLUCOSE BLD-MCNC: 86 MG/DL (ref 75–110)
GLUCOSE BLD-MCNC: 92 MG/DL (ref 70–99)
GLUCOSE BLD-MCNC: 94 MG/DL (ref 75–110)
HCT VFR BLD CALC: 40.2 % (ref 40.7–50.3)
HEMOGLOBIN: 13.2 G/DL (ref 13–17)
INR BLD: 1.4
MAGNESIUM: 2.2 MG/DL (ref 1.6–2.6)
MCH RBC QN AUTO: 29.9 PG (ref 25.2–33.5)
MCHC RBC AUTO-ENTMCNC: 32.8 G/DL (ref 28.4–34.8)
MCV RBC AUTO: 91.2 FL (ref 82.6–102.9)
NRBC AUTOMATED: 0 PER 100 WBC
PDW BLD-RTO: 14.4 % (ref 11.8–14.4)
PHOSPHORUS: 3.4 MG/DL (ref 2.5–4.5)
PLATELET # BLD: 188 K/UL (ref 138–453)
PMV BLD AUTO: 9 FL (ref 8.1–13.5)
POTASSIUM SERPL-SCNC: 3.9 MMOL/L (ref 3.7–5.3)
POTASSIUM SERPL-SCNC: 4.2 MMOL/L (ref 3.7–5.3)
POTASSIUM SERPL-SCNC: 4.6 MMOL/L (ref 3.7–5.3)
PROTHROMBIN TIME: 17 SEC (ref 11.5–14.2)
RBC # BLD: 4.41 M/UL (ref 4.21–5.77)
SODIUM BLD-SCNC: 131 MMOL/L (ref 135–144)
SODIUM BLD-SCNC: 133 MMOL/L (ref 135–144)
SODIUM BLD-SCNC: 134 MMOL/L (ref 135–144)
WBC # BLD: 8.1 K/UL (ref 3.5–11.3)

## 2022-09-29 PROCEDURE — 2580000003 HC RX 258: Performed by: INTERNAL MEDICINE

## 2022-09-29 PROCEDURE — 85027 COMPLETE CBC AUTOMATED: CPT

## 2022-09-29 PROCEDURE — 80069 RENAL FUNCTION PANEL: CPT

## 2022-09-29 PROCEDURE — 97116 GAIT TRAINING THERAPY: CPT

## 2022-09-29 PROCEDURE — 6370000000 HC RX 637 (ALT 250 FOR IP): Performed by: INTERNAL MEDICINE

## 2022-09-29 PROCEDURE — 97110 THERAPEUTIC EXERCISES: CPT

## 2022-09-29 PROCEDURE — 99239 HOSP IP/OBS DSCHRG MGMT >30: CPT | Performed by: INTERNAL MEDICINE

## 2022-09-29 PROCEDURE — 82947 ASSAY GLUCOSE BLOOD QUANT: CPT

## 2022-09-29 PROCEDURE — 80051 ELECTROLYTE PANEL: CPT

## 2022-09-29 PROCEDURE — 2580000003 HC RX 258: Performed by: NURSE PRACTITIONER

## 2022-09-29 PROCEDURE — 83735 ASSAY OF MAGNESIUM: CPT

## 2022-09-29 PROCEDURE — 36415 COLL VENOUS BLD VENIPUNCTURE: CPT

## 2022-09-29 PROCEDURE — 85610 PROTHROMBIN TIME: CPT

## 2022-09-29 PROCEDURE — 97535 SELF CARE MNGMENT TRAINING: CPT

## 2022-09-29 PROCEDURE — 6370000000 HC RX 637 (ALT 250 FOR IP): Performed by: NURSE PRACTITIONER

## 2022-09-29 RX ORDER — WARFARIN SODIUM 3 MG/1
6 TABLET ORAL
Status: COMPLETED | OUTPATIENT
Start: 2022-09-29 | End: 2022-09-29

## 2022-09-29 RX ORDER — DEXTROSE MONOHYDRATE 50 MG/ML
INJECTION, SOLUTION INTRAVENOUS CONTINUOUS
Status: ACTIVE | OUTPATIENT
Start: 2022-09-29 | End: 2022-09-29

## 2022-09-29 RX ORDER — DEXTROSE MONOHYDRATE 50 MG/ML
INJECTION, SOLUTION INTRAVENOUS CONTINUOUS
Status: DISCONTINUED | OUTPATIENT
Start: 2022-09-29 | End: 2022-09-29 | Stop reason: HOSPADM

## 2022-09-29 RX ADMIN — POLYETHYLENE GLYCOL 3350 17 G: 17 POWDER, FOR SOLUTION ORAL at 05:32

## 2022-09-29 RX ADMIN — MIDODRINE HYDROCHLORIDE 10 MG: 10 TABLET ORAL at 14:24

## 2022-09-29 RX ADMIN — FOLIC ACID 1 MG: 1 TABLET ORAL at 09:56

## 2022-09-29 RX ADMIN — PANTOPRAZOLE SODIUM 40 MG: 40 TABLET, DELAYED RELEASE ORAL at 05:32

## 2022-09-29 RX ADMIN — OXCARBAZEPINE 300 MG: 300 TABLET, FILM COATED ORAL at 09:56

## 2022-09-29 RX ADMIN — RISPERIDONE 0.5 MG: 0.25 TABLET ORAL at 09:55

## 2022-09-29 RX ADMIN — TRAMADOL HYDROCHLORIDE 50 MG: 50 TABLET ORAL at 05:32

## 2022-09-29 RX ADMIN — MIDODRINE HYDROCHLORIDE 10 MG: 10 TABLET ORAL at 09:59

## 2022-09-29 RX ADMIN — GABAPENTIN 900 MG: 300 CAPSULE ORAL at 14:24

## 2022-09-29 RX ADMIN — SENNOSIDES AND DOCUSATE SODIUM 1 TABLET: 50; 8.6 TABLET ORAL at 09:55

## 2022-09-29 RX ADMIN — DOCUSATE SODIUM 100 MG: 100 CAPSULE, LIQUID FILLED ORAL at 09:56

## 2022-09-29 RX ADMIN — ACETAMINOPHEN 650 MG: 325 TABLET ORAL at 07:51

## 2022-09-29 RX ADMIN — HYDROXYZINE HYDROCHLORIDE 50 MG: 25 TABLET, FILM COATED ORAL at 15:02

## 2022-09-29 RX ADMIN — CETIRIZINE HYDROCHLORIDE 10 MG: 10 TABLET ORAL at 09:55

## 2022-09-29 RX ADMIN — ASPIRIN 81 MG CHEWABLE TABLET 81 MG: 81 TABLET CHEWABLE at 09:56

## 2022-09-29 RX ADMIN — Medication 2000 UNITS: at 07:51

## 2022-09-29 RX ADMIN — DEXTROSE MONOHYDRATE 500 ML: 50 INJECTION, SOLUTION INTRAVENOUS at 10:28

## 2022-09-29 RX ADMIN — CYANOCOBALAMIN TAB 1000 MCG 1000 MCG: 1000 TAB at 09:57

## 2022-09-29 RX ADMIN — DEXTROSE MONOHYDRATE 500 ML: 50 INJECTION, SOLUTION INTRAVENOUS at 14:23

## 2022-09-29 RX ADMIN — GABAPENTIN 900 MG: 300 CAPSULE ORAL at 09:57

## 2022-09-29 RX ADMIN — FERROUS SULFATE TAB EC 325 MG (65 MG FE EQUIVALENT) 325 MG: 325 (65 FE) TABLET DELAYED RESPONSE at 07:51

## 2022-09-29 RX ADMIN — WARFARIN SODIUM 6 MG: 3 TABLET ORAL at 17:13

## 2022-09-29 RX ADMIN — SODIUM CHLORIDE, PRESERVATIVE FREE 10 ML: 5 INJECTION INTRAVENOUS at 09:58

## 2022-09-29 RX ADMIN — TAMSULOSIN HYDROCHLORIDE 0.8 MG: 0.4 CAPSULE ORAL at 17:13

## 2022-09-29 RX ADMIN — SODIUM CHLORIDE 2 G: 1 TABLET ORAL at 07:51

## 2022-09-29 ASSESSMENT — PAIN DESCRIPTION - DESCRIPTORS
DESCRIPTORS: ACHING
DESCRIPTORS: ACHING

## 2022-09-29 ASSESSMENT — PAIN DESCRIPTION - ORIENTATION
ORIENTATION: RIGHT;LEFT
ORIENTATION: RIGHT;LEFT

## 2022-09-29 ASSESSMENT — PAIN SCALES - GENERAL
PAINLEVEL_OUTOF10: 5
PAINLEVEL_OUTOF10: 5

## 2022-09-29 ASSESSMENT — PAIN DESCRIPTION - LOCATION
LOCATION: LEG
LOCATION: LEG

## 2022-09-29 NOTE — PROGRESS NOTES
[facility]  Office: [address], DO, Nereyda Newdale, DO, Yasmine Beets, DO, Nani Conley Blood, DO, Kamryn Saucedo MD, Hortencia Britt MD, Johnnie Claude, MD, Mihaela Avery MD,  Vishal Chau MD, Truett Burkitt, MD, Jerry Ballesteros, DO, Kiki Ortiz MD,  Pamela Herron MD, Ivan Rolon MD, Lou Rhoades, DO, Ragini Mccrary MD, Christal Stacy MD, Barbara Taveras MD, Lisa Seay MD, Cat Acharya MD, Patricia Xiong MD, Dora Phlegm, DO, Uriel Vazquez MD, Fidelina Stauffer MD, John Costello, CNP,  Carlos Poon, CNP, Shira Elise, CNP, Christine Salvador, CNP,  Jocelyn Wilks, DNP, Marylen Net, CNP, Landry Kennedy, CNP, Nicole Cruz, CNP, Kalie Lindt, CNP, Dwayne Nj, CNP, Jacqueline Aguillon PAAlleyC, Alejandrina Loving, CNS, Yessica Henley, DNP, Kong Cardoza, CNP, Wali Panchal, CNP, Jose Luis Hernández, Giuseppe WilsonU[facility] Hospital[facility]    Progress Note    9/29/2022    1:59 PM    Name:   Cuca Cortez  MRN:     [id number]     Acct:      [de-identified]   Room:   2024/2024-01   Day:  5  Admit Date:  9/24/2022  8:01 PM    PCP:   LOGAN Dorsey NP  Code Status:  Full Code    Subjective:     C/C:   Chief Complaint   Patient presents with    Abnormal Lab     States that Dr Jody Lou sent him here, had labs drawn today and potassium high     Interval History Status: . Sodium was getting corrected too fast after holding psychotropic medicines and getting salt tablets, current sodium 134  Urology earlier had recommended to place [facility] catheter due to neurogenic bladder dysfunction, he has left InterStim bladder stimulator at home  Wall's catheter was removed subsequently by urology and is getting Flomax-voiding  Denies constipation today  Brief History:   The patient presented the ER with high potassium.   Patient states that he had blood work drawn today for his Dr. Marcell Long and received a call tonight that his potassium was high. Patient denies any chest pain shortness of breath abdominal pain nausea or vomiting. He states that he gets his blood work done every 6 months to [facility] for cancer\". He states he typically drinks 3-4 bottles of water and a couple Gatorade's a day. He denies excessive thirst or urination. Patient states that he did recently get out of the psychiatric unit and they did start him on Elavil but no other medication changes. He does take warfarin and is compliant with his dosing. He denies any palpitations. No recent alcohol use. History includes monoclonal gammopathy, ARIELLE with CPAP, atrial fibs, prosthetic valve, hypothyroidism, hypertension, bipolar disorder, crack cocaine use, and chronic renal insufficiency      Review of Systems:     Constitutional:  negative for chills, fevers, sweats  Respiratory:  negative for cough, dyspnea on exertion, shortness of breath, wheezing  Cardiovascular:  negative for chest pain, chest pressure/discomfort, lower extremity edema, palpitations  Gastrointestinal:  negative for abdominal pain, denies constipation, denies nausea, vomiting  Neurological:  negative for dizziness, headache    Medications: Allergies:     Allergies   Allergen Reactions    Oxycodone Itching    Fish-Derived Products     Ibuprofen     Latuda [Lurasidone Hcl] Other (See Comments)     \"Feels like pt is going to crawl out of own skin\"    Lithium     Lurasidone     Quetiapine     Seroquel [Quetiapine Fumarate]        Current Meds:   Scheduled Meds:    warfarin  6 mg Oral Once    dextrose 5%  500 mL IntraVENous Once    docusate sodium  100 mg Oral BID    [Held by provider] escitalopram  10 mg Oral Daily    OXcarbazepine  300 mg Oral BID    tamsulosin  0.8 mg Oral QPM    aspirin  81 mg Oral Daily    atorvastatin  20 mg Oral Nightly    calcium citrate  250 mg Oral BID    cetirizine  10 mg Oral Daily    Vitamin D  2,000 Units Oral Daily with breakfast    vitamin B-12  1,000 mcg Oral Daily    ferrous sulfate  325 mg Oral Daily with breakfast    folic acid  1 mg Oral Daily    [Held by provider] furosemide  20 mg Oral Daily    gabapentin  900 mg Oral TID    midodrine  10 mg Oral TID    pantoprazole  40 mg Oral QAM AC    risperiDONE  0.5 mg Oral BID    sennosides-docusate sodium  1 tablet Oral Daily    sodium chloride flush  5-40 mL IntraVENous 2 times per day    warfarin placeholder: dosing by pharmacy   Other RX Placeholder     Continuous Infusions:    dextrose      dextrose 75 mL/hr at 22 1114    dextrose      sodium chloride       PRN Meds: bisacodyl, zolpidem, albuterol sulfate HFA, glucose, dextrose bolus **OR** dextrose bolus, glucagon (rDNA), dextrose, traMADol, diclofenac sodium, hydrOXYzine HCl, sodium chloride flush, sodium chloride, potassium chloride **OR** potassium alternative oral replacement **OR** potassium chloride, magnesium sulfate, ondansetron **OR** ondansetron, polyethylene glycol, acetaminophen **OR** acetaminophen    Data:     Past Medical History:   has a past medical history of AMS (altered mental status), Atrial fibrillation ([facility] [address] Bipolar 1 disorder ([facility] [address] Depression, Hypertension, Neck pain, [facility] Patient in clinical research study, Severe recurrent major depression without psychotic features ([facility] [address] and Twitching. Social History:   reports that he has never smoked. He has never been exposed to tobacco smoke. He has never used smokeless tobacco. He reports that he does not currently use alcohol. He reports that he does not currently use drugs.      Family History:   Family History   Problem Relation Age of Onset    Coronary Art Dis Mother     Hypertension Mother     Hypertension Father     Cancer Father         blood       Vitals:  /74   Pulse 71   Temp 98.2 °F (36.8 °C) (Oral)   Resp 16   Ht 5' 6\" (1.676 m)   Wt 186 lb 14.4 oz (84.8 kg)   SpO2 90%   BMI 30.17 kg/m²   Temp (24hrs), Av °F (36.7 °C), Min:97.3 °F (36.3 °C), Max:98.6 °F (37 SPECIAL R HAND 10ML 05/01/2022 05:25 PM     Lab Results   Component Value Date/Time    CULTURE NO GROWTH 5 DAYS 05/01/2022 05:25 PM       Radiology:  CT ABDOMEN PELVIS WO CONTRAST Additional Contrast? None    Result Date: 9/26/2022  A Wall catheter is present in an underdistended bladder. No hydronephrosis or urinary tract calculus. No acute process in the abdomen or pelvis. XR KNEE LEFT (3 VIEWS)    Result Date: 9/25/2022  No acute osseous abnormality of the left knee. XR KNEE RIGHT (3 VIEWS)    Result Date: 9/25/2022  No acute osseous abnormality of the right knee.        Physical Examination:        General appearance:  alert, cooperative and no distress  Mental Status:  oriented to person, place and time and normal affect  Lungs: Prolonged expiratory phase, no wheezing today  heart:  regular rate and rhythm, no murmur  Abdomen:  soft, nontender, nondistended, normal bowel sounds, no masses, hepatomegaly, splenomegaly  Extremities:  no edema, redness, tenderness in the calves  Skin:  no gross lesions, rashes, induration    Assessment:        Hospital Problems             Last Modified POA    * (Principal) Hyponatremia 9/24/2022 Yes    BPH (benign prostatic hyperplasia) 9/24/2022 Yes    Coronary atherosclerosis 9/24/2022 Yes    Bipolar 1 disorder, depressed ([address] 9/24/2022 Yes    Hyperkalemia 9/24/2022 Yes    ARIELLE on CPAP 9/24/2022 Yes    Other specified hypothyroidism (Chronic) 9/24/2022 Yes    Presence of prosthetic heart valve (Chronic) 9/24/2022 Yes    Restless leg syndrome 9/25/2022 Yes    Primary hypertension 9/24/2022 Yes    GERD (gastroesophageal reflux disease) 9/24/2022 Yes    Chronic renal impairment, stage 3 (moderate) ([address] 9/24/2022 Yes    Atrial fibrillation ([address] 9/24/2022 Yes   Constipation  Bronchospasm  Plan:        Hyponatremia suspected SIADH due to psychotropic medicines-was getting corrected too fast, started on hypotonic fluids for 6 hours, salt pills have been stopped, will recheck sodium at 2 PM    Bipolar Disorder -Trileptal restarted, SSRI on hold  Urinary Retention - urology consulted. Flomax started, has stimulator-left at home.    Paroxysmal Atrial Fibrillation - continue warfarin   Hypothroidism - home meds   GERD  CKD - stable  RLS  HTN   ARIELLE  CAD  BPH  Bronchospasm-continue DuoNeb aerosols while in hospital  Constipation-Dulcolax suppository as needed and continue Senokot and Colace  Dispo: ECF possibly today after checking 2 PM sodium report    Francesca Carlos MD  9/29/2022  1:59 PM

## 2022-09-29 NOTE — PROGRESS NOTES
Physical Therapy  Facility/Department: [facility]  Rehabilitation Physical Therapy Treatment Note    NAME: Frank Dodd  : 1960 (57 y.o.)  MRN: [id number]  CODE STATUS: Full Code    Date of Service: 22       Restrictions:        SUBJECTIVE  Subjective  Subjective: pt up standing just beginning to amb. upon arrival               OBJECTIVE  Cognition  Overall Cognitive Status: Exceptions  Arousal/Alertness: Appropriate responses to stimuli  Following Commands: Follows one step commands with increased time  Attention Span: Appears intact  Memory: Decreased long term memory;Decreased short term memory;Decreased recall of recent events  Safety Judgement: Decreased awareness of need for assistance;Decreased awareness of need for safety  Problem Solving: Decreased awareness of errors;Assistance required to identify errors made;Assistance required to generate solutions;Assistance required to implement solutions;Assistance required to correct errors made  Insights: Decreased awareness of deficits  Initiation: Requires cues for some  Sequencing: Requires cues for some  Cognition Comment: Pt recalls multiple past falls and continues to display decreased regards towards safety. Orientation  Overall Orientation Status: Within Functional Limits    Functional Mobility  Bed Mobility  Overall Assistance Level: Supervision  Additional Factors: Head of bed raised; With handrails  Roll Left  Assistance Level: Stand by assist  Roll Right  Assistance Level: Stand by assist  Sit to Supine  Assistance Level: Supervision  Supine to Sit  Assistance Level: Supervision  Transfers  Surface: To chair with arms  Additional Factors: Set-up; Increased time to complete;Verbal cues  Sit to Stand  Assistance Level: Contact guard assist  Stand to Sit  Assistance Level: Contact guard assist      Environmental Mobility  Ambulation  Surface: Level surface  Device: Rolling walker;Cane  Distance: 500 ft  Additional Factors: Set-up; Verbal cues;Increased time to complete  Assistance Level: Contact guard assist;Minimal assistance  Gait Deviations: Path deviations;Decreased step length bilateral  Skilled Clinical Factors: pt demo slight LOB when letting go of RW to adjust hospital pants otherwise steady    Reviewed HEP for general strengthening         PT Exercises  Exercise Treatment: yes  A/AROM Exercises: Seated marches, LAQ, ankle pumps x20  Resistive Exercises: seated orange band resisted hamstring curls and hip abd x20  Circulation/Endurance Exercises: AP  Pressure Relief Exercises: glute sets and seated WS to promote pressure relief techniques. AM-PAC Score    AM-PAC Inpatient Mobility Raw Score : 20  AM-PAC Inpatient T-Scale Score : 47.67  Mobility Inpatient CMS 0-100% Score: 35.83  Mobility Inpatient CMS G-Code Modifier:CJ            ASSESSMENT/PROGRESS TOWARDS GOALS       Assessment  Activity Tolerance: Patient limited by endurance  Discharge Recommendations: Patient would benefit from continued therapy after discharge    Goals  Patient Goals   Patient goals : Less pain  Short Term Goals  Time Frame for Short term goals: 12 visits  Short term goal 1: Pt to demonstrate bed mobility independently  Short term goal 2: Pt to perform STS transfers w/ most appropriate AD Lea  Short term goal 3: Pt to ambulate at least 100ft w/ most appropriate AD Lea  Short term goal 4: Pt to actively participate in at least 30 minutes of physical therapy for ther act, ther ex, balance, gait, and endurance training    PLAN OF CARE/SAFETY  Plan  Plan: 5-7 times per week  Current Treatment Recommendations: Strengthening;Balance training;Functional mobility training;Transfer training;Neuromuscular re-education;Gait training; Endurance training;Pain management;Home exercise program;Safety education & training;Patient/Caregiver education & training;Equipment evaluation, education, & procurement; Therapeutic activities  Safety Devices  Type of Devices:  All fall risk

## 2022-09-29 NOTE — PLAN OF CARE
Problem: Safety - Adult  Goal: Free from fall injury  9/29/2022 1125 by Huog Gonzalez RN  Outcome: Progressing  Note: Patient remains free from falls  Bed in lowest position, call light within reach, siderailsx2, hourly rounding  9/29/2022 0257 by Houston Mancuso RN  Outcome: Progressing  Flowsheets (Taken 9/29/2022 0000)  Free From Fall Injury:   Based on caregiver fall risk screen, instruct family/caregiver to ask for assistance with transferring infant if caregiver noted to have fall risk factors   Instruct family/caregiver on patient safety

## 2022-09-29 NOTE — CARE COORDINATION
Social work: Anticipate dc today, labs pending. Transport arranged via [id number] [facility] at 7:30PM.   # for report [phone]  Fax: [phone]. TAWNY started.

## 2022-09-29 NOTE — PROGRESS NOTES
Amish Bansal   Urology Progress Note            Subjective: Follow-up urinary retention enlarged prostate    Patient Vitals for the past 24 hrs:   BP Temp Temp src Pulse Resp SpO2   09/29/22 0655 106/74 98.2 °F (36.8 °C) Oral 71 16 90 %   09/28/22 1859 135/82 98.6 °F (37 °C) Oral 72 16 97 %   09/28/22 1517 115/72 97.3 °F (36.3 °C) Oral 73 15 91 %   09/28/22 1141 120/71 97.9 °F (36.6 °C) Oral 75 16 94 %       Intake/Output Summary (Last 24 hours) at 9/29/2022 0738  Last data filed at 9/29/2022 8006  Gross per 24 hour   Intake 240 ml   Output 2973 ml   Net -2733 ml       Recent Labs     09/27/22  0213 09/28/22  0452 09/29/22  0629   WBC 7.1 12.9* 8.1   HGB 11.3* 12.6* 13.2   HCT 33.6* 36.8* 40.2*   MCV 89.4 88.0 91.2    182 188     Recent Labs     09/27/22  0213 09/27/22  0939 09/28/22  0452 09/28/22  0935 09/28/22  1935 09/29/22  0629   *   < > 124* 124* 128* 134*   K 4.4   < > 4.3 4.1 4.2 4.6   CL 92*   < > 88* 90* 92* 97*   CO2 24   < > 23 24 27 27   PHOS 3.8  --  4.0  --   --  3.4   BUN 15   < > 15 15  --  18   CREATININE 0.94   < > 0.89 0.88  --  1.07    < > = values in this interval not displayed.        Recent Labs     09/27/22  2255   COLORU Yellow   PHUR 7.0   WBCUA 0 TO 2   RBCUA 10 TO 20   BACTERIA FEW*   SPECGRAV 1.010   LEUKOCYTESUR NEGATIVE   UROBILINOGEN Normal   BILIRUBINUR NEGATIVE       Additional Lab/culture results:    Physical Exam: patient alert and oriented not in acute distress, discussed with the patient enlarged prostate and hypotonic bladder, also discussed InterStim device proper adjustment    Interval Imaging Findings:    Impression:    Patient Active Problem List   Diagnosis    Light headed    Recurrent major depression in partial remission ([facility] [address]    Primary hypertension    Neck pain    GERD (gastroesophageal reflux disease)    Degenerative disc disease, cervical    Hyponatremia    Hypercalcemia    Aortic aneurysm (HCC)    MGUS (monoclonal gammopathy of unknown significance)    Chronic renal impairment, stage 3 (moderate) (HCC)    Depression    Crack cocaine use    Bipolar I disorder, most recent episode depressed ([address]    Altered mental status, unspecified    YONATHAN (acute kidney injury) ([address]    Altered mental status    Supratherapeutic INR    Atrial fibrillation (HCC)    Hypernatremia    Anemia    Major depression, recurrent (HCC)    Major depressive disorder, recurrent (HCC)    Acute kidney injury superimposed on chronic kidney disease (HCC)    Schizoaffective disorder, bipolar type ([address]    Schizoaffective disorder ([address]    Hematoma of injection site    BPH (benign prostatic hyperplasia)    Constipation, unspecified    Coronary atherosclerosis    Neurofibromatosis, unspecified ([address]    S/P AVR (aortic valve replacement)    Depression with suicidal ideation    Severe recurrent major depression without psychotic features ([address]    Severe depressed bipolar I disorder without psychotic features ([address]    Bipolar 1 disorder, depressed (HCC)    Hyperkalemia    ARIELLE on CPAP    Other specified hypothyroidism    Presence of prosthetic heart valve    Restless leg syndrome    Athscl heart disease of native coronary artery w/o ang pctrs       Plan: arrangements will be made at the [facility] for urology follow-up    Jimena Bernal MD  7:38 AM 9/29/2022

## 2022-09-29 NOTE — PROGRESS NOTES
Warfarin Dosing - Pharmacy Consult Note  Consulting Provider: Amanda Schulz CNP  Indication:  Atrial Fibrillation  Warfarin Dose prior to admission: 2mg MWSun, 4mg AOD   Concurrent anticoagulants/antiplatelets: aspirin  Significant Drug Interactions: No obvious interactions  Recent Labs     09/27/22  0213 09/28/22  0452 09/29/22  0629   INR 2.0 1.4 1.4   HGB 11.3* 12.6* 13.2    182 188   LABALBU 3.5 4.1 4.1     Recent warfarin administrations                     warfarin (COUMADIN) tablet 4 mg (mg) 4 mg Given 09/28/22 1751    warfarin (COUMADIN) tablet 2 mg (mg) 2 mg Given 09/28/22 0036    warfarin (COUMADIN) tablet 4 mg (mg) 4 mg Given 09/27/22 1743    warfarin (COUMADIN) tablet 2 mg (mg) 2 mg Given 09/26/22 1812                   Date   INR    Dose  9/24/22   2.4       took at home  9/25        2.4       2 mg   9/26        2.4       2 mg  9/27        2.0       2 mg (+ 4 mg?)  9/28        1.4       4 mg  9/29         1.4     Assessment/Plan  (Goal INR: 2 - 3)  Coumadin 6 mg today. Inr in am.     Active problem list reviewed. INR orders are placed. Chart reviewed for pertinent labs, drug/diet interactions, and past doses. Documentation of patient's clinical condition was reviewed. Pharmacy Dosing:  Pharmacy will continue to follow.

## 2022-09-29 NOTE — DISCHARGE SUMMARY
[facility]  Office: [address], DO, Nelda Ofelia, DO, Johnaaliyah Sampson, DO, Meenakshi Buchanan Blood, DO, Cali Johnson MD, Chetan Hollis MD, Chavez Stauffer MD, Kathe Alvares MD,  Sylvester Cordero MD, Esha Guardado MD, Javy Chapa, DO, Lydia Mon MD,  Benjie Del Rio, DO, Anastasia Owens MD, Grayson Pisano MD, Willem Khan DO, Immanuel Rainey MD, Ashley Watson MD, Porfirio Hammond MD, Michaela Mireles MD, Laurie Montgomery MD, Graciela Mccray MD, Mackenzie Delgado DO, Luh Strange MD, Hillary Panchal MD, Shay Villarreal, CNP,  Soledad Carcamo, CNP, Ava Deluna, CNP, Mihir Turpin, CNP,  Destini Del Rio, [facility], Maida Murillo, CNP, Abelino Figueroa, CNP, Jaki Henriquez, CNP, Susana Fraser, CNP, Tiera Antonio, CNP, Vinicio Huston PA-C, Jayne Chicas, CNS, Junito Conley, [facility], Da England, CNP, Ernesto Trevino, CNP, [facility]    Discharge Summary     Patient ID: Yareli Maldonado  :  1960   MRN: [id number]     ACCOUNT:  [de-identified]   Patient's PCP: LOGAN Dwyer NP  Admit Date: 2022   Discharge Date: 2022     Length of Stay: 5  Code Status:  Full Code  Admitting Physician: No admitting provider for patient encounter. Discharge Physician: Stephanie Walters MD     Active Discharge Diagnoses:     Hospital Problem Lists:  Principal Problem:    Hyponatremia  Active Problems:    BPH (benign prostatic hyperplasia)    Coronary atherosclerosis    Bipolar 1 disorder, depressed (HCC)    Hyperkalemia    ARIELLE on CPAP    Other specified hypothyroidism    Presence of prosthetic heart valve    Restless leg syndrome    Primary hypertension    GERD (gastroesophageal reflux disease)    Chronic renal impairment, stage 3 (moderate) (HCC)    Atrial fibrillation ([address]  Resolved Problems:    * No resolved hospital problems.  *      Admission Condition:  poor     Discharged Condition: stable    Hospital Stay: Admitting history:  The patient presented the ER with high potassium. Patient states that he had blood work drawn today for his Dr. Judah Perry and received a call tonight that his potassium was high. Patient denies any chest pain shortness of breath abdominal pain nausea or vomiting. He states that he gets his blood work done every 6 months to [facility] for cancer\". He states he typically drinks 3-4 bottles of water and a couple Gatorade's a day. He denies excessive thirst or urination. Patient states that he did recently get out of the psychiatric unit and they did start him on Elavil but no other medication changes. He does take warfarin and is compliant with his dosing. He denies any palpitations. No recent alcohol use. History includes monoclonal gammopathy, ARIELLE with CPAP, atrial fibs, prosthetic valve, hypothyroidism, hypertension, bipolar disorder, crack cocaine use, and chronic renal insufficiency       Hospital Course:    Sodium was getting corrected too fast after holding psychotropic medicines and getting salt tablets, current sodium 134  Urology earlier had recommended to place [facility] catheter due to neurogenic bladder dysfunction, he has left InterStim bladder stimulator at home  Wall's catheter was removed subsequently by urology and is getting Flomax-voiding  Denies constipation today  Bronchospasm has resolved    Plan:         Hyponatremia suspected SIADH due to psychotropic medicines-was getting corrected too fast, started on hypotonic fluids for 6 hours, salt pills have been stopped, will recheck sodium after 2 days, results should be sent to nephrology     Bipolar Disorder -Trileptal restarted, SSRI on hold  Urinary Retention - urology consulted. Flomax started, has stimulator-left at home.    Paroxysmal Atrial Fibrillation - continue warfarin   Bronchospasm-continue DuoNeb aerosols while in hospital  Constipation-Dulcolax suppository as needed and continue Senokot and Colace  Dispo: [facility] today    Significant therapeutic interventions: See above notes    Significant Diagnostic Studies:   Labs / Micro:  CBC:   Lab Results   Component Value Date/Time    WBC 8.1 09/29/2022 06:29 AM    RBC 4.41 09/29/2022 06:29 AM    RBC 4.89 01/14/2012 06:53 AM    HGB 13.2 09/29/2022 06:29 AM    HCT 40.2 09/29/2022 06:29 AM    MCV 91.2 09/29/2022 06:29 AM    MCH 29.9 09/29/2022 06:29 AM    MCHC 32.8 09/29/2022 06:29 AM    RDW 14.4 09/29/2022 06:29 AM     09/29/2022 06:29 AM     01/14/2012 06:53 AM     BMP:    Lab Results   Component Value Date/Time    GLUCOSE 92 09/29/2022 06:29 AM    GLUCOSE 88 01/14/2012 06:53 AM     09/29/2022 01:54 PM    K 4.2 09/29/2022 01:54 PM    CL 94 09/29/2022 01:54 PM    CO2 26 09/29/2022 01:54 PM    ANIONGAP 11 09/29/2022 01:54 PM    BUN 18 09/29/2022 06:29 AM    CREATININE 1.07 09/29/2022 06:29 AM    BUNCRER 17 09/29/2022 06:29 AM    CALCIUM 9.5 09/29/2022 06:29 AM    LABGLOM >60 09/29/2022 06:29 AM    GFRAA >60 09/29/2022 06:29 AM    GFR      09/29/2022 06:29 AM     HFP:    Lab Results   Component Value Date/Time    PROT 6.5 09/24/2022 08:25 PM     CMP:    Lab Results   Component Value Date/Time    GLUCOSE 92 09/29/2022 06:29 AM    GLUCOSE 88 01/14/2012 06:53 AM     09/29/2022 01:54 PM    K 4.2 09/29/2022 01:54 PM    CL 94 09/29/2022 01:54 PM    CO2 26 09/29/2022 01:54 PM    BUN 18 09/29/2022 06:29 AM    CREATININE 1.07 09/29/2022 06:29 AM    ANIONGAP 11 09/29/2022 01:54 PM    ALKPHOS 191 09/24/2022 08:25 PM     09/24/2022 08:25 PM    AST 92 09/24/2022 08:25 PM    BILITOT 0.2 09/24/2022 08:25 PM    LABALBU 4.1 09/29/2022 06:29 AM    LABALBU 4.2 01/14/2012 06:53 AM    ALBUMIN 1.6 09/24/2022 12:53 PM    LABGLOM >60 09/29/2022 06:29 AM    GFRAA >60 09/29/2022 06:29 AM    GFR      09/29/2022 06:29 AM    PROT 6.5 09/24/2022 08:25 PM    CALCIUM 9.5 09/29/2022 06:29 AM     PT/INR:    Lab Results   Component Value Date/Time    PROTIME 17.0 09/29/2022 06:29 AM INR 1.4 09/29/2022 06:29 AM     PTT:   Lab Results   Component Value Date/Time    APTT 57.2 05/10/2016 09:53 AM     FLP:    Lab Results   Component Value Date/Time    CHOL 126 02/17/2020 06:47 AM    TRIG 116 02/17/2020 06:47 AM    HDL 28 02/01/2021 12:10 PM     U/A:    Lab Results   Component Value Date/Time    COLORU Yellow 09/27/2022 10:55 PM    TURBIDITY Clear 09/27/2022 10:55 PM    SPECGRAV 1.010 09/27/2022 10:55 PM    HGBUR 1+ 09/27/2022 10:55 PM    PHUR 7.0 09/27/2022 10:55 PM    PROTEINU NEGATIVE 09/27/2022 10:55 PM    GLUCOSEU NEGATIVE 09/27/2022 10:55 PM    KETUA NEGATIVE 09/27/2022 10:55 PM    BILIRUBINUR NEGATIVE 09/27/2022 10:55 PM    UROBILINOGEN Normal 09/27/2022 10:55 PM    NITRU NEGATIVE 09/27/2022 10:55 PM    LEUKOCYTESUR NEGATIVE 09/27/2022 10:55 PM     TSH:    Lab Results   Component Value Date/Time    TSH 1.19 09/28/2022 11:54 AM        Radiology:  CT ABDOMEN PELVIS WO CONTRAST Additional Contrast? None    Result Date: 9/26/2022  A Wall catheter is present in an underdistended bladder. No hydronephrosis or urinary tract calculus. No acute process in the abdomen or pelvis. XR KNEE LEFT (3 VIEWS)    Result Date: 9/25/2022  No acute osseous abnormality of the left knee. XR KNEE RIGHT (3 VIEWS)    Result Date: 9/25/2022  No acute osseous abnormality of the right knee. Consultations:    Consults:     Final Specialist Recommendations/Findings:   IP CONSULT TO HOSPITALIST  PHARMACY TO DOSE WARFARIN  IP CONSULT TO RESPIRATORY CARE  IP CONSULT TO UROLOGY  IP CONSULT TO NEPHROLOGY      The patient was seen and examined on day of discharge and this discharge summary is in conjunction with any daily progress note from day of discharge.     Discharge plan:     Disposition: [facility]    Physician Follow Up:   PCP at SNF       Requiring Further Evaluation/Follow Up POST HOSPITALIZATION/Incidental Findings: Repeat BMP after 2 days to check sodium levels, follow-up with nephrology with the results    Diet: cardiac diet, 1500 mL fluid restriction    Activity: As tolerated    Instructions to Patient: Needs to follow-up with psychiatrist to readjust bipolar medications in view of getting hyponatremia    Discharge Medications:      Medication List        CONTINUE taking these medications      acetaminophen 500 MG tablet  Commonly known as: TYLENOL     alendronate 70 MG tablet  Commonly known as: FOSAMAX     aspirin 81 MG chewable tablet  Take 1 tablet by mouth daily     atorvastatin 20 MG tablet  Commonly known as: LIPITOR  Take 1 tablet by mouth nightly     calcium citrate 250 MG Tabs tablet  Commonly known as: CALCITRATE     cetirizine 10 MG tablet  Commonly known as: ZYRTEC     diclofenac sodium 1 % Gel  Commonly known as: VOLTAREN  Apply 2 g topically 4 times daily as needed for Pain     docusate sodium 100 MG capsule  Commonly known as: COLACE     FeroSul 325 (65 Fe) MG tablet  Generic drug: ferrous sulfate     folic acid 1 MG tablet  Commonly known as: FOLVITE     furosemide 20 MG tablet  Commonly known as: LASIX     gabapentin 300 MG capsule  Commonly known as: NEURONTIN     hydrOXYzine HCl 50 MG tablet  Commonly known as: ATARAX  Take 1 tablet by mouth 3 times daily as needed for Anxiety     midodrine 10 MG tablet  Commonly known as: PROAMATINE     OXcarbazepine 300 MG tablet  Commonly known as: TRILEPTAL     pantoprazole 40 MG tablet  Commonly known as: PROTONIX     polyethylene glycol 17 g packet  Commonly known as: GLYCOLAX  Take 17 g by mouth daily as needed for Constipation     risperiDONE 0.5 MG tablet  Commonly known as: RISPERDAL  Take 1 tablet by mouth 2 times daily     sennosides-docusate sodium 8.6-50 MG tablet  Commonly known as: SENOKOT-S     tamsulosin 0.4 MG capsule  Commonly known as: FLOMAX     Ventolin  (90 Base) MCG/ACT inhaler  Generic drug: albuterol sulfate HFA     Vitamin B 12 500 MCG Tabs     Vitamin D3 50 MCG (2000 UT) Caps     * warfarin 2 MG tablet  Commonly known as: COUMADIN     * warfarin 2 MG tablet  Commonly known as: COUMADIN           * This list has 2 medication(s) that are the same as other medications prescribed for you. Read the directions carefully, and ask your doctor or other care provider to review them with you. STOP taking these medications      amitriptyline 25 MG tablet  Commonly known as: ELAVIL     Lexapro 10 MG tablet  Generic drug: escitalopram     Melatonin 10 MG Tabs     Melatonin ER 5 MG Tbcr              Discharge Procedure Orders   Basic Metabolic Panel   Standing Status: Future Standing Exp. Date: 09/29/23   Order Comments: Send results to PCP and nephrology       Time Spent on discharge is  35 mins in patient examination, evaluation, counseling as well as medication reconciliation, prescriptions for required medications, discharge plan and follow up. Electronically signed by   Melvin Eisenmenger, MD  9/29/2022  4:54 PM      Thank you Dr. Zuhair Mariee, APRN - NP for the opportunity to be involved in this patient's care.

## 2022-09-29 NOTE — PROGRESS NOTES
Occupational Therapy  Facility/Department: [facility]  Rehabilitation Occupational Therapy Daily Treatment Note    Date: 22  Patient Name: Chip [place]       Room:   MRN: [id number]  Account: [de-identified]   : 1960  (63 y.o.) Gender: male       Past Medical History:  has a past medical history of AMS (altered mental status), Atrial fibrillation ([address] Bipolar 1 disorder ([facility] [address] Depression, Hypertension, Neck pain, Neurofibromatosis ([facility] [address] Patient in clinical research study, Severe recurrent major depression without psychotic features ([facility] [address] and Twitching. Past Surgical History:   has a past surgical history that includes hernia repair; Neck surgery (); Abdomen surgery; Tonsillectomy; Appendectomy; Dilatation, esophagus; Cardiac surgery (7/14/15); Cardiac surgery (2015); Colonoscopy (2021); and Upper gastrointestinal endoscopy (2021). Restrictions  Restrictions/Precautions: General Precautions; Fall Risk  Other position/activity restrictions: Up w/ assist, ALARMS, LUE IV  Required Braces or Orthoses?: No    Subjective  Subjective: \"I can do exercises today\". Restrictions/Precautions: General Precautions; Fall Risk   Objective     Cognition  Overall Cognitive Status: Exceptions  Arousal/Alertness: Appropriate responses to stimuli  Following Commands:  Follows one step commands with increased time  Attention Span: Appears intact  Memory: Decreased long term memory;Decreased short term memory;Decreased recall of recent events  Safety Judgement: Decreased awareness of need for assistance;Decreased awareness of need for safety  Problem Solving: Decreased awareness of errors;Assistance required to identify errors made;Assistance required to generate solutions;Assistance required to implement solutions;Assistance required to correct errors made  Insights: Decreased awareness of deficits  Initiation: Requires cues for some  Sequencing: Requires cues for some  Cognition Comment: Pt recalls multiple past falls and continues to display decreased regards towards safety. Orientation  Overall Orientation Status: Within Functional Limits         ADL  Feeding  Assistance Level: Independent  Grooming/Oral Hygiene  Assistance Level: Supervision  Skilled Clinical Factors: While sitting up at bedside to wash face and comb hair. Putting On/Taking Off Footwear  Assistance Level: Supervision  Skilled Clinical Factors: Able to frank  socks with sitting EOB with supervision  Toileting  Skilled Clinical Factors: No need at this time. Functional Mobility  Device: Rolling walker  Activity:  (in room transfers)  Assistance Level: Stand by assist  Bed Mobility  Overall Assistance Level: Independent  Additional Factors: Set-up  Bridging  Assistance Level: Independent  Roll Left  Assistance Level: Independent  Roll Right  Assistance Level: Independent  Sit to Supine  Assistance Level: Supervision  Supine to Sit  Assistance Level: Supervision  Scooting  Assistance Level: Independent  Skilled Clinical Factors: with use of side rails. OT Exercises  Exercise Treatment: Pt. initiated HEP with graded theraband and completed program with success. Pt. displayed increased knowledge of program and displayed good form and control with theraband. Assessment  Assessment  Assessment: Pt. complete HEP with success and displayed increased knowledge for program. Pt. displayed min fatigue and rested between sets. Skilled OT warranted to promote I/safety to return pt to prior living arrangement with assist as needed. Activity Tolerance: Patient limited by endurance; Patient tolerated treatment well  Discharge Recommendations: Patient would benefit from continued therapy after discharge  OT Equipment Recommendations  Walker: Rolling  ADL Assistive Devices: Long-handled Shoe Horn;Long-handled Sponge;Reacher;Sock-Aid Hard       Patient Education  Education  Education Given To: Patient  Education Provided: Role of Therapy;Home Exercise Program;Plan of Care; Mobility Training  Education Provided Comments: Pt. educated on importance of mobility and building functional endurance for selfcare. Education Method: Demonstration;Verbal  Barriers to Learning: None  Education Outcome: Verbalized understanding;Continued education needed    Plan  Plan  Times per Week: 4-5x/wk 1x/day as yecenia  Times per Day: Daily  Current Treatment Recommendations: Strengthening;Balance training;Functional mobility training; Endurance training; Safety education & training;Equipment evaluation, education, & procurement;Self-Care / ADL; Home management training;Patient/Caregiver education & training  Plan Comment: Cont with stated POC    Goals  Patient Goals   Patient goals : To go home! Short Term Goals  Time Frame for Short term goals: By discharge, pt to demo  Short Term Goal 1: ADL trasnfers and functional mobility to CGA with use of AD as needed. Short Term Goal 2: increased B UE strength by 1/2 grade to assist with functional tasks/I with B UE HEP with use of handouts as needed. Short Term Goal 3: increased standing yecenia > 8 min with Min A using AD as needed to reduce risk of falls during functional tasks. Short Term Goal 4: UB ADLs to Set up and LB ADLs to Min A with use of AD/AE  as needed. Short Term Goal 5: toileting to SBA with use of AD/grab bars as needed. Additional Goals?: No  Long Term Goals  Long Term Goal 1: Pt to be I with fall prevention education, EC/WS tech, recommendations for discharge/AE with use of handouts as needed.     AM-PAC Score        AM-PAC Inpatient Daily Activity Raw Score: 19 (09/29/22 1304)  AM-PAC Inpatient ADL T-Scale Score : 40.22 (09/29/22 1304)  ADL Inpatient CMS 0-100% Score: 42.8 (09/29/22 1304)  ADL Inpatient CMS G-Code Modifier : CK (09/29/22 1304)      Therapy Time   Individual Concurrent Group Co-treatment   Time In 1030         Time Out 1053         Minutes 23             RN reports patient is medically stable for therapy treatment this date. Chart reviewed prior to treatment and patient is agreeable for therapy. All lines intact and patient positioned comfortably at end of treatment. All patient needs addressed prior to ending therapy session.        Jefferson Araujo MERCEDES

## 2022-09-29 NOTE — PROGRESS NOTES
NEPHROLOGY PROGRESS NOTE      SUBJECTIVE     Recommended for hospitalization when noted to have hyperkalemia 6.1 and sodium of 123 as routine test done by hematology oncology as a part of follow-up for elevated serum free light chain ratio. 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 Currently on hypotonic fluids to prevent rapid correction  Repeat electrolytes pending  Asymptomatic  No overnight issues reported with the nursing staff    OBJECTIVE     Vitals:    09/28/22 1141 09/28/22 1517 09/28/22 1859 09/29/22 0655   BP: 120/71 115/72 135/82 106/74   Pulse: 75 73 72 71   Resp: 16 15 16 16   Temp: 97.9 °F (36.6 °C) 97.3 °F (36.3 °C) 98.6 °F (37 °C) 98.2 °F (36.8 °C)   TempSrc: Oral Oral Oral Oral   SpO2: 94% 91% 97% 90%   Weight:       Height:         24HR INTAKE/OUTPUT:    Intake/Output Summary (Last 24 hours) at 9/29/2022 1052  Last data filed at 9/29/2022 9568  Gross per 24 hour   Intake 240 ml   Output 2973 ml   Net -2733 ml       General appearance:Awake, alert, in no acute distress  HEENT: PERRLA  Respiratory::vesicular breath sounds,no wheeze/crackles  Cardiovascular:S1 S2 normal,no gallop or organic murmur. Abdomen:Non tender/non distended. Bowel sounds present  Extremities: No Cyanosis or Clubbing,Lower extremity edema  Neurological:Alert and oriented. No abnormalities of mood, affect, memory, mentation, or behavior are noted      MEDICATIONS     Scheduled Meds:    warfarin  6 mg Oral Once    dextrose 5%  500 mL IntraVENous Once    docusate sodium  100 mg Oral BID    [Held by provider] escitalopram  10 mg Oral Daily    OXcarbazepine  300 mg Oral BID    tamsulosin  0.8 mg Oral QPM    aspirin  81 mg Oral Daily    atorvastatin  20 mg Oral Nightly    calcium citrate  250 mg Oral BID    cetirizine  10 mg Oral Daily    Vitamin D  2,000 Units Oral Daily with breakfast    vitamin B-12  1,000 mcg Oral Daily    ferrous sulfate  325 mg Oral Daily with breakfast    folic acid  1 mg Oral Daily    [Held by provider] furosemide  20 mg Oral Daily    gabapentin  900 mg Oral TID    midodrine  10 mg Oral TID    pantoprazole  40 mg Oral QAM AC    risperiDONE  0.5 mg Oral BID    sennosides-docusate sodium  1 tablet Oral Daily    sodium chloride flush  5-40 mL IntraVENous 2 times per day    warfarin placeholder: dosing by pharmacy   Other RX Placeholder     Continuous Infusions:    dextrose      dextrose      dextrose      sodium chloride       PRN Meds:  bisacodyl, zolpidem, albuterol sulfate HFA, glucose, dextrose bolus **OR** dextrose bolus, glucagon (rDNA), dextrose, traMADol, diclofenac sodium, hydrOXYzine HCl, sodium chloride flush, sodium chloride, potassium chloride **OR** potassium alternative oral replacement **OR** potassium chloride, magnesium sulfate, ondansetron **OR** ondansetron, polyethylene glycol, acetaminophen **OR** acetaminophen  Home Meds:                Medications Prior to Admission: Melatonin ER 5 MG TBCR, Take 10 mg by mouth at bedtime  escitalopram (LEXAPRO) 10 MG tablet, Take 10 mg by mouth daily  albuterol sulfate HFA (VENTOLIN HFA) 108 (90 Base) MCG/ACT inhaler, Inhale 2 puffs into the lungs every 6 hours as needed for Wheezing  amitriptyline (ELAVIL) 25 MG tablet, Take 1 tablet by mouth nightly Patient reports home supply  diclofenac sodium (VOLTAREN) 1 % GEL, Apply 2 g topically 4 times daily as needed for Pain  hydrOXYzine HCl (ATARAX) 50 MG tablet, Take 1 tablet by mouth 3 times daily as needed for Anxiety  polyethylene glycol (GLYCOLAX) 17 g packet, Take 17 g by mouth daily as needed for Constipation  risperiDONE (RISPERDAL) 0.5 MG tablet, Take 1 tablet by mouth 2 times daily  warfarin (COUMADIN) 2 MG tablet, Take 4 mg by mouth four times a week Indications: Tues, Thurs, Fri, Sat Managed by [facility] Medication Management  sennosides-docusate sodium (SENOKOT-S) 8.6-50 MG tablet, Take 1 tablet by mouth daily  docusate sodium (COLACE) 100 mg capsule, Take 200 mg by mouth daily  calcium citrate (CALCITRATE) 250 MG TABS tablet, Take 250 mg by mouth 2 times daily  warfarin (COUMADIN) 2 MG tablet, Take 2 mg by mouth three times a week Indications: Sun, Mon, Wed Managed by [facility] Medication Management  pantoprazole (PROTONIX) 40 MG tablet, Take 40 mg by mouth daily  acetaminophen (TYLENOL) 500 MG tablet, Take 500 mg by mouth every 6 hours as needed  Cholecalciferol (VITAMIN D3) 50 MCG (2000 UT) CAPS, Take 1 capsule by mouth daily (with breakfast)  OXcarbazepine (TRILEPTAL) 300 MG tablet, Take 300 mg by mouth 2 times daily  FEROSUL 325 (65 Fe) MG tablet, Take 325 mg by mouth daily (with breakfast)   gabapentin (NEURONTIN) 300 MG capsule, Take 900 mg by mouth 3 times daily. Pt. States he takes 900 mg. TID  aspirin 81 MG chewable tablet, Take 1 tablet by mouth daily  atorvastatin (LIPITOR) 20 MG tablet, Take 1 tablet by mouth nightly  midodrine (PROAMATINE) 10 MG tablet, Take 10 mg by mouth 3 times daily  folic acid (FOLVITE) 1 MG tablet, Take 1 mg by mouth daily  furosemide (LASIX) 20 MG tablet, Take 20 mg by mouth daily  tamsulosin (FLOMAX) 0.4 MG capsule, Take 0.8 mg by mouth daily  Cyanocobalamin (VITAMIN B 12) 500 MCG TABS, Take 1,000 mcg by mouth daily   alendronate (FOSAMAX) 70 MG tablet, Take 70 mg by mouth every 7 days Patient takes on mondays. cetirizine (ZYRTEC) 10 MG tablet, Take 10 mg by mouth daily    INVESTIGATIONS     Last 3 CMP:    Recent Labs     09/27/22  0213 09/27/22  0939 09/28/22  0452 09/28/22  0935 09/28/22  1935 09/29/22  0629   *   < > 124* 124* 128* 134*   K 4.4   < > 4.3 4.1 4.2 4.6   CL 92*   < > 88* 90* 92* 97*   CO2 24   < > 23 24 27 27   BUN 15   < > 15 15  --  18   CREATININE 0.94   < > 0.89 0.88  --  1.07   CALCIUM 8.6   < > 9.0 8.9  --  9.5   LABALBU 3.5  --  4.1  --   --  4.1    < > = values in this interval not displayed.        Last 3 CBC:  Recent Labs     09/27/22  0213 09/28/22  0452 09/29/22  0629   WBC 7.1 12.9* 8. 1   RBC 3.76* 4.18* 4.41   HGB 11.3* 12.6* 13.2   HCT 33.6* 36.8* 40.2*   MCV 89.4 88.0 91.2   MCH 30.1 30.1 29.9   MCHC 33.6 34.2 32.8   RDW 14.2 14.1 14.4    182 188   MPV 9.0 9.0 9.0       ASSESSMENT     #1 hyponatremia secondary to SIADH (meds-psychotropic)  #2 history of bipolar disorder  #3 history of aortic valve replacement and ascending aneurysm of aorta repair  #4 history of atrial fibrillation  #5 ARIELLE  #6 osteoporosis  Serum free light chain ratio elevated and follows up with hematology    PLAN     #1 receiving hypotonic fluids  Awaiting electrolytes at 11:00 and 2:00  To discontinue salt tablets      Please do not hesitate to call with questions    This note is created with the assistance of a speech-recognition program. While intending to generate a document that actually reflects the content of the visit, no guarantees can be provided that every mistake has been identified and corrected by editing    Cherise De Luna MD MD, [facility]   9/29/2022 10:52 AM  [facility]

## 2022-10-01 ENCOUNTER — HOSPITAL ENCOUNTER (EMERGENCY)
Age: 62
Discharge: HOME | End: 2022-10-01
Attending: EMERGENCY MEDICINE
Payer: COMMERCIAL

## 2022-10-01 VITALS
RESPIRATION RATE: 18 BRPM | HEART RATE: 86 BPM | TEMPERATURE: 98.3 F | DIASTOLIC BLOOD PRESSURE: 89 MMHG | SYSTOLIC BLOOD PRESSURE: 119 MMHG | OXYGEN SATURATION: 96 %

## 2022-10-01 DIAGNOSIS — Z13.30 ENCOUNTER FOR SCREENING EXAMINATION FOR MENTAL HEALTH AND BEHAVIORAL DISORDERS: Primary | ICD-10-CM

## 2022-10-01 LAB
INR BLD: 2
PROTHROMBIN TIME: 22.8 SEC (ref 11.8–14.6)

## 2022-10-01 PROCEDURE — 99283 EMERGENCY DEPT VISIT LOW MDM: CPT

## 2022-10-01 PROCEDURE — 85610 PROTHROMBIN TIME: CPT

## 2022-10-01 PROCEDURE — 36415 COLL VENOUS BLD VENIPUNCTURE: CPT

## 2022-10-01 ASSESSMENT — ENCOUNTER SYMPTOMS
SHORTNESS OF BREATH: 0
ABDOMINAL PAIN: 0
COUGH: 0
VOMITING: 0
NAUSEA: 0
BACK PAIN: 0

## 2022-10-01 ASSESSMENT — PAIN - FUNCTIONAL ASSESSMENT: PAIN_FUNCTIONAL_ASSESSMENT: NONE - DENIES PAIN

## 2022-10-01 NOTE — ED TRIAGE NOTES
Mode of arrival (squad #, walk in, police, etc) : walk-in        Chief complaint(s): psych eval        Arrival Note (brief scenario, treatment PTA, etc). : Pt reports he is currently at a different psych therapy facility that he walked out of. Pt reports he does not want to be there anymore and wants to prove that he does not need to be at an inpatient facility. Pt denies SI/HI. C= \"Have you ever felt that you should Cut down on your drinking? \"  No  A= \"Have people Annoyed you by criticizing your drinking? \"  No  G= \"Have you ever felt bad or Guilty about your drinking? \"  No  E= \"Have you ever had a drink as an Eye-opener first thing in the morning to steady your nerves or to help a hangover? \"  No      Deferred []      Reason for deferring: N/A    *If yes to two or more: probable alcohol abuse. *

## 2022-10-01 NOTE — ED PROVIDER NOTES
[address]  EMERGENCY DEPARTMENT ENCOUNTER      Pt Name: Pepe Li  MRN: [id number]  Haydeegfcaleb 1960  Date of evaluation: 10/1/22      CHIEF COMPLAINT       Chief Complaint   Patient presents with    Psychiatric Evaluation     HISTORY OF PRESENT ILLNESS   HPI 57 y.o. male presents with c/o evaluation. The patient reports that he came to the emergency department today to \" cover my ass so the police are not out looking for me\". He was at a rehab facility and left [address] earlier today. He called his sister and told her that he was going home. I called and spoke with his sister she said that she inform the patient that he had to get a hold of the facility and let them know that he left otherwise the police would be out looking for him, and so he came here \" to prove I am okay so that I can go home\". The patient was recently admitted to the hospital between the 24th and 29 September where he was treated for hyponatremia. He was sent to an extended care facility for rehab. He is said that he did not think he needed to be there and he just wanted to go home. The patient denies any complaints. Review of Systems   Constitutional:  Negative for chills and fever. HENT:  Negative for congestion. Eyes:  Negative for visual disturbance. Respiratory:  Negative for cough and shortness of breath. Cardiovascular:  Negative for chest pain. Gastrointestinal:  Negative for abdominal pain, nausea and vomiting. Genitourinary:  Negative for difficulty urinating. Musculoskeletal:  Negative for back pain and gait problem. Skin:  Negative for rash. Neurological:  Negative for headaches. Psychiatric/Behavioral:  Negative for decreased concentration, dysphoric mood and suicidal ideas.       PAST MEDICAL HISTORY     Past Medical History:   Diagnosis Date    AMS (altered mental status) 02/15/2020    Atrial fibrillation ([facility] [address]     on coumadin    Bipolar 1 disorder (HCC)     Depression Hypertension     Neck pain     Neurofibromatosis ([facility] [address]     Lt leg pain    Patient in clinical research study 04/12/2018    [id number]    Severe recurrent major depression without psychotic features ([facility] [address] 6/13/2022    Twitching        SURGICAL HISTORY       Past Surgical History:   Procedure Laterality Date    ABDOMEN SURGERY      APPENDECTOMY      CARDIAC SURGERY  7/14/15    Median Sternotomy, Repair of ascending aorti aneurysm, stentless valve placement    CARDIAC SURGERY  07/14/2015    Median stenotomy, repair of ascending aorti aneurysm, stentless valve placement     COLONOSCOPY  01/28/2021    DILATATION, ESOPHAGUS      HERNIA REPAIR      NECK SURGERY  2013    TONSILLECTOMY      UPPER GASTROINTESTINAL ENDOSCOPY  01/28/2021       CURRENT MEDICATIONS       Discharge Medication List as of 10/1/2022  5:33 PM        CONTINUE these medications which have NOT CHANGED    Details   albuterol sulfate HFA (VENTOLIN HFA) 108 (90 Base) MCG/ACT inhaler Inhale 2 puffs into the lungs every 6 hours as needed for WheezingHistorical Med      diclofenac sodium (VOLTAREN) 1 % GEL Apply 2 g topically 4 times daily as needed for Pain, Topical, 4 TIMES DAILY PRN Starting Thu 9/22/2022, Disp-50 g, R-0, Normal      hydrOXYzine HCl (ATARAX) 50 MG tablet Take 1 tablet by mouth 3 times daily as needed for Anxiety, Disp-30 tablet, R-0Normal      polyethylene glycol (GLYCOLAX) 17 g packet Take 17 g by mouth daily as needed for Constipation, Disp-527 g, R-1Normal      risperiDONE (RISPERDAL) 0.5 MG tablet Take 1 tablet by mouth 2 times daily, Disp-60 tablet, R-0Normal      !! warfarin (COUMADIN) 2 MG tablet Take 4 mg by mouth four times a week Indications: Tues, Thurs, Fri, Sat [facility] Med[facility] ManagementHistor[facility] Med      sennosides-docusate sodium (SENOKOT-S) 8.6-50 MG tablet Take 1 tablet by mouth dailyHistorical Med      docusate sodium (COLACE) 100 mg capsule Take 200 mg by mouth dailyHistorical Med      calcium citrate (CALCITRATE) 250 MG TABS tablet Take 250 mg by mouth 2 times dailyHistorical Med      !! warfarin (COUMADIN) 2 MG tablet Take 2 mg by mouth three times a week Indications: Sun, Mon, Wed [facility] by [facility] Medication ManagementHistorical Med      pantoprazole (PROTONIX) 40 MG tablet Take 40 mg by mouth dailyHistorical Med      acetaminophen (TYLENOL) 500 MG tablet Take 500 mg by mouth every 6 hours as neededHistorical Med      Cholecalciferol (VITAMIN D3) 50 MCG (2000 UT) CAPS Take 1 capsule by mouth daily (with breakfast)Historical Med      OXcarbazepine (TRILEPTAL) 300 MG tablet Take 300 mg by mouth 2 times dailyHistorical Med      FEROSUL 325 (65 Fe) MG tablet Take 325 mg by mouth daily (with breakfast) , DAWHistorical Med      gabapentin (NEURONTIN) 300 MG capsule Take 900 mg by mouth 3 times daily. Pt. States he takes 900 mg. TIDHistorical Med      aspirin 81 MG chewable tablet Take 1 tablet by mouth daily, Disp-30 tablet, R-3Normal      atorvastatin (LIPITOR) 20 MG tablet Take 1 tablet by mouth nightly, Disp-30 tablet, R-3Normal      midodrine (PROAMATINE) 10 MG tablet Take 10 mg by mouth 3 times dailyHistorical Med      folic acid (FOLVITE) 1 MG tablet Take 1 mg by mouth dailyHistorical Med      furosemide (LASIX) 20 MG tablet Take 20 mg by mouth dailyHistorical Med      tamsulosin (FLOMAX) 0.4 MG capsule Take 0.8 mg by mouth dailyHistorical Med      Cyanocobalamin (VITAMIN B 12) 500 MCG TABS Take 1,000 mcg by mouth daily Historical Med      alendronate (FOSAMAX) 70 MG tablet Take 70 mg by mouth every 7 days Patient takes on mondays. Historical Med      cetirizine (ZYRTEC) 10 MG tablet Take 10 mg by mouth dailyHistorical Med       !! - Potential duplicate medications found. Please discuss with provider. ALLERGIES     is allergic to oxycodone, fish-derived products, ibuprofen, latuda [lurasidone hcl], lithium, lurasidone, quetiapine, and seroquel [quetiapine fumarate].     FAMILY HISTORY     He indicated that his mother is . He indicated that his father is . SOCIAL HISTORY      reports that he has never smoked. He has never been exposed to tobacco smoke. He has never used smokeless tobacco. He reports that he does not currently use alcohol. He reports that he does not currently use drugs. PHYSICAL EXAM     INITIAL VITALS: /89   Pulse 86   Temp 98.3 °F (36.8 °C) (Oral)   Resp 18   SpO2 96%   Gen: nad  Head: Normocephalic, atraumatic  Eye: Pupils equal round reactive to light, no conjunctivitis  ENT: MMM  Neck: no adenopathy  Heart: Regular rate and rhythm no murmurs  Lungs: Clear to auscultation bilaterally, no respiratory distress  Chest wall: No crepitus, no tenderness palpation  Abdomen: Soft, nontender, nondistended, with no peritoneal signs  Neurologic: Patient is alert and oriented x3, motor and sensation is intact in all 4 extremities, fluent speech  Extremities: no edema    MEDICAL DECISION MAKING:     MDM    57 y.o. male presents for a medical/mental health screening exam.  The patient has no complaints at this time. He apparently walked here from his rehab facility. I spoke with the patient's emergency contact, his Sister Jaleel Mariee. The patient makes his own medical decisions. She will be able to take him to his doctor's appointment on Monday to make sure that his medications are all straight. He has no signs of an acute stroke or complaints that would warrant the need for a CT scan of his head. He did have a subtherapeutic INR and so we checked his INR but is actually therapeutic now so we advised him to continue on his normal dose of anticoagulation. D/w pt the results, treatment plan, warning precautions for prompt ED return and importance of close OP FU, he verbalizes understanding and agrees with the treatment plan.        DIAGNOSTIC RESULTS     EMERGENCY DEPARTMENT COURSE:   Vitals:    Vitals:    10/01/22 1543   BP: 119/89   Pulse: 86   Resp: 18   Temp: 98.3 °F

## 2022-10-19 DIAGNOSIS — D47.2 MGUS (MONOCLONAL GAMMOPATHY OF UNKNOWN SIGNIFICANCE): Primary | ICD-10-CM

## 2022-10-20 ENCOUNTER — TELEPHONE (OUTPATIENT)
Dept: ONCOLOGY | Age: 62
End: 2022-10-20

## 2022-10-20 ENCOUNTER — OFFICE VISIT (OUTPATIENT)
Dept: ONCOLOGY | Age: 62
End: 2022-10-20
Payer: COMMERCIAL

## 2022-10-20 ENCOUNTER — HOSPITAL ENCOUNTER (OUTPATIENT)
Age: 62
Setting detail: SPECIMEN
Discharge: HOME | End: 2022-10-20
Payer: COMMERCIAL

## 2022-10-20 VITALS
WEIGHT: 189.6 LBS | DIASTOLIC BLOOD PRESSURE: 71 MMHG | SYSTOLIC BLOOD PRESSURE: 106 MMHG | BODY MASS INDEX: 30.6 KG/M2 | RESPIRATION RATE: 16 BRPM | TEMPERATURE: 97.6 F | HEART RATE: 70 BPM

## 2022-10-20 DIAGNOSIS — D47.2 MGUS (MONOCLONAL GAMMOPATHY OF UNKNOWN SIGNIFICANCE): Primary | ICD-10-CM

## 2022-10-20 DIAGNOSIS — D47.2 MGUS (MONOCLONAL GAMMOPATHY OF UNKNOWN SIGNIFICANCE): ICD-10-CM

## 2022-10-20 LAB
ABSOLUTE EOS #: 0.19 K/UL (ref 0–0.44)
ABSOLUTE IMMATURE GRANULOCYTE: 0.1 K/UL (ref 0–0.3)
ABSOLUTE LYMPH #: 0.92 K/UL (ref 1.1–3.7)
ABSOLUTE MONO #: 0.65 K/UL (ref 0.1–1.2)
ANION GAP SERPL CALCULATED.3IONS-SCNC: 9 MMOL/L (ref 9–17)
BASOPHILS # BLD: 1 % (ref 0–2)
BASOPHILS ABSOLUTE: 0.08 K/UL (ref 0–0.2)
BUN BLDV-MCNC: 11 MG/DL (ref 8–23)
BUN/CREAT BLD: 11 (ref 9–20)
CALCIUM SERPL-MCNC: 9.6 MG/DL (ref 8.6–10.4)
CHLORIDE BLD-SCNC: 104 MMOL/L (ref 98–107)
CO2: 30 MMOL/L (ref 20–31)
CREAT SERPL-MCNC: 1 MG/DL (ref 0.7–1.2)
EOSINOPHILS RELATIVE PERCENT: 3 % (ref 1–4)
GFR SERPL CREATININE-BSD FRML MDRD: >60 ML/MIN/1.73M2
GLUCOSE BLD-MCNC: 102 MG/DL (ref 70–99)
HCT VFR BLD CALC: 43.9 % (ref 40.7–50.3)
HEMOGLOBIN: 13.9 G/DL (ref 13–17)
IMMATURE GRANULOCYTES: 1 %
LYMPHOCYTES # BLD: 13 % (ref 24–43)
MCH RBC QN AUTO: 30.1 PG (ref 25.2–33.5)
MCHC RBC AUTO-ENTMCNC: 31.7 G/DL (ref 28.4–34.8)
MCV RBC AUTO: 95 FL (ref 82.6–102.9)
MONOCYTES # BLD: 9 % (ref 3–12)
NRBC AUTOMATED: 0 PER 100 WBC
PDW BLD-RTO: 15 % (ref 11.8–14.4)
PLATELET # BLD: 183 K/UL (ref 138–453)
PMV BLD AUTO: 9.5 FL (ref 8.1–13.5)
POTASSIUM SERPL-SCNC: 4.6 MMOL/L (ref 3.7–5.3)
RBC # BLD: 4.62 M/UL (ref 4.21–5.77)
RBC # BLD: ABNORMAL 10*6/UL
SEG NEUTROPHILS: 73 % (ref 36–65)
SEGMENTED NEUTROPHILS ABSOLUTE COUNT: 5 K/UL (ref 1.5–8.1)
SODIUM BLD-SCNC: 143 MMOL/L (ref 135–144)
WBC # BLD: 6.9 K/UL (ref 3.5–11.3)

## 2022-10-20 PROCEDURE — 99214 OFFICE O/P EST MOD 30 MIN: CPT | Performed by: INTERNAL MEDICINE

## 2022-10-20 PROCEDURE — 99211 OFF/OP EST MAY X REQ PHY/QHP: CPT

## 2022-10-20 PROCEDURE — G8417 CALC BMI ABV UP PARAM F/U: HCPCS | Performed by: INTERNAL MEDICINE

## 2022-10-20 PROCEDURE — 1111F DSCHRG MED/CURRENT MED MERGE: CPT | Performed by: INTERNAL MEDICINE

## 2022-10-20 PROCEDURE — 36415 COLL VENOUS BLD VENIPUNCTURE: CPT

## 2022-10-20 PROCEDURE — G8484 FLU IMMUNIZE NO ADMIN: HCPCS | Performed by: INTERNAL MEDICINE

## 2022-10-20 PROCEDURE — 85025 COMPLETE CBC W/AUTO DIFF WBC: CPT

## 2022-10-20 PROCEDURE — 1036F TOBACCO NON-USER: CPT | Performed by: INTERNAL MEDICINE

## 2022-10-20 PROCEDURE — 80048 BASIC METABOLIC PNL TOTAL CA: CPT

## 2022-10-20 PROCEDURE — 3017F COLORECTAL CA SCREEN DOC REV: CPT | Performed by: INTERNAL MEDICINE

## 2022-10-20 PROCEDURE — G8427 DOCREV CUR MEDS BY ELIG CLIN: HCPCS | Performed by: INTERNAL MEDICINE

## 2022-10-20 RX ORDER — HYDROXYZINE 50 MG/1
TABLET, FILM COATED ORAL
COMMUNITY
Start: 2022-10-06

## 2022-10-20 NOTE — PROGRESS NOTES
_        Chief Complaint   Patient presents with    Follow-up    Discuss Labs     DIAGNOSIS:       Paraproteinemia/ monoclonal gammopathy of undetermined significance. (MGUS). Chronic renal insufficiency  Bipolar disorder  Hypertension      CURRENT THERAPY:         Seen to discuss labs results and further management plans. BRIEF CASE HISTORY:      Mr. Margi Bryan is a very pleasant 57 y.o. male with history of hypertension, bipolar disorder, and chronic renal insufficiency. The patient had labs done which revealed monoclonal gammopathy. He is referred for further management. At the present time he denies any fever or night sweats. No weight loss or decreased appetite. No chest pain or shortness of breath. No palpable lymph nodes. Patient has generalized body aches. No bone fractures. No other complaints. .     INTERIM HISTORY:   Seen for follow up abnormal immunoglobulins levels. Patient is doing well clinically. No significant complaints. No aches or pains. No fever or night sweats. No weight loss or decreased appetite. Patient was recently hospitalized because of hyperkalemia and hyponatremia. These were corrected. Nephrology following. PAST MEDICAL HISTORY: has a past medical history of AMS (altered mental status), Atrial fibrillation ([address] Bipolar 1 disorder ([address] Depression, Hypertension, Neck pain, Neurofibromatosis ([address] Patient in clinical research study, Severe recurrent major depression without psychotic features ([address] and Twitching. PAST SURGICAL HISTORY: has a past surgical history that includes hernia repair; Neck surgery (2013); Abdomen surgery; Tonsillectomy; Appendectomy; Dilatation, esophagus; Cardiac surgery (7/14/15); Cardiac surgery (07/14/2015); Colonoscopy (01/28/2021); and Upper gastrointestinal endoscopy (01/28/2021).      CURRENT MEDICATIONS:  has a current medication list which includes the following prescription(s): hydroxyzine hcl, albuterol sulfate hfa, diclofenac sodium, polyethylene glycol, risperidone, warfarin, sennosides-docusate sodium, docusate sodium, calcium citrate, warfarin, pantoprazole, acetaminophen, vitamin d3, oxcarbazepine, ferosul, gabapentin, aspirin, atorvastatin, midodrine, folic acid, furosemide, tamsulosin, vitamin b 12, alendronate, and cetirizine. ALLERGIES:  is allergic to oxycodone, fish-derived products, ibuprofen, latuda [lurasidone hcl], lithium, lurasidone, quetiapine, and seroquel [quetiapine fumarate]. FAMILY HISTORY: Negative for any hematological or oncological conditions. SOCIAL HISTORY:  reports that he has never smoked. He has never been exposed to tobacco smoke. He has never used smokeless tobacco. He reports that he does not currently use alcohol. He reports that he does not currently use drugs. REVIEW OF SYSTEMS:     General: No weakness or fatigue. No unanticipated weight loss or decreased appetite. No fever or chills. Eyes: No blurred vision, eye pain or double vision. Ears: No hearing problems or drainage. No tinnitus. Throat: No sore throat, problems with swallowing or dysphagia. Respiratory: No cough, sputum or hemoptysis. No shortness of breath. No pleuritic chest pain. Cardiovascular: No chest pain, orthopnea or PND. No lower extremity edema. No palpitation. Gastrointestinal: No problems with swallowing. No abdominal pain or bloating. No nausea or vomiting. No diarrhea or constipation. No GI bleeding. Genitourinary: No dysuria, hematuria, frequency or urgency. Musculoskeletal: No muscle aches or pains. No limitation of movement. No back pain. No gait disturbance, No joint complaints. Dermatologic: No skin rashes or pruritus. No skin lesions or discolorations. Psychiatric: No depression, anxiety, or stress or signs of schizophrenia. No change in mood or affect.     Hematologic: No history of bleeding tendency. No bruises or ecchymosis. No history of clotting problems. Infectious disease: No fever, chills or frequent infections. Endocrine: No problems with opacity. No polydipsia or polyuria. No temperature intolerance. Neurologic: No headaches or dizziness. No weakness or numbness of the extremities. No changes in balance, coordination,  memory, mentation, behavior. Allergic/Immunologic: No nasal congestion or hives. No repeated infections. PHYSICAL EXAM:  The patient is not in acute distress. Vital signs: Blood pressure 106/71, pulse 70, temperature 97.6 °F (36.4 °C), temperature source Temporal, resp. rate 16, weight 189 lb 9.6 oz (86 kg). HEENT:  Eyes are normal. Ears, nose and throat are normal.  Neck: Supple. No lymph node enlargement. No thyroid enlargement. Trachea is centrally located. Chest:  Clear to auscultation. No wheezes or crepitations. Heart: Regular sinus rhythm. Abdomen: Soft, nontender. No hepatosplenomegaly. No masses. Extremities:  With no edema. Lymph Nodes:  No cervical, axillary or inguinal lymph node enlargement. Neurologic:  Conscious and oriented. No focal neurological deficits. Psychosocial: No depression, anxiety or stress. Skin: No rashes, bruises or ecchymoses.       Review of Diagnostic data:   Lab Results   Component Value Date    WBC 6.9 10/20/2022    HGB 13.9 10/20/2022    HCT 43.9 10/20/2022    MCV 95.0 10/20/2022     10/20/2022       Chemistry        Component Value Date/Time     10/20/2022 0750    K 4.6 10/20/2022 0750     10/20/2022 0750    CO2 30 10/20/2022 0750    BUN 11 10/20/2022 0750    CREATININE 1.00 10/20/2022 0750        Component Value Date/Time    CALCIUM 9.6 10/20/2022 0750    ALKPHOS 191 (H) 09/24/2022 2025    AST 92 (H) 09/24/2022 2025     (H) 09/24/2022 2025    BILITOT 0.2 (L) 09/24/2022 2025 4/5/2018 12:45 PM - Jeremy, Mhpn Incoming Lab Results From [facility] Results     Component Value Ref Range & Units Status Collected Lab   [place] Free Light Chains QNT 8.07   0.37 - 1.94 mg/dL Final 04/05/2018  9:10  [address]   Lambda Free Light Chains QNT 2.15  0.57 - 2.63 mg/dL Final 04/05/2018  9:10  [address]   Free [place]/Lambda Ratio 3.75   0.26 - 1.65 Final 04/05/2018  9:10 AM [address] [facility], [address] [place] [address] [phone]     4/5/2018 12:06 PM - Jeremy, Atilio Incoming Lab Results From [facility]     Component Results     Component Value Ref Range & Units Status Collected Lab   Igg 849  700 - 1600 mg/dL Final 04/05/2018  9:10  [address]     70 - 400 mg/dL Final 04/05/2018  9:10  [address]   Igm 41  40 - 230 mg/dL Final 04/05/2018  9:10 AM 3 [address] [place] [phone]             IMPRESSION:   Paraproteinemia/ monoclonal gammopathy of undetermined significance. (MGUS). Chronic renal insufficiency  Bipolar disorder  Hypertension  ECOG performance score (0). PLAN: labs were reviewed and discussed with the patient. I explained the significance of these abnormalities related to immunoglobulins. There were some fluctuation in the level of the immunoglobulins. Likely dealing with MGUS. [place] light chain level is improving. Recommend monitoring. No need for BM test.  Repeated labs today showed normal potassium and sodium. Will continue follow-up with nephrology. We will repeat labs in 6 months. Patient's questions were answered to the best of his satisfaction and he verbalized full understanding and agreement.                                                 6 [facility]                            This note is created with the assistance of a speech recognition program.  While intending to generate a document that actually reflects the content of the visit, the document can still have some errors including those of syntax and sound a like substitutions which may escape proof reading. It such instances, actual meaning can be extrapolated by contextual diversion.

## 2022-10-20 NOTE — TELEPHONE ENCOUNTER
Le Diaz MD VISIT  RV 6 months with labs before RV  LABS CDP  CMP SIFX SPEP  IGG IGA IGM KAPPA/LAMBDA FREE LIGHT CHAINS 4/13/23 ORDERS MAILED TO PT  MD VISIT 4/20/23 @ 8AM  AVS PRINTED W/ INSTRUCTIONS AND GIVEN TO PT ON EXIT

## 2022-11-02 NOTE — GROUP NOTE
Group Therapy Note    Date: 9/23/2022    Group Start Time: 1005  Group End Time: 7914  Group Topic: Psychotherapy    NORAH YODER    PIETER Byrd LSW        Group Therapy Note    Attendees: 7/17       Patient's Goal:  Discussion on Healthy vs Unhealthy Coping Strategies        Status After Intervention:  Improved    Participation Level:  Active Listener and Interactive    Participation Quality: Appropriate, Attentive, and Sharing      Speech:  normal      Thought Process/Content: Logical      Affective Functioning: Congruent      Mood: euthymic      Level of consciousness:  Alert, Oriented x4, and Attentive      Response to Learning: Able to verbalize current knowledge/experience and Able to verbalize/acknowledge new learning      Endings: None Reported    Modes of Intervention: Education, Support, and Socialization      Discipline Responsible: /Counselor      Signature:  PIETER Byrd LSW No

## 2023-05-01 ENCOUNTER — TELEPHONE (OUTPATIENT)
Dept: ONCOLOGY | Age: 63
End: 2023-05-01

## 2023-05-01 NOTE — TELEPHONE ENCOUNTER
[facility] call for pt , pt is requesting a referral to [address] [facility] in [place] . Writer spoke with MD , MD would like pt to get referral from PCP.    Jahaira Corley [phone] or [phone]

## 2024-04-01 ENCOUNTER — TELEPHONE (OUTPATIENT)
Dept: ONCOLOGY | Age: 64
End: 2024-04-01

## 2024-04-01 NOTE — TELEPHONE ENCOUNTER
PATIENT CALLED TO CANCEL HIS APPOINTMENT FOR 4/2/24 VIA  AT 3:41 PM.  WRITER CALLED PATIENT BACK, AND INFORMED PATIENT THAT WE WILL SEND RECORDS IF HE CALLS US WITH AN HEMATOLOGIST/ONCOLOGIST AND FAX NUMBER (It's difficult for him to come to [place]). PATIENT SEEMS  CONFUSED, BUT WRITER AGAIN, REITERATED ABOVE.

## 2024-06-11 ENCOUNTER — HOSPITAL ENCOUNTER (EMERGENCY)
Age: 64
Discharge: HOME | End: 2024-06-11
Payer: COMMERCIAL

## 2024-06-11 VITALS — OXYGEN SATURATION: 96 % | HEART RATE: 78 BPM

## 2024-06-11 VITALS — OXYGEN SATURATION: 93 % | HEART RATE: 72 BPM

## 2024-06-11 VITALS — HEART RATE: 75 BPM | OXYGEN SATURATION: 94 %

## 2024-06-11 VITALS — OXYGEN SATURATION: 92 % | SYSTOLIC BLOOD PRESSURE: 137 MMHG | DIASTOLIC BLOOD PRESSURE: 94 MMHG | HEART RATE: 75 BPM

## 2024-06-11 VITALS — HEART RATE: 85 BPM | OXYGEN SATURATION: 94 %

## 2024-06-11 VITALS — OXYGEN SATURATION: 95 % | HEART RATE: 81 BPM

## 2024-06-11 VITALS — OXYGEN SATURATION: 92 % | HEART RATE: 77 BPM

## 2024-06-11 VITALS — HEART RATE: 78 BPM | DIASTOLIC BLOOD PRESSURE: 96 MMHG | SYSTOLIC BLOOD PRESSURE: 138 MMHG | OXYGEN SATURATION: 79 %

## 2024-06-11 VITALS — HEART RATE: 83 BPM | OXYGEN SATURATION: 95 %

## 2024-06-11 VITALS — DIASTOLIC BLOOD PRESSURE: 88 MMHG | OXYGEN SATURATION: 96 % | HEART RATE: 79 BPM | SYSTOLIC BLOOD PRESSURE: 133 MMHG

## 2024-06-11 VITALS — DIASTOLIC BLOOD PRESSURE: 88 MMHG | HEART RATE: 84 BPM | SYSTOLIC BLOOD PRESSURE: 118 MMHG

## 2024-06-11 VITALS — OXYGEN SATURATION: 91 % | HEART RATE: 74 BPM

## 2024-06-11 VITALS — OXYGEN SATURATION: 94 % | HEART RATE: 78 BPM

## 2024-06-11 VITALS — HEART RATE: 79 BPM | OXYGEN SATURATION: 94 %

## 2024-06-11 VITALS
HEART RATE: 86 BPM | TEMPERATURE: 98.2 F | DIASTOLIC BLOOD PRESSURE: 86 MMHG | SYSTOLIC BLOOD PRESSURE: 142 MMHG | OXYGEN SATURATION: 96 %

## 2024-06-11 VITALS — HEART RATE: 85 BPM | OXYGEN SATURATION: 95 %

## 2024-06-11 VITALS — HEART RATE: 75 BPM | SYSTOLIC BLOOD PRESSURE: 144 MMHG | DIASTOLIC BLOOD PRESSURE: 93 MMHG | OXYGEN SATURATION: 92 %

## 2024-06-11 VITALS — HEART RATE: 74 BPM | OXYGEN SATURATION: 91 %

## 2024-06-11 VITALS — OXYGEN SATURATION: 96 % | SYSTOLIC BLOOD PRESSURE: 133 MMHG | DIASTOLIC BLOOD PRESSURE: 82 MMHG | HEART RATE: 83 BPM

## 2024-06-11 VITALS — DIASTOLIC BLOOD PRESSURE: 78 MMHG | HEART RATE: 84 BPM | OXYGEN SATURATION: 95 % | SYSTOLIC BLOOD PRESSURE: 114 MMHG

## 2024-06-11 VITALS — HEART RATE: 85 BPM | OXYGEN SATURATION: 93 %

## 2024-06-11 VITALS — HEART RATE: 75 BPM | OXYGEN SATURATION: 91 %

## 2024-06-11 VITALS — HEART RATE: 79 BPM | OXYGEN SATURATION: 93 %

## 2024-06-11 VITALS — DIASTOLIC BLOOD PRESSURE: 84 MMHG | SYSTOLIC BLOOD PRESSURE: 135 MMHG

## 2024-06-11 VITALS — HEART RATE: 82 BPM | OXYGEN SATURATION: 96 %

## 2024-06-11 VITALS — OXYGEN SATURATION: 94 % | HEART RATE: 79 BPM

## 2024-06-11 VITALS — HEART RATE: 76 BPM | OXYGEN SATURATION: 95 %

## 2024-06-11 VITALS — OXYGEN SATURATION: 95 % | HEART RATE: 80 BPM

## 2024-06-11 VITALS — HEART RATE: 79 BPM | OXYGEN SATURATION: 96 %

## 2024-06-11 VITALS — OXYGEN SATURATION: 94 % | HEART RATE: 88 BPM

## 2024-06-11 VITALS — SYSTOLIC BLOOD PRESSURE: 142 MMHG | DIASTOLIC BLOOD PRESSURE: 86 MMHG

## 2024-06-11 DIAGNOSIS — T50.911A: Primary | ICD-10-CM

## 2024-06-11 LAB
ADD MANUAL DIFF: NO
ALANINE AMINOTRANSFERASE: 43 U/L (ref 16–63)
ALBUMIN GLOBULIN RATIO: 1
ALBUMIN LEVEL: 3.3 G/DL (ref 3.4–5)
ALKALINE PHOSPHATASE: 167 U/L (ref 46–116)
ANION GAP: 8
ASPARTATE AMINO TRANSFERASE: 18 U/L (ref 15–37)
BLOOD UREA NITROGEN: 8 MG/DL (ref 7–18)
CALCIUM: 8.9 MG/DL (ref 8.5–10.1)
CARBON DIOXIDE: 29.7 MMOL/L (ref 21–32)
CHLORIDE: 101 MMOL/L (ref 98–107)
CREATINE KINASE: 61 U/L (ref 39–308)
ESTIMATED GFR (AFRICAN AMERICA: >60 (ref 60–?)
ESTIMATED GFR (NON-AFRICAN AME: >60 (ref 60–?)
GLOBULIN: 3.2 G/DL
GLUCOSE: 94 MG/DL (ref 74–106)
HEMATOCRIT: 39.4 % (ref 42–54)
HEMOGLOBIN: 13 G/DL (ref 14–18)
IMMATURE GRANULOCYTES ABS AUTO: 0.09 10^3/UL (ref 0–0.03)
IMMATURE GRANULOCYTES PCT AUTO: 1.1 % (ref 0–0.5)
INR: 2.27
LIPASE: 12 U/L (ref 16–77)
LYMPHOCYTES  ABSOLUTE AUTO: 0.8 10^3/UL (ref 1.2–3.8)
MCV RBC: 88.3 FL (ref 80–94)
MEAN CORPUSCULAR HEMOGLOBIN: 29.1 PG (ref 25.9–34)
MEAN CORPUSCULAR HGB CONC: 33 G/DL (ref 29.9–35.2)
PARTIAL THROMBOPLASTIN TIME: 54 SEC (ref 22.3–36.2)
PLATELET COUNT: 185 10^3/UL (ref 150–450)
POTASSIUM: 3.7 MMOL/L (ref 3.5–5.1)
PROTHROMBIN TIME: 22.2 SEC (ref 9–11.6)
RED BLOOD COUNT: 4.46 10^6/UL (ref 4.7–6.1)
SODIUM: 135 MMOL/L (ref 136–145)
TOTAL PROTEIN: 6.5 G/DL (ref 6.4–8.2)
WHITE BLOOD COUNT: 8.4 10^3/UL (ref 4–11)

## 2024-06-11 PROCEDURE — 93005 ELECTROCARDIOGRAM TRACING: CPT

## 2024-06-11 PROCEDURE — 83690 ASSAY OF LIPASE: CPT

## 2024-06-11 PROCEDURE — 84484 ASSAY OF TROPONIN QUANT: CPT

## 2024-06-11 PROCEDURE — 85610 PROTHROMBIN TIME: CPT

## 2024-06-11 PROCEDURE — 85025 COMPLETE CBC W/AUTO DIFF WBC: CPT

## 2024-06-11 PROCEDURE — 85730 THROMBOPLASTIN TIME PARTIAL: CPT

## 2024-06-11 PROCEDURE — 36415 COLL VENOUS BLD VENIPUNCTURE: CPT

## 2024-06-11 PROCEDURE — 82550 ASSAY OF CK (CPK): CPT

## 2024-06-11 PROCEDURE — 99284 EMERGENCY DEPT VISIT MOD MDM: CPT

## 2024-06-11 PROCEDURE — 80053 COMPREHEN METABOLIC PANEL: CPT

## 2025-05-12 ENCOUNTER — HOSPITAL ENCOUNTER (EMERGENCY)
Age: 65
Discharge: HOME | End: 2025-05-12
Attending: EMERGENCY MEDICINE
Payer: MEDICARE

## 2025-05-12 ENCOUNTER — APPOINTMENT (OUTPATIENT)
Dept: CT IMAGING | Age: 65
End: 2025-05-12
Payer: MEDICARE

## 2025-05-12 VITALS
DIASTOLIC BLOOD PRESSURE: 88 MMHG | BODY MASS INDEX: 35.36 KG/M2 | RESPIRATION RATE: 24 BRPM | WEIGHT: 220 LBS | HEART RATE: 84 BPM | SYSTOLIC BLOOD PRESSURE: 147 MMHG | OXYGEN SATURATION: 93 % | TEMPERATURE: 97.7 F | HEIGHT: 66 IN

## 2025-05-12 DIAGNOSIS — T14.8XXA HEMATOMA: ICD-10-CM

## 2025-05-12 DIAGNOSIS — M54.32 SCIATICA OF LEFT SIDE: Primary | ICD-10-CM

## 2025-05-12 LAB
BASOPHILS # BLD: 0.1 K/UL (ref 0–0.2)
BASOPHILS NFR BLD: 1 % (ref 0–2)
EOSINOPHIL # BLD: 0.19 K/UL (ref 0–0.44)
EOSINOPHILS RELATIVE PERCENT: 2 % (ref 1–4)
ERYTHROCYTE [DISTWIDTH] IN BLOOD BY AUTOMATED COUNT: 15.3 % (ref 11.8–14.4)
HCT VFR BLD AUTO: 36 % (ref 40.7–50.3)
HGB BLD-MCNC: 11.6 G/DL (ref 13–17)
IMM GRANULOCYTES # BLD AUTO: 0.1 K/UL (ref 0–0.3)
IMM GRANULOCYTES NFR BLD: 1 %
INR PPP: 2.3
LYMPHOCYTES NFR BLD: 0.67 K/UL (ref 1.1–3.7)
LYMPHOCYTES RELATIVE PERCENT: 7 % (ref 24–43)
MCH RBC QN AUTO: 29.6 PG (ref 25.2–33.5)
MCHC RBC AUTO-ENTMCNC: 32.2 G/DL (ref 28.4–34.8)
MCV RBC AUTO: 91.8 FL (ref 82.6–102.9)
MONOCYTES NFR BLD: 0.77 K/UL (ref 0.1–1.2)
MONOCYTES NFR BLD: 8 % (ref 3–12)
MORPHOLOGY: ABNORMAL
NEUTROPHILS NFR BLD: 81 % (ref 36–65)
NEUTS SEG NFR BLD: 7.77 K/UL (ref 1.5–8.1)
NRBC BLD-RTO: 0 PER 100 WBC
PLATELET # BLD AUTO: 184 K/UL (ref 138–453)
PMV BLD AUTO: 8.7 FL (ref 8.1–13.5)
PROTHROMBIN TIME: 26.2 SEC (ref 11.7–14.9)
RBC # BLD AUTO: 3.92 M/UL (ref 4.21–5.77)
WBC OTHER # BLD: 9.6 K/UL (ref 3.5–11.3)

## 2025-05-12 PROCEDURE — 96372 THER/PROPH/DIAG INJ SC/IM: CPT

## 2025-05-12 PROCEDURE — 85610 PROTHROMBIN TIME: CPT

## 2025-05-12 PROCEDURE — 85025 COMPLETE CBC W/AUTO DIFF WBC: CPT

## 2025-05-12 PROCEDURE — 6360000002 HC RX W HCPCS: Performed by: EMERGENCY MEDICINE

## 2025-05-12 PROCEDURE — 72131 CT LUMBAR SPINE W/O DYE: CPT

## 2025-05-12 PROCEDURE — 99284 EMERGENCY DEPT VISIT MOD MDM: CPT

## 2025-05-12 RX ORDER — ORPHENADRINE CITRATE 30 MG/ML
60 INJECTION INTRAMUSCULAR; INTRAVENOUS ONCE
Status: COMPLETED | OUTPATIENT
Start: 2025-05-12 | End: 2025-05-12

## 2025-05-12 RX ADMIN — ORPHENADRINE CITRATE 60 MG: 60 INJECTION INTRAMUSCULAR; INTRAVENOUS at 06:28

## 2025-05-12 ASSESSMENT — PAIN - FUNCTIONAL ASSESSMENT: PAIN_FUNCTIONAL_ASSESSMENT: 0-10

## 2025-05-12 ASSESSMENT — PAIN DESCRIPTION - ORIENTATION: ORIENTATION: RIGHT

## 2025-05-12 ASSESSMENT — PAIN SCALES - GENERAL
PAINLEVEL_OUTOF10: 8
PAINLEVEL_OUTOF10: 8

## 2025-05-12 ASSESSMENT — PAIN DESCRIPTION - LOCATION: LOCATION: BUTTOCKS

## 2025-05-12 ASSESSMENT — PAIN DESCRIPTION - DESCRIPTORS: DESCRIPTORS: ACHING

## 2025-05-12 NOTE — ED PROVIDER NOTES
Note Started: 6:17 AM EDT         [facility]     Emergency Department     Faculty Attestation    I performed a history and physical examination of the patient and discussed management with the resident. I reviewed the resident’s note and agree with the documented findings and plan of care. Any areas of disagreement are noted on the chart. I was personally present for the key portions of any procedures. I have documented in the chart those procedures where I was not present during the key portions. I have reviewed the emergency nurses triage note. I agree with the chief complaint, past medical history, past surgical history, allergies, medications, social and family history as documented unless otherwise noted below.        For Physician Assistant/ Nurse Practitioner cases/documentation I have personally evaluated this patient and have completed at least one if not all key elements of the E/M (history, physical exam, and MDM). Additional findings are as noted.  I have personally seen and evaluated the patient.  I find the patient's history and physical exam are consistent with the NP/PA documentation.  I agree with the care provided, treatment rendered, disposition and follow-up plan.    64-year-old male with history of aortic valve replacement on Coumadin last INR 2.3, recent fall seen at outside hospital with x-ray hip that was negative for fracture and CT head and C-spine also negative for injury presenting with gluteal pain radiating down the left leg.  No new injury.  Pain has been present since his fall.  Denies any numbness.  Has been ambulatory, but pain increases with walking.  States that he has a history of a tumor in his spine (neurofibromatosis)    Exam:  General : Laying on the bed, awake, alert, and in no acute distress  CV : normal rate and regular rhythm  Lungs : Breathing comfortably on room air with no tachypnea, hypoxia, or increased work of breathing  Skin: Ecchymosis over

## 2025-05-12 NOTE — ED PROVIDER NOTES
[facility] EMERGENCY DEPARTMENT  Emergency Department Encounter  Emergency Medicine Resident     Pt Name:Larry Moore  MRN: [id number]  Birthdate 1960  Date of evaluation: 5/12/25  PCP:  Elif Rivera APRN - NP  Note Started: 5:39 AM EDT      CHIEF COMPLAINT       Chief Complaint   Patient presents with    Fall     Pt fell 3 days ago landing on right side. Pt reports he was seen and evaluated at [facility] when he initially fell \"and they told me everything was fine\".        HISTORY OF PRESENT ILLNESS  (Location/Symptom, Timing/Onset, Context/Setting, Quality, Duration, Modifying Factors, Severity.)      Larry Moore is a 64 y.o. male who presents with left-sided buttock pain.  Patient was recently seen for a fall at [facility].  At that time, left hip x-ray did not show any osseous abnormalities.  Chart review reveals that the patient is chronically on warfarin for atrial fibrillation.  The patient reports that he is compliant with his medications.  Patient reports that she has tried Tylenol at home for his left-sided buttock pain with minimal relief.  ROS is negative for loss of consciousness, recent falls, shortness of breath, chest pain/tightness, excessive bleeding.    PAST MEDICAL / SURGICAL / SOCIAL / FAMILY HISTORY      has a past medical history of AMS (altered mental status), Atrial fibrillation (HCC), Bipolar 1 disorder (HCC), Depression, Hypertension, Neck pain, Neurofibromatosis (HCC), Patient in clinical research study, Severe recurrent major depression without psychotic features (HCC), and Twitching.     has a past surgical history that includes hernia repair; Neck surgery (2013); Abdomen surgery; Tonsillectomy; Appendectomy; Dilatation, esophagus; Cardiac surgery (7/14/15); Cardiac surgery (07/14/2015); Colonoscopy (01/28/2021); and Upper gastrointestinal endoscopy (01/28/2021).    Social History     Socioeconomic History    Marital status: Single     Spouse name: Not on file    Number of

## 2025-05-12 NOTE — DISCHARGE INSTRUCTIONS
You were seen in the emergency room today for left-sided buttock pain.  We ordered a CT as well as blood work.  There is no acute fracture in your spine and there was no abscess.  At this time, we are recommending that you follow-up with your primary care physician.  Please return to the ED with any loss of consciousness, falls, fever, difficulty bearing weight.

## 2025-05-12 NOTE — ED PROVIDER NOTES
Care of Larry Moore was assumed from previous attending and is being seen for Fall (Pt fell 3 days ago landing on right side. Pt reports he was seen and evaluated at [facility] when he initially fell \"and they told me everything was fine\". )  .  The patient's initial evaluation and plan have been discussed with the prior provider who initially evaluated the patient.    Handoff taken on the following patient from prior Attending Physician:Shan 7:35 AM EDT      Attestation    I was available and discussed any additional care issues that arose and coordinated the management plans with the resident(s) caring for the patient during my duty period. Any areas of disagreement with resident’s documentation of care or procedures are noted on the chart. I was personally present for the key portions of any/all procedures during my duty period. I have documented in the chart those procedures where I was not present during the key portions.      EMERGENCY DEPARTMENT COURSE / MEDICAL DECISION MAKING:       MEDICATIONS GIVEN:  Orders Placed This Encounter   Medications    orphenadrine (NORFLEX) injection 60 mg       LABS / RADIOLOGY:     Labs Reviewed   CBC WITH AUTO DIFFERENTIAL - Abnormal; Notable for the following components:       Result Value    RBC 3.92 (*)     Hemoglobin 11.6 (*)     Hematocrit 36.0 (*)     RDW 15.3 (*)     All other components within normal limits   PROTIME-INR - Abnormal; Notable for the following components:    Protime 26.2 (*)     All other components within normal limits       CT LUMBAR SPINE WO CONTRAST  Result Date: 5/12/2025  EXAMINATION: CT OF THE LUMBAR SPINE WITHOUT CONTRAST  5/12/2025 TECHNIQUE: CT of the lumbar spine was performed without the administration of intravenous contrast. Multiplanar reformatted images are provided for review. Adjustment of mA and/or kV according to patient size was utilized.  Automated exposure control, iterative reconstruction, and/or weight based adjustment of the

## 2025-05-12 NOTE — ED NOTES
Pt to room 19 from triage with c/o right buttock pain s/p fall three days ago. Pt reports he was seen at [facility] when he initially fell and was discharged home. Pt reports history of frequent falls. Pt reports he has episodes of \"passing out\" without any known cause. Pt reports he is supposed be following up with neurologist for syncopal episodes. Pt with ecchymosis to lower, middle back, and right buttock. Pt with tenderness to right lower buttock upon palpation. Dr. Chand and writer at bedside to evaluate buttock. Call light within reach.

## 2025-05-19 ENCOUNTER — HOSPITAL ENCOUNTER (OUTPATIENT)
Age: 65
Setting detail: SPECIMEN
Discharge: HOME | End: 2025-05-19
Payer: MEDICARE

## 2025-05-19 LAB
ALBUMIN SERPL-MCNC: 4.2 G/DL (ref 3.5–5.2)
ALBUMIN/GLOB SERPL: 1.8 {RATIO} (ref 1–2.5)
ALP SERPL-CCNC: 145 U/L (ref 40–129)
ALT SERPL-CCNC: 14 U/L (ref 10–50)
ANION GAP SERPL CALCULATED.3IONS-SCNC: 9 MMOL/L (ref 9–16)
AST SERPL-CCNC: 16 U/L (ref 10–50)
BASOPHILS # BLD: 0.07 K/UL (ref 0–0.2)
BASOPHILS NFR BLD: 1 % (ref 0–2)
BILIRUB SERPL-MCNC: 0.5 MG/DL (ref 0–1.2)
BUN SERPL-MCNC: 10 MG/DL (ref 8–23)
CALCIUM SERPL-MCNC: 9.4 MG/DL (ref 8.6–10.4)
CHLORIDE SERPL-SCNC: 96 MMOL/L (ref 98–107)
CHOLEST SERPL-MCNC: 99 MG/DL (ref 0–199)
CHOLESTEROL/HDL RATIO: 2.7
CO2 SERPL-SCNC: 25 MMOL/L (ref 20–31)
CREAT SERPL-MCNC: 0.9 MG/DL (ref 0.7–1.2)
EOSINOPHIL # BLD: 0.1 K/UL (ref 0–0.44)
EOSINOPHILS RELATIVE PERCENT: 1 % (ref 1–4)
ERYTHROCYTE [DISTWIDTH] IN BLOOD BY AUTOMATED COUNT: 14.8 % (ref 11.8–14.4)
EST. AVERAGE GLUCOSE BLD GHB EST-MCNC: 97 MG/DL
GFR, ESTIMATED: >90 ML/MIN/1.73M2
GLUCOSE SERPL-MCNC: 100 MG/DL (ref 74–99)
HBA1C MFR BLD: 5 % (ref 4–6)
HCT VFR BLD AUTO: 36.5 % (ref 40.7–50.3)
HDLC SERPL-MCNC: 37 MG/DL
HGB BLD-MCNC: 12.3 G/DL (ref 13–17)
IMM GRANULOCYTES # BLD AUTO: 0.1 K/UL (ref 0–0.3)
IMM GRANULOCYTES NFR BLD: 1 %
LDLC SERPL CALC-MCNC: 44 MG/DL (ref 0–100)
LYMPHOCYTES NFR BLD: 0.73 K/UL (ref 1.1–3.7)
LYMPHOCYTES RELATIVE PERCENT: 8 % (ref 24–43)
MCH RBC QN AUTO: 29.6 PG (ref 25.2–33.5)
MCHC RBC AUTO-ENTMCNC: 33.7 G/DL (ref 28.4–34.8)
MCV RBC AUTO: 87.7 FL (ref 82.6–102.9)
MONOCYTES NFR BLD: 0.63 K/UL (ref 0.1–1.2)
MONOCYTES NFR BLD: 7 % (ref 3–12)
NEUTROPHILS NFR BLD: 82 % (ref 36–65)
NEUTS SEG NFR BLD: 7.49 K/UL (ref 1.5–8.1)
NRBC BLD-RTO: 0 PER 100 WBC
PLATELET # BLD AUTO: 230 K/UL (ref 138–453)
PMV BLD AUTO: 9.3 FL (ref 8.1–13.5)
POTASSIUM SERPL-SCNC: 4.6 MMOL/L (ref 3.7–5.3)
PROT SERPL-MCNC: 6.5 G/DL (ref 6.6–8.7)
RBC # BLD AUTO: 4.16 M/UL (ref 4.21–5.77)
RBC # BLD: ABNORMAL 10*6/UL
SODIUM SERPL-SCNC: 130 MMOL/L (ref 136–145)
TRIGL SERPL-MCNC: 88 MG/DL
TSH SERPL DL<=0.05 MIU/L-ACNC: 1.01 UIU/ML (ref 0.27–4.2)
VLDLC SERPL CALC-MCNC: 18 MG/DL (ref 1–30)
WBC OTHER # BLD: 9.1 K/UL (ref 3.5–11.3)

## 2025-05-19 PROCEDURE — 80061 LIPID PANEL: CPT

## 2025-05-19 PROCEDURE — 84443 ASSAY THYROID STIM HORMONE: CPT

## 2025-05-19 PROCEDURE — 80053 COMPREHEN METABOLIC PANEL: CPT

## 2025-05-19 PROCEDURE — 85025 COMPLETE CBC W/AUTO DIFF WBC: CPT

## 2025-05-19 PROCEDURE — 83036 HEMOGLOBIN GLYCOSYLATED A1C: CPT

## 2025-05-19 PROCEDURE — 36415 COLL VENOUS BLD VENIPUNCTURE: CPT

## 2025-06-23 ENCOUNTER — HOSPITAL ENCOUNTER (OUTPATIENT)
Age: 65
Discharge: HOME | End: 2025-06-23
Payer: MEDICARE

## 2025-06-23 PROCEDURE — 36415 COLL VENOUS BLD VENIPUNCTURE: CPT

## 2025-06-26 ENCOUNTER — HOSPITAL ENCOUNTER (OUTPATIENT)
Age: 65
Discharge: HOME | End: 2025-06-26
Payer: MEDICARE

## 2025-06-26 LAB
ANION GAP SERPL CALCULATED.3IONS-SCNC: 11 MMOL/L (ref 9–16)
BUN SERPL-MCNC: 9 MG/DL (ref 8–23)
CALCIUM SERPL-MCNC: 9.4 MG/DL (ref 8.6–10.4)
CHLORIDE SERPL-SCNC: 94 MMOL/L (ref 98–107)
CO2 SERPL-SCNC: 25 MMOL/L (ref 20–31)
CREAT SERPL-MCNC: 0.9 MG/DL (ref 0.7–1.2)
GFR, ESTIMATED: >90 ML/MIN/1.73M2
GLUCOSE SERPL-MCNC: 105 MG/DL (ref 74–99)
POTASSIUM SERPL-SCNC: 4.9 MMOL/L (ref 3.7–5.3)
SODIUM SERPL-SCNC: 130 MMOL/L (ref 136–145)

## 2025-06-26 PROCEDURE — 80048 BASIC METABOLIC PNL TOTAL CA: CPT

## 2025-06-27 ENCOUNTER — APPOINTMENT (OUTPATIENT)
Dept: MRI IMAGING | Age: 65
End: 2025-06-27
Payer: MEDICARE

## 2025-06-27 ENCOUNTER — APPOINTMENT (OUTPATIENT)
Dept: CT IMAGING | Age: 65
End: 2025-06-27
Payer: MEDICARE

## 2025-06-27 ENCOUNTER — HOSPITAL ENCOUNTER (INPATIENT)
Age: 65
LOS: 6 days | Discharge: HOME | End: 2025-07-03
Attending: STUDENT IN AN ORGANIZED HEALTH CARE EDUCATION/TRAINING PROGRAM | Admitting: PSYCHIATRY & NEUROLOGY
Payer: MEDICARE

## 2025-06-27 ENCOUNTER — APPOINTMENT (OUTPATIENT)
Dept: GENERAL RADIOLOGY | Age: 65
End: 2025-06-27
Payer: MEDICARE

## 2025-06-27 DIAGNOSIS — Z92.29 HISTORY OF COUMADIN THERAPY: ICD-10-CM

## 2025-06-27 DIAGNOSIS — R42 DIZZINESS: Primary | ICD-10-CM

## 2025-06-27 DIAGNOSIS — R90.89 ABNORMAL BRAIN MRI: ICD-10-CM

## 2025-06-27 LAB
ANION GAP SERPL CALCULATED.3IONS-SCNC: 12 MMOL/L (ref 9–16)
BASOPHILS # BLD: 0.06 K/UL (ref 0–0.2)
BASOPHILS NFR BLD: 1 % (ref 0–2)
BUN SERPL-MCNC: 11 MG/DL (ref 8–23)
CALCIUM SERPL-MCNC: 9.9 MG/DL (ref 8.6–10.4)
CHLORIDE SERPL-SCNC: 92 MMOL/L (ref 98–107)
CO2 SERPL-SCNC: 23 MMOL/L (ref 20–31)
CREAT SERPL-MCNC: 1 MG/DL (ref 0.7–1.2)
EOSINOPHIL # BLD: 0.12 K/UL (ref 0–0.44)
EOSINOPHILS RELATIVE PERCENT: 1 % (ref 1–4)
ERYTHROCYTE [DISTWIDTH] IN BLOOD BY AUTOMATED COUNT: 15.2 % (ref 11.8–14.4)
GFR, ESTIMATED: 84 ML/MIN/1.73M2
GLUCOSE SERPL-MCNC: 102 MG/DL (ref 74–99)
HCT VFR BLD AUTO: 44.9 % (ref 40.7–50.3)
HGB BLD-MCNC: 14.7 G/DL (ref 13–17)
IMM GRANULOCYTES # BLD AUTO: 0.14 K/UL (ref 0–0.3)
IMM GRANULOCYTES NFR BLD: 1 %
INR PPP: 2.3
LYMPHOCYTES NFR BLD: 0.78 K/UL (ref 1.1–3.7)
LYMPHOCYTES RELATIVE PERCENT: 8 % (ref 24–43)
MCH RBC QN AUTO: 29.6 PG (ref 25.2–33.5)
MCHC RBC AUTO-ENTMCNC: 32.7 G/DL (ref 28.4–34.8)
MCV RBC AUTO: 90.3 FL (ref 82.6–102.9)
MONOCYTES NFR BLD: 0.71 K/UL (ref 0.1–1.2)
MONOCYTES NFR BLD: 7 % (ref 3–12)
NEUTROPHILS NFR BLD: 82 % (ref 36–65)
NEUTS SEG NFR BLD: 8.16 K/UL (ref 1.5–8.1)
NRBC BLD-RTO: 0 PER 100 WBC
PLATELET # BLD AUTO: 217 K/UL (ref 138–453)
PMV BLD AUTO: 8.6 FL (ref 8.1–13.5)
POTASSIUM SERPL-SCNC: 4.5 MMOL/L (ref 3.7–5.3)
PROTHROMBIN TIME: 26.3 SEC (ref 11.7–14.9)
RBC # BLD AUTO: 4.97 M/UL (ref 4.21–5.77)
RBC # BLD: ABNORMAL 10*6/UL
SODIUM SERPL-SCNC: 127 MMOL/L (ref 136–145)
TROPONIN I SERPL HS-MCNC: 10 NG/L (ref 0–22)
WBC OTHER # BLD: 10 K/UL (ref 3.5–11.3)

## 2025-06-27 PROCEDURE — 72156 MRI NECK SPINE W/O & W/DYE: CPT

## 2025-06-27 PROCEDURE — 2500000003 HC RX 250 WO HCPCS

## 2025-06-27 PROCEDURE — 80048 BASIC METABOLIC PNL TOTAL CA: CPT

## 2025-06-27 PROCEDURE — 2060000000 HC ICU INTERMEDIATE R&B

## 2025-06-27 PROCEDURE — 6360000004 HC RX CONTRAST MEDICATION

## 2025-06-27 PROCEDURE — 99285 EMERGENCY DEPT VISIT HI MDM: CPT

## 2025-06-27 PROCEDURE — 93005 ELECTROCARDIOGRAM TRACING: CPT

## 2025-06-27 PROCEDURE — 99223 1ST HOSP IP/OBS HIGH 75: CPT | Performed by: PSYCHIATRY & NEUROLOGY

## 2025-06-27 PROCEDURE — 72158 MRI LUMBAR SPINE W/O & W/DYE: CPT

## 2025-06-27 PROCEDURE — 6360000002 HC RX W HCPCS

## 2025-06-27 PROCEDURE — A9576 INJ PROHANCE MULTIPACK: HCPCS | Performed by: PSYCHIATRY & NEUROLOGY

## 2025-06-27 PROCEDURE — 84484 ASSAY OF TROPONIN QUANT: CPT

## 2025-06-27 PROCEDURE — 6370000000 HC RX 637 (ALT 250 FOR IP)

## 2025-06-27 PROCEDURE — 6360000004 HC RX CONTRAST MEDICATION: Performed by: PSYCHIATRY & NEUROLOGY

## 2025-06-27 PROCEDURE — 4A03X5D MEASUREMENT OF ARTERIAL FLOW, INTRACRANIAL, EXTERNAL APPROACH: ICD-10-PCS | Performed by: RADIOLOGY

## 2025-06-27 PROCEDURE — 70450 CT HEAD/BRAIN W/O DYE: CPT

## 2025-06-27 PROCEDURE — 85025 COMPLETE CBC W/AUTO DIFF WBC: CPT

## 2025-06-27 PROCEDURE — 70496 CT ANGIOGRAPHY HEAD: CPT

## 2025-06-27 PROCEDURE — 85610 PROTHROMBIN TIME: CPT

## 2025-06-27 PROCEDURE — 70498 CT ANGIOGRAPHY NECK: CPT

## 2025-06-27 PROCEDURE — 70553 MRI BRAIN STEM W/O & W/DYE: CPT

## 2025-06-27 RX ORDER — SODIUM CHLORIDE 0.9 % (FLUSH) 0.9 %
5-40 SYRINGE (ML) INJECTION EVERY 12 HOURS SCHEDULED
Status: DISCONTINUED | OUTPATIENT
Start: 2025-06-27 | End: 2025-07-03 | Stop reason: HOSPADM

## 2025-06-27 RX ORDER — ONDANSETRON 4 MG/1
4 TABLET, ORALLY DISINTEGRATING ORAL EVERY 8 HOURS PRN
Status: DISCONTINUED | OUTPATIENT
Start: 2025-06-27 | End: 2025-07-03 | Stop reason: HOSPADM

## 2025-06-27 RX ORDER — HYDROXYZINE HYDROCHLORIDE 10 MG/1
10 TABLET, FILM COATED ORAL 3 TIMES DAILY PRN
Status: DISCONTINUED | OUTPATIENT
Start: 2025-06-27 | End: 2025-07-03 | Stop reason: HOSPADM

## 2025-06-27 RX ORDER — ONDANSETRON 2 MG/ML
4 INJECTION INTRAMUSCULAR; INTRAVENOUS EVERY 6 HOURS PRN
Status: DISCONTINUED | OUTPATIENT
Start: 2025-06-27 | End: 2025-07-03 | Stop reason: HOSPADM

## 2025-06-27 RX ORDER — TAMSULOSIN HYDROCHLORIDE 0.4 MG/1
0.8 CAPSULE ORAL DAILY
Status: DISCONTINUED | OUTPATIENT
Start: 2025-06-27 | End: 2025-07-03 | Stop reason: HOSPADM

## 2025-06-27 RX ORDER — FUROSEMIDE 20 MG/1
20 TABLET ORAL DAILY
Status: DISCONTINUED | OUTPATIENT
Start: 2025-06-27 | End: 2025-07-03 | Stop reason: HOSPADM

## 2025-06-27 RX ORDER — WARFARIN SODIUM 2 MG/1
2 TABLET ORAL
Status: COMPLETED | OUTPATIENT
Start: 2025-06-27 | End: 2025-06-27

## 2025-06-27 RX ORDER — CETIRIZINE HYDROCHLORIDE 10 MG/1
10 TABLET ORAL DAILY
Status: DISCONTINUED | OUTPATIENT
Start: 2025-06-27 | End: 2025-07-03 | Stop reason: HOSPADM

## 2025-06-27 RX ORDER — ATORVASTATIN CALCIUM 20 MG/1
20 TABLET, FILM COATED ORAL NIGHTLY
Status: DISCONTINUED | OUTPATIENT
Start: 2025-06-27 | End: 2025-07-03 | Stop reason: HOSPADM

## 2025-06-27 RX ORDER — POLYETHYLENE GLYCOL 3350 17 G/17G
17 POWDER, FOR SOLUTION ORAL DAILY PRN
Status: DISCONTINUED | OUTPATIENT
Start: 2025-06-27 | End: 2025-07-03 | Stop reason: HOSPADM

## 2025-06-27 RX ORDER — HYDRALAZINE HYDROCHLORIDE 20 MG/ML
10 INJECTION INTRAMUSCULAR; INTRAVENOUS EVERY 6 HOURS PRN
Status: DISCONTINUED | OUTPATIENT
Start: 2025-06-27 | End: 2025-06-27

## 2025-06-27 RX ORDER — PANTOPRAZOLE SODIUM 40 MG/1
40 TABLET, DELAYED RELEASE ORAL DAILY
Status: DISCONTINUED | OUTPATIENT
Start: 2025-06-27 | End: 2025-07-03 | Stop reason: HOSPADM

## 2025-06-27 RX ORDER — DOCUSATE SODIUM 100 MG/1
200 CAPSULE, LIQUID FILLED ORAL DAILY
Status: DISCONTINUED | OUTPATIENT
Start: 2025-06-27 | End: 2025-07-03 | Stop reason: HOSPADM

## 2025-06-27 RX ORDER — ASPIRIN 81 MG/1
81 TABLET, CHEWABLE ORAL DAILY
Status: DISCONTINUED | OUTPATIENT
Start: 2025-06-27 | End: 2025-07-03 | Stop reason: HOSPADM

## 2025-06-27 RX ORDER — GADOTERIDOL 279.3 MG/ML
18 INJECTION INTRAVENOUS
Status: COMPLETED | OUTPATIENT
Start: 2025-06-27 | End: 2025-06-27

## 2025-06-27 RX ORDER — ACETAMINOPHEN 650 MG/1
650 SUPPOSITORY RECTAL EVERY 6 HOURS PRN
Status: DISCONTINUED | OUTPATIENT
Start: 2025-06-27 | End: 2025-06-27 | Stop reason: ALTCHOICE

## 2025-06-27 RX ORDER — ENOXAPARIN SODIUM 100 MG/ML
40 INJECTION SUBCUTANEOUS DAILY
Status: DISCONTINUED | OUTPATIENT
Start: 2025-06-27 | End: 2025-06-27 | Stop reason: SDUPTHER

## 2025-06-27 RX ORDER — IBUPROFEN 200 MG
200 CAPSULE ORAL 2 TIMES DAILY
Status: DISCONTINUED | OUTPATIENT
Start: 2025-06-27 | End: 2025-07-03 | Stop reason: HOSPADM

## 2025-06-27 RX ORDER — WARFARIN SODIUM 2 MG/1
4 TABLET ORAL
Status: DISCONTINUED | OUTPATIENT
Start: 2025-06-28 | End: 2025-06-27

## 2025-06-27 RX ORDER — MECLIZINE HCL 12.5 MG 12.5 MG/1
25 TABLET ORAL ONCE
Status: COMPLETED | OUTPATIENT
Start: 2025-06-27 | End: 2025-06-27

## 2025-06-27 RX ORDER — ONDANSETRON 4 MG/1
4 TABLET, ORALLY DISINTEGRATING ORAL EVERY 8 HOURS PRN
Status: DISCONTINUED | OUTPATIENT
Start: 2025-06-27 | End: 2025-06-27 | Stop reason: SDUPTHER

## 2025-06-27 RX ORDER — POTASSIUM CHLORIDE 7.45 MG/ML
10 INJECTION INTRAVENOUS PRN
Status: DISCONTINUED | OUTPATIENT
Start: 2025-06-27 | End: 2025-07-03 | Stop reason: HOSPADM

## 2025-06-27 RX ORDER — WARFARIN SODIUM 2 MG/1
2 TABLET ORAL
Status: DISCONTINUED | OUTPATIENT
Start: 2025-06-27 | End: 2025-06-27

## 2025-06-27 RX ORDER — ACETAMINOPHEN 325 MG/1
650 TABLET ORAL EVERY 4 HOURS PRN
Status: DISCONTINUED | OUTPATIENT
Start: 2025-06-27 | End: 2025-07-03 | Stop reason: HOSPADM

## 2025-06-27 RX ORDER — MAGNESIUM SULFATE IN WATER 40 MG/ML
2000 INJECTION, SOLUTION INTRAVENOUS PRN
Status: DISCONTINUED | OUTPATIENT
Start: 2025-06-27 | End: 2025-07-03 | Stop reason: HOSPADM

## 2025-06-27 RX ORDER — ALBUTEROL SULFATE 90 UG/1
2 INHALANT RESPIRATORY (INHALATION) EVERY 6 HOURS PRN
Status: DISCONTINUED | OUTPATIENT
Start: 2025-06-27 | End: 2025-07-03 | Stop reason: HOSPADM

## 2025-06-27 RX ORDER — ACETAMINOPHEN 325 MG/1
650 TABLET ORAL EVERY 6 HOURS PRN
Status: DISCONTINUED | OUTPATIENT
Start: 2025-06-27 | End: 2025-06-27 | Stop reason: ALTCHOICE

## 2025-06-27 RX ORDER — VITAMIN B COMPLEX
2000 TABLET ORAL
Status: DISCONTINUED | OUTPATIENT
Start: 2025-06-28 | End: 2025-07-03 | Stop reason: HOSPADM

## 2025-06-27 RX ORDER — SENNA AND DOCUSATE SODIUM 50; 8.6 MG/1; MG/1
1 TABLET, FILM COATED ORAL DAILY
Status: DISCONTINUED | OUTPATIENT
Start: 2025-06-27 | End: 2025-07-03 | Stop reason: HOSPADM

## 2025-06-27 RX ORDER — MULTIVITAMIN WITH IRON
1000 TABLET ORAL DAILY
Status: DISCONTINUED | OUTPATIENT
Start: 2025-06-27 | End: 2025-07-03 | Stop reason: HOSPADM

## 2025-06-27 RX ORDER — SODIUM CHLORIDE 0.9 % (FLUSH) 0.9 %
5-40 SYRINGE (ML) INJECTION PRN
Status: DISCONTINUED | OUTPATIENT
Start: 2025-06-27 | End: 2025-07-03 | Stop reason: HOSPADM

## 2025-06-27 RX ORDER — SODIUM CHLORIDE 9 MG/ML
INJECTION, SOLUTION INTRAVENOUS PRN
Status: DISCONTINUED | OUTPATIENT
Start: 2025-06-27 | End: 2025-07-03 | Stop reason: HOSPADM

## 2025-06-27 RX ORDER — OXCARBAZEPINE 300 MG/1
300 TABLET, FILM COATED ORAL 2 TIMES DAILY
Status: DISCONTINUED | OUTPATIENT
Start: 2025-06-27 | End: 2025-07-03 | Stop reason: HOSPADM

## 2025-06-27 RX ORDER — GABAPENTIN 300 MG/1
900 CAPSULE ORAL 3 TIMES DAILY
Status: DISCONTINUED | OUTPATIENT
Start: 2025-06-27 | End: 2025-07-03 | Stop reason: HOSPADM

## 2025-06-27 RX ORDER — ONDANSETRON 2 MG/ML
4 INJECTION INTRAMUSCULAR; INTRAVENOUS EVERY 6 HOURS PRN
Status: DISCONTINUED | OUTPATIENT
Start: 2025-06-27 | End: 2025-06-27 | Stop reason: SDUPTHER

## 2025-06-27 RX ORDER — IOPAMIDOL 755 MG/ML
75 INJECTION, SOLUTION INTRAVASCULAR
Status: COMPLETED | OUTPATIENT
Start: 2025-06-27 | End: 2025-06-27

## 2025-06-27 RX ORDER — POTASSIUM CHLORIDE 1500 MG/1
40 TABLET, EXTENDED RELEASE ORAL PRN
Status: DISCONTINUED | OUTPATIENT
Start: 2025-06-27 | End: 2025-07-03 | Stop reason: HOSPADM

## 2025-06-27 RX ORDER — RISPERIDONE 0.5 MG/1
0.5 TABLET ORAL 2 TIMES DAILY
Status: DISCONTINUED | OUTPATIENT
Start: 2025-06-27 | End: 2025-07-03 | Stop reason: HOSPADM

## 2025-06-27 RX ORDER — ENOXAPARIN SODIUM 100 MG/ML
40 INJECTION SUBCUTANEOUS DAILY
Status: DISCONTINUED | OUTPATIENT
Start: 2025-06-27 | End: 2025-06-27

## 2025-06-27 RX ORDER — FOLIC ACID 1 MG/1
1 TABLET ORAL DAILY
Status: DISCONTINUED | OUTPATIENT
Start: 2025-06-27 | End: 2025-07-03 | Stop reason: HOSPADM

## 2025-06-27 RX ADMIN — FUROSEMIDE 20 MG: 20 TABLET ORAL at 22:21

## 2025-06-27 RX ADMIN — CETIRIZINE HYDROCHLORIDE 10 MG: 10 TABLET, FILM COATED ORAL at 22:20

## 2025-06-27 RX ADMIN — MECLIZINE 25 MG: 12.5 TABLET ORAL at 10:48

## 2025-06-27 RX ADMIN — CYANOCOBALAMIN TAB 500 MCG 1000 MCG: 500 TAB at 22:20

## 2025-06-27 RX ADMIN — GADOTERIDOL 18 ML: 279.3 INJECTION, SOLUTION INTRAVENOUS at 21:40

## 2025-06-27 RX ADMIN — DOCUSATE SODIUM 200 MG: 100 CAPSULE, LIQUID FILLED ORAL at 22:20

## 2025-06-27 RX ADMIN — ATORVASTATIN CALCIUM 20 MG: 20 TABLET, FILM COATED ORAL at 22:20

## 2025-06-27 RX ADMIN — GABAPENTIN 900 MG: 300 CAPSULE ORAL at 22:19

## 2025-06-27 RX ADMIN — SODIUM CHLORIDE, PRESERVATIVE FREE 10 ML: 5 INJECTION INTRAVENOUS at 22:19

## 2025-06-27 RX ADMIN — WARFARIN SODIUM 2 MG: 2 TABLET ORAL at 22:18

## 2025-06-27 RX ADMIN — RISPERIDONE 0.5 MG: 0.5 TABLET, FILM COATED ORAL at 22:19

## 2025-06-27 RX ADMIN — TAMSULOSIN HYDROCHLORIDE 0.8 MG: 0.4 CAPSULE ORAL at 22:20

## 2025-06-27 RX ADMIN — FOLIC ACID 1 MG: 1 TABLET ORAL at 22:20

## 2025-06-27 RX ADMIN — SODIUM CHLORIDE, PRESERVATIVE FREE 10 ML: 5 INJECTION INTRAVENOUS at 22:22

## 2025-06-27 RX ADMIN — PANTOPRAZOLE SODIUM 40 MG: 40 TABLET, DELAYED RELEASE ORAL at 22:21

## 2025-06-27 RX ADMIN — ENOXAPARIN SODIUM 40 MG: 100 INJECTION SUBCUTANEOUS at 15:00

## 2025-06-27 RX ADMIN — SENNOSIDES AND DOCUSATE SODIUM 1 TABLET: 50; 8.6 TABLET ORAL at 22:21

## 2025-06-27 RX ADMIN — Medication 200 MG: at 22:18

## 2025-06-27 RX ADMIN — OXCARBAZEPINE 300 MG: 300 TABLET, FILM COATED ORAL at 22:19

## 2025-06-27 RX ADMIN — ACETAMINOPHEN 650 MG: 325 TABLET ORAL at 16:42

## 2025-06-27 RX ADMIN — ASPIRIN 81 MG: 81 TABLET, CHEWABLE ORAL at 22:21

## 2025-06-27 RX ADMIN — IOPAMIDOL 75 ML: 755 INJECTION, SOLUTION INTRAVENOUS at 11:32

## 2025-06-27 ASSESSMENT — PAIN SCALES - GENERAL
PAINLEVEL_OUTOF10: 0
PAINLEVEL_OUTOF10: 5
PAINLEVEL_OUTOF10: 3
PAINLEVEL_OUTOF10: 1

## 2025-06-27 ASSESSMENT — LIFESTYLE VARIABLES
HOW OFTEN DO YOU HAVE A DRINK CONTAINING ALCOHOL: NEVER
HOW MANY STANDARD DRINKS CONTAINING ALCOHOL DO YOU HAVE ON A TYPICAL DAY: PATIENT DOES NOT DRINK

## 2025-06-27 ASSESSMENT — PAIN DESCRIPTION - DESCRIPTORS
DESCRIPTORS: ACHING
DESCRIPTORS: ACHING

## 2025-06-27 ASSESSMENT — PAIN DESCRIPTION - LOCATION
LOCATION: LEG
LOCATION: HEAD

## 2025-06-27 ASSESSMENT — PAIN DESCRIPTION - ORIENTATION: ORIENTATION: LEFT

## 2025-06-27 NOTE — H&P
[facility] Neurology   IN-PATIENT SERVICE   [facility]    HISTORY AND PHYSICAL EXAMINATION            Date:   6/27/2025  Patient name:  Larry Moore  Date of admission:  6/27/2025 10:04 AM  MRN:   [id number]  Account:  [id number]  YOB: 1960  PCP:    Elif Rivera APRN - NP  Room:   [facility]  Code Status:    Full Code    Chief Complaint:     Chief Complaint   Patient presents with    Ear Fullness    Dizziness       History Obtained From:     patient    History of Present Illness:     The patient is a 64 y.o. male who presents with Ear Fullness and Dizziness   and he is admitted to the hospital for the management of  left ear fullness, gait ataxia. The patient was seen and examined and the chart was reviewed.  Patient with history of neurofibromatosis type I currently following with [facility] neurology presents to the ED today with chief complaint of left ear fullness.  States he has diminished hearing on the left side and this has been ongoing for the last several weeks.  In addition today he states he has been more unstable ambulating and has been falling more often.  He does have significant ecchymosis over the left anterior leg status post fall several days back.  He is on Coumadin with INR of 2.3 today.  Sodium level 127.  He has known history of right thalamic hamartoma last MRI brain done at [facility] in June, 2024.  MRI cervical spine was motion degraded at the time.  MRI thoracic spine without acute findings.  Unable to view images.  CTA head and neck with no significant stenosis or occlusion today.  CT head does reveal hypodensity in the right thalamus of unclear chronicity.  Blood pressure is 150/100.    Upon further review patient was just discharged from [facility] on 6/21/2025.  Patient failed to bring this up during our conversation.  At the time his complaint was generalized weakness.  At that time his sodium was also found to be127.  He was recommended to

## 2025-06-27 NOTE — PROGRESS NOTES
Patient arrived to floor at 1858, patient hooked to telemetry, vital signs checked admission complete, patient alert and oriented x4. Patient educated on call light use. Patient declined any needs at this time.

## 2025-06-27 NOTE — ED NOTES
ED to inpatient nurses report    Chief Complaint   Patient presents with    Ear Fullness    Dizziness      Present to ED from home   LOC: alert and orientated to name, place, date  Vital signs   Vitals:    06/27/25 1003 06/27/25 1004 06/27/25 1418 06/27/25 1420   BP: (!) 119/93  (!) 150/100    Pulse: 92  74    Resp: 18      Temp:  97.4 °F (36.3 °C)     TempSrc:  Oral     SpO2: 96%  99%    Weight:    99.8 kg (220 lb)      Oxygen Baseline 0    Current needs required 0   LDAs:   Peripheral IV 06/27/25 Distal;Left Cephalic (Active)   Site Assessment Clean, dry & intact 06/27/25 1057   Line Status Blood return noted;Flushed;Normal saline locked;Specimen collected 06/27/25 1057   Phlebitis Assessment No symptoms 06/27/25 1057   Infiltration Assessment 0 06/27/25 1057   Dressing Status New dressing applied;Clean, dry & intact 06/27/25 1057   Dressing Type Transparent 06/27/25 1057   Dressing Intervention New 06/27/25 1057     Mobility: Independent  Pending ED orders: 2 view chest x-ray, MRI  Present condition: stable  Code Status: full  Consults:  []  Hospitalist  Completed  [] yes [] no  []  Medicine  Completed  [] yes [] No  []  Cardiology  Completed  [] yes [] No  []  GI   Completed  [] yes [] No  []  Neurology  Completed  [] yes [] No  []  Nephrology Completed  [] yes [] No  []  Vascular  Completed  [] yes [] No   []  Surgery  Completed  [] yes [] No   []  Urology  Completed  [] yes [] No   []  Plastics  Completed  [] yes [] No   []  ENT  Completed  [] yes [] No   []  Other   Completed  [] yes [] No  Pertinent event(s)   Pertinent event(s) dizziness and ear fullness.  The patient reports that symptoms have been ongoing for the past 2 days.  He reports feeling off balance while walking and a room spinning sensation.  He also reports bilateral ear fullness.  The patient has a history of A-fib on Coumadin.  Also has a history of hypertension.   Electronically signed by Lucio Lay RN on 6/27/2025 at 6:39 PM

## 2025-06-27 NOTE — ED NOTES
Pt arrived to ED with report of bilateral ear \"fullness\" dizziness and left leg injury.   Pt states he fell 1 week ago causing swelling ecchymosis to LLE.   Pt ambulatory on arrival, attached to full cardiac monitor, call light within reach, pt A&Ox4

## 2025-06-27 NOTE — ED PROVIDER NOTES
[facility]     Emergency Department     Faculty Attestation    I performed a history and physical examination of the patient and discussed management with the resident. I have reviewed and agree with the resident’s findings including all diagnostic interpretations, and treatment plans as written at the time of my review. Any areas of disagreement are noted on the chart. I was personally present for the key portions of any procedures. I have documented in the chart those procedures where I was not present during the key portions. For Physician Assistant/ Nurse Practitioner cases/documentation I have personally evaluated this patient and have completed at least one if not all key elements of the E/M (history, physical exam, and MDM). Additional findings are as noted.    PtName: Larry Moore  MRN: [id number]  Birthdate 1960  Date of evaluation: 6/27/25  Note Started: 10:45 AM EDT    Primary Care Physician: Elif Rivera APRN - NP    Brief HPI:  64-year-old male presents emergency department with dizziness and ear fullness.  The patient reports that symptoms have been ongoing for the past 2 days.  He reports feeling off balance while walking and a room spinning sensation.  He also reports bilateral ear fullness.  The patient has a history of A-fib on Coumadin.  Also has a history of hypertension.    Pertinent Physical Exam Findings:  Vitals:    06/27/25 1004   BP:    Pulse:    Resp:    Temp: 97.4 °F (36.3 °C)   SpO2:    Appears well, no acute distress, ataxic gait, abnormal left upper extremity finger-to-nose exam, otherwise grossly unremarkable neurologic exam.  Bilateral TMs appear clear    Medical Decision Making: Patient is a 64 y.o. male presenting to the emergency department with dizziness, ataxia, ear fullness. The chart was reviewed for pertinent history relating to the chief complaint.  The patient appears well at this time in no acute distress, vitals  are stable.  The patient reports dizziness and room spinning sensation for the past 2 days.  The patient has multiple risk factors for CVA and has persistent symptoms for the past 2 days, we will evaluate for central etiology of symptoms by obtaining CT head and CT angiography study of the head and neck.  Consideration for peripheral etiology given that he has ear fullness.  His ear exam is normal.  The patient was given oral meclizine for symptoms.    All results, including labs (if ordered), imaging (if ordered), and EKGs (if ordered) were independently interpreted by me.  See radiologist report for additional details on imaging studies.      (Please note that portions of this note were completed with a voice recognition program.  Efforts were made to edit the dictations but occasionally words are mis-transcribed.)    Vic Leger DO   Attending Emergency Medicine Physician         Vic Leger DO  06/27/25 1041

## 2025-06-27 NOTE — PROGRESS NOTES
Pharmacy Note  Warfarin Consult    Larry Moore is a 64 y.o. male for whom pharmacy has been consulted to manage warfarin therapy.     Consulting Physician: : Richard Montiel MD   Reason for Admission: Dizziness    Warfarin dose prior to admission: 3 mg Monday, 2 mg all other days.   Warfarin indication: A fib  Target INR range: 2-3     Past Medical History:   Diagnosis Date    AMS (altered mental status) 02/15/2020    Atrial fibrillation (HCC)     on coumadin    Bipolar 1 disorder (HCC)     Depression     Hypertension     Neck pain     Neurofibromatosis (HCC)     Lt leg pain    Patient in clinical research study 04/12/2018    [id number]    Severe recurrent major depression without psychotic features (HCC) 6/13/2022    Twitching           Recent Labs     06/27/25  1050   INR 2.3     Recent Labs     06/27/25  1050   HGB 14.7   HCT 44.9          Current warfarin drug-drug interactions:     Date             INR        Dose   6/27/2025            2.3       2 mg    Daily PT/INR while inpatient. PT/INR ordered to start 6/28.    INR therapeutic, will give home dose of 2 mg today.    Thank you for the consult.  Will continue to follow.    Ede Espinoza, PharmD, [facility]  6/27/2025 7:46 PM

## 2025-06-27 NOTE — ED PROVIDER NOTES
[facility]  Emergency Department Encounter  Emergency Medicine Resident     Pt Name:Larry Moore  MRN: [id number]  Birthdate 1960  Date of evaluation: 6/27/25  PCP:  Elif Rivera APRN - NP  Note Started: 10:45 AM EDT      CHIEF COMPLAINT       Chief Complaint   Patient presents with    Ear Fullness    Dizziness       HISTORY OF PRESENT ILLNESS  (Location/Symptom, Timing/Onset, Context/Setting, Quality, Duration, Modifying Factors, Severity.)      Larry Moore is a 64 y.o. male who presents with complaint of ear fullness and dizziness.  Patient states that his unsteady on his feet and has been having difficulty walking.  Patient denies any focal neurological deficits or weakness.  Patient has past medical history of A-fib currently on warfarin.  Patient states that these symptoms have been ongoing for the past 2 days and have been constant.  He states that his dizziness is worst with ambulation but is present when sitting.  Patient denies any vision changes, chest pain, shortness of breath, abdominal pain, nausea, vomiting, or any other concerning symptoms.    PAST MEDICAL / SURGICAL / SOCIAL / FAMILY HISTORY      has a past medical history of AMS (altered mental status), Atrial fibrillation (HCC), Bipolar 1 disorder (HCC), Depression, Hypertension, Neck pain, Neurofibromatosis (HCC), Patient in clinical research study, Severe recurrent major depression without psychotic features (HCC), and Twitching.     has a past surgical history that includes hernia repair; Neck surgery (2013); Abdomen surgery; Tonsillectomy; Appendectomy; Dilatation, esophagus; Cardiac surgery (07/14/2015); Cardiac surgery (07/14/2015); Colonoscopy (01/28/2021); and Upper gastrointestinal endoscopy (01/28/2021).    Social History     Socioeconomic History    Marital status: Single     Spouse name: Not on file    Number of children: Not on file    Years of education: Not on file    Highest education level: Not on file

## 2025-06-28 PROBLEM — Q85.01 NEUROFIBROMATOSIS, TYPE I (VON RECKLINGHAUSEN'S DISEASE) (HCC): Status: ACTIVE | Noted: 2021-07-01

## 2025-06-28 PROBLEM — R26.0 ATAXIC GAIT: Status: ACTIVE | Noted: 2025-06-28

## 2025-06-28 LAB
ANION GAP SERPL CALCULATED.3IONS-SCNC: 12 MMOL/L (ref 9–16)
BASOPHILS # BLD: 0.06 K/UL (ref 0–0.2)
BASOPHILS NFR BLD: 1 % (ref 0–2)
BUN SERPL-MCNC: 12 MG/DL (ref 8–23)
CALCIUM SERPL-MCNC: 9.1 MG/DL (ref 8.6–10.4)
CHLORIDE SERPL-SCNC: 95 MMOL/L (ref 98–107)
CO2 SERPL-SCNC: 22 MMOL/L (ref 20–31)
CREAT SERPL-MCNC: 0.9 MG/DL (ref 0.7–1.2)
EKG ATRIAL RATE: 87 BPM
EKG P AXIS: 27 DEGREES
EKG P-R INTERVAL: 154 MS
EKG Q-T INTERVAL: 352 MS
EKG QRS DURATION: 108 MS
EKG QTC CALCULATION (BAZETT): 423 MS
EKG R AXIS: 3 DEGREES
EKG T AXIS: 74 DEGREES
EKG VENTRICULAR RATE: 87 BPM
EOSINOPHIL # BLD: 0.18 K/UL (ref 0–0.44)
EOSINOPHILS RELATIVE PERCENT: 2 % (ref 1–4)
ERYTHROCYTE [DISTWIDTH] IN BLOOD BY AUTOMATED COUNT: 15.7 % (ref 11.8–14.4)
GFR, ESTIMATED: >90 ML/MIN/1.73M2
GLUCOSE SERPL-MCNC: 98 MG/DL (ref 74–99)
HCT VFR BLD AUTO: 41 % (ref 40.7–50.3)
HGB BLD-MCNC: 13.2 G/DL (ref 13–17)
IMM GRANULOCYTES # BLD AUTO: 0.14 K/UL (ref 0–0.3)
IMM GRANULOCYTES NFR BLD: 2 %
INR PPP: 2.6
LYMPHOCYTES NFR BLD: 0.77 K/UL (ref 1.1–3.7)
LYMPHOCYTES RELATIVE PERCENT: 9 % (ref 24–43)
MCH RBC QN AUTO: 29.5 PG (ref 25.2–33.5)
MCHC RBC AUTO-ENTMCNC: 32.2 G/DL (ref 28.4–34.8)
MCV RBC AUTO: 91.7 FL (ref 82.6–102.9)
MONOCYTES NFR BLD: 0.76 K/UL (ref 0.1–1.2)
MONOCYTES NFR BLD: 9 % (ref 3–12)
NEUTROPHILS NFR BLD: 77 % (ref 36–65)
NEUTS SEG NFR BLD: 6.53 K/UL (ref 1.5–8.1)
NRBC BLD-RTO: 0 PER 100 WBC
PLATELET # BLD AUTO: 187 K/UL (ref 138–453)
PMV BLD AUTO: 8.6 FL (ref 8.1–13.5)
POTASSIUM SERPL-SCNC: 4.6 MMOL/L (ref 3.7–5.3)
PROTHROMBIN TIME: 29.1 SEC (ref 11.7–14.9)
RBC # BLD AUTO: 4.47 M/UL (ref 4.21–5.77)
RBC # BLD: ABNORMAL 10*6/UL
SODIUM SERPL-SCNC: 129 MMOL/L (ref 136–145)
WBC OTHER # BLD: 8.4 K/UL (ref 3.5–11.3)

## 2025-06-28 PROCEDURE — 2580000003 HC RX 258

## 2025-06-28 PROCEDURE — 80048 BASIC METABOLIC PNL TOTAL CA: CPT

## 2025-06-28 PROCEDURE — 36415 COLL VENOUS BLD VENIPUNCTURE: CPT

## 2025-06-28 PROCEDURE — 99232 SBSQ HOSP IP/OBS MODERATE 35: CPT | Performed by: PSYCHIATRY & NEUROLOGY

## 2025-06-28 PROCEDURE — 97162 PT EVAL MOD COMPLEX 30 MIN: CPT

## 2025-06-28 PROCEDURE — 6370000000 HC RX 637 (ALT 250 FOR IP)

## 2025-06-28 PROCEDURE — 2060000000 HC ICU INTERMEDIATE R&B

## 2025-06-28 PROCEDURE — 85610 PROTHROMBIN TIME: CPT

## 2025-06-28 PROCEDURE — 6370000000 HC RX 637 (ALT 250 FOR IP): Performed by: PSYCHIATRY & NEUROLOGY

## 2025-06-28 PROCEDURE — 85025 COMPLETE CBC W/AUTO DIFF WBC: CPT

## 2025-06-28 PROCEDURE — 97530 THERAPEUTIC ACTIVITIES: CPT

## 2025-06-28 PROCEDURE — 93010 ELECTROCARDIOGRAM REPORT: CPT | Performed by: INTERNAL MEDICINE

## 2025-06-28 PROCEDURE — 2500000003 HC RX 250 WO HCPCS

## 2025-06-28 RX ORDER — WARFARIN SODIUM 1 MG/1
1 TABLET ORAL
Status: COMPLETED | OUTPATIENT
Start: 2025-06-28 | End: 2025-06-28

## 2025-06-28 RX ORDER — SODIUM CHLORIDE 9 MG/ML
INJECTION, SOLUTION INTRAVENOUS CONTINUOUS
Status: DISCONTINUED | OUTPATIENT
Start: 2025-06-28 | End: 2025-07-03 | Stop reason: HOSPADM

## 2025-06-28 RX ADMIN — SODIUM CHLORIDE, PRESERVATIVE FREE 10 ML: 5 INJECTION INTRAVENOUS at 20:54

## 2025-06-28 RX ADMIN — ACETAMINOPHEN 650 MG: 325 TABLET ORAL at 12:53

## 2025-06-28 RX ADMIN — Medication 200 MG: at 08:24

## 2025-06-28 RX ADMIN — ATORVASTATIN CALCIUM 20 MG: 20 TABLET, FILM COATED ORAL at 20:54

## 2025-06-28 RX ADMIN — GABAPENTIN 900 MG: 300 CAPSULE ORAL at 20:54

## 2025-06-28 RX ADMIN — HYDROXYZINE HYDROCHLORIDE 10 MG: 10 TABLET, FILM COATED ORAL at 22:08

## 2025-06-28 RX ADMIN — FUROSEMIDE 20 MG: 20 TABLET ORAL at 08:23

## 2025-06-28 RX ADMIN — WARFARIN SODIUM 1 MG: 1 TABLET ORAL at 18:05

## 2025-06-28 RX ADMIN — ACETAMINOPHEN 650 MG: 325 TABLET ORAL at 22:08

## 2025-06-28 RX ADMIN — RISPERIDONE 0.5 MG: 0.5 TABLET, FILM COATED ORAL at 20:54

## 2025-06-28 RX ADMIN — RISPERIDONE 0.5 MG: 0.5 TABLET, FILM COATED ORAL at 08:24

## 2025-06-28 RX ADMIN — SODIUM CHLORIDE: 0.9 INJECTION, SOLUTION INTRAVENOUS at 17:33

## 2025-06-28 RX ADMIN — CETIRIZINE HYDROCHLORIDE 10 MG: 10 TABLET, FILM COATED ORAL at 08:23

## 2025-06-28 RX ADMIN — DOCUSATE SODIUM 200 MG: 100 CAPSULE, LIQUID FILLED ORAL at 08:23

## 2025-06-28 RX ADMIN — Medication 2000 UNITS: at 08:23

## 2025-06-28 RX ADMIN — GABAPENTIN 900 MG: 300 CAPSULE ORAL at 08:23

## 2025-06-28 RX ADMIN — SODIUM CHLORIDE: 0.9 INJECTION, SOLUTION INTRAVENOUS at 05:36

## 2025-06-28 RX ADMIN — ASPIRIN 81 MG: 81 TABLET, CHEWABLE ORAL at 08:24

## 2025-06-28 RX ADMIN — PANTOPRAZOLE SODIUM 40 MG: 40 TABLET, DELAYED RELEASE ORAL at 08:23

## 2025-06-28 RX ADMIN — Medication 200 MG: at 20:55

## 2025-06-28 RX ADMIN — OXCARBAZEPINE 300 MG: 300 TABLET, FILM COATED ORAL at 08:24

## 2025-06-28 RX ADMIN — FOLIC ACID 1 MG: 1 TABLET ORAL at 08:23

## 2025-06-28 RX ADMIN — Medication 5 MG: at 22:08

## 2025-06-28 RX ADMIN — TAMSULOSIN HYDROCHLORIDE 0.8 MG: 0.4 CAPSULE ORAL at 08:24

## 2025-06-28 RX ADMIN — HYDROXYZINE HYDROCHLORIDE 10 MG: 10 TABLET, FILM COATED ORAL at 08:23

## 2025-06-28 RX ADMIN — GABAPENTIN 900 MG: 300 CAPSULE ORAL at 14:35

## 2025-06-28 RX ADMIN — OXCARBAZEPINE 300 MG: 300 TABLET, FILM COATED ORAL at 20:54

## 2025-06-28 RX ADMIN — CYANOCOBALAMIN TAB 500 MCG 1000 MCG: 500 TAB at 08:23

## 2025-06-28 RX ADMIN — SENNOSIDES AND DOCUSATE SODIUM 1 TABLET: 50; 8.6 TABLET ORAL at 08:24

## 2025-06-28 ASSESSMENT — PAIN DESCRIPTION - DESCRIPTORS: DESCRIPTORS: ACHING;SORE

## 2025-06-28 ASSESSMENT — PAIN SCALES - GENERAL
PAINLEVEL_OUTOF10: 8
PAINLEVEL_OUTOF10: 6
PAINLEVEL_OUTOF10: 3
PAINLEVEL_OUTOF10: 7
PAINLEVEL_OUTOF10: 3

## 2025-06-28 ASSESSMENT — PAIN DESCRIPTION - ORIENTATION: ORIENTATION: LEFT

## 2025-06-28 ASSESSMENT — PAIN DESCRIPTION - LOCATION: LOCATION: LEG

## 2025-06-28 NOTE — PLAN OF CARE
Problem: Discharge Planning  Goal: Discharge to home or other facility with appropriate resources  Outcome: Progressing     Problem: Pain  Goal: Verbalizes/displays adequate comfort level or baseline comfort level  Outcome: Progressing     Problem: Skin/Tissue Integrity  Goal: Skin integrity remains intact  Description: 1.  Monitor for areas of redness and/or skin breakdown  2.  Assess vascular access sites hourly  3.  Every 4-6 hours minimum:  Change oxygen saturation probe site  4.  Every 4-6 hours:  If on nasal continuous positive airway pressure, respiratory therapy assess nares and determine need for appliance change or resting period  6/28/2025 1947 by Brigid Escobar, RN  Outcome: Progressing     Problem: Safety - Adult  Goal: Free from fall injury  6/28/2025 1947 by Brigid Escobar, RN  Outcome: Progressing     Problem: ABCDS Injury Assessment  Goal: Absence of physical injury  Outcome: Progressing

## 2025-06-28 NOTE — CONSULTS
[facility] Neuroscience [place]    Department of Neurosurgery  Resident Consult Note      Reason for Consult:    Requesting Physician:    Neurosurgeon:   [x]Dr. Scott  []Dr. James  []Dr. Shields  []Dr. Gong  []Dr. Mcqueen  []Dr. Horvath    History Obtained From:  patient    CHIEF COMPLAINT:         Dizziness    HISTORY OF PRESENT ILLNESS:       64-year-old male with past medical history neurofibromatosis type I, atrial fibrillation on Coumadin, HTN who presents with chief complaint left ear fullness, decreased hearing ongoing for the past few weeks, also reports that he has been feeling more unstable and having imbalance for the past few weeks.  He reports a fall 1 week ago, causing trauma to his left leg, bruising present left anterior leg.  He denies any head trauma at that time.  He denies back trauma.  He does have a history of right thalamic hematoma last MRI done at [facility] June 2024.  MRI C-spine was motion to grade at that time.  MRI's thoracic spine without acute findings.  CT head shows hypodensity right thalamus, CTA head and neck no significant stenosis or occlusion.  Neurosurgery consulted for presence of glioma on MRI.    PAST MEDICAL HISTORY :       Past Medical History:        Diagnosis Date    AMS (altered mental status) 02/15/2020    Atrial fibrillation (HCC)     on coumadin    Bipolar 1 disorder (HCC)     Depression     Hypertension     Neck pain     Neurofibromatosis (HCC)     Lt leg pain    Patient in clinical research study 04/12/2018    [id number]    Severe recurrent major depression without psychotic features (HCC) 6/13/2022    Twitching        Past Surgical History:        Procedure Laterality Date    ABDOMEN SURGERY      APPENDECTOMY      CARDIAC SURGERY  07/14/2015    Median Sternotomy, Repair of ascending aorti aneurysm, stentless valve placement    CARDIAC SURGERY  07/14/2015 6/27/25 Chest cleared by Dr. Mayes. -[facility]    COLONOSCOPY  01/28/2021    DILATATION, ESOPHAGUS        Abnormal extension - 2 []       None - 1 []            Total GCS: 15    Mental Status:  A & O x3, awake             Cranial Nerves:    cranial nerves II-XII are grossly intact    Motor Exam:    Drift:  absent  Tone:  normal    Motor exam is symmetrical 5 out of 5 all extremities bilaterally    Sensory:    Touch:    Right Upper Extremity:  normal  Left Upper Extremity:  normal  Right Lower Extremity:  normal  Left Lower Extremity:  normal    Deep Tendon Reflexes:    Right Bicep:  2+  Left Bicep:  3+  Right Knee:  3+  Left Knee:  3+    Coordination/Dysmetria:  Finger to Nose:   Right:  normal  Left:  normal     Gait:  Deferred   SKIN:  no rash      LABS AND IMAGING:     Labs:  CBC with Differential:    Lab Results   Component Value Date/Time    WBC 8.4 06/28/2025 03:33 AM    RBC 4.47 06/28/2025 03:33 AM    RBC 4.89 01/14/2012 06:53 AM    HGB 13.2 06/28/2025 03:33 AM    HCT 41.0 06/28/2025 03:33 AM     06/28/2025 03:33 AM     01/14/2012 06:53 AM    MCV 91.7 06/28/2025 03:33 AM    MCH 29.5 06/28/2025 03:33 AM    MCHC 32.2 06/28/2025 03:33 AM    RDW 15.7 06/28/2025 03:33 AM    METASPCT 3 08/18/2014 05:45 PM    LYMPHOPCT 9 06/28/2025 03:33 AM    PROMYELOPCT 1 08/18/2014 05:45 PM    MONOPCT 9 06/28/2025 03:33 AM    MYELOPCT 1 08/18/2014 05:45 PM    EOSPCT 2 06/28/2025 03:33 AM    BASOPCT 1 06/28/2025 03:33 AM    MONOSABS 0.76 06/28/2025 03:33 AM    LYMPHSABS 0.77 06/28/2025 03:33 AM    EOSABS 0.18 06/28/2025 03:33 AM    BASOSABS 0.06 06/28/2025 03:33 AM    DIFFTYPE NOT REPORTED 01/16/2022 11:30 AM     BMP:    Lab Results   Component Value Date/Time     06/28/2025 03:33 AM    K 4.6 06/28/2025 03:33 AM    CL 95 06/28/2025 03:33 AM    CO2 22 06/28/2025 03:33 AM    BUN 12 06/28/2025 03:33 AM    CREATININE 0.9 06/28/2025 03:33 AM    CALCIUM 9.1 06/28/2025 03:33 AM    GFRAA >60 09/29/2022 06:29 AM    LABGLOM >90 06/28/2025 03:33 AM    LABGLOM >60 10/20/2022 07:50 AM    GLUCOSE 98 06/28/2025 03:33 AM     no

## 2025-06-28 NOTE — PLAN OF CARE
Problem: Skin/Tissue Integrity  Goal: Skin integrity remains intact  Description: 1.  Monitor for areas of redness and/or skin breakdown  2.  Assess vascular access sites hourly  3.  Every 4-6 hours minimum:  Change oxygen saturation probe site  4.  Every 4-6 hours:  If on nasal continuous positive airway pressure, respiratory therapy assess nares and determine need for appliance change or resting period  6/28/2025 1803 by Cynthia Cruz, RN  Outcome: Progressing     Problem: Safety - Adult  Goal: Free from fall injury  6/28/2025 1803 by Cynthia Cruz, RN  Outcome: Progressing

## 2025-06-28 NOTE — PROGRESS NOTES
Physical Therapy  Facility/Department: [address] STEPDOWN   Physical Therapy Initial Evaluation    Patient Name: Larry Moore        MRN: [id number]    : 1960    Date of Service: 2025    Chief Complaint   Patient presents with    Ear Fullness    Dizziness     Past Medical History:  has a past medical history of AMS (altered mental status), Atrial fibrillation (HCC), Bipolar 1 disorder (HCC), Depression, Hypertension, Neck pain, Neurofibromatosis (HCC), Patient in clinical research study, Severe recurrent major depression without psychotic features (HCC), and Twitching.  Past Surgical History:  has a past surgical history that includes hernia repair; Neck surgery (); Abdomen surgery; Tonsillectomy; Appendectomy; Dilatation, esophagus; Cardiac surgery (2015); Cardiac surgery (2015); Colonoscopy (2021); and Upper gastrointestinal endoscopy (2021).    Discharge Recommendations  Discharge Recommendations: Patient would benefit from continued therapy after discharge  PT Equipment Recommendations  Equipment Needed: No    Assessment  Body Structures, Functions, Activity Limitations Requiring Skilled Therapeutic Intervention: Decreased functional mobility , Decreased strength, Decreased endurance, Decreased balance  Assessment: The pt ambulated 50 ft with a RW x min assist. He had a loss of balance x 3 but was able to self correct. He demonstrated a change in his length of step from normal to decreased. He could benefit from a continuation of PT for gait , strengthening and functional mobility prior to his DC  Therapy Prognosis: Good  Decision Making: Medium Complexity  Requires PT Follow-Up: Yes  Activity Tolerance  Activity Tolerance: Patient limited by fatigue, Patient limited by endurance, Patient limited by pain  Safety Devices  Type of Devices: Nurse notified, Left in bed, Chair alarm in place  Restraints  Restraints Initially in Place: No    AM-PAC  AM-PAC Basic Mobility -

## 2025-06-28 NOTE — PROGRESS NOTES
[facility] Neurology   IN-PATIENT SERVICE  General Neurology Progress Note   [facility]                Date:   6/28/2025  Patient name:  Larry Moore  Date of admission:  6/27/2025 10:04 AM  MRN:   [id number]  Account:  [id number]  YOB: 1960  PCP:    Elif Rivera APRN - NP  Room:   [address] [place] [address]  Code Status:    Full Code  Chief Complaint:   Chief Complaint   Patient presents with    Ear Fullness    Dizziness       Interval history      The patient was seen and examined at bedside this morning.     Labs, chart, and VS reviewed. No acute events overnight. Still with persistent left-sided dysmetria and left-sided hearing loss.    Brief History     Larry Moore is a 64 y.o. male who presents with Ear Fullness and Dizziness, and he is admitted to the hospital for the management of  left ear fullness, gait ataxia. The patient was seen and examined and the chart was reviewed.  Patient with history of neurofibromatosis type I currently following with [facility] neurology presents to the ED today with chief complaint of left ear fullness.  States he has diminished hearing on the left side and this has been ongoing for the last several weeks.  In addition today he states he has been more unstable ambulating and has been falling more often.  He does have significant ecchymosis over the left anterior leg status post fall several days back.  He is on Coumadin with INR of 2.3 today.  Sodium level 127.  He has known history of right thalamic hamartoma last MRI brain done at [facility] in June, 2024.  MRI cervical spine was motion degraded at the time.  MRI thoracic spine without acute findings.  Unable to view images.  CTA head and neck with no significant stenosis or occlusion today.  CT head does reveal hypodensity in the right thalamus of unclear chronicity.  Blood pressure is 150/100.     Upon further review patient was just discharged from [facility] on 6/21/2025.  Patient  (H) 11.8 - 14.4 %    Platelets 187 138 - 453 k/uL    MPV 8.6 8.1 - 13.5 fL    NRBC Automated 0.0 0.0 per 100 WBC    Neutrophils % 77 (H) 36 - 65 %    Lymphocytes % 9 (L) 24 - 43 %    Monocytes % 9 3 - 12 %    Eosinophils % 2 1 - 4 %    Basophils % 1 0 - 2 %    Immature Granulocytes % 2 (H) 0 %    Neutrophils Absolute 6.53 1.50 - 8.10 k/uL    Lymphocytes Absolute 0.77 (L) 1.10 - 3.70 k/uL    Monocytes Absolute 0.76 0.10 - 1.20 k/uL    Eosinophils Absolute 0.18 0.00 - 0.44 k/uL    Basophils Absolute 0.06 0.00 - 0.20 k/uL    Immature Granulocytes Absolute 0.14 0.00 - 0.30 k/uL    RBC Morphology ANISOCYTOSIS PRESENT    Protime-INR    Collection Time: 06/28/25  3:33 AM   Result Value Ref Range    Protime 29.1 (H) 11.7 - 14.9 sec    INR 2.6          Radiology:  MRI BRAIN W WO CONTRAST  Result Date: 6/27/2025  16 mm lesion in the right internal capsule is indeterminate.  Differential includes glioma or late subacute infarct.     MRI LUMBAR SPINE W WO CONTRAST  Result Date: 6/27/2025  Diffuse cauda equina and lumbosacral plexus nerve thickening.  Differential includes chronic inflammatory demyelinating polyneuropathy, hypertrophic neuropathies, or neurofibromatosis.     MRI CERVICAL SPINE W WO CONTRAST  Result Date: 6/27/2025  No acute cervical spine abnormality on motion degraded exam.     CT Head W/O Contrast  Result Date: 6/27/2025  1. Atrophy with a subtle rounded area of hypodensity adjacent to the right thalamus superiorly that may represent an area of subacute ischemia. Correlation with MRI of the brain may be helpful in further characterization. 2. No significant stenosis or evidence for large vessel occlusion.     CTA HEAD NECK W CONTRAST  Result Date: 6/27/2025  1. Atrophy with a subtle rounded area of hypodensity adjacent to the right thalamus superiorly that may represent an area of subacute ischemia. Correlation with MRI of the brain may be helpful in further characterization. 2. No significant stenosis or

## 2025-06-28 NOTE — CARE COORDINATION
Case Management Assessment  Initial Evaluation    Date/Time of Evaluation: 6/28/2025 1:08 PM  Assessment Completed by: Patricia Tay RN    If patient is discharged prior to next notation, then this note serves as note for discharge by case management.    Patient Name: Larry Moore                   YOB: 1960  Diagnosis: Dizziness [R42]                   Date / Time: 6/27/2025 10:04 AM    Patient Admission Status: Inpatient   Readmission Risk (Low < 19, Mod (19-27), High > 27): Readmission Risk Score: 13    Current PCP: Elif Rivera APRN - NP  PCP verified by CM? (P) Yes (Looking for new currently)    Chart Reviewed: Yes      History Provided by: Patient  Patient Orientation: Alert and Oriented    Patient Cognition: Alert    Hospitalization in the last 30 days (Readmission):  No    If yes, Readmission Assessment in CM Navigator will be completed.    Advance Directives:      Code Status: Full Code   Patient's Primary Decision Maker is: (P) Legal Next of Kin      Discharge Planning:    Patient lives with: (P) Alone Type of Home: (P) Apartment  Primary Care Giver: Self  Patient Support Systems include: (P) Family Members   Current Financial resources: (P) Medicare, Medicaid, Food [place]  Current community resources: (P) ECF/Home Care, Lodging (AOA)  Current services prior to admission: (P) Home Care, Durable Medical Equipment            Current DME: (P) Walker, Cane, Wheelchair            Type of Home Care services:  (P) PT, OT, Aide Services    ADLS  Prior functional level: (P) Assistance with the following:, Housework, Shopping  Current functional level: (P) Assistance with the following:, Housework, Shopping    PT AM-PAC:   /24  OT AM-PAC:   /24    Family can provide assistance at DC: (P) No  Would you like Case Management to discuss the discharge plan with any other family members/significant others, and if so, who? (P) No  Plans to Return to Present Housing: (P) Yes  Other Identified

## 2025-06-28 NOTE — PROGRESS NOTES
Pharmacy Note  Warfarin Consult follow-up      Recent Labs     06/28/25  0333   INR 2.6     Recent Labs     06/27/25  1050 06/28/25  0333   HGB 14.7 13.2   HCT 44.9 41.0    187       Current warfarin drug-drug interactions: Aspirin      Date INR Dose   6/27 2.3 2 mg   6/28 2.6 1 mg            Notes: Significant increase in INR over past 24 hours despite yesterday's 2 mg dose not being fully reflected in today's INR reading. Will decrease dose to warfarin 1 mg PO x1 dose today.             Daily PT/INR while inpatient.     Tim Upton Pharm.D., AGUILA, BCCCP  6/28/2025 8:59 AM

## 2025-06-29 ENCOUNTER — APPOINTMENT (OUTPATIENT)
Dept: GENERAL RADIOLOGY | Age: 65
End: 2025-06-29
Payer: MEDICARE

## 2025-06-29 LAB
ANION GAP SERPL CALCULATED.3IONS-SCNC: 11 MMOL/L (ref 9–16)
BASOPHILS # BLD: 0.07 K/UL (ref 0–0.2)
BASOPHILS NFR BLD: 1 % (ref 0–2)
BUN SERPL-MCNC: 16 MG/DL (ref 8–23)
CALCIUM SERPL-MCNC: 8.6 MG/DL (ref 8.6–10.4)
CHLORIDE SERPL-SCNC: 100 MMOL/L (ref 98–107)
CK SERPL-CCNC: 41 U/L (ref 39–308)
CO2 SERPL-SCNC: 22 MMOL/L (ref 20–31)
CREAT SERPL-MCNC: 1 MG/DL (ref 0.7–1.2)
EOSINOPHIL # BLD: 0.17 K/UL (ref 0–0.44)
EOSINOPHILS RELATIVE PERCENT: 2 % (ref 1–4)
ERYTHROCYTE [DISTWIDTH] IN BLOOD BY AUTOMATED COUNT: 15.6 % (ref 11.8–14.4)
GFR, ESTIMATED: 84 ML/MIN/1.73M2
GLUCOSE SERPL-MCNC: 102 MG/DL (ref 74–99)
HCT VFR BLD AUTO: 36.4 % (ref 40.7–50.3)
HGB BLD-MCNC: 12 G/DL (ref 13–17)
IMM GRANULOCYTES # BLD AUTO: 0.09 K/UL (ref 0–0.3)
IMM GRANULOCYTES NFR BLD: 1 %
INR PPP: 2.5
LYMPHOCYTES NFR BLD: 0.84 K/UL (ref 1.1–3.7)
LYMPHOCYTES RELATIVE PERCENT: 10 % (ref 24–43)
MCH RBC QN AUTO: 29.9 PG (ref 25.2–33.5)
MCHC RBC AUTO-ENTMCNC: 33 G/DL (ref 28.4–34.8)
MCV RBC AUTO: 90.8 FL (ref 82.6–102.9)
MONOCYTES NFR BLD: 0.72 K/UL (ref 0.1–1.2)
MONOCYTES NFR BLD: 9 % (ref 3–12)
NEUTROPHILS NFR BLD: 77 % (ref 36–65)
NEUTS SEG NFR BLD: 6.24 K/UL (ref 1.5–8.1)
NRBC BLD-RTO: 0 PER 100 WBC
PLATELET # BLD AUTO: 182 K/UL (ref 138–453)
PMV BLD AUTO: 8.8 FL (ref 8.1–13.5)
POTASSIUM SERPL-SCNC: 4.6 MMOL/L (ref 3.7–5.3)
PROTHROMBIN TIME: 28.4 SEC (ref 11.7–14.9)
RBC # BLD AUTO: 4.01 M/UL (ref 4.21–5.77)
RBC # BLD: ABNORMAL 10*6/UL
SODIUM SERPL-SCNC: 133 MMOL/L (ref 136–145)
WBC OTHER # BLD: 8.1 K/UL (ref 3.5–11.3)

## 2025-06-29 PROCEDURE — 82550 ASSAY OF CK (CPK): CPT

## 2025-06-29 PROCEDURE — 2500000003 HC RX 250 WO HCPCS

## 2025-06-29 PROCEDURE — 99232 SBSQ HOSP IP/OBS MODERATE 35: CPT | Performed by: PSYCHIATRY & NEUROLOGY

## 2025-06-29 PROCEDURE — 2580000003 HC RX 258

## 2025-06-29 PROCEDURE — 80048 BASIC METABOLIC PNL TOTAL CA: CPT

## 2025-06-29 PROCEDURE — 6370000000 HC RX 637 (ALT 250 FOR IP)

## 2025-06-29 PROCEDURE — 2060000000 HC ICU INTERMEDIATE R&B

## 2025-06-29 PROCEDURE — 85610 PROTHROMBIN TIME: CPT

## 2025-06-29 PROCEDURE — 6370000000 HC RX 637 (ALT 250 FOR IP): Performed by: PSYCHIATRY & NEUROLOGY

## 2025-06-29 PROCEDURE — 36415 COLL VENOUS BLD VENIPUNCTURE: CPT

## 2025-06-29 PROCEDURE — 85025 COMPLETE CBC W/AUTO DIFF WBC: CPT

## 2025-06-29 PROCEDURE — 72040 X-RAY EXAM NECK SPINE 2-3 VW: CPT

## 2025-06-29 RX ORDER — WARFARIN SODIUM 2 MG/1
2 TABLET ORAL
Status: COMPLETED | OUTPATIENT
Start: 2025-06-29 | End: 2025-06-29

## 2025-06-29 RX ORDER — TRAMADOL HYDROCHLORIDE 50 MG/1
50 TABLET ORAL EVERY 6 HOURS PRN
Status: DISCONTINUED | OUTPATIENT
Start: 2025-06-29 | End: 2025-07-03 | Stop reason: HOSPADM

## 2025-06-29 RX ADMIN — GABAPENTIN 900 MG: 300 CAPSULE ORAL at 20:45

## 2025-06-29 RX ADMIN — RISPERIDONE 0.5 MG: 0.5 TABLET, FILM COATED ORAL at 08:47

## 2025-06-29 RX ADMIN — TAMSULOSIN HYDROCHLORIDE 0.8 MG: 0.4 CAPSULE ORAL at 08:46

## 2025-06-29 RX ADMIN — PANTOPRAZOLE SODIUM 40 MG: 40 TABLET, DELAYED RELEASE ORAL at 08:47

## 2025-06-29 RX ADMIN — SODIUM CHLORIDE, PRESERVATIVE FREE 10 ML: 5 INJECTION INTRAVENOUS at 08:48

## 2025-06-29 RX ADMIN — ACETAMINOPHEN 650 MG: 325 TABLET ORAL at 08:46

## 2025-06-29 RX ADMIN — SODIUM CHLORIDE, PRESERVATIVE FREE 10 ML: 5 INJECTION INTRAVENOUS at 08:47

## 2025-06-29 RX ADMIN — Medication 2000 UNITS: at 08:46

## 2025-06-29 RX ADMIN — Medication 200 MG: at 20:46

## 2025-06-29 RX ADMIN — ATORVASTATIN CALCIUM 20 MG: 20 TABLET, FILM COATED ORAL at 20:45

## 2025-06-29 RX ADMIN — RISPERIDONE 0.5 MG: 0.5 TABLET, FILM COATED ORAL at 20:45

## 2025-06-29 RX ADMIN — DICLOFENAC SODIUM 4 G: 10 GEL TOPICAL at 12:31

## 2025-06-29 RX ADMIN — SODIUM CHLORIDE: 0.9 INJECTION, SOLUTION INTRAVENOUS at 05:35

## 2025-06-29 RX ADMIN — GABAPENTIN 900 MG: 300 CAPSULE ORAL at 14:57

## 2025-06-29 RX ADMIN — OXCARBAZEPINE 300 MG: 300 TABLET, FILM COATED ORAL at 20:46

## 2025-06-29 RX ADMIN — WARFARIN SODIUM 2 MG: 2 TABLET ORAL at 18:38

## 2025-06-29 RX ADMIN — TRAMADOL HYDROCHLORIDE 50 MG: 50 TABLET, COATED ORAL at 20:46

## 2025-06-29 RX ADMIN — FOLIC ACID 1 MG: 1 TABLET ORAL at 08:46

## 2025-06-29 RX ADMIN — CETIRIZINE HYDROCHLORIDE 10 MG: 10 TABLET, FILM COATED ORAL at 08:45

## 2025-06-29 RX ADMIN — CYANOCOBALAMIN TAB 500 MCG 1000 MCG: 500 TAB at 08:45

## 2025-06-29 RX ADMIN — HYDROXYZINE HYDROCHLORIDE 10 MG: 10 TABLET, FILM COATED ORAL at 08:45

## 2025-06-29 RX ADMIN — FUROSEMIDE 20 MG: 20 TABLET ORAL at 08:45

## 2025-06-29 RX ADMIN — ASPIRIN 81 MG: 81 TABLET, CHEWABLE ORAL at 08:46

## 2025-06-29 RX ADMIN — DICLOFENAC SODIUM 4 G: 10 GEL TOPICAL at 20:46

## 2025-06-29 RX ADMIN — SENNOSIDES AND DOCUSATE SODIUM 1 TABLET: 50; 8.6 TABLET ORAL at 08:46

## 2025-06-29 RX ADMIN — TRAMADOL HYDROCHLORIDE 50 MG: 50 TABLET, COATED ORAL at 10:47

## 2025-06-29 RX ADMIN — Medication 5 MG: at 22:21

## 2025-06-29 RX ADMIN — HYDROXYZINE HYDROCHLORIDE 10 MG: 10 TABLET, FILM COATED ORAL at 22:21

## 2025-06-29 RX ADMIN — OXCARBAZEPINE 300 MG: 300 TABLET, FILM COATED ORAL at 08:47

## 2025-06-29 RX ADMIN — DOCUSATE SODIUM 200 MG: 100 CAPSULE, LIQUID FILLED ORAL at 08:45

## 2025-06-29 RX ADMIN — Medication 200 MG: at 08:47

## 2025-06-29 RX ADMIN — GABAPENTIN 900 MG: 300 CAPSULE ORAL at 08:45

## 2025-06-29 RX ADMIN — SODIUM CHLORIDE, PRESERVATIVE FREE 10 ML: 5 INJECTION INTRAVENOUS at 20:45

## 2025-06-29 ASSESSMENT — PAIN DESCRIPTION - LOCATION
LOCATION: LEG
LOCATION: LEG
LOCATION: ARM;LEG
LOCATION: LEG
LOCATION: LEG

## 2025-06-29 ASSESSMENT — PAIN DESCRIPTION - ORIENTATION
ORIENTATION: LEFT
ORIENTATION: RIGHT;LEFT

## 2025-06-29 ASSESSMENT — PAIN DESCRIPTION - DESCRIPTORS
DESCRIPTORS: ACHING;DISCOMFORT
DESCRIPTORS: ACHING;DISCOMFORT
DESCRIPTORS: SORE;PRESSURE;POUNDING
DESCRIPTORS: ACHING;DISCOMFORT
DESCRIPTORS: ACHING

## 2025-06-29 ASSESSMENT — PAIN SCALES - GENERAL
PAINLEVEL_OUTOF10: 8
PAINLEVEL_OUTOF10: 5
PAINLEVEL_OUTOF10: 5
PAINLEVEL_OUTOF10: 8
PAINLEVEL_OUTOF10: 0
PAINLEVEL_OUTOF10: 8
PAINLEVEL_OUTOF10: 7
PAINLEVEL_OUTOF10: 0
PAINLEVEL_OUTOF10: 8
PAINLEVEL_OUTOF10: 0

## 2025-06-29 ASSESSMENT — PAIN SCALES - WONG BAKER: WONGBAKER_NUMERICALRESPONSE: NO HURT

## 2025-06-29 NOTE — PROGRESS NOTES
Pharmacy Note  Warfarin Consult follow-up      Recent Labs     06/29/25  0528   INR 2.5     Recent Labs     06/27/25  1050 06/28/25  0333 06/29/25  0528   HGB 14.7 13.2 12.0*   HCT 44.9 41.0 36.4*    187 182       Current warfarin drug-drug interactions:  Date INR Dose   6/27 2.3 2 mg   6/28 2.6 1 mg    6/29  2.5  2 mg         Notes:     Resume home regimen of warfarin 2 mg PO x1 dose today.                Daily PT/INR while inpatient.     Tim Upton, Pharm.D., AGUILA, BCCCP  6/29/2025 8:17 AM

## 2025-06-29 NOTE — PLAN OF CARE
Problem: Discharge Planning  Goal: Discharge to home or other facility with appropriate resources  6/29/2025 0833 by Jada Bowers RN  Outcome: Progressing  6/28/2025 1947 by Brigid Escobar RN  Outcome: Progressing     Problem: Pain  Goal: Verbalizes/displays adequate comfort level or baseline comfort level  6/29/2025 0833 by Jada Bowers RN  Outcome: Progressing  6/28/2025 1947 by Brigid Escobar RN  Outcome: Progressing     Problem: Skin/Tissue Integrity  Goal: Skin integrity remains intact  Description: 1.  Monitor for areas of redness and/or skin breakdown  2.  Assess vascular access sites hourly  3.  Every 4-6 hours minimum:  Change oxygen saturation probe site  4.  Every 4-6 hours:  If on nasal continuous positive airway pressure, respiratory therapy assess nares and determine need for appliance change or resting period  6/29/2025 0833 by Jada Bowers RN  Outcome: Progressing  6/28/2025 1947 by Brigid Escobar RN  Outcome: Progressing     Problem: Safety - Adult  Goal: Free from fall injury  6/29/2025 0833 by Jada Bowers RN  Outcome: Progressing  6/28/2025 1947 by Brigid Escobar RN  Outcome: Progressing     Problem: ABCDS Injury Assessment  Goal: Absence of physical injury  6/29/2025 0833 by Jada Bowers RN  Outcome: Progressing  6/28/2025 1947 by Brigid Escobar RN  Outcome: Progressing

## 2025-06-29 NOTE — PROGRESS NOTES
[facility] Neurology   IN-PATIENT SERVICE  General Neurology Progress Note   [facility]                Date:   6/29/2025  Patient name:  Larry Moore  Date of admission:  6/27/2025 10:04 AM  MRN:   [id number]  Account:  [id number]  YOB: 1960  PCP:    Elif Rivera APRN - NP  Room:   [address] [place] [address]  Code Status:    Full Code  Chief Complaint:   Chief Complaint   Patient presents with    Ear Fullness    Dizziness       Interval history      The patient was seen and examined at bedside this morning.  Labs, chart, and VS reviewed. No acute events overnight. Still with persistent left-sided dysmetria and left-sided hearing loss.    App[place]iate [facility] [place]    Brief History     Larry Moore is a 64 y.o. male who presents with Ear Fullness and Dizziness, and he is admitted to the hospital for the management of  left ear fullness, gait ataxia. The patient was seen and examined and the chart was reviewed.  Patient with history of neurofibromatosis type I currently following with [facility] neurology presents to the ED today with chief complaint of left ear fullness.  States he has diminished hearing on the left side and this has been ongoing for the last several weeks.  In addition today he states he has been more unstable ambulating and has been falling more often.  He does have significant ecchymosis over the left anterior leg status post fall several days back.  He is on Coumadin with INR of 2.3 today.  Sodium level 127.  He has known history of right thalamic hamartoma last MRI brain done at [facility] in June, 2024.  MRI cervical spine was motion degraded at the time.  MRI thoracic spine without acute findings.  Unable to view images.  CTA head and neck with no significant stenosis or occlusion today.  CT head does reveal hypodensity in the right thalamus of unclear chronicity.  Blood pressure is 150/100.     Upon further review patient was just discharged from [facility] on

## 2025-06-30 ENCOUNTER — APPOINTMENT (OUTPATIENT)
Dept: MRI IMAGING | Age: 65
End: 2025-06-30
Payer: MEDICARE

## 2025-06-30 LAB
ANION GAP SERPL CALCULATED.3IONS-SCNC: 11 MMOL/L (ref 9–16)
BASOPHILS # BLD: 0.07 K/UL (ref 0–0.2)
BASOPHILS NFR BLD: 1 % (ref 0–2)
BUN SERPL-MCNC: 14 MG/DL (ref 8–23)
CALCIUM SERPL-MCNC: 8.7 MG/DL (ref 8.6–10.4)
CHLORIDE SERPL-SCNC: 99 MMOL/L (ref 98–107)
CO2 SERPL-SCNC: 24 MMOL/L (ref 20–31)
CREAT SERPL-MCNC: 0.8 MG/DL (ref 0.7–1.2)
EOSINOPHIL # BLD: 0.2 K/UL (ref 0–0.44)
EOSINOPHILS RELATIVE PERCENT: 2 % (ref 1–4)
ERYTHROCYTE [DISTWIDTH] IN BLOOD BY AUTOMATED COUNT: 15.7 % (ref 11.8–14.4)
GFR, ESTIMATED: >90 ML/MIN/1.73M2
GLUCOSE SERPL-MCNC: 89 MG/DL (ref 74–99)
HCT VFR BLD AUTO: 35.7 % (ref 40.7–50.3)
HGB BLD-MCNC: 11.5 G/DL (ref 13–17)
IMM GRANULOCYTES # BLD AUTO: 0.1 K/UL (ref 0–0.3)
IMM GRANULOCYTES NFR BLD: 1 %
INR PPP: 2.1
LYMPHOCYTES NFR BLD: 0.82 K/UL (ref 1.1–3.7)
LYMPHOCYTES RELATIVE PERCENT: 10 % (ref 24–43)
MCH RBC QN AUTO: 29.6 PG (ref 25.2–33.5)
MCHC RBC AUTO-ENTMCNC: 32.2 G/DL (ref 28.4–34.8)
MCV RBC AUTO: 91.8 FL (ref 82.6–102.9)
MONOCYTES NFR BLD: 0.75 K/UL (ref 0.1–1.2)
MONOCYTES NFR BLD: 9 % (ref 3–12)
NEUTROPHILS NFR BLD: 77 % (ref 36–65)
NEUTS SEG NFR BLD: 6.73 K/UL (ref 1.5–8.1)
NRBC BLD-RTO: 0 PER 100 WBC
PLATELET # BLD AUTO: 187 K/UL (ref 138–453)
PMV BLD AUTO: 8.8 FL (ref 8.1–13.5)
POTASSIUM SERPL-SCNC: 4.1 MMOL/L (ref 3.7–5.3)
PROTHROMBIN TIME: 24.1 SEC (ref 11.7–14.9)
RBC # BLD AUTO: 3.89 M/UL (ref 4.21–5.77)
RBC # BLD: ABNORMAL 10*6/UL
SODIUM SERPL-SCNC: 134 MMOL/L (ref 136–145)
WBC OTHER # BLD: 8.7 K/UL (ref 3.5–11.3)

## 2025-06-30 PROCEDURE — 6370000000 HC RX 637 (ALT 250 FOR IP)

## 2025-06-30 PROCEDURE — 85025 COMPLETE CBC W/AUTO DIFF WBC: CPT

## 2025-06-30 PROCEDURE — 99232 SBSQ HOSP IP/OBS MODERATE 35: CPT | Performed by: NEUROLOGICAL SURGERY

## 2025-06-30 PROCEDURE — A9579 GAD-BASE MR CONTRAST NOS,1ML: HCPCS | Performed by: NURSE PRACTITIONER

## 2025-06-30 PROCEDURE — 97535 SELF CARE MNGMENT TRAINING: CPT

## 2025-06-30 PROCEDURE — 6360000002 HC RX W HCPCS

## 2025-06-30 PROCEDURE — 2060000000 HC ICU INTERMEDIATE R&B

## 2025-06-30 PROCEDURE — 99232 SBSQ HOSP IP/OBS MODERATE 35: CPT

## 2025-06-30 PROCEDURE — 36415 COLL VENOUS BLD VENIPUNCTURE: CPT

## 2025-06-30 PROCEDURE — 2500000003 HC RX 250 WO HCPCS

## 2025-06-30 PROCEDURE — 6370000000 HC RX 637 (ALT 250 FOR IP): Performed by: PSYCHIATRY & NEUROLOGY

## 2025-06-30 PROCEDURE — 85610 PROTHROMBIN TIME: CPT

## 2025-06-30 PROCEDURE — 97110 THERAPEUTIC EXERCISES: CPT

## 2025-06-30 PROCEDURE — 97530 THERAPEUTIC ACTIVITIES: CPT

## 2025-06-30 PROCEDURE — 72157 MRI CHEST SPINE W/O & W/DYE: CPT

## 2025-06-30 PROCEDURE — 97116 GAIT TRAINING THERAPY: CPT

## 2025-06-30 PROCEDURE — 80183 DRUG SCRN QUANT OXCARBAZEPIN: CPT

## 2025-06-30 PROCEDURE — 97166 OT EVAL MOD COMPLEX 45 MIN: CPT

## 2025-06-30 PROCEDURE — 6360000004 HC RX CONTRAST MEDICATION: Performed by: NURSE PRACTITIONER

## 2025-06-30 PROCEDURE — 80048 BASIC METABOLIC PNL TOTAL CA: CPT

## 2025-06-30 RX ORDER — TRAZODONE HYDROCHLORIDE 50 MG/1
25 TABLET ORAL NIGHTLY PRN
Status: DISPENSED | OUTPATIENT
Start: 2025-06-30 | End: 2025-07-01

## 2025-06-30 RX ORDER — PROPRANOLOL HYDROCHLORIDE 10 MG/1
10 TABLET ORAL ONCE
Status: COMPLETED | OUTPATIENT
Start: 2025-06-30 | End: 2025-06-30

## 2025-06-30 RX ORDER — SODIUM CHLORIDE 1 G/1
1 TABLET ORAL 2 TIMES DAILY WITH MEALS
Status: COMPLETED | OUTPATIENT
Start: 2025-06-30 | End: 2025-07-01

## 2025-06-30 RX ORDER — WARFARIN SODIUM 3 MG/1
3 TABLET ORAL
Status: COMPLETED | OUTPATIENT
Start: 2025-06-30 | End: 2025-06-30

## 2025-06-30 RX ORDER — GADOTERIDOL 279.3 MG/ML
18 INJECTION INTRAVENOUS
Status: COMPLETED | OUTPATIENT
Start: 2025-06-30 | End: 2025-06-30

## 2025-06-30 RX ORDER — LORAZEPAM 2 MG/ML
0.5 INJECTION INTRAMUSCULAR EVERY 10 MIN PRN
Status: DISCONTINUED | OUTPATIENT
Start: 2025-06-30 | End: 2025-07-03 | Stop reason: HOSPADM

## 2025-06-30 RX ORDER — HYDROXYZINE HYDROCHLORIDE 10 MG/1
15 TABLET, FILM COATED ORAL ONCE
Status: COMPLETED | OUTPATIENT
Start: 2025-06-30 | End: 2025-06-30

## 2025-06-30 RX ADMIN — SODIUM CHLORIDE 1 G: 1 TABLET ORAL at 18:25

## 2025-06-30 RX ADMIN — OXCARBAZEPINE 300 MG: 300 TABLET, FILM COATED ORAL at 21:02

## 2025-06-30 RX ADMIN — TAMSULOSIN HYDROCHLORIDE 0.8 MG: 0.4 CAPSULE ORAL at 07:45

## 2025-06-30 RX ADMIN — RISPERIDONE 0.5 MG: 0.5 TABLET, FILM COATED ORAL at 21:02

## 2025-06-30 RX ADMIN — PROPRANOLOL HYDROCHLORIDE 10 MG: 10 TABLET ORAL at 11:27

## 2025-06-30 RX ADMIN — Medication 200 MG: at 07:46

## 2025-06-30 RX ADMIN — RISPERIDONE 0.5 MG: 0.5 TABLET, FILM COATED ORAL at 07:46

## 2025-06-30 RX ADMIN — GABAPENTIN 900 MG: 300 CAPSULE ORAL at 13:46

## 2025-06-30 RX ADMIN — GABAPENTIN 900 MG: 300 CAPSULE ORAL at 07:45

## 2025-06-30 RX ADMIN — DICLOFENAC SODIUM 4 G: 10 GEL TOPICAL at 07:53

## 2025-06-30 RX ADMIN — OXCARBAZEPINE 300 MG: 300 TABLET, FILM COATED ORAL at 07:46

## 2025-06-30 RX ADMIN — ASPIRIN 81 MG: 81 TABLET, CHEWABLE ORAL at 07:46

## 2025-06-30 RX ADMIN — Medication 200 MG: at 21:02

## 2025-06-30 RX ADMIN — SODIUM CHLORIDE, PRESERVATIVE FREE 10 ML: 5 INJECTION INTRAVENOUS at 21:05

## 2025-06-30 RX ADMIN — Medication 5 MG: at 22:16

## 2025-06-30 RX ADMIN — FOLIC ACID 1 MG: 1 TABLET ORAL at 07:45

## 2025-06-30 RX ADMIN — HYDROXYZINE HYDROCHLORIDE 10 MG: 10 TABLET, FILM COATED ORAL at 22:14

## 2025-06-30 RX ADMIN — ATORVASTATIN CALCIUM 20 MG: 20 TABLET, FILM COATED ORAL at 21:02

## 2025-06-30 RX ADMIN — HYDROXYZINE HYDROCHLORIDE 10 MG: 10 TABLET, FILM COATED ORAL at 10:23

## 2025-06-30 RX ADMIN — DICLOFENAC SODIUM 4 G: 10 GEL TOPICAL at 15:02

## 2025-06-30 RX ADMIN — DOCUSATE SODIUM 200 MG: 100 CAPSULE, LIQUID FILLED ORAL at 07:46

## 2025-06-30 RX ADMIN — SODIUM CHLORIDE 1 G: 1 TABLET ORAL at 10:23

## 2025-06-30 RX ADMIN — Medication 2000 UNITS: at 07:45

## 2025-06-30 RX ADMIN — SODIUM CHLORIDE, PRESERVATIVE FREE 10 ML: 5 INJECTION INTRAVENOUS at 07:47

## 2025-06-30 RX ADMIN — GADOTERIDOL 18 ML: 279.3 INJECTION, SOLUTION INTRAVENOUS at 17:34

## 2025-06-30 RX ADMIN — Medication 0.5 MG: at 16:50

## 2025-06-30 RX ADMIN — CYANOCOBALAMIN TAB 500 MCG 1000 MCG: 500 TAB at 07:46

## 2025-06-30 RX ADMIN — HYDROXYZINE HYDROCHLORIDE 15 MG: 10 TABLET ORAL at 13:43

## 2025-06-30 RX ADMIN — SODIUM CHLORIDE, PRESERVATIVE FREE 10 ML: 5 INJECTION INTRAVENOUS at 21:01

## 2025-06-30 RX ADMIN — WARFARIN SODIUM 3 MG: 3 TABLET ORAL at 18:25

## 2025-06-30 RX ADMIN — GABAPENTIN 900 MG: 300 CAPSULE ORAL at 21:01

## 2025-06-30 RX ADMIN — POLYETHYLENE GLYCOL 3350 17 G: 17 POWDER, FOR SOLUTION ORAL at 21:14

## 2025-06-30 RX ADMIN — TRAMADOL HYDROCHLORIDE 50 MG: 50 TABLET, COATED ORAL at 07:45

## 2025-06-30 RX ADMIN — TRAMADOL HYDROCHLORIDE 50 MG: 50 TABLET, COATED ORAL at 21:13

## 2025-06-30 RX ADMIN — SENNOSIDES AND DOCUSATE SODIUM 1 TABLET: 50; 8.6 TABLET ORAL at 07:45

## 2025-06-30 RX ADMIN — DICLOFENAC SODIUM 4 G: 10 GEL TOPICAL at 21:03

## 2025-06-30 RX ADMIN — FUROSEMIDE 20 MG: 20 TABLET ORAL at 07:46

## 2025-06-30 RX ADMIN — CETIRIZINE HYDROCHLORIDE 10 MG: 10 TABLET, FILM COATED ORAL at 07:46

## 2025-06-30 RX ADMIN — PANTOPRAZOLE SODIUM 40 MG: 40 TABLET, DELAYED RELEASE ORAL at 07:46

## 2025-06-30 RX ADMIN — TRAZODONE HYDROCHLORIDE 25 MG: 50 TABLET ORAL at 22:13

## 2025-06-30 ASSESSMENT — PAIN SCALES - GENERAL
PAINLEVEL_OUTOF10: 8
PAINLEVEL_OUTOF10: 5
PAINLEVEL_OUTOF10: 5
PAINLEVEL_OUTOF10: 7
PAINLEVEL_OUTOF10: 7
PAINLEVEL_OUTOF10: 5

## 2025-06-30 ASSESSMENT — PAIN DESCRIPTION - DESCRIPTORS
DESCRIPTORS: ACHING
DESCRIPTORS: SHARP;PRESSURE;SORE

## 2025-06-30 ASSESSMENT — PAIN DESCRIPTION - LOCATION
LOCATION: LEG
LOCATION: LEG;HEAD

## 2025-06-30 ASSESSMENT — PAIN DESCRIPTION - ORIENTATION
ORIENTATION: LEFT
ORIENTATION: LEFT;MID

## 2025-06-30 ASSESSMENT — ENCOUNTER SYMPTOMS
SHORTNESS OF BREATH: 0
TROUBLE SWALLOWING: 0
ABDOMINAL PAIN: 0
VOICE CHANGE: 0
RHINORRHEA: 0

## 2025-06-30 NOTE — PROGRESS NOTES
The examination is motion degraded. BONES/ALIGNMENT: ACDF C3-C4.  Grade 1 anterolisthesis of C5 on C6.  Vertebral heights are maintained.  Bone marrow signal is benign. SPINAL CORD: No abnormal cord signal is seen. SOFT TISSUES: No abnormal enhancement of the cervical spine. No paraspinal mass identified. C2-C3: Facet hypertrophy causes mild right foraminal stenosis. C3-C4: Facet hypertrophy causes no stenosis. C4-C5: Facet hypertrophy causes no stenosis. C5-C6: Facet and uncovertebral hypertrophy cause mild left foraminal stenosis and mild thecal sac stenosis. C6-C7: Disc osteophyte complex causes mild thecal sac stenosis. C7-T1: Facet hypertrophy causes moderate left foraminal stenosis.     No acute cervical spine abnormality on motion degraded exam.     CT Head W/O Contrast  Result Date: 6/27/2025  EXAMINATION: CTA OF THE HEAD AND NECK WITH CONTRAST; CT OF THE HEAD WITHOUT CONTRAST 6/27/2025 11:25 am: TECHNIQUE: CTA of the head and neck was performed with the administration of intravenous contrast. Multiplanar reformatted images are provided for review.  MIP images are provided for review. Stenosis of the internal carotid arteries measured using NASCET criteria. Automated exposure control, iterative reconstruction, and/or weight based adjustment of the mA/kV was utilized to reduce the radiation dose to as low as reasonably achievable.; CT of the head was performed without the administration of intravenous contrast. Automated exposure control, iterative reconstruction, and/or weight based adjustment of the mA/kV was utilized to reduce the radiation dose to as low as reasonably achievable. Noncontrast CT of the head with reconstructed 2-D images are also provided for review.  This scan was analyzed using Viz.ai contact LVO. Identification of suspected findings is not for diagnostic use beyond notification. Viz LVO is limited to analysis of imaging data and should not be used in-lieu of full patient evaluation or  rounded area of hypodensity adjacent to the right thalamus superiorly that may represent an area of subacute ischemia. Correlation with MRI of the brain may be helpful in further characterization. 2. No significant stenosis or evidence for large vessel occlusion.     Complete Echo (TTE) w/wo Imaging Agent, Strain, 3D, Bubble Study  Result Date: 6/20/2025  1 1 [facility] [address] [place] [address] [phone] (fax) Echocardiogram-[facility] Name: ERASTO MORE Study Date: 06/20/2025 12:57 PM MRN: [id number] Account #: [id number] B/P: 100 mmHg/67 mmHg HR: YOB: 1960 Location: [facility] Height: 66 in. Age: 64 year(s) Patient Room: 4183 Weight: 209 lb. Gender: Male Patient Status: OutPt BSA: 2.04 m2 Indication: Syncope, AVR #27 mm Freestyle Medtronic Stentless Valve, Repair Ascending Aorta Aneurysm Examination: Echocardiogram (Complete) Image Quality: Technically Difficult study Patient Consent: Procedure explained to patient Conclusions Left Ventricle: The left ventricle is normal size. Global left ventricular systolic function is normal. The EF is 55 % visually. Left ventricular wall thickness is increased. The septum is abnormal in its motion, not unusal finding in the post open heart patient. Normal diastolic function. Concentric left ventricular hypertrophy. Right Ventricle: The right ventricle is normal in size. Right ventricular systolic function appears normal. Unable to assess right sided pressures due to lack of measurable tricuspid regurgitation. Left Atrium: The left atrium is severely enlarged. Mitral Valve: There is nonspecific thickening of the mitral valve leaflet. Moderate to severe mitral regurgitation. Aortic Valve: A bioprosthetic aortic valve is seen with normal Doppler flows. No prosthesis regurgitation. Measurements Left Ventricle Label Value Normal Value LVOT VTI 24.6 cm (18cm - 22cm) LVOT PGmax 9 mmHg LVEF visual 55 % LVDd, 2D 5.32 cm

## 2025-06-30 NOTE — PROGRESS NOTES
Occupational Therapy      Occupational Therapy Initial Evaluation  Facility/Department: [facility] 4B STEPDOWN   Patient Name: Larry Moore        MRN: [id number]    : 1960    Date of Service: 2025    Chief Complaint   Patient presents with    Ear Fullness    Dizziness     Past Medical History:  has a past medical history of AMS (altered mental status), Atrial fibrillation (HCC), Bipolar 1 disorder (HCC), Depression, Hypertension, Neck pain, Neurofibromatosis (HCC), Patient in clinical research study, Severe recurrent major depression without psychotic features (HCC), and Twitching.  Past Surgical History:  has a past surgical history that includes hernia repair; Neck surgery (); Abdomen surgery; Tonsillectomy; Appendectomy; Dilatation, esophagus; Cardiac surgery (2015); Cardiac surgery (2015); Colonoscopy (2021); and Upper gastrointestinal endoscopy (2021).    Discharge Recommendations  Discharge Recommendations: Continue to assess pending progress, Patient would benefit from continued therapy after discharge  OT Equipment Recommendations  Equipment Needed: No    Assessment  Performance deficits / Impairments: Decreased functional mobility ;Decreased ADL status;Decreased safe awareness;Decreased endurance;Decreased balance;Decreased high-level IADLs;Decreased coordination  Assessment: Patient is normaly independent with functional mobility, personal ADLs and household tasks who presents to OT eval with above deficit affecting ability to particpate in ADLs/ADL related mobility. At  this time patient requires stand by assist with functional mobility and up to CGA assist with ADLs. Patient would benefit from continued therapy during acute stay. Patient is safe to return to prior living arrangement with 24/7 assistance to safely perform personal ADLs and mobility.  Prognosis: Good  Decision Making: Medium Complexity  REQUIRES OT FOLLOW-UP: Yes  Activity Tolerance  Activity Tolerance:  Treatment Minutes: 25 Minutes    Electronically signed by MILTON Jurado on 6/30/25 at 3:19 PM EDT

## 2025-06-30 NOTE — PROGRESS NOTES
Pharmacy Note  Warfarin Consult follow-up      Recent Labs     06/30/25  0542   INR 2.1     Recent Labs     06/28/25  0333 06/29/25  0528 06/30/25  0542   HGB 13.2 12.0* 11.5*   HCT 41.0 36.4* 35.7*    182 187       Current warfarin drug-drug interactions: ASA, tramadol    Date INR Dose   6/27 2.3 2 mg   6/28 2.6 1 mg   6/29 2.5 2 mg   6/30 2.1 3 mg      Notes:     -Therapeutic INR  -Home Coumadin 3 mg dose this evening                   Daily PT/INR while inpatient.      Thank you for the consult. Will continue to follow.  Chito Owens, ChristD

## 2025-06-30 NOTE — PROGRESS NOTES
[facility] Neurology   IN-PATIENT SERVICE  General Neurology Progress Note   [facility]                Date:   6/30/2025  Patient name:  Larry Moore  Date of admission:  6/27/2025 10:04 AM  MRN:   [id number]  Account:  [id number]  YOB: 1960  PCP:    Elif Rivera APRN - NP  Room:   [address] [place] [address]  Code Status:    Full Code  Chief Complaint:   Chief Complaint   Patient presents with    Ear Fullness    Dizziness       Interval history      The patient was seen and examined at bedside this morning.  Labs, chart, and VS reviewed. No acute events overnight.    Concern for akithisia: Restless, constantly moving neck and BL LE which he states is new; on risperidone 0.5 bid. Will trial 1 dose propanolol to avoid sedating meds but will give ativan if needed to obtain MRI; will consider benztropine if no relief   Appreciate NSG recs    Brief History     Larry Moore is a 64 y.o. male who presents with Ear Fullness and Dizziness, and he is admitted to the hospital for the management of  left ear fullness, gait ataxia. The patient was seen and examined and the chart was reviewed.  Patient with history of neurofibromatosis type I currently following with [facility] neurology presents to the ED today with chief complaint of left ear fullness.  States he has diminished hearing on the left side and this has been ongoing for the last several weeks.  In addition today he states he has been more unstable ambulating and has been falling more often.  He does have significant ecchymosis over the left anterior leg status post fall several days back.  He is on Coumadin with INR of 2.3 today.  Sodium level 127.  He has known history of right thalamic hamartoma last MRI brain done at [facility] in June, 2024.  MRI cervical spine was motion degraded at the time.  MRI thoracic spine without acute findings.  Unable to view images.  CTA head and neck with no significant stenosis or occlusion today.

## 2025-06-30 NOTE — PROGRESS NOTES
Physical Therapy  Facility/Department: [address] STEPDOWN   Physical Therapy Daily Treatment Note    Patient Name: Larry Moore        MRN: [id number]    : 1960    Date of Service: 2025    Chief Complaint   Patient presents with    Ear Fullness    Dizziness     Past Medical History:  has a past medical history of AMS (altered mental status), Atrial fibrillation (HCC), Bipolar 1 disorder (HCC), Depression, Hypertension, Neck pain, Neurofibromatosis (HCC), Patient in clinical research study, Severe recurrent major depression without psychotic features (HCC), and Twitching.  Past Surgical History:  has a past surgical history that includes hernia repair; Neck surgery (); Abdomen surgery; Tonsillectomy; Appendectomy; Dilatation, esophagus; Cardiac surgery (2015); Cardiac surgery (2015); Colonoscopy (2021); and Upper gastrointestinal endoscopy (2021).    Discharge Recommendations  Discharge Recommendations: Patient would benefit from continued therapy after discharge  PT Equipment Recommendations  Equipment Needed: No  Other: Pt owns a RW with a basket, cane and a w/c    Assessment  Body Structures, Functions, Activity Limitations Requiring Skilled Therapeutic Intervention: Decreased functional mobility ;Decreased strength;Decreased endurance;Decreased balance  Assessment: Pt ambulated 250 ft x 6 RW SBA.  No LOB noted today, but slight left lateral sway; pt was able to self correct. Pt could benefit from a continuation of PT for gait , strengthening and functional mobility prior to his DC  Activity Tolerance  Activity Tolerance: Patient tolerated treatment well  Safety Devices  Type of Devices: Nurse notified;Left in chair;Call light within reach;Gait belt  Restraints  Restraints Initially in Place: No    AM-PAC  AM-PAC Basic Mobility - Inpatient   How much help is needed turning from your back to your side while in a flat bed without using bedrails?: None  How much help is needed

## 2025-06-30 NOTE — PLAN OF CARE
Problem: Discharge Planning  Goal: Discharge to home or other facility with appropriate resources  6/30/2025 0900 by Valerio Duran RN  Outcome: Progressing  6/29/2025 2316 by Brigid Escobar RN  Outcome: Progressing     Problem: Pain  Goal: Verbalizes/displays adequate comfort level or baseline comfort level  6/30/2025 0900 by Valerio Duran RN  Outcome: Progressing  6/29/2025 2316 by Brigid Escobar RN  Outcome: Progressing     Problem: Skin/Tissue Integrity  Goal: Skin integrity remains intact  Description: 1.  Monitor for areas of redness and/or skin breakdown  2.  Assess vascular access sites hourly  3.  Every 4-6 hours minimum:  Change oxygen saturation probe site  4.  Every 4-6 hours:  If on nasal continuous positive airway pressure, respiratory therapy assess nares and determine need for appliance change or resting period  6/30/2025 0900 by Valerio Duran RN  Outcome: Progressing  6/29/2025 2316 by Brigid Escobar RN  Outcome: Progressing     Problem: Safety - Adult  Goal: Free from fall injury  6/30/2025 0900 by Valerio Duran RN  Outcome: Progressing  6/29/2025 2316 by Brigid Escobar RN  Outcome: Progressing     Problem: ABCDS Injury Assessment  Goal: Absence of physical injury  6/30/2025 0900 by Valerio Duran RN  Outcome: Progressing  6/29/2025 2316 by Brigid Escobar RN  Outcome: Progressing

## 2025-07-01 PROBLEM — G25.71 DRUG INDUCED AKATHISIA: Status: ACTIVE | Noted: 2025-07-01

## 2025-07-01 PROBLEM — I63.81 CEREBROVASCULAR ACCIDENT (CVA) OF RIGHT BASAL GANGLIA (HCC): Status: ACTIVE | Noted: 2025-07-01

## 2025-07-01 PROBLEM — R90.89 ABNORMAL BRAIN MRI: Status: ACTIVE | Noted: 2025-07-01

## 2025-07-01 PROBLEM — G93.9 BRAIN LESION: Status: ACTIVE | Noted: 2025-07-01

## 2025-07-01 LAB
ANION GAP SERPL CALCULATED.3IONS-SCNC: 12 MMOL/L (ref 9–16)
BASOPHILS # BLD: 0.09 K/UL (ref 0–0.2)
BASOPHILS NFR BLD: 1 % (ref 0–2)
BUN SERPL-MCNC: 17 MG/DL (ref 8–23)
CALCIUM SERPL-MCNC: 9 MG/DL (ref 8.6–10.4)
CHLORIDE SERPL-SCNC: 98 MMOL/L (ref 98–107)
CO2 SERPL-SCNC: 24 MMOL/L (ref 20–31)
CREAT SERPL-MCNC: 0.9 MG/DL (ref 0.7–1.2)
EOSINOPHIL # BLD: 0.24 K/UL (ref 0–0.44)
EOSINOPHILS RELATIVE PERCENT: 3 % (ref 1–4)
ERYTHROCYTE [DISTWIDTH] IN BLOOD BY AUTOMATED COUNT: 15.3 % (ref 11.8–14.4)
GFR, ESTIMATED: >90 ML/MIN/1.73M2
GLUCOSE SERPL-MCNC: 97 MG/DL (ref 74–99)
HCT VFR BLD AUTO: 36.5 % (ref 40.7–50.3)
HGB BLD-MCNC: 12 G/DL (ref 13–17)
IMM GRANULOCYTES # BLD AUTO: 0.08 K/UL (ref 0–0.3)
IMM GRANULOCYTES NFR BLD: 1 %
INR PPP: 2
LYMPHOCYTES NFR BLD: 0.87 K/UL (ref 1.1–3.7)
LYMPHOCYTES RELATIVE PERCENT: 10 % (ref 24–43)
MCH RBC QN AUTO: 30 PG (ref 25.2–33.5)
MCHC RBC AUTO-ENTMCNC: 32.9 G/DL (ref 28.4–34.8)
MCV RBC AUTO: 91.3 FL (ref 82.6–102.9)
MONOCYTES NFR BLD: 0.71 K/UL (ref 0.1–1.2)
MONOCYTES NFR BLD: 8 % (ref 3–12)
NEUTROPHILS NFR BLD: 77 % (ref 36–65)
NEUTS SEG NFR BLD: 6.56 K/UL (ref 1.5–8.1)
NRBC BLD-RTO: 0 PER 100 WBC
PLATELET # BLD AUTO: 189 K/UL (ref 138–453)
PMV BLD AUTO: 8.7 FL (ref 8.1–13.5)
POTASSIUM SERPL-SCNC: 4.2 MMOL/L (ref 3.7–5.3)
PROTHROMBIN TIME: 23.8 SEC (ref 11.7–14.9)
RBC # BLD AUTO: 4 M/UL (ref 4.21–5.77)
RBC # BLD: ABNORMAL 10*6/UL
SODIUM SERPL-SCNC: 134 MMOL/L (ref 136–145)
WBC OTHER # BLD: 8.6 K/UL (ref 3.5–11.3)

## 2025-07-01 PROCEDURE — 99222 1ST HOSP IP/OBS MODERATE 55: CPT | Performed by: STUDENT IN AN ORGANIZED HEALTH CARE EDUCATION/TRAINING PROGRAM

## 2025-07-01 PROCEDURE — 2500000003 HC RX 250 WO HCPCS

## 2025-07-01 PROCEDURE — 2060000000 HC ICU INTERMEDIATE R&B

## 2025-07-01 PROCEDURE — 85610 PROTHROMBIN TIME: CPT

## 2025-07-01 PROCEDURE — 97116 GAIT TRAINING THERAPY: CPT

## 2025-07-01 PROCEDURE — 80048 BASIC METABOLIC PNL TOTAL CA: CPT

## 2025-07-01 PROCEDURE — 6370000000 HC RX 637 (ALT 250 FOR IP)

## 2025-07-01 PROCEDURE — 36415 COLL VENOUS BLD VENIPUNCTURE: CPT

## 2025-07-01 PROCEDURE — 6370000000 HC RX 637 (ALT 250 FOR IP): Performed by: PSYCHIATRY & NEUROLOGY

## 2025-07-01 PROCEDURE — 97530 THERAPEUTIC ACTIVITIES: CPT

## 2025-07-01 PROCEDURE — 99232 SBSQ HOSP IP/OBS MODERATE 35: CPT

## 2025-07-01 PROCEDURE — 85025 COMPLETE CBC W/AUTO DIFF WBC: CPT

## 2025-07-01 PROCEDURE — 99221 1ST HOSP IP/OBS SF/LOW 40: CPT

## 2025-07-01 RX ORDER — WARFARIN SODIUM 3 MG/1
3 TABLET ORAL
Status: DISCONTINUED | OUTPATIENT
Start: 2025-07-07 | End: 2025-07-03 | Stop reason: HOSPADM

## 2025-07-01 RX ORDER — WARFARIN SODIUM 2 MG/1
2 TABLET ORAL
Status: DISCONTINUED | OUTPATIENT
Start: 2025-07-01 | End: 2025-07-03 | Stop reason: HOSPADM

## 2025-07-01 RX ADMIN — Medication 200 MG: at 21:10

## 2025-07-01 RX ADMIN — HYDROXYZINE HYDROCHLORIDE 10 MG: 10 TABLET, FILM COATED ORAL at 21:10

## 2025-07-01 RX ADMIN — FOLIC ACID 1 MG: 1 TABLET ORAL at 08:43

## 2025-07-01 RX ADMIN — SODIUM CHLORIDE, PRESERVATIVE FREE 10 ML: 5 INJECTION INTRAVENOUS at 21:12

## 2025-07-01 RX ADMIN — ATORVASTATIN CALCIUM 20 MG: 20 TABLET, FILM COATED ORAL at 21:10

## 2025-07-01 RX ADMIN — HYDROXYZINE HYDROCHLORIDE 10 MG: 10 TABLET, FILM COATED ORAL at 11:34

## 2025-07-01 RX ADMIN — OXCARBAZEPINE 300 MG: 300 TABLET, FILM COATED ORAL at 21:11

## 2025-07-01 RX ADMIN — PANTOPRAZOLE SODIUM 40 MG: 40 TABLET, DELAYED RELEASE ORAL at 08:43

## 2025-07-01 RX ADMIN — TAMSULOSIN HYDROCHLORIDE 0.8 MG: 0.4 CAPSULE ORAL at 08:43

## 2025-07-01 RX ADMIN — Medication 2000 UNITS: at 08:42

## 2025-07-01 RX ADMIN — DOCUSATE SODIUM 200 MG: 100 CAPSULE, LIQUID FILLED ORAL at 08:43

## 2025-07-01 RX ADMIN — SODIUM CHLORIDE 1 G: 1 TABLET ORAL at 08:43

## 2025-07-01 RX ADMIN — RISPERIDONE 0.5 MG: 0.5 TABLET, FILM COATED ORAL at 08:43

## 2025-07-01 RX ADMIN — WARFARIN SODIUM 2 MG: 2 TABLET ORAL at 17:19

## 2025-07-01 RX ADMIN — SENNOSIDES AND DOCUSATE SODIUM 1 TABLET: 50; 8.6 TABLET ORAL at 08:48

## 2025-07-01 RX ADMIN — ACETAMINOPHEN 650 MG: 325 TABLET ORAL at 11:34

## 2025-07-01 RX ADMIN — CETIRIZINE HYDROCHLORIDE 10 MG: 10 TABLET, FILM COATED ORAL at 08:44

## 2025-07-01 RX ADMIN — GABAPENTIN 900 MG: 300 CAPSULE ORAL at 14:44

## 2025-07-01 RX ADMIN — TRAMADOL HYDROCHLORIDE 50 MG: 50 TABLET, COATED ORAL at 08:43

## 2025-07-01 RX ADMIN — GABAPENTIN 900 MG: 300 CAPSULE ORAL at 08:43

## 2025-07-01 RX ADMIN — DICLOFENAC SODIUM 4 G: 10 GEL TOPICAL at 05:16

## 2025-07-01 RX ADMIN — FUROSEMIDE 20 MG: 20 TABLET ORAL at 08:44

## 2025-07-01 RX ADMIN — ASPIRIN 81 MG: 81 TABLET, CHEWABLE ORAL at 08:43

## 2025-07-01 RX ADMIN — RISPERIDONE 0.5 MG: 0.5 TABLET, FILM COATED ORAL at 21:11

## 2025-07-01 RX ADMIN — Medication 200 MG: at 08:42

## 2025-07-01 RX ADMIN — TRAMADOL HYDROCHLORIDE 50 MG: 50 TABLET, COATED ORAL at 21:10

## 2025-07-01 RX ADMIN — OXCARBAZEPINE 300 MG: 300 TABLET, FILM COATED ORAL at 08:43

## 2025-07-01 RX ADMIN — SODIUM CHLORIDE, PRESERVATIVE FREE 10 ML: 5 INJECTION INTRAVENOUS at 21:11

## 2025-07-01 RX ADMIN — Medication 3 MG: at 21:10

## 2025-07-01 RX ADMIN — SODIUM CHLORIDE, PRESERVATIVE FREE 10 ML: 5 INJECTION INTRAVENOUS at 08:49

## 2025-07-01 RX ADMIN — DICLOFENAC SODIUM 4 G: 10 GEL TOPICAL at 21:11

## 2025-07-01 RX ADMIN — CYANOCOBALAMIN TAB 500 MCG 1000 MCG: 500 TAB at 08:43

## 2025-07-01 RX ADMIN — SODIUM CHLORIDE 1 G: 1 TABLET ORAL at 17:19

## 2025-07-01 RX ADMIN — GABAPENTIN 900 MG: 300 CAPSULE ORAL at 21:10

## 2025-07-01 RX ADMIN — DICLOFENAC SODIUM 4 G: 10 GEL TOPICAL at 11:33

## 2025-07-01 ASSESSMENT — PAIN SCALES - GENERAL
PAINLEVEL_OUTOF10: 3
PAINLEVEL_OUTOF10: 7
PAINLEVEL_OUTOF10: 3
PAINLEVEL_OUTOF10: 6
PAINLEVEL_OUTOF10: 8
PAINLEVEL_OUTOF10: 7

## 2025-07-01 ASSESSMENT — PAIN DESCRIPTION - ORIENTATION
ORIENTATION: LEFT
ORIENTATION: LEFT

## 2025-07-01 ASSESSMENT — ENCOUNTER SYMPTOMS
ABDOMINAL PAIN: 0
VOICE CHANGE: 0
TROUBLE SWALLOWING: 0
SHORTNESS OF BREATH: 1
SHORTNESS OF BREATH: 0
RHINORRHEA: 0

## 2025-07-01 ASSESSMENT — PAIN DESCRIPTION - DESCRIPTORS
DESCRIPTORS: ACHING
DESCRIPTORS: ACHING

## 2025-07-01 ASSESSMENT — PAIN DESCRIPTION - LOCATION
LOCATION: LEG
LOCATION: LEG

## 2025-07-01 NOTE — CONSULTS
Physical Medicine & Rehabilitation  Consult Note      Admitting Physician:   Isaac Amaya DO    Primary Care Provider:   Elif Rivera APRN - NP     Reason for Consult:  Acute Inpatient Rehabilitation    Chief Complaint: Ear fullness, difficulty walking    History of Present Illness:  Referring Provider is requesting an evaluation for appropriate placement upon discharge from acute care. History from chart review and patient.    Larry Moore is a 64 y.o. right-handed male with history of neurofibromatosis, right thalamic hamartoma, atrial fibrillation, HTN, bipolar disorder admitted to [facility] on 6/27/2025.      He initially presented with left ear fullness and difficulty ambulating.  CT head showed hypodensity in the right thalamus of unclear chronicity.  MRI brain showed enhancing lesion in the right internal capsule.  MRI lumbar spine showed diffuse cauda equina and lumbosacral plexus nerve thickening.  MRI cervical and thoracic spine were unremarkable.  Neurosurgery did not recommend any surgical intervention.  Psychiatry was consulted for drug-induced extra-pyramidal symptoms.    He reports left lower limb pain related to a fall at home.  He also notes some shortness of breath.  He denies any dizziness, numbness/tingling, and weakness.    Review of Systems:  Review of Systems   Constitutional:  Negative for fever.   Respiratory:  Positive for shortness of breath.    Cardiovascular:  Negative for chest pain.   Gastrointestinal:  Negative for abdominal pain.   Neurological:  Negative for dizziness, sensory change and weakness.      Premorbid function:  Independent    Current function:  Restrictions/ Precautions-  Restrictions/Precautions  Restrictions/Precautions: Fall Risk  Activity Level: Up with Assist  Required Braces or Orthoses?: No    PT:    Bed Mobility-  Bed mobility  Supine to Sit: Modified independent  Sit to Supine: Modified independent  Scooting: Independent  Bed Mobility Comments: HOB  rehabilitation at this time, as he is too high-functioning.  DVT Prophylaxis: On coumadin    It was my pleasure to evaluate Larry Moore today.  Please call with questions.    Nelda Leal MD

## 2025-07-01 NOTE — CONSULTS
Department of Psychiatry  Consult Service   Psychiatric Assessment        REASON FOR CONSULT: 'akathisia due to risperidone'    CONSULTING PHYSICIAN: Dr. Neema Sheikh    History obtained from: Patient, nurse, EMR    HISTORY OF PRESENT ILLNESS:          The patient is a 64 y.o. male with significant psychiatric history of bipolar disorder, depression, suicide attempt and substance use who presented to the ED endorsing ear fullness and dizziness. He reports unsteadiness and difficulty walking. He was admitted to medical floor for further work up. Psychiatry is consulted for concerns of akathisia due to risperidone use.    Patient is seen today face-to-face at bedside. He is alert and oriented X 4. He is known to this author in a different hospital setting. He has a history of EPS from long use of antipsychotics.  Per review of EMR documentation, he was previously treated with Cogentin. He denies noticeable worsening of EPS symptoms. He reports link with [facility] where he is seen by Dr. Trevino. He endorses some frustration due to recent dizziness and fall at home. He denies concerns for depression or anxiety. He denies feeling helpless or hopeless. He denies suicidal or homicidal ideation. He denies hallucinations or paranoia.  He denies his symptoms of brielle/hypomania.  He denies using alcohol or illicit substances.  He endorses chronic issues initiating and maintaing sleep.  He reports compliance taking home psychotropic medications.  Reports he is \"stable\" since relocating from [place] back to [place]. He reports stable income and housing. He denies recent psychiatric hospitalization. Per nurse, patient was trialed on Propanol with no improvement in EPS. Sodium level was 127 and improving to 134.     Patient does not require admission to [facility]. He reports finding current psychotropic medication regimen effective and declines switch to different antipsychotic. Recommend to trial Cogentin 0.5 mg twice daily for EPS.       The  25 mg, 25 mg, Oral, Nightly PRN  traMADol (ULTRAM) tablet 50 mg, 50 mg, Oral, Q6H PRN  diclofenac sodium (VOLTAREN) 1 % gel 4 g, 4 g, Topical, 4x Daily PRN  [Held by provider] 0.9 % sodium chloride infusion, , IntraVENous, Continuous  sodium chloride flush 0.9 % injection 5-40 mL, 5-40 mL, IntraVENous, 2 times per day  sodium chloride flush 0.9 % injection 5-40 mL, 5-40 mL, IntraVENous, PRN  0.9 % sodium chloride infusion, , IntraVENous, PRN  acetaminophen (TYLENOL) tablet 650 mg, 650 mg, Oral, Q4H PRN  ondansetron (ZOFRAN-ODT) disintegrating tablet 4 mg, 4 mg, Oral, Q8H PRN **OR** ondansetron (ZOFRAN) injection 4 mg, 4 mg, IntraVENous, Q6H PRN  sodium chloride flush 0.9 % injection 5-40 mL, 5-40 mL, IntraVENous, 2 times per day  sodium chloride flush 0.9 % injection 5-40 mL, 5-40 mL, IntraVENous, PRN  0.9 % sodium chloride infusion, , IntraVENous, PRN  potassium chloride (KLOR-CON M) extended release tablet 40 mEq, 40 mEq, Oral, PRN **OR** potassium bicarb-citric acid (EFFER-K) effervescent tablet 40 mEq, 40 mEq, Oral, PRN **OR** potassium chloride 10 mEq/100 mL IVPB (Peripheral Line), 10 mEq, IntraVENous, PRN  magnesium sulfate 2000 mg in 50 mL IVPB premix, 2,000 mg, IntraVENous, PRN  polyethylene glycol (GLYCOLAX) packet 17 g, 17 g, Oral, Daily PRN  albuterol sulfate HFA (PROVENTIL;VENTOLIN;PROAIR) 108 (90 Base) MCG/ACT inhaler 2 puff, 2 puff, Inhalation, Q6H PRN  aspirin chewable tablet 81 mg, 81 mg, Oral, Daily  atorvastatin (LIPITOR) tablet 20 mg, 20 mg, Oral, Nightly  calcium citrate (CALCITRATE) tablet 200 mg, 200 mg, Oral, BID  cetirizine (ZYRTEC) tablet 10 mg, 10 mg, Oral, Daily  Vitamin D (CHOLECALCIFEROL) tablet 2,000 Units, 2,000 Units, Oral, Daily with breakfast  vitamin B-12 (CYANOCOBALAMIN) tablet 1,000 mcg, 1,000 mcg, Oral, Daily  docusate sodium (COLACE) capsule 200 mg, 200 mg, Oral, Daily  folic acid (FOLVITE) tablet 1 mg, 1 mg, Oral, Daily  furosemide (LASIX) tablet 20 mg, 20 mg, Oral,

## 2025-07-01 NOTE — PROGRESS NOTES
Neurosurgery CASEY/Resident    Daily Progress Note   Chief Complaint   Patient presents with    Ear Fullness    Dizziness     7/1/2025  6:41 AM    Chart reviewed.  No acute events overnight.  No new complaints.     Vitals:    06/30/25 2113 06/30/25 2143 06/30/25 2326 07/01/25 0324   BP:   134/84 119/79   Pulse:   78 71   Resp: 24 17 20 17   Temp:   97.8 °F (36.6 °C) 98.2 °F (36.8 °C)   TempSrc:   Temporal Temporal   SpO2:   99% 95%   Weight:       Height:             PE:   AOx3   CNII-XII intact   PERRL, EOMI   Motor   L deltoid 5/5; R deltoid 5/5  L biceps 5/5; R biceps 5/5  L triceps 5/5; R triceps 5/5  L wrist extension 5/5; R wrist extension 5/5  L intrinsics 5/5; R intrinsics 5/5      L iliopsoas 4/5 , R iliopsoas 5/5  L quadriceps 4/5; R quadriceps 5/5  L Dorsiflexion 5/5; R dorsiflexion 5/5  L Plantarflexion 5/5; R plantarflexion 5/5  L EHL 4/5; R EHL 5/5    Sensation intact      Lab Results   Component Value Date    WBC 8.6 07/01/2025    HGB 12.0 (L) 07/01/2025    HCT 36.5 (L) 07/01/2025     07/01/2025    CHOL 99 05/19/2025    TRIG 88 05/19/2025    HDL 37 (L) 05/19/2025    ALT 14 05/19/2025    AST 16 05/19/2025     (L) 07/01/2025    K 4.2 07/01/2025    CL 98 07/01/2025    CREATININE 0.9 07/01/2025    BUN 17 07/01/2025    CO2 24 07/01/2025    TSH 1.01 05/19/2025    PSA 1.53 09/26/2022    INR 2.0 07/01/2025    LABA1C 5.0 05/19/2025       Radiology   MRI THORACIC SPINE W WO CONTRAST  Result Date: 6/30/2025  1. No significant disc protrusion, stenosis of the thecal sac, or narrowing of the neural foramina in the thoracic region. 2. No abnormal signal or enhancement of the thoracic spinal cord.     XR CERVICAL SPINE FLEXION AND EXTENSION  Result Date: 6/29/2025  EXAMINATION: 2 XRAY VIEWS OF THE CERVICAL SPINE 6/29/2025 4:00 pm COMPARISON: 06/27/2025 HISTORY: ORDERING SYSTEM PROVIDED HISTORY: assess any instability TECHNOLOGIST PROVIDED HISTORY: assess any instability FINDINGS: The skull base through  neural foraminal narrowing.     Diffuse cauda equina and lumbosacral plexus nerve thickening.  Differential includes chronic inflammatory demyelinating polyneuropathy, hypertrophic neuropathies, or neurofibromatosis.     MRI CERVICAL SPINE W WO CONTRAST  Result Date: 6/27/2025  EXAMINATION: MRI OF THE CERVICAL SPINE WITHOUT AND WITH CONTRAST  6/27/2025 8:17 pm: TECHNIQUE: Multiplanar multisequence MRI of the cervical spine was performed without and with the administration of intravenous contrast. COMPARISON: None. HISTORY: ORDERING SYSTEM PROVIDED HISTORY: Gait ataxia history of neurofibroma TECHNOLOGIST PROVIDED HISTORY: Gait ataxia history of neurofibroma Reason for Exam: Gait ataxia history of neurofibroma FINDINGS: The examination is motion degraded. BONES/ALIGNMENT: ACDF C3-C4.  Grade 1 anterolisthesis of C5 on C6.  Vertebral heights are maintained.  Bone marrow signal is benign. SPINAL CORD: No abnormal cord signal is seen. SOFT TISSUES: No abnormal enhancement of the cervical spine. No paraspinal mass identified. C2-C3: Facet hypertrophy causes mild right foraminal stenosis. C3-C4: Facet hypertrophy causes no stenosis. C4-C5: Facet hypertrophy causes no stenosis. C5-C6: Facet and uncovertebral hypertrophy cause mild left foraminal stenosis and mild thecal sac stenosis. C6-C7: Disc osteophyte complex causes mild thecal sac stenosis. C7-T1: Facet hypertrophy causes moderate left foraminal stenosis.     No acute cervical spine abnormality on motion degraded exam.           A/P  64 y.o. male who presents with known thalamic mass which is now enhancing on repeat MRI, who presents with worsening gait ataxia, left-sided dysmetria, left-sided hearing loss. MRI L spine also shows diffuse cauda equina and lumbosacral plexus nerve thickening.      - Neurosurgical interventions pending review by attending neurosurgeon    - Obtain MRI images from [facility] for MRI brain completed 6/2024              - MRI thoracic spine

## 2025-07-01 NOTE — PLAN OF CARE
Problem: Discharge Planning  Goal: Discharge to home or other facility with appropriate resources  7/1/2025 0704 by Valerio Duran RN  Outcome: Progressing  6/30/2025 2301 by Brigid Escobar RN  Outcome: Progressing     Problem: Pain  Goal: Verbalizes/displays adequate comfort level or baseline comfort level  7/1/2025 0704 by Valerio Duran RN  Outcome: Progressing  6/30/2025 2301 by Brigid Escobar RN  Outcome: Progressing     Problem: Skin/Tissue Integrity  Goal: Skin integrity remains intact  Description: 1.  Monitor for areas of redness and/or skin breakdown  2.  Assess vascular access sites hourly  3.  Every 4-6 hours minimum:  Change oxygen saturation probe site  4.  Every 4-6 hours:  If on nasal continuous positive airway pressure, respiratory therapy assess nares and determine need for appliance change or resting period  7/1/2025 0704 by Valerio Duran RN  Outcome: Progressing  6/30/2025 2301 by Brigid Escobar RN  Outcome: Progressing     Problem: Safety - Adult  Goal: Free from fall injury  7/1/2025 0704 by Valerio Duran RN  Outcome: Progressing  6/30/2025 2301 by Brigid Escobar RN  Outcome: Progressing     Problem: ABCDS Injury Assessment  Goal: Absence of physical injury  7/1/2025 0704 by Valerio Duran RN  Outcome: Progressing  6/30/2025 2301 by Brigid Escobar RN  Outcome: Progressing

## 2025-07-01 NOTE — PROGRESS NOTES
[facility] Neurology   IN-PATIENT SERVICE  General Neurology Progress Note   [facility]                Date:   7/1/2025  Patient name:  Larry Moore  Date of admission:  6/27/2025 10:04 AM  MRN:   [id number]  Account:  [id number]  YOB: 1960  PCP:    Elif Rivera APRN - NP  Room:   [address] [place] [address]  Code Status:    Full Code  Chief Complaint:   Chief Complaint   Patient presents with    Ear Fullness    Dizziness       Interval history      The patient was seen and examined at bedside this morning.  Labs, chart, and VS reviewed. No acute events overnight.    Concern for akithisia: Restless, constantly moving neck and BL LE which he states is new; on risperidone 0.5 bid. Patient did get one dose of propranolol yesterday. Will consider benztropine if continue to worsen.   Awaiting images from [facility] to compare  Appreciate [facility] recs    Brief History     Larry Moore is a 64 y.o. male who presents with Ear Fullness and Dizziness, and he is admitted to the hospital for the management of  left ear fullness, gait ataxia. The patient was seen and examined and the chart was reviewed.  Patient with history of neurofibromatosis type I currently following with [facility] neurology presents to the ED today with chief complaint of left ear fullness.  States he has diminished hearing on the left side and this has been ongoing for the last several weeks.  In addition today he states he has been more unstable ambulating and has been falling more often.  He does have significant ecchymosis over the left anterior leg status post fall several days back.  He is on Coumadin with INR of 2.3 today.  Sodium level 127.  He has known history of right thalamic hamartoma last MRI brain done at [facility] in June, 2024.  MRI cervical spine was motion degraded at the time.  MRI thoracic spine without acute findings.  Unable to view images.  CTA head and neck with no significant stenosis or occlusion today.   vibration  Intact proprioception             Cerebellar    LUE / LLE dysmetria            Reflex function       1+ symmetric throughout .   Downgoing plantar response bilaterally.   (-)Ovalles's sign bilaterally          Gait                      Deferred                  Investigations   Laboratory Testing:  Recent Results (from the past 24 hours)   Basic Metabolic Panel w/ Reflex to MG    Collection Time: 07/01/25  4:28 AM   Result Value Ref Range    Sodium 134 (L) 136 - 145 mmol/L    Potassium 4.2 3.7 - 5.3 mmol/L    Chloride 98 98 - 107 mmol/L    CO2 24 20 - 31 mmol/L    Anion Gap 12 9 - 16 mmol/L    Glucose 97 74 - 99 mg/dL    BUN 17 8 - 23 mg/dL    Creatinine 0.9 0.7 - 1.2 mg/dL    Est, Glom Filt Rate >90 >60 mL/min/1.73m2    Calcium 9.0 8.6 - 10.4 mg/dL   CBC with Auto Differential    Collection Time: 07/01/25  4:28 AM   Result Value Ref Range    WBC 8.6 3.5 - 11.3 k/uL    RBC 4.00 (L) 4.21 - 5.77 m/uL    Hemoglobin 12.0 (L) 13.0 - 17.0 g/dL    Hematocrit 36.5 (L) 40.7 - 50.3 %    MCV 91.3 82.6 - 102.9 fL    MCH 30.0 25.2 - 33.5 pg    MCHC 32.9 28.4 - 34.8 g/dL    RDW 15.3 (H) 11.8 - 14.4 %    Platelets 189 138 - 453 k/uL    MPV 8.7 8.1 - 13.5 fL    NRBC Automated 0.0 0.0 per 100 WBC    Neutrophils % 77 (H) 36 - 65 %    Lymphocytes % 10 (L) 24 - 43 %    Monocytes % 8 3 - 12 %    Eosinophils % 3 1 - 4 %    Basophils % 1 0 - 2 %    Immature Granulocytes % 1 (H) 0 %    Neutrophils Absolute 6.56 1.50 - 8.10 k/uL    Lymphocytes Absolute 0.87 (L) 1.10 - 3.70 k/uL    Monocytes Absolute 0.71 0.10 - 1.20 k/uL    Eosinophils Absolute 0.24 0.00 - 0.44 k/uL    Basophils Absolute 0.09 0.00 - 0.20 k/uL    Immature Granulocytes Absolute 0.08 0.00 - 0.30 k/uL    RBC Morphology ANISOCYTOSIS PRESENT    Protime-INR    Collection Time: 07/01/25  4:28 AM   Result Value Ref Range    Protime 23.8 (H) 11.7 - 14.9 sec    INR 2.0          Radiology:  MRI BRAIN W WO CONTRAST  Result Date: 6/27/2025  16 mm lesion in the right internal

## 2025-07-01 NOTE — PLAN OF CARE
--  NO ACUTE NEUROSURGICAL INTERVENTION AT THIS TIME    NEUROSURGERY TO SIGN OFF     Please contact Neurosurgery with any questions.    Electronically signed by LOGAN Quezada CNP on 7/1/2025 at 3:48 PM

## 2025-07-01 NOTE — CARE COORDINATION
Case Management   Daily Progress Note       Patient Name: Larry Moore                   YOB: 1960  Diagnosis: Dizziness [R42]                       GMLOS: 3 days  Length of Stay: 4  days    Anticipated Discharge Date: To be determined    Readmission Risk (Low < 19, Mod (19-27), High > 27): Readmission Risk Score: 15.1        Current Transitional Plan    [] Home Independently    [x] Home with HC    [] Skilled Nursing Facility    [] Acute Rehabilitation    [] Long Term Acute Care (LTAC)    [] Other:     Plan for the Stay (Medical Management) :          Workflow Continuation (Additional Notes) :    Met with patient to discuss transitional planning. Plan is to return home with home healthcare. Patient has used [facility] in the past and would like referral sent to them. This is done. Patient reports he will need a cab ride home at time of discharge but denies further needs. Agreeable to plan.     Venecia Roy RN  July 1, 2025

## 2025-07-02 ENCOUNTER — APPOINTMENT (OUTPATIENT)
Dept: GENERAL RADIOLOGY | Age: 65
End: 2025-07-02
Payer: MEDICARE

## 2025-07-02 LAB
ANION GAP SERPL CALCULATED.3IONS-SCNC: 9 MMOL/L (ref 9–16)
BASOPHILS # BLD: 0.06 K/UL (ref 0–0.2)
BASOPHILS NFR BLD: 1 % (ref 0–2)
BNP SERPL-MCNC: 165 PG/ML (ref 0–125)
BUN SERPL-MCNC: 17 MG/DL (ref 8–23)
CALCIUM SERPL-MCNC: 8.8 MG/DL (ref 8.6–10.4)
CHLORIDE SERPL-SCNC: 95 MMOL/L (ref 98–107)
CO2 SERPL-SCNC: 25 MMOL/L (ref 20–31)
CREAT SERPL-MCNC: 0.9 MG/DL (ref 0.7–1.2)
EOSINOPHIL # BLD: 0.24 K/UL (ref 0–0.44)
EOSINOPHILS RELATIVE PERCENT: 3 % (ref 1–4)
ERYTHROCYTE [DISTWIDTH] IN BLOOD BY AUTOMATED COUNT: 15.3 % (ref 11.8–14.4)
GFR, ESTIMATED: >90 ML/MIN/1.73M2
GLUCOSE SERPL-MCNC: 96 MG/DL (ref 74–99)
HCT VFR BLD AUTO: 36.2 % (ref 40.7–50.3)
HGB BLD-MCNC: 11.9 G/DL (ref 13–17)
IMM GRANULOCYTES # BLD AUTO: 0.07 K/UL (ref 0–0.3)
IMM GRANULOCYTES NFR BLD: 1 %
INR PPP: 2.1
LYMPHOCYTES NFR BLD: 0.82 K/UL (ref 1.1–3.7)
LYMPHOCYTES RELATIVE PERCENT: 10 % (ref 24–43)
MCH RBC QN AUTO: 29.5 PG (ref 25.2–33.5)
MCHC RBC AUTO-ENTMCNC: 32.9 G/DL (ref 28.4–34.8)
MCV RBC AUTO: 89.8 FL (ref 82.6–102.9)
MONOCYTES NFR BLD: 0.82 K/UL (ref 0.1–1.2)
MONOCYTES NFR BLD: 10 % (ref 3–12)
NEUTROPHILS NFR BLD: 75 % (ref 36–65)
NEUTS SEG NFR BLD: 6.59 K/UL (ref 1.5–8.1)
NRBC BLD-RTO: 0 PER 100 WBC
PLATELET # BLD AUTO: 174 K/UL (ref 138–453)
PMV BLD AUTO: 8.3 FL (ref 8.1–13.5)
POTASSIUM SERPL-SCNC: 4.4 MMOL/L (ref 3.7–5.3)
PROTHROMBIN TIME: 24.1 SEC (ref 11.7–14.9)
RBC # BLD AUTO: 4.03 M/UL (ref 4.21–5.77)
RBC # BLD: ABNORMAL 10*6/UL
SODIUM SERPL-SCNC: 129 MMOL/L (ref 136–145)
WBC OTHER # BLD: 8.6 K/UL (ref 3.5–11.3)

## 2025-07-02 PROCEDURE — 6370000000 HC RX 637 (ALT 250 FOR IP)

## 2025-07-02 PROCEDURE — 85025 COMPLETE CBC W/AUTO DIFF WBC: CPT

## 2025-07-02 PROCEDURE — 85610 PROTHROMBIN TIME: CPT

## 2025-07-02 PROCEDURE — 2500000003 HC RX 250 WO HCPCS

## 2025-07-02 PROCEDURE — 2700000000 HC OXYGEN THERAPY PER DAY

## 2025-07-02 PROCEDURE — 83880 ASSAY OF NATRIURETIC PEPTIDE: CPT

## 2025-07-02 PROCEDURE — 80048 BASIC METABOLIC PNL TOTAL CA: CPT

## 2025-07-02 PROCEDURE — 6370000000 HC RX 637 (ALT 250 FOR IP): Performed by: PSYCHIATRY & NEUROLOGY

## 2025-07-02 PROCEDURE — 94761 N-INVAS EAR/PLS OXIMETRY MLT: CPT

## 2025-07-02 PROCEDURE — 99232 SBSQ HOSP IP/OBS MODERATE 35: CPT

## 2025-07-02 PROCEDURE — 36415 COLL VENOUS BLD VENIPUNCTURE: CPT

## 2025-07-02 PROCEDURE — 97530 THERAPEUTIC ACTIVITIES: CPT

## 2025-07-02 PROCEDURE — 71045 X-RAY EXAM CHEST 1 VIEW: CPT

## 2025-07-02 PROCEDURE — 2060000000 HC ICU INTERMEDIATE R&B

## 2025-07-02 RX ORDER — BENZTROPINE MESYLATE 0.5 MG/1
0.5 TABLET ORAL 2 TIMES DAILY
Status: DISCONTINUED | OUTPATIENT
Start: 2025-07-02 | End: 2025-07-03 | Stop reason: HOSPADM

## 2025-07-02 RX ADMIN — RISPERIDONE 0.5 MG: 0.5 TABLET, FILM COATED ORAL at 08:24

## 2025-07-02 RX ADMIN — WARFARIN SODIUM 2 MG: 2 TABLET ORAL at 21:11

## 2025-07-02 RX ADMIN — ATORVASTATIN CALCIUM 20 MG: 20 TABLET, FILM COATED ORAL at 21:12

## 2025-07-02 RX ADMIN — Medication 2000 UNITS: at 08:24

## 2025-07-02 RX ADMIN — GABAPENTIN 900 MG: 300 CAPSULE ORAL at 21:12

## 2025-07-02 RX ADMIN — TRAMADOL HYDROCHLORIDE 50 MG: 50 TABLET, COATED ORAL at 21:11

## 2025-07-02 RX ADMIN — OXCARBAZEPINE 300 MG: 300 TABLET, FILM COATED ORAL at 08:23

## 2025-07-02 RX ADMIN — FUROSEMIDE 20 MG: 20 TABLET ORAL at 08:24

## 2025-07-02 RX ADMIN — TAMSULOSIN HYDROCHLORIDE 0.8 MG: 0.4 CAPSULE ORAL at 08:23

## 2025-07-02 RX ADMIN — Medication 200 MG: at 08:25

## 2025-07-02 RX ADMIN — BENZTROPINE MESYLATE 0.5 MG: 0.5 TABLET ORAL at 21:12

## 2025-07-02 RX ADMIN — CYANOCOBALAMIN TAB 500 MCG 1000 MCG: 500 TAB at 08:23

## 2025-07-02 RX ADMIN — FOLIC ACID 1 MG: 1 TABLET ORAL at 08:23

## 2025-07-02 RX ADMIN — BENZTROPINE MESYLATE 0.5 MG: 0.5 TABLET ORAL at 10:19

## 2025-07-02 RX ADMIN — DICLOFENAC SODIUM 4 G: 10 GEL TOPICAL at 21:12

## 2025-07-02 RX ADMIN — GABAPENTIN 900 MG: 300 CAPSULE ORAL at 08:23

## 2025-07-02 RX ADMIN — GABAPENTIN 900 MG: 300 CAPSULE ORAL at 13:38

## 2025-07-02 RX ADMIN — OXCARBAZEPINE 300 MG: 300 TABLET, FILM COATED ORAL at 21:11

## 2025-07-02 RX ADMIN — DICLOFENAC SODIUM 4 G: 10 GEL TOPICAL at 08:25

## 2025-07-02 RX ADMIN — Medication 3 MG: at 21:12

## 2025-07-02 RX ADMIN — HYDROXYZINE HYDROCHLORIDE 10 MG: 10 TABLET, FILM COATED ORAL at 16:51

## 2025-07-02 RX ADMIN — ASPIRIN 81 MG: 81 TABLET, CHEWABLE ORAL at 08:24

## 2025-07-02 RX ADMIN — HYDROXYZINE HYDROCHLORIDE 10 MG: 10 TABLET, FILM COATED ORAL at 23:07

## 2025-07-02 RX ADMIN — Medication 5 MG: at 23:07

## 2025-07-02 RX ADMIN — Medication 200 MG: at 21:12

## 2025-07-02 RX ADMIN — SODIUM CHLORIDE, PRESERVATIVE FREE 10 ML: 5 INJECTION INTRAVENOUS at 21:12

## 2025-07-02 RX ADMIN — RISPERIDONE 0.5 MG: 0.5 TABLET, FILM COATED ORAL at 21:12

## 2025-07-02 RX ADMIN — SODIUM CHLORIDE, PRESERVATIVE FREE 10 ML: 5 INJECTION INTRAVENOUS at 08:25

## 2025-07-02 RX ADMIN — PANTOPRAZOLE SODIUM 40 MG: 40 TABLET, DELAYED RELEASE ORAL at 08:24

## 2025-07-02 RX ADMIN — CETIRIZINE HYDROCHLORIDE 10 MG: 10 TABLET, FILM COATED ORAL at 08:24

## 2025-07-02 ASSESSMENT — PAIN SCALES - GENERAL
PAINLEVEL_OUTOF10: 5
PAINLEVEL_OUTOF10: 7
PAINLEVEL_OUTOF10: 4
PAINLEVEL_OUTOF10: 4
PAINLEVEL_OUTOF10: 7
PAINLEVEL_OUTOF10: 2

## 2025-07-02 ASSESSMENT — ENCOUNTER SYMPTOMS
ABDOMINAL PAIN: 0
RHINORRHEA: 0
VOICE CHANGE: 0
TROUBLE SWALLOWING: 0
SHORTNESS OF BREATH: 0

## 2025-07-02 ASSESSMENT — PAIN DESCRIPTION - ORIENTATION: ORIENTATION: LEFT

## 2025-07-02 ASSESSMENT — PAIN DESCRIPTION - LOCATION: LOCATION: LEG

## 2025-07-02 ASSESSMENT — PAIN DESCRIPTION - DESCRIPTORS: DESCRIPTORS: ACHING

## 2025-07-02 NOTE — PROGRESS NOTES
Writer talks with patient's , Nathen from [facility]. She informs writer that patient has outpatient EGD scheduled at [facility] on 7/10/2025, and asks if this can be done while inpatient here at the hospital. Writer explains that patient is near discharge, and that is not why he was admitted to the hospital. She also asks if CM has been assisting patient with AL placement. Writer explains that CM does not assist with AL placement only placement for SNF and rehab needs. She reports good understanding.

## 2025-07-02 NOTE — PLAN OF CARE
Problem: Discharge Planning  Goal: Discharge to home or other facility with appropriate resources  7/2/2025 0749 by Brigid Singh RN  Outcome: Progressing  7/2/2025 0047 by Jorge Luis Khan RN  Outcome: Progressing     Problem: Pain  Goal: Verbalizes/displays adequate comfort level or baseline comfort level  Outcome: Progressing     Problem: Skin/Tissue Integrity  Goal: Skin integrity remains intact  Description: 1.  Monitor for areas of redness and/or skin breakdown  2.  Assess vascular access sites hourly  3.  Every 4-6 hours minimum:  Change oxygen saturation probe site  4.  Every 4-6 hours:  If on nasal continuous positive airway pressure, respiratory therapy assess nares and determine need for appliance change or resting period  7/2/2025 0749 by Brigid Singh RN  Outcome: Progressing  7/2/2025 0047 by Jorge Luis Khan RN  Outcome: Progressing     Problem: Safety - Adult  Goal: Free from fall injury  7/2/2025 0749 by Brigid Singh RN  Outcome: Progressing  7/2/2025 0047 by Jorge Luis Khan RN  Outcome: Progressing     Problem: ABCDS Injury Assessment  Goal: Absence of physical injury  Outcome: Progressing     Problem: Chronic Conditions and Co-morbidities  Goal: Patient's chronic conditions and co-morbidity symptoms are monitored and maintained or improved  Outcome: Progressing

## 2025-07-02 NOTE — PLAN OF CARE
Problem: Discharge Planning  Goal: Discharge to home or other facility with appropriate resources  7/2/2025 1912 by Larissa Brown RN  Outcome: Progressing  Flowsheets  Taken 7/2/2025 1217 by Larissa Brown RN  Discharge to home or other facility with appropriate resources: Identify barriers to discharge with patient and caregiver    Problem: Pain  Goal: Verbalizes/displays adequate comfort level or baseline comfort level  7/2/2025 1912 by Larissa Brown RN  Outcome: Progressing     Problem: Skin/Tissue Integrity  Goal: Skin integrity remains intact  Description: 1.  Monitor for areas of redness and/or skin breakdown  2.  Assess vascular access sites hourly  3.  Every 4-6 hours minimum:  Change oxygen saturation probe site  4.  Every 4-6 hours:  If on nasal continuous positive airway pressure, respiratory therapy assess nares and determine need for appliance change or resting period  7/2/2025 1912 by Larissa Brown RN  Outcome: Progressing  Flowsheets  Taken 7/2/2025 1217 by Larissa Brown RN  Skin Integrity Remains Intact: Monitor for areas of redness and/or skin breakdown  Taken 7/2/2025 0819 by Brigid Singh RN  Skin Integrity Remains Intact: Monitor for areas of redness and/or skin breakdown     Problem: Safety - Adult  Goal: Free from fall injury  7/2/2025 1912 by Larissa Brown RN  Outcome: Progressing     Problem: ABCDS Injury Assessment  Goal: Absence of physical injury  7/2/2025 1912 by Larissa Brown RN  Outcome: Progressing

## 2025-07-02 NOTE — PROGRESS NOTES
Physical Therapy  Facility/Department: [address] STEPDOWN   Physical Therapy Daily Treatment Note    Patient Name: Larry Moore        MRN: [id number]    : 1960    Date of Service: 2025    Chief Complaint   Patient presents with    Ear Fullness    Dizziness     Past Medical History:  has a past medical history of AMS (altered mental status), Atrial fibrillation (HCC), Bipolar 1 disorder (HCC), Depression, Hypertension, Neck pain, Neurofibromatosis (HCC), Patient in clinical research study, Severe recurrent major depression without psychotic features (HCC), and Twitching.  Past Surgical History:  has a past surgical history that includes hernia repair; Neck surgery (); Abdomen surgery; Tonsillectomy; Appendectomy; Dilatation, esophagus; Cardiac surgery (2015); Cardiac surgery (2015); Colonoscopy (2021); and Upper gastrointestinal endoscopy (2021).    Discharge Recommendations  Discharge Recommendations: Patient would benefit from continued therapy after discharge  PT Equipment Recommendations  Equipment Needed: No  Other: Pt owns a RW with a basket, cane and a w/c    Assessment  Body Structures, Functions, Activity Limitations Requiring Skilled Therapeutic Intervention: Decreased functional mobility ;Decreased strength;Decreased endurance  Assessment: Pt ambulated 300 ft x 3 with RW and CGA. No LOB noted today, but slight lateral sway with ability to self correct. Pt could benefit from a continuation of PT for balance and and functional mobility; should be safe to return to prior living situation with support as needed.  Therapy Prognosis: Good  Decision Making: Medium Complexity  Requires PT Follow-Up: Yes  Activity Tolerance  Activity Tolerance: Patient tolerated treatment well  Safety Devices  Type of Devices: Nurse notified;Call light within reach;Gait belt;Left in bed  Restraints  Restraints Initially in Place: No    AM-PAC  AM-PAC Basic Mobility - Inpatient   How much help is

## 2025-07-02 NOTE — CARE COORDINATION
Case Management   Daily Progress Note       Patient Name: Larry Moore                   YOB: 1960  Diagnosis: Dizziness [R42]                       GMLOS: 3 days  Length of Stay: 5  days    Anticipated Discharge Date: To be determined    Readmission Risk (Low < 19, Mod (19-27), High > 27): Readmission Risk Score: 14.8        Current Transitional Plan    [] Home Independently    [x] Home with HC    [] Skilled Nursing Facility    [] Acute Rehabilitation    [] Long Term Acute Care (LTAC)    [] Other:     Plan for the Stay (Medical Management) :        Workflow Continuation (Additional Notes) :    Plan is home with [facility] (accepted). Patient will need cab ride home. Patient agreeable to plan.     Venecia Roy RN  July 2, 2025

## 2025-07-02 NOTE — PROGRESS NOTES
[facility] Neurology   IN-PATIENT SERVICE  General Neurology Progress Note   [facility]                Date:   7/2/2025  Patient name:  Larry Moore  Date of admission:  6/27/2025 10:04 AM  MRN:   [id number]  Account:  [id number]  YOB: 1960  PCP:    Elif Rivera APRN - NP  Room:   [address] [place] [address]  Code Status:    Full Code  Chief Complaint:   Chief Complaint   Patient presents with    Ear Fullness    Dizziness       Interval history      The patient was seen and examined at bedside this morning.  Labs, chart, and VS reviewed. No acute events overnight.    Patient stated he feels much better, he was able to walk yesterday with some improvement in ataxia but hearing difficulty still persistent on the left side.  Per PT note patient was short of breath and saturation was around 89% while he was ambulating with rolling walker.  Concern for akithisia: Started Cogentin today  Neurosurgery signed off with no further intervention.  Psychiatry was consulted for concerns of akathisia secondary to risperidone use.  Recommended to start Cogentin 0.5 Mg twice daily for extrapyramidal symptoms.  No further medication changes and signed off  Patient was started on Cogentin 0.5 Mg twice daily today per psychiatry recommendations.  Labs reviewed, sodium 129 dropped from 134 yesterday, INR 2.1, hemoglobin 11.9  MRI thoracic spine had shown No significant disc protrusion, stenosis of the thecal sac, or narrowing of the neural foramina in the thoracic region.  No abnormal signal or enhancement of thoracic spinal cord.  Chest x-ray had shown mild left pleural effusion, BNP less than 200  Discharge planning to home after home O2 eval    Brief History     Larry Moore is a 64 y.o. male who presents with Ear Fullness and Dizziness, and he is admitted to the hospital for the management of  left ear fullness, gait ataxia. The patient was seen and examined and the chart was reviewed.  Patient with  (COLACE) capsule 200 mg  200 mg Oral Daily Rowena Lipscomb MD   200 mg at 25 0843    folic acid (FOLVITE) tablet 1 mg  1 mg Oral Daily Rowena Lipscomb MD   1 mg at 25 0823    furosemide (LASIX) tablet 20 mg  20 mg Oral Daily Rowena Lipscomb MD   20 mg at 25 0824    gabapentin (NEURONTIN) capsule 900 mg  900 mg Oral TID Rowena Lipscomb MD   900 mg at 25 1338    OXcarbazepine (TRILEPTAL) tablet 300 mg  300 mg Oral BID Rowena Lipscomb MD   300 mg at 25 0823    pantoprazole (PROTONIX) tablet 40 mg  40 mg Oral Daily Rowena Lipscomb MD   40 mg at 25 0824    risperiDONE (RISPERDAL) tablet 0.5 mg  0.5 mg Oral BID Rowena Lipscomb MD   0.5 mg at 25 0824    sennosides-docusate sodium (SENOKOT-S) 8.6-50 MG tablet 1 tablet  1 tablet Oral Daily Rowena Lipscomb MD   1 tablet at 25 0848    tamsulosin (FLOMAX) capsule 0.8 mg  0.8 mg Oral Daily Rowena Lipscomb MD   0.8 mg at 25 0823    warfarin placeholder: dosing by pharmacy   Oral RX Placeholder Richard Montiel MD        hydrOXYzine HCl (ATARAX) tablet 10 mg  10 mg Oral TID PRN Richard Montiel MD   10 mg at 25 2110    melatonin tablet 5 mg  5 mg Oral Nightly PRN Richard Montiel MD   5 mg at 25 2216        Allergies: Oxycodone, Fish-derived products, Ibuprofen, Latuda [lurasidone hcl], Lithium, Lurasidone, Quetiapine, and Seroquel [quetiapine fumarate]    Neurological Exam   /77   Pulse 70   Temp 97.9 °F (36.6 °C) (Oral)   Resp 18   Ht 1.676 m (5' 6\")   Wt 95.6 kg (210 lb 12.2 oz)   SpO2 93%   BMI 34.02 kg/m²   Temp (24hrs), Av.7 °F (36.5 °C), Min:97.5 °F (36.4 °C), Max:97.9 °F (36.6 °C)    No results for input(s): \"POCGLU\" in the last 72 hours.    Intake/Output Summary (Last 24 hours) at 2025 1459  Last data filed at 2025 1300  Gross per 24 hour   Intake 600 ml   Output 1400 ml   Net -800 ml           Neurological Exam:         Mental status

## 2025-07-02 NOTE — DISCHARGE INSTR - COC
Continuity of Care Form    Patient Name: Larry Moore   :  1960  MRN:  [id number]    Admit date:  2025  Discharge date:  7/3/2025    Code Status Order: Full Code   Advance Directives:     Admitting Physician:  Isaac Amaya DO  PCP: Elif Rivera APRN - NP    Discharging Nurse: Brigid Barreto Hospital Unit/Room#: 0417/0417-02  Discharging Unit Phone Number: [phone]    Emergency Contact:   Extended Emergency Contact Information  Primary Emergency Contact: Yaa Ayala  Address: [address]  Home Phone: [phone]  Mobile Phone: [phone]  Relation: Brother/Sister    Past Surgical History:  Past Surgical History:   Procedure Laterality Date    ABDOMEN SURGERY      APPENDECTOMY      CARDIAC SURGERY  2015    Median Sternotomy, Repair of ascending aorti aneurysm, stentless valve placement    CARDIAC SURGERY  2015 Chest cleared by Dr. Mayes. -[facility]    COLONOSCOPY  2021    DILATATION, ESOPHAGUS      HERNIA REPAIR      NECK SURGERY  2013    TONSILLECTOMY      UPPER GASTROINTESTINAL ENDOSCOPY  2021       Immunization History:   Immunization History   Administered Date(s) Administered    COVID-19, MODERNA Bivalent, (age 12y+), IM, 50 mcg/0.5 mL 03/10/2021, 2021    COVID-19, MODERNA Booster BLUE border, (age 18y+), IM, 50mcg/0.25mL 10/30/2021    COVID-19, MODERNA, , (age 12y+), IM, 50mcg/0.5mL 10/19/2023    COVID-19, PFIZER Bivalent, DO NOT Dilute, (age 12y+), IM, 30 mcg/0.3 mL 09/15/2022    TDaP, ADACEL (age 10y-64y), BOOSTRIX (age 10y+), IM, 0.5mL 2020       Active Problems:  Patient Active Problem List   Diagnosis Code    Light headed R42    Recurrent major depression in partial remission F33.41    Primary hypertension I10    Neck pain M54.2    GERD (gastroesophageal reflux disease) K21.9    Degenerative disc disease, cervical M50.30    Hyponatremia E87.1    Hypercalcemia E83.52    Aortic

## 2025-07-02 NOTE — PROGRESS NOTES
Pharmacy Note  Warfarin Consult follow-up      Recent Labs     07/02/25  0336   INR 2.1     Recent Labs     06/30/25  0542 07/01/25  0428 07/02/25  0336   HGB 11.5* 12.0* 11.9*   HCT 35.7* 36.5* 36.2*    189 174       Current warfarin drug-drug interactions:      Date INR Dose                      Notes:                   CONTINUE CURRENT SCHEDULE  Daily PT/INR while inpatient.

## 2025-07-02 NOTE — PROGRESS NOTES
07/02/25 1615   Resting (Room Air)   SpO2 96   HR 74   During Walk (Room Air)   SpO2 91   HR 85   Walk/Assistance Device Cane   Rate of Dyspnea 1   Symptoms Shortness of breath   After Walk   SpO2 93   HR 78   Does the Patient Qualify for Home O2 No   Does the Patient Need Portable Oxygen Tanks No     Patient does not qualify for home oxygen.

## 2025-07-03 VITALS
BODY MASS INDEX: 33.87 KG/M2 | SYSTOLIC BLOOD PRESSURE: 106 MMHG | RESPIRATION RATE: 16 BRPM | HEART RATE: 77 BPM | DIASTOLIC BLOOD PRESSURE: 79 MMHG | OXYGEN SATURATION: 94 % | TEMPERATURE: 97.9 F | HEIGHT: 66 IN | WEIGHT: 210.76 LBS

## 2025-07-03 PROBLEM — Z92.29 HISTORY OF COUMADIN THERAPY: Status: ACTIVE | Noted: 2025-07-03

## 2025-07-03 PROBLEM — T50.905A DRUG-INDUCED EXTRAPYRAMIDAL MOVEMENT DISORDER: Status: ACTIVE | Noted: 2025-07-03

## 2025-07-03 PROBLEM — G25.89 DRUG-INDUCED EXTRAPYRAMIDAL MOVEMENT DISORDER: Status: ACTIVE | Noted: 2025-07-03

## 2025-07-03 PROBLEM — F31.9 BIPOLAR 1 DISORDER (HCC): Status: ACTIVE | Noted: 2025-07-03

## 2025-07-03 LAB
ANION GAP SERPL CALCULATED.3IONS-SCNC: 12 MMOL/L (ref 9–16)
BASOPHILS # BLD: 0.07 K/UL (ref 0–0.2)
BASOPHILS NFR BLD: 1 % (ref 0–2)
BUN SERPL-MCNC: 17 MG/DL (ref 8–23)
CALCIUM SERPL-MCNC: 9.1 MG/DL (ref 8.6–10.4)
CHLORIDE SERPL-SCNC: 96 MMOL/L (ref 98–107)
CHLORIDE UR-SCNC: 59 MMOL/L
CO2 SERPL-SCNC: 23 MMOL/L (ref 20–31)
CREAT SERPL-MCNC: 0.8 MG/DL (ref 0.7–1.2)
EOSINOPHIL # BLD: 0.19 K/UL (ref 0–0.44)
EOSINOPHILS RELATIVE PERCENT: 2 % (ref 1–4)
ERYTHROCYTE [DISTWIDTH] IN BLOOD BY AUTOMATED COUNT: 15.2 % (ref 11.8–14.4)
GFR, ESTIMATED: >90 ML/MIN/1.73M2
GLUCOSE SERPL-MCNC: 94 MG/DL (ref 74–99)
HCT VFR BLD AUTO: 36.3 % (ref 40.7–50.3)
HGB BLD-MCNC: 12 G/DL (ref 13–17)
IMM GRANULOCYTES # BLD AUTO: 0.05 K/UL (ref 0–0.3)
IMM GRANULOCYTES NFR BLD: 1 %
INR PPP: 1.6
LYMPHOCYTES NFR BLD: 0.85 K/UL (ref 1.1–3.7)
LYMPHOCYTES RELATIVE PERCENT: 10 % (ref 24–43)
MCH RBC QN AUTO: 29.5 PG (ref 25.2–33.5)
MCHC RBC AUTO-ENTMCNC: 33.1 G/DL (ref 28.4–34.8)
MCV RBC AUTO: 89.2 FL (ref 82.6–102.9)
MONOCYTES NFR BLD: 0.71 K/UL (ref 0.1–1.2)
MONOCYTES NFR BLD: 8 % (ref 3–12)
NEUTROPHILS NFR BLD: 78 % (ref 36–65)
NEUTS SEG NFR BLD: 6.62 K/UL (ref 1.5–8.1)
NRBC BLD-RTO: 0 PER 100 WBC
OSMOLALITY SERPL: 271 MOSM/KG (ref 275–295)
OSMOLALITY UR: 698 MOSM/KG (ref 80–1300)
PLATELET # BLD AUTO: 201 K/UL (ref 138–453)
PMV BLD AUTO: 8.9 FL (ref 8.1–13.5)
POTASSIUM SERPL-SCNC: 4.5 MMOL/L (ref 3.7–5.3)
POTASSIUM, UR: 41.2 MMOL/L
PROTHROMBIN TIME: 19.9 SEC (ref 11.7–14.9)
RBC # BLD AUTO: 4.07 M/UL (ref 4.21–5.77)
RBC # BLD: ABNORMAL 10*6/UL
SODIUM SERPL-SCNC: 131 MMOL/L (ref 136–145)
SODIUM UR-SCNC: 109 MMOL/L
WBC OTHER # BLD: 8.5 K/UL (ref 3.5–11.3)

## 2025-07-03 PROCEDURE — 85610 PROTHROMBIN TIME: CPT

## 2025-07-03 PROCEDURE — 99239 HOSP IP/OBS DSCHRG MGMT >30: CPT

## 2025-07-03 PROCEDURE — 83930 ASSAY OF BLOOD OSMOLALITY: CPT

## 2025-07-03 PROCEDURE — 82436 ASSAY OF URINE CHLORIDE: CPT

## 2025-07-03 PROCEDURE — 83935 ASSAY OF URINE OSMOLALITY: CPT

## 2025-07-03 PROCEDURE — 85025 COMPLETE CBC W/AUTO DIFF WBC: CPT

## 2025-07-03 PROCEDURE — 84300 ASSAY OF URINE SODIUM: CPT

## 2025-07-03 PROCEDURE — 84133 ASSAY OF URINE POTASSIUM: CPT

## 2025-07-03 PROCEDURE — 36415 COLL VENOUS BLD VENIPUNCTURE: CPT

## 2025-07-03 PROCEDURE — 2500000003 HC RX 250 WO HCPCS

## 2025-07-03 PROCEDURE — 80048 BASIC METABOLIC PNL TOTAL CA: CPT

## 2025-07-03 PROCEDURE — 6370000000 HC RX 637 (ALT 250 FOR IP)

## 2025-07-03 RX ORDER — BENZTROPINE MESYLATE 0.5 MG/1
0.5 TABLET ORAL 2 TIMES DAILY
Qty: 60 TABLET | Refills: 3 | Status: SHIPPED | OUTPATIENT
Start: 2025-07-03

## 2025-07-03 RX ORDER — WARFARIN SODIUM 2 MG/1
2 TABLET ORAL
Status: DISCONTINUED | OUTPATIENT
Start: 2025-07-03 | End: 2025-07-03 | Stop reason: HOSPADM

## 2025-07-03 RX ADMIN — SODIUM CHLORIDE, PRESERVATIVE FREE 10 ML: 5 INJECTION INTRAVENOUS at 08:31

## 2025-07-03 RX ADMIN — Medication 200 MG: at 08:30

## 2025-07-03 RX ADMIN — GABAPENTIN 900 MG: 300 CAPSULE ORAL at 08:30

## 2025-07-03 RX ADMIN — BENZTROPINE MESYLATE 0.5 MG: 0.5 TABLET ORAL at 08:30

## 2025-07-03 RX ADMIN — TAMSULOSIN HYDROCHLORIDE 0.8 MG: 0.4 CAPSULE ORAL at 08:31

## 2025-07-03 RX ADMIN — FOLIC ACID 1 MG: 1 TABLET ORAL at 08:30

## 2025-07-03 RX ADMIN — RISPERIDONE 0.5 MG: 0.5 TABLET, FILM COATED ORAL at 08:30

## 2025-07-03 RX ADMIN — PANTOPRAZOLE SODIUM 40 MG: 40 TABLET, DELAYED RELEASE ORAL at 08:31

## 2025-07-03 RX ADMIN — Medication 2000 UNITS: at 08:30

## 2025-07-03 RX ADMIN — CYANOCOBALAMIN TAB 500 MCG 1000 MCG: 500 TAB at 08:30

## 2025-07-03 RX ADMIN — ASPIRIN 81 MG: 81 TABLET, CHEWABLE ORAL at 08:30

## 2025-07-03 RX ADMIN — CETIRIZINE HYDROCHLORIDE 10 MG: 10 TABLET, FILM COATED ORAL at 08:30

## 2025-07-03 RX ADMIN — OXCARBAZEPINE 300 MG: 300 TABLET, FILM COATED ORAL at 08:30

## 2025-07-03 ASSESSMENT — ENCOUNTER SYMPTOMS
WHEEZING: 0
STRIDOR: 0
RHINORRHEA: 0
TROUBLE SWALLOWING: 0
ABDOMINAL PAIN: 0
CONSTIPATION: 0
VOICE CHANGE: 0
SHORTNESS OF BREATH: 0
EYES NEGATIVE: 1
ABDOMINAL DISTENTION: 1
EYE PAIN: 0
RESPIRATORY NEGATIVE: 1
EYE DISCHARGE: 0

## 2025-07-03 ASSESSMENT — PAIN SCALES - GENERAL: PAINLEVEL_OUTOF10: 0

## 2025-07-03 NOTE — PLAN OF CARE
Problem: Discharge Planning  Goal: Discharge to home or other facility with appropriate resources  7/3/2025 0013 by Jorge Luis Khan, RN  Outcome: Progressing     Problem: Pain  Goal: Verbalizes/displays adequate comfort level or baseline comfort level  7/3/2025 0013 by Jorge Luis Khan, RN  Outcome: Progressing     Problem: Safety - Adult  Goal: Free from fall injury  7/3/2025 0013 by Jorge Luis Khan, RN  Outcome: Progressing

## 2025-07-03 NOTE — PROGRESS NOTES
RT Evaluation for Home Oxygen    Resting (Room Air):  SpO2: 99  HR: 78      During Walk (Room Air):  SpO2: 90  HR: 110  Walk/Assistance Device: walking stick  Symptoms: pt was feeling out of breath but oxygen saturation did not fall below 88.       After Walk:  SpO2: 94  HR: 75    Does the Patient Qualify for Home O2: no      Additional Comments: pt does not qualify for oxygen at home at this time.     Electronically signed by Ruth Ann Marshall RCP on 7/3/2025 at 9:25 AM

## 2025-07-03 NOTE — PROGRESS NOTES
[facility] Neurology   IN-PATIENT SERVICE  General Neurology Progress Note   [facility]                Date:   7/3/2025  Patient name:  Larry Moore  Date of admission:  6/27/2025 10:04 AM  MRN:   [id number]  Account:  [id number]  YOB: 1960  PCP:    Elif Rivera APRN - NP  Room:   [address] [place] [address]  Code Status:    Full Code  Chief Complaint:   Chief Complaint   Patient presents with    Ear Fullness    Dizziness       Interval history      The patient was seen and examined at bedside this morning.  Labs, chart, and VS reviewed. No acute events overnight.    Patient feeling much better, patient is weaned off oxygen  Patient did not qualify for home O2 per respiratory therapy note  Chest x-ray had shown and mild left pleural effusion  Internal medicine was consulted for hyponatremia, urine and serum studies were ordered  Continue fluid restriction and hold Lasix  Discharge planning     Brief History     Larry Moore is a 64 y.o. male who presents with Ear Fullness and Dizziness, and he is admitted to the hospital for the management of  left ear fullness, gait ataxia. The patient was seen and examined and the chart was reviewed.  Patient with history of neurofibromatosis type I currently following with [facility] neurology presents to the ED today with chief complaint of left ear fullness.  States he has diminished hearing on the left side and this has been ongoing for the last several weeks.  In addition today he states he has been more unstable ambulating and has been falling more often.  He does have significant ecchymosis over the left anterior leg status post fall several days back.  He is on Coumadin with INR of 2.3 .  Sodium level 127.  He has known history of right thalamic hamartoma last MRI brain done at [facility] in June, 2024.  MRI cervical spine was motion degraded at the time.  MRI thoracic spine without acute findings.  Unable to view images.  CTA head and neck  significant stenosis or evidence for large vessel occlusion.       Assessment & Plan     Impression: Larry Moore is a 64 y.o. male with a history of neurofibromatosis type 1 presenting with worsened gait ataxia and hearing loss found to have enhancing right internal capsule lesion of unclear significance- differential includes glioma or late subacute infarct.      Enhancing right internal capsule lesion  Found on MRI brain w wo, new contrast enhancement from prior study  MRI cervical spine showed no acute cervical spine abnormality  MRI lumbar spine showed diffuse cauda equina of the lumbosacral plexus with nerve thickening  MRI thoracic spine had shown No significant disc protrusion, stenosis of the thecal sac, or narrowing of the neural foramina in the thoracic region.  No abnormal signal or enhancement of thoracic spinal cord.  Neurosurgery has seen the patient, no active intervention and signed off  PM&R consulted.  Patient does not meet criteria for acute inpatient rehab at this time.  Repeat MRI in 3 months.    H/o NF1  Diffuse cauda equina and lumbar spinal root thickening which appears to be a chronic finding 2/2 NF1 vs CIDP    Concern for akathisia and EPS  Consulted psychiatry for possible akathisia and EPS.  Psychiatry was consulted and appreciate recommendations, started Cogentin 0.5 Mg twice daily for EPS.  No other medications changes recommended.    Bipolar disorder with depression and history of suicide attempt and substance abuse  Currently no suicidal ideation  Continue risperidone 0.5 Mg 2 times daily and benztropine for EPS  Psychiatry was consulted, no further medication changes and signed off    S/p aortic valve replacement  Atrial fibrillation  History of Bentall procedure and 27 mm freestyle AVR 2015.  Bioprosthetic valve  Recent echo 55% EF with concentric LVH and severely enlarged left atrium, moderate to severe MR.  Continue warfarin with home dose, 2 Mg on Sunday Tuesday Wednesday

## 2025-07-03 NOTE — PROGRESS NOTES
CLINICAL PHARMACY NOTE: MEDS TO BEDS    Total # of Prescriptions Filled: 1   The following medications were delivered to the patient:  BENZTROPINE 0.5MG     Additional Documentation:

## 2025-07-03 NOTE — PROGRESS NOTES
Discharge instructions given to patient with good understanding. Patient discharges home with cogentin, paper work, and specific instructions.

## 2025-07-03 NOTE — DISCHARGE SUMMARY
[facility]     Department of Neurology    INPATIENT DISCHARGE SUMMARY        Patient Identification:  Larry Moore is a 64 y.o. male.  :  1960  MRN: [id number]     Acct: [id number]   Admit Date:  2025  Discharge date: 7/3/2025    Attending Provider: Bashir Martinez MD                                     Admission Diagnoses:   Dizziness [R42]    Discharge Diagnoses:   Principal Problem:    Dizziness  Active Problems:    Neurofibromatosis, type I (von Recklinghausen's disease) ([facility])    Chronic hyponatremia    Ataxic gait    Cerebrovascular accident (CVA) of right basal ganglia (HCC)    Abnormal brain MRI    Drug induced akathisia    Brain lesion    History of Coumadin therapy    Bipolar 1 disorder (HCC)    Drug-induced extrapyramidal movement disorder  Resolved Problems:    * No resolved hospital problems. *       Consults:   Internal Medicine, Neurosurgery    Brief Inpatient course:    Larry Moore is a 64 y.o. male who presents with Ear Fullness and Dizziness, and he is admitted to the hospital for the management of  left ear fullness, gait ataxia. The patient was seen and examined and the chart was reviewed.  Patient with history of neurofibromatosis type I currently following with [facility] neurology presents to the ED today with chief complaint of left ear fullness.  States he has diminished hearing on the left side and this has been ongoing for the last several weeks.  In addition today he states he has been more unstable ambulating and has been falling more often.  He does have significant ecchymosis over the left anterior leg status post fall several days back.  He is on Coumadin with INR of 2.3 .  Sodium level 127.  He has known history of right thalamic hamartoma last MRI brain done at [facility] in .  MRI cervical spine was motion degraded at the time.  MRI thoracic spine without acute findings.  Unable to view images.  CTA head and neck with no significant  as: TRILEPTAL     pantoprazole 40 MG tablet  Commonly known as: PROTONIX     risperiDONE 0.5 MG tablet  Commonly known as: RISPERDAL  Take 1 tablet by mouth 2 times daily     sennosides-docusate sodium 8.6-50 MG tablet  Commonly known as: SENOKOT-S     tamsulosin 0.4 MG capsule  Commonly known as: FLOMAX     Vitamin B 12 500 MCG Tabs     vitamin D 50 MCG (2000 UT) Caps capsule  Commonly known as: CHOLECALCIFEROL     * warfarin 2 MG tablet  Commonly known as: COUMADIN     * warfarin 2 MG tablet  Commonly known as: COUMADIN           * This list has 2 medication(s) that are the same as other medications prescribed for you. Read the directions carefully, and ask your doctor or other care provider to review them with you.                ASK your doctor about these medications      albuterol sulfate  (90 Base) MCG/ACT inhaler  Commonly known as: PROVENTIL;VENTOLIN;PROAIR               Where to Get Your Medications        These medications were sent to [place] [id number] [phone] - F [phone]  [address] [place] [address]      Phone: [phone]   benztropine 0.5 MG tablet         Activity: activity as tolerated      Diet: regular diet    Follow-up:    Jackson George MD  [address] [facility] [address]  [place] [address] [phone]  Call in 1 week(s)  Neurology follow-up    Elif Rivera, APRN - NP  [address]  [facility] [address]  [phone]    Call in 1 week(s)            Note that 35 minutes was spent in preparing discharge papers, discussing discharge with patient, medication review, etc.      Jamshid Ovalle MD,  Neurology Resident PGY-2  Department of Neurology  [place], [place]  7/3/2025, 10:59 AM    This note is created with the assistance of a speech-recognition program. While intending to generate a document that actually reflects the content of the visit, the document can still have some errors including

## 2025-07-03 NOTE — PLAN OF CARE
Problem: Discharge Planning  Goal: Discharge to home or other facility with appropriate resources  7/3/2025 0715 by Brigid Singh RN  Outcome: Progressing  7/3/2025 0013 by Jorge Luis Khan RN  Outcome: Progressing  7/2/2025 1912 by Larissa Brown RN  Outcome: Progressing  Flowsheets  Taken 7/2/2025 1217 by Larissa Bronw RN  Discharge to home or other facility with appropriate resources: Identify barriers to discharge with patient and caregiver  Taken 7/2/2025 0819 by Brigid Singh RN  Discharge to home or other facility with appropriate resources:   Identify barriers to discharge with patient and caregiver   Identify discharge learning needs (meds, wound care, etc)   Arrange for needed discharge resources and transportation as appropriate   Refer to discharge planning if patient needs post-hospital services based on physician order or complex needs related to functional status, cognitive ability or social support system     Problem: Pain  Goal: Verbalizes/displays adequate comfort level or baseline comfort level  7/3/2025 0715 by Brigid Singh RN  Outcome: Progressing  7/3/2025 0013 by Jorge Luis Khan RN  Outcome: Progressing  7/2/2025 1912 by Larissa Brown RN  Outcome: Progressing     Problem: Skin/Tissue Integrity  Goal: Skin integrity remains intact  Description: 1.  Monitor for areas of redness and/or skin breakdown  2.  Assess vascular access sites hourly  3.  Every 4-6 hours minimum:  Change oxygen saturation probe site  4.  Every 4-6 hours:  If on nasal continuous positive airway pressure, respiratory therapy assess nares and determine need for appliance change or resting period  7/3/2025 0715 by Brigid Singh RN  Outcome: Progressing  7/2/2025 1912 by Larissa Brown RN  Outcome: Progressing  Flowsheets  Taken 7/2/2025 1217 by Larissa Brown RN  Skin Integrity Remains Intact: Monitor for areas of redness and/or skin breakdown  Taken 7/2/2025 0819 by Brigid Singh

## 2025-07-03 NOTE — PROGRESS NOTES
Pharmacy Note  Warfarin Consult follow-up      Recent Labs     07/03/25  0835   INR 1.6     Recent Labs     07/01/25  0428 07/02/25  0336 07/03/25  0835   HGB 12.0* 11.9* 12.0*   HCT 36.5* 36.2* 36.3*    174 201       Current warfarin drug-drug interactions:  Notes:                   Inr low today, will give 4 mg today.   Daily PT/INR while inpatient.

## 2025-07-03 NOTE — CARE COORDINATION
Discharge Report    [facility]  Clinical Case Management Department  Written by: Venecia Roy RN    Patient Name: Larry Moore  Attending Provider: Bashir Martinez MD  Admit Date: 2025 10:04 AM  MRN: [id number]  Account: [id number]                     : 1960  Discharge Date: 7/3/25      Disposition: home    Met with patient to discuss transitional planning. Plan is to return home with [facility]. Additionally, patient takes coumadin at home which is managed by the [facility] which was verified by phone call to the clinic @ [phone] and spoke with Anna. CM notified Anna with [facility] coumadin clinic that patient will be discharging home from [facility] today and that [facility] has also accepted patient for visiting nurse services. Per anna, patient has endoscopy procedure on 7/10 and will need to stop his coumadin this Saturday and bridge with lovenox prior to his procedure. She states patient should have this information but they will call him to follow up after discharge and remind. Additionally, DALE placed call to norman with [facility] to update on above. Additionally, notified primary hospital service of above via perfect serve      Venecia Roy RN

## 2025-07-03 NOTE — CONSULTS
[facility]     Department of Internal Medicine - Staff Internal Medicine Teaching Service          CONSULT  NOTE / HISTORY AND PHYSICAL EXAMINATION   Date: 7/3/2025  Patient Name: Larry Moore  Date of admission: 6/27/2025 10:04 AM  YOB: 1960  PCP: Elif Rivera APRN - NP  History Obtained From:  patient    Consult Reason:     Consult Reason: Hyponatremia    HISTORY OF PRESENTING ILLNESS     The patient is a pleasant 64 y.o. male past medical history of chronic hyponatremia, left leg pain, neurofibromatosis type I, right thalamic hamartoma 2010, C3-C4 ACDF, history of aortic valve replacement in 2015 and Bentall procedure, A-fib on Coumadin, MGUS/paraproteinemia, CKD, CAD s/p CABG, CHF anemia, bipolar depression, hypertension, hyperlipidemia, abdominal aortic aneurysm s/p repair presented with a chief complaint of Ear Fullness and Dizziness, was found to have diminished hearing on the left side and this has been ongoing for the last several weeks. In addition today he states he has been more unstable ambulating and has been falling more often. He does have significant ecchymosis over the left anterior leg status post fall several days back. He is on Coumadin with INR of 1.6 and sodium level 129 today.     Patient was admitted under Neurology service.  IM was consulted for the management of hyponatremia          Review of Systems:  Review of Systems   Constitutional:  Negative for activity change and appetite change.   HENT:  Positive for ear pain and tinnitus. Negative for ear discharge, hearing loss, mouth sores, nosebleeds, trouble swallowing and voice change.    Eyes: Negative.  Negative for pain and discharge.   Respiratory: Negative.  Negative for shortness of breath, wheezing and stridor.    Cardiovascular: Negative.  Negative for chest pain and leg swelling.   Gastrointestinal:  Positive for abdominal distention. Negative for abdominal pain and constipation.

## 2025-07-05 LAB — 10OH-CARBAZEPINE SERPL-MCNC: 13.6 UG/ML (ref 10–35)

## 2025-07-09 NOTE — DISCHARGE INSTRUCTIONS
You came to the hospital with left ear fullness, hearing difficulty and gait ataxia.  You were seen by neurology and neurosurgery doctors in the hospital.  You are also being discharged with a medication called Cogentin as recommended by psychiatry for abnormal movements which you have.  Please continue to take Cogentin as recommended and please follow-up with your psychiatric doctor.  MRI brain was done which had shown 16 mm lesion in the right internal capsule is indeterminate.  Please call to make appointment and follow-up with neurology and neurosurgery doctors as mentioned above.  Your sodium level was less, continue to take fluids with 1500 mL restriction daily and continue to follow-up with your PCP for further.   Please call to make appointment to follow-up with PCP.  Please return to hospital or nearest ER if you experience any seizure, fever, chest pain, shortness of breath, dizziness, syncope, palpitations or any other concerning complaint.        Follow up with [facility] (established patient)   [phone]  Fax [phone]  
yes